# Patient Record
Sex: FEMALE | Race: WHITE | NOT HISPANIC OR LATINO | Employment: OTHER | URBAN - METROPOLITAN AREA
[De-identification: names, ages, dates, MRNs, and addresses within clinical notes are randomized per-mention and may not be internally consistent; named-entity substitution may affect disease eponyms.]

---

## 2017-01-03 ENCOUNTER — HOSPITAL ENCOUNTER (OUTPATIENT)
Dept: PHYSICAL THERAPY | Facility: HOSPITAL | Age: 62
Setting detail: RECURRING SERIES
Discharge: HOME | End: 2017-02-20
Attending: ORTHOPAEDIC SURGERY | Admitting: ORTHOPAEDIC SURGERY

## 2017-01-17 ENCOUNTER — HOSPITAL ENCOUNTER (OUTPATIENT)
Dept: OTHER | Facility: HOSPITAL | Age: 62
Discharge: HOME | End: 2017-01-17
Attending: UROLOGY | Admitting: UROLOGY

## 2017-01-17 LAB
ANION GAP SERPL CALC-SCNC: 11.2 MMOL/L (ref 10–20)
BASOPHILS # BLD AUTO: 0 10*3/UL (ref 0–0.2)
BASOPHILS NFR BLD AUTO: 1 % (ref 0–2)
BUN SERPL-MCNC: 22 MG/DL (ref 8–20)
BUN/CREAT SERPL: 27.5 (ref 5.4–26.2)
CALCIUM SERPL-MCNC: 9.9 MG/DL (ref 8.9–10.3)
CHLORIDE SERPL-SCNC: 101 MMOL/L (ref 101–111)
CONV CO2: 30 MMOL/L (ref 22–32)
CREAT UR-MCNC: 0.8 MG/DL (ref 0.4–1)
DIFFERENTIAL METHOD BLD: (no result)
EOSINOPHIL # BLD AUTO: 0.1 10*3/UL (ref 0–0.3)
EOSINOPHIL # BLD AUTO: 2 % (ref 0–3)
ERYTHROCYTE [DISTWIDTH] IN BLOOD BY AUTOMATED COUNT: 14.2 % (ref 11.5–14.5)
GLUCOSE SERPL-MCNC: 108 MG/DL (ref 65–99)
HCT VFR BLD AUTO: 38.8 % (ref 35–49)
HGB BLD-MCNC: 13 G/DL (ref 12–15)
LYMPHOCYTES # BLD AUTO: 1 10*3/UL (ref 0.8–4.8)
LYMPHOCYTES NFR BLD AUTO: 27 % (ref 18–42)
MCH RBC QN AUTO: 29.5 PG (ref 26–32)
MCHC RBC AUTO-ENTMCNC: 33.5 G/DL (ref 32–36)
MCV RBC AUTO: 88.1 FL (ref 80–94)
MONOCYTES # BLD AUTO: 0.3 10*3/UL (ref 0.1–1.3)
MONOCYTES NFR BLD AUTO: 7 % (ref 2–11)
NEUTROPHILS # BLD AUTO: 2.3 10*3/UL (ref 2.3–8.6)
NEUTROPHILS NFR BLD AUTO: 63 % (ref 50–75)
NRBC BLD AUTO-RTO: 0 /100{WBCS}
NRBC/RBC NFR BLD MANUAL: 0 10*3/UL
PLATELET # BLD AUTO: 168 10*3/UL (ref 150–450)
PMV BLD AUTO: 8.6 FL (ref 7.4–10.4)
POTASSIUM SERPL-SCNC: 4.2 MMOL/L (ref 3.6–5.1)
RBC # BLD AUTO: 4.41 10*6/UL (ref 4–5.4)
SODIUM SERPL-SCNC: 138 MMOL/L (ref 136–144)
WBC # BLD AUTO: 3.7 10*3/UL (ref 4.5–11.5)

## 2017-02-01 ENCOUNTER — HOSPITAL ENCOUNTER (OUTPATIENT)
Dept: GENERAL RADIOLOGY | Facility: HOSPITAL | Age: 62
Discharge: HOME | End: 2017-02-01
Attending: UROLOGY | Admitting: UROLOGY

## 2017-02-09 ENCOUNTER — HOSPITAL ENCOUNTER (OUTPATIENT)
Dept: SLEEP MEDICINE | Facility: HOSPITAL | Age: 62
Discharge: HOME | End: 2017-02-09
Attending: INTERNAL MEDICINE | Admitting: INTERNAL MEDICINE

## 2017-02-15 ENCOUNTER — HOSPITAL ENCOUNTER (OUTPATIENT)
Dept: PAIN MEDICINE | Facility: HOSPITAL | Age: 62
Discharge: HOME | End: 2017-02-15
Attending: ANESTHESIOLOGY | Admitting: ANESTHESIOLOGY

## 2017-02-18 ENCOUNTER — HOSPITAL ENCOUNTER (OUTPATIENT)
Dept: ULTRASOUND IMAGING | Facility: HOSPITAL | Age: 62
Discharge: HOME | End: 2017-02-18
Attending: INTERNAL MEDICINE | Admitting: INTERNAL MEDICINE

## 2017-02-18 LAB
ALBUMIN SERPL-MCNC: 3.6 G/DL (ref 3.5–4.8)
ALBUMIN/GLOB SERPL: 1 {RATIO} (ref 1–1.7)
ALP SERPL-CCNC: 64 IU/L (ref 32–91)
ALT SERPL-CCNC: 21 IU/L (ref 14–54)
ANION GAP SERPL CALC-SCNC: 12.5 MMOL/L (ref 10–20)
AST SERPL-CCNC: 23 IU/L (ref 15–41)
BASOPHILS # BLD AUTO: 0 10*3/UL (ref 0–0.2)
BASOPHILS NFR BLD AUTO: 1 % (ref 0–2)
BILIRUB SERPL-MCNC: 0.6 MG/DL (ref 0.3–1.2)
BUN SERPL-MCNC: 16 MG/DL (ref 8–20)
BUN/CREAT SERPL: 20 (ref 5.4–26.2)
CALCIUM SERPL-MCNC: 9.4 MG/DL (ref 8.9–10.3)
CHLORIDE SERPL-SCNC: 101 MMOL/L (ref 101–111)
CONV CO2: 29 MMOL/L (ref 22–32)
CONV TOTAL PROTEIN: 7.1 G/DL (ref 6.1–7.9)
CREAT UR-MCNC: 0.8 MG/DL (ref 0.4–1)
DIFFERENTIAL METHOD BLD: (no result)
EOSINOPHIL # BLD AUTO: 0.1 10*3/UL (ref 0–0.3)
EOSINOPHIL # BLD AUTO: 1 % (ref 0–3)
ERYTHROCYTE [DISTWIDTH] IN BLOOD BY AUTOMATED COUNT: 14.1 % (ref 11.5–14.5)
GLOBULIN UR ELPH-MCNC: 3.5 G/DL (ref 2.5–3.8)
GLUCOSE SERPL-MCNC: 100 MG/DL (ref 65–99)
HCT VFR BLD AUTO: 36.7 % (ref 35–49)
HGB BLD-MCNC: 12.3 G/DL (ref 12–15)
INR PPP: 0.8
LYMPHOCYTES # BLD AUTO: 1.3 10*3/UL (ref 0.8–4.8)
LYMPHOCYTES NFR BLD AUTO: 29 % (ref 18–42)
MCH RBC QN AUTO: 29.7 PG (ref 26–32)
MCHC RBC AUTO-ENTMCNC: 33.7 G/DL (ref 32–36)
MCV RBC AUTO: 88.3 FL (ref 80–94)
MONOCYTES # BLD AUTO: 0.4 10*3/UL (ref 0.1–1.3)
MONOCYTES NFR BLD AUTO: 9 % (ref 2–11)
NEUTROPHILS # BLD AUTO: 2.6 10*3/UL (ref 2.3–8.6)
NEUTROPHILS NFR BLD AUTO: 60 % (ref 50–75)
NRBC BLD AUTO-RTO: 0 /100{WBCS}
NRBC/RBC NFR BLD MANUAL: 0 10*3/UL
PLATELET # BLD AUTO: 146 10*3/UL (ref 150–450)
PMV BLD AUTO: 8.4 FL (ref 7.4–10.4)
POTASSIUM SERPL-SCNC: 3.5 MMOL/L (ref 3.6–5.1)
PROTHROMBIN TIME: 11.4 SEC (ref 9.6–11.7)
RBC # BLD AUTO: 4.15 10*6/UL (ref 4–5.4)
SODIUM SERPL-SCNC: 139 MMOL/L (ref 136–144)
WBC # BLD AUTO: 4.4 10*3/UL (ref 4.5–11.5)

## 2017-02-20 LAB — CONV AFP: 4 NG/ML (ref 0–9)

## 2017-04-26 ENCOUNTER — HOSPITAL ENCOUNTER (OUTPATIENT)
Dept: PAIN MEDICINE | Facility: HOSPITAL | Age: 62
Discharge: HOME | End: 2017-04-26
Attending: ANESTHESIOLOGY | Admitting: ANESTHESIOLOGY

## 2017-06-15 ENCOUNTER — HOSPITAL ENCOUNTER (OUTPATIENT)
Dept: FAMILY MEDICINE CLINIC | Facility: CLINIC | Age: 62
Setting detail: SPECIMEN
Discharge: HOME | End: 2017-06-15
Attending: PHYSICIAN ASSISTANT | Admitting: PHYSICIAN ASSISTANT

## 2017-06-15 LAB
ALBUMIN SERPL-MCNC: 4.1 G/DL (ref 3.5–4.8)
ALBUMIN/GLOB SERPL: 1.1 {RATIO} (ref 1–1.7)
ALP SERPL-CCNC: 69 IU/L (ref 32–91)
ALT SERPL-CCNC: 9 IU/L (ref 14–54)
ANION GAP SERPL CALC-SCNC: 17.4 MMOL/L (ref 10–20)
AST SERPL-CCNC: 27 IU/L (ref 15–41)
BASOPHILS # BLD AUTO: 0 10*3/UL (ref 0–0.2)
BASOPHILS NFR BLD AUTO: 1 % (ref 0–2)
BILIRUB SERPL-MCNC: 0.6 MG/DL (ref 0.3–1.2)
BUN SERPL-MCNC: 20 MG/DL (ref 8–20)
BUN/CREAT SERPL: 22.2 (ref 5.4–26.2)
CALCIUM SERPL-MCNC: 10.3 MG/DL (ref 8.9–10.3)
CHLORIDE SERPL-SCNC: 97 MMOL/L (ref 101–111)
CHOLEST SERPL-MCNC: 230 MG/DL
CHOLEST/HDLC SERPL: 3.1 {RATIO}
CONV CO2: 30 MMOL/L (ref 22–32)
CONV LDL CHOLESTEROL DIRECT: 134 MG/DL (ref 0–100)
CONV MICROALBUM.,U,RANDOM: NORMAL MG/L
CONV TOTAL PROTEIN: 7.8 G/DL (ref 6.1–7.9)
CREAT 24H UR-MCNC: 49 MG/DL
CREAT UR-MCNC: 0.9 MG/DL (ref 0.4–1)
DIFFERENTIAL METHOD BLD: (no result)
EOSINOPHIL # BLD AUTO: 0.1 10*3/UL (ref 0–0.3)
EOSINOPHIL # BLD AUTO: 2 % (ref 0–3)
ERYTHROCYTE [DISTWIDTH] IN BLOOD BY AUTOMATED COUNT: 13.9 % (ref 11.5–14.5)
GLOBULIN UR ELPH-MCNC: 3.7 G/DL (ref 2.5–3.8)
GLUCOSE SERPL-MCNC: 92 MG/DL (ref 65–99)
HCT VFR BLD AUTO: 39.3 % (ref 35–49)
HDLC SERPL-MCNC: 74 MG/DL
HGB BLD-MCNC: 13.2 G/DL (ref 12–15)
LDLC/HDLC SERPL: 1.8 {RATIO}
LIPID INTERPRETATION: ABNORMAL
LYMPHOCYTES # BLD AUTO: 1.6 10*3/UL (ref 0.8–4.8)
LYMPHOCYTES NFR BLD AUTO: 25 % (ref 18–42)
MCH RBC QN AUTO: 29.7 PG (ref 26–32)
MCHC RBC AUTO-ENTMCNC: 33.7 G/DL (ref 32–36)
MCV RBC AUTO: 88.3 FL (ref 80–94)
MICROALBUMIN/CREAT UR: NORMAL UG/MG
MONOCYTES # BLD AUTO: 0.6 10*3/UL (ref 0.1–1.3)
MONOCYTES NFR BLD AUTO: 9 % (ref 2–11)
NEUTROPHILS # BLD AUTO: 4.1 10*3/UL (ref 2.3–8.6)
NEUTROPHILS NFR BLD AUTO: 63 % (ref 50–75)
NRBC BLD AUTO-RTO: 0 /100{WBCS}
NRBC/RBC NFR BLD MANUAL: 0 10*3/UL
PLATELET # BLD AUTO: 183 10*3/UL (ref 150–450)
PMV BLD AUTO: 9.3 FL (ref 7.4–10.4)
POTASSIUM SERPL-SCNC: 4.4 MMOL/L (ref 3.6–5.1)
RBC # BLD AUTO: 4.45 10*6/UL (ref 4–5.4)
SODIUM SERPL-SCNC: 140 MMOL/L (ref 136–144)
TRIGL SERPL-MCNC: 152 MG/DL
TSH SERPL-ACNC: 1.21 UIU/ML (ref 0.34–5.6)
VLDLC SERPL CALC-MCNC: 21.8 MG/DL
WBC # BLD AUTO: 6.4 10*3/UL (ref 4.5–11.5)

## 2017-06-21 ENCOUNTER — HOSPITAL ENCOUNTER (OUTPATIENT)
Dept: PAIN MEDICINE | Facility: HOSPITAL | Age: 62
Discharge: HOME | End: 2017-06-21
Attending: ANESTHESIOLOGY | Admitting: ANESTHESIOLOGY

## 2017-06-24 ENCOUNTER — HOSPITAL ENCOUNTER (OUTPATIENT)
Dept: MRI IMAGING | Facility: HOSPITAL | Age: 62
Discharge: HOME | End: 2017-06-24
Attending: ANESTHESIOLOGY | Admitting: ANESTHESIOLOGY

## 2017-07-18 ENCOUNTER — HOSPITAL ENCOUNTER (OUTPATIENT)
Dept: CARDIOLOGY | Facility: HOSPITAL | Age: 62
Discharge: HOME | End: 2017-07-18
Attending: PHYSICIAN ASSISTANT | Admitting: PHYSICIAN ASSISTANT

## 2017-08-09 ENCOUNTER — HOSPITAL ENCOUNTER (OUTPATIENT)
Dept: PAIN MEDICINE | Facility: HOSPITAL | Age: 62
Discharge: HOME | End: 2017-08-09
Attending: ANESTHESIOLOGY | Admitting: ANESTHESIOLOGY

## 2017-08-30 ENCOUNTER — HOSPITAL ENCOUNTER (OUTPATIENT)
Dept: OTHER | Facility: HOSPITAL | Age: 62
Discharge: HOME | End: 2017-08-30
Attending: PHYSICIAN ASSISTANT | Admitting: PHYSICIAN ASSISTANT

## 2017-08-30 LAB
ALBUMIN SERPL-MCNC: 3.7 G/DL (ref 3.5–4.8)
ALBUMIN/GLOB SERPL: 1 {RATIO} (ref 1–1.7)
ALP SERPL-CCNC: 60 IU/L (ref 32–91)
ALT SERPL-CCNC: 11 IU/L (ref 14–54)
ANION GAP SERPL CALC-SCNC: 15 MMOL/L (ref 10–20)
AST SERPL-CCNC: 24 IU/L (ref 15–41)
BASOPHILS # BLD AUTO: 0 10*3/UL (ref 0–0.2)
BASOPHILS NFR BLD AUTO: 1 % (ref 0–2)
BILIRUB SERPL-MCNC: 0.6 MG/DL (ref 0.3–1.2)
BUN SERPL-MCNC: 17 MG/DL (ref 8–20)
BUN/CREAT SERPL: 21.3 (ref 5.4–26.2)
CALCIUM SERPL-MCNC: 9.6 MG/DL (ref 8.9–10.3)
CHLORIDE SERPL-SCNC: 98 MMOL/L (ref 101–111)
CONV AFP: 4 NG/ML (ref 0–9)
CONV CO2: 30 MMOL/L (ref 22–32)
CONV TOTAL PROTEIN: 7.4 G/DL (ref 6.1–7.9)
CREAT UR-MCNC: 0.8 MG/DL (ref 0.4–1)
DIFFERENTIAL METHOD BLD: (no result)
EOSINOPHIL # BLD AUTO: 0.1 10*3/UL (ref 0–0.3)
EOSINOPHIL # BLD AUTO: 2 % (ref 0–3)
ERYTHROCYTE [DISTWIDTH] IN BLOOD BY AUTOMATED COUNT: 14.2 % (ref 11.5–14.5)
GLOBULIN UR ELPH-MCNC: 3.7 G/DL (ref 2.5–3.8)
GLUCOSE SERPL-MCNC: 110 MG/DL (ref 65–99)
HCT VFR BLD AUTO: 36.5 % (ref 35–49)
HGB BLD-MCNC: 12.2 G/DL (ref 12–15)
INR PPP: 1 (ref 2–3)
LYMPHOCYTES # BLD AUTO: 1.4 10*3/UL (ref 0.8–4.8)
LYMPHOCYTES NFR BLD AUTO: 32 % (ref 18–42)
MCH RBC QN AUTO: 29.2 PG (ref 26–32)
MCHC RBC AUTO-ENTMCNC: 33.5 G/DL (ref 32–36)
MCV RBC AUTO: 87.4 FL (ref 80–94)
MONOCYTES # BLD AUTO: 0.4 10*3/UL (ref 0.1–1.3)
MONOCYTES NFR BLD AUTO: 9 % (ref 2–11)
NEUTROPHILS # BLD AUTO: 2.5 10*3/UL (ref 2.3–8.6)
NEUTROPHILS NFR BLD AUTO: 56 % (ref 50–75)
NRBC BLD AUTO-RTO: 0 /100{WBCS}
NRBC/RBC NFR BLD MANUAL: 0 10*3/UL
PLATELET # BLD AUTO: 154 10*3/UL (ref 150–450)
PMV BLD AUTO: 8 FL (ref 7.4–10.4)
POTASSIUM SERPL-SCNC: 4 MMOL/L (ref 3.6–5.1)
PROTHROMBIN TIME: 10.8 SEC (ref 19.4–28.5)
RBC # BLD AUTO: 4.18 10*6/UL (ref 4–5.4)
SODIUM SERPL-SCNC: 139 MMOL/L (ref 136–144)
WBC # BLD AUTO: 4.4 10*3/UL (ref 4.5–11.5)

## 2017-09-12 ENCOUNTER — HOSPITAL ENCOUNTER (OUTPATIENT)
Dept: ORTHOPEDIC SURGERY | Facility: CLINIC | Age: 62
Discharge: HOME | End: 2017-09-12
Attending: ORTHOPAEDIC SURGERY | Admitting: ORTHOPAEDIC SURGERY

## 2017-10-03 ENCOUNTER — HOSPITAL ENCOUNTER (OUTPATIENT)
Dept: PREADMISSION TESTING | Facility: HOSPITAL | Age: 62
Discharge: HOME | End: 2017-10-03
Attending: ORTHOPAEDIC SURGERY | Admitting: ORTHOPAEDIC SURGERY

## 2017-10-03 LAB
ABO + RH BLD: NORMAL
ALBUMIN SERPL-MCNC: 3.7 G/DL (ref 3.5–4.8)
ALBUMIN/GLOB SERPL: 1 {RATIO} (ref 1–1.7)
ALP SERPL-CCNC: 69 IU/L (ref 32–91)
ALT SERPL-CCNC: 9 IU/L (ref 14–54)
AMPHETAMINES UR QL SCN: NEGATIVE
AMPICILLIN SUSC ISLT: NORMAL
ANION GAP SERPL CALC-SCNC: 11.5 MMOL/L (ref 10–20)
ARMBAND: NORMAL
AST SERPL-CCNC: 29 IU/L (ref 15–41)
AZTREONAM SUSC ISLT: NORMAL
BACTERIA ISLT: NORMAL
BACTERIA SPEC AEROBE CULT: NORMAL
BACTERIA SPEC AEROBE CULT: NORMAL
BARBITURATES UR QL SCN: NEGATIVE
BASOPHILS # BLD AUTO: 0 10*3/UL (ref 0–0.2)
BASOPHILS NFR BLD AUTO: 1 % (ref 0–2)
BENZODIAZ UR QL SCN: NEGATIVE
BILIRUB SERPL-MCNC: 0.6 MG/DL (ref 0.3–1.2)
BILIRUB UR QL STRIP: NEGATIVE MG/DL
BLD COMPONENT TYPE: NORMAL
BLD GP AB SCN SERPL QL: NEGATIVE
BUN SERPL-MCNC: 19 MG/DL (ref 8–20)
BUN/CREAT SERPL: 21.1 (ref 5.4–26.2)
BZE UR QL SCN: NEGATIVE
CALCIUM SERPL-MCNC: 9.4 MG/DL (ref 8.9–10.3)
CASTS URNS QL MICRO: ABNORMAL /[LPF]
CEFAZOLIN SUSC ISLT: NORMAL
CEFEPIME SUSC ISLT: NORMAL
CEFTRIAXONE SUSC ISLT: NORMAL
CHLORIDE SERPL-SCNC: 97 MMOL/L (ref 101–111)
CIPROFLOXACIN SUSC ISLT: NORMAL
COLONY COUNT: NORMAL
COLOR UR: YELLOW
CONV BACTERIA IN URINE MICRO: ABNORMAL
CONV CLARITY OF URINE: ABNORMAL
CONV CO2: 30 MMOL/L (ref 22–32)
CONV HYALINE CASTS IN URINE MICRO: ABNORMAL /[LPF] (ref 0–5)
CONV PROTEIN IN URINE BY AUTOMATED TEST STRIP: NEGATIVE MG/DL
CONV SMALL ROUND CELLS: ABNORMAL /[HPF]
CONV TOTAL PROTEIN: 7.3 G/DL (ref 6.1–7.9)
CONV UROBILINOGEN IN URINE BY AUTOMATED TEST STRIP: 0.2 MG/DL
CREAT 24H UR-MCNC: NORMAL MG/DL
CREAT UR-MCNC: 0.9 MG/DL (ref 0.4–1)
CROSSMATCH EXPIRATION: NORMAL
CULTURE INDICATED?: ABNORMAL
DIFFERENTIAL METHOD BLD: (no result)
EOSINOPHIL # BLD AUTO: 0.1 10*3/UL (ref 0–0.3)
EOSINOPHIL # BLD AUTO: 2 % (ref 0–3)
ERTAPENEM SUSC ISLT: NORMAL
ERYTHROCYTE [DISTWIDTH] IN BLOOD BY AUTOMATED COUNT: 14.2 % (ref 11.5–14.5)
GLOBULIN UR ELPH-MCNC: 3.6 G/DL (ref 2.5–3.8)
GLUCOSE SERPL-MCNC: 121 MG/DL (ref 65–99)
GLUCOSE UR QL: NEGATIVE MG/DL
HCT VFR BLD AUTO: 37 % (ref 35–49)
HGB BLD-MCNC: 12.4 G/DL (ref 12–15)
HGB UR QL STRIP: NEGATIVE
KETONES UR QL STRIP: NEGATIVE MG/DL
LEUKOCYTE ESTERASE UR QL STRIP: ABNORMAL
LEVOFLOXACIN SUSC ISLT: NORMAL
LYMPHOCYTES # BLD AUTO: 1.4 10*3/UL (ref 0.8–4.8)
LYMPHOCYTES NFR BLD AUTO: 26 % (ref 18–42)
Lab: NORMAL
Lab: NORMAL
MCH RBC QN AUTO: 29.4 PG (ref 26–32)
MCHC RBC AUTO-ENTMCNC: 33.7 G/DL (ref 32–36)
MCV RBC AUTO: 87.2 FL (ref 80–94)
MEROPENEM SUSC ISLT: NORMAL
METHADONE UR QL SCN: NEGATIVE
MICRO REPORT STATUS: NORMAL
MICRO REPORT STATUS: NORMAL
MONOCYTES # BLD AUTO: 0.4 10*3/UL (ref 0.1–1.3)
MONOCYTES NFR BLD AUTO: 8 % (ref 2–11)
NEUTROPHILS # BLD AUTO: 3.4 10*3/UL (ref 2.3–8.6)
NEUTROPHILS NFR BLD AUTO: 63 % (ref 50–75)
NITRITE UR QL STRIP: POSITIVE
NITROFURANTOIN SUSC ISLT: NORMAL
NRBC BLD AUTO-RTO: 0 /100{WBCS}
NRBC/RBC NFR BLD MANUAL: 0 10*3/UL
OPIATES TESTED UR SCN: NEGATIVE
PCP UR QL: NEGATIVE
PH UR STRIP.AUTO: 7 [PH] (ref 4.5–8)
PIP+TAZO SUSC ISLT: NORMAL
PLATELET # BLD AUTO: 155 10*3/UL (ref 150–450)
PMV BLD AUTO: 8.4 FL (ref 7.4–10.4)
POTASSIUM SERPL-SCNC: 3.5 MMOL/L (ref 3.6–5.1)
RBC # BLD AUTO: 4.24 10*6/UL (ref 4–5.4)
RBC #/AREA URNS HPF: 1 /[HPF] (ref 0–3)
SODIUM SERPL-SCNC: 135 MMOL/L (ref 136–144)
SP GR UR: 1.02 (ref 1–1.03)
SPECIMEN SOURCE: ABNORMAL
SPECIMEN SOURCE: NORMAL
SPECIMEN SOURCE: NORMAL
SPERM URNS QL MICRO: ABNORMAL /[HPF]
SQUAMOUS SPT QL MICRO: 0 /[HPF] (ref 0–5)
SUSC METH SPEC: NORMAL
TETRACYCLINE SUSC ISLT: NORMAL
THC SERPLBLD CFM-MCNC: NEGATIVE NG/ML
TOBRAMYCIN SUSC ISLT: NORMAL
TRIMETHOPRIM/SULFA: NORMAL
UNIDENT CRYS URNS QL MICRO: ABNORMAL /[HPF]
WBC # BLD AUTO: 5.3 10*3/UL (ref 4.5–11.5)
WBC #/AREA URNS HPF: 25 /[HPF] (ref 0–5)
YEAST SPEC QL WET PREP: ABNORMAL /[HPF]

## 2017-10-10 ENCOUNTER — HOSPITAL ENCOUNTER (OUTPATIENT)
Dept: LAB | Facility: HOSPITAL | Age: 62
Discharge: HOME | End: 2017-10-10
Attending: PHYSICIAN ASSISTANT | Admitting: PHYSICIAN ASSISTANT

## 2017-10-10 LAB
BILIRUB UR QL STRIP: NEGATIVE MG/DL
CASTS URNS QL MICRO: NORMAL /[LPF]
COLOR UR: YELLOW
CONV BACTERIA IN URINE MICRO: NEGATIVE
CONV CLARITY OF URINE: CLEAR
CONV HYALINE CASTS IN URINE MICRO: 1 /[LPF] (ref 0–5)
CONV PROTEIN IN URINE BY AUTOMATED TEST STRIP: NEGATIVE MG/DL
CONV SMALL ROUND CELLS: NORMAL /[HPF]
CONV UROBILINOGEN IN URINE BY AUTOMATED TEST STRIP: 1 MG/DL
CULTURE INDICATED?: NORMAL
GLUCOSE UR QL: NEGATIVE MG/DL
HGB UR QL STRIP: NEGATIVE
KETONES UR QL STRIP: NEGATIVE MG/DL
LEUKOCYTE ESTERASE UR QL STRIP: NEGATIVE
NITRITE UR QL STRIP: NEGATIVE
PH UR STRIP.AUTO: 6.5 [PH] (ref 4.5–8)
RBC #/AREA URNS HPF: 1 /[HPF] (ref 0–3)
SP GR UR: 1.02 (ref 1–1.03)
SPERM URNS QL MICRO: NORMAL /[HPF]
SQUAMOUS SPT QL MICRO: 1 /[HPF] (ref 0–5)
UNIDENT CRYS URNS QL MICRO: NORMAL /[HPF]
WBC #/AREA URNS HPF: 2 /[HPF] (ref 0–5)
YEAST SPEC QL WET PREP: NORMAL /[HPF]

## 2017-10-11 ENCOUNTER — HOSPITAL ENCOUNTER (OUTPATIENT)
Dept: CARDIOLOGY | Facility: HOSPITAL | Age: 62
Discharge: HOME | End: 2017-10-11
Attending: INTERNAL MEDICINE | Admitting: INTERNAL MEDICINE

## 2017-12-12 ENCOUNTER — ON CAMPUS - OUTPATIENT (AMBULATORY)
Dept: URBAN - METROPOLITAN AREA HOSPITAL 85 | Facility: HOSPITAL | Age: 62
End: 2017-12-12

## 2017-12-12 ENCOUNTER — HOSPITAL ENCOUNTER (OUTPATIENT)
Dept: PREOP | Facility: HOSPITAL | Age: 62
Setting detail: HOSPITAL OUTPATIENT SURGERY
Discharge: HOME | End: 2017-12-12
Attending: INTERNAL MEDICINE | Admitting: INTERNAL MEDICINE

## 2017-12-12 DIAGNOSIS — I85.00 ESOPHAGEAL VARICES WITHOUT BLEEDING: ICD-10-CM

## 2017-12-12 DIAGNOSIS — K75.81 NONALCOHOLIC STEATOHEPATITIS (NASH): ICD-10-CM

## 2017-12-12 DIAGNOSIS — K44.9 DIAPHRAGMATIC HERNIA WITHOUT OBSTRUCTION OR GANGRENE: ICD-10-CM

## 2017-12-12 DIAGNOSIS — R13.10 DYSPHAGIA, UNSPECIFIED: ICD-10-CM

## 2017-12-12 PROCEDURE — 43450 DILATE ESOPHAGUS 1/MULT PASS: CPT | Performed by: INTERNAL MEDICINE

## 2017-12-12 PROCEDURE — 43235 EGD DIAGNOSTIC BRUSH WASH: CPT | Performed by: INTERNAL MEDICINE

## 2018-02-02 ENCOUNTER — HOSPITAL ENCOUNTER (OUTPATIENT)
Dept: GENERAL RADIOLOGY | Facility: HOSPITAL | Age: 63
Discharge: HOME | End: 2018-02-02
Attending: UROLOGY | Admitting: UROLOGY

## 2018-02-14 ENCOUNTER — HOSPITAL ENCOUNTER (OUTPATIENT)
Dept: GENERAL RADIOLOGY | Facility: HOSPITAL | Age: 63
Discharge: HOME | End: 2018-02-14
Attending: UROLOGY | Admitting: UROLOGY

## 2018-03-07 ENCOUNTER — HOSPITAL ENCOUNTER (OUTPATIENT)
Dept: ORTHOPEDIC SURGERY | Facility: CLINIC | Age: 63
Discharge: HOME | End: 2018-03-07
Attending: ORTHOPAEDIC SURGERY | Admitting: ORTHOPAEDIC SURGERY

## 2018-03-10 ENCOUNTER — HOSPITAL ENCOUNTER (OUTPATIENT)
Dept: MRI IMAGING | Facility: HOSPITAL | Age: 63
Discharge: HOME | End: 2018-03-10
Attending: ORTHOPAEDIC SURGERY | Admitting: ORTHOPAEDIC SURGERY

## 2018-04-03 ENCOUNTER — HOSPITAL ENCOUNTER (OUTPATIENT)
Dept: FAMILY MEDICINE CLINIC | Facility: CLINIC | Age: 63
Setting detail: SPECIMEN
Discharge: HOME | End: 2018-04-03
Attending: FAMILY MEDICINE | Admitting: FAMILY MEDICINE

## 2018-04-03 LAB
AMPICILLIN SUSC ISLT: NORMAL
AZTREONAM SUSC ISLT: NORMAL
BACTERIA ISLT: NORMAL
BACTERIA SPEC AEROBE CULT: NORMAL
BILIRUB UR QL STRIP: NEGATIVE MG/DL
CASTS URNS QL MICRO: ABNORMAL /[LPF]
CEFAZOLIN SUSC ISLT: NORMAL
CEFEPIME SUSC ISLT: NORMAL
CEFTRIAXONE SUSC ISLT: NORMAL
CIPROFLOXACIN SUSC ISLT: NORMAL
COLONY COUNT: NORMAL
COLOR UR: YELLOW
CONV BACTERIA IN URINE MICRO: ABNORMAL
CONV CLARITY OF URINE: CLEAR
CONV HYALINE CASTS IN URINE MICRO: 0 /[LPF] (ref 0–5)
CONV PROTEIN IN URINE BY AUTOMATED TEST STRIP: NEGATIVE MG/DL
CONV SMALL ROUND CELLS: ABNORMAL /[HPF]
CONV UROBILINOGEN IN URINE BY AUTOMATED TEST STRIP: 0.2 MG/DL
CULTURE INDICATED?: ABNORMAL
ERTAPENEM SUSC ISLT: NORMAL
GLUCOSE UR QL: NEGATIVE MG/DL
HGB UR QL STRIP: NEGATIVE
KETONES UR QL STRIP: NEGATIVE MG/DL
LEUKOCYTE ESTERASE UR QL STRIP: ABNORMAL
LEVOFLOXACIN SUSC ISLT: NORMAL
Lab: NORMAL
MEROPENEM SUSC ISLT: NORMAL
MICRO REPORT STATUS: NORMAL
NITRITE UR QL STRIP: NEGATIVE
NITROFURANTOIN SUSC ISLT: NORMAL
PH UR STRIP.AUTO: 7 [PH] (ref 4.5–8)
PIP+TAZO SUSC ISLT: NORMAL
RBC #/AREA URNS HPF: 1 /[HPF] (ref 0–3)
SP GR UR: 1.02 (ref 1–1.03)
SPECIMEN SOURCE: NORMAL
SPERM URNS QL MICRO: ABNORMAL /[HPF]
SQUAMOUS SPT QL MICRO: 2 /[HPF] (ref 0–5)
SUSC METH SPEC: NORMAL
TETRACYCLINE SUSC ISLT: NORMAL
TOBRAMYCIN SUSC ISLT: NORMAL
TRIMETHOPRIM/SULFA: NORMAL
UNIDENT CRYS URNS QL MICRO: ABNORMAL /[HPF]
WBC #/AREA URNS HPF: 15 /[HPF] (ref 0–5)
YEAST SPEC QL WET PREP: ABNORMAL /[HPF]

## 2018-04-10 ENCOUNTER — HOSPITAL ENCOUNTER (OUTPATIENT)
Dept: PREADMISSION TESTING | Facility: HOSPITAL | Age: 63
Discharge: HOME | End: 2018-04-10
Attending: ORTHOPAEDIC SURGERY | Admitting: ORTHOPAEDIC SURGERY

## 2018-04-10 LAB
ABO + RH BLD: NORMAL
ALBUMIN SERPL-MCNC: 3.5 G/DL (ref 3.5–4.8)
ALBUMIN/GLOB SERPL: 1.1 {RATIO} (ref 1–1.7)
ALP SERPL-CCNC: 72 IU/L (ref 32–91)
ALT SERPL-CCNC: 7 IU/L (ref 14–54)
ANION GAP SERPL CALC-SCNC: 14.8 MMOL/L (ref 10–20)
ARMBAND: NORMAL
AST SERPL-CCNC: 21 IU/L (ref 15–41)
BACTERIA SPEC AEROBE CULT: NORMAL
BASOPHILS # BLD AUTO: 0 10*3/UL (ref 0–0.2)
BASOPHILS NFR BLD AUTO: 0 % (ref 0–2)
BILIRUB SERPL-MCNC: 0.4 MG/DL (ref 0.3–1.2)
BILIRUB UR QL STRIP: NEGATIVE MG/DL
BLD COMPONENT TYPE: NORMAL
BLD GP AB SCN SERPL QL: NEGATIVE
BUN SERPL-MCNC: 17 MG/DL (ref 8–20)
BUN/CREAT SERPL: 18.9 (ref 5.4–26.2)
CALCIUM SERPL-MCNC: 9 MG/DL (ref 8.9–10.3)
CASTS URNS QL MICRO: ABNORMAL /[LPF]
CHLORIDE SERPL-SCNC: 93 MMOL/L (ref 101–111)
COLOR UR: YELLOW
CONV BACTERIA IN URINE MICRO: NEGATIVE
CONV CLARITY OF URINE: CLEAR
CONV CO2: 27 MMOL/L (ref 22–32)
CONV HYALINE CASTS IN URINE MICRO: 1 /[LPF] (ref 0–5)
CONV PROTEIN IN URINE BY AUTOMATED TEST STRIP: NEGATIVE MG/DL
CONV SMALL ROUND CELLS: ABNORMAL /[HPF]
CONV TOTAL PROTEIN: 6.7 G/DL (ref 6.1–7.9)
CONV UROBILINOGEN IN URINE BY AUTOMATED TEST STRIP: 0.2 MG/DL
CREAT UR-MCNC: 0.9 MG/DL (ref 0.4–1)
CROSSMATCH EXPIRATION: NORMAL
CULTURE INDICATED?: ABNORMAL
DIFFERENTIAL METHOD BLD: (no result)
EOSINOPHIL # BLD AUTO: 0.1 10*3/UL (ref 0–0.3)
EOSINOPHIL # BLD AUTO: 1 % (ref 0–3)
ERYTHROCYTE [DISTWIDTH] IN BLOOD BY AUTOMATED COUNT: 16.1 % (ref 11.5–14.5)
GLOBULIN UR ELPH-MCNC: 3.2 G/DL (ref 2.5–3.8)
GLUCOSE SERPL-MCNC: 108 MG/DL (ref 65–99)
GLUCOSE UR QL: NEGATIVE MG/DL
HCT VFR BLD AUTO: 34.7 % (ref 35–49)
HGB BLD-MCNC: 11.5 G/DL (ref 12–15)
HGB UR QL STRIP: NEGATIVE
KETONES UR QL STRIP: NEGATIVE MG/DL
LEUKOCYTE ESTERASE UR QL STRIP: ABNORMAL
LYMPHOCYTES # BLD AUTO: 1.1 10*3/UL (ref 0.8–4.8)
LYMPHOCYTES NFR BLD AUTO: 25 % (ref 18–42)
Lab: NORMAL
MCH RBC QN AUTO: 27.9 PG (ref 26–32)
MCHC RBC AUTO-ENTMCNC: 33.3 G/DL (ref 32–36)
MCV RBC AUTO: 83.8 FL (ref 80–94)
MICRO REPORT STATUS: NORMAL
MONOCYTES # BLD AUTO: 0.4 10*3/UL (ref 0.1–1.3)
MONOCYTES NFR BLD AUTO: 8 % (ref 2–11)
NEUTROPHILS # BLD AUTO: 3 10*3/UL (ref 2.3–8.6)
NEUTROPHILS NFR BLD AUTO: 66 % (ref 50–75)
NITRITE UR QL STRIP: NEGATIVE
NRBC BLD AUTO-RTO: 0 /100{WBCS}
NRBC/RBC NFR BLD MANUAL: 0 10*3/UL
PH UR STRIP.AUTO: 6 [PH] (ref 4.5–8)
PLATELET # BLD AUTO: 183 10*3/UL (ref 150–450)
PMV BLD AUTO: 7.6 FL (ref 7.4–10.4)
POTASSIUM SERPL-SCNC: 3.8 MMOL/L (ref 3.6–5.1)
RBC # BLD AUTO: 4.14 10*6/UL (ref 4–5.4)
RBC #/AREA URNS HPF: 1 /[HPF] (ref 0–3)
SODIUM SERPL-SCNC: 131 MMOL/L (ref 136–144)
SP GR UR: 1.03 (ref 1–1.03)
SPECIMEN SOURCE: ABNORMAL
SPECIMEN SOURCE: NORMAL
SPERM URNS QL MICRO: ABNORMAL /[HPF]
SQUAMOUS SPT QL MICRO: 1 /[HPF] (ref 0–5)
UNIDENT CRYS URNS QL MICRO: ABNORMAL /[HPF]
WBC # BLD AUTO: 4.6 10*3/UL (ref 4.5–11.5)
WBC #/AREA URNS HPF: 6 /[HPF] (ref 0–5)
YEAST SPEC QL WET PREP: ABNORMAL /[HPF]

## 2018-04-12 ENCOUNTER — HOSPITAL ENCOUNTER (OUTPATIENT)
Dept: FAMILY MEDICINE CLINIC | Facility: CLINIC | Age: 63
Setting detail: SPECIMEN
Discharge: HOME | End: 2018-04-12
Attending: PHYSICIAN ASSISTANT | Admitting: PHYSICIAN ASSISTANT

## 2018-04-12 LAB
CHOLEST SERPL-MCNC: 226 MG/DL
CHOLEST/HDLC SERPL: 3 {RATIO}
CONV LDL CHOLESTEROL DIRECT: 120 MG/DL (ref 0–100)
HDLC SERPL-MCNC: 76 MG/DL
LDLC/HDLC SERPL: 1.6 {RATIO}
LIPID INTERPRETATION: ABNORMAL
TRIGL SERPL-MCNC: 116 MG/DL
VLDLC SERPL CALC-MCNC: 30.4 MG/DL

## 2018-05-01 ENCOUNTER — HOSPITAL ENCOUNTER (OUTPATIENT)
Dept: ORTHOPEDIC SURGERY | Facility: CLINIC | Age: 63
Discharge: HOME | End: 2018-05-01
Attending: ORTHOPAEDIC SURGERY | Admitting: ORTHOPAEDIC SURGERY

## 2018-05-22 ENCOUNTER — HOSPITAL ENCOUNTER (OUTPATIENT)
Dept: ORTHOPEDIC SURGERY | Facility: CLINIC | Age: 63
Discharge: HOME | End: 2018-05-22
Attending: ORTHOPAEDIC SURGERY | Admitting: ORTHOPAEDIC SURGERY

## 2018-06-11 ENCOUNTER — HOSPITAL ENCOUNTER (OUTPATIENT)
Dept: RESPIRATORY THERAPY | Facility: HOSPITAL | Age: 63
Discharge: HOME | End: 2018-06-11
Attending: NURSE PRACTITIONER | Admitting: NURSE PRACTITIONER

## 2018-06-13 ENCOUNTER — HOSPITAL ENCOUNTER (OUTPATIENT)
Dept: CT IMAGING | Facility: HOSPITAL | Age: 63
Discharge: HOME | End: 2018-06-13
Attending: UROLOGY | Admitting: UROLOGY

## 2018-06-21 ENCOUNTER — HOSPITAL ENCOUNTER (OUTPATIENT)
Dept: RESPIRATORY THERAPY | Facility: HOSPITAL | Age: 63
Discharge: HOME | End: 2018-06-21
Attending: NURSE PRACTITIONER | Admitting: NURSE PRACTITIONER

## 2018-07-03 ENCOUNTER — HOSPITAL ENCOUNTER (OUTPATIENT)
Dept: ORTHOPEDIC SURGERY | Facility: CLINIC | Age: 63
Discharge: HOME | End: 2018-07-03
Attending: PHYSICIAN ASSISTANT | Admitting: PHYSICIAN ASSISTANT

## 2018-07-03 ENCOUNTER — HOSPITAL ENCOUNTER (OUTPATIENT)
Dept: CT IMAGING | Facility: HOSPITAL | Age: 63
Discharge: HOME | End: 2018-07-03
Attending: PHYSICIAN ASSISTANT | Admitting: PHYSICIAN ASSISTANT

## 2018-07-13 ENCOUNTER — HOSPITAL ENCOUNTER (OUTPATIENT)
Dept: MRI IMAGING | Facility: HOSPITAL | Age: 63
Discharge: HOME | End: 2018-07-13
Attending: PHYSICIAN ASSISTANT | Admitting: PHYSICIAN ASSISTANT

## 2018-07-19 ENCOUNTER — HOSPITAL ENCOUNTER (OUTPATIENT)
Dept: RESPIRATORY THERAPY | Facility: HOSPITAL | Age: 63
Discharge: HOME | End: 2018-07-19
Attending: INTERNAL MEDICINE | Admitting: INTERNAL MEDICINE

## 2018-07-23 ENCOUNTER — HOSPITAL ENCOUNTER (OUTPATIENT)
Dept: FAMILY MEDICINE CLINIC | Facility: CLINIC | Age: 63
Setting detail: SPECIMEN
Discharge: HOME | End: 2018-07-23
Attending: FAMILY MEDICINE | Admitting: FAMILY MEDICINE

## 2018-07-23 LAB
ALBUMIN SERPL-MCNC: 4 G/DL (ref 3.5–4.8)
ALBUMIN/GLOB SERPL: 1.1 {RATIO} (ref 1–1.7)
ALP SERPL-CCNC: 70 IU/L (ref 32–91)
ALT SERPL-CCNC: 10 IU/L (ref 14–54)
ANION GAP SERPL CALC-SCNC: 12.9 MMOL/L (ref 10–20)
AST SERPL-CCNC: 21 IU/L (ref 15–41)
BASOPHILS # BLD AUTO: 0 10*3/UL (ref 0–0.2)
BASOPHILS NFR BLD AUTO: 0 % (ref 0–2)
BILIRUB SERPL-MCNC: 0.5 MG/DL (ref 0.3–1.2)
BUN SERPL-MCNC: 21 MG/DL (ref 8–20)
BUN/CREAT SERPL: 26.3 (ref 5.4–26.2)
CALCIUM SERPL-MCNC: 9.9 MG/DL (ref 8.9–10.3)
CHLORIDE SERPL-SCNC: 97 MMOL/L (ref 101–111)
CONV CO2: 29 MMOL/L (ref 22–32)
CONV TOTAL PROTEIN: 7.5 G/DL (ref 6.1–7.9)
CREAT UR-MCNC: 0.8 MG/DL (ref 0.4–1)
DIFFERENTIAL METHOD BLD: (no result)
EOSINOPHIL # BLD AUTO: 0.1 10*3/UL (ref 0–0.3)
EOSINOPHIL # BLD AUTO: 1 % (ref 0–3)
ERYTHROCYTE [DISTWIDTH] IN BLOOD BY AUTOMATED COUNT: 17.1 % (ref 11.5–14.5)
GLOBULIN UR ELPH-MCNC: 3.5 G/DL (ref 2.5–3.8)
GLUCOSE SERPL-MCNC: 99 MG/DL (ref 65–99)
HCT VFR BLD AUTO: 37.3 % (ref 35–49)
HGB BLD-MCNC: 12.1 G/DL (ref 12–15)
LYMPHOCYTES # BLD AUTO: 1.4 10*3/UL (ref 0.8–4.8)
LYMPHOCYTES NFR BLD AUTO: 19 % (ref 18–42)
MCH RBC QN AUTO: 26.9 PG (ref 26–32)
MCHC RBC AUTO-ENTMCNC: 32.3 G/DL (ref 32–36)
MCV RBC AUTO: 83.1 FL (ref 80–94)
MONOCYTES # BLD AUTO: 0.6 10*3/UL (ref 0.1–1.3)
MONOCYTES NFR BLD AUTO: 8 % (ref 2–11)
NEUTROPHILS # BLD AUTO: 5.4 10*3/UL (ref 2.3–8.6)
NEUTROPHILS NFR BLD AUTO: 72 % (ref 50–75)
NRBC BLD AUTO-RTO: 0 /100{WBCS}
NRBC/RBC NFR BLD MANUAL: 0 10*3/UL
PLATELET # BLD AUTO: 213 10*3/UL (ref 150–450)
PMV BLD AUTO: 8.1 FL (ref 7.4–10.4)
POTASSIUM SERPL-SCNC: 3.9 MMOL/L (ref 3.6–5.1)
RBC # BLD AUTO: 4.49 10*6/UL (ref 4–5.4)
SODIUM SERPL-SCNC: 135 MMOL/L (ref 136–144)
WBC # BLD AUTO: 7.5 10*3/UL (ref 4.5–11.5)

## 2018-07-26 ENCOUNTER — HOSPITAL ENCOUNTER (OUTPATIENT)
Dept: LAB | Facility: HOSPITAL | Age: 63
Discharge: HOME | End: 2018-07-26
Attending: INTERNAL MEDICINE | Admitting: INTERNAL MEDICINE

## 2018-07-26 ENCOUNTER — HOSPITAL ENCOUNTER (OUTPATIENT)
Dept: SLEEP MEDICINE | Facility: HOSPITAL | Age: 63
Discharge: HOME | End: 2018-07-26
Attending: INTERNAL MEDICINE | Admitting: INTERNAL MEDICINE

## 2018-07-26 LAB
BASOPHILS # BLD AUTO: 0 10*3/UL (ref 0–0.2)
BASOPHILS NFR BLD AUTO: 0 % (ref 0–2)
DIFFERENTIAL METHOD BLD: (no result)
EOSINOPHIL # BLD AUTO: 0.1 10*3/UL (ref 0–0.3)
EOSINOPHIL # BLD AUTO: 1 % (ref 0–3)
ERYTHROCYTE [DISTWIDTH] IN BLOOD BY AUTOMATED COUNT: 16.5 % (ref 11.5–14.5)
HCT VFR BLD AUTO: 36 % (ref 35–49)
HGB BLD-MCNC: 11.6 G/DL (ref 12–15)
LYMPHOCYTES # BLD AUTO: 1.4 10*3/UL (ref 0.8–4.8)
LYMPHOCYTES NFR BLD AUTO: 21 % (ref 18–42)
MCH RBC QN AUTO: 26.7 PG (ref 26–32)
MCHC RBC AUTO-ENTMCNC: 32.3 G/DL (ref 32–36)
MCV RBC AUTO: 82.7 FL (ref 80–94)
MONOCYTES # BLD AUTO: 0.6 10*3/UL (ref 0.1–1.3)
MONOCYTES NFR BLD AUTO: 9 % (ref 2–11)
NEUTROPHILS # BLD AUTO: 4.4 10*3/UL (ref 2.3–8.6)
NEUTROPHILS NFR BLD AUTO: 69 % (ref 50–75)
NRBC BLD AUTO-RTO: 0 /100{WBCS}
NRBC/RBC NFR BLD MANUAL: 0 10*3/UL
PLATELET # BLD AUTO: 196 10*3/UL (ref 150–450)
PMV BLD AUTO: 7.8 FL (ref 7.4–10.4)
RBC # BLD AUTO: 4.35 10*6/UL (ref 4–5.4)
WBC # BLD AUTO: 6.5 10*3/UL (ref 4.5–11.5)

## 2018-07-30 ENCOUNTER — HOSPITAL ENCOUNTER (OUTPATIENT)
Dept: HOME HEALTH SERVICES | Facility: HOME HEALTHCARE | Age: 63
Setting detail: SPECIMEN
Discharge: HOME | End: 2018-07-30

## 2018-07-30 LAB
BILIRUB UR QL STRIP: NEGATIVE MG/DL
CASTS URNS QL MICRO: ABNORMAL /[LPF]
COLOR UR: YELLOW
CONV BACTERIA IN URINE MICRO: NEGATIVE
CONV CLARITY OF URINE: CLEAR
CONV HYALINE CASTS IN URINE MICRO: 4 /[LPF] (ref 0–5)
CONV PROTEIN IN URINE BY AUTOMATED TEST STRIP: NEGATIVE MG/DL
CONV SMALL ROUND CELLS: ABNORMAL /[HPF]
CONV UROBILINOGEN IN URINE BY AUTOMATED TEST STRIP: 0.2 MG/DL
CULTURE INDICATED?: ABNORMAL
GLUCOSE UR QL: NEGATIVE MG/DL
HGB UR QL STRIP: NEGATIVE
KETONES UR QL STRIP: NEGATIVE MG/DL
LEUKOCYTE ESTERASE UR QL STRIP: ABNORMAL
NITRITE UR QL STRIP: NEGATIVE
PH UR STRIP.AUTO: 6.5 [PH] (ref 4.5–8)
RBC #/AREA URNS HPF: 4 /[HPF] (ref 0–3)
SP GR UR: 1.02 (ref 1–1.03)
SPERM URNS QL MICRO: ABNORMAL /[HPF]
SQUAMOUS SPT QL MICRO: 4 /[HPF] (ref 0–5)
UNIDENT CRYS URNS QL MICRO: ABNORMAL /[HPF]
WBC #/AREA URNS HPF: 4 /[HPF] (ref 0–5)
YEAST SPEC QL WET PREP: ABNORMAL /[HPF]

## 2018-08-09 ENCOUNTER — HOSPITAL ENCOUNTER (OUTPATIENT)
Dept: SLEEP MEDICINE | Facility: HOSPITAL | Age: 63
Discharge: HOME | End: 2018-08-09
Attending: INTERNAL MEDICINE | Admitting: INTERNAL MEDICINE

## 2018-08-20 ENCOUNTER — HOSPITAL ENCOUNTER (OUTPATIENT)
Dept: RESPIRATORY THERAPY | Facility: HOSPITAL | Age: 63
Discharge: HOME | End: 2018-08-20
Attending: NURSE PRACTITIONER | Admitting: NURSE PRACTITIONER

## 2018-08-28 ENCOUNTER — HOSPITAL ENCOUNTER (OUTPATIENT)
Dept: CARDIOLOGY | Facility: HOSPITAL | Age: 63
Discharge: HOME | End: 2018-08-28
Attending: INTERNAL MEDICINE | Admitting: INTERNAL MEDICINE

## 2018-09-12 ENCOUNTER — HOSPITAL ENCOUNTER (OUTPATIENT)
Dept: ORTHOPEDIC SURGERY | Facility: CLINIC | Age: 63
Discharge: HOME | End: 2018-09-12
Attending: ORTHOPAEDIC SURGERY | Admitting: ORTHOPAEDIC SURGERY

## 2018-10-02 ENCOUNTER — HOSPITAL ENCOUNTER (OUTPATIENT)
Dept: MAMMOGRAPHY | Facility: HOSPITAL | Age: 63
Discharge: HOME | End: 2018-10-02
Attending: FAMILY MEDICINE | Admitting: FAMILY MEDICINE

## 2018-10-15 ENCOUNTER — HOSPITAL ENCOUNTER (OUTPATIENT)
Dept: RESPIRATORY THERAPY | Facility: HOSPITAL | Age: 63
Discharge: HOME | End: 2018-10-15
Attending: NURSE PRACTITIONER | Admitting: NURSE PRACTITIONER

## 2018-10-22 ENCOUNTER — HOSPITAL ENCOUNTER (OUTPATIENT)
Dept: FAMILY MEDICINE CLINIC | Facility: CLINIC | Age: 63
Setting detail: SPECIMEN
Discharge: HOME | End: 2018-10-22
Attending: FAMILY MEDICINE | Admitting: FAMILY MEDICINE

## 2018-10-22 LAB
ANION GAP SERPL CALC-SCNC: 15.7 MMOL/L (ref 10–20)
BUN SERPL-MCNC: 21 MG/DL (ref 8–20)
BUN/CREAT SERPL: 26.3 (ref 5.4–26.2)
CALCIUM SERPL-MCNC: 9.9 MG/DL (ref 8.9–10.3)
CHLORIDE SERPL-SCNC: 95 MMOL/L (ref 101–111)
CONV CO2: 28 MMOL/L (ref 22–32)
CREAT UR-MCNC: 0.8 MG/DL (ref 0.4–1)
GLUCOSE SERPL-MCNC: 151 MG/DL (ref 65–99)
POTASSIUM SERPL-SCNC: 3.7 MMOL/L (ref 3.6–5.1)
SODIUM SERPL-SCNC: 135 MMOL/L (ref 136–144)

## 2018-10-23 LAB — HBA1C MFR BLD: 6.4 % (ref 0–5.6)

## 2018-10-24 ENCOUNTER — HOSPITAL ENCOUNTER (OUTPATIENT)
Dept: GENERAL RADIOLOGY | Facility: HOSPITAL | Age: 63
Discharge: HOME | End: 2018-10-24
Attending: FAMILY MEDICINE | Admitting: FAMILY MEDICINE

## 2018-12-12 ENCOUNTER — OFFICE (AMBULATORY)
Dept: URBAN - METROPOLITAN AREA CLINIC 64 | Facility: CLINIC | Age: 63
End: 2018-12-12

## 2018-12-12 VITALS
WEIGHT: 290 LBS | DIASTOLIC BLOOD PRESSURE: 70 MMHG | HEIGHT: 67 IN | HEART RATE: 83 BPM | SYSTOLIC BLOOD PRESSURE: 122 MMHG

## 2018-12-12 DIAGNOSIS — K62.5 HEMORRHAGE OF ANUS AND RECTUM: ICD-10-CM

## 2018-12-12 DIAGNOSIS — K74.69 OTHER CIRRHOSIS OF LIVER: ICD-10-CM

## 2018-12-12 DIAGNOSIS — R19.4 CHANGE IN BOWEL HABIT: ICD-10-CM

## 2018-12-12 DIAGNOSIS — K21.9 GASTRO-ESOPHAGEAL REFLUX DISEASE WITHOUT ESOPHAGITIS: ICD-10-CM

## 2018-12-12 DIAGNOSIS — R10.10 UPPER ABDOMINAL PAIN, UNSPECIFIED: ICD-10-CM

## 2018-12-12 DIAGNOSIS — R13.10 DYSPHAGIA, UNSPECIFIED: ICD-10-CM

## 2018-12-12 PROCEDURE — 99214 OFFICE O/P EST MOD 30 MIN: CPT | Performed by: NURSE PRACTITIONER

## 2018-12-12 RX ORDER — OMEPRAZOLE 40 MG/1
CAPSULE, DELAYED RELEASE ORAL
Qty: 90 | Refills: 4 | Status: ACTIVE

## 2018-12-27 ENCOUNTER — HOSPITAL ENCOUNTER (OUTPATIENT)
Dept: OTHER | Facility: HOSPITAL | Age: 63
Discharge: HOME | End: 2018-12-27
Attending: NURSE PRACTITIONER | Admitting: NURSE PRACTITIONER

## 2018-12-27 LAB
ALBUMIN SERPL-MCNC: 3.8 G/DL (ref 3.5–4.8)
ALBUMIN/GLOB SERPL: 1.1 {RATIO} (ref 1–1.7)
ALP SERPL-CCNC: 69 IU/L (ref 32–91)
ALT SERPL-CCNC: 10 IU/L (ref 14–54)
ANION GAP SERPL CALC-SCNC: 11.9 MMOL/L (ref 10–20)
AST SERPL-CCNC: 31 IU/L (ref 15–41)
BASOPHILS # BLD AUTO: 0 10*3/UL (ref 0–0.2)
BASOPHILS NFR BLD AUTO: 1 % (ref 0–2)
BILIRUB SERPL-MCNC: 0.4 MG/DL (ref 0.3–1.2)
BUN SERPL-MCNC: 27 MG/DL (ref 8–20)
BUN/CREAT SERPL: 33.8 (ref 5.4–26.2)
CALCIUM SERPL-MCNC: 9.4 MG/DL (ref 8.9–10.3)
CHLORIDE SERPL-SCNC: 101 MMOL/L (ref 101–111)
CONV AFP: 4 NG/ML (ref 0–9)
CONV CO2: 27 MMOL/L (ref 22–32)
CONV TOTAL PROTEIN: 7.3 G/DL (ref 6.1–7.9)
CREAT UR-MCNC: 0.8 MG/DL (ref 0.4–1)
DIFFERENTIAL METHOD BLD: (no result)
EOSINOPHIL # BLD AUTO: 0.1 10*3/UL (ref 0–0.3)
EOSINOPHIL # BLD AUTO: 1 % (ref 0–3)
ERYTHROCYTE [DISTWIDTH] IN BLOOD BY AUTOMATED COUNT: 15.5 % (ref 11.5–14.5)
GLOBULIN UR ELPH-MCNC: 3.5 G/DL (ref 2.5–3.8)
GLUCOSE SERPL-MCNC: 142 MG/DL (ref 65–99)
HCT VFR BLD AUTO: 37.5 % (ref 35–49)
HGB BLD-MCNC: 12.6 G/DL (ref 12–15)
INR PPP: 1
LYMPHOCYTES # BLD AUTO: 1.2 10*3/UL (ref 0.8–4.8)
LYMPHOCYTES NFR BLD AUTO: 17 % (ref 18–42)
MCH RBC QN AUTO: 30.3 PG (ref 26–32)
MCHC RBC AUTO-ENTMCNC: 33.6 G/DL (ref 32–36)
MCV RBC AUTO: 90.1 FL (ref 80–94)
MONOCYTES # BLD AUTO: 0.6 10*3/UL (ref 0.1–1.3)
MONOCYTES NFR BLD AUTO: 9 % (ref 2–11)
NEUTROPHILS # BLD AUTO: 5 10*3/UL (ref 2.3–8.6)
NEUTROPHILS NFR BLD AUTO: 72 % (ref 50–75)
NRBC BLD AUTO-RTO: 0 /100{WBCS}
NRBC/RBC NFR BLD MANUAL: 0 10*3/UL
PLATELET # BLD AUTO: 161 10*3/UL (ref 150–450)
PMV BLD AUTO: 7.6 FL (ref 7.4–10.4)
POTASSIUM SERPL-SCNC: 3.9 MMOL/L (ref 3.6–5.1)
PROTHROMBIN TIME: 10.4 SEC (ref 9.6–11.7)
RBC # BLD AUTO: 4.16 10*6/UL (ref 4–5.4)
SODIUM SERPL-SCNC: 136 MMOL/L (ref 136–144)
WBC # BLD AUTO: 7 10*3/UL (ref 4.5–11.5)

## 2019-01-29 ENCOUNTER — ON CAMPUS - OUTPATIENT (AMBULATORY)
Dept: URBAN - METROPOLITAN AREA HOSPITAL 85 | Facility: HOSPITAL | Age: 64
End: 2019-01-29

## 2019-01-29 ENCOUNTER — HOSPITAL ENCOUNTER (OUTPATIENT)
Dept: GASTROENTEROLOGY | Facility: HOSPITAL | Age: 64
Setting detail: HOSPITAL OUTPATIENT SURGERY
Discharge: HOME | End: 2019-01-29
Attending: INTERNAL MEDICINE | Admitting: INTERNAL MEDICINE

## 2019-01-29 DIAGNOSIS — K76.6 PORTAL HYPERTENSION: ICD-10-CM

## 2019-01-29 DIAGNOSIS — K44.9 DIAPHRAGMATIC HERNIA WITHOUT OBSTRUCTION OR GANGRENE: ICD-10-CM

## 2019-01-29 DIAGNOSIS — K21.9 GASTRO-ESOPHAGEAL REFLUX DISEASE WITHOUT ESOPHAGITIS: ICD-10-CM

## 2019-01-29 DIAGNOSIS — R13.10 DYSPHAGIA, UNSPECIFIED: ICD-10-CM

## 2019-01-29 DIAGNOSIS — K75.81 NONALCOHOLIC STEATOHEPATITIS (NASH): ICD-10-CM

## 2019-01-29 DIAGNOSIS — I85.00 ESOPHAGEAL VARICES WITHOUT BLEEDING: ICD-10-CM

## 2019-01-29 DIAGNOSIS — R10.31 RIGHT LOWER QUADRANT PAIN: ICD-10-CM

## 2019-01-29 DIAGNOSIS — K57.30 DIVERTICULOSIS OF LARGE INTESTINE WITHOUT PERFORATION OR ABS: ICD-10-CM

## 2019-01-29 DIAGNOSIS — K62.1 RECTAL POLYP: ICD-10-CM

## 2019-01-29 LAB
GLUCOSE BLD-MCNC: 108 MG/DL (ref 70–105)
GLUCOSE BLD-MCNC: 133 MG/DL (ref 70–105)

## 2019-01-29 PROCEDURE — 43450 DILATE ESOPHAGUS 1/MULT PASS: CPT | Performed by: INTERNAL MEDICINE

## 2019-01-29 PROCEDURE — 43235 EGD DIAGNOSTIC BRUSH WASH: CPT | Performed by: INTERNAL MEDICINE

## 2019-01-29 PROCEDURE — 45380 COLONOSCOPY AND BIOPSY: CPT | Performed by: INTERNAL MEDICINE

## 2019-04-15 ENCOUNTER — HOSPITAL ENCOUNTER (OUTPATIENT)
Dept: RESPIRATORY THERAPY | Facility: HOSPITAL | Age: 64
Discharge: HOME | End: 2019-04-15
Attending: NURSE PRACTITIONER | Admitting: NURSE PRACTITIONER

## 2019-06-05 ENCOUNTER — HOSPITAL ENCOUNTER (OUTPATIENT)
Dept: FAMILY MEDICINE CLINIC | Facility: CLINIC | Age: 64
Setting detail: SPECIMEN
Discharge: HOME | End: 2019-06-05
Attending: FAMILY MEDICINE | Admitting: FAMILY MEDICINE

## 2019-06-05 ENCOUNTER — CONVERSION ENCOUNTER (OUTPATIENT)
Dept: FAMILY MEDICINE CLINIC | Facility: CLINIC | Age: 64
End: 2019-06-05

## 2019-06-05 VITALS
OXYGEN SATURATION: 96 % | DIASTOLIC BLOOD PRESSURE: 79 MMHG | SYSTOLIC BLOOD PRESSURE: 125 MMHG | RESPIRATION RATE: 14 BRPM | BODY MASS INDEX: 48.05 KG/M2 | HEART RATE: 80 BPM | HEIGHT: 65 IN | WEIGHT: 288.4 LBS

## 2019-06-05 LAB
ALBUMIN SERPL-MCNC: 4 G/DL (ref 3.5–4.8)
ALBUMIN/GLOB SERPL: 1.1 {RATIO} (ref 1–1.7)
ALP SERPL-CCNC: 67 IU/L (ref 32–91)
ALT SERPL-CCNC: 12 IU/L (ref 14–54)
ANION GAP SERPL CALC-SCNC: 16.3 MMOL/L (ref 10–20)
AST SERPL-CCNC: 27 IU/L (ref 15–41)
BASOPHILS # BLD AUTO: 0.1 10*3/UL (ref 0–0.2)
BASOPHILS NFR BLD AUTO: 1 % (ref 0–2)
BILIRUB SERPL-MCNC: 0.6 MG/DL (ref 0.3–1.2)
BUN SERPL-MCNC: 20 MG/DL (ref 8–20)
BUN/CREAT SERPL: 25 (ref 5.4–26.2)
CALCIUM SERPL-MCNC: 9.8 MG/DL (ref 8.9–10.3)
CHLORIDE SERPL-SCNC: 97 MMOL/L (ref 101–111)
CHOLEST SERPL-MCNC: 234 MG/DL
CHOLEST/HDLC SERPL: 3.3 {RATIO}
CONV CO2: 26 MMOL/L (ref 22–32)
CONV LDL CHOLESTEROL DIRECT: 157 MG/DL (ref 0–100)
CONV TOTAL PROTEIN: 7.5 G/DL (ref 6.1–7.9)
CREAT UR-MCNC: 0.8 MG/DL (ref 0.4–1)
DIFFERENTIAL METHOD BLD: (no result)
EOSINOPHIL # BLD AUTO: 0.1 10*3/UL (ref 0–0.3)
EOSINOPHIL # BLD AUTO: 1 % (ref 0–3)
ERYTHROCYTE [DISTWIDTH] IN BLOOD BY AUTOMATED COUNT: 14.9 % (ref 11.5–14.5)
GLOBULIN UR ELPH-MCNC: 3.5 G/DL (ref 2.5–3.8)
GLUCOSE SERPL-MCNC: 103 MG/DL (ref 65–99)
HCT VFR BLD AUTO: 38.3 % (ref 35–49)
HDLC SERPL-MCNC: 71 MG/DL
HGB BLD-MCNC: 12.9 G/DL (ref 12–15)
LDLC/HDLC SERPL: 2.2 {RATIO}
LIPID INTERPRETATION: ABNORMAL
LYMPHOCYTES # BLD AUTO: 1.4 10*3/UL (ref 0.8–4.8)
LYMPHOCYTES NFR BLD AUTO: 20 % (ref 18–42)
MCH RBC QN AUTO: 29.6 PG (ref 26–32)
MCHC RBC AUTO-ENTMCNC: 33.7 G/DL (ref 32–36)
MCV RBC AUTO: 87.7 FL (ref 80–94)
MONOCYTES # BLD AUTO: 0.5 10*3/UL (ref 0.1–1.3)
MONOCYTES NFR BLD AUTO: 6 % (ref 2–11)
NEUTROPHILS # BLD AUTO: 5.3 10*3/UL (ref 2.3–8.6)
NEUTROPHILS NFR BLD AUTO: 72 % (ref 50–75)
NRBC BLD AUTO-RTO: 0 /100{WBCS}
NRBC/RBC NFR BLD MANUAL: 0 10*3/UL
PLATELET # BLD AUTO: 185 10*3/UL (ref 150–450)
PMV BLD AUTO: 8.7 FL (ref 7.4–10.4)
POTASSIUM SERPL-SCNC: 4.3 MMOL/L (ref 3.6–5.1)
RBC # BLD AUTO: 4.37 10*6/UL (ref 4–5.4)
SODIUM SERPL-SCNC: 135 MMOL/L (ref 136–144)
TRIGL SERPL-MCNC: 148 MG/DL
VLDLC SERPL CALC-MCNC: 5.6 MG/DL
WBC # BLD AUTO: 7.3 10*3/UL (ref 4.5–11.5)

## 2019-06-06 LAB — HBA1C MFR BLD: 6.2 % (ref 0–5.6)

## 2019-06-06 NOTE — PROGRESS NOTES
[    Visit Type:  Follow-up Visit  Referring Provider:  Paul Larson MD  Primary Provider:  Paul Larson MD    CC:  3 month followup-COPD, GERD and Diabetes.    History of Present Illness:  Chief complaint:  Hypertension Parkinson's disease gastroesophageal reflux disease type 2 diabetes mellitus bipolar disease seizure disorder without abnormal EEG COPD    The patient is a 63-year-old white female comes in for follow-up and maintenance of her current problems which include    1. Hypertension- Stable -patient on lisinopril 10 mg once a day hydrochlorothiazide 12.5 mg daily potassium replacement therapy.  She denied headache lightheadedness dizziness or chest pain.    2. Type 2 diabetes mellitus - Stable-patient is on Piaglitazone 30 mg daily.  She denied polydipsia polyphagia or polyuria.    3. Bipolar disease - stable- the patient is on Latuda 20 mg daily clonazepam 0.25 mg twice a day.  She denied anxiousness and agitation depressed or suicidal.    4.  Seizure disorder without abnormal EEG- Stable-patient is on try a left elbow and had Keppra and gabapentin.   patient has frequent seizures.  Patient does not have abnormal EEG with her episodes of seizure activity.    5. Parkinson's disease /dystonia - Stable-patient is on Sinemet 25/100 3 times a day and Mirapex 0.5 mg 3 times a day.  Patient continues with tremor involving her head primarily.    6. COPD- Stable-patient is on inhaled long-acting beta adrenergic see her rescue inhaler inhaled  anticholinergics.  She denied recent cough shortness breath wheezing or sputum production.    7. gastroesophageal reflux disease- stable-he is on proton pump inhibitor.  She denied dysphagia or heartburn.    8. recurrent urinary tract infections - Stable -the patient is on Macrobid on a  regular basis.      Past Medical History:     Reviewed history from 09/12/2018 and no changes required:        Chest pain        Hypertension        Anxiety disorder - [facility]         Type II diabetes mellitus        Osteoarthritis        Chronic pain with stenosis        Bipolar Disease        Parkinsons disease        Cirrhosis        Non alcoholic steato-hepatitis        GERD        IBS        Colon polyps        Kidney Stones         eosinophilic colitis        Gastritis        Neck pain        Back pain        Dr. Romo        previous PT        previous Chiropactor        Previous JEMIMA        colitis        DDD        Seizures-onset 7/2016        Sleep apnea--Using Bipap machine  at night            Past Surgical History:     Reviewed history from 03/05/2019 and no changes required:        Stress test-2015        Arthroscopic surgery to bilateral knees        Knee replacement-2000        Cholesysectomy 1997        Hysterectomy-complete 09/2004        Bilateral wrist carpel tunnel surgery         Lithotripsy 10/2016         Left knee replacement Nov 2016        Right arm cellulits-spider bites        Lithotripsy 2/8/2018 4/20/18 ACDF C3-C4 & C6-C7        Heart Catherization 2/2019    Family History Summary:      Reviewed history Last on 03/06/2019 and no changes required:06/05/2019  Father - Has FH Heart Disease - Entered On: 10/26/2015  Father - Has FH Diabetes - Entered On: 10/26/2015  Sister - Has FH High Cholesterol - Entered On: 10/26/2015  Brother - Has FH High Cholesterol - Entered On: 10/26/2015  Brother - Has FH Hypertension - Entered On: 10/26/2015  Brother - Has FH Heart Disease - Entered On: 10/26/2015  Brother - Has FH Diabetes - Entered On: 10/26/2015  Mother - Has FH High Cholesterol - Entered On: 10/26/2015  Mother - Has FH Hypertension - Entered On: 10/26/2015    General Comments - FH:  FH- diabetes  FH- heart attack   FH- hypertension  FH- hyperlipidemia    Social History:     Reviewed history from 03/06/2019 and no changes required:        Patient is a former smoker.        Passive Smoke: N        Alcohol Use: N        Drug Use: N        HIV/High Risk: N         Regular Exercise: N                Risk Factors:     Smoked Tobacco Use:  Former smoker     Cigarettes:  Yes -- 1/2 pack(s) per day,      Year started:  1974 quit:          Years Since Last Quit:    Smokeless Tobacco Use:  Never  Passive smoke exposure:  no  Drug use:  no  HIV high-risk behavior:  no  Caffeine use:  2 drinks per day  Alcohol use:  no  Exercise:  no  Seatbelt use:  100 %  Sun Exposure:  rarely    Family History Risk Factors:     Family History of MI in females < 65 years old:  no     Family History of MI in males < 55 years old:  yes    Previous Tobacco Use: Signed On 2019  Smoked Tobacco Use:  Former smoker     Cigarettes:  Yes -- 1/2 pack(s) per day,      Year started:  1974 quit:          Years Since Last Quit:  29 years, 5 months, 4 days  Smokeless Tobacco Use:  Never     Counseled to quit/cut down:  yes  Passive smoke exposure:  no  Drug use:  no  HIV high-risk behavior:  no  Caffeine use:  2 drinks per day    Previous Alcohol Use: Signed On 2019  Alcohol use:  no  Exercise:  no  Seatbelt use:  100 %  Sun Exposure:  rarely    Family History Risk Factors:     Family History of MI in females < 65 years old:  no     Family History of MI in males < 55 years old:  yes    Colonoscopy History:     Date of Last Colonoscopy:  2019    Mammogram History:     Date of Last Mammogram:  10/02/2018        Vital Signs:    Patient Profile:    63 Years Old Female  Height:     65 inches (165.10 cm)  Weight:     288.4 pounds  BMI:        47.99     O2 Sat:     96 %  Temp:       98.1 degrees F oral  Pulse rate: 80 / minute  Pulse rhythm:   regular  Resp:       14 per minute  BP Sittin / 79  (left arm)    Cuff size:  regular      Problems: Active problems were reviewed with the patient during this visit.  Medications: Medications were reviewed with the patient during this visit.  Allergies: Allergies were reviewed with the patient during this  visit.        Vitals Entered By: Robby Amezcua CMA (June 5, 2019 11:25 AM)      Physical Exam    General:      No distress, obese  Head:      normocephalic and atraumatic.    Eyes:      PERRL/EOM intact, conjunctiva and sclera clear with out nystagmus.    Ears:      TM's intact and clear with normal canals with grossly normal hearing.    Nose:      no deformity, discharge, inflammation, or lesions.    Mouth:      no deformity or lesions with good dentition.    Neck:      no masses, thyromegaly, or abnormal cervical nodes.    Lungs:      clear bilaterally to auscultation.    Heart:      non-displaced PMI, chest non-tender; regular rate and rhythm, S1, S2 without murmurs, rubs, or gallops  Abdomen:       normal bowel sounds; no hepatosplenomegaly no ventral,umbilical hernias or masses noted.  obese  Msk:      no deformity or scoliosis noted of thoracic or lumbar spine.    Extremities:      no clubbing, cyanosis, edema, or deformity noted with normal full range of motion of joints of all four extremities   Skin:      intact without lesions or rashes.        Blood Pressure:  Today's BP: 125/79 mm Hg    Labwork:   Most Recent Lab Results:   LDL: 120 mg/dL 04/12/2018  HbA1c: : 6.4 % 10/23/2018        Impression & Recommendations:    Problem # 1:  Hypertension (ICD-401.9) ([id number])  Assessment: Unchanged    The following medications were removed from the medication list:     Lisinopril-hctz 10-12.5 Mg Tab (Lisinopril-hydrochlorothiazide) ..... Take 1 tablet by mouth every day    Her updated medication list for this problem includes:     Lisinopril-hctz 10-12.5 Mg Tab (Lisinopril-hydrochlorothiazide) ..... Take 1 tablet by mouth every day    Orders:  [facility] LIPID PANEL (LIPID)      Problem # 2:  Diabetes mellitus, type II, uncontrolled (ICD-250.02) (UHF46-R27.65)  Assessment: Unchanged    The following medications were removed from the medication list:     Lisinopril-hctz 10-12.5 Mg Tab (Lisinopril-hydrochlorothiazide)  ..... Take 1 tablet by mouth every day    Her updated medication list for this problem includes:     Adult Aspirin Ec Low Strength 81 Mg Oral Tablet Delayed Release (Aspirin) ..... Take 1 tablet by mouth daily     Lisinopril-hctz 10-12.5 Mg Tab (Lisinopril-hydrochlorothiazide) ..... Take 1 tablet by mouth every day     Pioglitazone Hcl 30 Mg Tablet (Pioglitazone hcl) ..... Take 1 tablet by mouth once daily    Orders:  [facility] CBC W/DIFF; PATH REVIEW IF INDICATED (CBC)  [facility] COMPREHENSIVE METABOLIC PANEL (CMP) (MPC)  [facility] MICROALBUMIN/CREATININE RATIO (MACRE)  [facility] HEMOGLOBIN A1c (A1DCA)      Problem # 3:  Bipolar disorder (ICD-296.80) (QVW45-F72.9)  Assessment: Unchanged    Problem # 4:  COPD (ICD-496) (NIW65-K95.9)  Assessment: Unchanged    Her updated medication list for this problem includes:     Spiriva Handihaler 18 Mcg Inhalation Capsule (Tiotropium bromide monohydrate) ..... 1 capsule inhaled daily     Proair Hfa 108 (90 Base) Mcg/act Inhalation Aerosol Solution (Albuterol sulfate) ..... 2 puffs every 4 hours as needed for wheezing     Symbicort 160-4.5 Mcg/act Inhalation Aerosol (Budesonide-formoterol fumarate) ..... 2 puffs twice a day     Ipratropium-albuterol 0.5-2.5 (3) Mg/3ml Inhalation Solution (Ipratropium-albuterol) ..... Inhale 1 vial daily      Problem # 5:  UTI's, recurrent (ICD-599.0) (JJD33-C32.0)  Assessment: Unchanged    Her updated medication list for this problem includes:     Macrobid 100 Mg Oral Capsule (Nitrofurantoin monohyd macro)      Problem # 6:  Seizure (ICD-780.39) (SMK53-Y70.9)  Assessment: Unchanged    Her updated medication list for this problem includes:     Gabapentin 300 Mg Oral Capsule (Gabapentin) ..... Take 1 tablets by mouth three times daily     Trileptal 600 Mg Oral Tablet (Oxcarbazepine) ..... Take one (1) tablet by mouth twice a day     Vimpat 150 Mg Oral Tablet (Lacosamide) ..... Take one (1) tablet by mouth twice a day     Keppra 750 Mg Oral Tablet (Levetiracetam) .....  Take one (1) tablet by mouth twice a day     Klonopin 0.5 Mg Oral Tablet (Clonazepam) ..... Take 1/2 tablet by mouth twice a day      Problem # 7:  Dystonia (ICD-781.0) (KLS95-K45.9)  Assessment: Unchanged    Problem # 8:  GERD (ICD-530.81) (XPK90-J96.9)  Assessment: Unchanged    Problem # 9:  Cirrhosis (ICD-571.5) (DSF11-Y48.60)  Assessment: Unchanged    Medications Added to Medication List This Visit:  1)  Ipratropium-albuterol 0.5-2.5 (3) Mg/3ml Inhalation Solution (Ipratropium-albuterol) .... Inhale 1 vial daily        Patient Instructions:  1)    Continue current medications and treatment.  2)  Have the follow-up laboratory testing results.  3)  Follow up with me in 3-6 months.                      Medication Administration    Orders Added:  1)  [facility] CBC W/DIFF; PATH REVIEW IF INDICATED [CBC]  2)  [facility] COMPREHENSIVE METABOLIC PANEL (CMP) [MPC]  3)  [facility] MICROALBUMIN/CREATININE RATIO [MACRE]  4)  [facility] HEMOGLOBIN A1c [A1DCA]  5)  Ofc Vst, Est Level IV [63732]  6)  [facility] LIPID PANEL [LIPID]  ]      Electronically signed by Paul Larson MD on 06/05/2019 at 12:03 PM  ________________________________________________________________________       Disclaimer: Converted Note message may not contain all data elements that existed in the legacy source system. Please see [employer] Legacy System for the original note details.

## 2019-06-18 ENCOUNTER — OFFICE VISIT (OUTPATIENT)
Dept: CARDIOLOGY | Facility: CLINIC | Age: 64
End: 2019-06-18

## 2019-06-18 VITALS
WEIGHT: 290 LBS | BODY MASS INDEX: 48.26 KG/M2 | OXYGEN SATURATION: 98 % | SYSTOLIC BLOOD PRESSURE: 151 MMHG | HEART RATE: 81 BPM | DIASTOLIC BLOOD PRESSURE: 85 MMHG

## 2019-06-18 DIAGNOSIS — E66.01 MORBIDLY OBESE (HCC): ICD-10-CM

## 2019-06-18 DIAGNOSIS — R53.1 LEFT-SIDED WEAKNESS: Primary | ICD-10-CM

## 2019-06-18 PROCEDURE — 99214 OFFICE O/P EST MOD 30 MIN: CPT | Performed by: INTERNAL MEDICINE

## 2019-06-18 RX ORDER — PANTOPRAZOLE SODIUM 40 MG/1
40 TABLET, DELAYED RELEASE ORAL DAILY
COMMUNITY
Start: 2018-11-30 | End: 2019-07-18

## 2019-06-18 RX ORDER — OMEPRAZOLE 40 MG/1
40 CAPSULE, DELAYED RELEASE ORAL DAILY
COMMUNITY

## 2019-06-18 RX ORDER — POTASSIUM CHLORIDE 20 MEQ/1
20 TABLET, EXTENDED RELEASE ORAL DAILY
COMMUNITY
End: 2019-08-29 | Stop reason: SDUPTHER

## 2019-06-18 NOTE — PROGRESS NOTES
Encounter Date:06/18/2019  Last seen in the office 3/5/2019      Patient ID: Dariana Rivera is a 63 y.o. female.    Chief Complaint:  Acute visit  Left-sided weakness fullness in the head  History of Present Illness  The patient was last seen on 3/5/2019.  On 6/12/2019 patient had an episode of head fullness left arm numbness and drooling.  Patient was seen by primary care and subsequently was seen by neurologist Dr. Hartley.  Patient was told to be seen by cardiologist also.  Patient denies having any chest discomfort or any cardiac symptoms at this time.    Since I have last seen, the patient has been without any chest discomfort ,shortness of breath, palpitations, dizziness or syncope.  Denies having any headache ,abdominal pain ,nausea, vomiting , diarrhea constipation, loss of weight or loss of appetite.  Denies having any excessive bruising ,hematuria or blood in the stool.  Review of all systems negative except as indicated.    Patient did not have cervical spine surgery.    Assessment:         [[[[[[[[[[[[[[[   impression  ======  -Recent left arm numbness had fullness and drooling from mouth.  Concerning for neurological issue.  Does not appear to be cardiac.  Could be due to cervical spine problems.    - preoperative cardiovascular evaluation prior to having repeat cervical spine surgery.  Surgery has been postponed and patient is taking normal board ox injections for cervical dystonia.  Patient did not have cervical spine surgery    -chest pain and shortness of breath  Normal left ventricular function and normal coronary arteries 02/15/2019    -history of pulmonary problems and is being evaluated by Pulmonary Medicine also.  It is my understanding her risk of surgery is increased from pulmonary standpoint.  Patient was intubated and on the respirator May of 2018. patient had seizure around the time of admission at that time.    -hypertension diabetes bipolar disorder anxiety disorder Parkinson's  disease cirrhosis nonalcoholic steatohepatitis GERD.  History of seizure disorder    -status post cholecystectomy hysterectomy lithotripsy new left knee replacement arthroscopic bilateral knee surgery cervical spine surgery    -exogenous obesity.    -former smoker    -allergic to penicillin Ambien methadone Cipro and clindamycin.  =============  Plan  ==========  Recent left arm numbness had fullness and drooling from mouth.  Concerning for neurological issue.  Does not appear to be cardiac.  Could be due to cervical spine problems.  Patient did not have cervical spine surgery  Patient is not having any angina pectoris or congestive heart failure.  Medications were reviewed and updated today  Attempts to reduce weight.  Patient does not need cervical spine surgery at this time.  The problems does not appear to be cardiac and suggested neurological follow-up.  Followup in the office in 6 months  Further plan will depend on patient's progress.  [[[[[[[[[[[[[[[[[[[         Plan:                      The following portions of the patient's history were reviewed and updated as appropriate: allergies, current medications, past family history, past medical history, past social history, past surgical history and problem list.    Review of Systems   Cardiovascular: Positive for leg swelling (Ankles. ). Negative for chest pain, palpitations and syncope.   Respiratory: Negative for shortness of breath.    Skin: Negative for rash.   Gastrointestinal: Negative for nausea and vomiting.   Neurological: Negative for dizziness, light-headedness and numbness.     /85    Heart Rate 81    SpO2 98 %    Weight 132 kg (290 lb)        There were no vitals taken for this visit.      Current Outpatient Medications:   •  pantoprazole (PROTONIX) 40 MG EC tablet, Take 40 mg by mouth Daily., Disp: , Rfl:   •  aspirin 81 MG oral suspension, Take 81 mg by mouth Daily., Disp: , Rfl:   •  lisinopril 10 MG tablet 10 mg, hydrochlorothiazide 12.5  MG capsule 12.5 mg, Take 10-12.5 mg by mouth Daily., Disp: , Rfl:   •  omeprazole (priLOSEC) 20 MG capsule, Take 20 mg by mouth Daily., Disp: , Rfl:   •  potassium chloride (K-DUR,KLOR-CON) 20 MEQ CR tablet, Take 20 mg by mouth Daily., Disp: , Rfl:     Allergies   Allergen Reactions   • Ambien  [Zolpidem Tartrate] Confusion   • Ciprofloxacin Hcl Rash   • Penicillins Rash   • Methadone Hallucinations   • Clindamycin Rash       Family History   Problem Relation Age of Onset   • Hypertension Mother    • Hyperlipidemia Mother    • Diabetes Father    • Heart disease Father    • Hyperlipidemia Sister    • Diabetes Brother    • Heart disease Brother    • Hypertension Brother    • Hyperlipidemia Brother        Past Surgical History:   Procedure Laterality Date   • CARDIAC CATHETERIZATION  02/2019   • CARPAL TUNNEL RELEASE Bilateral     Wrist    • CHOLECYSTECTOMY  1997   • HYSTERECTOMY  09/2004    complete   • KNEE ARTHROSCOPY Bilateral     Arthroscopic surgery to bilateral knees    • OTHER SURGICAL HISTORY  2015    Stress test    • OTHER SURGICAL HISTORY  10/2016    Lithotripsy   • OTHER SURGICAL HISTORY      Right arm cellulits- spider bites    • OTHER SURGICAL HISTORY  02/08/2018    Lithotripsy   • OTHER SURGICAL HISTORY  04/20/2018    ACDF C3-C4 & C6-C7   • REPLACEMENT TOTAL KNEE  2000   • REPLACEMENT TOTAL KNEE Left 11/2016       Past Medical History:   Diagnosis Date   • Anxiety disorder     [facility]    • Back pain    • Bipolar disorder (CMS/HCC)    • Chest pain    • Chronic pain     with stenosis   • Cirrhosis (CMS/HCC)    • Colitis    • Colon polyps    • DDD (degenerative disc disease), lumbar    • Eosinophilic colitis    • Gastritis    • GERD (gastroesophageal reflux disease)    • Hepatic steatosis     non alcoholic steo-hepatitis    • Hypertension    • IBS (irritable bowel syndrome)    • Kidney stones    • Neck pain    • Osteoarthritis    • Parkinson's disease (CMS/HCC)    • Seizure (CMS/HCC)     onset 7/2016   •  Sleep apnea     Using Bipap machine at night.    • Type II diabetes mellitus (CMS/HCC)        Family History   Problem Relation Age of Onset   • Hypertension Mother    • Hyperlipidemia Mother    • Diabetes Father    • Heart disease Father    • Hyperlipidemia Sister    • Diabetes Brother    • Heart disease Brother    • Hypertension Brother    • Hyperlipidemia Brother        Social History     Socioeconomic History   • Marital status:      Spouse name: Not on file   • Number of children: Not on file   • Years of education: Not on file   • Highest education level: Not on file   Tobacco Use   • Smoking status: Former Smoker   Substance and Sexual Activity   • Alcohol use: No     Frequency: Never   • Drug use: No                  Objective:         Physical Exam    General:      The patient is alert, oriented and in no distress.    Vital signs as noted above.  Exogenous obesity.    Head and neck revealed no carotid bruits or jugular venous distension.  No thyromegaly or lymphadenopathy is present.    Lungs clear.  No wheezing.  Breath sounds are normal bilaterally.    Heart normal first and second heart sounds.  No murmur.  No pericardial rub is present.  No gallop is present.    Abdomen soft and nontender.  No organomegaly is present.    Extremities revealed good peripheral pulses without any pedal edema.    Skin warm and dry.    Musculoskeletal system is grossly normal.    CNS grossly normal.

## 2019-06-21 ENCOUNTER — TELEPHONE (OUTPATIENT)
Dept: PULMONOLOGY | Facility: HOSPITAL | Age: 64
End: 2019-06-21

## 2019-06-21 NOTE — TELEPHONE ENCOUNTER
Please refill medications.  Patient is expecting to  at her pharmacy today at 12:30 pm.  Thank you.

## 2019-06-24 ENCOUNTER — APPOINTMENT (OUTPATIENT)
Dept: CT IMAGING | Facility: HOSPITAL | Age: 64
End: 2019-06-24

## 2019-06-24 ENCOUNTER — APPOINTMENT (OUTPATIENT)
Dept: GENERAL RADIOLOGY | Facility: HOSPITAL | Age: 64
End: 2019-06-24

## 2019-06-24 ENCOUNTER — HOSPITAL ENCOUNTER (EMERGENCY)
Facility: HOSPITAL | Age: 64
Discharge: HOME | End: 2019-06-24
Admitting: EMERGENCY MEDICINE

## 2019-06-24 VITALS
OXYGEN SATURATION: 99 % | DIASTOLIC BLOOD PRESSURE: 65 MMHG | BODY MASS INDEX: 51.38 KG/M2 | WEIGHT: 290 LBS | HEIGHT: 63 IN | SYSTOLIC BLOOD PRESSURE: 140 MMHG | RESPIRATION RATE: 17 BRPM | TEMPERATURE: 98.9 F | HEART RATE: 66 BPM
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
## 2019-06-24 DIAGNOSIS — S40.012A CONTUSION OF LEFT SHOULDER, INITIAL ENCOUNTER: ICD-10-CM

## 2019-06-24 DIAGNOSIS — S16.1XXA STRAIN OF NECK MUSCLE, INITIAL ENCOUNTER: ICD-10-CM

## 2019-06-24 DIAGNOSIS — R56.9 SEIZURE (HCC): ICD-10-CM

## 2019-06-24 DIAGNOSIS — S00.03XA CONTUSION OF SCALP, INITIAL ENCOUNTER: ICD-10-CM

## 2019-06-24 PROCEDURE — 96374 THER/PROPH/DIAG INJ IV PUSH: CPT

## 2019-06-24 PROCEDURE — 70450 CT HEAD/BRAIN W/O DYE: CPT

## 2019-06-24 PROCEDURE — 73030 X-RAY EXAM OF SHOULDER: CPT

## 2019-06-24 PROCEDURE — 25010000003 LEVETIRACETAM IN NACL 0.75% 1000 MG/100ML SOLUTION: Performed by: NURSE PRACTITIONER

## 2019-06-24 PROCEDURE — 25010000002 LORAZEPAM PER 2 MG: Performed by: NURSE PRACTITIONER

## 2019-06-24 PROCEDURE — 96375 TX/PRO/DX INJ NEW DRUG ADDON: CPT

## 2019-06-24 PROCEDURE — 99285 EMERGENCY DEPT VISIT HI MDM: CPT

## 2019-06-24 PROCEDURE — 72125 CT NECK SPINE W/O DYE: CPT

## 2019-06-24 RX ORDER — ALBUTEROL SULFATE 90 UG/1
2 AEROSOL, METERED RESPIRATORY (INHALATION) EVERY 6 HOURS PRN
COMMUNITY
End: 2020-08-19 | Stop reason: SDUPTHER

## 2019-06-24 RX ORDER — LEVETIRACETAM 750 MG/1
750 TABLET ORAL EVERY MORNING
COMMUNITY
End: 2019-11-06

## 2019-06-24 RX ORDER — LACOSAMIDE 100 MG/1
150 TABLET ORAL EVERY 12 HOURS SCHEDULED
COMMUNITY
End: 2019-08-05 | Stop reason: SDUPTHER

## 2019-06-24 RX ORDER — DIPHENHYDRAMINE HCL 50 MG
50 CAPSULE ORAL NIGHTLY PRN
COMMUNITY
End: 2019-08-18 | Stop reason: HOSPADM

## 2019-06-24 RX ORDER — LORAZEPAM 2 MG/ML
1 INJECTION INTRAMUSCULAR ONCE
Status: COMPLETED | OUTPATIENT
Start: 2019-06-24 | End: 2019-06-24

## 2019-06-24 RX ORDER — PIOGLITAZONEHYDROCHLORIDE 30 MG/1
30 TABLET ORAL DAILY
COMMUNITY
End: 2019-11-30 | Stop reason: SDUPTHER

## 2019-06-24 RX ORDER — LEVETIRACETAM 10 MG/ML
1000 INJECTION INTRAVASCULAR ONCE
Status: COMPLETED | OUTPATIENT
Start: 2019-06-24 | End: 2019-06-24

## 2019-06-24 RX ORDER — BUDESONIDE AND FORMOTEROL FUMARATE DIHYDRATE 160; 4.5 UG/1; UG/1
2 AEROSOL RESPIRATORY (INHALATION)
COMMUNITY
End: 2019-12-26 | Stop reason: SDUPTHER

## 2019-06-24 RX ORDER — LACTULOSE 10 G/15ML
15 SOLUTION ORAL NIGHTLY
COMMUNITY
End: 2021-02-17

## 2019-06-24 RX ORDER — HYDROCODONE BITARTRATE AND ACETAMINOPHEN 5; 325 MG/1; MG/1
1 TABLET ORAL ONCE AS NEEDED
Status: DISCONTINUED | OUTPATIENT
Start: 2019-06-24 | End: 2019-06-24 | Stop reason: HOSPADM

## 2019-06-24 RX ORDER — ONDANSETRON 4 MG/1
4 TABLET, ORALLY DISINTEGRATING ORAL ONCE
Status: COMPLETED | OUTPATIENT
Start: 2019-06-24 | End: 2019-06-24

## 2019-06-24 RX ORDER — LEVETIRACETAM 1000 MG/1
1000 TABLET ORAL
COMMUNITY
End: 2019-11-06

## 2019-06-24 RX ORDER — GABAPENTIN 300 MG/1
300 CAPSULE ORAL 3 TIMES DAILY
COMMUNITY

## 2019-06-24 RX ORDER — OXCARBAZEPINE 300 MG/1
600 TABLET, FILM COATED ORAL 2 TIMES DAILY
COMMUNITY
End: 2019-11-06

## 2019-06-24 RX ADMIN — LORAZEPAM 2 MG: 2 INJECTION INTRAMUSCULAR; INTRAVENOUS at 08:17

## 2019-06-24 RX ADMIN — HYDROCODONE BITARTRATE AND ACETAMINOPHEN 1 TABLET: 5; 325 TABLET ORAL at 07:36

## 2019-06-24 RX ADMIN — LEVETIRACETAM 1000 MG: 10 INJECTION INTRAVENOUS at 08:15

## 2019-06-24 RX ADMIN — ONDANSETRON 4 MG: 4 TABLET, ORALLY DISINTEGRATING ORAL at 07:35

## 2019-06-25 ENCOUNTER — OFFICE VISIT (OUTPATIENT)
Dept: FAMILY MEDICINE CLINIC | Facility: CLINIC | Age: 64
End: 2019-06-25

## 2019-06-25 VITALS
OXYGEN SATURATION: 94 % | RESPIRATION RATE: 24 BRPM | BODY MASS INDEX: 48.12 KG/M2 | DIASTOLIC BLOOD PRESSURE: 57 MMHG | HEART RATE: 107 BPM | SYSTOLIC BLOOD PRESSURE: 99 MMHG | WEIGHT: 288.8 LBS | HEIGHT: 65 IN | TEMPERATURE: 98.9 F

## 2019-06-25 DIAGNOSIS — S09.90XD INJURY OF HEAD, SUBSEQUENT ENCOUNTER: Primary | ICD-10-CM
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
## 2019-06-25 DIAGNOSIS — S49.92XD SHOULDER INJURY, LEFT, SUBSEQUENT ENCOUNTER: ICD-10-CM

## 2019-06-25 DIAGNOSIS — S14.109D INJURY OF CERVICAL SPINE, SUBSEQUENT ENCOUNTER (HCC): ICD-10-CM

## 2019-06-25 PROBLEM — G24.9 DYSTONIA: Status: ACTIVE | Noted: 2019-03-06

## 2019-06-25 PROBLEM — N39.0 RECURRENT URINARY TRACT INFECTION: Status: ACTIVE | Noted: 2019-03-06

## 2019-06-25 PROBLEM — M48.02 SPINAL STENOSIS OF CERVICAL REGION: Status: ACTIVE | Noted: 2017-09-12

## 2019-06-25 PROBLEM — J44.9 CHRONIC OBSTRUCTIVE PULMONARY DISEASE (HCC): Status: ACTIVE | Noted: 2017-10-06

## 2019-06-25 PROBLEM — G47.30 SLEEP APNEA: Status: ACTIVE | Noted: 2018-09-21

## 2019-06-25 PROBLEM — M40.209 KYPHOSIS: Status: ACTIVE | Noted: 2018-07-17

## 2019-06-25 PROCEDURE — 99214 OFFICE O/P EST MOD 30 MIN: CPT | Performed by: FAMILY MEDICINE

## 2019-06-25 RX ORDER — LURASIDONE HYDROCHLORIDE 20 MG/1
20 TABLET, FILM COATED ORAL EVERY EVENING
Refills: 2 | COMMUNITY
Start: 2019-05-25 | End: 2019-08-05 | Stop reason: SDUPTHER

## 2019-06-25 RX ORDER — PRAMIPEXOLE DIHYDROCHLORIDE 0.5 MG/1
0.5 TABLET ORAL 3 TIMES DAILY
Refills: 2 | COMMUNITY
Start: 2019-05-29 | End: 2019-12-28 | Stop reason: SDUPTHER

## 2019-06-25 RX ORDER — BACLOFEN 10 MG/1
10 TABLET ORAL 3 TIMES DAILY
Refills: 3 | COMMUNITY
Start: 2019-05-11 | End: 2022-11-29 | Stop reason: DRUGHIGH

## 2019-06-25 RX ORDER — NITROFURANTOIN 25; 75 MG/1; MG/1
100 CAPSULE ORAL
Refills: 11 | COMMUNITY
Start: 2019-08-11 | End: 2020-08-28

## 2019-06-25 RX ORDER — LISINOPRIL AND HYDROCHLOROTHIAZIDE 12.5; 1 MG/1; MG/1
TABLET ORAL EVERY 24 HOURS
COMMUNITY
Start: 2019-02-22 | End: 2019-06-25

## 2019-06-25 RX ORDER — CLONAZEPAM 0.25 MG/1
0.25 TABLET, ORALLY DISINTEGRATING ORAL 2 TIMES DAILY
COMMUNITY
End: 2019-08-06

## 2019-06-25 RX ORDER — HYDROCODONE BITARTRATE AND ACETAMINOPHEN 5; 325 MG/1; MG/1
5 TABLET ORAL EVERY 6 HOURS PRN
Refills: 0 | COMMUNITY
Start: 2019-04-17 | End: 2019-07-18

## 2019-06-25 RX ORDER — IPRATROPIUM BROMIDE AND ALBUTEROL SULFATE 2.5; .5 MG/3ML; MG/3ML
SOLUTION RESPIRATORY (INHALATION) EVERY 6 HOURS PRN
Refills: 0 | Status: ON HOLD | COMMUNITY
Start: 2019-04-23 | End: 2019-07-22 | Stop reason: SDUPTHER

## 2019-06-25 RX ORDER — ERGOCALCIFEROL 1.25 MG/1
50000 CAPSULE ORAL WEEKLY
COMMUNITY
End: 2019-07-18

## 2019-06-25 RX ORDER — LACOSAMIDE 150 MG/1
1 TABLET, FILM COATED ORAL 2 TIMES DAILY
Refills: 2 | COMMUNITY
Start: 2019-06-10 | End: 2019-06-25

## 2019-06-25 RX ORDER — FLUCONAZOLE 150 MG/1
150 TABLET ORAL DAILY
Refills: 2 | COMMUNITY
Start: 2019-04-25 | End: 2019-07-18

## 2019-06-25 NOTE — PROGRESS NOTES
Subjective   Dariana Rivera is a 63 y.o. female.     Chief Complaint   Patient presents with   • Fall     ER follow up       HPI  Chief complaint: Neck pain, left shoulder pain, closed head injury    Patient states that yesterday morning at 6:00 in the morning she bent over to her dog out.  She states that she lost her balance.  She states that she fell.  States that she struck her head and struck her left shoulder.  Did not lose consciousness.  However was not able to get up.  EMS was called.  Patient was taken to the emergency room where she was evaluated.  Evaluation included CT scan of the head CT scan of the cervical spine and x-ray of the left shoulder.  X-rays failed to reveal acute injury.    Patient had an echo exam by room physician.    Patient complains of some tightness in her neck today.  She denied headache.  She denied altered mental status.  She denied weakness or numbness on one side of the body of the other.  Does complain of some left shoulder pain.        The following portions of the patient's history were reviewed and updated as appropriate: allergies, current medications, past family history, past medical history, past social history, past surgical history and problem list.    Review of Systems   Constitutional: Negative for chills and fever.   HENT: Negative for congestion and sinus pressure.    Eyes: Negative for blurred vision and pain.   Respiratory: Negative for cough and shortness of breath.    Cardiovascular: Negative for chest pain and leg swelling.   Gastrointestinal: Negative for abdominal pain, nausea and indigestion.   Endocrine: Negative for cold intolerance, heat intolerance, polydipsia, polyphagia and polyuria.   Musculoskeletal:        Neck pain  Left Shoulder pain   Skin: Negative for dry skin, rash and bruise.   Neurological: Negative for dizziness, syncope and headache.   Psychiatric/Behavioral: Negative for dysphoric mood and stress.       Objective     BP 99/57 (BP  "Location: Left arm, Patient Position: Sitting, Cuff Size: Large Adult)   Pulse 107   Temp 98.9 °F (37.2 °C) (Oral)   Resp 24   Ht 165.1 cm (65\")   Wt 131 kg (288 lb 12.8 oz)   SpO2 94%   BMI 48.06 kg/m²     Physical Exam   Constitutional: She is oriented to person, place, and time. She appears well-developed and well-nourished.   Morbidly obese   HENT:   Head: Normocephalic and atraumatic.   Eyes: Conjunctivae and EOM are normal. Pupils are equal, round, and reactive to light.   Neck: Normal range of motion. Neck supple.   Cardiovascular: Normal rate, regular rhythm, normal heart sounds and intact distal pulses.   Pulmonary/Chest: Effort normal and breath sounds normal.   Abdominal: Soft. Bowel sounds are normal.   Musculoskeletal: Normal range of motion.   Neurological: She is alert and oriented to person, place, and time.   No focal findings   Skin: Skin is warm and dry.   Psychiatric: She has a normal mood and affect. Her behavior is normal.   Nursing note and vitals reviewed.        Assessment/Plan   Dariana was seen today for fall.    Diagnoses and all orders for this visit:    Injury of head, subsequent encounter    Injury of cervical spine, subsequent encounter (CMS/[facility])    Shoulder injury, left, subsequent encounter    Fall, subsequent encounter  Comments:  Patient was advised she would benefit by physical therapy for strengthening conditioning to prevent falls.  She has a home health provider.  She will try to arr      Patient Instructions   Continue your current medciations and treatment.    Arrange for physical therapy.    Follow up at your next appointment,.      Paul Larson Jr., MD    06/25/19  "

## 2019-06-25 NOTE — PATIENT INSTRUCTIONS
Continue your current medciations and treatment.    Arrange for physical therapy.    Follow up at your next appointment,.

## 2019-07-15 ENCOUNTER — TELEPHONE (OUTPATIENT)
Dept: FAMILY MEDICINE CLINIC | Facility: CLINIC | Age: 64
End: 2019-07-15

## 2019-07-18 ENCOUNTER — HOSPITAL ENCOUNTER (INPATIENT)
Facility: HOSPITAL | Age: 64
LOS: 4 days | Discharge: HOME HEALTH | End: 2019-07-24
Attending: EMERGENCY MEDICINE | Admitting: FAMILY MEDICINE

## 2019-07-18 ENCOUNTER — APPOINTMENT (OUTPATIENT)
Dept: CT IMAGING | Facility: HOSPITAL | Age: 64
End: 2019-07-18

## 2019-07-18 ENCOUNTER — APPOINTMENT (OUTPATIENT)
Dept: GENERAL RADIOLOGY | Facility: HOSPITAL | Age: 64
End: 2019-07-18

## 2019-07-18 DIAGNOSIS — E87.1 HYPONATREMIA: ICD-10-CM

## 2019-07-18 DIAGNOSIS — R06.02 SHORTNESS OF BREATH: Primary | ICD-10-CM

## 2019-07-18 DIAGNOSIS — R56.9 SEIZURE (HCC): ICD-10-CM

## 2019-07-18 DIAGNOSIS — J44.1 CHRONIC OBSTRUCTIVE PULMONARY DISEASE WITH ACUTE EXACERBATION (HCC): ICD-10-CM

## 2019-07-18 DIAGNOSIS — I10 ESSENTIAL HYPERTENSION: ICD-10-CM

## 2019-07-18 LAB
ALBUMIN SERPL-MCNC: 3.9 G/DL (ref 3.5–4.8)
ALBUMIN/GLOB SERPL: 1.1 G/DL (ref 1–1.7)
ALP SERPL-CCNC: 69 U/L (ref 32–91)
ALT SERPL W P-5'-P-CCNC: 16 U/L (ref 14–54)
ANION GAP SERPL CALCULATED.3IONS-SCNC: 18 MMOL/L (ref 5–15)
AST SERPL-CCNC: 28 U/L (ref 15–41)
BASOPHILS # BLD AUTO: 0 10*3/MM3 (ref 0–0.2)
BASOPHILS NFR BLD AUTO: 0.7 % (ref 0–1.5)
BILIRUB SERPL-MCNC: 0.6 MG/DL (ref 0.3–1.2)
BNP SERPL-MCNC: 39 PG/ML
BUN BLD-MCNC: 22 MG/DL (ref 8–20)
BUN/CREAT SERPL: 36.7 (ref 5.4–26.2)
CALCIUM SPEC-SCNC: 9.6 MG/DL (ref 8.9–10.3)
CHLORIDE SERPL-SCNC: 99 MMOL/L (ref 101–111)
CO2 SERPL-SCNC: 23 MMOL/L (ref 22–32)
CREAT BLD-MCNC: 0.6 MG/DL (ref 0.4–1)
D DIMER PPP FEU-MCNC: 1.14 MCGFEU/ML (ref 0.17–0.59)
D-LACTATE SERPL-SCNC: 2 MMOL/L (ref 0.5–2)
DEPRECATED RDW RBC AUTO: 47.3 FL (ref 37–54)
EOSINOPHIL # BLD AUTO: 0.1 10*3/MM3 (ref 0–0.4)
EOSINOPHIL NFR BLD AUTO: 1.1 % (ref 0.3–6.2)
ERYTHROCYTE [DISTWIDTH] IN BLOOD BY AUTOMATED COUNT: 15.4 % (ref 12.3–15.4)
GFR SERPL CREATININE-BSD FRML MDRD: 101 ML/MIN/1.73
GLOBULIN UR ELPH-MCNC: 3.4 GM/DL (ref 2.5–3.8)
GLUCOSE BLD-MCNC: 99 MG/DL (ref 65–99)
HCT VFR BLD AUTO: 38.8 % (ref 34–46.6)
HGB BLD-MCNC: 12.7 G/DL (ref 12–15.9)
HOLD SPECIMEN: NORMAL
HOLD SPECIMEN: NORMAL
LYMPHOCYTES # BLD AUTO: 1.5 10*3/MM3 (ref 0.7–3.1)
LYMPHOCYTES NFR BLD AUTO: 22.2 % (ref 19.6–45.3)
MCH RBC QN AUTO: 28.7 PG (ref 26.6–33)
MCHC RBC AUTO-ENTMCNC: 32.7 G/DL (ref 31.5–35.7)
MCV RBC AUTO: 87.7 FL (ref 79–97)
MONOCYTES # BLD AUTO: 0.5 10*3/MM3 (ref 0.1–0.9)
MONOCYTES NFR BLD AUTO: 7.8 % (ref 5–12)
NEUTROPHILS # BLD AUTO: 4.7 10*3/MM3 (ref 1.7–7)
NEUTROPHILS NFR BLD AUTO: 68.2 % (ref 42.7–76)
NRBC BLD AUTO-RTO: 0.1 /100 WBC (ref 0–0.2)
PLATELET # BLD AUTO: 155 10*3/MM3 (ref 140–450)
PMV BLD AUTO: 8.1 FL (ref 6–12)
POTASSIUM BLD-SCNC: 4 MMOL/L (ref 3.6–5.1)
PROT SERPL-MCNC: 7.3 G/DL (ref 6.1–7.9)
RBC # BLD AUTO: 4.42 10*6/MM3 (ref 3.77–5.28)
SODIUM BLD-SCNC: 136 MMOL/L (ref 136–144)
TROPONIN I SERPL-MCNC: <0.03 NG/ML (ref 0–0.03)
WBC NRBC COR # BLD: 6.9 10*3/MM3 (ref 3.4–10.8)
WHOLE BLOOD HOLD SPECIMEN: NORMAL
WHOLE BLOOD HOLD SPECIMEN: NORMAL

## 2019-07-18 PROCEDURE — 0 IOPAMIDOL PER 1 ML: Performed by: EMERGENCY MEDICINE

## 2019-07-18 PROCEDURE — 84484 ASSAY OF TROPONIN QUANT: CPT | Performed by: EMERGENCY MEDICINE

## 2019-07-18 PROCEDURE — 94799 UNLISTED PULMONARY SVC/PX: CPT

## 2019-07-18 PROCEDURE — 71275 CT ANGIOGRAPHY CHEST: CPT

## 2019-07-18 PROCEDURE — 93005 ELECTROCARDIOGRAM TRACING: CPT | Performed by: EMERGENCY MEDICINE

## 2019-07-18 PROCEDURE — 25010000003 LEVETIRACETAM IN NACL 0.75% 1000 MG/100ML SOLUTION: Performed by: EMERGENCY MEDICINE

## 2019-07-18 PROCEDURE — 94640 AIRWAY INHALATION TREATMENT: CPT

## 2019-07-18 PROCEDURE — 85025 COMPLETE CBC W/AUTO DIFF WBC: CPT | Performed by: EMERGENCY MEDICINE

## 2019-07-18 PROCEDURE — 83605 ASSAY OF LACTIC ACID: CPT

## 2019-07-18 PROCEDURE — 83880 ASSAY OF NATRIURETIC PEPTIDE: CPT | Performed by: EMERGENCY MEDICINE

## 2019-07-18 PROCEDURE — 99285 EMERGENCY DEPT VISIT HI MDM: CPT

## 2019-07-18 PROCEDURE — 80053 COMPREHEN METABOLIC PANEL: CPT | Performed by: EMERGENCY MEDICINE

## 2019-07-18 PROCEDURE — 87040 BLOOD CULTURE FOR BACTERIA: CPT | Performed by: EMERGENCY MEDICINE

## 2019-07-18 PROCEDURE — G0378 HOSPITAL OBSERVATION PER HR: HCPCS

## 2019-07-18 PROCEDURE — 99219 PR INITIAL OBSERVATION CARE/DAY 50 MINUTES: CPT | Performed by: NURSE PRACTITIONER

## 2019-07-18 PROCEDURE — 71045 X-RAY EXAM CHEST 1 VIEW: CPT

## 2019-07-18 PROCEDURE — 25010000002 LORAZEPAM PER 2 MG: Performed by: EMERGENCY MEDICINE

## 2019-07-18 PROCEDURE — 87076 CULTURE ANAEROBE IDENT EACH: CPT | Performed by: EMERGENCY MEDICINE

## 2019-07-18 PROCEDURE — 93005 ELECTROCARDIOGRAM TRACING: CPT

## 2019-07-18 PROCEDURE — 87150 DNA/RNA AMPLIFIED PROBE: CPT | Performed by: EMERGENCY MEDICINE

## 2019-07-18 PROCEDURE — 85379 FIBRIN DEGRADATION QUANT: CPT | Performed by: EMERGENCY MEDICINE

## 2019-07-18 RX ORDER — BUDESONIDE AND FORMOTEROL FUMARATE DIHYDRATE 160; 4.5 UG/1; UG/1
2 AEROSOL RESPIRATORY (INHALATION)
Status: DISCONTINUED | OUTPATIENT
Start: 2019-07-19 | End: 2019-07-24 | Stop reason: HOSPADM

## 2019-07-18 RX ORDER — ASPIRIN 81 MG/1
81 TABLET, CHEWABLE ORAL DAILY
Status: DISCONTINUED | OUTPATIENT
Start: 2019-07-19 | End: 2019-07-24 | Stop reason: HOSPADM

## 2019-07-18 RX ORDER — SODIUM CHLORIDE 0.9 % (FLUSH) 0.9 %
3 SYRINGE (ML) INJECTION EVERY 12 HOURS SCHEDULED
Status: DISCONTINUED | OUTPATIENT
Start: 2019-07-19 | End: 2019-07-24 | Stop reason: HOSPADM

## 2019-07-18 RX ORDER — ONDANSETRON 2 MG/ML
4 INJECTION INTRAMUSCULAR; INTRAVENOUS EVERY 6 HOURS PRN
Status: DISCONTINUED | OUTPATIENT
Start: 2019-07-18 | End: 2019-07-24 | Stop reason: HOSPADM

## 2019-07-18 RX ORDER — LEVETIRACETAM 10 MG/ML
1000 INJECTION INTRAVASCULAR ONCE
Status: COMPLETED | OUTPATIENT
Start: 2019-07-18 | End: 2019-07-18

## 2019-07-18 RX ORDER — NITROFURANTOIN 25; 75 MG/1; MG/1
100 CAPSULE ORAL NIGHTLY
Status: DISCONTINUED | OUTPATIENT
Start: 2019-07-19 | End: 2019-07-19

## 2019-07-18 RX ORDER — LORAZEPAM 2 MG/ML
2 INJECTION INTRAMUSCULAR ONCE
Status: COMPLETED | OUTPATIENT
Start: 2019-07-18 | End: 2019-07-18

## 2019-07-18 RX ORDER — PIOGLITAZONEHYDROCHLORIDE 30 MG/1
30 TABLET ORAL DAILY
Status: DISCONTINUED | OUTPATIENT
Start: 2019-07-19 | End: 2019-07-24 | Stop reason: HOSPADM

## 2019-07-18 RX ORDER — ASCORBIC ACID 500 MG
1000 TABLET ORAL DAILY
Status: DISCONTINUED | OUTPATIENT
Start: 2019-07-19 | End: 2019-07-24 | Stop reason: HOSPADM

## 2019-07-18 RX ORDER — MULTIPLE VITAMINS W/ MINERALS TAB 9MG-400MCG
1 TAB ORAL DAILY
COMMUNITY

## 2019-07-18 RX ORDER — PRAMIPEXOLE DIHYDROCHLORIDE 0.5 MG/1
0.5 TABLET ORAL 3 TIMES DAILY
Status: DISCONTINUED | OUTPATIENT
Start: 2019-07-19 | End: 2019-07-24 | Stop reason: HOSPADM

## 2019-07-18 RX ORDER — SODIUM CHLORIDE 9 MG/ML
100 INJECTION, SOLUTION INTRAVENOUS CONTINUOUS
Status: DISCONTINUED | OUTPATIENT
Start: 2019-07-19 | End: 2019-07-19

## 2019-07-18 RX ORDER — ASPIRIN 81 MG/1
81 TABLET, CHEWABLE ORAL DAILY
COMMUNITY
End: 2021-08-16 | Stop reason: SDUPTHER

## 2019-07-18 RX ORDER — DIPHENHYDRAMINE HCL 25 MG
50 TABLET ORAL NIGHTLY PRN
Status: DISCONTINUED | OUTPATIENT
Start: 2019-07-18 | End: 2019-07-24 | Stop reason: HOSPADM

## 2019-07-18 RX ORDER — LACTULOSE 10 G/15ML
30 SOLUTION ORAL NIGHTLY
Status: DISCONTINUED | OUTPATIENT
Start: 2019-07-19 | End: 2019-07-24 | Stop reason: HOSPADM

## 2019-07-18 RX ORDER — BACLOFEN 10 MG/1
10 TABLET ORAL 3 TIMES DAILY
Status: DISCONTINUED | OUTPATIENT
Start: 2019-07-19 | End: 2019-07-24 | Stop reason: HOSPADM

## 2019-07-18 RX ORDER — IPRATROPIUM BROMIDE AND ALBUTEROL SULFATE 2.5; .5 MG/3ML; MG/3ML
3 SOLUTION RESPIRATORY (INHALATION) ONCE
Status: COMPLETED | OUTPATIENT
Start: 2019-07-18 | End: 2019-07-18

## 2019-07-18 RX ORDER — SODIUM CHLORIDE 0.9 % (FLUSH) 0.9 %
3-10 SYRINGE (ML) INJECTION AS NEEDED
Status: DISCONTINUED | OUTPATIENT
Start: 2019-07-18 | End: 2019-07-24 | Stop reason: HOSPADM

## 2019-07-18 RX ORDER — LEVETIRACETAM 500 MG/1
1000 TABLET ORAL NIGHTLY
Status: DISCONTINUED | OUTPATIENT
Start: 2019-07-19 | End: 2019-07-20

## 2019-07-18 RX ORDER — GABAPENTIN 300 MG/1
300 CAPSULE ORAL 3 TIMES DAILY
Status: DISCONTINUED | OUTPATIENT
Start: 2019-07-19 | End: 2019-07-24 | Stop reason: HOSPADM

## 2019-07-18 RX ORDER — POTASSIUM CHLORIDE 750 MG/1
10 TABLET, FILM COATED, EXTENDED RELEASE ORAL DAILY
Status: DISCONTINUED | OUTPATIENT
Start: 2019-07-19 | End: 2019-07-24 | Stop reason: HOSPADM

## 2019-07-18 RX ORDER — LURASIDONE HYDROCHLORIDE 40 MG/1
20 TABLET, FILM COATED ORAL EVERY EVENING
Status: DISCONTINUED | OUTPATIENT
Start: 2019-07-19 | End: 2019-07-20

## 2019-07-18 RX ORDER — ASCORBIC ACID 500 MG
1000 TABLET ORAL DAILY
COMMUNITY

## 2019-07-18 RX ORDER — OXCARBAZEPINE 600 MG/1
600 TABLET, FILM COATED ORAL EVERY 12 HOURS SCHEDULED
Status: DISCONTINUED | OUTPATIENT
Start: 2019-07-19 | End: 2019-07-24 | Stop reason: HOSPADM

## 2019-07-18 RX ORDER — PANTOPRAZOLE SODIUM 40 MG/1
40 TABLET, DELAYED RELEASE ORAL DAILY
Status: DISCONTINUED | OUTPATIENT
Start: 2019-07-19 | End: 2019-07-24 | Stop reason: HOSPADM

## 2019-07-18 RX ORDER — SODIUM CHLORIDE 0.9 % (FLUSH) 0.9 %
10 SYRINGE (ML) INJECTION AS NEEDED
Status: DISCONTINUED | OUTPATIENT
Start: 2019-07-18 | End: 2019-07-24 | Stop reason: HOSPADM

## 2019-07-18 RX ORDER — CLONAZEPAM 0.5 MG/1
0.5 TABLET ORAL 2 TIMES DAILY PRN
Status: DISCONTINUED | OUTPATIENT
Start: 2019-07-18 | End: 2019-07-20

## 2019-07-18 RX ORDER — MULTIPLE VITAMINS W/ MINERALS TAB 2148-113
1 TAB ORAL DAILY
Status: DISCONTINUED | OUTPATIENT
Start: 2019-07-19 | End: 2019-07-24 | Stop reason: HOSPADM

## 2019-07-18 RX ADMIN — LEVETIRACETAM 1000 MG: 10 INJECTION INTRAVENOUS at 17:50

## 2019-07-18 RX ADMIN — IPRATROPIUM BROMIDE AND ALBUTEROL SULFATE 3 ML: .5; 3 SOLUTION RESPIRATORY (INHALATION) at 16:35

## 2019-07-18 RX ADMIN — IOPAMIDOL 94 ML: 755 INJECTION, SOLUTION INTRAVENOUS at 19:42

## 2019-07-18 RX ADMIN — LORAZEPAM 2 MG: 2 INJECTION INTRAMUSCULAR; INTRAVENOUS at 17:37

## 2019-07-19 LAB
ANION GAP SERPL CALCULATED.3IONS-SCNC: 15.7 MMOL/L (ref 5–15)
BASOPHILS # BLD AUTO: 0 10*3/MM3 (ref 0–0.2)
BASOPHILS NFR BLD AUTO: 0.8 % (ref 0–1.5)
BUN BLD-MCNC: 18 MG/DL (ref 8–20)
BUN/CREAT SERPL: 30 (ref 5.4–26.2)
CALCIUM SPEC-SCNC: 8.9 MG/DL (ref 8.9–10.3)
CHLORIDE SERPL-SCNC: 99 MMOL/L (ref 101–111)
CO2 SERPL-SCNC: 25 MMOL/L (ref 22–32)
CREAT BLD-MCNC: 0.6 MG/DL (ref 0.4–1)
DEPRECATED RDW RBC AUTO: 46.4 FL (ref 37–54)
EOSINOPHIL # BLD AUTO: 0.1 10*3/MM3 (ref 0–0.4)
EOSINOPHIL NFR BLD AUTO: 1.9 % (ref 0.3–6.2)
ERYTHROCYTE [DISTWIDTH] IN BLOOD BY AUTOMATED COUNT: 15 % (ref 12.3–15.4)
GFR SERPL CREATININE-BSD FRML MDRD: 101 ML/MIN/1.73
GLUCOSE BLD-MCNC: 141 MG/DL (ref 65–99)
GLUCOSE BLDC GLUCOMTR-MCNC: 123 MG/DL (ref 70–105)
GLUCOSE BLDC GLUCOMTR-MCNC: 148 MG/DL (ref 70–105)
GLUCOSE BLDC GLUCOMTR-MCNC: 154 MG/DL (ref 70–105)
GLUCOSE BLDC GLUCOMTR-MCNC: 162 MG/DL (ref 70–105)
GLUCOSE BLDC GLUCOMTR-MCNC: 182 MG/DL (ref 70–105)
HCT VFR BLD AUTO: 35.9 % (ref 34–46.6)
HGB BLD-MCNC: 12.1 G/DL (ref 12–15.9)
LYMPHOCYTES # BLD AUTO: 1.3 10*3/MM3 (ref 0.7–3.1)
LYMPHOCYTES NFR BLD AUTO: 25.5 % (ref 19.6–45.3)
MCH RBC QN AUTO: 29.6 PG (ref 26.6–33)
MCHC RBC AUTO-ENTMCNC: 33.6 G/DL (ref 31.5–35.7)
MCV RBC AUTO: 88 FL (ref 79–97)
MONOCYTES # BLD AUTO: 0.5 10*3/MM3 (ref 0.1–0.9)
MONOCYTES NFR BLD AUTO: 9 % (ref 5–12)
NEUTROPHILS # BLD AUTO: 3.3 10*3/MM3 (ref 1.7–7)
NEUTROPHILS NFR BLD AUTO: 62.8 % (ref 42.7–76)
NRBC BLD AUTO-RTO: 0.1 /100 WBC (ref 0–0.2)
PLATELET # BLD AUTO: 145 10*3/MM3 (ref 140–450)
PMV BLD AUTO: 8.7 FL (ref 6–12)
POTASSIUM BLD-SCNC: 3.7 MMOL/L (ref 3.6–5.1)
PROCALCITONIN SERPL-MCNC: <0.05 NG/ML (ref 0–0.5)
RBC # BLD AUTO: 4.08 10*6/MM3 (ref 3.77–5.28)
SODIUM BLD-SCNC: 136 MMOL/L (ref 136–144)
TSH SERPL DL<=0.05 MIU/L-ACNC: 1.3 MIU/ML (ref 0.34–5.6)
WBC NRBC COR # BLD: 5.2 10*3/MM3 (ref 3.4–10.8)

## 2019-07-19 PROCEDURE — 99232 SBSQ HOSP IP/OBS MODERATE 35: CPT | Performed by: HOSPITALIST

## 2019-07-19 PROCEDURE — 94799 UNLISTED PULMONARY SVC/PX: CPT

## 2019-07-19 PROCEDURE — 25010000002 ENOXAPARIN PER 10 MG: Performed by: NURSE PRACTITIONER

## 2019-07-19 PROCEDURE — 80048 BASIC METABOLIC PNL TOTAL CA: CPT | Performed by: NURSE PRACTITIONER

## 2019-07-19 PROCEDURE — 25010000002 FUROSEMIDE PER 20 MG: Performed by: HOSPITALIST

## 2019-07-19 PROCEDURE — 25010000002 METHYLPREDNISOLONE PER 40 MG: Performed by: HOSPITALIST

## 2019-07-19 PROCEDURE — 84145 PROCALCITONIN (PCT): CPT | Performed by: NURSE PRACTITIONER

## 2019-07-19 PROCEDURE — 85025 COMPLETE CBC W/AUTO DIFF WBC: CPT | Performed by: NURSE PRACTITIONER

## 2019-07-19 PROCEDURE — G0378 HOSPITAL OBSERVATION PER HR: HCPCS

## 2019-07-19 PROCEDURE — 99221 1ST HOSP IP/OBS SF/LOW 40: CPT | Performed by: PSYCHIATRY & NEUROLOGY

## 2019-07-19 PROCEDURE — 25010000002 LORAZEPAM PER 2 MG: Performed by: HOSPITALIST

## 2019-07-19 PROCEDURE — 84443 ASSAY THYROID STIM HORMONE: CPT | Performed by: NURSE PRACTITIONER

## 2019-07-19 PROCEDURE — 82962 GLUCOSE BLOOD TEST: CPT

## 2019-07-19 RX ORDER — DOXYCYCLINE 100 MG/1
100 TABLET ORAL 2 TIMES DAILY WITH MEALS
Status: DISPENSED | OUTPATIENT
Start: 2019-07-19 | End: 2019-07-24

## 2019-07-19 RX ORDER — FUROSEMIDE 10 MG/ML
40 INJECTION INTRAMUSCULAR; INTRAVENOUS EVERY 12 HOURS
Status: DISCONTINUED | OUTPATIENT
Start: 2019-07-19 | End: 2019-07-19

## 2019-07-19 RX ORDER — METHYLPREDNISOLONE SODIUM SUCCINATE 40 MG/ML
40 INJECTION, POWDER, LYOPHILIZED, FOR SOLUTION INTRAMUSCULAR; INTRAVENOUS EVERY 8 HOURS
Status: DISCONTINUED | OUTPATIENT
Start: 2019-07-19 | End: 2019-07-22

## 2019-07-19 RX ORDER — LORAZEPAM 2 MG/ML
2 INJECTION INTRAMUSCULAR EVERY 4 HOURS PRN
Status: DISCONTINUED | OUTPATIENT
Start: 2019-07-19 | End: 2019-07-24 | Stop reason: HOSPADM

## 2019-07-19 RX ADMIN — FUROSEMIDE 40 MG: 10 INJECTION, SOLUTION INTRAVENOUS at 13:42

## 2019-07-19 RX ADMIN — LEVETIRACETAM 750 MG: 250 TABLET, FILM COATED ORAL at 08:49

## 2019-07-19 RX ADMIN — BACLOFEN 10 MG: 10 TABLET ORAL at 09:01

## 2019-07-19 RX ADMIN — LACTULOSE 30 ML: 10 SOLUTION ORAL at 02:00

## 2019-07-19 RX ADMIN — LURASIDONE HYDROCHLORIDE 20 MG: 40 TABLET, FILM COATED ORAL at 18:27

## 2019-07-19 RX ADMIN — LACOSAMIDE 150 MG: 100 TABLET, FILM COATED ORAL at 01:59

## 2019-07-19 RX ADMIN — HYDROCHLOROTHIAZIDE: 12.5 TABLET ORAL at 10:56

## 2019-07-19 RX ADMIN — BUDESONIDE AND FORMOTEROL FUMARATE DIHYDRATE 2 PUFF: 160; 4.5 AEROSOL RESPIRATORY (INHALATION) at 06:57

## 2019-07-19 RX ADMIN — PIOGLITAZONE 30 MG: 30 TABLET ORAL at 08:48

## 2019-07-19 RX ADMIN — LACOSAMIDE 150 MG: 100 TABLET, FILM COATED ORAL at 09:01

## 2019-07-19 RX ADMIN — Medication 3 ML: at 08:50

## 2019-07-19 RX ADMIN — Medication 3 ML: at 02:03

## 2019-07-19 RX ADMIN — CARBIDOPA AND LEVODOPA 2 TABLET: 25; 100 TABLET ORAL at 20:43

## 2019-07-19 RX ADMIN — LEVETIRACETAM 1000 MG: 500 TABLET ORAL at 01:56

## 2019-07-19 RX ADMIN — CARBIDOPA AND LEVODOPA 2 TABLET: 25; 100 TABLET ORAL at 01:59

## 2019-07-19 RX ADMIN — CHOLECALCIFEROL CAP 125 MCG (5000 UNIT) 5000 UNITS: 125 CAP at 08:49

## 2019-07-19 RX ADMIN — DOXYCYCLINE 100 MG: 100 TABLET, FILM COATED ORAL at 20:43

## 2019-07-19 RX ADMIN — CARBIDOPA AND LEVODOPA 2 TABLET: 25; 100 TABLET ORAL at 18:20

## 2019-07-19 RX ADMIN — Medication 3 ML: at 21:14

## 2019-07-19 RX ADMIN — GABAPENTIN 300 MG: 300 CAPSULE ORAL at 18:20

## 2019-07-19 RX ADMIN — BACLOFEN 10 MG: 10 TABLET ORAL at 18:20

## 2019-07-19 RX ADMIN — IPRATROPIUM BROMIDE 0.5 MG: 0.5 SOLUTION RESPIRATORY (INHALATION) at 20:17

## 2019-07-19 RX ADMIN — ENOXAPARIN SODIUM 40 MG: 40 INJECTION SUBCUTANEOUS at 02:00

## 2019-07-19 RX ADMIN — ENOXAPARIN SODIUM 40 MG: 40 INJECTION SUBCUTANEOUS at 20:43

## 2019-07-19 RX ADMIN — GABAPENTIN 300 MG: 300 CAPSULE ORAL at 09:01

## 2019-07-19 RX ADMIN — METHYLPREDNISOLONE SODIUM SUCCINATE 40 MG: 40 INJECTION, POWDER, FOR SOLUTION INTRAMUSCULAR; INTRAVENOUS at 18:27

## 2019-07-19 RX ADMIN — LACTULOSE 30 ML: 10 SOLUTION ORAL at 20:44

## 2019-07-19 RX ADMIN — LURASIDONE HYDROCHLORIDE 20 MG: 40 TABLET, FILM COATED ORAL at 02:02

## 2019-07-19 RX ADMIN — MULTIPLE VITAMINS W/ MINERALS TAB 1 TABLET: TAB at 09:01

## 2019-07-19 RX ADMIN — OXCARBAZEPINE 600 MG: 600 TABLET, FILM COATED ORAL at 13:42

## 2019-07-19 RX ADMIN — NITROFURANTOIN (MONOHYDRATE/MACROCRYSTALS) 100 MG: 75; 25 CAPSULE ORAL at 02:00

## 2019-07-19 RX ADMIN — BUDESONIDE AND FORMOTEROL FUMARATE DIHYDRATE 2 PUFF: 160; 4.5 AEROSOL RESPIRATORY (INHALATION) at 20:17

## 2019-07-19 RX ADMIN — PRAMIPEXOLE DIHYDROCHLORIDE 0.5 MG: 0.5 TABLET ORAL at 18:27

## 2019-07-19 RX ADMIN — LORAZEPAM 2 MG: 2 INJECTION INTRAMUSCULAR; INTRAVENOUS at 14:29

## 2019-07-19 RX ADMIN — GABAPENTIN 300 MG: 300 CAPSULE ORAL at 01:59

## 2019-07-19 RX ADMIN — GABAPENTIN 300 MG: 300 CAPSULE ORAL at 20:43

## 2019-07-19 RX ADMIN — ASPIRIN 81 MG 81 MG: 81 TABLET ORAL at 08:49

## 2019-07-19 RX ADMIN — LEVETIRACETAM 1000 MG: 500 TABLET ORAL at 20:43

## 2019-07-19 RX ADMIN — POTASSIUM CHLORIDE 10 MEQ: 750 TABLET, EXTENDED RELEASE ORAL at 10:56

## 2019-07-19 RX ADMIN — LACOSAMIDE 150 MG: 100 TABLET, FILM COATED ORAL at 20:43

## 2019-07-19 RX ADMIN — IPRATROPIUM BROMIDE 0.5 MG: 0.5 SOLUTION RESPIRATORY (INHALATION) at 11:17

## 2019-07-19 RX ADMIN — OXYCODONE HYDROCHLORIDE AND ACETAMINOPHEN 1000 MG: 500 TABLET ORAL at 08:47

## 2019-07-19 RX ADMIN — OXCARBAZEPINE 600 MG: 600 TABLET, FILM COATED ORAL at 20:43

## 2019-07-19 RX ADMIN — PANTOPRAZOLE SODIUM 40 MG: 40 TABLET, DELAYED RELEASE ORAL at 09:15

## 2019-07-19 RX ADMIN — ENOXAPARIN SODIUM 40 MG: 40 INJECTION SUBCUTANEOUS at 09:01

## 2019-07-19 RX ADMIN — IPRATROPIUM BROMIDE 0.5 MG: 0.5 SOLUTION RESPIRATORY (INHALATION) at 15:14

## 2019-07-19 RX ADMIN — PRAMIPEXOLE DIHYDROCHLORIDE 0.5 MG: 0.5 TABLET ORAL at 20:43

## 2019-07-19 RX ADMIN — BACLOFEN 10 MG: 10 TABLET ORAL at 20:44

## 2019-07-19 RX ADMIN — BACLOFEN 10 MG: 10 TABLET ORAL at 01:59

## 2019-07-19 RX ADMIN — IPRATROPIUM BROMIDE 0.5 MG: 0.5 SOLUTION RESPIRATORY (INHALATION) at 06:57

## 2019-07-19 RX ADMIN — PRAMIPEXOLE DIHYDROCHLORIDE 0.5 MG: 0.5 TABLET ORAL at 01:57

## 2019-07-19 RX ADMIN — OXCARBAZEPINE 600 MG: 600 TABLET, FILM COATED ORAL at 01:56

## 2019-07-20 LAB
BACTERIA BLD CULT: ABNORMAL
GLUCOSE BLDC GLUCOMTR-MCNC: 177 MG/DL (ref 70–105)

## 2019-07-20 PROCEDURE — 25010000002 ENOXAPARIN PER 10 MG: Performed by: NURSE PRACTITIONER

## 2019-07-20 PROCEDURE — 99222 1ST HOSP IP/OBS MODERATE 55: CPT | Performed by: PHYSICIAN ASSISTANT

## 2019-07-20 PROCEDURE — 25010000002 LORAZEPAM PER 2 MG: Performed by: HOSPITALIST

## 2019-07-20 PROCEDURE — 99231 SBSQ HOSP IP/OBS SF/LOW 25: CPT | Performed by: PSYCHIATRY & NEUROLOGY

## 2019-07-20 PROCEDURE — 25010000002 METHYLPREDNISOLONE PER 40 MG: Performed by: HOSPITALIST

## 2019-07-20 PROCEDURE — 25010000002 FOSPHENYTOIN 500 MG PE/10ML SOLUTION 10 ML VIAL: Performed by: PSYCHIATRY & NEUROLOGY

## 2019-07-20 PROCEDURE — 94799 UNLISTED PULMONARY SVC/PX: CPT

## 2019-07-20 PROCEDURE — 25010000002 LEVETRIRACETAM PER 10 MG: Performed by: PSYCHIATRY & NEUROLOGY

## 2019-07-20 PROCEDURE — 82962 GLUCOSE BLOOD TEST: CPT

## 2019-07-20 PROCEDURE — 92610 EVALUATE SWALLOWING FUNCTION: CPT

## 2019-07-20 PROCEDURE — 25010000002 FOSPHENYTOIN 100 MG PE/2ML SOLUTION 2 ML VIAL: Performed by: PSYCHIATRY & NEUROLOGY

## 2019-07-20 PROCEDURE — 99232 SBSQ HOSP IP/OBS MODERATE 35: CPT | Performed by: INTERNAL MEDICINE

## 2019-07-20 PROCEDURE — 25010000003 LEVETIRACETAM IN NACL 0.75% 1000 MG/100ML SOLUTION: Performed by: PSYCHIATRY & NEUROLOGY

## 2019-07-20 RX ORDER — AMMONIA INHALANTS 0.04 G/.3ML
INHALANT RESPIRATORY (INHALATION)
Status: COMPLETED
Start: 2019-07-20 | End: 2019-07-20

## 2019-07-20 RX ORDER — LEVETIRACETAM 10 MG/ML
1000 INJECTION INTRAVASCULAR NIGHTLY
Status: DISCONTINUED | OUTPATIENT
Start: 2019-07-20 | End: 2019-07-23

## 2019-07-20 RX ORDER — CLONAZEPAM 1 MG/1
1 TABLET ORAL EVERY 12 HOURS SCHEDULED
Status: DISCONTINUED | OUTPATIENT
Start: 2019-07-20 | End: 2019-07-24 | Stop reason: HOSPADM

## 2019-07-20 RX ORDER — LURASIDONE HYDROCHLORIDE 40 MG/1
40 TABLET, FILM COATED ORAL EVERY EVENING
Status: DISCONTINUED | OUTPATIENT
Start: 2019-07-20 | End: 2019-07-24 | Stop reason: HOSPADM

## 2019-07-20 RX ADMIN — BACLOFEN 10 MG: 10 TABLET ORAL at 15:25

## 2019-07-20 RX ADMIN — SODIUM CHLORIDE 150 MG: 900 INJECTION, SOLUTION INTRAVENOUS at 22:52

## 2019-07-20 RX ADMIN — IPRATROPIUM BROMIDE 0.5 MG: 0.5 SOLUTION RESPIRATORY (INHALATION) at 07:51

## 2019-07-20 RX ADMIN — IPRATROPIUM BROMIDE 0.5 MG: 0.5 SOLUTION RESPIRATORY (INHALATION) at 15:49

## 2019-07-20 RX ADMIN — BUDESONIDE AND FORMOTEROL FUMARATE DIHYDRATE 2 PUFF: 160; 4.5 AEROSOL RESPIRATORY (INHALATION) at 20:03

## 2019-07-20 RX ADMIN — GABAPENTIN 300 MG: 300 CAPSULE ORAL at 15:25

## 2019-07-20 RX ADMIN — SODIUM CHLORIDE 750 MG: 900 INJECTION, SOLUTION INTRAVENOUS at 11:26

## 2019-07-20 RX ADMIN — Medication 3 ML: at 11:34

## 2019-07-20 RX ADMIN — METHYLPREDNISOLONE SODIUM SUCCINATE 40 MG: 40 INJECTION, POWDER, FOR SOLUTION INTRAMUSCULAR; INTRAVENOUS at 03:14

## 2019-07-20 RX ADMIN — IPRATROPIUM BROMIDE 0.5 MG: 0.5 SOLUTION RESPIRATORY (INHALATION) at 11:59

## 2019-07-20 RX ADMIN — DOXYCYCLINE 100 MG: 100 TABLET, FILM COATED ORAL at 18:06

## 2019-07-20 RX ADMIN — CLONAZEPAM 1 MG: 1 TABLET ORAL at 15:25

## 2019-07-20 RX ADMIN — BUDESONIDE AND FORMOTEROL FUMARATE DIHYDRATE 2 PUFF: 160; 4.5 AEROSOL RESPIRATORY (INHALATION) at 07:51

## 2019-07-20 RX ADMIN — ENOXAPARIN SODIUM 40 MG: 40 INJECTION SUBCUTANEOUS at 13:20

## 2019-07-20 RX ADMIN — GABAPENTIN 300 MG: 300 CAPSULE ORAL at 21:43

## 2019-07-20 RX ADMIN — LORAZEPAM 2 MG: 2 INJECTION INTRAMUSCULAR; INTRAVENOUS at 07:10

## 2019-07-20 RX ADMIN — ENOXAPARIN SODIUM 40 MG: 40 INJECTION SUBCUTANEOUS at 21:43

## 2019-07-20 RX ADMIN — LORAZEPAM 2 MG: 2 INJECTION INTRAMUSCULAR; INTRAVENOUS at 06:44

## 2019-07-20 RX ADMIN — Medication 3 ML: at 21:48

## 2019-07-20 RX ADMIN — AMMONIA INHALANTS: 0.04 INHALANT RESPIRATORY (INHALATION) at 06:55

## 2019-07-20 RX ADMIN — BACLOFEN 10 MG: 10 TABLET ORAL at 21:43

## 2019-07-20 RX ADMIN — SODIUM CHLORIDE 150 MG: 900 INJECTION, SOLUTION INTRAVENOUS at 11:52

## 2019-07-20 RX ADMIN — OXCARBAZEPINE 600 MG: 600 TABLET, FILM COATED ORAL at 21:43

## 2019-07-20 RX ADMIN — LURASIDONE HYDROCHLORIDE 40 MG: 40 TABLET, FILM COATED ORAL at 18:06

## 2019-07-20 RX ADMIN — LEVETIRACETAM 1000 MG: 10 INJECTION INTRAVENOUS at 22:05

## 2019-07-20 RX ADMIN — IPRATROPIUM BROMIDE 0.5 MG: 0.5 SOLUTION RESPIRATORY (INHALATION) at 20:03

## 2019-07-20 RX ADMIN — LACTULOSE 30 ML: 10 SOLUTION ORAL at 21:43

## 2019-07-20 RX ADMIN — CARBIDOPA AND LEVODOPA 2 TABLET: 25; 100 TABLET ORAL at 21:43

## 2019-07-20 RX ADMIN — CARBIDOPA AND LEVODOPA 2 TABLET: 25; 100 TABLET ORAL at 15:25

## 2019-07-20 RX ADMIN — FOSPHENYTOIN SODIUM 1600 MG PE: 50 INJECTION, SOLUTION INTRAMUSCULAR; INTRAVENOUS at 07:24

## 2019-07-20 RX ADMIN — CLONAZEPAM 1 MG: 1 TABLET ORAL at 21:44

## 2019-07-20 RX ADMIN — PRAMIPEXOLE DIHYDROCHLORIDE 0.5 MG: 0.5 TABLET ORAL at 15:25

## 2019-07-20 RX ADMIN — METHYLPREDNISOLONE SODIUM SUCCINATE 40 MG: 40 INJECTION, POWDER, FOR SOLUTION INTRAMUSCULAR; INTRAVENOUS at 13:20

## 2019-07-20 RX ADMIN — METHYLPREDNISOLONE SODIUM SUCCINATE 40 MG: 40 INJECTION, POWDER, FOR SOLUTION INTRAMUSCULAR; INTRAVENOUS at 22:01

## 2019-07-20 RX ADMIN — PRAMIPEXOLE DIHYDROCHLORIDE 0.5 MG: 0.5 TABLET ORAL at 21:43

## 2019-07-21 LAB — GLUCOSE BLDC GLUCOMTR-MCNC: 194 MG/DL (ref 70–105)

## 2019-07-21 PROCEDURE — 99232 SBSQ HOSP IP/OBS MODERATE 35: CPT | Performed by: INTERNAL MEDICINE

## 2019-07-21 PROCEDURE — 25010000002 ENOXAPARIN PER 10 MG: Performed by: NURSE PRACTITIONER

## 2019-07-21 PROCEDURE — 99232 SBSQ HOSP IP/OBS MODERATE 35: CPT | Performed by: PHYSICIAN ASSISTANT

## 2019-07-21 PROCEDURE — 82962 GLUCOSE BLOOD TEST: CPT

## 2019-07-21 PROCEDURE — 25010000002 METHYLPREDNISOLONE PER 40 MG: Performed by: HOSPITALIST

## 2019-07-21 PROCEDURE — 94799 UNLISTED PULMONARY SVC/PX: CPT

## 2019-07-21 PROCEDURE — 97162 PT EVAL MOD COMPLEX 30 MIN: CPT

## 2019-07-21 PROCEDURE — 25010000002 LORAZEPAM PER 2 MG: Performed by: HOSPITALIST

## 2019-07-21 PROCEDURE — 99231 SBSQ HOSP IP/OBS SF/LOW 25: CPT | Performed by: PSYCHIATRY & NEUROLOGY

## 2019-07-21 PROCEDURE — 25010000002 LEVETRIRACETAM PER 10 MG: Performed by: PSYCHIATRY & NEUROLOGY

## 2019-07-21 PROCEDURE — 25010000003 LEVETIRACETAM IN NACL 0.75% 1000 MG/100ML SOLUTION: Performed by: PSYCHIATRY & NEUROLOGY

## 2019-07-21 RX ADMIN — PRAMIPEXOLE DIHYDROCHLORIDE 0.5 MG: 0.5 TABLET ORAL at 08:55

## 2019-07-21 RX ADMIN — PIOGLITAZONE 30 MG: 30 TABLET ORAL at 08:53

## 2019-07-21 RX ADMIN — DOXYCYCLINE 100 MG: 100 TABLET, FILM COATED ORAL at 17:56

## 2019-07-21 RX ADMIN — ENOXAPARIN SODIUM 40 MG: 40 INJECTION SUBCUTANEOUS at 20:37

## 2019-07-21 RX ADMIN — OXCARBAZEPINE 600 MG: 600 TABLET, FILM COATED ORAL at 08:53

## 2019-07-21 RX ADMIN — OXYCODONE HYDROCHLORIDE AND ACETAMINOPHEN 1000 MG: 500 TABLET ORAL at 08:56

## 2019-07-21 RX ADMIN — CARBIDOPA AND LEVODOPA 2 TABLET: 25; 100 TABLET ORAL at 17:56

## 2019-07-21 RX ADMIN — IPRATROPIUM BROMIDE 0.5 MG: 0.5 SOLUTION RESPIRATORY (INHALATION) at 14:53

## 2019-07-21 RX ADMIN — LORAZEPAM 2 MG: 2 INJECTION INTRAMUSCULAR; INTRAVENOUS at 22:08

## 2019-07-21 RX ADMIN — SODIUM CHLORIDE 750 MG: 900 INJECTION, SOLUTION INTRAVENOUS at 08:59

## 2019-07-21 RX ADMIN — GABAPENTIN 300 MG: 300 CAPSULE ORAL at 20:38

## 2019-07-21 RX ADMIN — PANTOPRAZOLE SODIUM 40 MG: 40 TABLET, DELAYED RELEASE ORAL at 08:56

## 2019-07-21 RX ADMIN — CLONAZEPAM 1 MG: 1 TABLET ORAL at 20:38

## 2019-07-21 RX ADMIN — ENOXAPARIN SODIUM 40 MG: 40 INJECTION SUBCUTANEOUS at 08:59

## 2019-07-21 RX ADMIN — LURASIDONE HYDROCHLORIDE 40 MG: 40 TABLET, FILM COATED ORAL at 17:57

## 2019-07-21 RX ADMIN — LACTULOSE 30 ML: 10 SOLUTION ORAL at 20:38

## 2019-07-21 RX ADMIN — GABAPENTIN 300 MG: 300 CAPSULE ORAL at 08:56

## 2019-07-21 RX ADMIN — Medication 3 ML: at 20:39

## 2019-07-21 RX ADMIN — METHYLPREDNISOLONE SODIUM SUCCINATE 40 MG: 40 INJECTION, POWDER, FOR SOLUTION INTRAMUSCULAR; INTRAVENOUS at 11:45

## 2019-07-21 RX ADMIN — GABAPENTIN 300 MG: 300 CAPSULE ORAL at 17:56

## 2019-07-21 RX ADMIN — LEVETIRACETAM 1000 MG: 10 INJECTION INTRAVENOUS at 20:37

## 2019-07-21 RX ADMIN — OXCARBAZEPINE 600 MG: 600 TABLET, FILM COATED ORAL at 20:38

## 2019-07-21 RX ADMIN — BACLOFEN 10 MG: 10 TABLET ORAL at 20:38

## 2019-07-21 RX ADMIN — HYDROCHLOROTHIAZIDE: 12.5 TABLET ORAL at 08:54

## 2019-07-21 RX ADMIN — BUDESONIDE AND FORMOTEROL FUMARATE DIHYDRATE 2 PUFF: 160; 4.5 AEROSOL RESPIRATORY (INHALATION) at 07:15

## 2019-07-21 RX ADMIN — METHYLPREDNISOLONE SODIUM SUCCINATE 40 MG: 40 INJECTION, POWDER, FOR SOLUTION INTRAMUSCULAR; INTRAVENOUS at 20:37

## 2019-07-21 RX ADMIN — BACLOFEN 10 MG: 10 TABLET ORAL at 08:55

## 2019-07-21 RX ADMIN — CLONAZEPAM 1 MG: 1 TABLET ORAL at 08:56

## 2019-07-21 RX ADMIN — POTASSIUM CHLORIDE 10 MEQ: 750 TABLET, EXTENDED RELEASE ORAL at 08:53

## 2019-07-21 RX ADMIN — METHYLPREDNISOLONE SODIUM SUCCINATE 40 MG: 40 INJECTION, POWDER, FOR SOLUTION INTRAMUSCULAR; INTRAVENOUS at 03:22

## 2019-07-21 RX ADMIN — MULTIPLE VITAMINS W/ MINERALS TAB 1 TABLET: TAB at 08:53

## 2019-07-21 RX ADMIN — PRAMIPEXOLE DIHYDROCHLORIDE 0.5 MG: 0.5 TABLET ORAL at 17:56

## 2019-07-21 RX ADMIN — IPRATROPIUM BROMIDE 0.5 MG: 0.5 SOLUTION RESPIRATORY (INHALATION) at 12:05

## 2019-07-21 RX ADMIN — CARBIDOPA AND LEVODOPA 2 TABLET: 25; 100 TABLET ORAL at 08:55

## 2019-07-21 RX ADMIN — SODIUM CHLORIDE 150 MG: 900 INJECTION, SOLUTION INTRAVENOUS at 09:47

## 2019-07-21 RX ADMIN — Medication 3 ML: at 09:00

## 2019-07-21 RX ADMIN — IPRATROPIUM BROMIDE 0.5 MG: 0.5 SOLUTION RESPIRATORY (INHALATION) at 07:14

## 2019-07-21 RX ADMIN — PRAMIPEXOLE DIHYDROCHLORIDE 0.5 MG: 0.5 TABLET ORAL at 20:38

## 2019-07-21 RX ADMIN — SODIUM CHLORIDE 150 MG: 900 INJECTION, SOLUTION INTRAVENOUS at 22:10

## 2019-07-21 RX ADMIN — DOXYCYCLINE 100 MG: 100 TABLET, FILM COATED ORAL at 08:53

## 2019-07-21 RX ADMIN — BACLOFEN 10 MG: 10 TABLET ORAL at 17:56

## 2019-07-21 RX ADMIN — CARBIDOPA AND LEVODOPA 2 TABLET: 25; 100 TABLET ORAL at 20:38

## 2019-07-21 RX ADMIN — ASPIRIN 81 MG 81 MG: 81 TABLET ORAL at 08:56

## 2019-07-21 RX ADMIN — CHOLECALCIFEROL CAP 125 MCG (5000 UNIT) 5000 UNITS: 125 CAP at 08:55

## 2019-07-22 ENCOUNTER — TELEPHONE (OUTPATIENT)
Dept: PULMONOLOGY | Facility: HOSPITAL | Age: 64
End: 2019-07-22

## 2019-07-22 DIAGNOSIS — J44.9 CHRONIC OBSTRUCTIVE PULMONARY DISEASE, UNSPECIFIED COPD TYPE (HCC): Primary | ICD-10-CM

## 2019-07-22 PROBLEM — E87.1 HYPONATREMIA: Status: ACTIVE | Noted: 2019-07-22

## 2019-07-22 PROBLEM — J98.8 RESPIRATORY INFECTION: Status: ACTIVE | Noted: 2019-07-22

## 2019-07-22 LAB
ANION GAP SERPL CALCULATED.3IONS-SCNC: 17 MMOL/L (ref 5–15)
ANION GAP SERPL CALCULATED.3IONS-SCNC: 20.5 MMOL/L (ref 5–15)
BACTERIA SPEC AEROBE CULT: ABNORMAL
BASOPHILS # BLD AUTO: 0 10*3/MM3 (ref 0–0.2)
BASOPHILS NFR BLD AUTO: 0.3 % (ref 0–1.5)
BUN BLD-MCNC: 23 MG/DL (ref 8–20)
BUN BLD-MCNC: 25 MG/DL (ref 8–20)
BUN/CREAT SERPL: 25.6 (ref 5.4–26.2)
BUN/CREAT SERPL: 31.3 (ref 5.4–26.2)
CALCIUM SPEC-SCNC: 8.7 MG/DL (ref 8.9–10.3)
CALCIUM SPEC-SCNC: 9.5 MG/DL (ref 8.9–10.3)
CHLORIDE SERPL-SCNC: 88 MMOL/L (ref 101–111)
CHLORIDE SERPL-SCNC: 89 MMOL/L (ref 101–111)
CO2 SERPL-SCNC: 20 MMOL/L (ref 22–32)
CO2 SERPL-SCNC: 23 MMOL/L (ref 22–32)
CREAT BLD-MCNC: 0.8 MG/DL (ref 0.4–1)
CREAT BLD-MCNC: 0.9 MG/DL (ref 0.4–1)
DEPRECATED RDW RBC AUTO: 46.4 FL (ref 37–54)
EOSINOPHIL # BLD AUTO: 0 10*3/MM3 (ref 0–0.4)
EOSINOPHIL NFR BLD AUTO: 0.1 % (ref 0.3–6.2)
ERYTHROCYTE [DISTWIDTH] IN BLOOD BY AUTOMATED COUNT: 15.1 % (ref 12.3–15.4)
GFR SERPL CREATININE-BSD FRML MDRD: 63 ML/MIN/1.73
GFR SERPL CREATININE-BSD FRML MDRD: 73 ML/MIN/1.73
GLUCOSE BLD-MCNC: 195 MG/DL (ref 65–99)
GLUCOSE BLD-MCNC: 239 MG/DL (ref 65–99)
GLUCOSE BLDC GLUCOMTR-MCNC: 157 MG/DL (ref 70–105)
GLUCOSE BLDC GLUCOMTR-MCNC: 178 MG/DL (ref 70–105)
GLUCOSE BLDC GLUCOMTR-MCNC: 217 MG/DL (ref 70–105)
GLUCOSE BLDC GLUCOMTR-MCNC: 222 MG/DL (ref 70–105)
GRAM STN SPEC: ABNORMAL
HCT VFR BLD AUTO: 37.6 % (ref 34–46.6)
HGB BLD-MCNC: 12.7 G/DL (ref 12–15.9)
ISOLATED FROM: ABNORMAL
LYMPHOCYTES # BLD AUTO: 1 10*3/MM3 (ref 0.7–3.1)
LYMPHOCYTES NFR BLD AUTO: 13.4 % (ref 19.6–45.3)
MCH RBC QN AUTO: 29.5 PG (ref 26.6–33)
MCHC RBC AUTO-ENTMCNC: 33.9 G/DL (ref 31.5–35.7)
MCV RBC AUTO: 87.1 FL (ref 79–97)
MONOCYTES # BLD AUTO: 0.3 10*3/MM3 (ref 0.1–0.9)
MONOCYTES NFR BLD AUTO: 4.2 % (ref 5–12)
NEUTROPHILS # BLD AUTO: 5.9 10*3/MM3 (ref 1.7–7)
NEUTROPHILS NFR BLD AUTO: 82 % (ref 42.7–76)
NRBC BLD AUTO-RTO: 0.1 /100 WBC (ref 0–0.2)
PLATELET # BLD AUTO: 150 10*3/MM3 (ref 140–450)
PMV BLD AUTO: 8.6 FL (ref 6–12)
POTASSIUM BLD-SCNC: 4 MMOL/L (ref 3.6–5.1)
POTASSIUM BLD-SCNC: 4.5 MMOL/L (ref 3.6–5.1)
RBC # BLD AUTO: 4.31 10*6/MM3 (ref 3.77–5.28)
SODIUM BLD-SCNC: 124 MMOL/L (ref 136–144)
SODIUM BLD-SCNC: 125 MMOL/L (ref 136–144)
WBC NRBC COR # BLD: 7.2 10*3/MM3 (ref 3.4–10.8)

## 2019-07-22 PROCEDURE — 25010000003 LEVETIRACETAM IN NACL 0.75% 1000 MG/100ML SOLUTION: Performed by: PSYCHIATRY & NEUROLOGY

## 2019-07-22 PROCEDURE — 25010000002 LEVETRIRACETAM PER 10 MG: Performed by: PSYCHIATRY & NEUROLOGY

## 2019-07-22 PROCEDURE — 63710000001 INSULIN LISPRO (HUMAN) PER 5 UNITS: Performed by: FAMILY MEDICINE

## 2019-07-22 PROCEDURE — 85025 COMPLETE CBC W/AUTO DIFF WBC: CPT | Performed by: INTERNAL MEDICINE

## 2019-07-22 PROCEDURE — 80048 BASIC METABOLIC PNL TOTAL CA: CPT | Performed by: INTERNAL MEDICINE

## 2019-07-22 PROCEDURE — 25010000002 METHYLPREDNISOLONE PER 40 MG: Performed by: HOSPITALIST

## 2019-07-22 PROCEDURE — 92526 ORAL FUNCTION THERAPY: CPT

## 2019-07-22 PROCEDURE — 80048 BASIC METABOLIC PNL TOTAL CA: CPT | Performed by: FAMILY MEDICINE

## 2019-07-22 PROCEDURE — 82962 GLUCOSE BLOOD TEST: CPT

## 2019-07-22 PROCEDURE — 25010000002 ENOXAPARIN PER 10 MG: Performed by: NURSE PRACTITIONER

## 2019-07-22 PROCEDURE — 99231 SBSQ HOSP IP/OBS SF/LOW 25: CPT | Performed by: PHYSICIAN ASSISTANT

## 2019-07-22 PROCEDURE — 99232 SBSQ HOSP IP/OBS MODERATE 35: CPT | Performed by: FAMILY MEDICINE

## 2019-07-22 PROCEDURE — 94799 UNLISTED PULMONARY SVC/PX: CPT

## 2019-07-22 RX ORDER — IPRATROPIUM BROMIDE AND ALBUTEROL SULFATE 2.5; .5 MG/3ML; MG/3ML
3 SOLUTION RESPIRATORY (INHALATION) EVERY 6 HOURS PRN
Qty: 1080 ML | Refills: 0 | Status: SHIPPED | OUTPATIENT
Start: 2019-07-22 | End: 2019-08-21

## 2019-07-22 RX ORDER — IPRATROPIUM BROMIDE AND ALBUTEROL SULFATE 2.5; .5 MG/3ML; MG/3ML
3 SOLUTION RESPIRATORY (INHALATION) EVERY 6 HOURS PRN
Qty: 1080 ML | Refills: 0 | Status: SHIPPED | OUTPATIENT
Start: 2019-07-22 | End: 2019-07-22

## 2019-07-22 RX ORDER — DEXTROSE MONOHYDRATE 25 G/50ML
25 INJECTION, SOLUTION INTRAVENOUS
Status: DISCONTINUED | OUTPATIENT
Start: 2019-07-22 | End: 2019-07-24 | Stop reason: HOSPADM

## 2019-07-22 RX ORDER — NICOTINE POLACRILEX 4 MG
15 LOZENGE BUCCAL
Status: DISCONTINUED | OUTPATIENT
Start: 2019-07-22 | End: 2019-07-24 | Stop reason: HOSPADM

## 2019-07-22 RX ORDER — PREDNISONE 20 MG/1
20 TABLET ORAL 2 TIMES DAILY WITH MEALS
Status: DISCONTINUED | OUTPATIENT
Start: 2019-07-23 | End: 2019-07-24 | Stop reason: HOSPADM

## 2019-07-22 RX ADMIN — DOXYCYCLINE 100 MG: 100 TABLET, FILM COATED ORAL at 09:40

## 2019-07-22 RX ADMIN — IPRATROPIUM BROMIDE 0.5 MG: 0.5 SOLUTION RESPIRATORY (INHALATION) at 06:37

## 2019-07-22 RX ADMIN — LACTULOSE 30 ML: 10 SOLUTION ORAL at 20:29

## 2019-07-22 RX ADMIN — LEVETIRACETAM 1000 MG: 10 INJECTION INTRAVENOUS at 20:30

## 2019-07-22 RX ADMIN — CARBIDOPA AND LEVODOPA 2 TABLET: 25; 100 TABLET ORAL at 20:30

## 2019-07-22 RX ADMIN — PRAMIPEXOLE DIHYDROCHLORIDE 0.5 MG: 0.5 TABLET ORAL at 17:18

## 2019-07-22 RX ADMIN — ENOXAPARIN SODIUM 40 MG: 40 INJECTION SUBCUTANEOUS at 09:41

## 2019-07-22 RX ADMIN — CLONAZEPAM 1 MG: 1 TABLET ORAL at 09:41

## 2019-07-22 RX ADMIN — PRAMIPEXOLE DIHYDROCHLORIDE 0.5 MG: 0.5 TABLET ORAL at 09:39

## 2019-07-22 RX ADMIN — GABAPENTIN 300 MG: 300 CAPSULE ORAL at 09:41

## 2019-07-22 RX ADMIN — OXCARBAZEPINE 600 MG: 600 TABLET, FILM COATED ORAL at 20:30

## 2019-07-22 RX ADMIN — IPRATROPIUM BROMIDE 0.5 MG: 0.5 SOLUTION RESPIRATORY (INHALATION) at 18:48

## 2019-07-22 RX ADMIN — BACLOFEN 10 MG: 10 TABLET ORAL at 09:41

## 2019-07-22 RX ADMIN — INSULIN LISPRO 8 UNITS: 100 INJECTION, SOLUTION INTRAVENOUS; SUBCUTANEOUS at 17:18

## 2019-07-22 RX ADMIN — Medication 3 ML: at 09:42

## 2019-07-22 RX ADMIN — OXYCODONE HYDROCHLORIDE AND ACETAMINOPHEN 1000 MG: 500 TABLET ORAL at 09:39

## 2019-07-22 RX ADMIN — PRAMIPEXOLE DIHYDROCHLORIDE 0.5 MG: 0.5 TABLET ORAL at 20:30

## 2019-07-22 RX ADMIN — CHOLECALCIFEROL CAP 125 MCG (5000 UNIT) 5000 UNITS: 125 CAP at 09:40

## 2019-07-22 RX ADMIN — INSULIN LISPRO 8 UNITS: 100 INJECTION, SOLUTION INTRAVENOUS; SUBCUTANEOUS at 21:12

## 2019-07-22 RX ADMIN — LURASIDONE HYDROCHLORIDE 40 MG: 40 TABLET, FILM COATED ORAL at 17:17

## 2019-07-22 RX ADMIN — BACLOFEN 10 MG: 10 TABLET ORAL at 20:29

## 2019-07-22 RX ADMIN — BUDESONIDE AND FORMOTEROL FUMARATE DIHYDRATE 2 PUFF: 160; 4.5 AEROSOL RESPIRATORY (INHALATION) at 06:37

## 2019-07-22 RX ADMIN — HYDROCHLOROTHIAZIDE: 12.5 TABLET ORAL at 09:39

## 2019-07-22 RX ADMIN — SODIUM CHLORIDE 750 MG: 900 INJECTION, SOLUTION INTRAVENOUS at 09:42

## 2019-07-22 RX ADMIN — IPRATROPIUM BROMIDE 0.5 MG: 0.5 SOLUTION RESPIRATORY (INHALATION) at 14:30

## 2019-07-22 RX ADMIN — MULTIPLE VITAMINS W/ MINERALS TAB 1 TABLET: TAB at 09:40

## 2019-07-22 RX ADMIN — DOXYCYCLINE 100 MG: 100 TABLET, FILM COATED ORAL at 17:17

## 2019-07-22 RX ADMIN — CLONAZEPAM 1 MG: 1 TABLET ORAL at 20:29

## 2019-07-22 RX ADMIN — CARBIDOPA AND LEVODOPA 2 TABLET: 25; 100 TABLET ORAL at 17:17

## 2019-07-22 RX ADMIN — PIOGLITAZONE 30 MG: 30 TABLET ORAL at 09:40

## 2019-07-22 RX ADMIN — POTASSIUM CHLORIDE 10 MEQ: 750 TABLET, EXTENDED RELEASE ORAL at 09:40

## 2019-07-22 RX ADMIN — GABAPENTIN 300 MG: 300 CAPSULE ORAL at 17:17

## 2019-07-22 RX ADMIN — METHYLPREDNISOLONE SODIUM SUCCINATE 40 MG: 40 INJECTION, POWDER, FOR SOLUTION INTRAMUSCULAR; INTRAVENOUS at 02:09

## 2019-07-22 RX ADMIN — ASPIRIN 81 MG 81 MG: 81 TABLET ORAL at 09:39

## 2019-07-22 RX ADMIN — BUDESONIDE AND FORMOTEROL FUMARATE DIHYDRATE 2 PUFF: 160; 4.5 AEROSOL RESPIRATORY (INHALATION) at 18:48

## 2019-07-22 RX ADMIN — BACLOFEN 10 MG: 10 TABLET ORAL at 17:21

## 2019-07-22 RX ADMIN — SODIUM CHLORIDE 150 MG: 900 INJECTION, SOLUTION INTRAVENOUS at 12:50

## 2019-07-22 RX ADMIN — ENOXAPARIN SODIUM 40 MG: 40 INJECTION SUBCUTANEOUS at 20:29

## 2019-07-22 RX ADMIN — SODIUM CHLORIDE 150 MG: 900 INJECTION, SOLUTION INTRAVENOUS at 21:11

## 2019-07-22 RX ADMIN — PANTOPRAZOLE SODIUM 40 MG: 40 TABLET, DELAYED RELEASE ORAL at 09:41

## 2019-07-22 RX ADMIN — GABAPENTIN 300 MG: 300 CAPSULE ORAL at 20:29

## 2019-07-22 RX ADMIN — METHYLPREDNISOLONE SODIUM SUCCINATE 40 MG: 40 INJECTION, POWDER, FOR SOLUTION INTRAMUSCULAR; INTRAVENOUS at 12:50

## 2019-07-22 RX ADMIN — OXCARBAZEPINE 600 MG: 600 TABLET, FILM COATED ORAL at 09:39

## 2019-07-22 RX ADMIN — CARBIDOPA AND LEVODOPA 2 TABLET: 25; 100 TABLET ORAL at 09:39

## 2019-07-22 RX ADMIN — Medication 3 ML: at 20:29

## 2019-07-23 PROBLEM — F41.1 GENERALIZED ANXIETY DISORDER: Chronic | Status: ACTIVE | Noted: 2019-07-23

## 2019-07-23 LAB
BACTERIA SPEC AEROBE CULT: NORMAL
GLUCOSE BLDC GLUCOMTR-MCNC: 120 MG/DL (ref 70–105)
GLUCOSE BLDC GLUCOMTR-MCNC: 144 MG/DL (ref 70–105)
GLUCOSE BLDC GLUCOMTR-MCNC: 200 MG/DL (ref 70–105)
GLUCOSE BLDC GLUCOMTR-MCNC: 256 MG/DL (ref 70–105)
SODIUM BLD-SCNC: 125 MMOL/L (ref 136–144)
TSH SERPL DL<=0.05 MIU/L-ACNC: 1.77 MIU/ML (ref 0.34–5.6)
URATE SERPL-MCNC: 4.9 MG/DL (ref 2.6–8)

## 2019-07-23 PROCEDURE — 82962 GLUCOSE BLOOD TEST: CPT

## 2019-07-23 PROCEDURE — 63710000001 PREDNISONE PER 1 MG: Performed by: FAMILY MEDICINE

## 2019-07-23 PROCEDURE — 25010000002 ENOXAPARIN PER 10 MG: Performed by: NURSE PRACTITIONER

## 2019-07-23 PROCEDURE — 63710000001 INSULIN LISPRO (HUMAN) PER 5 UNITS: Performed by: FAMILY MEDICINE

## 2019-07-23 PROCEDURE — 84550 ASSAY OF BLOOD/URIC ACID: CPT | Performed by: INTERNAL MEDICINE

## 2019-07-23 PROCEDURE — 84443 ASSAY THYROID STIM HORMONE: CPT | Performed by: INTERNAL MEDICINE

## 2019-07-23 PROCEDURE — 99232 SBSQ HOSP IP/OBS MODERATE 35: CPT | Performed by: PSYCHIATRY & NEUROLOGY

## 2019-07-23 PROCEDURE — 94799 UNLISTED PULMONARY SVC/PX: CPT

## 2019-07-23 PROCEDURE — 93005 ELECTROCARDIOGRAM TRACING: CPT | Performed by: FAMILY MEDICINE

## 2019-07-23 PROCEDURE — 99232 SBSQ HOSP IP/OBS MODERATE 35: CPT | Performed by: FAMILY MEDICINE

## 2019-07-23 PROCEDURE — 84295 ASSAY OF SERUM SODIUM: CPT | Performed by: INTERNAL MEDICINE

## 2019-07-23 RX ORDER — LEVETIRACETAM 500 MG/1
1000 TABLET ORAL NIGHTLY
Status: DISCONTINUED | OUTPATIENT
Start: 2019-07-23 | End: 2019-07-24 | Stop reason: HOSPADM

## 2019-07-23 RX ORDER — SODIUM CHLORIDE 1000 MG
2 TABLET, SOLUBLE MISCELLANEOUS DAILY
Status: DISCONTINUED | OUTPATIENT
Start: 2019-07-23 | End: 2019-07-24 | Stop reason: HOSPADM

## 2019-07-23 RX ORDER — FUROSEMIDE 40 MG/1
40 TABLET ORAL DAILY
Status: DISCONTINUED | OUTPATIENT
Start: 2019-07-23 | End: 2019-07-24 | Stop reason: HOSPADM

## 2019-07-23 RX ADMIN — IPRATROPIUM BROMIDE 0.5 MG: 0.5 SOLUTION RESPIRATORY (INHALATION) at 06:33

## 2019-07-23 RX ADMIN — LACOSAMIDE 150 MG: 100 TABLET, FILM COATED ORAL at 08:58

## 2019-07-23 RX ADMIN — BACLOFEN 10 MG: 10 TABLET ORAL at 15:06

## 2019-07-23 RX ADMIN — LEVETIRACETAM 750 MG: 250 TABLET, FILM COATED ORAL at 08:58

## 2019-07-23 RX ADMIN — CLONAZEPAM 1 MG: 1 TABLET ORAL at 21:31

## 2019-07-23 RX ADMIN — DOXYCYCLINE 100 MG: 100 TABLET, FILM COATED ORAL at 18:36

## 2019-07-23 RX ADMIN — LACOSAMIDE 150 MG: 100 TABLET, FILM COATED ORAL at 21:30

## 2019-07-23 RX ADMIN — GABAPENTIN 300 MG: 300 CAPSULE ORAL at 21:30

## 2019-07-23 RX ADMIN — FUROSEMIDE 40 MG: 40 TABLET ORAL at 15:06

## 2019-07-23 RX ADMIN — POTASSIUM CHLORIDE 10 MEQ: 750 TABLET, EXTENDED RELEASE ORAL at 08:47

## 2019-07-23 RX ADMIN — PRAMIPEXOLE DIHYDROCHLORIDE 0.5 MG: 0.5 TABLET ORAL at 08:47

## 2019-07-23 RX ADMIN — BUDESONIDE AND FORMOTEROL FUMARATE DIHYDRATE 2 PUFF: 160; 4.5 AEROSOL RESPIRATORY (INHALATION) at 19:46

## 2019-07-23 RX ADMIN — Medication 3 ML: at 21:29

## 2019-07-23 RX ADMIN — LEVETIRACETAM 1000 MG: 500 TABLET ORAL at 21:30

## 2019-07-23 RX ADMIN — INSULIN LISPRO 8 UNITS: 100 INJECTION, SOLUTION INTRAVENOUS; SUBCUTANEOUS at 21:30

## 2019-07-23 RX ADMIN — BACLOFEN 10 MG: 10 TABLET ORAL at 21:31

## 2019-07-23 RX ADMIN — ENOXAPARIN SODIUM 40 MG: 40 INJECTION SUBCUTANEOUS at 21:31

## 2019-07-23 RX ADMIN — IPRATROPIUM BROMIDE 0.5 MG: 0.5 SOLUTION RESPIRATORY (INHALATION) at 19:46

## 2019-07-23 RX ADMIN — PIOGLITAZONE 30 MG: 30 TABLET ORAL at 08:50

## 2019-07-23 RX ADMIN — GABAPENTIN 300 MG: 300 CAPSULE ORAL at 08:50

## 2019-07-23 RX ADMIN — CARBIDOPA AND LEVODOPA 2 TABLET: 25; 100 TABLET ORAL at 08:50

## 2019-07-23 RX ADMIN — PREDNISONE 20 MG: 20 TABLET ORAL at 08:50

## 2019-07-23 RX ADMIN — MULTIPLE VITAMINS W/ MINERALS TAB 1 TABLET: TAB at 08:47

## 2019-07-23 RX ADMIN — ASPIRIN 81 MG 81 MG: 81 TABLET ORAL at 08:50

## 2019-07-23 RX ADMIN — IPRATROPIUM BROMIDE 0.5 MG: 0.5 SOLUTION RESPIRATORY (INHALATION) at 10:48

## 2019-07-23 RX ADMIN — INSULIN LISPRO 8 UNITS: 100 INJECTION, SOLUTION INTRAVENOUS; SUBCUTANEOUS at 18:36

## 2019-07-23 RX ADMIN — CLONAZEPAM 1 MG: 1 TABLET ORAL at 08:50

## 2019-07-23 RX ADMIN — LURASIDONE HYDROCHLORIDE 40 MG: 40 TABLET, FILM COATED ORAL at 18:36

## 2019-07-23 RX ADMIN — GABAPENTIN 300 MG: 300 CAPSULE ORAL at 15:06

## 2019-07-23 RX ADMIN — PRAMIPEXOLE DIHYDROCHLORIDE 0.5 MG: 0.5 TABLET ORAL at 15:06

## 2019-07-23 RX ADMIN — BUDESONIDE AND FORMOTEROL FUMARATE DIHYDRATE 2 PUFF: 160; 4.5 AEROSOL RESPIRATORY (INHALATION) at 06:33

## 2019-07-23 RX ADMIN — Medication 3 ML: at 09:35

## 2019-07-23 RX ADMIN — CHOLECALCIFEROL CAP 125 MCG (5000 UNIT) 5000 UNITS: 125 CAP at 08:47

## 2019-07-23 RX ADMIN — HYDROCHLOROTHIAZIDE: 12.5 TABLET ORAL at 08:47

## 2019-07-23 RX ADMIN — PRAMIPEXOLE DIHYDROCHLORIDE 0.5 MG: 0.5 TABLET ORAL at 21:30

## 2019-07-23 RX ADMIN — OXYCODONE HYDROCHLORIDE AND ACETAMINOPHEN 1000 MG: 500 TABLET ORAL at 08:50

## 2019-07-23 RX ADMIN — CARBIDOPA AND LEVODOPA 2 TABLET: 25; 100 TABLET ORAL at 15:06

## 2019-07-23 RX ADMIN — PANTOPRAZOLE SODIUM 40 MG: 40 TABLET, DELAYED RELEASE ORAL at 08:50

## 2019-07-23 RX ADMIN — DOXYCYCLINE 100 MG: 100 TABLET, FILM COATED ORAL at 08:50

## 2019-07-23 RX ADMIN — OXCARBAZEPINE 600 MG: 600 TABLET, FILM COATED ORAL at 21:30

## 2019-07-23 RX ADMIN — CARBIDOPA AND LEVODOPA 2 TABLET: 25; 100 TABLET ORAL at 21:31

## 2019-07-23 RX ADMIN — ENOXAPARIN SODIUM 40 MG: 40 INJECTION SUBCUTANEOUS at 08:59

## 2019-07-23 RX ADMIN — LACTULOSE 30 ML: 10 SOLUTION ORAL at 21:31

## 2019-07-23 RX ADMIN — OXCARBAZEPINE 600 MG: 600 TABLET, FILM COATED ORAL at 08:46

## 2019-07-23 RX ADMIN — IPRATROPIUM BROMIDE 0.5 MG: 0.5 SOLUTION RESPIRATORY (INHALATION) at 14:30

## 2019-07-23 RX ADMIN — BACLOFEN 10 MG: 10 TABLET ORAL at 08:46

## 2019-07-23 RX ADMIN — SODIUM CHLORIDE TAB 1 GM 2 G: 1 TAB at 15:09

## 2019-07-23 RX ADMIN — PREDNISONE 20 MG: 20 TABLET ORAL at 18:35

## 2019-07-24 VITALS
SYSTOLIC BLOOD PRESSURE: 106 MMHG | BODY MASS INDEX: 51.56 KG/M2 | WEIGHT: 291.01 LBS | HEIGHT: 63 IN | DIASTOLIC BLOOD PRESSURE: 68 MMHG | RESPIRATION RATE: 13 BRPM | OXYGEN SATURATION: 93 % | TEMPERATURE: 97.9 F | HEART RATE: 83 BPM

## 2019-07-24 LAB
ANION GAP SERPL CALCULATED.3IONS-SCNC: 18.4 MMOL/L (ref 5–15)
ANION GAP SERPL CALCULATED.3IONS-SCNC: 18.4 MMOL/L (ref 5–15)
BUN BLD-MCNC: 21 MG/DL (ref 8–20)
BUN BLD-MCNC: 22 MG/DL (ref 8–20)
BUN/CREAT SERPL: 26.3 (ref 5.4–26.2)
BUN/CREAT SERPL: 27.5 (ref 5.4–26.2)
CALCIUM SPEC-SCNC: 9.4 MG/DL (ref 8.9–10.3)
CALCIUM SPEC-SCNC: 9.5 MG/DL (ref 8.9–10.3)
CHLORIDE SERPL-SCNC: 88 MMOL/L (ref 101–111)
CHLORIDE SERPL-SCNC: 91 MMOL/L (ref 101–111)
CO2 SERPL-SCNC: 25 MMOL/L (ref 22–32)
CO2 SERPL-SCNC: 26 MMOL/L (ref 22–32)
CORTIS SERPL-MCNC: 3.84 MCG/DL
CREAT BLD-MCNC: 0.8 MG/DL (ref 0.4–1)
CREAT BLD-MCNC: 0.8 MG/DL (ref 0.4–1)
GFR SERPL CREATININE-BSD FRML MDRD: 73 ML/MIN/1.73
GFR SERPL CREATININE-BSD FRML MDRD: 73 ML/MIN/1.73
GLUCOSE BLD-MCNC: 183 MG/DL (ref 65–99)
GLUCOSE BLD-MCNC: 205 MG/DL (ref 65–99)
GLUCOSE BLDC GLUCOMTR-MCNC: 169 MG/DL (ref 70–105)
GLUCOSE BLDC GLUCOMTR-MCNC: 175 MG/DL (ref 70–105)
GLUCOSE BLDC GLUCOMTR-MCNC: 225 MG/DL (ref 70–105)
OSMOLALITY UR: 544 MOSM/KG (ref 300–800)
POTASSIUM BLD-SCNC: 4.4 MMOL/L (ref 3.6–5.1)
POTASSIUM BLD-SCNC: 4.4 MMOL/L (ref 3.6–5.1)
SODIUM BLD-SCNC: 125 MMOL/L (ref 136–144)
SODIUM BLD-SCNC: 128 MMOL/L (ref 136–144)
SODIUM BLD-SCNC: 130 MMOL/L (ref 136–144)
SODIUM UR-SCNC: 70 MMOL/L

## 2019-07-24 PROCEDURE — 94799 UNLISTED PULMONARY SVC/PX: CPT

## 2019-07-24 PROCEDURE — 82962 GLUCOSE BLOOD TEST: CPT

## 2019-07-24 PROCEDURE — 63710000001 INSULIN LISPRO (HUMAN) PER 5 UNITS: Performed by: FAMILY MEDICINE

## 2019-07-24 PROCEDURE — 80048 BASIC METABOLIC PNL TOTAL CA: CPT | Performed by: FAMILY MEDICINE

## 2019-07-24 PROCEDURE — 82533 TOTAL CORTISOL: CPT | Performed by: INTERNAL MEDICINE

## 2019-07-24 PROCEDURE — 84295 ASSAY OF SERUM SODIUM: CPT | Performed by: INTERNAL MEDICINE

## 2019-07-24 PROCEDURE — 25010000002 ENOXAPARIN PER 10 MG: Performed by: NURSE PRACTITIONER

## 2019-07-24 PROCEDURE — 99238 HOSP IP/OBS DSCHRG MGMT 30/<: CPT | Performed by: FAMILY MEDICINE

## 2019-07-24 PROCEDURE — 83935 ASSAY OF URINE OSMOLALITY: CPT | Performed by: INTERNAL MEDICINE

## 2019-07-24 PROCEDURE — 84300 ASSAY OF URINE SODIUM: CPT | Performed by: INTERNAL MEDICINE

## 2019-07-24 PROCEDURE — 80048 BASIC METABOLIC PNL TOTAL CA: CPT | Performed by: INTERNAL MEDICINE

## 2019-07-24 PROCEDURE — 99231 SBSQ HOSP IP/OBS SF/LOW 25: CPT | Performed by: PSYCHIATRY & NEUROLOGY

## 2019-07-24 PROCEDURE — 63710000001 PREDNISONE PER 1 MG: Performed by: FAMILY MEDICINE

## 2019-07-24 RX ORDER — DOXYCYCLINE 100 MG/1
100 TABLET ORAL 2 TIMES DAILY WITH MEALS
Qty: 14 TABLET | Refills: 0 | Status: SHIPPED | OUTPATIENT
Start: 2019-07-24 | End: 2019-07-25

## 2019-07-24 RX ORDER — SODIUM CHLORIDE 1000 MG
2 TABLET, SOLUBLE MISCELLANEOUS DAILY
Qty: 60 TABLET | Refills: 2 | Status: SHIPPED | OUTPATIENT
Start: 2019-07-25 | End: 2019-10-16 | Stop reason: SDUPTHER

## 2019-07-24 RX ORDER — PREDNISONE 20 MG/1
20 TABLET ORAL 2 TIMES DAILY WITH MEALS
Qty: 10 TABLET | Refills: 0 | Status: SHIPPED | OUTPATIENT
Start: 2019-07-25 | End: 2019-08-05

## 2019-07-24 RX ORDER — FUROSEMIDE 40 MG/1
40 TABLET ORAL DAILY
Qty: 30 TABLET | Refills: 2 | Status: ON HOLD | OUTPATIENT
Start: 2019-07-25 | End: 2019-08-16 | Stop reason: SDUPTHER

## 2019-07-24 RX ADMIN — PRAMIPEXOLE DIHYDROCHLORIDE 0.5 MG: 0.5 TABLET ORAL at 08:31

## 2019-07-24 RX ADMIN — Medication 3 ML: at 08:38

## 2019-07-24 RX ADMIN — INSULIN LISPRO 4 UNITS: 100 INJECTION, SOLUTION INTRAVENOUS; SUBCUTANEOUS at 08:33

## 2019-07-24 RX ADMIN — MULTIPLE VITAMINS W/ MINERALS TAB 1 TABLET: TAB at 08:33

## 2019-07-24 RX ADMIN — INSULIN LISPRO 8 UNITS: 100 INJECTION, SOLUTION INTRAVENOUS; SUBCUTANEOUS at 17:43

## 2019-07-24 RX ADMIN — SODIUM CHLORIDE TAB 1 GM 2 G: 1 TAB at 08:32

## 2019-07-24 RX ADMIN — PREDNISONE 20 MG: 20 TABLET ORAL at 08:33

## 2019-07-24 RX ADMIN — IPRATROPIUM BROMIDE 0.5 MG: 0.5 SOLUTION RESPIRATORY (INHALATION) at 07:06

## 2019-07-24 RX ADMIN — PRAMIPEXOLE DIHYDROCHLORIDE 0.5 MG: 0.5 TABLET ORAL at 17:42

## 2019-07-24 RX ADMIN — IPRATROPIUM BROMIDE 0.5 MG: 0.5 SOLUTION RESPIRATORY (INHALATION) at 10:49

## 2019-07-24 RX ADMIN — GABAPENTIN 300 MG: 300 CAPSULE ORAL at 17:42

## 2019-07-24 RX ADMIN — OXCARBAZEPINE 600 MG: 600 TABLET, FILM COATED ORAL at 08:31

## 2019-07-24 RX ADMIN — PIOGLITAZONE 30 MG: 30 TABLET ORAL at 08:32

## 2019-07-24 RX ADMIN — PANTOPRAZOLE SODIUM 40 MG: 40 TABLET, DELAYED RELEASE ORAL at 08:33

## 2019-07-24 RX ADMIN — GABAPENTIN 300 MG: 300 CAPSULE ORAL at 08:33

## 2019-07-24 RX ADMIN — FUROSEMIDE 40 MG: 40 TABLET ORAL at 08:31

## 2019-07-24 RX ADMIN — CHOLECALCIFEROL CAP 125 MCG (5000 UNIT) 5000 UNITS: 125 CAP at 08:32

## 2019-07-24 RX ADMIN — IPRATROPIUM BROMIDE 0.5 MG: 0.5 SOLUTION RESPIRATORY (INHALATION) at 14:46

## 2019-07-24 RX ADMIN — PREDNISONE 20 MG: 20 TABLET ORAL at 17:42

## 2019-07-24 RX ADMIN — INSULIN LISPRO 4 UNITS: 100 INJECTION, SOLUTION INTRAVENOUS; SUBCUTANEOUS at 12:27

## 2019-07-24 RX ADMIN — BACLOFEN 10 MG: 10 TABLET ORAL at 08:33

## 2019-07-24 RX ADMIN — OXYCODONE HYDROCHLORIDE AND ACETAMINOPHEN 1000 MG: 500 TABLET ORAL at 08:33

## 2019-07-24 RX ADMIN — LEVETIRACETAM 750 MG: 250 TABLET, FILM COATED ORAL at 08:32

## 2019-07-24 RX ADMIN — CARBIDOPA AND LEVODOPA 2 TABLET: 25; 100 TABLET ORAL at 08:32

## 2019-07-24 RX ADMIN — DOXYCYCLINE 100 MG: 100 TABLET, FILM COATED ORAL at 08:32

## 2019-07-24 RX ADMIN — ASPIRIN 81 MG 81 MG: 81 TABLET ORAL at 08:31

## 2019-07-24 RX ADMIN — LURASIDONE HYDROCHLORIDE 40 MG: 40 TABLET, FILM COATED ORAL at 17:42

## 2019-07-24 RX ADMIN — BUDESONIDE AND FORMOTEROL FUMARATE DIHYDRATE 2 PUFF: 160; 4.5 AEROSOL RESPIRATORY (INHALATION) at 07:06

## 2019-07-24 RX ADMIN — POTASSIUM CHLORIDE 10 MEQ: 750 TABLET, EXTENDED RELEASE ORAL at 08:31

## 2019-07-24 RX ADMIN — HYDROCHLOROTHIAZIDE: 12.5 TABLET ORAL at 08:31

## 2019-07-24 RX ADMIN — CLONAZEPAM 1 MG: 1 TABLET ORAL at 08:33

## 2019-07-24 RX ADMIN — CARBIDOPA AND LEVODOPA 2 TABLET: 25; 100 TABLET ORAL at 17:43

## 2019-07-24 RX ADMIN — BACLOFEN 10 MG: 10 TABLET ORAL at 17:43

## 2019-07-24 RX ADMIN — LACOSAMIDE 150 MG: 100 TABLET, FILM COATED ORAL at 08:33

## 2019-07-24 RX ADMIN — ENOXAPARIN SODIUM 40 MG: 40 INJECTION SUBCUTANEOUS at 08:34

## 2019-07-25 ENCOUNTER — READMISSION MANAGEMENT (OUTPATIENT)
Dept: CALL CENTER | Facility: HOSPITAL | Age: 64
End: 2019-07-25

## 2019-07-26 ENCOUNTER — READMISSION MANAGEMENT (OUTPATIENT)
Dept: CALL CENTER | Facility: HOSPITAL | Age: 64
End: 2019-07-26

## 2019-07-26 NOTE — OUTREACH NOTE
Medical Week 1 Survey      Responses   Facility patient discharged from?  [facility]   Does the patient have one of the following disease processes/diagnoses(primary or secondary)?  Other   Is there a successful TCM telephone encounter documented?  No   Week 1 attempt successful?  Yes   Call start time  1246   Call end time  1252   Discharge diagnosis  Generalized anxiety diorder   Meds reviewed with patient/caregiver?  Yes   Is the patient having any side effects they believe may be caused by any medication additions or changes?  No   Does the patient have all medications ordered at discharge?  Yes   Is the patient taking all medications as directed (includes completed medication regime)?  Yes   Does the patient have a primary care provider?   Yes   Does the patient have an appointment with their PCP within 7 days of discharge?  Greater than 7 days   What is preventing the patient from scheduling follow up appointments within 7 days of discharge?  Earlier appointment not available   Nursing Interventions  Verified appointment date/time/provider   Has the patient kept scheduled appointments due by today?  N/A   What is the Home health agency?   [facility]   Has home health visited the patient within 72 hours of discharge?  Yes   Did the patient receive a copy of their discharge instructions?  Yes   Nursing interventions  Reviewed instructions with patient   What is the patient's perception of their health status since discharge?  Improving   Is the patient/caregiver able to teach back signs and symptoms related to disease process for when to call PCP?  Yes   Is the patient/caregiver able to teach back signs and symptoms related to disease process for when to call 911?  Yes   Is the patient/caregiver able to teach back the hierarchy of who to call/visit for symptoms/problems? PCP, Specialist, Home health nurse, Urgent Care, ED, 911  Yes   Week 1 call completed?  Yes          Rochelle Wolff LPN

## 2019-07-29 ENCOUNTER — TELEPHONE (OUTPATIENT)
Dept: FAMILY MEDICINE CLINIC | Facility: CLINIC | Age: 64
End: 2019-07-29

## 2019-07-29 NOTE — TELEPHONE ENCOUNTER
Santiago Harrell, called to report pt had seizure while PT was in the home. Please call with any questions

## 2019-07-30 ENCOUNTER — TELEPHONE (OUTPATIENT)
Dept: FAMILY MEDICINE CLINIC | Facility: CLINIC | Age: 64
End: 2019-07-30

## 2019-07-30 NOTE — TELEPHONE ENCOUNTER
Sharri from [facility] would like you to call her regarding the patient.

## 2019-08-02 ENCOUNTER — EPISODE CHANGES (OUTPATIENT)
Dept: CASE MANAGEMENT | Facility: OTHER | Age: 64
End: 2019-08-02

## 2019-08-02 ENCOUNTER — READMISSION MANAGEMENT (OUTPATIENT)
Dept: CALL CENTER | Facility: HOSPITAL | Age: 64
End: 2019-08-02

## 2019-08-02 NOTE — OUTREACH NOTE
Medical Week 2 Survey      Responses   Facility patient discharged from?  [facility]   Does the patient have one of the following disease processes/diagnoses(primary or secondary)?  Other   Week 2 attempt successful?  Yes   Call start time  1218   Discharge diagnosis  Generalized anxiety rustyer   Call end time  1230   Meds reviewed with patient/caregiver?  Yes   Is the patient having any side effects they believe may be caused by any medication additions or changes?  No   Does the patient have all medications ordered at discharge?  Yes   Is the patient taking all medications as directed (includes completed medication regime)?  Yes   Medication comments  Patient has question on clonazepam on AVS was .25mg bid but consult note from Psych was changed to 1mg bid.  Pt will contact Psych office regarding getting refill due she will be out.   Does the patient have a primary care provider?   Yes   Does the patient have an appointment with their PCP within 7 days of discharge?  Greater than 7 days   Has the patient kept scheduled appointments due by today?  Yes   Comments  Patient went to  due to fall and rib pain.  States she did not fracture but it is painful.  Has f/u Monday with Dr. Larson and Tues with Dr. Armenta   What is the Home health agency?   [facility]   Has home health visited the patient within 72 hours of discharge?  Yes   Did the patient receive a copy of their discharge instructions?  Yes   Nursing interventions  Reviewed instructions with patient   What is the patient's perception of their health status since discharge?  New symptoms unrelated to diagnosis [Pt states she had a seizure and had fallen.  She was seen in by  states no breaks or fracture to her ribs but they are bruised and painful.]   Is the patient/caregiver able to teach back signs and symptoms related to disease process for when to call PCP?  Yes   Is the patient/caregiver able to teach back signs and symptoms related to disease process for  when to call 911?  Yes   Is the patient/caregiver able to teach back the hierarchy of who to call/visit for symptoms/problems? PCP, Specialist, Home health nurse, Urgent Care, ED, 911  Yes   Week 2 Call Completed?  Yes   Graduated  Yes   Graduated/Revoked comments  Patient is following up with PCP and Ortho next week and will contact Dr. Singh's office regarding prescription refill.  No needs at this time          An Angela LPN

## 2019-08-05 ENCOUNTER — OFFICE VISIT (OUTPATIENT)
Dept: FAMILY MEDICINE CLINIC | Facility: CLINIC | Age: 64
End: 2019-08-05

## 2019-08-05 VITALS
SYSTOLIC BLOOD PRESSURE: 103 MMHG | WEIGHT: 285.8 LBS | OXYGEN SATURATION: 95 % | TEMPERATURE: 98.3 F | HEART RATE: 82 BPM | RESPIRATION RATE: 20 BRPM | DIASTOLIC BLOOD PRESSURE: 67 MMHG | BODY MASS INDEX: 50.64 KG/M2 | HEIGHT: 63 IN

## 2019-08-05 DIAGNOSIS — E87.1 HYPONATREMIA: ICD-10-CM

## 2019-08-05 DIAGNOSIS — F41.1 GENERALIZED ANXIETY DISORDER: Chronic | ICD-10-CM

## 2019-08-05 DIAGNOSIS — J98.8 RESPIRATORY INFECTION: Primary | ICD-10-CM

## 2019-08-05 PROBLEM — G24.9 DYSTONIA: Status: RESOLVED | Noted: 2019-03-06 | Resolved: 2019-08-05

## 2019-08-05 PROBLEM — E66.01 MORBIDLY OBESE (HCC): Status: RESOLVED | Noted: 2019-06-18 | Resolved: 2019-08-05

## 2019-08-05 PROBLEM — R06.02 SHORTNESS OF BREATH: Status: RESOLVED | Noted: 2019-07-18 | Resolved: 2019-08-05

## 2019-08-05 PROCEDURE — 99214 OFFICE O/P EST MOD 30 MIN: CPT | Performed by: FAMILY MEDICINE

## 2019-08-05 RX ORDER — BREXPIPRAZOLE 1 MG/1
1 TABLET ORAL DAILY
Refills: 1 | COMMUNITY
Start: 2019-07-18 | End: 2019-08-05 | Stop reason: SDUPTHER

## 2019-08-05 RX ORDER — LACOSAMIDE 150 MG/1
1 TABLET, FILM COATED ORAL 2 TIMES DAILY
Refills: 2 | Status: ON HOLD | COMMUNITY
Start: 2019-07-08 | End: 2019-08-18 | Stop reason: SDUPTHER

## 2019-08-05 NOTE — PATIENT INSTRUCTIONS
Have the follow up labs done and call for results.    Follow up in the office in 3 months.    Have the follow up labs done and call for results.

## 2019-08-05 NOTE — PROGRESS NOTES
"Subjective   Dariana Rivera is a 63 y.o. female.     Chief Complaint   Patient presents with   • Follow-up     hospital follow up       HPI  Plan: Respiratory infection hyponatremia seizures generalized anxiety disorder    The patient is a 63-year-old white female comes in for follow-up and maintenance of her current problems to include    #1 Respiratory infection-stable-patient recently hospitalized with respiratory infection.  Patient was treated with antibiotics steroids and bronchitis.  Patient was discharged home on antibiotics including steroids.  Patient states that she is not having cough shortness of breath or wheezing    #2 hyponatremia-new-patient while hospitalized was found to have hyponatremia.  She was treated with fluid restriction and ADH inhibitors.  With this her sodium level improved.  Patient was discharged home on salt tablets and fluid restriction.  Patient does take multiple medications that can cause hyponatremia.    #3 seizures with normal EEG-unchanged-patient is currently on Vimpat Trileptal Keppra and Neurontin.  Patient continues to have frequent seizures at home    #4 anxiety disorder-stable-patient currently is on diazepam 1 mg twice a day Latuda 40 mg daily.  Patient does have anxiety depression is been made worse by recent health problems of late.    Other problems include hypertension sleep apnea COPD cirrhosis obesity type diabetes mellitus and chronic back pain      The following portions of the patient's history were reviewed and updated as appropriate: allergies, current medications, past family history, past medical history, past social history, past surgical history and problem list.    Review of Systems    Objective     /67 (BP Location: Left arm, Patient Position: Sitting, Cuff Size: Large Adult)   Pulse 82   Temp 98.3 °F (36.8 °C) (Oral)   Resp 20   Ht 160 cm (63\")   Wt 130 kg (285 lb 12.8 oz)   SpO2 95%   BMI 50.63 kg/m²     Physical Exam "   Constitutional: She is oriented to person, place, and time.   Morbidly obese   HENT:   Head: Normocephalic and atraumatic.   Eyes: Conjunctivae and EOM are normal. Pupils are equal, round, and reactive to light.   Neck: Normal range of motion. Neck supple.   Cardiovascular: Normal rate, regular rhythm, normal heart sounds and intact distal pulses.   Pulmonary/Chest: Effort normal and breath sounds normal.   Abdominal: Soft. Bowel sounds are normal.   Musculoskeletal: Normal range of motion.   Neurological: She is alert and oriented to person, place, and time.   Skin: Skin is warm and dry.   Psychiatric: She has a normal mood and affect. Her behavior is normal.   Nursing note and vitals reviewed.        Assessment/Plan   Dariana was seen today for follow-up.    Diagnoses and all orders for this visit:    Respiratory infection    Hyponatremia    Generalized anxiety disorder      Patient Instructions   Have the follow up labs done and call for results.    Follow up in the office in 3 months.    Have the follow up labs done and call for results.      Paul Larson Jr., MD    08/05/19

## 2019-08-06 ENCOUNTER — TELEPHONE (OUTPATIENT)
Dept: FAMILY MEDICINE CLINIC | Facility: CLINIC | Age: 64
End: 2019-08-06

## 2019-08-06 RX ORDER — CLONAZEPAM 0.25 MG/1
1 TABLET, ORALLY DISINTEGRATING ORAL 2 TIMES DAILY PRN
Qty: 60 TABLET | Refills: 0 | Status: SHIPPED | OUTPATIENT
Start: 2019-08-06 | End: 2019-08-06 | Stop reason: CLARIF

## 2019-08-06 RX ORDER — CLONAZEPAM 1 MG/1
1 TABLET ORAL 2 TIMES DAILY PRN
Qty: 69 TABLET | Refills: 2 | Status: ON HOLD | OUTPATIENT
Start: 2019-08-06 | End: 2019-08-18 | Stop reason: SDUPTHER

## 2019-08-06 NOTE — TELEPHONE ENCOUNTER
I sent the prescription...  I agreed to prescribe this until she can be seen by Psychiatry...  This was the dose Psychiatry recommended at IP consultation...

## 2019-08-07 ENCOUNTER — OFFICE VISIT (OUTPATIENT)
Dept: ORTHOPEDIC SURGERY | Facility: CLINIC | Age: 64
End: 2019-08-07

## 2019-08-07 VITALS
SYSTOLIC BLOOD PRESSURE: 138 MMHG | WEIGHT: 288.8 LBS | BODY MASS INDEX: 51.17 KG/M2 | HEIGHT: 63 IN | DIASTOLIC BLOOD PRESSURE: 72 MMHG | HEART RATE: 98 BPM

## 2019-08-07 DIAGNOSIS — M40.203 KYPHOSIS OF CERVICOTHORACIC REGION, UNSPECIFIED KYPHOSIS TYPE: Primary | ICD-10-CM

## 2019-08-07 PROCEDURE — 99213 OFFICE O/P EST LOW 20 MIN: CPT | Performed by: ORTHOPAEDIC SURGERY

## 2019-08-07 NOTE — PROGRESS NOTES
Visit Type: Follow Up  Referring Provider: No ref. provider found  Primary Care Provider: Paul Larson Jr., MD    Patient Name: Dariana Rivera  Patient Age: 63 y.o.  Chief Complaint: had concerns including Follow-up and Pain of the Cervical Spine (Increased pain, reports falling twice in the last month, one hitting head on kitchen cabinet and the other hitting head on entertainment center. See in [facility] ED and CT done at that time.).    Subjective   History of Present Illness: This patient is a 63 years old obese white female with multiple medical comorbidity patient is a status post anterior cervical discectomy fusion of C3-C4 and C6-C7 the patient developed a cervical thoracic kyphosis the patient has had multiple episode of seizure patient has been seen by Dr. Nya Botello and neurologist at [facility] and she was diagnosed this patient with dystonia patient has been receiving a Botox injection.  The patient also was diagnosed with hypertension, hyperlipidemia, diabetes, Parkinson disease and multiple episode of seizure patient had a CT scan in the past which is compatible with opening of the spinous process interspace at the level of C6-C7.      Review of Systems   Constitutional: Positive for activity change and fatigue.   Musculoskeletal: Positive for back pain, myalgias, neck pain and neck stiffness.   Neurological: Positive for dizziness, tremors, seizures, weakness and numbness.       The following portions of the patient's history were reviewed and updated as appropriate: allergies, current medications, past family history, past medical history, past social history, past surgical history and problem list.    Past Medical History:   Diagnosis Date   • Anxiety disorder     [facility]    • Back pain    • Bipolar disorder (CMS/HCC)    • Chest pain    • Chronic pain     with stenosis   • Cirrhosis (CMS/HCC)    • Colitis    • Colon polyps    • DDD (degenerative disc disease), lumbar    •  Eosinophilic colitis    • Gastritis    • GERD (gastroesophageal reflux disease)    • Hepatic steatosis     non alcoholic steo-hepatitis    • Hypertension    • IBS (irritable bowel syndrome)    • Kidney stones    • Neck pain    • Osteoarthritis    • Parkinson's disease (CMS/HCC)    • Seizure (CMS/HCC)     onset 7/2016   • Sleep apnea     Using Bipap machine at night.    • Type II diabetes mellitus (CMS/HCC)        Past Surgical History:   Procedure Laterality Date   • BACK SURGERY     • CARDIAC CATHETERIZATION  02/2019   • CARPAL TUNNEL RELEASE Bilateral     Wrist    • CHOLECYSTECTOMY  1997   • COLONOSCOPY     • ENDOSCOPY     • HYSTERECTOMY  09/2004    complete   • JOINT REPLACEMENT     • KNEE ARTHROSCOPY Bilateral     Arthroscopic surgery to bilateral knees    • OTHER SURGICAL HISTORY  2015    Stress test    • OTHER SURGICAL HISTORY  10/2016    Lithotripsy   • OTHER SURGICAL HISTORY      Right arm cellulits- spider bites    • OTHER SURGICAL HISTORY  02/08/2018    Lithotripsy   • OTHER SURGICAL HISTORY  04/20/2018    ACDF C3-C4 & C6-C7   • REPLACEMENT TOTAL KNEE  2000   • REPLACEMENT TOTAL KNEE Left 11/2016       Vitals:    08/07/19 1225   BP: 138/72   Pulse: 98       Patient Active Problem List   Diagnosis   • Bipolar 1 disorder, depressed, moderate (CMS/HCC)   • Body mass index (BMI) of 45.0-49.9 in adult (CMS/HCC)   • Cervical disc disorder with radiculopathy   • Chronic back pain   • Chronic kidney disease, unspecified   • Chronic obstructive pulmonary disease (CMS/HCC)   • Cirrhosis (CMS/HCC)   • Gastroesophageal reflux disease   • Hypertension   • Kyphosis   • Lumbar radiculopathy   • Sleep apnea   • Parkinson's disease (CMS/HCC)   • Recurrent urinary tract infection   • Seizure (CMS/HCC)   • Spinal stenosis of cervical region   • Type 2 diabetes mellitus with other diabetic neurological complication (CMS/HCC)   • Unsteady gait   • Hyponatremia   • Respiratory infection   • Generalized anxiety disorder        Family History Summary:  family history includes Diabetes in her brother and father; Heart disease in her brother and father; Hyperlipidemia in her brother, mother, and sister; Hypertension in her brother and mother.    Social History     Socioeconomic History   • Marital status:      Spouse name: Not on file   • Number of children: Not on file   • Years of education: Not on file   • Highest education level: Not on file   Tobacco Use   • Smoking status: Former Smoker   • Smokeless tobacco: Never Used   Substance and Sexual Activity   • Alcohol use: No     Frequency: Never   • Drug use: No   • Sexual activity: Defer         Current Outpatient Medications:   •  albuterol sulfate  (90 Base) MCG/ACT inhaler, Inhale 2 puffs Every 6 (Six) Hours As Needed for Wheezing., Disp: , Rfl:   •  aspirin 81 MG chewable tablet, Chew 81 mg Daily., Disp: , Rfl:   •  baclofen (LIORESAL) 10 MG tablet, Take 10 mg by mouth 3 (Three) Times a Day., Disp: , Rfl: 3  •  budesonide-formoterol (SYMBICORT) 160-4.5 MCG/ACT inhaler, Inhale 2 puffs 2 (Two) Times a Day., Disp: , Rfl:   •  carbidopa-levodopa (SINEMET)  MG per tablet, Take 2 tablets by mouth 3 (Three) Times a Day., Disp: , Rfl:   •  Cholecalciferol 5000 units tablet, Take 1 tablet by mouth Daily., Disp: , Rfl:   •  clonazePAM (KlonoPIN) 1 MG tablet, Take 1 tablet by mouth 2 (Two) Times a Day As Needed for Seizures., Disp: 69 tablet, Rfl: 2  •  diphenhydrAMINE (BENADRYL) 50 MG capsule, Take 50 mg by mouth At Night As Needed for Itching., Disp: , Rfl:   •  furosemide (LASIX) 40 MG tablet, Take 1 tablet by mouth Daily., Disp: 30 tablet, Rfl: 2  •  gabapentin (NEURONTIN) 300 MG capsule, Take 300 mg by mouth 3 (Three) Times a Day., Disp: , Rfl:   •  ipratropium-albuterol (DUO-NEB) 0.5-2.5 mg/3 ml nebulizer, Take 3 mL by nebulization Every 6 (Six) Hours As Needed for Shortness of Air or Wheezing for up to 30 days., Disp: 1080 mL, Rfl: 0  •  lactulose (CHRONULAC) 10  GM/15ML solution, Take 30 mL by mouth Every Night., Disp: , Rfl:   •  levETIRAcetam (KEPPRA) 1000 MG tablet, Take 1,000 mg by mouth every night at bedtime., Disp: , Rfl:   •  levETIRAcetam (KEPPRA) 750 MG tablet, Take 750 mg by mouth Every Morning., Disp: , Rfl:   •  lisinopril 10 MG tablet 10 mg, hydrochlorothiazide 12.5 MG capsule 12.5 mg, Take 10-12.5 mg by mouth Daily., Disp: , Rfl:   •  lurasidone (LATUDA) 40 MG tablet tablet, Take 40 mg by mouth Daily., Disp: , Rfl:   •  Multiple Vitamins-Minerals (MULTIVITAMIN WITH MINERALS) tablet tablet, Take 1 tablet by mouth Daily., Disp: , Rfl:   •  nitrofurantoin, macrocrystal-monohydrate, (MACROBID) 100 MG capsule, Take 100 mg by mouth every night at bedtime., Disp: , Rfl: 11  •  omeprazole (priLOSEC) 20 MG capsule, Take 40 mg by mouth Daily., Disp: , Rfl:   •  OXcarbazepine (TRILEPTAL) 300 MG tablet, Take 600 mg by mouth 2 (Two) Times a Day., Disp: , Rfl:   •  pioglitazone (ACTOS) 30 MG tablet, Take 30 mg by mouth Daily., Disp: , Rfl:   •  potassium chloride (K-DUR,KLOR-CON) 20 MEQ CR tablet, Take 20 mg by mouth Daily., Disp: , Rfl:   •  pramipexole (MIRAPEX) 0.5 MG tablet, Take 0.5 mg by mouth 3 (Three) Times a Day., Disp: , Rfl: 2  •  sodium chloride 1 g tablet, Take 2 tablets by mouth Daily., Disp: 60 tablet, Rfl: 2  •  tiotropium (SPIRIVA) 18 MCG per inhalation capsule, Place 1 capsule into inhaler and inhale Daily., Disp: , Rfl:   •  VIMPAT 150 MG tablet, Take 1 tablet by mouth 2 (Two) Times a Day., Disp: , Rfl: 2  •  vitamin C (ASCORBIC ACID) 500 MG tablet, Take 1,000 mg by mouth Daily., Disp: , Rfl:     Allergies   Allergen Reactions   • Ambien  [Zolpidem Tartrate] Confusion   • Ciprofloxacin Hcl Rash   • Penicillins Rash   • Methadone Hallucinations   • Clindamycin Rash           Objective   Physical Exam   Neck: Trachea normal. Normal carotid pulses present. Spinous process tenderness and muscular tenderness present. Neck rigidity present. Decreased range of  motion present.   Compression and distraction test are positive the patient has cervicothoracic kyphosis and obvious tremor and the restriction of range of motion of cervical spine.     Ortho Exam      Impression and Plan: This patient is here today for follow-up the patient is a status post C3-C4 and C6-C7 anterior cervical discectomy and fusion in the past the patient had a seizure last year came back to my office complaining of intractable neck pain patient is a CT scan of cervical spine which it was compatible with opening, widening of interspinous process distance of C6-C7 compatible with ligamentous injury patient started to have an episode of tremor patient has been seen by Dr. Nya Botello and neurologist at [facility] and that she was diagnosed this patient with dystonia and Parkinson's disease patient has been receiving Botox injection with minimal benefit patient is here for further evaluation and treatment.  Plan; lateral x-ray of cervical spine is demonstrating opening of interspinous ligament at the level of C6-C7 and cervicothoracic kyphosis and I am going to talk to Dr. Keith regarding her dystonia and Parkinson's disease and problem going to reconsider my plan of treatment for this patient.    Kyphosis of cervicothoracic region, unspecified kyphosis type [M40.203]     Orders Placed This Encounter   Procedures   • XR Spine Cervical 2 or 3 View     Scheduling Instructions:        16      0180      Cspine ap and lat      Neck pain, reports falling hitting head 6/24/19 and 7/28/19, post op 4/20/19 ACDF C3-C4 & C6-C7     Order Specific Question:   Reason for Exam:     Answer:   neck pain, falling               Cheryl Armenta MD  08/07/19  1:28 PM

## 2019-08-09 ENCOUNTER — PATIENT OUTREACH (OUTPATIENT)
Dept: CASE MANAGEMENT | Facility: OTHER | Age: 64
End: 2019-08-09

## 2019-08-09 ENCOUNTER — LAB (OUTPATIENT)
Dept: LAB | Facility: HOSPITAL | Age: 64
End: 2019-08-09

## 2019-08-09 DIAGNOSIS — E87.1 HYPONATREMIA: ICD-10-CM

## 2019-08-09 LAB
ANION GAP SERPL CALCULATED.3IONS-SCNC: 16.6 MMOL/L (ref 5–15)
BUN BLD-MCNC: 24 MG/DL (ref 8–20)
BUN/CREAT SERPL: 30 (ref 5.4–26.2)
CALCIUM SPEC-SCNC: 9.9 MG/DL (ref 8.9–10.3)
CHLORIDE SERPL-SCNC: 96 MMOL/L (ref 101–111)
CO2 SERPL-SCNC: 29 MMOL/L (ref 22–32)
CREAT BLD-MCNC: 0.8 MG/DL (ref 0.4–1)
GFR SERPL CREATININE-BSD FRML MDRD: 73 ML/MIN/1.73
GLUCOSE BLD-MCNC: 211 MG/DL (ref 65–99)
POTASSIUM BLD-SCNC: 3.6 MMOL/L (ref 3.6–5.1)
SODIUM BLD-SCNC: 138 MMOL/L (ref 136–144)

## 2019-08-09 PROCEDURE — 36415 COLL VENOUS BLD VENIPUNCTURE: CPT

## 2019-08-09 PROCEDURE — 80048 BASIC METABOLIC PNL TOTAL CA: CPT

## 2019-08-09 NOTE — OUTREACH NOTE
Care Plan Note      Responses   Lifestyle Goals  Routine follow-up with doctor(s), Medication management, Eat a healthy diet, Self monitor blood sugar   Self Management  Home Glucose Monitoring [dietary changes- diabetic diet and current fluid restriction]   Suggested Appointments  -- [pt has appointments scheduled with neurologist, nephrologist, cardiologist, and PCP]   AWV Materials  Send Materials   Care Gaps Addressed  Diabetic Eye Exam, Other (See Comment) [diabetic foot exam, pt states she sees podiatrist every 10 weeks for exam. pap discussion with pt]   Diabetic Eye Exam Status  Up to Date   Diabetic Eye Exam Completion at [facility] or Other  Other   Specific Disease Process Teaching  Diabetes, COPD, Hypertension   Other Patient Education/Resources   Advanced Care Planning, Nutrition/Diet, 24/7 [facility] Nurse Call Line   24/7 Nurse Call Line Education Method  Send Materials   ACP Education Method  Send Materials   Nutrition/Diet Education Method  Send Materials   Does patient have depression diagnosis?  Yes   Advanced Directives:  Send Materials   Ed Visits past 12 months:  2 or 3   Hospitalizations past 12 months  3 or more   Discharged From:  [facility]    Admit Date:  07/18/19   Discharge Date:  07/24/19   Discharge destination:  Home [Home with HH]   Agency:  [facility]   Medication Adherence  Medications understood   Goal Progress  Making Progress Toward Goal(s)   Readiness Scale  9   Confidence Scale  9   Health Literacy  Good        Pt discharged from [facility] on 7/24/19, pt diagnosed with respiratory infection, exacerbation of COPD, hyponatremia, and seizures. Pt since has seen PCP, MD Larson, on 8/5/19. Pt reports that she is active with HH, doing PT twice a week and has a HH nurse come once a week. Pt states she also has caregivers that come Monday through Friday, set up per [facility]. Per medical records pts seizure activity has increased recently. Pt is on Vimpat, Trileptal, Keppra, and Neurontin. Pt  "confirmed, states she had a seizure yesterday, Tuesday, and Saturday. Pt states HH nurse called and got her an appointment to see neurologist MD Hartley for this coming Monday 8/12/19. Pt states she last saw him while she inpatient. Pt reports she is using rescue inhaler and nebulizer as needed for shortness of breath and using Bipap at night. Pt has cough, is productive. Pt states she is on fluid restriction and does fairly well with diabetic diet, states she does has \"slip ups\" in regards to diabetic diet. Pt checks BS at home and states PT and HH nurse check her BP. Pt takes medications as directed, states she completed antibiotic and steroid that were prescribed at hospital discharge. Pt states blood glucose levels have been running between 140-190, pt is taking Actos as directed. Asked pt about labs ordered per PCP. Pt states she is currently at [facility] for lab draw. Pt states she uses MyChart and will look for lab results and call her PCP if she notices any abnormalities. Discussed health maintenence gaps with pt. Pt states she had eye exam done on Monday, sees podiatrist every 10 weeks for exam, and she was told she did not need pap 5 years after having her hysterectomy (done in 2004). Discussed pap with pt. Offered to send pt information about AWV, pt OK with this. Will have materials mailed. No other needs identified. RN-CC asked to follow up in 2 weeks after pts MD appointments to check in, pt in agreement. Outreach scheduled.      Judy Alvares RN    8/9/2019, 2:41 PM    "

## 2019-08-10 PROCEDURE — 93010 ELECTROCARDIOGRAM REPORT: CPT | Performed by: INTERNAL MEDICINE

## 2019-08-14 ENCOUNTER — APPOINTMENT (OUTPATIENT)
Dept: CT IMAGING | Facility: HOSPITAL | Age: 64
End: 2019-08-14

## 2019-08-14 ENCOUNTER — APPOINTMENT (OUTPATIENT)
Dept: GENERAL RADIOLOGY | Facility: HOSPITAL | Age: 64
End: 2019-08-14

## 2019-08-14 ENCOUNTER — HOSPITAL ENCOUNTER (OUTPATIENT)
Facility: HOSPITAL | Age: 64
Setting detail: OBSERVATION
Discharge: NURSING, REHAB OR LONG-TERM CARE | End: 2019-08-18
Attending: EMERGENCY MEDICINE | Admitting: FAMILY MEDICINE

## 2019-08-14 DIAGNOSIS — R41.82 ALTERED MENTAL STATUS, UNSPECIFIED ALTERED MENTAL STATUS TYPE: Primary | ICD-10-CM

## 2019-08-14 DIAGNOSIS — R56.9 SEIZURE (HCC): ICD-10-CM

## 2019-08-14 LAB
ALBUMIN SERPL-MCNC: 3.8 G/DL (ref 3.5–4.8)
ALBUMIN/GLOB SERPL: 1.1 G/DL (ref 1–1.7)
ALP SERPL-CCNC: 75 U/L (ref 32–91)
ALT SERPL W P-5'-P-CCNC: 13 U/L (ref 14–54)
AMMONIA BLD-SCNC: 28 UMOL/L (ref 9–35)
ANION GAP SERPL CALCULATED.3IONS-SCNC: 18.4 MMOL/L (ref 5–15)
APTT PPP: 21.5 SECONDS (ref 24–31)
ARTERIAL PATENCY WRIST A: POSITIVE
AST SERPL-CCNC: 27 U/L (ref 15–41)
ATMOSPHERIC PRESS: ABNORMAL MMHG
BASE EXCESS BLDA CALC-SCNC: 7.8 MMOL/L (ref 0–3)
BASOPHILS # BLD AUTO: 0.1 10*3/MM3 (ref 0–0.2)
BASOPHILS NFR BLD AUTO: 0.9 % (ref 0–1.5)
BDY SITE: ABNORMAL
BILIRUB SERPL-MCNC: 0.5 MG/DL (ref 0.3–1.2)
BILIRUB UR QL STRIP: NEGATIVE
BNP SERPL-MCNC: 45 PG/ML
BUN BLD-MCNC: 26 MG/DL (ref 8–20)
BUN/CREAT SERPL: 23.6 (ref 5.4–26.2)
CALCIUM SPEC-SCNC: 9.8 MG/DL (ref 8.9–10.3)
CHLORIDE SERPL-SCNC: 94 MMOL/L (ref 101–111)
CLARITY UR: CLEAR
CO2 BLDA-SCNC: 33.2 MMOL/L (ref 22–29)
CO2 SERPL-SCNC: 27 MMOL/L (ref 22–32)
COLOR UR: YELLOW
CREAT BLD-MCNC: 1.1 MG/DL (ref 0.4–1)
DEPRECATED RDW RBC AUTO: 47.7 FL (ref 37–54)
EOSINOPHIL # BLD AUTO: 0.1 10*3/MM3 (ref 0–0.4)
EOSINOPHIL NFR BLD AUTO: 1 % (ref 0.3–6.2)
ERYTHROCYTE [DISTWIDTH] IN BLOOD BY AUTOMATED COUNT: 15.4 % (ref 12.3–15.4)
ETHANOL UR QL: <0.01 %
GFR SERPL CREATININE-BSD FRML MDRD: 50 ML/MIN/1.73
GLOBULIN UR ELPH-MCNC: 3.6 GM/DL (ref 2.5–3.8)
GLUCOSE BLD-MCNC: 159 MG/DL (ref 65–99)
GLUCOSE BLDC GLUCOMTR-MCNC: 152 MG/DL (ref 70–105)
GLUCOSE UR STRIP-MCNC: NEGATIVE MG/DL
HCO3 BLDA-SCNC: 31.9 MMOL/L (ref 21–28)
HCT VFR BLD AUTO: 37.5 % (ref 34–46.6)
HEMODILUTION: NO
HGB BLD-MCNC: 12.7 G/DL (ref 12–15.9)
HGB UR QL STRIP.AUTO: NEGATIVE
HOROWITZ INDEX BLD+IHG-RTO: 21 %
INR PPP: 0.99 (ref 0.9–1.1)
KETONES UR QL STRIP: NEGATIVE
LEUKOCYTE ESTERASE UR QL STRIP.AUTO: NEGATIVE
LYMPHOCYTES # BLD AUTO: 1.1 10*3/MM3 (ref 0.7–3.1)
LYMPHOCYTES NFR BLD AUTO: 16.4 % (ref 19.6–45.3)
MAGNESIUM SERPL-MCNC: 1.7 MG/DL (ref 1.8–2.5)
MCH RBC QN AUTO: 29.9 PG (ref 26.6–33)
MCHC RBC AUTO-ENTMCNC: 33.9 G/DL (ref 31.5–35.7)
MCV RBC AUTO: 87.9 FL (ref 79–97)
MODALITY: ABNORMAL
MONOCYTES # BLD AUTO: 0.6 10*3/MM3 (ref 0.1–0.9)
MONOCYTES NFR BLD AUTO: 8.7 % (ref 5–12)
NEUTROPHILS # BLD AUTO: 4.9 10*3/MM3 (ref 1.7–7)
NEUTROPHILS NFR BLD AUTO: 73 % (ref 42.7–76)
NITRITE UR QL STRIP: NEGATIVE
NRBC BLD AUTO-RTO: 0.1 /100 WBC (ref 0–0.2)
PCO2 BLDA: 41.6 MM HG (ref 35–48)
PH BLDA: 7.49 PH UNITS (ref 7.35–7.45)
PH UR STRIP.AUTO: <=5 [PH] (ref 5–8)
PLATELET # BLD AUTO: 170 10*3/MM3 (ref 140–450)
PMV BLD AUTO: 8.2 FL (ref 6–12)
PO2 BLDA: 73.5 MM HG (ref 83–108)
POTASSIUM BLD-SCNC: 3.4 MMOL/L (ref 3.6–5.1)
PROT SERPL-MCNC: 7.4 G/DL (ref 6.1–7.9)
PROT UR QL STRIP: NEGATIVE
PROTHROMBIN TIME: 10.3 SECONDS (ref 9.6–11.7)
RBC # BLD AUTO: 4.26 10*6/MM3 (ref 3.77–5.28)
SAO2 % BLDCOA: 95.7 % (ref 94–98)
SODIUM BLD-SCNC: 136 MMOL/L (ref 136–144)
SP GR UR STRIP: 1.01 (ref 1–1.03)
TROPONIN I SERPL-MCNC: <0.03 NG/ML (ref 0–0.03)
UROBILINOGEN UR QL STRIP: NORMAL
WBC NRBC COR # BLD: 6.7 10*3/MM3 (ref 3.4–10.8)

## 2019-08-14 PROCEDURE — 96365 THER/PROPH/DIAG IV INF INIT: CPT

## 2019-08-14 PROCEDURE — 80307 DRUG TEST PRSMV CHEM ANLYZR: CPT | Performed by: EMERGENCY MEDICINE

## 2019-08-14 PROCEDURE — 83735 ASSAY OF MAGNESIUM: CPT | Performed by: EMERGENCY MEDICINE

## 2019-08-14 PROCEDURE — 80053 COMPREHEN METABOLIC PANEL: CPT | Performed by: EMERGENCY MEDICINE

## 2019-08-14 PROCEDURE — 85610 PROTHROMBIN TIME: CPT | Performed by: EMERGENCY MEDICINE

## 2019-08-14 PROCEDURE — 70450 CT HEAD/BRAIN W/O DYE: CPT

## 2019-08-14 PROCEDURE — 25010000002 LORAZEPAM PER 2 MG

## 2019-08-14 PROCEDURE — 84484 ASSAY OF TROPONIN QUANT: CPT | Performed by: EMERGENCY MEDICINE

## 2019-08-14 PROCEDURE — 96375 TX/PRO/DX INJ NEW DRUG ADDON: CPT

## 2019-08-14 PROCEDURE — G0378 HOSPITAL OBSERVATION PER HR: HCPCS

## 2019-08-14 PROCEDURE — 36600 WITHDRAWAL OF ARTERIAL BLOOD: CPT

## 2019-08-14 PROCEDURE — 25010000003 LEVETIRACETAM IN NACL 0.75% 1000 MG/100ML SOLUTION

## 2019-08-14 PROCEDURE — 82140 ASSAY OF AMMONIA: CPT | Performed by: EMERGENCY MEDICINE

## 2019-08-14 PROCEDURE — 82962 GLUCOSE BLOOD TEST: CPT

## 2019-08-14 PROCEDURE — 85025 COMPLETE CBC W/AUTO DIFF WBC: CPT | Performed by: EMERGENCY MEDICINE

## 2019-08-14 PROCEDURE — 51702 INSERT TEMP BLADDER CATH: CPT

## 2019-08-14 PROCEDURE — 71045 X-RAY EXAM CHEST 1 VIEW: CPT

## 2019-08-14 PROCEDURE — 99285 EMERGENCY DEPT VISIT HI MDM: CPT

## 2019-08-14 PROCEDURE — 25010000002 LORAZEPAM PER 2 MG: Performed by: EMERGENCY MEDICINE

## 2019-08-14 PROCEDURE — 85730 THROMBOPLASTIN TIME PARTIAL: CPT | Performed by: EMERGENCY MEDICINE

## 2019-08-14 PROCEDURE — 82803 BLOOD GASES ANY COMBINATION: CPT

## 2019-08-14 PROCEDURE — 96376 TX/PRO/DX INJ SAME DRUG ADON: CPT

## 2019-08-14 PROCEDURE — 93005 ELECTROCARDIOGRAM TRACING: CPT | Performed by: EMERGENCY MEDICINE

## 2019-08-14 PROCEDURE — 83880 ASSAY OF NATRIURETIC PEPTIDE: CPT | Performed by: EMERGENCY MEDICINE

## 2019-08-14 PROCEDURE — 81003 URINALYSIS AUTO W/O SCOPE: CPT | Performed by: EMERGENCY MEDICINE

## 2019-08-14 RX ORDER — LISINOPRIL AND HYDROCHLOROTHIAZIDE 12.5; 1 MG/1; MG/1
1 TABLET ORAL DAILY
COMMUNITY
End: 2019-11-06

## 2019-08-14 RX ORDER — LORAZEPAM 2 MG/ML
INJECTION INTRAMUSCULAR
Status: COMPLETED
Start: 2019-08-14 | End: 2019-08-14

## 2019-08-14 RX ORDER — LORAZEPAM 2 MG/ML
1 INJECTION INTRAMUSCULAR ONCE
Status: DISCONTINUED | OUTPATIENT
Start: 2019-08-14 | End: 2019-08-14

## 2019-08-14 RX ORDER — LORAZEPAM 2 MG/ML
1 INJECTION INTRAMUSCULAR ONCE
Status: COMPLETED | OUTPATIENT
Start: 2019-08-14 | End: 2019-08-14

## 2019-08-14 RX ORDER — SODIUM CHLORIDE 0.9 % (FLUSH) 0.9 %
10 SYRINGE (ML) INJECTION AS NEEDED
Status: DISCONTINUED | OUTPATIENT
Start: 2019-08-14 | End: 2019-08-18 | Stop reason: HOSPADM

## 2019-08-14 RX ORDER — LEVETIRACETAM 10 MG/ML
1000 INJECTION INTRAVASCULAR ONCE
Status: COMPLETED | OUTPATIENT
Start: 2019-08-14 | End: 2019-08-14

## 2019-08-14 RX ORDER — LEVETIRACETAM 10 MG/ML
INJECTION INTRAVASCULAR
Status: COMPLETED
Start: 2019-08-14 | End: 2019-08-14

## 2019-08-14 RX ORDER — LORAZEPAM 2 MG/ML
INJECTION INTRAMUSCULAR
Status: DISPENSED
Start: 2019-08-14 | End: 2019-08-15

## 2019-08-14 RX ADMIN — LEVETIRACETAM 1000 MG: 10 INJECTION INTRAVENOUS at 17:10

## 2019-08-14 RX ADMIN — LORAZEPAM 1 MG: 2 INJECTION INTRAMUSCULAR; INTRAVENOUS at 17:55

## 2019-08-14 RX ADMIN — LEVETIRACETAM 1000 MG: 10 INJECTION INTRAVASCULAR at 17:10

## 2019-08-14 RX ADMIN — LORAZEPAM 1 MG: 2 INJECTION INTRAMUSCULAR; INTRAVENOUS at 17:01

## 2019-08-14 NOTE — PROGRESS NOTES
Case Management/Social Work    Patient Name:  Dariana Rivera  YOB: 1955  MRN: [id number]  Admit Date:  8/14/2019        Patient is sedated at present. No family at bedside. Readmission assessment not done with patient at this time      Electronically signed by:  Jovana Elizabeth RN  08/14/19 7:42 PM

## 2019-08-14 NOTE — ED PROVIDER NOTES
"Subjective   63-year-old female presents with decreased mental status.  Patient apparently laid down to take a nap around 2:30 pm.  She was apparently normal at that time.  She woke up around 4 pm but was reportedly somewhat \"out of it\" and wasn't making any sense.  She does have a history of seizures but there was no witnessed seizure activity today.  Patient is unable to provide any history and no additional details can be obtained at this time.        History provided by:  EMS personnel      Review of Systems   Unable to perform ROS: Mental status change       Past Medical History:   Diagnosis Date   • Anxiety disorder     [facility]    • Back pain    • Bipolar disorder (CMS/HCC)    • Chest pain    • Chronic pain     with stenosis   • Cirrhosis (CMS/HCC)    • Colitis    • Colon polyps    • DDD (degenerative disc disease), lumbar    • Eosinophilic colitis    • Gastritis    • GERD (gastroesophageal reflux disease)    • Hepatic steatosis     non alcoholic steo-hepatitis    • Hypertension    • IBS (irritable bowel syndrome)    • Kidney stones    • Neck pain    • Osteoarthritis    • Parkinson's disease (CMS/HCC)    • Seizure (CMS/HCC)     onset 7/2016   • Sleep apnea     Using Bipap machine at night.    • Type II diabetes mellitus (CMS/HCC)        Allergies   Allergen Reactions   • Ambien  [Zolpidem Tartrate] Confusion   • Ciprofloxacin Hcl Rash   • Penicillins Rash   • Methadone Hallucinations   • Clindamycin Rash       Past Surgical History:   Procedure Laterality Date   • BACK SURGERY     • CARDIAC CATHETERIZATION  02/2019   • CARPAL TUNNEL RELEASE Bilateral     Wrist    • CHOLECYSTECTOMY  1997   • COLONOSCOPY     • ENDOSCOPY     • HYSTERECTOMY  09/2004    complete   • JOINT REPLACEMENT     • KNEE ARTHROSCOPY Bilateral     Arthroscopic surgery to bilateral knees    • OTHER SURGICAL HISTORY  2015    Stress test    • OTHER SURGICAL HISTORY  10/2016    Lithotripsy   • OTHER SURGICAL HISTORY      Right arm cellulits- " "spider bites    • OTHER SURGICAL HISTORY  02/08/2018    Lithotripsy   • OTHER SURGICAL HISTORY  04/20/2018    ACDF C3-C4 & C6-C7   • REPLACEMENT TOTAL KNEE  2000   • REPLACEMENT TOTAL KNEE Left 11/2016       Family History   Problem Relation Age of Onset   • Hypertension Mother    • Hyperlipidemia Mother    • Diabetes Father    • Heart disease Father    • Hyperlipidemia Sister    • Diabetes Brother    • Heart disease Brother    • Hypertension Brother    • Hyperlipidemia Brother        Social History     Socioeconomic History   • Marital status:      Spouse name: Not on file   • Number of children: Not on file   • Years of education: Not on file   • Highest education level: Not on file   Tobacco Use   • Smoking status: Former Smoker   • Smokeless tobacco: Never Used   Substance and Sexual Activity   • Alcohol use: No     Frequency: Never   • Drug use: No   • Sexual activity: Defer       /65   Pulse 81   Temp 97.8 °F (36.6 °C) (Rectal)   Resp 28   Ht 167.6 cm (66\")   Wt 121 kg (267 lb 10.2 oz)   SpO2 97%   BMI 43.20 kg/m²       Objective   Physical Exam   Constitutional: She appears well-developed and well-nourished. She appears lethargic.   HENT:   Head: Normocephalic and atraumatic.   Eyes: Pupils are equal, round, and reactive to light.   Neck: Normal range of motion. Neck supple.   Cardiovascular: Normal rate, regular rhythm and normal heart sounds.   Pulmonary/Chest: Effort normal and breath sounds normal. No respiratory distress.   Abdominal: Soft. Bowel sounds are normal.   Neurological: She appears lethargic. GCS eye subscore is 3. GCS verbal subscore is 1. GCS motor subscore is 4.   Skin: Skin is warm and dry.   Nursing note and vitals reviewed.      Procedures           ED Course      My interpretation of EKG shows sinus rhythm, rate of 90, no ST elevation.    Results for orders placed or performed during the hospital encounter of 08/14/19   Comprehensive Metabolic Panel   Result Value " Ref Range    Glucose 159 (H) 65 - 99 mg/dL    BUN 26 (H) 8 - 20 mg/dL    Creatinine 1.10 (H) 0.40 - 1.00 mg/dL    Sodium 136 136 - 144 mmol/L    Potassium 3.4 (L) 3.6 - 5.1 mmol/L    Chloride 94 (L) 101 - 111 mmol/L    CO2 27.0 22.0 - 32.0 mmol/L    Calcium 9.8 8.9 - 10.3 mg/dL    Total Protein 7.4 6.1 - 7.9 g/dL    Albumin 3.80 3.50 - 4.80 g/dL    ALT (SGPT) 13 (L) 14 - 54 U/L    AST (SGOT) 27 15 - 41 U/L    Alkaline Phosphatase 75 32 - 91 U/L    Total Bilirubin 0.5 0.3 - 1.2 mg/dL    eGFR Non African Amer 50 (L) >60 mL/min/1.73    Globulin 3.6 2.5 - 3.8 gm/dL    A/G Ratio 1.1 1.0 - 1.7 g/dL    BUN/Creatinine Ratio 23.6 5.4 - 26.2    Anion Gap 18.4 (H) 5.0 - 15.0 mmol/L   Protime-INR   Result Value Ref Range    Protime 10.3 9.6 - 11.7 Seconds    INR 0.99 0.90 - 1.10   aPTT   Result Value Ref Range    PTT 21.5 (L) 24.0 - 31.0 seconds   BNP   Result Value Ref Range    BNP 45.0 <=100.0 pg/mL   Urinalysis With Culture If Indicated - Urine, Catheter   Result Value Ref Range    Color, UA Yellow Yellow, Straw    Appearance, UA Clear Clear    pH, UA <=5.0 5.0 - 8.0    Specific Gravity, UA 1.014 1.005 - 1.030    Glucose, UA Negative Negative    Ketones, UA Negative Negative    Bilirubin, UA Negative Negative    Blood, UA Negative Negative    Protein, UA Negative Negative    Leuk Esterase, UA Negative Negative    Nitrite, UA Negative Negative    Urobilinogen, UA 0.2 E.U./dL 0.2 - 1.0 E.U./dL   Troponin   Result Value Ref Range    Troponin I <0.030 0.000 - 0.030 ng/mL   Ethanol   Result Value Ref Range    Ethanol % <0.010 <=0.079 %   Magnesium   Result Value Ref Range    Magnesium 1.7 (L) 1.8 - 2.5 mg/dL   Ammonia   Result Value Ref Range    Ammonia 28 9 - 35 umol/L   CBC Auto Differential   Result Value Ref Range    WBC 6.70 3.40 - 10.80 10*3/mm3    RBC 4.26 3.77 - 5.28 10*6/mm3    Hemoglobin 12.7 12.0 - 15.9 g/dL    Hematocrit 37.5 34.0 - 46.6 %    MCV 87.9 79.0 - 97.0 fL    MCH 29.9 26.6 - 33.0 pg    MCHC 33.9 31.5 - 35.7  g/dL    RDW 15.4 12.3 - 15.4 %    RDW-SD 47.7 37.0 - 54.0 fl    MPV 8.2 6.0 - 12.0 fL    Platelets 170 140 - 450 10*3/mm3    Neutrophil % 73.0 42.7 - 76.0 %    Lymphocyte % 16.4 (L) 19.6 - 45.3 %    Monocyte % 8.7 5.0 - 12.0 %    Eosinophil % 1.0 0.3 - 6.2 %    Basophil % 0.9 0.0 - 1.5 %    Neutrophils, Absolute 4.90 1.70 - 7.00 10*3/mm3    Lymphocytes, Absolute 1.10 0.70 - 3.10 10*3/mm3    Monocytes, Absolute 0.60 0.10 - 0.90 10*3/mm3    Eosinophils, Absolute 0.10 0.00 - 0.40 10*3/mm3    Basophils, Absolute 0.10 0.00 - 0.20 10*3/mm3    nRBC 0.1 0.0 - 0.2 /100 WBC   Blood Gas, Arterial   Result Value Ref Range    Site Right Radial     Yang's Test Positive     pH, Arterial 7.493 (H) 7.350 - 7.450 pH units    pCO2, Arterial 41.6 35.0 - 48.0 mm Hg    pO2, Arterial 73.5 (L) 83.0 - 108.0 mm Hg    HCO3, Arterial 31.9 (H) 21.0 - 28.0 mmol/L    Base Excess, Arterial 7.8 (H) 0.0 - 3.0 mmol/L    O2 Saturation, Arterial 95.7 94.0 - 98.0 %    CO2 Content 33.2 (H) 22 - 29 mmol/L    Barometric Pressure for Blood Gas  mmHg    Modality Room Air     FIO2 21 %    Hemodilution No    POC Glucose Once   Result Value Ref Range    Glucose 152 (H) 70 - 105 mg/dL     Ct Head Without Contrast    Result Date: 8/14/2019  1.No CT findings of acute intracranial abnormality. 2.Ethmoid sinus mucosal thickening may indicate sinusitis.  Electronically Signed By-DR. Zachary Montemayor MD On:8/14/2019 5:57 PM This report was finalized on [id number] by DR. Zachary Montemayor MD.    Xr Chest 1 View    Result Date: 8/14/2019  No acute process.  Electronically Signed By-Matthew Coronel On:8/14/2019 5:21 PM This report was finalized on [id number] by  Matthew Coronel, .            MDM  Patient had the above evaluation.  Results were discussed with the family.  Patient has remained somewhat poorly responsive throughout her ER stay however she has been hemodynamically stable.  Her work-up has been unremarkable.  There was some question of possible seizure activity  with some twitching of her face.  She was given a total of 2 mg of Ativan.  She was also given a dose of Keppra.  Patient has apparently had similar episodes in the past.  She has seen neurology and psychiatry.  Her EEG has been normal.  She does seem to respond to the conversation in the room however.  When I was discussing with the  that the patient would be admitted the patient immediately started having some convulsions and some sobbing sounds.  She is now resting comfortably.  Head CT is normal.  Labs are all unremarkable.  I discussed with Dr. Larson and the patient will be admitted for further evaluation and management.    Final diagnoses:   Altered mental status, unspecified altered mental status type   Seizure (CMS/HCC)            Abrahan Trent MD  08/14/19 1917

## 2019-08-15 PROBLEM — J98.8 RESPIRATORY INFECTION: Status: RESOLVED | Noted: 2019-07-22 | Resolved: 2019-08-15

## 2019-08-15 PROCEDURE — 25010000002 ENOXAPARIN PER 10 MG: Performed by: FAMILY MEDICINE

## 2019-08-15 PROCEDURE — 99219 PR INITIAL OBSERVATION CARE/DAY 50 MINUTES: CPT | Performed by: FAMILY MEDICINE

## 2019-08-15 PROCEDURE — 97162 PT EVAL MOD COMPLEX 30 MIN: CPT

## 2019-08-15 PROCEDURE — 99214 OFFICE O/P EST MOD 30 MIN: CPT | Performed by: PSYCHIATRY & NEUROLOGY

## 2019-08-15 PROCEDURE — 92610 EVALUATE SWALLOWING FUNCTION: CPT

## 2019-08-15 PROCEDURE — 96372 THER/PROPH/DIAG INJ SC/IM: CPT

## 2019-08-15 PROCEDURE — 94640 AIRWAY INHALATION TREATMENT: CPT

## 2019-08-15 PROCEDURE — G0378 HOSPITAL OBSERVATION PER HR: HCPCS

## 2019-08-15 PROCEDURE — 94799 UNLISTED PULMONARY SVC/PX: CPT

## 2019-08-15 PROCEDURE — 97116 GAIT TRAINING THERAPY: CPT

## 2019-08-15 RX ORDER — LURASIDONE HYDROCHLORIDE 40 MG/1
40 TABLET, FILM COATED ORAL DAILY
Status: DISCONTINUED | OUTPATIENT
Start: 2019-08-16 | End: 2019-08-18 | Stop reason: HOSPADM

## 2019-08-15 RX ORDER — POTASSIUM CHLORIDE 20 MEQ/1
20 TABLET, EXTENDED RELEASE ORAL DAILY
Status: DISCONTINUED | OUTPATIENT
Start: 2019-08-15 | End: 2019-08-18 | Stop reason: HOSPADM

## 2019-08-15 RX ORDER — GABAPENTIN 300 MG/1
300 CAPSULE ORAL 3 TIMES DAILY
Status: DISCONTINUED | OUTPATIENT
Start: 2019-08-15 | End: 2019-08-18 | Stop reason: HOSPADM

## 2019-08-15 RX ORDER — FAMOTIDINE 20 MG/1
40 TABLET, FILM COATED ORAL DAILY
Status: DISCONTINUED | OUTPATIENT
Start: 2019-08-15 | End: 2019-08-15

## 2019-08-15 RX ORDER — OXCARBAZEPINE 600 MG/1
600 TABLET, FILM COATED ORAL EVERY 12 HOURS SCHEDULED
Status: DISCONTINUED | OUTPATIENT
Start: 2019-08-15 | End: 2019-08-18 | Stop reason: HOSPADM

## 2019-08-15 RX ORDER — BUDESONIDE AND FORMOTEROL FUMARATE DIHYDRATE 160; 4.5 UG/1; UG/1
2 AEROSOL RESPIRATORY (INHALATION)
Status: DISCONTINUED | OUTPATIENT
Start: 2019-08-15 | End: 2019-08-18 | Stop reason: HOSPADM

## 2019-08-15 RX ORDER — SODIUM CHLORIDE 0.9 % (FLUSH) 0.9 %
3-10 SYRINGE (ML) INJECTION AS NEEDED
Status: DISCONTINUED | OUTPATIENT
Start: 2019-08-15 | End: 2019-08-18 | Stop reason: HOSPADM

## 2019-08-15 RX ORDER — CLONAZEPAM 1 MG/1
1 TABLET ORAL 2 TIMES DAILY PRN
Status: DISCONTINUED | OUTPATIENT
Start: 2019-08-15 | End: 2019-08-18 | Stop reason: HOSPADM

## 2019-08-15 RX ORDER — PRAMIPEXOLE DIHYDROCHLORIDE 0.5 MG/1
0.5 TABLET ORAL 3 TIMES DAILY
Status: DISCONTINUED | OUTPATIENT
Start: 2019-08-15 | End: 2019-08-18 | Stop reason: HOSPADM

## 2019-08-15 RX ORDER — ASPIRIN 81 MG/1
81 TABLET, CHEWABLE ORAL DAILY
Status: DISCONTINUED | OUTPATIENT
Start: 2019-08-15 | End: 2019-08-18 | Stop reason: HOSPADM

## 2019-08-15 RX ORDER — LACTULOSE 10 G/15ML
30 SOLUTION ORAL NIGHTLY
Status: DISCONTINUED | OUTPATIENT
Start: 2019-08-15 | End: 2019-08-18 | Stop reason: HOSPADM

## 2019-08-15 RX ORDER — LURASIDONE HYDROCHLORIDE 40 MG/1
20 TABLET, FILM COATED ORAL DAILY
Status: DISCONTINUED | OUTPATIENT
Start: 2019-08-15 | End: 2019-08-15

## 2019-08-15 RX ORDER — PANTOPRAZOLE SODIUM 40 MG/1
40 TABLET, DELAYED RELEASE ORAL EVERY MORNING
Status: DISCONTINUED | OUTPATIENT
Start: 2019-08-15 | End: 2019-08-18 | Stop reason: HOSPADM

## 2019-08-15 RX ORDER — PIOGLITAZONEHYDROCHLORIDE 30 MG/1
30 TABLET ORAL DAILY
Status: DISCONTINUED | OUTPATIENT
Start: 2019-08-15 | End: 2019-08-18 | Stop reason: HOSPADM

## 2019-08-15 RX ORDER — IPRATROPIUM BROMIDE AND ALBUTEROL SULFATE 2.5; .5 MG/3ML; MG/3ML
3 SOLUTION RESPIRATORY (INHALATION) 4 TIMES DAILY
Status: DISCONTINUED | OUTPATIENT
Start: 2019-08-15 | End: 2019-08-18 | Stop reason: HOSPADM

## 2019-08-15 RX ORDER — NITROGLYCERIN 0.4 MG/1
0.4 TABLET SUBLINGUAL
Status: DISCONTINUED | OUTPATIENT
Start: 2019-08-15 | End: 2019-08-18 | Stop reason: HOSPADM

## 2019-08-15 RX ORDER — ONDANSETRON 2 MG/ML
4 INJECTION INTRAMUSCULAR; INTRAVENOUS EVERY 6 HOURS PRN
Status: DISCONTINUED | OUTPATIENT
Start: 2019-08-15 | End: 2019-08-18 | Stop reason: HOSPADM

## 2019-08-15 RX ORDER — ARIPIPRAZOLE 2 MG/1
2 TABLET ORAL DAILY
Status: DISCONTINUED | OUTPATIENT
Start: 2019-08-15 | End: 2019-08-15

## 2019-08-15 RX ORDER — ALBUTEROL SULFATE 2.5 MG/3ML
2.5 SOLUTION RESPIRATORY (INHALATION)
Status: DISCONTINUED | OUTPATIENT
Start: 2019-08-15 | End: 2019-08-15

## 2019-08-15 RX ORDER — FUROSEMIDE 40 MG/1
40 TABLET ORAL DAILY
Status: DISCONTINUED | OUTPATIENT
Start: 2019-08-15 | End: 2019-08-18 | Stop reason: HOSPADM

## 2019-08-15 RX ORDER — LEVETIRACETAM 500 MG/1
1000 TABLET ORAL NIGHTLY
Status: DISCONTINUED | OUTPATIENT
Start: 2019-08-15 | End: 2019-08-18 | Stop reason: HOSPADM

## 2019-08-15 RX ORDER — SODIUM CHLORIDE 0.9 % (FLUSH) 0.9 %
3 SYRINGE (ML) INJECTION EVERY 12 HOURS SCHEDULED
Status: DISCONTINUED | OUTPATIENT
Start: 2019-08-15 | End: 2019-08-18 | Stop reason: HOSPADM

## 2019-08-15 RX ORDER — IPRATROPIUM BROMIDE AND ALBUTEROL SULFATE 2.5; .5 MG/3ML; MG/3ML
3 SOLUTION RESPIRATORY (INHALATION) EVERY 6 HOURS PRN
Status: DISCONTINUED | OUTPATIENT
Start: 2019-08-15 | End: 2019-08-18 | Stop reason: HOSPADM

## 2019-08-15 RX ORDER — SENNA PLUS 8.6 MG/1
1 TABLET ORAL NIGHTLY
Status: DISCONTINUED | OUTPATIENT
Start: 2019-08-15 | End: 2019-08-18 | Stop reason: HOSPADM

## 2019-08-15 RX ORDER — HEPARIN SODIUM 5000 [USP'U]/ML
5000 INJECTION, SOLUTION INTRAVENOUS; SUBCUTANEOUS EVERY 12 HOURS SCHEDULED
Status: DISCONTINUED | OUTPATIENT
Start: 2019-08-15 | End: 2019-08-15

## 2019-08-15 RX ORDER — SODIUM CHLORIDE 1000 MG
2 TABLET, SOLUBLE MISCELLANEOUS DAILY
Status: DISCONTINUED | OUTPATIENT
Start: 2019-08-15 | End: 2019-08-18 | Stop reason: HOSPADM

## 2019-08-15 RX ADMIN — Medication 3 ML: at 20:13

## 2019-08-15 RX ADMIN — OXCARBAZEPINE 600 MG: 600 TABLET, FILM COATED ORAL at 20:09

## 2019-08-15 RX ADMIN — LURASIDONE HYDROCHLORIDE 20 MG: 40 TABLET, FILM COATED ORAL at 16:24

## 2019-08-15 RX ADMIN — IPRATROPIUM BROMIDE AND ALBUTEROL SULFATE 3 ML: .5; 3 SOLUTION RESPIRATORY (INHALATION) at 18:29

## 2019-08-15 RX ADMIN — POTASSIUM CHLORIDE 20 MEQ: 20 TABLET, EXTENDED RELEASE ORAL at 13:36

## 2019-08-15 RX ADMIN — FUROSEMIDE 40 MG: 40 TABLET ORAL at 13:35

## 2019-08-15 RX ADMIN — LEVETIRACETAM 1000 MG: 500 TABLET ORAL at 20:09

## 2019-08-15 RX ADMIN — Medication 3 ML: at 13:46

## 2019-08-15 RX ADMIN — CARBIDOPA AND LEVODOPA 2 TABLET: 25; 100 TABLET ORAL at 16:24

## 2019-08-15 RX ADMIN — IPRATROPIUM BROMIDE AND ALBUTEROL SULFATE 3 ML: .5; 3 SOLUTION RESPIRATORY (INHALATION) at 11:57

## 2019-08-15 RX ADMIN — LACOSAMIDE 150 MG: 100 TABLET, FILM COATED ORAL at 20:08

## 2019-08-15 RX ADMIN — LACTULOSE 30 ML: 10 SOLUTION ORAL at 20:08

## 2019-08-15 RX ADMIN — OXCARBAZEPINE 600 MG: 600 TABLET, FILM COATED ORAL at 13:35

## 2019-08-15 RX ADMIN — IPRATROPIUM BROMIDE AND ALBUTEROL SULFATE 3 ML: .5; 3 SOLUTION RESPIRATORY (INHALATION) at 15:28

## 2019-08-15 RX ADMIN — CLONAZEPAM 1 MG: 1 TABLET ORAL at 22:20

## 2019-08-15 RX ADMIN — SENNOSIDES 1 TABLET: 8.6 TABLET, FILM COATED ORAL at 20:09

## 2019-08-15 RX ADMIN — PRAMIPEXOLE DIHYDROCHLORIDE 0.5 MG: 0.5 TABLET ORAL at 20:09

## 2019-08-15 RX ADMIN — LEVETIRACETAM 750 MG: 250 TABLET, FILM COATED ORAL at 16:24

## 2019-08-15 RX ADMIN — LACOSAMIDE 150 MG: 100 TABLET, FILM COATED ORAL at 13:38

## 2019-08-15 RX ADMIN — GABAPENTIN 300 MG: 300 CAPSULE ORAL at 16:26

## 2019-08-15 RX ADMIN — BUDESONIDE AND FORMOTEROL FUMARATE DIHYDRATE 2 PUFF: 160; 4.5 AEROSOL RESPIRATORY (INHALATION) at 18:29

## 2019-08-15 RX ADMIN — GABAPENTIN 300 MG: 300 CAPSULE ORAL at 20:09

## 2019-08-15 RX ADMIN — CARBIDOPA AND LEVODOPA 2 TABLET: 25; 100 TABLET ORAL at 20:09

## 2019-08-15 RX ADMIN — ASPIRIN 81 MG 81 MG: 81 TABLET ORAL at 13:37

## 2019-08-15 RX ADMIN — ENOXAPARIN SODIUM 40 MG: 40 INJECTION SUBCUTANEOUS at 16:25

## 2019-08-15 RX ADMIN — PRAMIPEXOLE DIHYDROCHLORIDE 0.5 MG: 0.5 TABLET ORAL at 16:28

## 2019-08-15 RX ADMIN — LISINOPRIL: 20 TABLET ORAL at 13:33

## 2019-08-15 RX ADMIN — PANTOPRAZOLE SODIUM 40 MG: 40 TABLET, DELAYED RELEASE ORAL at 13:37

## 2019-08-15 RX ADMIN — PIOGLITAZONE 30 MG: 30 TABLET ORAL at 13:37

## 2019-08-15 RX ADMIN — SODIUM CHLORIDE TAB 1 GM 2 G: 1 TAB at 13:36

## 2019-08-15 RX ADMIN — CLONAZEPAM 1 MG: 1 TABLET ORAL at 14:03

## 2019-08-15 RX ADMIN — BUDESONIDE AND FORMOTEROL FUMARATE DIHYDRATE 2 PUFF: 160; 4.5 AEROSOL RESPIRATORY (INHALATION) at 11:57

## 2019-08-15 NOTE — PROGRESS NOTES
Continued Stay Note  [facility]     Patient Name: Dariana Rivera  MRN: [id number]  Today's Date: 8/15/2019    Admit Date: 8/14/2019    Discharge Plan     Row Name 08/15/19 1609       Plan    Plan  [place] referral made,decision pending,needs PAS[facility] ,no precert needed    Patient/Family in Agreement with Plan  yes              Lakisha Ogden

## 2019-08-15 NOTE — PROGRESS NOTES
Nutrition Services    Patient Name:  Dariana Rivera  YOB: 1955  MRN: [id number]  Admit Date:  8/14/2019    MST =2 (unsure weight loss). No weight loss per EMR. Skin intact. No intakes recorded and no BM x1 day. Continue to monitor for nutrition needs. None at this time.     Electronically signed by:  Charly Vela RD  08/15/19 4:11 PM

## 2019-08-15 NOTE — THERAPY EVALUATION
Acute Care - Speech Language Pathology   Swallow Initial Evaluation  [facility]     Patient Name: Dariana Rivera  : 1955  MRN: [id number]  Today's Date: 8/15/2019               Admit Date: 2019    Visit Dx:     ICD-10-CM ICD-9-CM   1. Altered mental status, unspecified altered mental status type R41.82 780.97   2. Seizure (CMS/HCC) R56.9 780.39     Patient Active Problem List   Diagnosis   • Bipolar 1 disorder, depressed, moderate (CMS/HCC)   • Body mass index (BMI) of 45.0-49.9 in adult (CMS/HCC)   • Cervical disc disorder with radiculopathy   • Chronic back pain   • Chronic kidney disease, unspecified   • Chronic obstructive pulmonary disease (CMS/HCC)   • Cirrhosis (CMS/HCC)   • Gastroesophageal reflux disease   • Hypertension   • Kyphosis   • Lumbar radiculopathy   • Sleep apnea   • Parkinson's disease (CMS/HCC)   • Recurrent urinary tract infection   • Seizure (CMS/HCC)   • Spinal stenosis of cervical region   • Type 2 diabetes mellitus with other diabetic neurological complication (CMS/HCC)   • Unsteady gait   • Hyponatremia   • Respiratory infection   • Generalized anxiety disorder   • Altered mental status     Past Medical History:   Diagnosis Date   • Anxiety disorder     [facility]    • Back pain    • Bipolar disorder (CMS/HCC)    • Chest pain    • Chronic pain     with stenosis   • Cirrhosis (CMS/HCC)    • Colitis    • Colon polyps    • DDD (degenerative disc disease), lumbar    • Eosinophilic colitis    • Gastritis    • GERD (gastroesophageal reflux disease)    • Hepatic steatosis     non alcoholic steo-hepatitis    • Hypertension    • IBS (irritable bowel syndrome)    • Kidney stones    • Neck pain    • Osteoarthritis    • Parkinson's disease (CMS/HCC)    • Seizure (CMS/HCC)     onset 2016   • Sleep apnea     Using Bipap machine at night.    • Type II diabetes mellitus (CMS/HCC)      Past Surgical History:   Procedure Laterality Date   • BACK SURGERY     • CARDIAC CATHETERIZATION   02/2019   • CARPAL TUNNEL RELEASE Bilateral     Wrist    • CHOLECYSTECTOMY  1997   • COLONOSCOPY     • ENDOSCOPY     • HYSTERECTOMY  09/2004    complete   • JOINT REPLACEMENT     • KNEE ARTHROSCOPY Bilateral     Arthroscopic surgery to bilateral knees    • OTHER SURGICAL HISTORY  2015    Stress test    • OTHER SURGICAL HISTORY  10/2016    Lithotripsy   • OTHER SURGICAL HISTORY      Right arm cellulits- spider bites    • OTHER SURGICAL HISTORY  02/08/2018    Lithotripsy   • OTHER SURGICAL HISTORY  04/20/2018    ACDF C3-C4 & C6-C7   • REPLACEMENT TOTAL KNEE  2000   • REPLACEMENT TOTAL KNEE Left 11/2016        SWALLOW EVALUATION (last 72 hours)      SLP Adult Swallow Evaluation     Row Name 08/15/19 1300       Rehab Evaluation    Document Type  evaluation  -MM    Subjective Information  no complaints  -MM    Patient Observations  alert;cooperative;agree to therapy  -MM    Patient/Family Observations  Patient sitting in chair feeding herself a turkey sandwich, salad, chips and tea by straw.  -MM    Patient Effort  excellent  -MM       General Information    Patient Profile Reviewed  yes  -MM    Pertinent History Of Current Problem  Patient seen during last admission in July and it was recommended she be placed on a regular and thin liquid diet at that time.  -MM    Current Method of Nutrition  regular textures;thin liquids  -MM    Prior Level of Function-Communication  WFL  -MM    Prior Level of Function-Swallowing  no diet consistency restrictions  -MM       Oral Motor and Function    Dentition Assessment  upper dentures/partial in place;lower dentures/partial in place  -MM    Secretion Management  WNL/WFL  -MM    Mucosal Quality  moist, healthy  -MM    Volitional Swallow  WFL  -MM    Volitional Cough  WFL  -MM       Oral Musculature and Cranial Nerve Assessment    Oral Motor General Assessment  WFL  -MM       General Eating/Swallowing Observations    Respiratory Support Currently in Use  room air  -MM     Eating/Swallowing Skills  self-fed  -MM    Positioning During Eating  upright in chair  -MM    Utensils Used  straw  -MM    Consistencies Trialed  regular textures;thin liquids  -MM       Clinical Swallow Eval    Oral Prep Phase  WFL  -MM    Oral Transit  WFL  -MM    Oral Residue  WFL  -MM    Pharyngeal Phase  no overt signs/symptoms of pharyngeal impairment  -MM    Clinical Swallow Evaluation Summary  Patient seen during lunch.  She is independent in feeding herself small bites and sips with no oral residue and no overt s/s of aspiration  -MM       Clinical Impression    SLP Swallowing Diagnosis  functional oral phase;functional pharyngeal phase  -MM    Functional Impact  no impact on function  -MM    Swallow Criteria for Skilled Therapeutic Interventions Met  no problems identified which require skilled intervention  -MM       Recommendations    Therapy Frequency (Swallow)  evaluation only  -MM    SLP Diet Recommendation  regular textures;thin liquids  -MM    Recommended Precautions and Strategies  upright posture during/after eating;small bites of food and sips of liquid  -MM      User Key  (r) = Recorded By, (t) = Taken By, (c) = Cosigned By    Initials Name Effective Dates    MM Lorraine Rivas SLP 03/01/19 -           EDUCATION  The patient has been educated in the following areas:   Safe swallow strategies.    SLP Recommendation and Plan  SLP Swallowing Diagnosis: functional oral phase, functional pharyngeal phase  SLP Diet Recommendation: regular textures, thin liquids  Recommended Precautions and Strategies: upright posture during/after eating, small bites of food and sips of liquid              Swallow Criteria for Skilled Therapeutic Interventions Met: no problems identified which require skilled intervention        Therapy Frequency (Swallow): evaluation only          Plan of Care Reviewed With: patient  Plan of Care Review  Plan of Care Reviewed With: patient  Progress: improving  Outcome Summary:  Clinical swallow evaluation completed.  Patient is independent in feeding herself while sitting in the chair.  Patient observed to eat a turkey sandwich, chips, salad and tea by straw.  Functional mastication with no oral residue, timely swallow initiation and no overt s/s of aspiration noted during the evaluation.  It is recommended that patient continue a regular and thin liquid diet.  Skilled ST services at not indicated at this time.           Time Calculation:                HARPER Verdugo  8/15/2019

## 2019-08-15 NOTE — THERAPY EVALUATION
Patient Name: Dariana Rivera  : 1955    MRN: [id number]                              Today's Date: 8/15/2019       Admit Date: 2019    Visit Dx:     ICD-10-CM ICD-9-CM   1. Altered mental status, unspecified altered mental status type R41.82 780.97   2. Seizure (CMS/HCC) R56.9 780.39     Patient Active Problem List   Diagnosis   • Bipolar 1 disorder, depressed, moderate (CMS/HCC)   • Body mass index (BMI) of 45.0-49.9 in adult (CMS/HCC)   • Cervical disc disorder with radiculopathy   • Chronic back pain   • Chronic kidney disease, unspecified   • Chronic obstructive pulmonary disease (CMS/HCC)   • Cirrhosis (CMS/HCC)   • Gastroesophageal reflux disease   • Hypertension   • Kyphosis   • Lumbar radiculopathy   • Sleep apnea   • Parkinson's disease (CMS/HCC)   • Recurrent urinary tract infection   • Seizure (CMS/HCC)   • Spinal stenosis of cervical region   • Type 2 diabetes mellitus with other diabetic neurological complication (CMS/HCC)   • Unsteady gait   • Hyponatremia   • Generalized anxiety disorder   • Altered mental status     Past Medical History:   Diagnosis Date   • Anxiety disorder     [facility]    • Back pain    • Bipolar disorder (CMS/HCC)    • Chest pain    • Chronic pain     with stenosis   • Cirrhosis (CMS/HCC)    • Colitis    • Colon polyps    • DDD (degenerative disc disease), lumbar    • Eosinophilic colitis    • Gastritis    • GERD (gastroesophageal reflux disease)    • Hepatic steatosis     non alcoholic steo-hepatitis    • Hypertension    • IBS (irritable bowel syndrome)    • Kidney stones    • Neck pain    • Osteoarthritis    • Parkinson's disease (CMS/HCC)    • Seizure (CMS/HCC)     onset 2016   • Sleep apnea     Using Bipap machine at night.    • Type II diabetes mellitus (CMS/HCC)      Past Surgical History:   Procedure Laterality Date   • BACK SURGERY     • CARDIAC CATHETERIZATION  2019   • CARPAL TUNNEL RELEASE Bilateral     Wrist    • CHOLECYSTECTOMY     •  COLONOSCOPY     • ENDOSCOPY     • HYSTERECTOMY  09/2004    complete   • JOINT REPLACEMENT     • KNEE ARTHROSCOPY Bilateral     Arthroscopic surgery to bilateral knees    • OTHER SURGICAL HISTORY  2015    Stress test    • OTHER SURGICAL HISTORY  10/2016    Lithotripsy   • OTHER SURGICAL HISTORY      Right arm cellulits- spider bites    • OTHER SURGICAL HISTORY  02/08/2018    Lithotripsy   • OTHER SURGICAL HISTORY  04/20/2018    ACDF C3-C4 & C6-C7   • REPLACEMENT TOTAL KNEE  2000   • REPLACEMENT TOTAL KNEE Left 11/2016     General Information     Row Name 08/15/19 1537 08/15/19 1300       PT Evaluation Time/Intention    Subjective Information  --  no complaints  -MM    Document Type  evaluation  -CM  evaluation  -MM    Mode of Treatment  physical therapy  -  --    Patient Effort  --  excellent  -MM    Row Name 08/15/19 1537 08/15/19 1300       General Information    Patient Profile Reviewed?  yes  -CM  --    Patient Observations  --  alert;cooperative;agree to therapy  -MM    Patient/Family Observations  --  Patient sitting in chair feeding herself a turkey sandwich, salad, chips and tea by straw.  -MM    Prior Level of Function  independent:;all household mobility;gait uses cane; requires assist to ascend 3rd (large) step to enter home  -CM  --    Existing Precautions/Restrictions  fall  -CM  --    Barriers to Rehab  medically complex  -CM  --    Row Name 08/15/19 1537 08/15/19 1034       Relationship/Environment    Lives With  spouse  cares for pt at home  -CM  spouse  -CF    Family Caregiver if Needed  --  other (see comments) no children, neightbor [place]  -CF    Row Name 08/15/19 1537 08/15/19 1034       Resource/Environmental Concerns    Current Living Arrangements  home/apartment/condo  -CM  home/apartment/condo  -CF    Resource/Environmental Concerns  --  none  -CF    Transportation Concerns  --  car, none  -CF    Row Name 08/15/19 1537          Home Main Entrance    Number of Stairs, Main Entrance   three;other (see comments) large 3rd step which requires asst  -CM     Stair Railings, Main Entrance  railings safe and in good condition  -CM     Row Name 08/15/19 1537          Stairs Within Home, Primary    Number of Stairs, Within Home, Primary  none  -CM     Row Name 08/15/19 1537          Cognitive Assessment/Intervention- PT/OT    Orientation Status (Cognition)  oriented x 4  -CM     Row Name 08/15/19 1537          Safety Issues, Functional Mobility    Safety Issues Affecting Function (Mobility)  awareness of need for assistance;insight into deficits/self awareness  -CM     Impairments Affecting Function (Mobility)  balance;motor control;motor planning;muscle tone abnormal  -CM       User Key  (r) = Recorded By, (t) = Taken By, (c) = Cosigned By    Initials Name Provider Type    CM Josee Phelps, PT Physical Therapist    MM Lorraine Rivas, SLP Speech and Language Pathologist    CF Gale Lin         Mobility     Row Name 08/15/19 1543          Bed Mobility Assessment/Treatment    Comment (Bed Mobility)  not assessed, as pt in chair when PT arrived  -CM     Row Name 08/15/19 1543          Sit-Stand Transfer    Sit-Stand [place] (Transfers)  contact guard  -CM     Assistive Device (Sit-Stand Transfers)  walker, front-wheeled  -CM     Row Name 08/15/19 1543          Gait/Stairs Assessment/Training    [place] Level (Gait)  minimum assist (75% patient effort)  -CM     Assistive Device (Gait)  walker, front-wheeled  -CM     Distance in Feet (Gait)  30  -CM     Deviations/Abnormal Patterns (Gait)  festinating/shuffling;base of support, wide;other (see comments) severe tremors  -CM     Comment (Gait/Stairs)  eyes fluttering open/closed at times during rx; pt reports she likes to use cane, as rw tends to get away from her and cause her to lean forward; she feels puts too much pressure on her neck  -CM       User Key  (r) = Recorded By, (t) = Taken By, (c) = Cosigned By     Initials Name Provider Type    Josee Hudson, PT Physical Therapist        Obj/Interventions     [facility] Name 08/15/19 1547          General ROM    GENERAL ROM COMMENTS  wfl  -CM     Row Name 08/15/19 1547          MMT (Manual Muscle Testing)    General MMT Comments  R side 4-/5, L side 3+/5; severe tremoring of all 4 extremities at rest & w /movement  -CM     Row Name 08/15/19 1547          Static Sitting Balance    Level of [place] (Unsupported Sitting, Static Balance)  supervision  -CM     Sitting Position (Unsupported Sitting, Static Balance)  sitting in chair  -CM     Row Name 08/15/19 1547          Dynamic Sitting Balance    Level of [place], Reaches Outside Midline (Sitting, Dynamic Balance)  contact guard assist  -CM     Sitting Position, Reaches Outside Midline (Sitting, Dynamic Balance)  sitting in chair  -CM     Row Name 08/15/19 1547          Static Standing Balance    Level of [place] (Supported Standing, Static Balance)  minimal assist, 75% patient effort  -CM     Assistive Device Utilized (Supported Standing, Static Balance)  walker, rolling  -CM     Row Name 08/15/19 1547          Dynamic Standing Balance    Level of [place], Reaches Outside Midline (Standing, Dynamic Balance)  minimal assist, 75% patient effort  -CM     Assistive Device Utilized (Supported Standing, Dynamic Balance)  walker, rolling  -CM     Row Name 08/15/19 1547          Sensory Assessment/Intervention    Sensory General Assessment  -- hx of periheral neuropathy  -CM       User Key  (r) = Recorded By, (t) = Taken By, (c) = Cosigned By    Initials Name Provider Type    Josee Hudson, PT Physical Therapist        Goals/Plan     Row Name 08/15/19 6879          Bed Mobility Goal 1 (PT)    Activity/Assistive Device (Bed Mobility Goal 1, PT)  bed mobility activities, all  -CM     [place] Level/Cues Needed (Bed Mobility Goal 1, PT)  supervision required  -CM     Time Frame (Bed Mobility Goal 1, PT)  2  weeks  -CM     Row Name 08/15/19 1552          Transfer Goal 1 (PT)    Activity/Assistive Device (Transfer Goal 1, PT)  transfers, all;walker, rolling  -CM     Cost Level/Cues Needed (Transfer Goal 1, PT)  conditional independence  -CM     Time Frame (Transfer Goal 1, PT)  2 weeks  -CM     Row Name 08/15/19 1552          Gait Training Goal 1 (PT)    Activity/Assistive Device (Gait Training Goal 1, PT)  gait (walking locomotion);assistive device use;walker, rolling  -CM     Cost Level (Gait Training Goal 1, PT)  conditional independence  -CM     Distance (Gait Goal 1, PT)  75 ft  -CM       User Key  (r) = Recorded By, (t) = Taken By, (c) = Cosigned By    Initials Name Provider Type    Josee Hudson, PT Physical Therapist        Clinical Impression     Row Name 08/15/19 1548          Pain Assessment    Additional Documentation  Pain Scale: Numbers Pre/Post-Treatment (Group)  -CM     Row Name 08/15/19 1548          Pain Scale: Numbers Pre/Post-Treatment    Pain Scale: Numbers, Pretreatment  9/10  -CM     Pain Scale: Numbers, Post-Treatment  9/10  -CM     Pain Location  neck  -CM     Row Name 08/15/19 1548 08/15/19 1329       Plan of Care Review    Plan of Care Reviewed With  patient  -CM  patient  -MM    Row Name 08/15/19 1326          Plan of Care Review    Plan of Care Reviewed With  patient  (Pended)   -MM     Row Name 08/15/19 1548          Physical Therapy Clinical Impression    Patient/Family Goals Statement (PT Clinical Impression)  64 yo female w/ complicated medical hx. Admitted after being noted to have decreased level of consciousness & mumbled speech. Pt has hx of parkinsons, seizure d/o, as well as multiple other comorbidities. Pt has required frequent hospitalizations. High risk for readmission. Presents w/ global weakness & hx of multiple falls. Recommend short IP rehab stay at d/c.   -CM     Criteria for Skilled Interventions Met (PT Clinical Impression)  yes;treatment indicated   -CM     Rehab Potential (PT Clinical Summary)  good, to achieve stated therapy goals  -CM     Row Name 08/15/19 1548          Vital Signs    Recovery Time  vital signs stable w/ treatment  -CM     Row Name 08/15/19 1548          Positioning and Restraints    Pre-Treatment Position  sitting in chair/recliner  -CM     Post Treatment Position  chair  -CM     In Chair  notified nsg;sitting;call light within reach;encouraged to call for assist;exit alarm on;with family/caregiver  -CM       User Key  (r) = Recorded By, (t) = Taken By, (c) = Cosigned By    Initials Name Provider Type    Josee Hudson, PT Physical Therapist    Lorraine Drake, SLP Speech and Language Pathologist        Outcome Measures     Row Name 08/15/19 1555          Modified [place] Scale    Pre-Stroke Modified Iris Scale  4 - Moderately severe disability.  Unable to walk without assistance, and unable to attend to own bodily needs without assistance.  -CM     Modified [place] Scale  4 - Moderately severe disability.  Unable to walk without assistance, and unable to attend to own bodily needs without assistance.  -CM     Row Name 08/15/19 1555          Functional Assessment    Outcome Measure Options  Modified [place]  -CM       User Key  (r) = Recorded By, (t) = Taken By, (c) = Cosigned By    Initials Name Provider Type    Josee Hudson, PT Physical Therapist        Physical Therapy Education     Title: PT OT SLP Therapies (In Progress)     Topic: Physical Therapy (Done)     Point: Mobility training (Done)     Learning Progress Summary           Patient Acceptance, E,TB, VU by CM at 8/15/2019  3:54 PM   Family Acceptance, E,TB, VU by CM at 8/15/2019  3:54 PM   Significant Other Acceptance, E,TB, VU by CM at 8/15/2019  3:54 PM                   Point: Home exercise program (Done)     Learning Progress Summary           Patient Acceptance, E,TB, VU by CM at 8/15/2019  3:54 PM   Family Acceptance, E,TB, VU by CM at 8/15/2019  3:54 PM    Significant Other Acceptance, E,TB, VU by CM at 8/15/2019  3:54 PM                   Point: Body mechanics (Done)     Learning Progress Summary           Patient Acceptance, E,TB, VU by CM at 8/15/2019  3:54 PM   Family Acceptance, E,TB, VU by CM at 8/15/2019  3:54 PM   Significant Other Acceptance, E,TB, VU by CM at 8/15/2019  3:54 PM                   Point: Precautions (Done)     Learning Progress Summary           Patient Acceptance, E,TB, VU by CM at 8/15/2019  3:54 PM   Family Acceptance, E,TB, VU by CM at 8/15/2019  3:54 PM   Significant Other Acceptance, E,TB, VU by CM at 8/15/2019  3:54 PM                               User Key     Initials Effective Dates Name Provider Type Discipline     03/01/19 -  Josee Phelps, PT Physical Therapist PT              PT Recommendation and Plan  Planned Therapy Interventions (PT Eval): balance training, bed mobility training, gait training, home exercise program, motor coordination training, patient/family education, strengthening, transfer training  Outcome Summary/Treatment Plan (PT)  Anticipated Discharge Disposition (PT): inpatient rehabilitation facility  Plan of Care Reviewed With: patient  Outcome Summary: 62 yo female adm for decreased level of consciousness. Hx of multiple falls. Presents w/ global weakness & decreased safety of ambulation. Recommend short IP rehab stay at d/c.     Time Calculation:   PT Charges     Row Name 08/15/19 1556             Time Calculation    Start Time  1330  -CM      Stop Time  1358  -CM      Time Calculation (min)  28 min  -CM      PT Received On  08/15/19  -CM      PT - Next Appointment  08/18/19  -CM      PT Goal Re-Cert Due Date  08/29/19  -CM         Time Calculation- PT    Total Timed Code Minutes- PT  15 minute(s)  -CM        User Key  (r) = Recorded By, (t) = Taken By, (c) = Cosigned By    Initials Name Provider Type    Josee Hudson, PT Physical Therapist        Therapy Charges for Today     Code Description  Service Date Service Provider Modifiers Qty    [id number] HC PT EVAL MOD COMPLEXITY 4 8/15/2019 Josee Phelps, PT GP 1    [id number]  GAIT TRAINING EA 15 MIN 8/15/2019 Josee Phelps, PT GP 1          PT G-Codes  Outcome Measure Options: Modified Iris  Modified Iris Scale: 4 - Moderately severe disability.  Unable to walk without assistance, and unable to attend to own bodily needs without assistance.    Josee Phelps, PT  8/15/2019

## 2019-08-15 NOTE — DISCHARGE PLACEMENT REQUEST
"Dariana Rivera (63 y.o. Female)     Date of Birth Social Security Number Address Home Phone MRN    1955  [address]  [place] [address] [phone] [id number]    [place] Marital Status          [employer]        Admission Date Admission Type Admitting Provider Attending Provider Department, Room/Bed    8/14/19 Emergency Paul Larson Jr., MD Hocker, Clifton M Jr., MD [facility] NEURO HEART, 258/1    Discharge Date Discharge Disposition Discharge Destination                       Attending Provider:  Paul Larson Jr., MD    Allergies:  Ambien  [Zolpidem Tartrate], Ciprofloxacin Hcl, Penicillins, Methadone, Clindamycin    Isolation:  None   Infection:  None   Code Status:  CPR    Ht:  167.6 cm (66\")   Wt:  128 kg (282 lb 3 oz)    Admission Cmt:  None   Principal Problem:  None                Active Insurance as of 8/14/2019     Primary Coverage     Payor Plan Insurance Group Employer/Plan Group    MEDICARE MEDICARE A & B      Payor Plan Address Payor Plan Phone Number Payor Plan Fax Number Effective Dates    [address] [phone]  1/1/2011 - None Entered    [place] [place] [facility] [address]       Subscriber Name Subscriber Birth Date Member ID       DARIANA RIVERA 1955 [id number]           Secondary Coverage     Payor Plan Insurance Group Employer/Plan Group    [place]      Payor Plan Address Payor Plan Phone Number Payor Plan Fax Number Effective Dates    [address]   6/1/2019 - None Entered    [place] [address]       Subscriber Name Subscriber Birth Date Member ID       DARIANA RIVERA 1955 [id number]                 Emergency Contacts      (Rel.) Home Phone Work Phone Mobile Phone    RIVERAJIMBO (Spouse) [phone] -- --    KHARI GENAO (Sister) [phone] -- --              "

## 2019-08-15 NOTE — PROGRESS NOTES
Continued Stay Note   [facility]     Patient Name: Dariana Rivera  MRN: [id number]  Today's Date: 8/15/2019    Admit Date: 8/14/2019    Discharge Plan    Spoke with , who is advocating for patient to go to inpatient rehab. Upon discharge, especially due to frequent readmissions.  Discussed at bedside with patient and spouse, open to referrals to 1) [facility] 2) [facility].   PT/OT evaluation pending.  Updated .        Gale HANNONN,CCM

## 2019-08-15 NOTE — PLAN OF CARE
Problem: Patient Care Overview  Goal: Plan of Care Review   08/15/19 1552   OTHER   Outcome Summary 62 yo female adm for decreased level of consciousness. Hx of multiple falls. Presents w/ global weakness & decreased safety of ambulation. Recommend short IP rehab stay at d/c.

## 2019-08-15 NOTE — PLAN OF CARE
Problem: Patient Care Overview  Goal: Plan of Care Review  Outcome: Ongoing (interventions implemented as appropriate)   08/15/19 0259   Coping/Psychosocial   Plan of Care Reviewed With other (see comments)   OTHER   Outcome Summary pt. lethargic. Arouses to verbal stimuli, opens eyes but makes no eye contact. pt. responds to touch by pulling ext. away and then awakens and speech incoherant and mumbling. no s/s pain. v/s monitored. Seizure precautions maintained. NPO status r/t decreased responsiveness.        Problem: Fall Risk (Adult)  Goal: Identify Related Risk Factors and Signs and Symptoms  Outcome: Ongoing (interventions implemented as appropriate)    Goal: Absence of Fall  Outcome: Ongoing (interventions implemented as appropriate)      Problem: Seizure Disorder/Epilepsy (Adult)  Goal: Signs and Symptoms of Listed Potential Problems Will be Absent, Minimized or Managed (Seizure Disorder/Epilepsy)  Outcome: Ongoing (interventions implemented as appropriate)      Problem: Skin Injury Risk (Adult)  Goal: Identify Related Risk Factors and Signs and Symptoms  Outcome: Ongoing (interventions implemented as appropriate)    Goal: Skin Health and Integrity  Outcome: Ongoing (interventions implemented as appropriate)

## 2019-08-15 NOTE — H&P
Patient Care Team:  Paul Larson Jr., MD as PCP - General (Family Medicine)  Schirmer, OLIVER Avila as PCP - Judy Gaviria RN as Care Coordinator    Chief complaint  Altered mental status    Subjective      The patient is a 63-year-old white female was admitted the hospital altered mental status.  The patient has multiple medical problems.  The patient is currently cared for home by her .  She does have home health and home physical therapy.      History is obtained from the patient's  and the patient. They state that the patient was sitting in her chair in her recliner.  They significantly back to go to sleep.  Patient fell asleep.  The patient does have sleep apnea.  The patient did not order BiPAP on.  They state that when she awakened she was confused.  She was mumbling and not making sense.  Because of this she was brought to the emergency room.  In emergency room evaluation was performed.  Nothing specific was found to account for her symptoms.  She was admitted for further care.      Patient states that she awakened at about 2 o'clock this morning.  She states initially she was confused prescribed for help.  Since that time the patient is returned to her baseline status.      As stated above the patient has multiple medical problems.  She has frequent hospitalizations for ideal reasons.  It appears to me that her needs are greater than can be managed home.    Review of Systems   Constitutional: Positive for fatigue. Negative for chills and fever.   HENT: Negative for sinus pain, sore throat and trouble swallowing.    Eyes: Negative for pain and discharge.   Respiratory: Negative for cough and shortness of breath.    Cardiovascular: Negative for chest pain and leg swelling.   Gastrointestinal: Negative for abdominal pain, diarrhea, nausea and vomiting.   Endocrine: Negative for cold intolerance, heat intolerance, polydipsia, polyphagia and polyuria.    Genitourinary: Negative for dysuria and frequency.   Musculoskeletal: Negative for arthralgias and myalgias.   Skin: Negative for color change and rash.   Neurological: Negative for dizziness, syncope and weakness.   Psychiatric/Behavioral: Negative for agitation, confusion and decreased concentration.          History  Past Medical History:   Diagnosis Date   • Anxiety disorder     [facility]    • Back pain    • Bipolar disorder (CMS/HCC)    • Chest pain    • Chronic pain     with stenosis   • Cirrhosis (CMS/HCC)    • Colitis    • Colon polyps    • DDD (degenerative disc disease), lumbar    • Eosinophilic colitis    • Gastritis    • GERD (gastroesophageal reflux disease)    • Hepatic steatosis     non alcoholic steo-hepatitis    • Hypertension    • IBS (irritable bowel syndrome)    • Kidney stones    • Neck pain    • Osteoarthritis    • Parkinson's disease (CMS/HCC)    • Seizure (CMS/HCC)     onset 7/2016   • Sleep apnea     Using Bipap machine at night.    • Type II diabetes mellitus (CMS/HCC)      Past Surgical History:   Procedure Laterality Date   • BACK SURGERY     • CARDIAC CATHETERIZATION  02/2019   • CARPAL TUNNEL RELEASE Bilateral     Wrist    • CHOLECYSTECTOMY  1997   • COLONOSCOPY     • ENDOSCOPY     • HYSTERECTOMY  09/2004    complete   • JOINT REPLACEMENT     • KNEE ARTHROSCOPY Bilateral     Arthroscopic surgery to bilateral knees    • OTHER SURGICAL HISTORY  2015    Stress test    • OTHER SURGICAL HISTORY  10/2016    Lithotripsy   • OTHER SURGICAL HISTORY      Right arm cellulits- spider bites    • OTHER SURGICAL HISTORY  02/08/2018    Lithotripsy   • OTHER SURGICAL HISTORY  04/20/2018    ACDF C3-C4 & C6-C7   • REPLACEMENT TOTAL KNEE  2000   • REPLACEMENT TOTAL KNEE Left 11/2016     Family History   Problem Relation Age of Onset   • Hypertension Mother    • Hyperlipidemia Mother    • Diabetes Father    • Heart disease Father    • Hyperlipidemia Sister    • Diabetes Brother    • Heart disease Brother     • Hypertension Brother    • Hyperlipidemia Brother      Social History     Tobacco Use   • Smoking status: Former Smoker   • Smokeless tobacco: Never Used   Substance Use Topics   • Alcohol use: No     Frequency: Never   • Drug use: No     Medications Prior to Admission   Medication Sig Dispense Refill Last Dose   • albuterol sulfate  (90 Base) MCG/ACT inhaler Inhale 2 puffs Every 6 (Six) Hours As Needed for Wheezing.   Taking   • aspirin 81 MG chewable tablet Chew 81 mg Daily.   Taking   • baclofen (LIORESAL) 10 MG tablet Take 10 mg by mouth 3 (Three) Times a Day.  3 Taking   • Brexpiprazole (REXULTI) 1 MG tablet Take 1 mg by mouth Daily.      • budesonide-formoterol (SYMBICORT) 160-4.5 MCG/ACT inhaler Inhale 2 puffs 2 (Two) Times a Day.   Taking   • carbidopa-levodopa (SINEMET)  MG per tablet Take 2 tablets by mouth 3 (Three) Times a Day.   Taking   • Cholecalciferol 5000 units tablet Take 1 tablet by mouth Daily.   Taking   • clonazePAM (KlonoPIN) 1 MG tablet Take 1 tablet by mouth 2 (Two) Times a Day As Needed for Seizures. 69 tablet 2 Taking   • diphenhydrAMINE (BENADRYL) 50 MG capsule Take 50 mg by mouth At Night As Needed for Itching.   Taking   • furosemide (LASIX) 40 MG tablet Take 1 tablet by mouth Daily. 30 tablet 2 Taking   • gabapentin (NEURONTIN) 300 MG capsule Take 300 mg by mouth 3 (Three) Times a Day.   Taking   • ipratropium-albuterol (DUO-NEB) 0.5-2.5 mg/3 ml nebulizer Take 3 mL by nebulization Every 6 (Six) Hours As Needed for Shortness of Air or Wheezing for up to 30 days. 1080 mL 0 Taking   • lactulose (CHRONULAC) 10 GM/15ML solution Take 30 mL by mouth Every Night.   Taking   • levETIRAcetam (KEPPRA) 1000 MG tablet Take 1,000 mg by mouth every night at bedtime.   Taking   • levETIRAcetam (KEPPRA) 750 MG tablet Take 750 mg by mouth Every Morning.   Taking   • lisinopril-hydrochlorothiazide (PRINZIDE,ZESTORETIC) 10-12.5 MG per tablet Take 1 tablet by mouth Daily.      •  lurasidone (LATUDA) 40 MG tablet tablet Take 20 mg by mouth Daily.   Taking   • Multiple Vitamins-Minerals (MULTIVITAMIN WITH MINERALS) tablet tablet Take 1 tablet by mouth Daily.   Taking   • nitrofurantoin, macrocrystal-monohydrate, (MACROBID) 100 MG capsule Take 100 mg by mouth every night at bedtime.  11 Taking   • omeprazole (priLOSEC) 20 MG capsule Take 40 mg by mouth Daily.   Taking   • OXcarbazepine (TRILEPTAL) 300 MG tablet Take 600 mg by mouth 2 (Two) Times a Day.   Taking   • pioglitazone (ACTOS) 30 MG tablet Take 30 mg by mouth Daily.   Taking   • potassium chloride (K-DUR,KLOR-CON) 20 MEQ CR tablet Take 20 mg by mouth Daily.   Taking   • pramipexole (MIRAPEX) 0.5 MG tablet Take 0.5 mg by mouth 3 (Three) Times a Day.  2 Taking   • sodium chloride 1 g tablet Take 2 tablets by mouth Daily. 60 tablet 2 Taking   • tiotropium (SPIRIVA) 18 MCG per inhalation capsule Place 1 capsule into inhaler and inhale Daily.   Taking   • VIMPAT 150 MG tablet Take 1 tablet by mouth 2 (Two) Times a Day.  2 Taking   • vitamin C (ASCORBIC ACID) 500 MG tablet Take 1,000 mg by mouth Daily.   Taking     Allergies:  Ambien  [zolpidem tartrate]; Ciprofloxacin hcl; Penicillins; Methadone; and Clindamycin    Objective     Vital Signs  Temp:  [97.7 °F (36.5 °C)-98.6 °F (37 °C)] 98.2 °F (36.8 °C)  Heart Rate:  [] 95  Resp:  [13-28] 18  BP: (103-150)/(55-89) 113/57    Physical Exam   Constitutional: She is oriented to person, place, and time.   Morbidly obese   HENT:   Head: Normocephalic and atraumatic.   Eyes: EOM are normal. Pupils are equal, round, and reactive to light.   Neck: Neck supple.   Cardiovascular: Normal rate, regular rhythm, normal heart sounds and intact distal pulses.   Pulmonary/Chest: Effort normal and breath sounds normal.   Abdominal: Soft. Bowel sounds are normal.   Musculoskeletal: Normal range of motion.   Neurological: She is oriented to person, place, and time.   Skin: Skin is warm and dry.    Psychiatric: She has a normal mood and affect. Her behavior is normal. Judgment and thought content normal.   Nursing note and vitals reviewed.       Results Review:   Lab Results (last 24 hours)     Procedure Component Value Units Date/Time    Comprehensive Metabolic Panel [[id number]]  (Abnormal) Collected:  08/14/19 1706    Specimen:  Blood Updated:  08/14/19 1803     Glucose 159 mg/dL      BUN 26 mg/dL      Creatinine 1.10 mg/dL      Sodium 136 mmol/L      Potassium 3.4 mmol/L      Chloride 94 mmol/L      CO2 27.0 mmol/L      Calcium 9.8 mg/dL      Total Protein 7.4 g/dL      Albumin 3.80 g/dL      ALT (SGPT) 13 U/L      AST (SGOT) 27 U/L      Alkaline Phosphatase 75 U/L      Total Bilirubin 0.5 mg/dL      eGFR Non African Amer 50 mL/min/1.73      Globulin 3.6 gm/dL      A/G Ratio 1.1 g/dL      BUN/Creatinine Ratio 23.6     Anion Gap 18.4 mmol/L     Ethanol [[id number]]  (Normal) Collected:  08/14/19 1706    Specimen:  Blood Updated:  08/14/19 1803     Ethanol % <0.010 %     Narrative:       Plasma Ethanol Clinical Symptoms:    ETOH (%)               Clinical Symptom  .01-.05              No apparent influence  .03-.12              Euphoria, Diminished judgment and attention   .09-.25              Impaired comprehension, Muscle incoordination  .18-.30              Confusion, Staggered gait, Slurred speech  .25-.40              Markedly decreased response to stimuli, unable to stand or                        walk, vomitting, sleep or stupor  .35-.50              Comatose, Anesthesia, Subnormal body temperature      Magnesium [[id number]]  (Abnormal) Collected:  08/14/19 1706    Specimen:  Blood Updated:  08/14/19 1803     Magnesium 1.7 mg/dL     BNP [[id number]]  (Normal) Collected:  08/14/19 1706    Specimen:  Blood Updated:  08/14/19 1759     BNP 45.0 pg/mL      Comment: Results may be falsely decreased if patient taking Biotin.       Troponin [[id number]]  (Normal) Collected:  08/14/19 1706    Specimen:  Blood  Updated:  08/14/19 1754     Troponin I <0.030 ng/mL     Narrative:       Troponin I Reference Range:    0.00-0.03  Negative.  Repeat testing in 4-6 hours if clinically indicated.    0.04-0.29  Suspicious for myocardial injury. Serial measurements and clinical  correlation may be necessary to confirm or exclude diagnosis of acute  coronary syndrome.  Repeat testing in 4-6 hours if indicated.     >0.29 Consistent with myocardial injury.  Recommend clinical and laboratory correlation.     Results my be falsely decreased if patient taking Biotin.     Ammonia [[id number]]  (Normal) Collected:  08/14/19 1706    Specimen:  Blood Updated:  08/14/19 1753     Ammonia 28 umol/L     Protime-INR [[id number]]  (Normal) Collected:  08/14/19 1706    Specimen:  Blood Updated:  08/14/19 1737     Protime 10.3 Seconds      INR 0.99    aPTT [[id number]]  (Abnormal) Collected:  08/14/19 1706    Specimen:  Blood Updated:  08/14/19 1737     PTT 21.5 seconds     CBC & Differential [[id number]] Collected:  08/14/19 1706    Specimen:  Blood Updated:  08/14/19 1729    Narrative:       The following orders were created for panel order CBC & Differential.  Procedure                               Abnormality         Status                     ---------                               -----------         ------                     CBC Auto Differential[[id number]]        Abnormal            Final result                 Please view results for these tests on the individual orders.    CBC Auto Differential [[id number]]  (Abnormal) Collected:  08/14/19 1706    Specimen:  Blood Updated:  08/14/19 1729     WBC 6.70 10*3/mm3      RBC 4.26 10*6/mm3      Hemoglobin 12.7 g/dL      Hematocrit 37.5 %      MCV 87.9 fL      MCH 29.9 pg      MCHC 33.9 g/dL      RDW 15.4 %      RDW-SD 47.7 fl      MPV 8.2 fL      Platelets 170 10*3/mm3      Neutrophil % 73.0 %      Lymphocyte % 16.4 %      Monocyte % 8.7 %      Eosinophil % 1.0 %      Basophil % 0.9 %      Neutrophils,  Absolute 4.90 10*3/mm3      Lymphocytes, Absolute 1.10 10*3/mm3      Monocytes, Absolute 0.60 10*3/mm3      Eosinophils, Absolute 0.10 10*3/mm3      Basophils, Absolute 0.10 10*3/mm3      nRBC 0.1 /100 WBC     Urinalysis With Culture If Indicated - Urine, Catheter [[id number]]  (Normal) Collected:  08/14/19 1706    Specimen:  Urine, Catheter Updated:  08/14/19 1716     Color, UA Yellow     Appearance, UA Clear     pH, UA <=5.0     Specific Gravity, UA 1.014     Glucose, UA Negative     Ketones, UA Negative     Bilirubin, UA Negative     Blood, UA Negative     Protein, UA Negative     Leuk Esterase, UA Negative     Nitrite, UA Negative     Urobilinogen, UA 0.2 E.U./dL    Narrative:       Urine microscopic not indicated.    Blood Gas, Arterial [[id number]]  (Abnormal) Collected:  08/14/19 1648    Specimen:  Arterial Blood Updated:  08/14/19 1657     Site Right Radial     Yang's Test Positive     pH, Arterial 7.493 pH units      pCO2, Arterial 41.6 mm Hg      pO2, Arterial 73.5 mm Hg      HCO3, Arterial 31.9 mmol/L      Base Excess, Arterial 7.8 mmol/L      Comment: Serial Number: [id number]:  [id number]        O2 Saturation, Arterial 95.7 %      CO2 Content 33.2 mmol/L      Barometric Pressure for Blood Gas -- mmHg      Comment: N/A        Modality Room Air     FIO2 21 %      Hemodilution No    POC Glucose Once [[id number]]  (Abnormal) Collected:  08/14/19 1642    Specimen:  Blood Updated:  08/14/19 1649     Glucose 152 mg/dL      Comment: Serial Number: [id number]:  [id number]           Imaging Results (last 24 hours)     Procedure Component Value Units Date/Time    CT Head Without Contrast [[id number]] Collected:  08/14/19 1753     Updated:  08/14/19 1759    Narrative:          DATE OF EXAM:  8/14/2019 5:37 PM     PROCEDURE:   CT HEAD WO CONTRAST-     INDICATIONS:   altered mental status     COMPARISON:  06/24/2019     TECHNIQUE:   Routine transaxial cuts were obtained through the head without the  administration  of contrast. Automated exposure control and iterative  reconstruction methods were used.      FINDINGS:  No midline shift. Basal cisterns appear patent.  Ventricles and sulci  appear similar in appearance to the prior examination.     No findings of acute hemorrhage or extra-axial collection. No definite  mass or focal edema is identified. Atherosclerotic calcifications are  seen in the carotid arteries. Stable hypodensity in the inferior right  basal ganglion, which may be related to dilated perivascular space. No  CT findings of acute infarction.     There is mucosal thickening of the ethmoid sinuses.  Mastoid air cells  appear clear.  No acute calvarial abnormality is identified.       Impression:       1.No CT findings of acute intracranial abnormality.  2.Ethmoid sinus mucosal thickening may indicate sinusitis.     Electronically Signed By-DR. Zachary Montemayor MD On:8/14/2019 5:57 PM  This report was finalized on [id number] by DR. Zachary Montemayor MD.    XR Chest 1 View [[id number]] Collected:  08/14/19 1721     Updated:  08/14/19 1723    Narrative:          DATE OF EXAM:   8/14/2019 5:05 PM     PROCEDURE:   XR CHEST 1 VW-     INDICATIONS:   altered mental status     COMPARISON:  07/31/2019     TECHNIQUE:   [Portable chest radiograph]     FINDINGS:    The cardiomediastinal silhouette is within normal limits. The lungs are  clear. There is no pneumothorax, focal consolidation, or large pleural  effusion. Osseous structures grossly intact.        Impression:       No acute process.      Electronically Signed By-Matthew Coronel On:8/14/2019 5:21 PM  This report was finalized on [id number] by  Matthew Coronel, .         ECG/EMG Results (last 24 hours)     Procedure Component Value Units Date/Time    ECG 12 Lead [[id number]] Collected:  08/14/19 1646     Updated:  08/15/19 0857    Narrative:       HEART RATE= 90  bpm  RR Interval= 668  ms  FL Interval= 223  ms  P Horizontal Axis= 4  deg  P Front Axis= 44  deg  QRSD  Interval= 90  ms  QT Interval= 368  ms  QRS Axis= -5  deg  T Wave Axis= 26  deg  - ABNORMAL ECG -  Sinus rhythm  Prolonged CA interval  Inferior infarct, old  Electronically Signed By: Abrahan Trent ([facility]) 15-Aug-2019 08:57:41  Date and Time of Study: 2019-08-14 16:46:54           I reviewed the patient's new clinical results.    Active Problems:    Altered mental status  At this is multi factorial in etiology including patient not wearing BiPAP when she fell asleep multiple medications obesity.  The patient has been evaluated.  The patient is not returned to baseline.  Patient be continued on her home medications for now.  I discussed with the patient that 1 of the problems she has a is the number of medications that she takes.     patient be seen in consultation by case management.  I recommend transfer to skilled nursing facility for further care.  The patient refuses she could go back home with home health and home physical therapy.         Assessment:    Condition: In stable condition.       Plan:   Transfer to floor.  Regular diet.        I discussed the patients findings and my recommendations with patient and family.     Paul Larson Jr., MD  08/15/19  2:23 PM

## 2019-08-15 NOTE — CONSULTS
Chief Complaint:   Evaluate for mental status changes.      HPI:  The patient is a 63 year old lady with multiple medical problems including Parkinson's disease, Bipolar disorder, generalized seizure disorder, GERD, DDD, IBS, OSAS and pain in the  Neck region who was in usual health until day before yesterday when she did felt well.  She sat on her recliner and her speech was garbled.  The patient also had shortness of air at that time.  She did not have any loss or impairment of consciousness, but her speech remained garbled for about an hour.  The EMS were called and she was brought to ER of [facility].  She was admitted for further work up and management.  She denies any focal weakness, swallowing problems, however her anxiety has increased recently.  She also denies focal weakness, bowel and bladder problems.   ROS: Constitutional: no weakness SOA+, CP+, IBS+., Anxiety +, Neck pain +, myalgia +, no rash.           PMH:  Past Medical History:   Diagnosis Date   • Anxiety disorder     [facility]    • Back pain    • Bipolar disorder (CMS/HCC)    • Chest pain    • Chronic pain     with stenosis   • Cirrhosis (CMS/HCC)    • Colitis    • Colon polyps    • DDD (degenerative disc disease), lumbar    • Eosinophilic colitis    • Gastritis    • GERD (gastroesophageal reflux disease)    • Hepatic steatosis     non alcoholic steo-hepatitis    • Hypertension    • IBS (irritable bowel syndrome)    • Kidney stones    • Neck pain    • Osteoarthritis    • Parkinson's disease (CMS/HCC)    • Seizure (CMS/HCC)     onset 7/2016   • Sleep apnea     Using Bipap machine at night.    • Type II diabetes mellitus (CMS/HCC)      Social History     Socioeconomic History   • Marital status:      Spouse name: Not on file   • Number of children: Not on file   • Years of education: Not on file   • Highest education level: Not on file   Tobacco Use   • Smoking status: Former Smoker   • Smokeless tobacco: Never Used   Substance and Sexual Activity    • Alcohol use: No     Frequency: Never   • Drug use: No   • Sexual activity: Defer     Past Surgical History:   Procedure Laterality Date   • BACK SURGERY     • CARDIAC CATHETERIZATION  02/2019   • CARPAL TUNNEL RELEASE Bilateral     Wrist    • CHOLECYSTECTOMY  1997   • COLONOSCOPY     • ENDOSCOPY     • HYSTERECTOMY  09/2004    complete   • JOINT REPLACEMENT     • KNEE ARTHROSCOPY Bilateral     Arthroscopic surgery to bilateral knees    • OTHER SURGICAL HISTORY  2015    Stress test    • OTHER SURGICAL HISTORY  10/2016    Lithotripsy   • OTHER SURGICAL HISTORY      Right arm cellulits- spider bites    • OTHER SURGICAL HISTORY  02/08/2018    Lithotripsy   • OTHER SURGICAL HISTORY  04/20/2018    ACDF C3-C4 & C6-C7   • REPLACEMENT TOTAL KNEE  2000   • REPLACEMENT TOTAL KNEE Left 11/2016     Family History   Problem Relation Age of Onset   • Hypertension Mother    • Hyperlipidemia Mother    • Diabetes Father    • Heart disease Father    • Hyperlipidemia Sister    • Diabetes Brother    • Heart disease Brother    • Hypertension Brother    • Hyperlipidemia Brother        Labs:  Lab Results (last 24 hours)     Procedure Component Value Units Date/Time    Comprehensive Metabolic Panel [[id number]]  (Abnormal) Collected:  08/14/19 1706    Specimen:  Blood Updated:  08/14/19 1803     Glucose 159 mg/dL      BUN 26 mg/dL      Creatinine 1.10 mg/dL      Sodium 136 mmol/L      Potassium 3.4 mmol/L      Chloride 94 mmol/L      CO2 27.0 mmol/L      Calcium 9.8 mg/dL      Total Protein 7.4 g/dL      Albumin 3.80 g/dL      ALT (SGPT) 13 U/L      AST (SGOT) 27 U/L      Alkaline Phosphatase 75 U/L      Total Bilirubin 0.5 mg/dL      eGFR Non African Amer 50 mL/min/1.73      Globulin 3.6 gm/dL      A/G Ratio 1.1 g/dL      BUN/Creatinine Ratio 23.6     Anion Gap 18.4 mmol/L     Ethanol [[id number]]  (Normal) Collected:  08/14/19 1706    Specimen:  Blood Updated:  08/14/19 1803     Ethanol % <0.010 %     Narrative:       Plasma Ethanol  Clinical Symptoms:    ETOH (%)               Clinical Symptom  .01-.05              No apparent influence  .03-.12              Euphoria, Diminished judgment and attention   .09-.25              Impaired comprehension, Muscle incoordination  .18-.30              Confusion, Staggered gait, Slurred speech  .25-.40              Markedly decreased response to stimuli, unable to stand or                        walk, vomitting, sleep or stupor  .35-.50              Comatose, Anesthesia, Subnormal body temperature      Magnesium [[id number]]  (Abnormal) Collected:  08/14/19 1706    Specimen:  Blood Updated:  08/14/19 1803     Magnesium 1.7 mg/dL     BNP [[id number]]  (Normal) Collected:  08/14/19 1706    Specimen:  Blood Updated:  08/14/19 1759     BNP 45.0 pg/mL      Comment: Results may be falsely decreased if patient taking Biotin.       Troponin [[id number]]  (Normal) Collected:  08/14/19 1706    Specimen:  Blood Updated:  08/14/19 1754     Troponin I <0.030 ng/mL     Narrative:       Troponin I Reference Range:    0.00-0.03  Negative.  Repeat testing in 4-6 hours if clinically indicated.    0.04-0.29  Suspicious for myocardial injury. Serial measurements and clinical  correlation may be necessary to confirm or exclude diagnosis of acute  coronary syndrome.  Repeat testing in 4-6 hours if indicated.     >0.29 Consistent with myocardial injury.  Recommend clinical and laboratory correlation.     Results my be falsely decreased if patient taking Biotin.     Ammonia [[id number]]  (Normal) Collected:  08/14/19 1706    Specimen:  Blood Updated:  08/14/19 1753     Ammonia 28 umol/L     Protime-INR [[id number]]  (Normal) Collected:  08/14/19 1706    Specimen:  Blood Updated:  08/14/19 1737     Protime 10.3 Seconds      INR 0.99    aPTT [[id number]]  (Abnormal) Collected:  08/14/19 1706    Specimen:  Blood Updated:  08/14/19 1737     PTT 21.5 seconds     CBC & Differential [[id number]] Collected:  08/14/19 1706    Specimen:  Blood  Updated:  08/14/19 1729    Narrative:       The following orders were created for panel order CBC & Differential.  Procedure                               Abnormality         Status                     ---------                               -----------         ------                     CBC Auto Differential[[id number]]        Abnormal            Final result                 Please view results for these tests on the individual orders.    CBC Auto Differential [[id number]]  (Abnormal) Collected:  08/14/19 1706    Specimen:  Blood Updated:  08/14/19 1729     WBC 6.70 10*3/mm3      RBC 4.26 10*6/mm3      Hemoglobin 12.7 g/dL      Hematocrit 37.5 %      MCV 87.9 fL      MCH 29.9 pg      MCHC 33.9 g/dL      RDW 15.4 %      RDW-SD 47.7 fl      MPV 8.2 fL      Platelets 170 10*3/mm3      Neutrophil % 73.0 %      Lymphocyte % 16.4 %      Monocyte % 8.7 %      Eosinophil % 1.0 %      Basophil % 0.9 %      Neutrophils, Absolute 4.90 10*3/mm3      Lymphocytes, Absolute 1.10 10*3/mm3      Monocytes, Absolute 0.60 10*3/mm3      Eosinophils, Absolute 0.10 10*3/mm3      Basophils, Absolute 0.10 10*3/mm3      nRBC 0.1 /100 WBC     Urinalysis With Culture If Indicated - Urine, Catheter [[id number]]  (Normal) Collected:  08/14/19 1706    Specimen:  Urine, Catheter Updated:  08/14/19 1716     Color, UA Yellow     Appearance, UA Clear     pH, UA <=5.0     Specific Gravity, UA 1.014     Glucose, UA Negative     Ketones, UA Negative     Bilirubin, UA Negative     Blood, UA Negative     Protein, UA Negative     Leuk Esterase, UA Negative     Nitrite, UA Negative     Urobilinogen, UA 0.2 E.U./dL    Narrative:       Urine microscopic not indicated.    Blood Gas, Arterial [[id number]]  (Abnormal) Collected:  08/14/19 1648    Specimen:  Arterial Blood Updated:  08/14/19 1657     Site Right Radial     Yang's Test Positive     pH, Arterial 7.493 pH units      pCO2, Arterial 41.6 mm Hg      pO2, Arterial 73.5 mm Hg      HCO3, Arterial 31.9 mmol/L       Base Excess, Arterial 7.8 mmol/L      Comment: Serial Number: [id number]:  [id number]        O2 Saturation, Arterial 95.7 %      CO2 Content 33.2 mmol/L      Barometric Pressure for Blood Gas -- mmHg      Comment: N/A        Modality Room Air     FIO2 21 %      Hemodilution No    POC Glucose Once [[id number]]  (Abnormal) Collected:  08/14/19 1642    Specimen:  Blood Updated:  08/14/19 1649     Glucose 152 mg/dL      Comment: Serial Number: [id number]:  [id number]               Medications:  Schedule Meds    aspirin 81 mg Oral Daily   budesonide-formoterol 2 puff Inhalation BID - RT   [START ON 8/16/2019] calcium-vitamin D 1 tablet Oral Daily   carbidopa-levodopa 2 tablet Oral TID   enoxaparin 40 mg Subcutaneous Q24H   furosemide 40 mg Oral Daily   gabapentin 300 mg Oral TID   ipratropium-albuterol 3 mL Nebulization 4x Daily   lacosamide 150 mg Oral BID   lactulose 30 mL Oral Nightly   levETIRAcetam 1,000 mg Oral Nightly   [START ON 8/16/2019] levETIRAcetam 750 mg Oral QAM   levETIRAcetam 750 mg Oral Once   lisinopril-hydroCHLOROthiazide (ZESTORETIC) 10-12.5 mg combo dose  Oral Q24H   lurasidone 20 mg Oral Daily   OXcarbazepine 600 mg Oral Q12H   pantoprazole 40 mg Oral QAM   pioglitazone 30 mg Oral Daily   potassium chloride 20 mEq Oral Daily   pramipexole 0.5 mg Oral TID   senna 1 tablet Oral Nightly   sodium chloride 3 mL Intravenous Q12H   sodium chloride 2 g Oral Daily         MED'S PRN  clonazePAM  •  ipratropium-albuterol  •  magnesium hydroxide  •  nitroglycerin  •  ondansetron  •  [COMPLETED] Insert peripheral IV **AND** sodium chloride  •  sodium chloride    Vitals:  Vitals:    08/15/19 1531   BP: 110/74   Pulse: 89   Resp: 18   Temp:    SpO2:    1      Physical Exam: The patient is obese lady sitting in chair.  Head NC, Neck supple.  Lungs decreased breath sounds in bases. CV  S1-S2.  Abdomen soft.  Ext no edema.    Neurologic Exam:  The patient is awake, alert, oriented x 3.  Speech is good.   Slight tremor around mouth noted.  CN EOMI, no facial droop. Motor 5-/5.  Sensory light touch intact. Reflexes absent, plantar mute.  Cerebellum coordinated movements noted.  Gait is deferred secondary to patient's condition.      Impression:  The patient is a 63 year old lady with multiple medical problems including Parkinson's disease,  Generalized seizure disorder, bipolar and anxiety disorder who had an episode of dysarthria.  It has resolved without any residuals.  It may be related to toxic metabolic encephalopathy along with some component of anxiety.  Unlikely to be stroke or transient ischemic attack.      Recomendations:  Agree with work up and management.  Will follow.

## 2019-08-15 NOTE — PLAN OF CARE
Problem: Patient Care Overview  Goal: Plan of Care Review  Outcome: Ongoing (interventions implemented as appropriate)   08/15/19 8884   Coping/Psychosocial   Plan of Care Reviewed With patient   OTHER   Outcome Summary Clinical swallow evaluation completed. Patient is independent in feeding herself while sitting in the chair. Patient observed to eat a turkey sandwich, chips, salad and tea by straw. Functional mastication with no oral residue, timely swallow initiation and no overt s/s of aspiration noted during the evaluation. It is recommended that patient continue a regular and thin liquid diet. Skilled ST services at not indicated at this time.   Plan of Care Review   Progress improving

## 2019-08-15 NOTE — PROGRESS NOTES
Discharge Planning Assessment   [facility]     Patient Name: Dariana Rivera  MRN: [id number]  Today's Date: 8/15/2019    Admit Date: 8/14/2019    Discharge Needs Assessment     Row Name 08/15/19 1034       Living Environment    Lives With  spouse    Current Living Arrangements  home/apartment/condo    Primary Care Provided by  spouse/significant other;self    Provides Primary Care For  no one    Family Caregiver if Needed  other (see comments) no children, neightbor Delonte    Able to Return to Prior Arrangements  yes       Resource/Environmental Concerns    Resource/Environmental Concerns  none    Transportation Concerns  car, none       Transition Planning    Patient/Family Anticipates Transition to  home with family;home with help/services    Patient/Family Anticipated Services at Transition  home health care    Transportation Anticipated  -- spouse       Discharge Needs Assessment    Readmission Within the Last 30 Days  -- recent admission 8/19-24    Concerns to be Addressed  denies needs/concerns at this time    Equipment Currently Used at Home  cane, quad;cane, straight;walker, standard;commode    Equipment Needed After Discharge  none    Discharge Facility/Level of Care Needs  home with home health    Offered/Gave Vendor List  no        Discharge Plan     Row Name 08/15/19 1036       Plan    Plan  Return home with spouse, current [facility]    Patient/Family in Agreement with Plan  yes     Demographic Summary     Row Name 08/15/19 1026       General Information    Admission Type  observation    Required Notices Provided  Observation Status Notice    Referral Source  admission list    Preferred Language  English        Functional Status     Row Name 08/15/19 1026       Functional Status    Usual Activity Tolerance  good       Functional Status, IADL    Medications  independent [facility] assists M-F with housekeeping, transportation and meals     Spoke with patient this AM at bedside,  slow to respond. Lives in own home with spouse.  Current with [facility]  and [facility].  Declined rehab. In past.    frankie BEARDEN. Dean,     Gale Lin BSN,[facility]  [phone]

## 2019-08-16 PROCEDURE — 96372 THER/PROPH/DIAG INJ SC/IM: CPT

## 2019-08-16 PROCEDURE — 25010000002 ENOXAPARIN PER 10 MG: Performed by: FAMILY MEDICINE

## 2019-08-16 PROCEDURE — 97535 SELF CARE MNGMENT TRAINING: CPT

## 2019-08-16 PROCEDURE — G0378 HOSPITAL OBSERVATION PER HR: HCPCS

## 2019-08-16 PROCEDURE — 94660 CPAP INITIATION&MGMT: CPT

## 2019-08-16 PROCEDURE — 94799 UNLISTED PULMONARY SVC/PX: CPT

## 2019-08-16 PROCEDURE — 97166 OT EVAL MOD COMPLEX 45 MIN: CPT

## 2019-08-16 PROCEDURE — 99225 PR SBSQ OBSERVATION CARE/DAY 25 MINUTES: CPT | Performed by: FAMILY MEDICINE

## 2019-08-16 PROCEDURE — 99213 OFFICE O/P EST LOW 20 MIN: CPT | Performed by: PSYCHIATRY & NEUROLOGY

## 2019-08-16 RX ORDER — ACETAMINOPHEN 325 MG/1
650 TABLET ORAL EVERY 4 HOURS PRN
Status: DISCONTINUED | OUTPATIENT
Start: 2019-08-16 | End: 2019-08-18 | Stop reason: HOSPADM

## 2019-08-16 RX ORDER — BACLOFEN 10 MG/1
10 TABLET ORAL 3 TIMES DAILY
Status: DISCONTINUED | OUTPATIENT
Start: 2019-08-16 | End: 2019-08-18 | Stop reason: HOSPADM

## 2019-08-16 RX ORDER — FUROSEMIDE 40 MG/1
TABLET ORAL
Qty: 30 TABLET | Refills: 2 | Status: SHIPPED | OUTPATIENT
Start: 2019-08-16 | End: 2019-11-14 | Stop reason: SDUPTHER

## 2019-08-16 RX ADMIN — IPRATROPIUM BROMIDE AND ALBUTEROL SULFATE 3 ML: .5; 3 SOLUTION RESPIRATORY (INHALATION) at 14:35

## 2019-08-16 RX ADMIN — PANTOPRAZOLE SODIUM 40 MG: 40 TABLET, DELAYED RELEASE ORAL at 12:01

## 2019-08-16 RX ADMIN — POTASSIUM CHLORIDE 20 MEQ: 20 TABLET, EXTENDED RELEASE ORAL at 09:47

## 2019-08-16 RX ADMIN — IPRATROPIUM BROMIDE AND ALBUTEROL SULFATE 3 ML: .5; 3 SOLUTION RESPIRATORY (INHALATION) at 06:47

## 2019-08-16 RX ADMIN — CARBIDOPA AND LEVODOPA 2 TABLET: 25; 100 TABLET ORAL at 09:54

## 2019-08-16 RX ADMIN — BACLOFEN 10 MG: 10 TABLET ORAL at 16:32

## 2019-08-16 RX ADMIN — GABAPENTIN 300 MG: 300 CAPSULE ORAL at 09:47

## 2019-08-16 RX ADMIN — ENOXAPARIN SODIUM 40 MG: 40 INJECTION SUBCUTANEOUS at 20:59

## 2019-08-16 RX ADMIN — OXCARBAZEPINE 600 MG: 600 TABLET, FILM COATED ORAL at 09:56

## 2019-08-16 RX ADMIN — PRAMIPEXOLE DIHYDROCHLORIDE 0.5 MG: 0.5 TABLET ORAL at 21:00

## 2019-08-16 RX ADMIN — PRAMIPEXOLE DIHYDROCHLORIDE 0.5 MG: 0.5 TABLET ORAL at 09:56

## 2019-08-16 RX ADMIN — CLONAZEPAM 1 MG: 1 TABLET ORAL at 15:25

## 2019-08-16 RX ADMIN — ASPIRIN 81 MG 81 MG: 81 TABLET ORAL at 09:51

## 2019-08-16 RX ADMIN — Medication 3 ML: at 10:12

## 2019-08-16 RX ADMIN — CARBIDOPA AND LEVODOPA 2 TABLET: 25; 100 TABLET ORAL at 15:23

## 2019-08-16 RX ADMIN — PRAMIPEXOLE DIHYDROCHLORIDE 0.5 MG: 0.5 TABLET ORAL at 15:23

## 2019-08-16 RX ADMIN — Medication 3 ML: at 21:00

## 2019-08-16 RX ADMIN — LACTULOSE 30 ML: 10 SOLUTION ORAL at 20:34

## 2019-08-16 RX ADMIN — GABAPENTIN 300 MG: 300 CAPSULE ORAL at 15:22

## 2019-08-16 RX ADMIN — BUDESONIDE AND FORMOTEROL FUMARATE DIHYDRATE 2 PUFF: 160; 4.5 AEROSOL RESPIRATORY (INHALATION) at 06:47

## 2019-08-16 RX ADMIN — LEVETIRACETAM 1000 MG: 500 TABLET ORAL at 20:34

## 2019-08-16 RX ADMIN — LURASIDONE HYDROCHLORIDE 40 MG: 40 TABLET, FILM COATED ORAL at 09:57

## 2019-08-16 RX ADMIN — OYSTER SHELL CALCIUM WITH VITAMIN D 1 TABLET: 500; 200 TABLET, FILM COATED ORAL at 09:51

## 2019-08-16 RX ADMIN — SENNOSIDES 1 TABLET: 8.6 TABLET, FILM COATED ORAL at 20:33

## 2019-08-16 RX ADMIN — LEVETIRACETAM 750 MG: 250 TABLET, FILM COATED ORAL at 09:51

## 2019-08-16 RX ADMIN — LACOSAMIDE 150 MG: 100 TABLET, FILM COATED ORAL at 20:34

## 2019-08-16 RX ADMIN — BACLOFEN 10 MG: 10 TABLET ORAL at 20:59

## 2019-08-16 RX ADMIN — LISINOPRIL: 20 TABLET ORAL at 09:49

## 2019-08-16 RX ADMIN — IPRATROPIUM BROMIDE AND ALBUTEROL SULFATE 3 ML: .5; 3 SOLUTION RESPIRATORY (INHALATION) at 10:43

## 2019-08-16 RX ADMIN — OXCARBAZEPINE 600 MG: 600 TABLET, FILM COATED ORAL at 20:33

## 2019-08-16 RX ADMIN — IPRATROPIUM BROMIDE AND ALBUTEROL SULFATE 3 ML: .5; 3 SOLUTION RESPIRATORY (INHALATION) at 19:49

## 2019-08-16 RX ADMIN — ACETAMINOPHEN 650 MG: 325 TABLET ORAL at 15:47

## 2019-08-16 RX ADMIN — BUDESONIDE AND FORMOTEROL FUMARATE DIHYDRATE 2 PUFF: 160; 4.5 AEROSOL RESPIRATORY (INHALATION) at 19:50

## 2019-08-16 RX ADMIN — CARBIDOPA AND LEVODOPA 2 TABLET: 25; 100 TABLET ORAL at 20:59

## 2019-08-16 RX ADMIN — PIOGLITAZONE 30 MG: 30 TABLET ORAL at 09:57

## 2019-08-16 RX ADMIN — GABAPENTIN 300 MG: 300 CAPSULE ORAL at 20:33

## 2019-08-16 RX ADMIN — LACOSAMIDE 150 MG: 100 TABLET, FILM COATED ORAL at 09:47

## 2019-08-16 NOTE — PLAN OF CARE
Problem: Patient Care Overview  Goal: Plan of Care Review  Outcome: Ongoing (interventions implemented as appropriate)   08/16/19 1121   Coping/Psychosocial   Plan of Care Reviewed With patient   OTHER   Outcome Summary pt. Alert, verbal and orientated x3 this shift. Amb. to BR with assistance. fall precautions in place and pt. compliant with precautions.       Problem: Fall Risk (Adult)  Goal: Identify Related Risk Factors and Signs and Symptoms  Outcome: Ongoing (interventions implemented as appropriate)    Goal: Absence of Fall  Outcome: Ongoing (interventions implemented as appropriate)      Problem: Seizure Disorder/Epilepsy (Adult)  Goal: Signs and Symptoms of Listed Potential Problems Will be Absent, Minimized or Managed (Seizure Disorder/Epilepsy)  Outcome: Ongoing (interventions implemented as appropriate)      Problem: Skin Injury Risk (Adult)  Goal: Identify Related Risk Factors and Signs and Symptoms  Outcome: Ongoing (interventions implemented as appropriate)    Goal: Skin Health and Integrity  Outcome: Ongoing (interventions implemented as appropriate)

## 2019-08-16 NOTE — PROGRESS NOTES
Discharge Planning Assessment   [facility]     Patient Name: Dariana Rivera  MRN: [id number]  Today's Date: 8/16/2019    Admit Date: 8/14/2019    .       Discharge Plan     Row Name 08/16/19 1230       Plan    Plan  [facility] accepted,[facility] approved    Patient/Family in Agreement with Plan  yes    Final Discharge Disposition Code  03 - skilled nursing facility (SNF)        Destination      Service Provider Request Status Selected Services Address Phone Number Fax Number    [facility] Accepted N/A [address] [place] [address] [phone]             Functional Status    No documentation.           Lakisha Ogden

## 2019-08-16 NOTE — THERAPY EVALUATION
Acute Care - Occupational Therapy Initial Evaluation   [facility]     Patient Name: Dariana Rivera  : 1955  MRN: [id number]  Today's Date: 2019             Admit Date: 2019       ICD-10-CM ICD-9-CM   1. Altered mental status, unspecified altered mental status type R41.82 780.97   2. Seizure (CMS/HCC) R56.9 780.39     Patient Active Problem List   Diagnosis   • Bipolar 1 disorder, depressed, moderate (CMS/HCC)   • Body mass index (BMI) of 45.0-49.9 in adult (CMS/HCC)   • Cervical disc disorder with radiculopathy   • Chronic back pain   • Chronic kidney disease, unspecified   • Chronic obstructive pulmonary disease (CMS/HCC)   • Cirrhosis (CMS/HCC)   • Gastroesophageal reflux disease   • Hypertension   • Kyphosis   • Lumbar radiculopathy   • Sleep apnea   • Parkinson's disease (CMS/HCC)   • Recurrent urinary tract infection   • Seizure (CMS/HCC)   • Spinal stenosis of cervical region   • Type 2 diabetes mellitus with other diabetic neurological complication (CMS/HCC)   • Unsteady gait   • Hyponatremia   • Generalized anxiety disorder   • Altered mental status     Past Medical History:   Diagnosis Date   • Anxiety disorder     [facility]    • Back pain    • Bipolar disorder (CMS/HCC)    • Chest pain    • Chronic pain     with stenosis   • Cirrhosis (CMS/HCC)    • Colitis    • Colon polyps    • DDD (degenerative disc disease), lumbar    • Eosinophilic colitis    • Gastritis    • GERD (gastroesophageal reflux disease)    • Hepatic steatosis     non alcoholic steo-hepatitis    • Hypertension    • IBS (irritable bowel syndrome)    • Kidney stones    • Neck pain    • Osteoarthritis    • Parkinson's disease (CMS/HCC)    • Seizure (CMS/HCC)     onset 2016   • Sleep apnea     Using Bipap machine at night.    • Type II diabetes mellitus (CMS/HCC)      Past Surgical History:   Procedure Laterality Date   • BACK SURGERY     • CARDIAC CATHETERIZATION  2019   • CARPAL TUNNEL RELEASE Bilateral     Wrist    •  CHOLECYSTECTOMY  1997   • COLONOSCOPY     • ENDOSCOPY     • HYSTERECTOMY  09/2004    complete   • JOINT REPLACEMENT     • KNEE ARTHROSCOPY Bilateral     Arthroscopic surgery to bilateral knees    • OTHER SURGICAL HISTORY  2015    Stress test    • OTHER SURGICAL HISTORY  10/2016    Lithotripsy   • OTHER SURGICAL HISTORY      Right arm cellulits- spider bites    • OTHER SURGICAL HISTORY  02/08/2018    Lithotripsy   • OTHER SURGICAL HISTORY  04/20/2018    ACDF C3-C4 & C6-C7   • REPLACEMENT TOTAL KNEE  2000   • REPLACEMENT TOTAL KNEE Left 11/2016          OT ASSESSMENT FLOWSHEET (last 12 hours)      Occupational Therapy Evaluation     Row Name 08/16/19 1100                   OT Evaluation Time/Intention    Subjective Information  complains of;fatigue;pain CLBP; feeling better but weak  -        Document Type  evaluation  -        Mode of Treatment  occupational therapy  -        Patient Effort  excellent  -           General Information    Patient Observations  alert;cooperative;agree to therapy  -        Patient/Family Observations  supine, HM, O2  -        Prior Level of Function  min assist:;ADL's has CG in the home 5 days/wk to assist w/ I/ADL  -        Equipment Currently Used at Home  shower chair;cane, straight;oxygen  -        Pertinent History of Current Functional Problem  Pt is 64 y/o chronically ill F who admits with decreased LOC & her stroke workup has been negative. Pt is diagnosed with metabolic encephalopathy. All of her symptoms are resolved this a.m. & Pt is agreeable to get up to the bathroom.  -           Relationship/Environment    Primary Source of Support/Comfort  spouse reports spouse has [employer]'s w/ ST mem loss 2* chron. ETOH  -        Lives With  spouse  -           Resource/Environmental Concerns    Current Living Arrangements  home/apartment/condo  -           Home Main Entrance    Number of Stairs, Main Entrance  three  -        Stair Railings, Main Entrance  --  one rail  -        Stairs Comment, Main Entrance  Difficulty sttepping up third step.  -           Cognitive Assessment/Intervention- PT/OT    Orientation Status (Cognition)  oriented x 4  -           Safety Issues, Functional Mobility    Impairments Affecting Function (Mobility)  balance;endurance/activity tolerance Pt states she might need a walker instead of cane.  -           Bed Mobility Assessment/Treatment    Bed Mobility Assessment/Treatment  bed mobility (all) activities  -        [place] Level (Bed Mobility)  set up;conditional independence  -           Functional Mobility    Functional Mobility- Ind. Level  set up required  -        Functional Mobility- Device  rolling walker  -        Functional Mobility-Distance (Feet)  40  -           Transfer Assessment/Treatment    Transfer Assessment/Treatment  toilet transfer  -           Toilet Transfer    Type (Toilet Transfer)  lateral  -        [place] Level (Toilet Transfer)  conditional independence  -           ADL Assessment/Intervention    BADL Assessment/Intervention  bathing;lower body dressing;grooming;toileting;upper body dressing  -           Bathing Assessment/Intervention    Bathing [place] Level  lower body;minimum assist (75% patient effort)  -        Bathing Position  sink side  -           Upper Body Dressing Assessment/Training    Upper Body Dressing [place] Level  doff;don;front opening garment;minimum assist (75% patient effort)  -        Upper Body Dressing Position  unsupported standing  -           Lower Body Dressing Assessment/Training    Lower Body Dressing [place] Level  don;socks;dependent (less than 25% patient effort)  -        Lower Body Dressing Position  supported sitting  -           Grooming Assessment/Training    [place] Level (Grooming)  hair care, combing/brushing;oral care regimen;wash face, hands;set up  -        Grooming Position  sink side  -            Toileting Assessment/Training    [place] Level (Toileting)  conditional independence  -        Toileting Position  unsupported sitting  -           General ROM    GENERAL ROM COMMENTS  WFL except trunk flexion limited 25% by obesity.  -           MMT (Manual Muscle Testing)    General MMT Comments  difficult to assess due to Parkinson's tremors. Pt has functional strength for ADL BUE.  -           Static Sitting Balance    Level of [place] (Unsupported Sitting, Static Balance)  independent  -           Dynamic Sitting Balance    Level of [place], Reaches Outside Midline (Sitting, Dynamic Balance)  conditional independence  -           Static Standing Balance    Level of [place] (Supported Standing, Static Balance)  supervision cues for safety when Pt tremors increased but Pt did not sit  -           Dynamic Standing Balance    Level of [place], Reaches Outside Midline (Standing, Dynamic Balance)  contact guard assist  -           Positioning and Restraints    Pre-Treatment Position  in bed  -        Post Treatment Position  bathroom  -        Bathroom  call light within reach;with other staff  -           Pain Assessment    Additional Documentation  Pain Scale: Word Pre/Post-Treatment (Group)  -           Pain Scale: Word Pre/Post-Treatment    Pain: Word Scale, Pretreatment  2 - mild pain  -        Pain: Word Scale, Post-Treatment  2 - mild pain  -        Pre/Post Treatment Pain Comment  reports chronic neck & back pain. No worse than normal.  -           Coping    Observed Emotional State  accepting;tearful/crying crying over spouse's inability to remember new events.  -           Plan of Care Review    Plan of Care Reviewed With  patient  -           Clinical Impression (OT)    Criteria for Skilled Therapeutic Interventions Met (OT Eval)  treatment indicated  -        Therapy Frequency (OT Eval)  3 times/wk  -        Anticipated Discharge  Disposition (OT)  home with home health;assisted living facility (shelter)  -           Planned OT Interventions    Planned Therapy Interventions (OT Eval)  activity tolerance training;occupation/activity based interventions;IADL retraining  -           OT Goals    Bathing Goal Selection (OT)  bathing, OT goal 1  -        Dressing Goal Selection (OT)  dressing, OT goal 1  -        Additional Documentation  Safety Awareness Goal Selection (OT) (Row)  -           Bathing Goal 1 (OT)    Activity/Assistive Device (Bathing Goal 1, OT)  bathing skills, all  -MH        [place] Level/Cues Needed (Bathing Goal 1, OT)  verbal cues required;supervision required;set-up required  -        Time Frame (Bathing Goal 1, OT)  2 weeks  -           Dressing Goal 1 (OT)    Activity/Assistive Device (Dressing Goal 1, OT)  dressing skills, all  -MH        [place]/Cues Needed (Dressing Goal 1, OT)  conditional independence;verbal cues required;set-up required  -        Time Frame (Dressing Goal 1, OT)  2 weeks  -           Patient Education Goal (OT)    Activity (Patient Education Goal, OT)  upright endurance 10 min  -        [place]/Cues/Accuracy (Memory Goal 2, OT)  demonstrates adequately  -MH        Time Frame (Patient Education Goal, OT)  2 weeks  -           Living Environment    Home Accessibility  stairs to enter home  -          User Key  (r) = Recorded By, (t) = Taken By, (c) = Cosigned By    Initials Name Effective Dates     Tita Elizondo, OT 03/01/19 -          Occupational Therapy Education     Title: PT OT SLP Therapies (Done)     Topic: Occupational Therapy (Done)     Point: ADL training (Done)     Description: Instruct learner(s) on proper safety adaptation and remediation techniques during self care or transfers.   Instruct in proper use of assistive devices.    Learning Progress Summary           Patient Acceptance, E,TB, VU by MM at 8/16/2019 12:44 AM                   Point: Home  exercise program (Done)     Description: Instruct learner(s) on appropriate technique for monitoring, assisting and/or progressing therapeutic exercises/activities.    Learning Progress Summary           Patient Acceptance, E,TB, VU by LÁZARO at 8/16/2019 12:44 AM                   Point: Precautions (Done)     Description: Instruct learner(s) on prescribed precautions during self-care and functional transfers.    Learning Progress Summary           Patient Acceptance, E,TB, VU by LÁZARO at 8/16/2019 12:44 AM                   Point: Body mechanics (Done)     Description: Instruct learner(s) on proper positioning and spine alignment during self-care, functional mobility activities and/or exercises.    Learning Progress Summary           Patient Acceptance, E,TB, VU by LÁZARO at 8/16/2019 12:44 AM                               User Key     Initials Effective Dates Name Provider Type Discipline     03/01/19 -  Lesly Burroughs RN Registered Nurse Nurse                  OT Recommendation and Plan  Outcome Summary/Treatment Plan (OT)  Anticipated Discharge Disposition (OT): home with home health, assisted living facility ([facility])  Planned Therapy Interventions (OT Eval): activity tolerance training, occupation/activity based interventions, IADL retraining  Therapy Frequency (OT Eval): 3 times/wk  Plan of Care Review  Plan of Care Reviewed With: patient  Plan of Care Reviewed With: patient  Outcome Summary: Pt demonstrates return of baseline function and is alert & up completing her ADL in the bathroom without encouragement. She requires some assist at baseline & has CG out ot her home for this 5 days a week. Pt verbalizes stress due to her spouse's increasing needs & her own lack of total independence. OT recommends [facility] OT/PT and transition to assisted living facility for Pt & spouse.    Outcome Measures     Row Name 08/16/19 1100             How much help from another is currently needed...    Putting on and taking off regular lower  body clothing?  2  -MH      Bathing (including washing, rinsing, and drying)  3  -MH      Toileting (which includes using toilet bed pan or urinal)  4  -MH      Putting on and taking off regular upper body clothing  3  -      Taking care of personal grooming (such as brushing teeth)  3  -MH      Eating meals  4  -MH      AM-PAC 6 Clicks Score (OT)  19  -         Functional Assessment    Outcome Measure Options  AM-PAC 6 Clicks Daily Activity (OT)  -        User Key  (r) = Recorded By, (t) = Taken By, (c) = Cosigned By    Initials Name Provider Type     Tita Elizondo OT Occupational Therapist          Time Calculation:   Time Calculation- OT     Row Name 08/16/19 1157             Time Calculation-     OT Start Time  0845  -      OT Stop Time  0920  -      OT Time Calculation (min)  35 min  -      Total Timed Code Minutes- OT  20 minute(s)  -      OT Received On  08/16/19  -      OT - Next Appointment  08/19/19  -      OT Goal Re-Cert Due Date  08/30/19  -        User Key  (r) = Recorded By, (t) = Taken By, (c) = Cosigned By    Initials Name Provider Type     Tita Elizondo OT Occupational Therapist        Therapy Charges for Today     Code Description Service Date Service Provider Modifiers Qty    [id number]  OT EVAL MOD COMPLEXITY 3 8/16/2019 Tita Elizondo OT GO 1    [id number]  OT SELF CARE/MGMT/TRAIN EA 15 MIN 8/16/2019 Tita Elizondo OT GO 1               Tita Elizondo OT  8/16/2019

## 2019-08-16 NOTE — PROGRESS NOTES
Cc:  Confusion.       S:  The patient is sitting in chair.     O:  Vitals stable.  O/E the patient is sitting in chair in no apparent distress.  Speech is good.  CN EOMI, no facial droop. Motor moves all extremities without difficulty.      A:  63 year old lady with multiple medical problems with toxic metabolic encephalopathy.  Improving.      P:  Will continue same care.  Will follow.

## 2019-08-16 NOTE — NURSING NOTE
Pt  having muscle pain in neck due to stress. This nurse called Dr. Larson about ordering medication that she takes scheduled for muscle pain. (See MAR)

## 2019-08-16 NOTE — PROGRESS NOTES
LOS: 0 days   Patient Care Team:  Paul Larson Jr., MD as PCP - [facility] (Family Medicine)  Schirmer, OLIVER Avila as PCP - Judy Gaviria RN as Care Coordinator    Chief Complaint: Altered mental status    Subjective     Interval History:     Patient states she has had no further episodes of altered mental status or trouble speaking.    Review of Systems   Constitutional: Negative for chills, fatigue and fever.   Respiratory: Negative for cough and shortness of breath.    Cardiovascular: Negative for chest pain and leg swelling.   Gastrointestinal: Negative for abdominal pain, diarrhea and nausea.   Skin: Negative for pallor and rash.         Objective     Vital Signs  Temp:  [98 °F (36.7 °C)-98.6 °F (37 °C)] 98.5 °F (36.9 °C)  Heart Rate:  [63-95] 70  Resp:  [12-18] 12  BP: ()/(49-59) 102/59    Physical Exam   Constitutional:   Obese   Cardiovascular: Normal rate, regular rhythm, normal heart sounds and intact distal pulses.   Pulmonary/Chest: Effort normal and breath sounds normal.   Abdominal: Soft. Bowel sounds are normal.   Skin: Skin is warm and dry.   Nursing note and vitals reviewed.        Results Review:    Lab Results (last 24 hours)     ** No results found for the last 24 hours. **         Imaging Results (last 24 hours)     ** No results found for the last 24 hours. **         ECG/EMG Results (last 24 hours)     Procedure Component Value Units Date/Time    ECG 12 Lead [[id number]] Collected:  08/14/19 1646     Updated:  08/15/19 0857    Narrative:       HEART RATE= 90  bpm  RR Interval= 668  ms  ME Interval= 223  ms  P Horizontal Axis= 4  deg  P Front Axis= 44  deg  QRSD Interval= 90  ms  QT Interval= 368  ms  QRS Axis= -5  deg  T Wave Axis= 26  deg  - ABNORMAL ECG -  Sinus rhythm  Prolonged ME interval  Inferior infarct, old  Electronically Signed By: Abrahan Trent (Sean) 15-Aug-2019 08:57:41  Date and Time of Study: 2019-08-14 16:46:54           I reviewed the  patient's new clinical results.    Medication Review: I have reviewed the medications    Current Facility-Administered Medications:   •  aspirin chewable tablet 81 mg, 81 mg, Oral, Daily, Paul Larson Jr., MD, 81 mg at 08/15/19 1337  •  budesonide-formoterol (SYMBICORT) 160-4.5 MCG/ACT inhaler 2 puff, 2 puff, Inhalation, BID - RT, Paul Larson Jr., MD, 2 puff at 08/16/19 0647  •  calcium-vitamin D 500-200 MG-UNIT per tablet 1 tablet, 1 tablet, Oral, Daily, Paul Larson Jr., MD  •  carbidopa-levodopa (SINEMET)  MG per tablet 2 tablet, 2 tablet, Oral, TID, Paul Larson Jr., MD, 2 tablet at 08/15/19 2009  •  clonazePAM (KlonoPIN) tablet 1 mg, 1 mg, Oral, BID PRN, Paul Larson Jr., MD, 1 mg at 08/15/19 2220  •  enoxaparin (LOVENOX) syringe 40 mg, 40 mg, Subcutaneous, Q24H, Paul Larson Jr., MD, 40 mg at 08/15/19 1625  •  furosemide (LASIX) tablet 40 mg, 40 mg, Oral, Daily, Paul Larson Jr., MD, 40 mg at 08/15/19 1335  •  gabapentin (NEURONTIN) capsule 300 mg, 300 mg, Oral, TID, Paul Larson Jr., MD, 300 mg at 08/15/19 2009  •  ipratropium-albuterol (DUO-NEB) nebulizer solution 3 mL, 3 mL, Nebulization, Q6H PRN, Paul Larson Jr., MD  •  ipratropium-albuterol (DUO-NEB) nebulizer solution 3 mL, 3 mL, Nebulization, 4x Daily, Paul Larson Jr., MD, 3 mL at 08/16/19 0647  •  lacosamide (VIMPAT) tablet 150 mg, 150 mg, Oral, BID, Paul Larson Jr., MD, 150 mg at 08/15/19 2008  •  lactulose (CHRONULAC) 10 GM/15ML solution 30 mL, 30 mL, Oral, Nightly, Paul Larson Jr., MD, 30 mL at 08/15/19 2008  •  levETIRAcetam (KEPPRA) tablet 1,000 mg, 1,000 mg, Oral, Nightly, Paul Larson Jr., MD, 1,000 mg at 08/15/19 2009  •  levETIRAcetam (KEPPRA) tablet 750 mg, 750 mg, Oral, QAM, Palu Larson Jr., MD  •  lisinopril (PRINIVIL,ZESTRIL) 10 mg, hydrochlorothiazide (HYDRODIURIL) 12.5 mg for ZESTORETIC 10-12.5, , Oral, Q24H, Paul Larson Jr., MD  •  lurasidone  (LATUDA) tablet 40 mg, 40 mg, Oral, Daily, Paul Larson Jr., MD  •  magnesium hydroxide (MILK OF MAGNESIA) suspension 2400 mg/10mL 10 mL, 10 mL, Oral, Daily PRN, Paul Larson Jr., MD  •  nitroglycerin (NITROSTAT) SL tablet 0.4 mg, 0.4 mg, Sublingual, Q5 Min PRN, Paul Larson Jr., MD  •  ondansetron (ZOFRAN) injection 4 mg, 4 mg, Intravenous, Q6H PRN, Paul Larson Jr., MD  •  OXcarbazepine (TRILEPTAL) tablet 600 mg, 600 mg, Oral, Q12H, Paul Larson Jr., MD, 600 mg at 08/15/19 2009  •  pantoprazole (PROTONIX) EC tablet 40 mg, 40 mg, Oral, QAM, Paul Larson Jr., MD, 40 mg at 08/15/19 1337  •  pioglitazone (ACTOS) tablet 30 mg, 30 mg, Oral, Daily, Paul Larson Jr., MD, 30 mg at 08/15/19 1337  •  potassium chloride (K-DUR,KLOR-CON) CR tablet 20 mEq, 20 mEq, Oral, Daily, Paul Larson Jr., MD, 20 mEq at 08/15/19 1336  •  pramipexole (MIRAPEX) tablet 0.5 mg, 0.5 mg, Oral, TID, Paul Larson Jr., MD, 0.5 mg at 08/15/19 2009  •  senna (SENOKOT) tablet 1 tablet, 1 tablet, Oral, Nightly, Paul Larson Jr., MD, 1 tablet at 08/15/19 2009  •  [COMPLETED] Insert peripheral IV, , , Once **AND** sodium chloride 0.9 % flush 10 mL, 10 mL, Intravenous, PRN, Abrahan Trent MD  •  sodium chloride 0.9 % flush 3 mL, 3 mL, Intravenous, Q12H, Paul Larson Jr., MD, 3 mL at 08/15/19 2013  •  sodium chloride 0.9 % flush 3-10 mL, 3-10 mL, Intravenous, PRN, Paul Larson Jr., MD  •  sodium chloride tablet 2 g, 2 g, Oral, Daily, Paul Larson Jr., MD, 2 g at 08/15/19 1905       Active Problems:    Altered mental ysickz-cuwnufhb-cgmnaijdizgyih in etiology including fact that the patient is falling asleep without wearing her BiPAP medications obesity     I recommended the patient that she go to a skilled nursing facility on discharge for further recovery.  I also discussed with the patient that she takes a lot of occasions which can contribute to the problems that she is having.  I  suggested she work to get off of some of these medications.    Assessment & Plan     Plan for disposition:Where: To be determined    Paul Larson Jr., MD  08/16/19  7:11 AM

## 2019-08-16 NOTE — PLAN OF CARE
Problem: Patient Care Overview  Goal: Plan of Care Review  Outcome: Ongoing (interventions implemented as appropriate)   08/16/19 1152   Coping/Psychosocial   Plan of Care Reviewed With patient   OTHER   Outcome Summary Pt demonstrates return of baseline function and is alert & up completing her ADL in the bathroom without encouragement. She requires some assist at baseline & has CG out ot her home for this 5 days a week. Pt verbalizes stress due to her spouse's increasing needs & her own lack of total independence. OT recommends [facility] OT/PT and transition to assisted living facility for Pt & spouse.   Plan of Care Review   Progress improving

## 2019-08-16 NOTE — PLAN OF CARE
Problem: Patient Care Overview  Goal: Plan of Care Review  Outcome: Ongoing (interventions implemented as appropriate)    Goal: Individualization and Mutuality  Outcome: Ongoing (interventions implemented as appropriate)    Goal: Discharge Needs Assessment  Outcome: Ongoing (interventions implemented as appropriate)    Goal: Interprofessional Rounds/Family Conf  Outcome: Ongoing (interventions implemented as appropriate)      Problem: Fall Risk (Adult)  Goal: Identify Related Risk Factors and Signs and Symptoms  Outcome: Ongoing (interventions implemented as appropriate)    Goal: Absence of Fall  Outcome: Ongoing (interventions implemented as appropriate)      Problem: Seizure Disorder/Epilepsy (Adult)  Goal: Signs and Symptoms of Listed Potential Problems Will be Absent, Minimized or Managed (Seizure Disorder/Epilepsy)  Outcome: Ongoing (interventions implemented as appropriate)      Problem: Skin Injury Risk (Adult)  Goal: Identify Related Risk Factors and Signs and Symptoms  Outcome: Ongoing (interventions implemented as appropriate)    Goal: Skin Health and Integrity  Outcome: Ongoing (interventions implemented as appropriate)

## 2019-08-17 ENCOUNTER — APPOINTMENT (OUTPATIENT)
Dept: GENERAL RADIOLOGY | Facility: HOSPITAL | Age: 64
End: 2019-08-17

## 2019-08-17 PROBLEM — E87.1 HYPONATREMIA: Status: RESOLVED | Noted: 2019-07-22 | Resolved: 2019-08-17

## 2019-08-17 LAB
ANION GAP SERPL CALCULATED.3IONS-SCNC: 17.7 MMOL/L (ref 5–15)
BNP SERPL-MCNC: 46 PG/ML
BUN BLD-MCNC: 20 MG/DL (ref 8–20)
BUN/CREAT SERPL: 22.2 (ref 5.4–26.2)
CALCIUM SPEC-SCNC: 9.3 MG/DL (ref 8.9–10.3)
CHLORIDE SERPL-SCNC: 95 MMOL/L (ref 101–111)
CO2 SERPL-SCNC: 23 MMOL/L (ref 22–32)
CREAT BLD-MCNC: 0.9 MG/DL (ref 0.4–1)
DEPRECATED RDW RBC AUTO: 49 FL (ref 37–54)
ERYTHROCYTE [DISTWIDTH] IN BLOOD BY AUTOMATED COUNT: 15.8 % (ref 12.3–15.4)
GFR SERPL CREATININE-BSD FRML MDRD: 63 ML/MIN/1.73
GLUCOSE BLD-MCNC: 142 MG/DL (ref 65–99)
HCT VFR BLD AUTO: 36.1 % (ref 34–46.6)
HGB BLD-MCNC: 12.4 G/DL (ref 12–15.9)
MCH RBC QN AUTO: 30.5 PG (ref 26.6–33)
MCHC RBC AUTO-ENTMCNC: 34.3 G/DL (ref 31.5–35.7)
MCV RBC AUTO: 89.1 FL (ref 79–97)
PLATELET # BLD AUTO: 157 10*3/MM3 (ref 140–450)
PMV BLD AUTO: 8.2 FL (ref 6–12)
POTASSIUM BLD-SCNC: 3.7 MMOL/L (ref 3.6–5.1)
RBC # BLD AUTO: 4.05 10*6/MM3 (ref 3.77–5.28)
SODIUM BLD-SCNC: 132 MMOL/L (ref 136–144)
WBC NRBC COR # BLD: 6.1 10*3/MM3 (ref 3.4–10.8)

## 2019-08-17 PROCEDURE — 94660 CPAP INITIATION&MGMT: CPT

## 2019-08-17 PROCEDURE — 25010000002 ENOXAPARIN PER 10 MG: Performed by: FAMILY MEDICINE

## 2019-08-17 PROCEDURE — 96376 TX/PRO/DX INJ SAME DRUG ADON: CPT

## 2019-08-17 PROCEDURE — 83880 ASSAY OF NATRIURETIC PEPTIDE: CPT | Performed by: FAMILY MEDICINE

## 2019-08-17 PROCEDURE — 94799 UNLISTED PULMONARY SVC/PX: CPT

## 2019-08-17 PROCEDURE — 80048 BASIC METABOLIC PNL TOTAL CA: CPT | Performed by: FAMILY MEDICINE

## 2019-08-17 PROCEDURE — 25010000002 LORAZEPAM PER 2 MG

## 2019-08-17 PROCEDURE — 71045 X-RAY EXAM CHEST 1 VIEW: CPT

## 2019-08-17 PROCEDURE — G0378 HOSPITAL OBSERVATION PER HR: HCPCS

## 2019-08-17 PROCEDURE — 99217 PR OBSERVATION CARE DISCHARGE MANAGEMENT: CPT | Performed by: FAMILY MEDICINE

## 2019-08-17 PROCEDURE — 85027 COMPLETE CBC AUTOMATED: CPT | Performed by: FAMILY MEDICINE

## 2019-08-17 PROCEDURE — 96372 THER/PROPH/DIAG INJ SC/IM: CPT

## 2019-08-17 RX ORDER — LORAZEPAM 2 MG/ML
INJECTION INTRAMUSCULAR
Status: COMPLETED
Start: 2019-08-17 | End: 2019-08-17

## 2019-08-17 RX ORDER — LORAZEPAM 2 MG/ML
1 INJECTION INTRAMUSCULAR ONCE
Status: COMPLETED | OUTPATIENT
Start: 2019-08-17 | End: 2019-08-17

## 2019-08-17 RX ADMIN — PIOGLITAZONE 30 MG: 30 TABLET ORAL at 09:13

## 2019-08-17 RX ADMIN — LACOSAMIDE 150 MG: 100 TABLET, FILM COATED ORAL at 09:12

## 2019-08-17 RX ADMIN — Medication 3 ML: at 09:14

## 2019-08-17 RX ADMIN — LURASIDONE HYDROCHLORIDE 40 MG: 40 TABLET, FILM COATED ORAL at 09:09

## 2019-08-17 RX ADMIN — PRAMIPEXOLE DIHYDROCHLORIDE 0.5 MG: 0.5 TABLET ORAL at 20:20

## 2019-08-17 RX ADMIN — FUROSEMIDE 40 MG: 40 TABLET ORAL at 09:12

## 2019-08-17 RX ADMIN — POTASSIUM CHLORIDE 20 MEQ: 20 TABLET, EXTENDED RELEASE ORAL at 09:12

## 2019-08-17 RX ADMIN — CARBIDOPA AND LEVODOPA 2 TABLET: 25; 100 TABLET ORAL at 20:20

## 2019-08-17 RX ADMIN — BUDESONIDE AND FORMOTEROL FUMARATE DIHYDRATE 2 PUFF: 160; 4.5 AEROSOL RESPIRATORY (INHALATION) at 19:59

## 2019-08-17 RX ADMIN — IPRATROPIUM BROMIDE AND ALBUTEROL SULFATE 3 ML: .5; 3 SOLUTION RESPIRATORY (INHALATION) at 11:09

## 2019-08-17 RX ADMIN — LISINOPRIL: 20 TABLET ORAL at 09:10

## 2019-08-17 RX ADMIN — PRAMIPEXOLE DIHYDROCHLORIDE 0.5 MG: 0.5 TABLET ORAL at 16:23

## 2019-08-17 RX ADMIN — ASPIRIN 81 MG 81 MG: 81 TABLET ORAL at 09:12

## 2019-08-17 RX ADMIN — CARBIDOPA AND LEVODOPA 2 TABLET: 25; 100 TABLET ORAL at 09:13

## 2019-08-17 RX ADMIN — PANTOPRAZOLE SODIUM 40 MG: 40 TABLET, DELAYED RELEASE ORAL at 09:12

## 2019-08-17 RX ADMIN — CLONAZEPAM 1 MG: 1 TABLET ORAL at 09:13

## 2019-08-17 RX ADMIN — IPRATROPIUM BROMIDE AND ALBUTEROL SULFATE 3 ML: .5; 3 SOLUTION RESPIRATORY (INHALATION) at 07:08

## 2019-08-17 RX ADMIN — LACOSAMIDE 150 MG: 100 TABLET, FILM COATED ORAL at 20:20

## 2019-08-17 RX ADMIN — Medication 3 ML: at 20:23

## 2019-08-17 RX ADMIN — OXCARBAZEPINE 600 MG: 600 TABLET, FILM COATED ORAL at 20:20

## 2019-08-17 RX ADMIN — LORAZEPAM 1 MG: 2 INJECTION INTRAMUSCULAR; INTRAVENOUS at 20:40

## 2019-08-17 RX ADMIN — IPRATROPIUM BROMIDE AND ALBUTEROL SULFATE 3 ML: .5; 3 SOLUTION RESPIRATORY (INHALATION) at 15:41

## 2019-08-17 RX ADMIN — BUDESONIDE AND FORMOTEROL FUMARATE DIHYDRATE 2 PUFF: 160; 4.5 AEROSOL RESPIRATORY (INHALATION) at 07:08

## 2019-08-17 RX ADMIN — OXCARBAZEPINE 600 MG: 600 TABLET, FILM COATED ORAL at 09:11

## 2019-08-17 RX ADMIN — PRAMIPEXOLE DIHYDROCHLORIDE 0.5 MG: 0.5 TABLET ORAL at 09:10

## 2019-08-17 RX ADMIN — CARBIDOPA AND LEVODOPA 2 TABLET: 25; 100 TABLET ORAL at 16:23

## 2019-08-17 RX ADMIN — BACLOFEN 10 MG: 10 TABLET ORAL at 20:20

## 2019-08-17 RX ADMIN — BACLOFEN 10 MG: 10 TABLET ORAL at 09:12

## 2019-08-17 RX ADMIN — LEVETIRACETAM 750 MG: 250 TABLET, FILM COATED ORAL at 09:11

## 2019-08-17 RX ADMIN — GABAPENTIN 300 MG: 300 CAPSULE ORAL at 09:11

## 2019-08-17 RX ADMIN — SENNOSIDES 1 TABLET: 8.6 TABLET, FILM COATED ORAL at 20:20

## 2019-08-17 RX ADMIN — LEVETIRACETAM 1000 MG: 500 TABLET ORAL at 20:20

## 2019-08-17 RX ADMIN — CLONAZEPAM 1 MG: 1 TABLET ORAL at 20:23

## 2019-08-17 RX ADMIN — ENOXAPARIN SODIUM 40 MG: 40 INJECTION SUBCUTANEOUS at 09:12

## 2019-08-17 RX ADMIN — GABAPENTIN 300 MG: 300 CAPSULE ORAL at 16:23

## 2019-08-17 RX ADMIN — LORAZEPAM 1 MG: 2 INJECTION INTRAMUSCULAR at 20:40

## 2019-08-17 RX ADMIN — LACTULOSE 30 ML: 10 SOLUTION ORAL at 20:20

## 2019-08-17 RX ADMIN — IPRATROPIUM BROMIDE AND ALBUTEROL SULFATE 3 ML: .5; 3 SOLUTION RESPIRATORY (INHALATION) at 19:59

## 2019-08-17 RX ADMIN — GABAPENTIN 300 MG: 300 CAPSULE ORAL at 20:20

## 2019-08-17 RX ADMIN — BACLOFEN 10 MG: 10 TABLET ORAL at 16:23

## 2019-08-17 RX ADMIN — ENOXAPARIN SODIUM 40 MG: 40 INJECTION SUBCUTANEOUS at 20:20

## 2019-08-17 RX ADMIN — OYSTER SHELL CALCIUM WITH VITAMIN D 1 TABLET: 500; 200 TABLET, FILM COATED ORAL at 09:12

## 2019-08-17 NOTE — PLAN OF CARE
Problem: Patient Care Overview  Goal: Plan of Care Review  Outcome: Ongoing (interventions implemented as appropriate)   08/17/19 0005   Coping/Psychosocial   Plan of Care Reviewed With patient   OTHER   Outcome Summary Pt has remained stable, no new issues.        Problem: Fall Risk (Adult)  Goal: Identify Related Risk Factors and Signs and Symptoms  Outcome: Outcome(s) achieved Date Met: 08/17/19    Goal: Absence of Fall  Outcome: Ongoing (interventions implemented as appropriate)      Problem: Seizure Disorder/Epilepsy (Adult)  Goal: Signs and Symptoms of Listed Potential Problems Will be Absent, Minimized or Managed (Seizure Disorder/Epilepsy)  Outcome: Outcome(s) achieved Date Met: 08/17/19      Problem: Skin Injury Risk (Adult)  Goal: Identify Related Risk Factors and Signs and Symptoms  Outcome: Outcome(s) achieved Date Met: 08/17/19    Goal: Skin Health and Integrity  Outcome: Ongoing (interventions implemented as appropriate)

## 2019-08-17 NOTE — PROGRESS NOTES
"Continued Stay Note  [facility]     Patient Name: Dariana Rivera  MRN: [id number]  Today's Date: 8/17/2019    Admit Date: 8/14/2019    Discharge Plan     Row Name 08/17/19 1216       Plan    Plan  DC PLAN: [facility] P: [phone]- accepted. PASRR approved. No precert required.     Plan Comments  1213pm: Per Ruchi  on call: \" The LOC has been eacalated to be approved as a 30 Exempted Hospital Discharge per the notes documented on the LOC once selected in PASRR, so pt does not need a level II.\"  1216pm: Notified [facility] charge nurse pt is ok to DC to [facility] today.  Will continue to follow and update ase needed.       Row Name 08/17/19 1205       Plan    Plan  DC PLAN: [facility] P: [phone]- accepted. [facility] Level II is pending. No precert required.     Plan Comments  UR person called to inquire on status of discharge.  It has been noted that pt is accepted for rehab at [facility] but the status of precert is in question.  I looked on [facility] website and it appears a level II is pending.  12pm: I contacted  on call and asked her to verify status of the [facility].  At this time I believe the level II is pending.  Pt cannot be discharged until the level II is completed.  Will continue to follow and update as needed.           Discharge Codes    No documentation.             Pamela Daniels RN    "

## 2019-08-17 NOTE — PROGRESS NOTES
Continued Stay Note   [facility]     Patient Name: Dariana Rivera  MRN: [id number]  Today's Date: 8/17/2019    Admit Date: 8/14/2019    Discharge Plan     Row Name 08/17/19 1205       Plan    Plan  DC PLAN: [facility] P: [phone] [facility] No [facility] required.     Plan Comments  UR person called to inquire on status of discharge.  It has been noted that pt is accepted for rehab at [facility] but the status of precert is in question.  I looked on PASRR website and it appears a level II is pending.  12pm: I contacted  on call and asked her to verify status of the PAS.  At this time I believe the level II is pending.  Pt cannot be discharged until the level II is completed.  Will continue to follow and update as needed.           Discharge Codes    No documentation.             Pamela Daniels RN

## 2019-08-17 NOTE — DISCHARGE PLACEMENT REQUEST
"Dariana Rivera (63 y.o. Female)     Date of Birth Social Security Number Address Home Phone MRN    1955  [id number]  [place] [address] [phone] [id number]    Zoroastrian Marital Status          [employer]        Admission Date Admission Type Admitting Provider Attending Provider Department, Room/Bed    8/14/19 Emergency Paul Larson Jr., MD Hocker, Clifton M Jr., MD [facility] NEURO HEART, 258/1    Discharge Date Discharge Disposition Discharge Destination                       Attending Provider:  Paul Larson Jr., MD    Allergies:  Ambien  [Zolpidem Tartrate], Ciprofloxacin Hcl, Penicillins, Methadone, Clindamycin    Isolation:  None   Infection:  None   Code Status:  CPR    Ht:  167.6 cm (66\")   Wt:  131 kg (289 lb 3.2 oz)    Admission Cmt:  None   Principal Problem:  None                Active Insurance as of 8/14/2019     Primary Coverage     Payor Plan Insurance Group Employer/Plan Group    MEDICARE MEDICARE A & B      Payor Plan Address Payor Plan Phone Number Payor Plan Fax Number Effective Dates    [address] [phone]  1/1/2011 - None Entered    [facility] [id number]       Subscriber Name Subscriber Birth Date Member ID       DARIANA RIVERA 1955 [id number]           Secondary Coverage     Payor Plan Insurance Group Employer/Plan Group    [place]      Payor Plan Address Payor Plan Phone Number Payor Plan Fax Number Effective Dates    [address]   6/1/2019 - None Entered    [place] [address]       Subscriber Name Subscriber Birth Date Member ID       DARIANA RIVERA 1955 [id number]                 Emergency Contacts      (Rel.) Home Phone Work Phone Mobile Phone    JIMBO RIVERA (Spouse) [phone] -- --    KHARI GENAO (Sister) [phone] -- --        Continued Stay Note  [facility]     Patient Name: Dariana Rivera  MRN: [id number]  Today's Date: 8/17/2019    Admit Date: " 8/14/2019    Discharge Plan     Row Name 08/17/19 1205       Plan    Plan  DC PLAN: [facility] P: [phone] No [facility].     Plan Comments  UR person called to inquire on status of discharge.  It has been noted that pt is accepted for rehab at [facility] but the status of precert is in question.  I looked on [facility] website and it appears a level II is pending.  12pm: I contacted  on call and asked her to verify status of the PASRR.  At this time I believe the level II is pending.  Pt cannot be discharged until the level II is completed.  Will continue to follow and update as needed.           Discharge Codes    No documentation.             Pamela Daniels RN         Physician Progress Notes (last 24 hours) (Notes from 8/16/2019 12:08 PM through 8/17/2019 12:08 PM)      Allyson Steele MD at 8/16/2019  1:15 PM                Cc:  Confusion.       S:  The patient is sitting in chair.     O:  Vitals stable.  O/E the patient is sitting in chair in no apparent distress.  Speech is good.  CN EOMI, no facial droop. Motor moves all extremities without difficulty.      A:  63 year old lady with multiple medical problems with toxic metabolic encephalopathy.  Improving.      P:  Will continue same care.  Will follow.     Electronically signed by Allyson Steele MD at 8/16/2019  1:19 PM

## 2019-08-17 NOTE — PLAN OF CARE
Problem: Patient Care Overview  Goal: Plan of Care Review  Outcome: Ongoing (interventions implemented as appropriate)   08/17/19 3832   Coping/Psychosocial   Plan of Care Reviewed With patient   OTHER   Outcome Summary patient did not discharge today due to feeling congested and shortness of air. chest xray negative with labs pending. Patient is stable and will likely discharge tomorrow       Problem: Fall Risk (Adult)  Goal: Absence of Fall  Outcome: Ongoing (interventions implemented as appropriate)      Problem: Skin Injury Risk (Adult)  Goal: Skin Health and Integrity  Outcome: Ongoing (interventions implemented as appropriate)

## 2019-08-17 NOTE — PROGRESS NOTES
Cc:   Mental status changes.        S:    The patient is sitting in chair.  Feeling better.      O:   Vitals stable.  O/E the patient is sitting in chair in no apparent distress.  Speech is good.  CN EOMI, no facial droop. Mild tremor around mouth noted.  Motor no changes.      A:  63 year old lady with multiple medical problems with toxic metabolic encephalopathy.  Improving.      P:  Same care.  Agree with plan.  Will follow.

## 2019-08-17 NOTE — DISCHARGE SUMMARY
Date of Discharge:  8/17/2019    Discharge Diagnosis: Altered mental status  <principal problem not specified>      Presenting Problem/History of Present Illness  Active Hospital Problems    Diagnosis  POA   • Altered mental status [R41.82]  Yes      Resolved Hospital Problems   No resolved problems to display.       Hospital Course  Patient is a 63 y.o. female presented with altered mental status.      The patient is a 63-year-old white female was admitted the hospital altered mental status.  The patient has multiple medical problems.  The patient is currently cared for home by her .  She does have home health and home physical therapy.       History is obtained from the patient's  and the patient. They state that the patient was sitting in her chair in her recliner.  They significantly back to go to sleep.  Patient fell asleep.  The patient does have sleep apnea.  The patient did not order BiPAP on.  They state that when she awakened she was confused.  She was mumbling and not making sense.  Because of this she was brought to the emergency room.  In emergency room evaluation was performed.  Nothing specific was found to account for her symptoms.  She was admitted for further care.       Patient states that she awakened at about 2 o'clock this morning.  She states initially she was confused prescribed for help.  Since that time the patient is returned to her baseline status.       As stated above the patient has multiple medical problems.  She has frequent hospitalizations for ideal reasons.  It appears to me that her needs are greater than can be managed home.    The patient was admitted to the neuro heart unit at the hospital.  Patient was seen in consultation by neurology.  The patient's mental status returned to baseline spontaneously.    He was continued on her home medications.  Patient is remained stable during the entire hospital stay.    [place] by this examiner that the patient would benefit  by a stay in extended care facility for further rehab.  Arrangements been made for this to occur patient be transferred today.        Procedures Performed         Consults:   Consults     Date and Time Order Name Status Description    8/15/2019 1139 Inpatient Neurology Consult General Completed     8/14/2019 1804 Family Medicine Consult Completed     7/23/2019 0733 Inpatient Nephrology Consult Completed     7/19/2019 1743 Inpatient Psychiatrist Consult Completed     7/19/2019 1446 Inpatient Neurology Consult General Completed     7/18/2019 2014 Hospitalist (on-call MD unless specified) Completed     7/18/2019 2013 Family Medicine Consult Completed           Pertinent Test Results:   Lab Results (last 48 hours)     ** No results found for the last 48 hours. **         Imaging Results (last 24 hours)     ** No results found for the last 24 hours. **         ECG/EMG Results (last 24 hours)     ** No results found for the last 24 hours. **           Condition on Discharge: Stable    Vital Signs  Temp:  [97.9 °F (36.6 °C)-98.7 °F (37.1 °C)] 98.2 °F (36.8 °C)  Heart Rate:  [76-88] 82  Resp:  [10-23] 18  BP: ()/(52-67) 133/67     Physical Exam   Constitutional:   Morbidly obese   Cardiovascular: Normal rate, regular rhythm, normal heart sounds and intact distal pulses.   Pulmonary/Chest: Effort normal and breath sounds normal.   Abdominal: Soft. Bowel sounds are normal.   Musculoskeletal: Normal range of motion.   Skin: Skin is warm and dry.   Nursing note and vitals reviewed.       Discharge Disposition stable      Discharge Medications     Discharge Medications      ASK your doctor about these medications      Instructions Start Date   albuterol sulfate  (90 Base) MCG/ACT inhaler  Commonly known as:  PROVENTIL HFA;VENTOLIN HFA;PROAIR HFA   2 puffs, Inhalation, Every 6 Hours PRN      aspirin 81 MG chewable tablet   81 mg, Oral, Daily      baclofen 10 MG tablet  Commonly known as:  LIORESAL   10 mg, Oral, 3  Times Daily      budesonide-formoterol 160-4.5 MCG/ACT inhaler  Commonly known as:  SYMBICORT   2 puffs, Inhalation, 2 Times Daily - RT      carbidopa-levodopa  MG per tablet  Commonly known as:  SINEMET   2 tablets, Oral, 3 Times Daily      Cholecalciferol 5000 units tablet   1 tablet, Oral, Daily      clonazePAM 1 MG tablet  Commonly known as:  KlonoPIN   1 mg, Oral, 2 Times Daily PRN      diphenhydrAMINE 50 MG capsule  Commonly known as:  BENADRYL   50 mg, Oral, Nightly PRN      furosemide 40 MG tablet  Commonly known as:  LASIX   TAKE 1 TABLET BY MOUTH EVERY DAY      gabapentin 300 MG capsule  Commonly known as:  NEURONTIN   300 mg, Oral, 3 Times Daily      ipratropium-albuterol 0.5-2.5 mg/3 ml nebulizer  Commonly known as:  DUO-NEB   3 mL, Nebulization, Every 6 Hours PRN      lactulose 10 GM/15ML solution  Commonly known as:  CHRONULAC   30 mL, Oral, Nightly      levETIRAcetam 750 MG tablet  Commonly known as:  KEPPRA   750 mg, Oral, Every Morning      levETIRAcetam 1000 MG tablet  Commonly known as:  KEPPRA   1,000 mg, Oral, Every Night at Bedtime      lisinopril-hydrochlorothiazide 10-12.5 MG per tablet  Commonly known as:  PRINZIDE,ZESTORETIC   1 tablet, Oral, Daily      lurasidone 40 MG tablet tablet  Commonly known as:  LATUDA   20 mg, Oral, Daily      multivitamin with minerals tablet tablet   1 tablet, Oral, Daily      nitrofurantoin (macrocrystal-monohydrate) 100 MG capsule  Commonly known as:  MACROBID   100 mg, Oral, Every Night at Bedtime      omeprazole 20 MG capsule  Commonly known as:  priLOSEC   40 mg, Oral, Daily      OXcarbazepine 300 MG tablet  Commonly known as:  TRILEPTAL   600 mg, Oral, 2 Times Daily      pioglitazone 30 MG tablet  Commonly known as:  ACTOS   30 mg, Oral, Daily      potassium chloride 20 MEQ CR tablet  Commonly known as:  K-DUR,KLOR-CON   20 mg, Oral, Daily      pramipexole 0.5 MG tablet  Commonly known as:  MIRAPEX   0.5 mg, Oral, 3 Times Daily      sodium chloride 1  g tablet   2 g, Oral, Daily      tiotropium 18 MCG per inhalation capsule  Commonly known as:  SPIRIVA   1 capsule, Inhalation, Daily - RT      VIMPAT 150 MG tablet  Generic drug:  Lacosamide   1 tablet, Oral, 2 Times Daily      vitamin C 500 MG tablet  Commonly known as:  ASCORBIC ACID   1,000 mg, Oral, Daily             Discharge Diet: Consistent carbohydrate diet    Activity at Discharge: As tolerated    Follow-up Appointments  Future Appointments   Date Time Provider Department Center   10/18/2019 11:00 AM [facility] None   11/6/2019  1:00 PM Paul Larson Jr., MD [facility] None   12/2/2019 12:50 PM Nabor Amador MD [facility] NA         Test Results Pending at Discharge none       Paul Larson Jr., MD  08/17/19  12:25 PM

## 2019-08-18 VITALS
RESPIRATION RATE: 16 BRPM | HEART RATE: 79 BPM | SYSTOLIC BLOOD PRESSURE: 122 MMHG | TEMPERATURE: 98.5 F | WEIGHT: 292.55 LBS | OXYGEN SATURATION: 96 % | BODY MASS INDEX: 47.02 KG/M2 | HEIGHT: 66 IN | DIASTOLIC BLOOD PRESSURE: 59 MMHG

## 2019-08-18 PROCEDURE — 96372 THER/PROPH/DIAG INJ SC/IM: CPT

## 2019-08-18 PROCEDURE — 94799 UNLISTED PULMONARY SVC/PX: CPT

## 2019-08-18 PROCEDURE — G0378 HOSPITAL OBSERVATION PER HR: HCPCS

## 2019-08-18 PROCEDURE — 25010000002 ENOXAPARIN PER 10 MG: Performed by: FAMILY MEDICINE

## 2019-08-18 PROCEDURE — 94660 CPAP INITIATION&MGMT: CPT

## 2019-08-18 RX ORDER — LACOSAMIDE 150 MG/1
1 TABLET, FILM COATED ORAL 2 TIMES DAILY
Qty: 60 TABLET | Refills: 0 | Status: SHIPPED | OUTPATIENT
Start: 2019-08-18 | End: 2019-11-06

## 2019-08-18 RX ORDER — CLONAZEPAM 1 MG/1
1 TABLET ORAL 2 TIMES DAILY PRN
Qty: 69 TABLET | Refills: 0 | Status: SHIPPED | OUTPATIENT
Start: 2019-08-18 | End: 2019-10-30 | Stop reason: SDUPTHER

## 2019-08-18 RX ADMIN — ASPIRIN 81 MG 81 MG: 81 TABLET ORAL at 09:12

## 2019-08-18 RX ADMIN — LISINOPRIL: 20 TABLET ORAL at 09:11

## 2019-08-18 RX ADMIN — PANTOPRAZOLE SODIUM 40 MG: 40 TABLET, DELAYED RELEASE ORAL at 09:12

## 2019-08-18 RX ADMIN — IPRATROPIUM BROMIDE AND ALBUTEROL SULFATE 3 ML: .5; 3 SOLUTION RESPIRATORY (INHALATION) at 07:15

## 2019-08-18 RX ADMIN — SODIUM CHLORIDE TAB 1 GM 2 G: 1 TAB at 09:12

## 2019-08-18 RX ADMIN — BUDESONIDE AND FORMOTEROL FUMARATE DIHYDRATE 2 PUFF: 160; 4.5 AEROSOL RESPIRATORY (INHALATION) at 07:16

## 2019-08-18 RX ADMIN — PIOGLITAZONE 30 MG: 30 TABLET ORAL at 09:12

## 2019-08-18 RX ADMIN — FUROSEMIDE 40 MG: 40 TABLET ORAL at 09:12

## 2019-08-18 RX ADMIN — OXCARBAZEPINE 600 MG: 600 TABLET, FILM COATED ORAL at 09:11

## 2019-08-18 RX ADMIN — BACLOFEN 10 MG: 10 TABLET ORAL at 09:13

## 2019-08-18 RX ADMIN — LACOSAMIDE 150 MG: 100 TABLET, FILM COATED ORAL at 09:12

## 2019-08-18 RX ADMIN — OYSTER SHELL CALCIUM WITH VITAMIN D 1 TABLET: 500; 200 TABLET, FILM COATED ORAL at 09:12

## 2019-08-18 RX ADMIN — ENOXAPARIN SODIUM 40 MG: 40 INJECTION SUBCUTANEOUS at 09:11

## 2019-08-18 RX ADMIN — LEVETIRACETAM 750 MG: 250 TABLET, FILM COATED ORAL at 09:12

## 2019-08-18 RX ADMIN — CLONAZEPAM 1 MG: 1 TABLET ORAL at 10:41

## 2019-08-18 RX ADMIN — CARBIDOPA AND LEVODOPA 2 TABLET: 25; 100 TABLET ORAL at 09:13

## 2019-08-18 RX ADMIN — PRAMIPEXOLE DIHYDROCHLORIDE 0.5 MG: 0.5 TABLET ORAL at 09:12

## 2019-08-18 RX ADMIN — Medication 3 ML: at 09:11

## 2019-08-18 RX ADMIN — IPRATROPIUM BROMIDE AND ALBUTEROL SULFATE 3 ML: .5; 3 SOLUTION RESPIRATORY (INHALATION) at 11:21

## 2019-08-18 RX ADMIN — POTASSIUM CHLORIDE 20 MEQ: 20 TABLET, EXTENDED RELEASE ORAL at 09:11

## 2019-08-18 RX ADMIN — GABAPENTIN 300 MG: 300 CAPSULE ORAL at 09:12

## 2019-08-18 RX ADMIN — LURASIDONE HYDROCHLORIDE 40 MG: 40 TABLET, FILM COATED ORAL at 09:11

## 2019-08-18 NOTE — NURSING NOTE
Pt had a period of intense tremors lasting from 2034 to 2052. Pt started to tremor and asked for Klonopin. RN gave pt meds as she started to tremor more intensely and stated she thought she was about to have a seizure. Pt then was not able to follow commands and she would not follow finger with her eyes. Pt was still able to answer yes or no questions by shaking her head. Pt was given IV ativan during this episode.  After this episode ended pt stated she had a panic attack. Pt stated she has panic attacks if she wears bipap, so she is refusing to wear it. Pt is now alert and oriented and responsive.

## 2019-08-19 ENCOUNTER — EPISODE CHANGES (OUTPATIENT)
Dept: CASE MANAGEMENT | Facility: OTHER | Age: 64
End: 2019-08-19

## 2019-08-19 NOTE — PROGRESS NOTES
"Case Management Discharge Note    Final Note: [facility]     Destination - Selection Complete      Service Provider Request Status Selected Services Address Phone Number Fax Number    [facility] Selected Skilled Nursing [address] [place] [phone]       Pamela Daniels RN 8/17/2019 1219    UR person called to inquire on status of discharge.  It has been noted that pt is accepted for rehab at [facility] but the status of precert is in question.  I looked on [facility] website and it appears a level II is pending.  12pm: I contacted  on call and asked her to verify status of the [facility].  At this time I believe the level II is pending.  Pt cannot be discharged until the level II is completed.  Will continue to follow and update as needed.       1213pm: Per Ruchi  on call: \" The LOC has been eacalated to be approved as a 30 Exempted Hospital Discharge per the notes documented on the LOC once selected in [facility], so pt does not need a level II.\"  1216pm: Notified ROULA charge nurse pt is ok to DC to [facility] today.  Will continue to follow and update ase needed.                  Final Discharge Disposition Code: 03 - skilled nursing facility (SNF)  "

## 2019-08-23 ENCOUNTER — EPISODE CHANGES (OUTPATIENT)
Dept: CASE MANAGEMENT | Facility: OTHER | Age: 64
End: 2019-08-23

## 2019-08-30 ENCOUNTER — EPISODE CHANGES (OUTPATIENT)
Dept: CASE MANAGEMENT | Facility: OTHER | Age: 64
End: 2019-08-30

## 2019-08-31 RX ORDER — POTASSIUM CHLORIDE 1500 MG/1
TABLET, EXTENDED RELEASE ORAL
Qty: 90 TABLET | Refills: 0 | Status: SHIPPED | OUTPATIENT
Start: 2019-08-31

## 2019-09-04 ENCOUNTER — OFFICE VISIT (OUTPATIENT)
Dept: FAMILY MEDICINE CLINIC | Facility: CLINIC | Age: 64
End: 2019-09-04

## 2019-09-04 ENCOUNTER — PATIENT OUTREACH (OUTPATIENT)
Dept: CASE MANAGEMENT | Facility: OTHER | Age: 64
End: 2019-09-04

## 2019-09-04 VITALS
HEART RATE: 84 BPM | TEMPERATURE: 98.6 F | OXYGEN SATURATION: 92 % | RESPIRATION RATE: 16 BRPM | HEIGHT: 66 IN | BODY MASS INDEX: 45.77 KG/M2 | SYSTOLIC BLOOD PRESSURE: 112 MMHG | WEIGHT: 284.8 LBS | DIASTOLIC BLOOD PRESSURE: 57 MMHG

## 2019-09-04 DIAGNOSIS — G20 PARKINSON'S DISEASE (HCC): Primary | ICD-10-CM

## 2019-09-04 PROBLEM — F41.1 GENERALIZED ANXIETY DISORDER: Chronic | Status: RESOLVED | Noted: 2019-07-23 | Resolved: 2019-09-04

## 2019-09-04 PROBLEM — M40.209 KYPHOSIS: Status: RESOLVED | Noted: 2018-07-17 | Resolved: 2019-09-04

## 2019-09-04 PROBLEM — R41.82 ALTERED MENTAL STATUS: Status: RESOLVED | Noted: 2019-08-14 | Resolved: 2019-09-04

## 2019-09-04 PROCEDURE — 99213 OFFICE O/P EST LOW 20 MIN: CPT | Performed by: FAMILY MEDICINE

## 2019-09-04 NOTE — OUTREACH NOTE
SNF Follow-up Note      Responses   Name of the Skilled Nursing Facility?  [facility]   Skilled Nursing Discharge Date?  08/30/19   Where was the patient discharged to?  Home   Home Health Agency at discharge?  Yes   Name of Home Health Agency?  [facility]   DME Needed at Discharge?  Yes   What Medical Equipment is needed?  cane   Are there any medication concerns at Discharge  Yes   What are the Concerns?  see note   Does the patient have a follow-up appointment?  Yes   Comments Regarding F/U Appt. ?  scheduled today per RN-CC        Pt discharged from [facility] to home on 8/30/19. RN-CC outreach call made to pt. Pt states that since Saturday she has had difficulty walking and her hands are not wanting to grab or hold onto things. Pt states she is using a cane with ambulation but is having a hard time holding onto it. Pt states her  and caregiver have been helping her walk. Pt states she thought the effects could've been due to a medication change so she called [facility] and had a nurse review meds with her. Pt states the only med change was she was not discharged on a cholesterol medication, possibly Lipitor, that she was taking while in the SNF. Pt reports that she does have [facility] coming to her home. Pt states a nurse saw her yesterday and PT is coming today to do an evaluation. Pt reports she still has a caregiver that comes Monday-Friday through [employer] (set up per [facility]). Pt has caregiver with her currently. Pt reports she has not had any seizure activity since she was in the SNF. Pt has appointment scheduled to see PCP next Thursday, 9/12/19. Pt was tearful on the phone stating she felt OK on Friday when she was discharged from the SNF and she doesn't know why she has started having difficulty with ambulation, and states she is having trouble with her memory also. RN-CC offered to call pts PCP to see about getting sooner appointment, pt agreed.     Judy  GLORIA Alvares    9/4/2019, 11:25 AM

## 2019-09-04 NOTE — OUTREACH NOTE
Care Coordination Note    Called pts PCP office, spoke with staff, explained pts problems. Staff offered to schedule pt appointment to see PCP today at 2:45 pm. RN-CC called pt on 3 way. Pt took appointment and will see PCP today. RN-CC reminded pt to take SNF discharge papers, including med list, to this appointment. Advised pt to call RN-CC with any needs. Next outreach scheduled for 1-2 weeks.     Judy Alvares RN    9/4/2019, 11:35 AM

## 2019-09-04 NOTE — PROGRESS NOTES
"Subjective   Dariana Rivera is a 63 y.o. female.     Chief Complaint   Patient presents with   • Hand Problem     CAN'T HOLD THINGS       HPI  Chief complaint: Tremor    Patient is a 63-year-old white female comes in for follow-up and maintenance of her current problems which include     #1 Parkinson's disease-deteriorated-she was recently hospitalized because of altered mental status.  Patient developed altered mental status as result of not using her BiPAP.  Patient was hospitalized for repeated time and then discharged to rehab.  Patient states that she was in rehab until this past Saturday.  She was discharged from rehab Friday.  Patient states that she was doing well in Friday when she was discharged.  She states that she when she awakened on Saturday she had tremor in the upper extremities to the point that she did not want anything.  Patient states no changes in medications were made.  Patient does take Sinemet and ropinirole for Parkinson's.      Patient takes multiple other medications.  Other problems include hypertension COPD sleep apnea morbid obesity cirrhosis Gastrosoft reflux disease type 2 diabetes mellitus chronic back pain seizures with normal EEG recurrent urinary tract infection bipolar disease.  Patient also has polypharmacy      The following portions of the patient's history were reviewed and updated as appropriate: allergies, current medications, past family history, past medical history, past social history, past surgical history and problem list.    Review of Systems    Objective     /57 (BP Location: Right arm, Patient Position: Sitting, Cuff Size: Large Adult)   Pulse 84   Temp 98.6 °F (37 °C) (Oral)   Resp 16   Ht 167.6 cm (66\")   Wt 129 kg (284 lb 12.8 oz)   SpO2 92%   BMI 45.97 kg/m²     Physical Exam   Constitutional: She is oriented to person, place, and time. She appears well-developed and well-nourished.   HENT:   Head: Normocephalic and atraumatic.   Eyes: " Conjunctivae and EOM are normal. Pupils are equal, round, and reactive to light.   Neck: Normal range of motion. Neck supple.   Cardiovascular: Normal rate, regular rhythm, normal heart sounds and intact distal pulses.   Pulmonary/Chest: Effort normal and breath sounds normal.   Abdominal: Soft. Bowel sounds are normal.   Musculoskeletal: Normal range of motion.   Neurological: She is alert and oriented to person, place, and time. She displays normal reflexes. No cranial nerve deficit or sensory deficit. She exhibits normal muscle tone. Coordination normal.   She had tremor involving the upper 50s that would  come and go.  It was not classic for Parkinson's type tremor.   Skin: Skin is warm and dry.   Psychiatric: She has a normal mood and affect. Her behavior is normal.   Nursing note and vitals reviewed.    Patient was advised that the tremor may or may not be related to Parkinson's disease.  Patient is advised him hesitant to add more medication given patient's current medication list and in fact my preference would be that she take less medication.    Assessment/Plan   Dariana was seen today for hand problem.    Diagnoses and all orders for this visit:    Parkinson's disease (CMS/[facility])      Patient Instructions   Continue your current medications and treatment.    Follow up with your neurologist regardng the tremor.    Further francia ill depend on further evaluation.          Paul Larson Jr., MD    09/04/19

## 2019-09-04 NOTE — PATIENT INSTRUCTIONS
Continue your current medications and treatment.    Follow up with your neurologist regardng the tremor.    Further care will depend on further evaluation.

## 2019-09-10 ENCOUNTER — LAB REQUISITION (OUTPATIENT)
Dept: LAB | Facility: HOSPITAL | Age: 64
End: 2019-09-10

## 2019-09-10 DIAGNOSIS — J44.0 CHRONIC OBSTRUCTIVE PULMONARY DISEASE WITH ACUTE LOWER RESPIRATORY INFECTION (HCC): ICD-10-CM

## 2019-09-10 DIAGNOSIS — J44.1 CHRONIC OBSTRUCTIVE PULMONARY DISEASE WITH ACUTE EXACERBATION (HCC): ICD-10-CM

## 2019-09-10 DIAGNOSIS — E87.1 HYPO-OSMOLALITY AND HYPONATREMIA: ICD-10-CM

## 2019-09-10 DIAGNOSIS — J98.8 OTHER SPECIFIED RESPIRATORY DISORDERS: ICD-10-CM

## 2019-09-10 LAB
ANION GAP SERPL CALCULATED.3IONS-SCNC: 19.4 MMOL/L (ref 5–15)
BUN BLD-MCNC: 31 MG/DL (ref 8–20)
BUN/CREAT SERPL: 28.2 (ref 5.4–26.2)
CALCIUM SPEC-SCNC: 9.7 MG/DL (ref 8.9–10.3)
CHLORIDE SERPL-SCNC: 91 MMOL/L (ref 101–111)
CO2 SERPL-SCNC: 30 MMOL/L (ref 22–32)
CREAT BLD-MCNC: 1.1 MG/DL (ref 0.4–1)
GFR SERPL CREATININE-BSD FRML MDRD: 50 ML/MIN/1.73
GLUCOSE BLD-MCNC: 133 MG/DL (ref 65–99)
MAGNESIUM SERPL-MCNC: 1.7 MG/DL (ref 1.8–2.5)
POTASSIUM BLD-SCNC: 3.4 MMOL/L (ref 3.6–5.1)
SODIUM BLD-SCNC: 137 MMOL/L (ref 136–144)
URATE SERPL-MCNC: 9 MG/DL (ref 2.6–8)

## 2019-09-10 PROCEDURE — 80048 BASIC METABOLIC PNL TOTAL CA: CPT | Performed by: INTERNAL MEDICINE

## 2019-09-10 PROCEDURE — 83735 ASSAY OF MAGNESIUM: CPT | Performed by: INTERNAL MEDICINE

## 2019-09-10 PROCEDURE — 84550 ASSAY OF BLOOD/URIC ACID: CPT | Performed by: INTERNAL MEDICINE

## 2019-09-17 ENCOUNTER — EPISODE CHANGES (OUTPATIENT)
Dept: CASE MANAGEMENT | Facility: OTHER | Age: 64
End: 2019-09-17

## 2019-09-25 ENCOUNTER — TELEPHONE (OUTPATIENT)
Dept: FAMILY MEDICINE CLINIC | Facility: CLINIC | Age: 64
End: 2019-09-25

## 2019-09-25 RX ORDER — SULFAMETHOXAZOLE AND TRIMETHOPRIM 800; 160 MG/1; MG/1
1 TABLET ORAL 2 TIMES DAILY
Qty: 14 TABLET | Refills: 0 | Status: SHIPPED | OUTPATIENT
Start: 2019-09-25 | End: 2019-11-06

## 2019-09-25 NOTE — TELEPHONE ENCOUNTER
I spoke with the nurse.  The patient complains of cough productive of sputum.  She has increased SOB with 7 pound weight gain in 1 day.  She also complains of Dysuria.  I recommended Bactrim.  And increasing her lasix to 120 mgm a day until her weight gets back to baseline.

## 2019-09-26 ENCOUNTER — PATIENT OUTREACH (OUTPATIENT)
Dept: CASE MANAGEMENT | Facility: OTHER | Age: 64
End: 2019-09-26

## 2019-09-26 ENCOUNTER — EPISODE CHANGES (OUTPATIENT)
Dept: CASE MANAGEMENT | Facility: OTHER | Age: 64
End: 2019-09-26

## 2019-09-26 NOTE — OUTREACH NOTE
"Care Plan Note      Responses   Annual Wellness Visit:   Care Coordinator Will Schedule   Care Gaps Addressed  Colon Cancer Screening, Mammogram, Flu Shot, Pneumonia Vaccine   Colon Cancer Screening Type  Colonoscopy   Colonoscopy Status  Up to Date (< 10 yrs)   Flu Shot Status  Up to Date   Mammogram Status  Up to Date   Pneumonia Vaccine Status  Up to Date        Follow up RN-CC outreach call made to pt. Pt states she is \"doing pretty good\". Pt states she is currently being treated for URI and UTI. Telephone encounter noted from 9/25/19. Pt reports she is getting antibiotic prescription today and is taking Lasix now at 120 mg daily. RN-CC emphasized importance of completing entire antibiotic prescription. Disease education provided to pt, along with UTI education. Pt states she has been having seizures more frequently. Pt has been advised to follow up with her neurologist. Pt states she has not seen her neurologist but she has decided to see another neurologist at [facility], MD Hernandez, and she has an appointment with him next week on 10/1/9. Pt states she wants the MD's opinion on her seizures and tremors. Pt confirmed she is still active with [facility]. Pt states she saw nurse yesterday and had PT today. Pt reports she also still has caregiver through the week, through [employer]. Reviewed health maintenance gaps with pt. Pt is up to date on colonoscopy, mammogram, flu and pneumonia vaccine. Discussed AWV with pt. RN-CC offered to call PCP office and inquire about getting her follow up appointment in November changed to AWV, pt agreed. RN-CC to call PCP office and call pt back.     Judy Alvares RN  Community Care Coordinator    9/26/2019, 11:14 AM    "

## 2019-09-26 NOTE — OUTREACH NOTE
Care Coordination Note    Called pts PCP office, asked if pts appointment on 11/6/19 at 1:00 pm could be changed to an AWV, staff states they will change appointment to AWV.     Judy Alvares RN  Community Care Coordinator    9/26/2019, 11:14 AM

## 2019-09-26 NOTE — OUTREACH NOTE
Patient Outreach Note    Called pt, let her know that her appointment with PCP on 11/6/19 has been changed to AWV. RN-CC asked about pt having a advance directive. Pt states her PCP has been asking her to do one. RN-CC offered to mail AD materials to pt, pt agreed. RN-CC encouraged pt to make one before seeing PCP in November. Pt has no questions or concerns. Pt given [facility] 24 hour nurse line number. Advised pt to call RN-CC with any needs. No future outreach scheduled at this time.     Judy Alvares RN  Community Care Coordinator    9/26/2019, 11:21 AM

## 2019-10-02 ENCOUNTER — TELEPHONE (OUTPATIENT)
Dept: FAMILY MEDICINE CLINIC | Facility: CLINIC | Age: 64
End: 2019-10-02

## 2019-10-02 NOTE — TELEPHONE ENCOUNTER
I spoke with the nurse.  The patient has had symptoms intermittently for 3-4 days.  She is to be seen in the office tomorrow.

## 2019-10-09 ENCOUNTER — TELEPHONE (OUTPATIENT)
Dept: FAMILY MEDICINE CLINIC | Facility: CLINIC | Age: 64
End: 2019-10-09

## 2019-10-09 RX ORDER — LURASIDONE HYDROCHLORIDE 40 MG/1
20 TABLET, FILM COATED ORAL DAILY
Qty: 30 TABLET | Refills: 0 | Status: SHIPPED | OUTPATIENT
Start: 2019-10-09 | End: 2019-10-30 | Stop reason: SDUPTHER

## 2019-10-17 RX ORDER — SODIUM CHLORIDE 1000 MG
TABLET, SOLUBLE MISCELLANEOUS
Qty: 60 TABLET | Refills: 2 | Status: SHIPPED | OUTPATIENT
Start: 2019-10-17 | End: 2019-11-06

## 2019-10-18 ENCOUNTER — OFFICE VISIT (OUTPATIENT)
Dept: PULMONOLOGY | Facility: HOSPITAL | Age: 64
End: 2019-10-18

## 2019-10-18 VITALS
HEIGHT: 67 IN | WEIGHT: 284 LBS | SYSTOLIC BLOOD PRESSURE: 134 MMHG | BODY MASS INDEX: 44.57 KG/M2 | OXYGEN SATURATION: 95 % | DIASTOLIC BLOOD PRESSURE: 79 MMHG | HEART RATE: 89 BPM

## 2019-10-18 DIAGNOSIS — Z99.81 DEPENDENCE ON NOCTURNAL OXYGEN THERAPY: ICD-10-CM

## 2019-10-18 DIAGNOSIS — R06.09 DYSPNEA ON EXERTION: ICD-10-CM

## 2019-10-18 DIAGNOSIS — J44.9 CHRONIC OBSTRUCTIVE PULMONARY DISEASE, UNSPECIFIED COPD TYPE (HCC): ICD-10-CM

## 2019-10-18 DIAGNOSIS — G47.33 OSA (OBSTRUCTIVE SLEEP APNEA): Primary | ICD-10-CM

## 2019-10-18 PROCEDURE — G0463 HOSPITAL OUTPT CLINIC VISIT: HCPCS

## 2019-10-18 RX ORDER — CLONAZEPAM 0.5 MG/1
0.5 TABLET ORAL 2 TIMES DAILY
Refills: 0 | COMMUNITY
Start: 2019-09-18 | End: 2019-10-18 | Stop reason: DRUGHIGH

## 2019-10-18 RX ORDER — INFLUENZA A VIRUS A/MICHIGAN/45/2015 X-275 (H1N1) ANTIGEN (FORMALDEHYDE INACTIVATED), INFLUENZA A VIRUS A/SINGAPORE/INFIMH-16-0019/2016 IVR-186 (H3N2) ANTIGEN (FORMALDEHYDE INACTIVATED), INFLUENZA B VIRUS B/PHUKET/3073/2013 ANTIGEN (FORMALDEHYDE INACTIVATED), AND INFLUENZA B VIRUS B/MARYLAND/15/2016 BX-69A ANTIGEN (FORMALDEHYDE INACTIVATED) 15; 15; 15; 15 UG/.5ML; UG/.5ML; UG/.5ML; UG/.5ML
INJECTION, SUSPENSION INTRAMUSCULAR
Refills: 0 | COMMUNITY
Start: 2019-09-11 | End: 2020-02-10

## 2019-10-18 NOTE — PROGRESS NOTES
10/18/2019     Darianakatya Rivera  1955      Chief Complaint   Patient presents with   • COPD   • Sleep Apnea         Subjective   64 year old female with a [facility] sig for LISANDRO, COPD, DM, Seizures, Parkinson's Disease, and GERD presented to clinic today for follow up to her COPD and LISANDRO.  She was last seen by me on 4/15/19.  Since then she had two hospitalizations one in July where she was treated for a respiratory infection and in August for altered mental status likely secondary to elevated CO2 levels.  She said she fell asleep while watching tv and was without her PAP.  When she woke up she was confused and her spouse called EMS.  She was treated about a month ago for an upper respiratory infection by her primary physician.  Today she reports feeling improved.  After the second hospitalization she stayed two weeks in rehab.  She is home now.  She has physical therapy once per week and she also has home health nursing care five days per week.  She continues to use Spiriva and Symbicort and feels better with use.  She remains with shortness of air with activity that is better at rest.  She is using 1 liter of oxygen with sleep with her PAP.  Data sheet reviewed and showed 172 days with device and 12 days without.  93.5% compliance.  Average usage time is 5 hours and 2 minutes.  Greater than 4 hours use is 81%.  Average AHI is 0.2.  No issues with her machine or mask.  She feels her symptoms of LISANDRO are controlled with use of PAP.  She is going to be admitted to [facility] today according to her for studies related to her seizures.  She has her PAP with her to take.  No fevers and no worsening cough.    Review of System  General: Denies fevers or chills.  Denies any weakness + fatigue.  Denies any weight loss or weight gain.  HEENT: Denies sore throat, rhinorrhea, ear pain.  Denies any change in vision.   LUNGS: + shortness of breath No wheezing. + cough.  Denies any hemoptysis.  CARDIAC: Denies any chest pain,  palpitations. Denies edema  ABD: Denies any abdominal pain.  Denies any N/V/D  PSYCH: Negative for suicidal ideas. + anxiety + depression  All other systems reviewed and negative unless stated in HPI       Objective    Vitals:    10/18/19 1119   BP: 134/79   Pulse: 89   SpO2: 95%       General: NAD, alert and oriented x 4  HEENT: NCAT. PERRL, oral mucosa moist   Cardiac: S1, S2, RRR, no murmur, trace LE edema  Pulmonary: good air movement bilaterally, no wheezing   Abdomen: soft, non-tender, non-distended  : Voids independently, no CVA tenderness   Musculoskeletal: Moves all extremities, equal strength bilaterally  Neuro: alert and oriented x 3, CN 2 - 12 grossly intact  Skin: warm, dry, and intact          Current Outpatient Medications:   •  albuterol sulfate  (90 Base) MCG/ACT inhaler, Inhale 2 puffs Every 6 (Six) Hours As Needed for Wheezing., Disp: , Rfl:   •  aspirin 81 MG chewable tablet, Chew 81 mg Daily., Disp: , Rfl:   •  baclofen (LIORESAL) 10 MG tablet, Take 10 mg by mouth 3 (Three) Times a Day., Disp: , Rfl: 3  •  budesonide-formoterol (SYMBICORT) 160-4.5 MCG/ACT inhaler, Inhale 2 puffs 2 (Two) Times a Day., Disp: , Rfl:   •  carbidopa-levodopa (SINEMET)  MG per tablet, Take 2 tablets by mouth 3 (Three) Times a Day., Disp: , Rfl:   •  Cholecalciferol 5000 units tablet, Take 1 tablet by mouth Daily., Disp: , Rfl:   •  clonazePAM (KlonoPIN) 1 MG tablet, Take 1 tablet by mouth 2 (Two) Times a Day As Needed for Seizures., Disp: 69 tablet, Rfl: 0  •  FLUZONE QUADRIVALENT 0.5 ML suspension prefilled syringe injection, TO BE ADMINISTERED BY PHARMACIST FOR IMMUNIZATION, Disp: , Rfl: 0  •  furosemide (LASIX) 40 MG tablet, TAKE 1 TABLET BY MOUTH EVERY DAY (Patient taking differently: TAKE 1 TABLET BY MOUTH TWICE DAILY), Disp: 30 tablet, Rfl: 2  •  gabapentin (NEURONTIN) 300 MG capsule, Take 300 mg by mouth 3 (Three) Times a Day., Disp: , Rfl:   •  KLOR-CON 20 MEQ CR tablet, TAKE 1 TABLET BY MOUTH  DAILY, Disp: 90 tablet, Rfl: 0  •  lactulose (CHRONULAC) 10 GM/15ML solution, Take 30 mL by mouth Every Night., Disp: , Rfl:   •  levETIRAcetam (KEPPRA) 1000 MG tablet, Take 1,000 mg by mouth every night at bedtime., Disp: , Rfl:   •  levETIRAcetam (KEPPRA) 750 MG tablet, Take 750 mg by mouth Every Morning., Disp: , Rfl:   •  lurasidone (LATUDA) 40 MG tablet tablet, Take 0.5 tablets by mouth Daily., Disp: 30 tablet, Rfl: 0  •  Multiple Vitamins-Minerals (MULTIVITAMIN WITH MINERALS) tablet tablet, Take 1 tablet by mouth Daily., Disp: , Rfl:   •  nitrofurantoin, macrocrystal-monohydrate, (MACROBID) 100 MG capsule, Take 100 mg by mouth every night at bedtime., Disp: , Rfl: 11  •  omeprazole (priLOSEC) 20 MG capsule, Take 40 mg by mouth Daily., Disp: , Rfl:   •  OXcarbazepine (TRILEPTAL) 300 MG tablet, Take 600 mg by mouth 2 (Two) Times a Day., Disp: , Rfl:   •  pioglitazone (ACTOS) 30 MG tablet, Take 30 mg by mouth Daily., Disp: , Rfl:   •  pramipexole (MIRAPEX) 0.5 MG tablet, Take 0.5 mg by mouth 3 (Three) Times a Day., Disp: , Rfl: 2  •  tiotropium (SPIRIVA) 18 MCG per inhalation capsule, Place 1 capsule into inhaler and inhale Daily., Disp: , Rfl:   •  VIMPAT 150 MG tablet, Take 1 tablet by mouth 2 (Two) Times a Day., Disp: 60 tablet, Rfl: 0  •  vitamin C (ASCORBIC ACID) 500 MG tablet, Take 1,000 mg by mouth Daily., Disp: , Rfl:   •  lisinopril-hydrochlorothiazide (PRINZIDE,ZESTORETIC) 10-12.5 MG per tablet, Take 1 tablet by mouth Daily., Disp: , Rfl:   •  sodium chloride 1 g tablet, TAKE 2 TABLETS BY MOUTH EVERY DAY, Disp: 60 tablet, Rfl: 2  •  sulfamethoxazole-trimethoprim (BACTRIM DS) 800-160 MG per tablet, Take 1 tablet by mouth 2 (Two) Times a Day., Disp: 14 tablet, Rfl: 0    Social History     Socioeconomic History   • Marital status:      Spouse name: Not on file   • Number of children: Not on file   • Years of education: Not on file   • Highest education level: Not on file   Tobacco Use   • Smoking  status: Former Smoker     Types: Cigarettes   • Smokeless tobacco: Never Used   Substance and Sexual Activity   • Alcohol use: No     Frequency: Never   • Drug use: No   • Sexual activity: Defer       Past Medical History:   Diagnosis Date   • Anxiety disorder     [facility]    • Back pain    • Bipolar disorder (CMS/HCC)    • Chest pain    • Chronic pain     with stenosis   • Cirrhosis (CMS/HCC)    • Colitis    • Colon polyps    • DDD (degenerative disc disease), lumbar    • Eosinophilic colitis    • Gastritis    • GERD (gastroesophageal reflux disease)    • Hepatic steatosis     non alcoholic steo-hepatitis    • Hypertension    • IBS (irritable bowel syndrome)    • Kidney stones    • Neck pain    • Osteoarthritis    • Parkinson's disease (CMS/HCC)    • Seizure (CMS/HCC)     onset 7/2016   • Sleep apnea     Using Bipap machine at night.    • Sleep apnea, obstructive    • Type II diabetes mellitus (CMS/HCC)                Diagnoses and all orders for this visit:    1. LISANDRO (obstructive sleep apnea) (Primary)    2. Chronic obstructive pulmonary disease, unspecified COPD type (CMS/HCC)    3. Dependence on nocturnal oxygen therapy    4. Dyspnea on exertion  -     Walking Oximetry; Future      PLAN:  -cont with Symbicort and Spiriva  -albuterol prn  -6 minute walk test ordered   -Data sheet reviewed and showed 172 days with device and 12 days without.  93.5% compliance.  Average usage time is 5 hours and 2 minutes.  Greater than 4 hours use is 81%.  Average AHI is 0.2.  Patient is compliant with PAP and benefiting from use      This document has been electronically signed by: OLIVER Shearer APRN

## 2019-10-29 ENCOUNTER — TELEPHONE (OUTPATIENT)
Dept: FAMILY MEDICINE CLINIC | Facility: CLINIC | Age: 64
End: 2019-10-29

## 2019-10-30 ENCOUNTER — OFFICE VISIT (OUTPATIENT)
Dept: PSYCHIATRY | Facility: CLINIC | Age: 64
End: 2019-10-30

## 2019-10-30 ENCOUNTER — TELEPHONE (OUTPATIENT)
Dept: FAMILY MEDICINE CLINIC | Facility: CLINIC | Age: 64
End: 2019-10-30

## 2019-10-30 DIAGNOSIS — F31.32 BIPOLAR 1 DISORDER, DEPRESSED, MODERATE (HCC): Primary | Chronic | ICD-10-CM

## 2019-10-30 DIAGNOSIS — F41.1 GENERALIZED ANXIETY DISORDER: Chronic | ICD-10-CM

## 2019-10-30 DIAGNOSIS — F43.12 CHRONIC POSTTRAUMATIC STRESS DISORDER: ICD-10-CM

## 2019-10-30 PROCEDURE — 99214 OFFICE O/P EST MOD 30 MIN: CPT | Performed by: PSYCHIATRY & NEUROLOGY

## 2019-10-30 RX ORDER — CLONAZEPAM 1 MG/1
1 TABLET ORAL 2 TIMES DAILY PRN
Qty: 60 TABLET | Refills: 2 | Status: SHIPPED | OUTPATIENT
Start: 2019-10-30 | End: 2019-10-30 | Stop reason: SDUPTHER

## 2019-10-30 RX ORDER — CLONAZEPAM 1 MG/1
1 TABLET ORAL 2 TIMES DAILY PRN
Qty: 60 TABLET | Refills: 2 | Status: SHIPPED | OUTPATIENT
Start: 2019-10-30 | End: 2020-01-28

## 2019-10-30 RX ORDER — LURASIDONE HYDROCHLORIDE 40 MG/1
40 TABLET, FILM COATED ORAL DAILY
Qty: 30 TABLET | Refills: 2 | Status: SHIPPED | OUTPATIENT
Start: 2019-10-30 | End: 2020-01-25

## 2019-10-30 NOTE — PATIENT INSTRUCTIONS
Bipolar 1 Disorder  Bipolar 1 disorder is a mental health disorder in which a person has episodes of emotional highs (mary), and may also have episodes of emotional lows (depression) in addition to highs. Bipolar 1 disorder is different from other bipolar disorders because it involves extreme manic episodes. These episodes last at least one week or involve symptoms that are so severe that hospitalization is needed to keep the person safe.  What increases the risk?  The cause of this condition is not known. However, certain factors make you more likely to have bipolar disorder, such as:  · Having a family member with the disorder.  · An imbalance of certain chemicals in the brain (neurotransmitters).  · Stress, such as illness, financial problems, or a death.  · Certain conditions that affect the brain or spinal cord (neurologic conditions).  · Brain injury (trauma).  · Having another mental health disorder, such as:  ? Obsessive compulsive disorder.  ? Schizophrenia.  What are the signs or symptoms?  Symptoms of mary include:  · Very high self-esteem or self-confidence.  · Decreased need for sleep.  · Unusual talkativeness or feeling a need to keep talking. Speech may be very fast. It may seem like you cannot stop talking.  · Racing thoughts or constant talking, with quick shifts between topics that may or may not be related (flight of ideas).  · Decreased ability to focus or concentrate.  · Increased purposeful activity, such as work, studies, or social activity.  · Increased nonproductive activity. This could be pacing, squirming and fidgeting, or finger and toe tapping.  · Impulsive behavior and poor judgment. This may result in high-risk activities, such as having unprotected sex or spending a lot of money.  Symptoms of depression include:  · Feeling sad, hopeless, or helpless.  · Frequent or uncontrollable crying.  · Lack of feeling or caring about anything.  · Sleeping too much.  · Moving more slowly than  usual.  · Not being able to enjoy things you used to enjoy.  · Wanting to be alone all the time.  · Feeling guilty or worthless.  · Lack of energy or motivation.  · Trouble concentrating or remembering.  · Trouble making decisions.  · Increased appetite.  · Thoughts of death, or the desire to harm yourself.  Sometimes, you may have a mixed mood. This means having symptoms of depression and mary. Stress can make symptoms worse.  How is this diagnosed?  To diagnose bipolar disorder, your health care provider may ask about your:  · Emotional episodes.  · Medical history.  · Alcohol and drug use. This includes prescription medicines. Certain medical conditions and substances can cause symptoms that seem like bipolar disorder (secondary bipolar disorder).  How is this treated?  Bipolar disorder is a long-term (chronic) illness. It is best controlled with ongoing (continuous) treatment rather than treatment only when symptoms occur. Treatment may include:  · Medicine. Medicine can be prescribed by a provider who specializes in treating mental disorders (psychiatrist).  ? Medicines called mood stabilizers are usually prescribed.  ? If symptoms occur even while taking a mood stabilizer, other medicines may be added.  · Psychotherapy. Some forms of talk therapy, such as cognitive-behavioral therapy (CBT), can provide support, education, and guidance.  · Coping methods, such as journaling or relaxation exercises. These may include:  ? Yoga.  ? Meditation.  ? Deep breathing.  · Lifestyle changes, such as:  ? Limiting alcohol and drug use.  ? Exercising regularly.  ? Getting plenty of sleep.  ? Making healthy eating choices.    A combination of medicine, talk therapy, and coping methods is best. A procedure in which electricity is applied to the brain through the scalp (electroconvulsive therapy) may be used in cases of severe mary when medicine and psychotherapy work too slowly or do not work.  Follow these instructions at  home:  Activity    · Return to your normal activities as told by your health care provider.  · Find activities that you enjoy, and make time to do them.  · Exercise regularly as told by your health care provider.  Lifestyle  · Limit alcohol intake to no more than 1 drink a day for nonpregnant women and 2 drinks a day for men. One drink equals 12 oz of beer, 5 oz of wine, or 1½ oz of hard liquor.  · Follow a set schedule for eating and sleeping.  · Eat a balanced diet that includes fresh fruits and vegetables, whole grains, low-fat dairy, and lean meat.  · Get 7-8 hours of sleep each night.  General instructions  · Take over-the-counter and prescription medicines only as told by your health care provider.  · Think about joining a support group. Your health care provider may be able to recommend a support group.  · Talk with your family and loved ones about your treatment goals and how they can help.  · Keep all follow-up visits as told by your health care provider. This is important.  Where to find more information  For more information about bipolar disorder, visit the following websites:  · National [place] on Mental Illness: www.tania.org  · [place] National [place] of [place] Health: www.nimh.nih.gov  Contact a health care provider if:  · Your symptoms get worse.  · You have side effects from your medicine, and they get worse.  · You have trouble sleeping.  · You have trouble doing daily activities.  · You feel unsafe in your surroundings.  · You are dealing with substance abuse.  Get help right away if:  · You have new symptoms.  · You have thoughts about harming yourself.  · You self-harm.  This information is not intended to replace advice given to you by your health care provider. Make sure you discuss any questions you have with your health care provider.  Document Released: 03/26/2002 Document Revised: 08/13/2017 Document Reviewed: 08/17/2017  [employer] Interactive Patient Education © 2019 [employer]
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
## 2019-11-06 ENCOUNTER — OFFICE VISIT (OUTPATIENT)
Dept: FAMILY MEDICINE CLINIC | Facility: CLINIC | Age: 64
End: 2019-11-06

## 2019-11-06 VITALS
TEMPERATURE: 98.5 F | OXYGEN SATURATION: 96 % | DIASTOLIC BLOOD PRESSURE: 87 MMHG | SYSTOLIC BLOOD PRESSURE: 157 MMHG | HEIGHT: 67 IN | HEART RATE: 77 BPM | RESPIRATION RATE: 16 BRPM | WEIGHT: 287 LBS | BODY MASS INDEX: 45.04 KG/M2

## 2019-11-06 DIAGNOSIS — Z00.00 ENCOUNTER FOR MEDICARE ANNUAL WELLNESS EXAM: Primary | ICD-10-CM

## 2019-11-06 PROCEDURE — G0439 PPPS, SUBSEQ VISIT: HCPCS | Performed by: FAMILY MEDICINE

## 2019-11-06 NOTE — PATIENT INSTRUCTIONS
Continue your current medications and treatment.    Create a living will.    Have the immunizations done.    Ask your neurologist about coming off the Mirapex.    Follow up in 3-6 months.

## 2019-11-12 ENCOUNTER — LAB REQUISITION (OUTPATIENT)
Dept: LAB | Facility: HOSPITAL | Age: 64
End: 2019-11-12

## 2019-11-12 DIAGNOSIS — E87.5 HYPERKALEMIA: ICD-10-CM

## 2019-11-12 LAB
ALBUMIN SERPL-MCNC: 4 G/DL (ref 3.5–5.2)
ALBUMIN/GLOB SERPL: 1.1 G/DL
ALP SERPL-CCNC: 66 U/L (ref 39–117)
ALT SERPL W P-5'-P-CCNC: 17 U/L (ref 1–33)
ANION GAP SERPL CALCULATED.3IONS-SCNC: 13.4 MMOL/L (ref 5–15)
AST SERPL-CCNC: 31 U/L (ref 1–32)
BACTERIA UR QL AUTO: ABNORMAL /HPF
BASOPHILS # BLD AUTO: 0.03 10*3/MM3 (ref 0–0.2)
BASOPHILS NFR BLD AUTO: 0.5 % (ref 0–1.5)
BILIRUB SERPL-MCNC: 0.4 MG/DL (ref 0.2–1.2)
BILIRUB UR QL STRIP: NEGATIVE
BUN BLD-MCNC: 23 MG/DL (ref 8–23)
BUN/CREAT SERPL: 24 (ref 7–25)
CALCIUM SPEC-SCNC: 9.8 MG/DL (ref 8.6–10.5)
CHLORIDE SERPL-SCNC: 97 MMOL/L (ref 98–107)
CK SERPL-CCNC: 150 U/L (ref 20–180)
CLARITY UR: CLEAR
CO2 SERPL-SCNC: 30.6 MMOL/L (ref 22–29)
COLOR UR: YELLOW
CREAT BLD-MCNC: 0.96 MG/DL (ref 0.57–1)
CREAT UR-MCNC: 67.4 MG/DL
DEPRECATED RDW RBC AUTO: 44.3 FL (ref 37–54)
EOSINOPHIL # BLD AUTO: 0.08 10*3/MM3 (ref 0–0.4)
EOSINOPHIL NFR BLD AUTO: 1.2 % (ref 0.3–6.2)
ERYTHROCYTE [DISTWIDTH] IN BLOOD BY AUTOMATED COUNT: 13.5 % (ref 12.3–15.4)
GFR SERPL CREATININE-BSD FRML MDRD: 59 ML/MIN/1.73
GLOBULIN UR ELPH-MCNC: 3.6 GM/DL
GLUCOSE BLD-MCNC: 188 MG/DL (ref 65–99)
GLUCOSE UR STRIP-MCNC: ABNORMAL MG/DL
HCT VFR BLD AUTO: 36.8 % (ref 34–46.6)
HGB BLD-MCNC: 12.3 G/DL (ref 12–15.9)
HGB UR QL STRIP.AUTO: NEGATIVE
HYALINE CASTS UR QL AUTO: ABNORMAL /LPF
IMM GRANULOCYTES # BLD AUTO: 0.01 10*3/MM3 (ref 0–0.05)
IMM GRANULOCYTES NFR BLD AUTO: 0.2 % (ref 0–0.5)
KETONES UR QL STRIP: NEGATIVE
LEUKOCYTE ESTERASE UR QL STRIP.AUTO: ABNORMAL
LYMPHOCYTES # BLD AUTO: 1.26 10*3/MM3 (ref 0.7–3.1)
LYMPHOCYTES NFR BLD AUTO: 19.3 % (ref 19.6–45.3)
MAGNESIUM SERPL-MCNC: 1.8 MG/DL (ref 1.6–2.4)
MCH RBC QN AUTO: 29.9 PG (ref 26.6–33)
MCHC RBC AUTO-ENTMCNC: 33.4 G/DL (ref 31.5–35.7)
MCV RBC AUTO: 89.3 FL (ref 79–97)
MONOCYTES # BLD AUTO: 0.52 10*3/MM3 (ref 0.1–0.9)
MONOCYTES NFR BLD AUTO: 8 % (ref 5–12)
NEUTROPHILS # BLD AUTO: 4.62 10*3/MM3 (ref 1.7–7)
NEUTROPHILS NFR BLD AUTO: 70.8 % (ref 42.7–76)
NITRITE UR QL STRIP: NEGATIVE
NRBC BLD AUTO-RTO: 0 /100 WBC (ref 0–0.2)
PH UR STRIP.AUTO: 5.5 [PH] (ref 5–8)
PLATELET # BLD AUTO: 146 10*3/MM3 (ref 140–450)
PMV BLD AUTO: 10.6 FL (ref 6–12)
POTASSIUM BLD-SCNC: 3.5 MMOL/L (ref 3.5–5.2)
PROT SERPL-MCNC: 7.6 G/DL (ref 6–8.5)
PROT UR QL STRIP: NEGATIVE
PROT UR-MCNC: 8 MG/DL
RBC # BLD AUTO: 4.12 10*6/MM3 (ref 3.77–5.28)
RBC # UR: ABNORMAL /HPF
REF LAB TEST METHOD: ABNORMAL
SODIUM BLD-SCNC: 141 MMOL/L (ref 136–145)
SP GR UR STRIP: 1.02 (ref 1–1.03)
SQUAMOUS #/AREA URNS HPF: ABNORMAL /HPF
URATE SERPL-MCNC: 7.6 MG/DL (ref 2.4–5.7)
UROBILINOGEN UR QL STRIP: ABNORMAL
WBC NRBC COR # BLD: 6.52 10*3/MM3 (ref 3.4–10.8)
WBC UR QL AUTO: ABNORMAL /HPF

## 2019-11-12 PROCEDURE — 80053 COMPREHEN METABOLIC PANEL: CPT | Performed by: INTERNAL MEDICINE

## 2019-11-12 PROCEDURE — 83735 ASSAY OF MAGNESIUM: CPT | Performed by: INTERNAL MEDICINE

## 2019-11-12 PROCEDURE — 87077 CULTURE AEROBIC IDENTIFY: CPT | Performed by: INTERNAL MEDICINE

## 2019-11-12 PROCEDURE — 87186 SC STD MICRODIL/AGAR DIL: CPT | Performed by: INTERNAL MEDICINE

## 2019-11-12 PROCEDURE — 87086 URINE CULTURE/COLONY COUNT: CPT | Performed by: INTERNAL MEDICINE

## 2019-11-12 PROCEDURE — 82550 ASSAY OF CK (CPK): CPT | Performed by: INTERNAL MEDICINE

## 2019-11-12 PROCEDURE — 81001 URINALYSIS AUTO W/SCOPE: CPT | Performed by: INTERNAL MEDICINE

## 2019-11-12 PROCEDURE — 84156 ASSAY OF PROTEIN URINE: CPT | Performed by: INTERNAL MEDICINE

## 2019-11-12 PROCEDURE — 82570 ASSAY OF URINE CREATININE: CPT | Performed by: INTERNAL MEDICINE

## 2019-11-12 PROCEDURE — 84550 ASSAY OF BLOOD/URIC ACID: CPT | Performed by: INTERNAL MEDICINE

## 2019-11-12 PROCEDURE — 85025 COMPLETE CBC W/AUTO DIFF WBC: CPT | Performed by: INTERNAL MEDICINE

## 2019-11-14 LAB — BACTERIA SPEC AEROBE CULT: ABNORMAL

## 2019-11-14 RX ORDER — FUROSEMIDE 40 MG/1
TABLET ORAL
Qty: 90 TABLET | Refills: 0 | Status: SHIPPED | OUTPATIENT
Start: 2019-11-14 | End: 2020-01-04

## 2019-11-18 ENCOUNTER — TELEPHONE (OUTPATIENT)
Dept: FAMILY MEDICINE CLINIC | Facility: CLINIC | Age: 64
End: 2019-11-18

## 2019-11-18 DIAGNOSIS — Z00.00 HEALTH CARE MAINTENANCE: Primary | ICD-10-CM

## 2019-12-02 ENCOUNTER — OFFICE VISIT (OUTPATIENT)
Dept: CARDIOLOGY | Facility: CLINIC | Age: 64
End: 2019-12-02

## 2019-12-02 VITALS
WEIGHT: 284 LBS | BODY MASS INDEX: 44.57 KG/M2 | HEIGHT: 67 IN | SYSTOLIC BLOOD PRESSURE: 138 MMHG | DIASTOLIC BLOOD PRESSURE: 83 MMHG | HEART RATE: 64 BPM | OXYGEN SATURATION: 98 %

## 2019-12-02 DIAGNOSIS — G47.30 SLEEP APNEA, UNSPECIFIED TYPE: ICD-10-CM

## 2019-12-02 DIAGNOSIS — J41.0 SIMPLE CHRONIC BRONCHITIS (HCC): ICD-10-CM

## 2019-12-02 DIAGNOSIS — E78.2 MIXED HYPERLIPIDEMIA: ICD-10-CM

## 2019-12-02 DIAGNOSIS — I10 ESSENTIAL HYPERTENSION: Primary | ICD-10-CM

## 2019-12-02 DIAGNOSIS — E11.49 TYPE 2 DIABETES MELLITUS WITH OTHER DIABETIC NEUROLOGICAL COMPLICATION (HCC): ICD-10-CM

## 2019-12-02 PROBLEM — E78.5 HYPERLIPIDEMIA: Status: ACTIVE | Noted: 2019-12-02

## 2019-12-02 PROCEDURE — 99213 OFFICE O/P EST LOW 20 MIN: CPT | Performed by: INTERNAL MEDICINE

## 2019-12-02 RX ORDER — DIPHENHYDRAMINE HCL 25 MG
50 CAPSULE ORAL NIGHTLY PRN
COMMUNITY
End: 2020-08-19 | Stop reason: SINTOL

## 2019-12-02 RX ORDER — IPRATROPIUM BROMIDE AND ALBUTEROL SULFATE 2.5; .5 MG/3ML; MG/3ML
3 SOLUTION RESPIRATORY (INHALATION) EVERY 4 HOURS PRN
COMMUNITY

## 2019-12-02 RX ORDER — PIOGLITAZONEHYDROCHLORIDE 30 MG/1
TABLET ORAL
Qty: 90 TABLET | Refills: 1 | Status: SHIPPED | OUTPATIENT
Start: 2019-12-02 | End: 2020-05-21

## 2019-12-02 NOTE — PROGRESS NOTES
Encounter Date:12/02/2019  Last seen 6/18/2019      Patient ID: Dariana Rivera is a 64 y.o. female.    Chief Complaint:  Follow-up  History of chest discomfort  History of shortness of breath  Hypertension  Diabetes    History of Present Illness    Since I have last seen, the patient has been without any chest discomfort ,shortness of breath, palpitations, dizziness or syncope.  Denies having any headache ,abdominal pain ,nausea, vomiting , diarrhea constipation, loss of weight or loss of appetite.  Denies having any excessive bruising ,hematuria or blood in the stool.    Review of all systems negative except as indicated  Assessment and Plan       [[[[[[[[[[[[[[[   impression  ======  -Recent left arm numbness had fullness and drooling from mouth.-Improved  Concerning for neurological issue.  Does not appear to be cardiac.  Could be due to cervical spine problems.     - preoperative cardiovascular evaluation prior to having repeat cervical spine surgery.  Surgery has been postponed and patient is taking Botox injections for cervical dystonia.  Patient did not have cervical spine surgery     -chest pain and shortness of breath  Normal left ventricular function and normal coronary arteries 02/15/2019     -history of pulmonary problems and is being evaluated by Pulmonary Medicine also.  It is my understanding her risk of surgery is increased from pulmonary standpoint.  Patient was intubated and on the respirator May of 2018. patient had seizure around the time of admission at that time.     -hypertension diabetes bipolar disorder anxiety disorder Parkinson's disease cirrhosis nonalcoholic steatohepatitis GERD.  History of seizure disorder     -status post cholecystectomy hysterectomy lithotripsy new left knee replacement arthroscopic bilateral knee surgery cervical spine surgery     -exogenous obesity.     -former smoker     -allergic to penicillin Ambien methadone Cipro and  clindamycin.  =============  Plan  ==========  Recent left arm numbness had fullness and drooling from mouth.  Concerning for neurological issue.  Does not appear to be cardiac.  Could be due to cervical spine problems.  Patient did not have cervical spine surgery  Patient is not having any angina pectoris or congestive heart failure.  Medications were reviewed and updated today  Attempts to reduce weight.  Patient does not need cervical spine surgery at this time per spine surgeon.  The problems does not appear to be cardiac and suggested neurological follow-up.  Patient is taking Lasix on a as needed basis for lower extremity swelling  Followup in the office in 1 year  Further plan will depend on patient's progress.  [[[[[[[[[[[[[[[[[[[            Diagnosis Plan   1. Essential hypertension     2. Mixed hyperlipidemia     3. Simple chronic bronchitis (CMS/HCC)     4. Sleep apnea, unspecified type     5. Type 2 diabetes mellitus with other diabetic neurological complication (CMS/[facility])     LAB RESULTS (LAST 7 DAYS)    CBC        BMP        CMP         BNP        TROPONIN        CoAg        Creatinine Clearance  Estimated Creatinine Clearance: 83.9 mL/min (by C-G formula based on SCr of 0.96 mg/dL).    ABG        Radiology  No radiology results for the last day                The following portions of the patient's history were reviewed and updated as appropriate: allergies, current medications, past family history, past medical history, past social history, past surgical history and problem list.    Review of Systems   Constitution: Negative for malaise/fatigue.   Cardiovascular: Negative for chest pain, leg swelling, palpitations and syncope.   Respiratory: Negative for shortness of breath.    Skin: Negative for rash.   Gastrointestinal: Negative for nausea and vomiting.   Neurological: Negative for dizziness, light-headedness and numbness.         Current Outpatient Medications:   •  albuterol sulfate  (90  Base) MCG/ACT inhaler, Inhale 2 puffs Every 6 (Six) Hours As Needed for Wheezing., Disp: , Rfl:   •  aspirin 81 MG chewable tablet, Chew 81 mg Daily., Disp: , Rfl:   •  baclofen (LIORESAL) 10 MG tablet, Take 10 mg by mouth 3 (Three) Times a Day., Disp: , Rfl: 3  •  budesonide-formoterol (SYMBICORT) 160-4.5 MCG/ACT inhaler, Inhale 2 puffs 2 (Two) Times a Day., Disp: , Rfl:   •  carbidopa-levodopa (SINEMET)  MG per tablet, Take 2 tablets by mouth 3 (Three) Times a Day., Disp: , Rfl:   •  Cholecalciferol 5000 units tablet, Take 1 tablet by mouth Daily., Disp: , Rfl:   •  clonazePAM (KlonoPIN) 1 MG tablet, Take 1 tablet by mouth 2 (Two) Times a Day As Needed for Anxiety., Disp: 60 tablet, Rfl: 2  •  diphenhydrAMINE (BENADRYL) 25 mg capsule, Take 25 mg by mouth At Night As Needed for Itching (2 tabs)., Disp: , Rfl:   •  FLUZONE QUADRIVALENT 0.5 ML suspension prefilled syringe injection, TO BE ADMINISTERED BY PHARMACIST FOR IMMUNIZATION, Disp: , Rfl: 0  •  furosemide (LASIX) 40 MG tablet, TAKE 1 TABLET BY MOUTH EVERY DAY (Patient taking differently: TAKE 1 TABLET BY MOUTH BID), Disp: 90 tablet, Rfl: 0  •  gabapentin (NEURONTIN) 300 MG capsule, Take 300 mg by mouth 3 (Three) Times a Day., Disp: , Rfl:   •  ipratropium-albuterol (DUO-NEB) 0.5-2.5 mg/3 ml nebulizer, Take 3 mL by nebulization Every 4 (Four) Hours As Needed for Wheezing., Disp: , Rfl:   •  KLOR-CON 20 MEQ CR tablet, TAKE 1 TABLET BY MOUTH DAILY (Patient taking differently: TAKE 1 TABLET BY MOUTH BID), Disp: 90 tablet, Rfl: 0  •  lactulose (CHRONULAC) 10 GM/15ML solution, Take 30 mL by mouth Every Night., Disp: , Rfl:   •  lurasidone (LATUDA) 40 MG tablet tablet, Take 1 tablet by mouth Daily., Disp: 30 tablet, Rfl: 2  •  Multiple Vitamins-Minerals (MULTIVITAMIN WITH MINERALS) tablet tablet, Take 1 tablet by mouth Daily., Disp: , Rfl:   •  nitrofurantoin, macrocrystal-monohydrate, (MACROBID) 100 MG capsule, Take 100 mg by mouth every night at bedtime.,  Disp: , Rfl: 11  •  O2 (OXYGEN), Inhale 1 L/min 1 (One) Time. @@ night, Disp: , Rfl:   •  omeprazole (priLOSEC) 40 MG capsule, Take 40 mg by mouth Daily., Disp: , Rfl:   •  pioglitazone (ACTOS) 30 MG tablet, TAKE 1 TABLET BY MOUTH ONCE DAILY, Disp: 90 tablet, Rfl: 1  •  pramipexole (MIRAPEX) 0.5 MG tablet, Take 0.5 mg by mouth 3 (Three) Times a Day., Disp: , Rfl: 2  •  tiotropium (SPIRIVA) 18 MCG per inhalation capsule, Place 1 capsule into inhaler and inhale Daily., Disp: , Rfl:   •  vitamin C (ASCORBIC ACID) 500 MG tablet, Take 1,000 mg by mouth Daily., Disp: , Rfl:     Allergies   Allergen Reactions   • Ambien  [Zolpidem Tartrate] Confusion   • Ciprofloxacin Hcl Rash   • Penicillins Rash   • Methadone Hallucinations   • Clindamycin Rash       Family History   Problem Relation Age of Onset   • Hypertension Mother    • Hyperlipidemia Mother    • Diabetes Father    • Heart disease Father    • Hyperlipidemia Sister    • Diabetes Brother    • Heart disease Brother    • Hypertension Brother    • Hyperlipidemia Brother        Past Surgical History:   Procedure Laterality Date   • BACK SURGERY     • CARDIAC CATHETERIZATION  02/2019   • CARPAL TUNNEL RELEASE Bilateral     Wrist    • CHOLECYSTECTOMY  1997   • COLONOSCOPY     • ENDOSCOPY     • HYSTERECTOMY  09/2004    complete   • JOINT REPLACEMENT     • KNEE ARTHROSCOPY Bilateral     Arthroscopic surgery to bilateral knees    • OTHER SURGICAL HISTORY  2015    Stress test    • OTHER SURGICAL HISTORY  10/2016    Lithotripsy   • OTHER SURGICAL HISTORY      Right arm cellulits- spider bites    • OTHER SURGICAL HISTORY  02/08/2018    Lithotripsy   • OTHER SURGICAL HISTORY  04/20/2018    ACDF C3-C4 & C6-C7   • REPLACEMENT TOTAL KNEE  2000   • REPLACEMENT TOTAL KNEE Left 11/2016       Past Medical History:   Diagnosis Date   • Anxiety disorder     [facility]    • Back pain    • Bipolar disorder (CMS/HCC)    • Chest pain    • Chronic pain     with stenosis   • Cirrhosis (CMS/HCC)   "  • Colitis    • Colon polyps    • DDD (degenerative disc disease), lumbar    • Eosinophilic colitis    • Gastritis    • GERD (gastroesophageal reflux disease)    • Hepatic steatosis     non alcoholic steo-hepatitis    • Hypertension    • IBS (irritable bowel syndrome)    • Kidney stones    • Neck pain    • Osteoarthritis    • Parkinson's disease (CMS/HCC)    • Seizure (CMS/HCC)     onset 2016   • Sleep apnea     Using Bipap machine at night.    • Sleep apnea, obstructive    • Type II diabetes mellitus (CMS/HCC)        Family History   Problem Relation Age of Onset   • Hypertension Mother    • Hyperlipidemia Mother    • Diabetes Father    • Heart disease Father    • Hyperlipidemia Sister    • Diabetes Brother    • Heart disease Brother    • Hypertension Brother    • Hyperlipidemia Brother        Social History     Socioeconomic History   • Marital status:      Spouse name: Not on file   • Number of children: Not on file   • Years of education: Not on file   • Highest education level: Not on file   Tobacco Use   • Smoking status: Former Smoker     Types: Cigarettes     Last attempt to quit: 1990     Years since quittin.9   • Smokeless tobacco: Never Used   Substance and Sexual Activity   • Alcohol use: No     Frequency: Never   • Drug use: No   • Sexual activity: Defer         Procedures      Objective:       Physical Exam    /83 (BP Location: Right arm, Patient Position: Sitting, Cuff Size: Large Adult)   Pulse 64   Ht 170.2 cm (67\")   Wt 129 kg (284 lb)   SpO2 98%   BMI 44.48 kg/m²   The patient is alert, oriented and in no distress.    Vital signs as noted above.  Exogenous obesity.    Head and neck revealed no carotid bruits or jugular venous distension.  No thyromegaly or lymphadenopathy is present.    Lungs clear.  No wheezing.  Breath sounds are normal bilaterally.    Heart normal first and second heart sounds.  No murmur..  No pericardial rub is present.  No gallop is " present.    Abdomen soft and nontender.  No organomegaly is present.    Extremities revealed good peripheral pulses without any pedal edema.    Skin warm and dry.    Musculoskeletal system is grossly normal.    CNS grossly normal.

## 2019-12-06 ENCOUNTER — TRANSCRIBE ORDERS (OUTPATIENT)
Dept: ADMINISTRATIVE | Facility: HOSPITAL | Age: 64
End: 2019-12-06

## 2019-12-06 ENCOUNTER — HOSPITAL ENCOUNTER (OUTPATIENT)
Dept: MAMMOGRAPHY | Facility: HOSPITAL | Age: 64
Discharge: HOME | End: 2019-12-06
Admitting: FAMILY MEDICINE

## 2019-12-06 ENCOUNTER — HOSPITAL ENCOUNTER (OUTPATIENT)
Dept: GENERAL RADIOLOGY | Facility: HOSPITAL | Age: 64
Discharge: HOME | End: 2019-12-06

## 2019-12-06 DIAGNOSIS — N20.0 RENAL CALCULI: ICD-10-CM

## 2019-12-06 DIAGNOSIS — Z00.00 HEALTH CARE MAINTENANCE: ICD-10-CM

## 2019-12-06 DIAGNOSIS — N20.0 RENAL CALCULI: Primary | ICD-10-CM

## 2019-12-06 PROCEDURE — 77067 SCR MAMMO BI INCL CAD: CPT

## 2019-12-06 PROCEDURE — 74018 RADEX ABDOMEN 1 VIEW: CPT

## 2019-12-06 PROCEDURE — 77063 BREAST TOMOSYNTHESIS BI: CPT

## 2019-12-17 ENCOUNTER — TELEPHONE (OUTPATIENT)
Dept: FAMILY MEDICINE CLINIC | Facility: CLINIC | Age: 64
End: 2019-12-17

## 2019-12-18 ENCOUNTER — TELEPHONE (OUTPATIENT)
Dept: CARDIOLOGY | Facility: CLINIC | Age: 64
End: 2019-12-18

## 2019-12-26 RX ORDER — BUDESONIDE AND FORMOTEROL FUMARATE DIHYDRATE 160; 4.5 UG/1; UG/1
2 AEROSOL RESPIRATORY (INHALATION)
Qty: 30.6 G | Refills: 3 | Status: SHIPPED | OUTPATIENT
Start: 2019-12-26 | End: 2019-12-26

## 2019-12-26 RX ORDER — BUDESONIDE AND FORMOTEROL FUMARATE DIHYDRATE 160; 4.5 UG/1; UG/1
AEROSOL RESPIRATORY (INHALATION)
Qty: 30.6 INHALER | Refills: 1 | Status: SHIPPED | OUTPATIENT
Start: 2019-12-26 | End: 2020-12-18

## 2019-12-28 RX ORDER — PRAMIPEXOLE DIHYDROCHLORIDE 0.5 MG/1
0.5 TABLET ORAL 3 TIMES DAILY
Qty: 90 TABLET | Refills: 2 | Status: SHIPPED | OUTPATIENT
Start: 2019-12-28 | End: 2020-02-05 | Stop reason: SDUPTHER

## 2019-12-30 ENCOUNTER — DOCUMENTATION (OUTPATIENT)
Dept: PULMONOLOGY | Facility: HOSPITAL | Age: 64
End: 2019-12-30

## 2019-12-30 NOTE — PROGRESS NOTES
[facility]   19    [facility] office received a request for pulmonary clearance on Dariana Rivera  1955 for a rotator cuff repair surgery to take place on 20 with Dr. Vaughn.  This patient is seen by NICHOLAS for COPD, LISANDRO, Dyspnea, and use of nocturnal oxygen.  She is compliant with oxygen use, PAP use, and bronchodilators.  Per Ariscat scoring she would be at intermediate risk for post operative complications such as respiratory failure, pneumonia, and atelectasis.  I would recommend minimal amount of surgery and anesthesia time as possible to complete the surgical repair.  Patient would likely require use of a BIPAP post-operatively during recovery given her LISANDRO.

## 2020-01-03 ENCOUNTER — TRANSCRIBE ORDERS (OUTPATIENT)
Dept: ADMINISTRATIVE | Facility: HOSPITAL | Age: 65
End: 2020-01-03

## 2020-01-03 ENCOUNTER — APPOINTMENT (OUTPATIENT)
Dept: PREADMISSION TESTING | Facility: HOSPITAL | Age: 65
End: 2020-01-03

## 2020-01-03 ENCOUNTER — HOSPITAL ENCOUNTER (OUTPATIENT)
Dept: GENERAL RADIOLOGY | Facility: HOSPITAL | Age: 65
Discharge: HOME | End: 2020-01-03

## 2020-01-03 ENCOUNTER — HOSPITAL ENCOUNTER (OUTPATIENT)
Dept: GENERAL RADIOLOGY | Facility: HOSPITAL | Age: 65
Discharge: HOME | End: 2020-01-03
Admitting: ORTHOPAEDIC SURGERY

## 2020-01-03 VITALS
WEIGHT: 279 LBS | DIASTOLIC BLOOD PRESSURE: 79 MMHG | OXYGEN SATURATION: 94 % | HEART RATE: 85 BPM | SYSTOLIC BLOOD PRESSURE: 135 MMHG | HEIGHT: 67 IN | BODY MASS INDEX: 43.79 KG/M2

## 2020-01-03 DIAGNOSIS — N20.1 CALCULUS, URETER: Primary | ICD-10-CM

## 2020-01-03 LAB
ANION GAP SERPL CALCULATED.3IONS-SCNC: 13 MMOL/L (ref 5–15)
BILIRUB UR QL STRIP: NEGATIVE
BUN BLD-MCNC: 17 MG/DL (ref 8–23)
BUN/CREAT SERPL: 17.3 (ref 7–25)
CALCIUM SPEC-SCNC: 10.2 MG/DL (ref 8.6–10.5)
CHLORIDE SERPL-SCNC: 96 MMOL/L (ref 98–107)
CLARITY UR: CLEAR
CO2 SERPL-SCNC: 29 MMOL/L (ref 22–29)
COLOR UR: YELLOW
CREAT BLD-MCNC: 0.98 MG/DL (ref 0.57–1)
DEPRECATED RDW RBC AUTO: 43.8 FL (ref 37–54)
ERYTHROCYTE [DISTWIDTH] IN BLOOD BY AUTOMATED COUNT: 14 % (ref 12.3–15.4)
GFR SERPL CREATININE-BSD FRML MDRD: 57 ML/MIN/1.73
GLUCOSE BLD-MCNC: 176 MG/DL (ref 65–99)
GLUCOSE UR STRIP-MCNC: NEGATIVE MG/DL
HCT VFR BLD AUTO: 38.5 % (ref 34–46.6)
HGB BLD-MCNC: 12.9 G/DL (ref 12–15.9)
HGB UR QL STRIP.AUTO: NEGATIVE
KETONES UR QL STRIP: NEGATIVE
LEUKOCYTE ESTERASE UR QL STRIP.AUTO: NEGATIVE
MCH RBC QN AUTO: 29.9 PG (ref 26.6–33)
MCHC RBC AUTO-ENTMCNC: 33.6 G/DL (ref 31.5–35.7)
MCV RBC AUTO: 89 FL (ref 79–97)
NITRITE UR QL STRIP: NEGATIVE
PH UR STRIP.AUTO: <=5 [PH] (ref 5–8)
PLATELET # BLD AUTO: 184 10*3/MM3 (ref 140–450)
PMV BLD AUTO: 8.3 FL (ref 6–12)
POTASSIUM BLD-SCNC: 3.9 MMOL/L (ref 3.5–5.2)
PROT UR QL STRIP: NEGATIVE
RBC # BLD AUTO: 4.33 10*6/MM3 (ref 3.77–5.28)
SODIUM BLD-SCNC: 138 MMOL/L (ref 136–145)
SP GR UR STRIP: 1.01 (ref 1–1.03)
UROBILINOGEN UR QL STRIP: NORMAL
WBC NRBC COR # BLD: 7.5 10*3/MM3 (ref 3.4–10.8)

## 2020-01-03 PROCEDURE — 36415 COLL VENOUS BLD VENIPUNCTURE: CPT

## 2020-01-03 PROCEDURE — 93005 ELECTROCARDIOGRAM TRACING: CPT

## 2020-01-03 PROCEDURE — 81003 URINALYSIS AUTO W/O SCOPE: CPT | Performed by: ORTHOPAEDIC SURGERY

## 2020-01-03 PROCEDURE — 80048 BASIC METABOLIC PNL TOTAL CA: CPT | Performed by: ORTHOPAEDIC SURGERY

## 2020-01-03 PROCEDURE — 71046 X-RAY EXAM CHEST 2 VIEWS: CPT

## 2020-01-03 PROCEDURE — 87081 CULTURE SCREEN ONLY: CPT | Performed by: ORTHOPAEDIC SURGERY

## 2020-01-03 PROCEDURE — 74018 RADEX ABDOMEN 1 VIEW: CPT

## 2020-01-03 PROCEDURE — 93010 ELECTROCARDIOGRAM REPORT: CPT | Performed by: INTERNAL MEDICINE

## 2020-01-03 PROCEDURE — 85027 COMPLETE CBC AUTOMATED: CPT | Performed by: ORTHOPAEDIC SURGERY

## 2020-01-03 RX ORDER — HYDROCODONE BITARTRATE AND ACETAMINOPHEN 5; 325 MG/1; MG/1
2 TABLET ORAL EVERY 6 HOURS PRN
COMMUNITY
End: 2020-05-11

## 2020-01-04 LAB — MRSA SPEC QL CULT: NORMAL

## 2020-01-04 RX ORDER — FUROSEMIDE 40 MG/1
TABLET ORAL
Qty: 90 TABLET | Refills: 0 | Status: SHIPPED | OUTPATIENT
Start: 2020-01-04

## 2020-01-07 ENCOUNTER — ANESTHESIA EVENT (OUTPATIENT)
Dept: PERIOP | Facility: HOSPITAL | Age: 65
End: 2020-01-07

## 2020-01-08 ENCOUNTER — HOSPITAL ENCOUNTER (OUTPATIENT)
Facility: HOSPITAL | Age: 65
Setting detail: HOSPITAL OUTPATIENT SURGERY
Discharge: HOME | End: 2020-01-08
Attending: ORTHOPAEDIC SURGERY | Admitting: ORTHOPAEDIC SURGERY

## 2020-01-08 ENCOUNTER — ANESTHESIA (OUTPATIENT)
Dept: PERIOP | Facility: HOSPITAL | Age: 65
End: 2020-01-08

## 2020-01-08 VITALS
WEIGHT: 277.56 LBS | OXYGEN SATURATION: 92 % | BODY MASS INDEX: 43.56 KG/M2 | HEIGHT: 67 IN | HEART RATE: 78 BPM | SYSTOLIC BLOOD PRESSURE: 114 MMHG | TEMPERATURE: 97 F | DIASTOLIC BLOOD PRESSURE: 57 MMHG | RESPIRATION RATE: 10 BRPM

## 2020-01-08 LAB
GLUCOSE BLDC GLUCOMTR-MCNC: 174 MG/DL (ref 70–105)
GLUCOSE BLDC GLUCOMTR-MCNC: 181 MG/DL (ref 70–105)

## 2020-01-08 PROCEDURE — 25010000002 FENTANYL CITRATE (PF) 100 MCG/2ML SOLUTION: Performed by: ANESTHESIOLOGY

## 2020-01-08 PROCEDURE — C1713 ANCHOR/SCREW BN/BN,TIS/BN: HCPCS | Performed by: ORTHOPAEDIC SURGERY

## 2020-01-08 PROCEDURE — 25010000002 MIDAZOLAM PER 1 MG: Performed by: ANESTHESIOLOGY

## 2020-01-08 PROCEDURE — 82962 GLUCOSE BLOOD TEST: CPT

## 2020-01-08 PROCEDURE — 25010000002 ROPIVACAINE PER 1 MG: Performed by: ANESTHESIOLOGY

## 2020-01-08 PROCEDURE — 25010000002 ONDANSETRON PER 1 MG: Performed by: ANESTHESIOLOGIST ASSISTANT

## 2020-01-08 PROCEDURE — 25010000002 DEXAMETHASONE PER 1 MG: Performed by: ANESTHESIOLOGY

## 2020-01-08 PROCEDURE — L3670 SO ACRO/CLAV CAN WEB PRE OTS: HCPCS | Performed by: ORTHOPAEDIC SURGERY

## 2020-01-08 PROCEDURE — 25010000002 PROPOFOL 200 MG/20ML EMULSION: Performed by: ANESTHESIOLOGIST ASSISTANT

## 2020-01-08 DEVICE — SUT/ANCH BIOSWIVELOCK/C VNT 5.5X19.1MM: Type: IMPLANTABLE DEVICE | Site: SHOULDER | Status: FUNCTIONAL

## 2020-01-08 RX ORDER — MEPERIDINE HYDROCHLORIDE 25 MG/ML
12.5 INJECTION INTRAMUSCULAR; INTRAVENOUS; SUBCUTANEOUS
Status: DISCONTINUED | OUTPATIENT
Start: 2020-01-08 | End: 2020-01-08 | Stop reason: HOSPADM

## 2020-01-08 RX ORDER — CEFAZOLIN SODIUM IN 0.9 % NACL 3 G/100 ML
3 INTRAVENOUS SOLUTION, PIGGYBACK (ML) INTRAVENOUS ONCE
Status: COMPLETED | OUTPATIENT
Start: 2020-01-08 | End: 2020-01-08

## 2020-01-08 RX ORDER — SODIUM CHLORIDE 0.9 % (FLUSH) 0.9 %
10 SYRINGE (ML) INJECTION EVERY 12 HOURS SCHEDULED
Status: DISCONTINUED | OUTPATIENT
Start: 2020-01-08 | End: 2020-01-08 | Stop reason: HOSPADM

## 2020-01-08 RX ORDER — PROMETHAZINE HYDROCHLORIDE 25 MG/1
25 TABLET ORAL ONCE AS NEEDED
Status: DISCONTINUED | OUTPATIENT
Start: 2020-01-08 | End: 2020-01-08 | Stop reason: HOSPADM

## 2020-01-08 RX ORDER — SODIUM CHLORIDE 0.9 % (FLUSH) 0.9 %
10 SYRINGE (ML) INJECTION AS NEEDED
Status: DISCONTINUED | OUTPATIENT
Start: 2020-01-08 | End: 2020-01-08 | Stop reason: HOSPADM

## 2020-01-08 RX ORDER — ROPIVACAINE HYDROCHLORIDE 5 MG/ML
INJECTION, SOLUTION EPIDURAL; INFILTRATION; PERINEURAL
Status: COMPLETED | OUTPATIENT
Start: 2020-01-08 | End: 2020-01-08

## 2020-01-08 RX ORDER — PROMETHAZINE HYDROCHLORIDE 25 MG/1
25 SUPPOSITORY RECTAL ONCE AS NEEDED
Status: DISCONTINUED | OUTPATIENT
Start: 2020-01-08 | End: 2020-01-08 | Stop reason: HOSPADM

## 2020-01-08 RX ORDER — LIDOCAINE HYDROCHLORIDE 10 MG/ML
INJECTION, SOLUTION EPIDURAL; INFILTRATION; INTRACAUDAL; PERINEURAL AS NEEDED
Status: DISCONTINUED | OUTPATIENT
Start: 2020-01-08 | End: 2020-01-08 | Stop reason: SURG

## 2020-01-08 RX ORDER — PROPOFOL 10 MG/ML
INJECTION, EMULSION INTRAVENOUS AS NEEDED
Status: DISCONTINUED | OUTPATIENT
Start: 2020-01-08 | End: 2020-01-08 | Stop reason: SURG

## 2020-01-08 RX ORDER — FENTANYL CITRATE 50 UG/ML
25 INJECTION, SOLUTION INTRAMUSCULAR; INTRAVENOUS
Status: DISCONTINUED | OUTPATIENT
Start: 2020-01-08 | End: 2020-01-08 | Stop reason: HOSPADM

## 2020-01-08 RX ORDER — OXYCODONE HYDROCHLORIDE 5 MG/1
7.5 TABLET ORAL ONCE AS NEEDED
Status: DISCONTINUED | OUTPATIENT
Start: 2020-01-08 | End: 2020-01-08 | Stop reason: HOSPADM

## 2020-01-08 RX ORDER — LABETALOL HYDROCHLORIDE 5 MG/ML
5 INJECTION, SOLUTION INTRAVENOUS
Status: DISCONTINUED | OUTPATIENT
Start: 2020-01-08 | End: 2020-01-08 | Stop reason: HOSPADM

## 2020-01-08 RX ORDER — FLUMAZENIL 0.1 MG/ML
0.2 INJECTION INTRAVENOUS AS NEEDED
Status: DISCONTINUED | OUTPATIENT
Start: 2020-01-08 | End: 2020-01-08 | Stop reason: HOSPADM

## 2020-01-08 RX ORDER — SODIUM CHLORIDE 9 MG/ML
9 INJECTION, SOLUTION INTRAVENOUS CONTINUOUS PRN
Status: DISCONTINUED | OUTPATIENT
Start: 2020-01-08 | End: 2020-01-08 | Stop reason: HOSPADM

## 2020-01-08 RX ORDER — IPRATROPIUM BROMIDE AND ALBUTEROL SULFATE 2.5; .5 MG/3ML; MG/3ML
3 SOLUTION RESPIRATORY (INHALATION) ONCE AS NEEDED
Status: DISCONTINUED | OUTPATIENT
Start: 2020-01-08 | End: 2020-01-08 | Stop reason: HOSPADM

## 2020-01-08 RX ORDER — MIDAZOLAM HYDROCHLORIDE 1 MG/ML
INJECTION INTRAMUSCULAR; INTRAVENOUS
Status: COMPLETED | OUTPATIENT
Start: 2020-01-08 | End: 2020-01-08

## 2020-01-08 RX ORDER — ROCURONIUM BROMIDE 10 MG/ML
INJECTION, SOLUTION INTRAVENOUS AS NEEDED
Status: DISCONTINUED | OUTPATIENT
Start: 2020-01-08 | End: 2020-01-08 | Stop reason: SURG

## 2020-01-08 RX ORDER — NICOTINE POLACRILEX 4 MG
15 LOZENGE BUCCAL
Status: DISCONTINUED | OUTPATIENT
Start: 2020-01-08 | End: 2020-01-08 | Stop reason: HOSPADM

## 2020-01-08 RX ORDER — FENTANYL CITRATE 50 UG/ML
INJECTION, SOLUTION INTRAMUSCULAR; INTRAVENOUS
Status: COMPLETED | OUTPATIENT
Start: 2020-01-08 | End: 2020-01-08

## 2020-01-08 RX ORDER — ONDANSETRON 2 MG/ML
INJECTION INTRAMUSCULAR; INTRAVENOUS AS NEEDED
Status: DISCONTINUED | OUTPATIENT
Start: 2020-01-08 | End: 2020-01-08 | Stop reason: SURG

## 2020-01-08 RX ORDER — HYDRALAZINE HYDROCHLORIDE 20 MG/ML
5 INJECTION INTRAMUSCULAR; INTRAVENOUS
Status: DISCONTINUED | OUTPATIENT
Start: 2020-01-08 | End: 2020-01-08 | Stop reason: HOSPADM

## 2020-01-08 RX ORDER — DEXAMETHASONE SODIUM PHOSPHATE 4 MG/ML
INJECTION, SOLUTION INTRA-ARTICULAR; INTRALESIONAL; INTRAMUSCULAR; INTRAVENOUS; SOFT TISSUE
Status: COMPLETED | OUTPATIENT
Start: 2020-01-08 | End: 2020-01-08

## 2020-01-08 RX ORDER — DEXTROSE MONOHYDRATE 25 G/50ML
25 INJECTION, SOLUTION INTRAVENOUS
Status: DISCONTINUED | OUTPATIENT
Start: 2020-01-08 | End: 2020-01-08 | Stop reason: HOSPADM

## 2020-01-08 RX ORDER — OXYCODONE AND ACETAMINOPHEN 10; 325 MG/1; MG/1
1 TABLET ORAL EVERY 4 HOURS PRN
Qty: 42 TABLET | Refills: 0 | Status: SHIPPED | OUTPATIENT
Start: 2020-01-08 | End: 2020-02-10

## 2020-01-08 RX ORDER — FENTANYL CITRATE 50 UG/ML
50 INJECTION, SOLUTION INTRAMUSCULAR; INTRAVENOUS
Status: DISCONTINUED | OUTPATIENT
Start: 2020-01-08 | End: 2020-01-08 | Stop reason: HOSPADM

## 2020-01-08 RX ORDER — PHENYLEPHRINE HCL IN 0.9% NACL 0.5 MG/5ML
SYRINGE (ML) INTRAVENOUS AS NEEDED
Status: DISCONTINUED | OUTPATIENT
Start: 2020-01-08 | End: 2020-01-08 | Stop reason: SURG

## 2020-01-08 RX ORDER — NALOXONE HCL 0.4 MG/ML
0.4 VIAL (ML) INJECTION AS NEEDED
Status: DISCONTINUED | OUTPATIENT
Start: 2020-01-08 | End: 2020-01-08 | Stop reason: HOSPADM

## 2020-01-08 RX ORDER — EPHEDRINE SULFATE 50 MG/ML
INJECTION INTRAVENOUS AS NEEDED
Status: DISCONTINUED | OUTPATIENT
Start: 2020-01-08 | End: 2020-01-08 | Stop reason: SURG

## 2020-01-08 RX ORDER — PROMETHAZINE HYDROCHLORIDE 25 MG/ML
6.25 INJECTION, SOLUTION INTRAMUSCULAR; INTRAVENOUS ONCE AS NEEDED
Status: DISCONTINUED | OUTPATIENT
Start: 2020-01-08 | End: 2020-01-08 | Stop reason: HOSPADM

## 2020-01-08 RX ORDER — ONDANSETRON 2 MG/ML
4 INJECTION INTRAMUSCULAR; INTRAVENOUS ONCE AS NEEDED
Status: DISCONTINUED | OUTPATIENT
Start: 2020-01-08 | End: 2020-01-08 | Stop reason: HOSPADM

## 2020-01-08 RX ADMIN — LIDOCAINE HYDROCHLORIDE 50 MG: 10 INJECTION, SOLUTION EPIDURAL; INFILTRATION; INTRACAUDAL; PERINEURAL at 07:19

## 2020-01-08 RX ADMIN — ROPIVACAINE HYDROCHLORIDE 25 ML: 5 INJECTION, SOLUTION EPIDURAL; INFILTRATION; PERINEURAL at 06:55

## 2020-01-08 RX ADMIN — ONDANSETRON 4 MG: 2 INJECTION INTRAMUSCULAR; INTRAVENOUS at 08:06

## 2020-01-08 RX ADMIN — MIDAZOLAM 2 MG: 1 INJECTION INTRAMUSCULAR; INTRAVENOUS at 06:55

## 2020-01-08 RX ADMIN — SUGAMMADEX 200 MG: 100 INJECTION, SOLUTION INTRAVENOUS at 08:06

## 2020-01-08 RX ADMIN — DEXAMETHASONE SODIUM PHOSPHATE 4 MG: 4 INJECTION, SOLUTION INTRAMUSCULAR; INTRAVENOUS at 06:55

## 2020-01-08 RX ADMIN — CEFAZOLIN 3 G: 1 INJECTION, POWDER, FOR SOLUTION INTRAMUSCULAR; INTRAVENOUS; PARENTERAL at 07:33

## 2020-01-08 RX ADMIN — EPHEDRINE SULFATE 10 MG: 50 INJECTION, SOLUTION INTRAVENOUS at 07:59

## 2020-01-08 RX ADMIN — PHENYLEPHRINE HYDROCHLORIDE 100 MCG: 10 INJECTION INTRAVENOUS at 07:29

## 2020-01-08 RX ADMIN — FENTANYL CITRATE 100 MCG: 50 INJECTION, SOLUTION INTRAMUSCULAR; INTRAVENOUS at 06:55

## 2020-01-08 RX ADMIN — SODIUM CHLORIDE: 0.9 INJECTION, SOLUTION INTRAVENOUS at 07:04

## 2020-01-08 RX ADMIN — ROCURONIUM BROMIDE 50 MG: 10 INJECTION, SOLUTION INTRAVENOUS at 07:20

## 2020-01-08 RX ADMIN — PROPOFOL 200 MG: 10 INJECTION, EMULSION INTRAVENOUS at 07:19

## 2020-01-08 RX ADMIN — PHENYLEPHRINE HYDROCHLORIDE 100 MCG: 10 INJECTION INTRAVENOUS at 07:50

## 2020-01-08 NOTE — ANESTHESIA POSTPROCEDURE EVALUATION
Patient: Dariana Rivera    Procedure Summary     Date:  01/08/20 Room / Location:  [facility] OR [address] [place] [address] MAIN OR    Anesthesia Start:  0712 Anesthesia Stop:  0815    Procedure:  RT ROTATOR CUFF REPAIR OPEN (Right Shoulder) Diagnosis:       Right rotator cuff tear      (RT ROTATOR CUFF TEAR)    Surgeon:  Curly Vaughn MD Provider:  Jayro Velazquez MD    Anesthesia Type:  general with block ASA Status:  4          Anesthesia Type: general with block    Vitals  Vitals Value Taken Time   /50 1/8/2020  9:06 AM   Temp 97.7 °F (36.5 °C) 1/8/2020  8:13 AM   Pulse 71 1/8/2020  9:08 AM   Resp 17 1/8/2020  8:58 AM   SpO2 92 % 1/8/2020  9:08 AM   Vitals shown include unvalidated device data.        Post Anesthesia Care and Evaluation    Patient location during evaluation: PACU  Patient participation: complete - patient participated  Level of consciousness: awake  Pain scale: See nurse's notes for pain score.  Pain management: adequate  Airway patency: patent  Anesthetic complications: No anesthetic complications  PONV Status: none  Cardiovascular status: acceptable  Respiratory status: acceptable  Hydration status: acceptable    Comments: Patient seen and examined postoperatively; vital signs stable; SpO2 greater than or equal to 90%; cardiopulmonary status stable; nausea/vomiting adequately controlled; pain adequately controlled; no apparent anesthesia complications; patient discharged from anesthesia care when discharge criteria were met

## 2020-01-08 NOTE — ANESTHESIA PROCEDURE NOTES
Peripheral Block      Patient location during procedure: pre-op  Start time: 1/8/2020 6:55 AM  Stop time: 1/8/2020 7:03 AM  Reason for block: at surgeon's request and post-op pain management  Performed by  Anesthesiologist: Jayro Velazquez MD  Preanesthetic Checklist  Completed: patient identified, site marked, surgical consent, pre-op evaluation, timeout performed, IV checked, risks and benefits discussed and monitors and equipment checked  Prep:  Pt Position: supine  Sterile barriers:cap, gloves and mask  Prep: ChloraPrep  Patient monitoring: blood pressure monitoring, continuous pulse oximetry and EKG  Procedure  Sedation:yes    Guidance:ultrasound guided  ULTRASOUND INTERPRETATION.  Using ultrasound guidance a 20 G gauge needle was placed in close proximity to the brachial plexus nerve, at which point, under ultrasound guidance anesthetic was injected in the area of the nerve and spread of the anesthesia was seen on ultrasound in close proximity thereto.  There were no abnormalities seen on ultrasound; a digital image was taken; and the patient tolerated the procedure with no complications. Images:still images obtained, printed/placed on chart    Laterality:right  Block Type:supraclavicular  Injection Technique:single-shot  Needle Type:echogenic  Needle Gauge:20 G  Resistance on Injection: none  Sedation medications used: midazolam (VERSED) injection, 2 mg  fentaNYL citrate (PF) (SUBLIMAZE) injection, 100 mcg  Medications Used: dexamethasone (DECADRON) injection, 4 mg  ropivacaine (NAROPIN) 0.5 % injection, 25 mL  Med admintered at 1/8/2020 6:55 AM      Post Assessment  Injection Assessment: negative aspiration for heme, no paresthesia on injection and incremental injection  Patient Tolerance:comfortable throughout block  Complications:no

## 2020-01-08 NOTE — OP NOTE
Preoperative diagnosis right rotator cuff tear  Postoperative diagnosis same with BMI of 44  Surgeon Dr. Curly Vaughn  Assistant Sahra  Anesthesia General plus scalene by Dr. Gates and Larissa  Complications drains transfusions none estimated blood loss less than 50 cc    Operation performed was right rotator cuff repair with subacromial decompression and bursectomy    Occasions this patient is a 64-year-old female with severe pain in her right shoulder diagnosis and treatment plan as well as risks potential complications were discussed with her that include but not exclusive to continued pain infection nerve damage or damage need for reoperation re-tear of the rotator cuff.  She understood these and still desired to have me do the procedure    Procedure the patient was taken the operating room where she was given a scalene block general anesthesia placed in the oglesby chair sitting position her right shoulder was prepped and draped in usual sterile fashion.  She was given 3 g of perioperative Kefzol developed no rash and had no problems with the capsule.  Anterior superior incision was made skin was dissected and soft tissue and a vascular structure retractor harm's way hemo-coagulation was obtained throughout the procedure with Bovie cauterization.  The deltoid was split the subacromial decompression and bursectomy was performed and the rotator cuff tear was identified.  Was approximately 1 x 1 cm.  It was anatomically we repaired with 1 bio absorbable suture anchor and one fiber tape from [employer].  The arm was then placed through full passive range of motion she had no impingement.  Wound was washed out with copious amounts of sterile saline the subcu was closed with interrupted sutures of 3-0 Vicryl skin was closed with a running subcuticular 3-0 Prolene covered with Steri-Strips fluffs the patient was placed in a sling  The needle and sponge count was correct at the end of the procedure the patient tolerated  procedure well was taken to the recovery room in satisfactory condition.

## 2020-01-08 NOTE — ANESTHESIA PROCEDURE NOTES
Airway  Urgency: elective    Date/Time: 1/8/2020 7:24 AM  Airway not difficult    General Information and Staff    Patient location during procedure: OR  Anesthesiologist: Jayro Velazquez MD  CRNA: Larissa Orourke AA    Indications and Patient Condition  Indications for airway management: airway protection    Preoxygenated: yes  Mask difficulty assessment: 2 - vent by mask + OA or adjuvant +/- NMBA    Final Airway Details  Final airway type: endotracheal airway      Successful airway: ETT  Cuffed: yes   Successful intubation technique: direct laryngoscopy  Endotracheal tube insertion site: oral  Blade: Fina  Blade size: 3  ETT size (mm): 7.0  Cormack-Lehane Classification: grade I - full view of glottis  Placement verified by: chest auscultation and capnometry   Measured from: teeth  ETT/EBT  to teeth (cm): 22  Number of attempts at approach: 1  Assessment: lips, teeth, and gum same as pre-op and atraumatic intubation

## 2020-01-08 NOTE — ANESTHESIA PREPROCEDURE EVALUATION
Anesthesia Evaluation     Patient summary reviewed and Nursing notes reviewed   NPO Solid Status: > 8 hours  NPO Liquid Status: > 8 hours           Airway   Mallampati: II  TM distance: <3 FB  Neck ROM: limited  No difficulty expected, Possible difficult intubation and Large neck circumference  Dental - normal exam   (+) edentulous    Pulmonary - normal exam   (+) COPD mild, sleep apnea on CPAP,   Cardiovascular - normal exam    (+) hypertension, hyperlipidemia,       Neuro/Psych  (+) psychiatric history Bipolar,     GI/Hepatic/Renal/Endo    (+) morbid obesity,  liver disease fatty liver disease, renal disease CRI, diabetes mellitus type 2,     Musculoskeletal (-) negative ROS    Abdominal  - normal exam    Bowel sounds: normal.   Substance History - negative use     OB/GYN negative ob/gyn ROS         Other                      Anesthesia Plan    ASA 4     general with block     intravenous induction     Anesthetic plan, all risks, benefits, and alternatives have been provided, discussed and informed consent has been obtained with: patient.    Plan discussed with CAA.

## 2020-01-08 NOTE — H&P
Patient Care Team:  Paul Larson Jr., MD as PCP - General (Family Medicine)  Paul Larson Jr., MD as PCP - Claims Attributed  Nabor Amador MD as Consulting Physician (Cardiology)    Chief complaint pain in right shoulder  This patient is a 64-year-old right-handed female who has an MRI that reveals a torn rotator cuff in her right shoulder.  No specific injuries.  She is tried nonoperative therapy and it has failed and she is here in the hospital to have her right rotator cuff repaired.  Subjective     History of Present Illness    Review of Systems     Past Medical History:   Diagnosis Date   • Anxiety disorder     [facility]    • Back pain    • Bipolar disorder (CMS/HCC)    • Chest pain    • Chronic pain     with stenosis   • Cirrhosis (CMS/HCC)    • Colitis    • Colon polyps    • COPD (chronic obstructive pulmonary disease) (CMS/HCC)    • DDD (degenerative disc disease), lumbar    • Eosinophilic colitis    • Gastritis    • GERD (gastroesophageal reflux disease)    • Hepatic steatosis     non alcoholic steo-hepatitis    • Hypertension    • IBS (irritable bowel syndrome)    • Kidney stones    • Neck pain    • Osteoarthritis    • Parkinson's disease (CMS/HCC)    • Seizure (CMS/HCC)     onset 7/2016   • Sleep apnea     Using Bipap machine at night.    • Sleep apnea, obstructive    • Type II diabetes mellitus (CMS/HCC)        Objective      Vital Signs  Temp:  [97.6 °F (36.4 °C)] 97.6 °F (36.4 °C)  Heart Rate:  [76] 76  Resp:  [20] 20  BP: (114)/(55) 114/55    Physical Exam  Head normocephalic and atraumatic  Eyes pupils equal round reactive to light and accommodation  Neck supple without adenopathy  Cardiovascular regular rate and rhythm normal S1 and S2  Lungs clear to auscultation percussion  Abdomen soft nontender nondistended bowel sounds within normal limits no mass or fluid wave  Remedies no cyanosis clubbing or edema deep tendon reflexes within normal notes no joint deformity or  effusion    Results Review:   I reviewed the patient's new clinical results.      Assessment/Plan       * No active hospital problems. *      Assessment & Plan  Impression right rotator cuff tear  Recommendations right rotator cuff repair as an outpatient.  I discussed the patients findings and my recommendations with patient    Curly Vaughn MD  01/08/20  6:43 AM    Time: More than 50% of time spent in counseling and coordination of care:  Total face-to-face/floor time 15 min.  Time spent in counseling 15 min. Counseling included the following topics: I have discussed risks and potential complications with the patient she understands these and still desires to have me do the procedure.

## 2020-01-25 DIAGNOSIS — F31.32 BIPOLAR 1 DISORDER, DEPRESSED, MODERATE (HCC): Chronic | ICD-10-CM

## 2020-01-25 RX ORDER — LURASIDONE HYDROCHLORIDE 40 MG/1
TABLET, FILM COATED ORAL
Qty: 30 TABLET | Refills: 2 | Status: SHIPPED | OUTPATIENT
Start: 2020-01-25 | End: 2020-02-05 | Stop reason: SDUPTHER

## 2020-01-27 DIAGNOSIS — F41.1 GENERALIZED ANXIETY DISORDER: Chronic | ICD-10-CM

## 2020-01-28 RX ORDER — CLONAZEPAM 1 MG/1
1 TABLET ORAL 2 TIMES DAILY PRN
Qty: 60 TABLET | Refills: 0 | Status: SHIPPED | OUTPATIENT
Start: 2020-01-28 | End: 2020-02-24

## 2020-02-04 ENCOUNTER — HOSPITAL ENCOUNTER (OUTPATIENT)
Facility: HOSPITAL | Age: 65
Setting detail: HOSPITAL OUTPATIENT SURGERY
End: 2020-02-04
Attending: INTERNAL MEDICINE | Admitting: INTERNAL MEDICINE

## 2020-02-05 ENCOUNTER — OFFICE VISIT (OUTPATIENT)
Dept: PSYCHIATRY | Facility: CLINIC | Age: 65
End: 2020-02-05

## 2020-02-05 DIAGNOSIS — F41.1 GENERALIZED ANXIETY DISORDER: Chronic | ICD-10-CM

## 2020-02-05 DIAGNOSIS — F31.32 BIPOLAR 1 DISORDER, DEPRESSED, MODERATE (HCC): Primary | Chronic | ICD-10-CM

## 2020-02-05 DIAGNOSIS — F43.12 CHRONIC POSTTRAUMATIC STRESS DISORDER: ICD-10-CM

## 2020-02-05 PROCEDURE — 99213 OFFICE O/P EST LOW 20 MIN: CPT | Performed by: PSYCHIATRY & NEUROLOGY

## 2020-02-05 RX ORDER — LURASIDONE HYDROCHLORIDE 40 MG/1
40 TABLET, FILM COATED ORAL DAILY
Qty: 30 TABLET | Refills: 2 | Status: SHIPPED | OUTPATIENT
Start: 2020-02-05 | End: 2020-07-13

## 2020-02-05 RX ORDER — PRAMIPEXOLE DIHYDROCHLORIDE 0.5 MG/1
0.5 TABLET ORAL 3 TIMES DAILY
Qty: 90 TABLET | Refills: 2 | Status: SHIPPED | OUTPATIENT
Start: 2020-02-05 | End: 2020-07-16
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
## 2020-02-05 NOTE — PATIENT INSTRUCTIONS
Generalized Anxiety Disorder, Adult  Generalized anxiety disorder (FAVIOLA) is a mental health disorder. People with this condition constantly worry about everyday events. Unlike normal anxiety, worry related to FAVIOLA is not triggered by a specific event. These worries also do not fade or get better with time. FAVIOLA interferes with life functions, including relationships, work, and school.  FAVIOLA can vary from mild to severe. People with severe FAVIOLA can have intense waves of anxiety with physical symptoms (panic attacks).  What are the causes?  The exact cause of FAVIOLA is not known.  What increases the risk?  This condition is more likely to develop in:  · Women.  · People who have a family history of anxiety disorders.  · People who are very shy.  · People who experience very stressful life events, such as the death of a loved one.  · People who have a very stressful family environment.  What are the signs or symptoms?  People with FAVIOLA often worry excessively about many things in their lives, such as their health and family. They may also be overly concerned about:  · Doing well at work.  · Being on time.  · Natural disasters.  · Friendships.  Physical symptoms of FAVIOLA include:  · Fatigue.  · Muscle tension or having muscle twitches.  · Trembling or feeling shaky.  · Being easily startled.  · Feeling like your heart is pounding or racing.  · Feeling out of breath or like you cannot take a deep breath.  · Having trouble falling asleep or staying asleep.  · Sweating.  · Nausea, diarrhea, or irritable bowel syndrome (IBS).  · Headaches.  · Trouble concentrating or remembering facts.  · Restlessness.  · Irritability.  How is this diagnosed?  Your health care provider can diagnose FAVIOLA based on your symptoms and medical history. You will also have a physical exam. The health care provider will ask specific questions about your symptoms, including how severe they are, when they started, and if they come and go. Your health care  provider may ask you about your use of alcohol or drugs, including prescription medicines. Your health care provider may refer you to a mental health specialist for further evaluation.  Your health care provider will do a thorough examination and may perform additional tests to rule out other possible causes of your symptoms.  To be diagnosed with FAVIOLA, a person must have anxiety that:  · Is out of his or her control.  · Affects several different aspects of his or her life, such as work and relationships.  · Causes distress that makes him or her unable to take part in normal activities.  · Includes at least three physical symptoms of FAVIOLA, such as restlessness, fatigue, trouble concentrating, irritability, muscle tension, or sleep problems.  Before your health care provider can confirm a diagnosis of FAVIOLA, these symptoms must be present more days than they are not, and they must last for six months or longer.  How is this treated?  The following therapies are usually used to treat FAVIOLA:  · Medicine. Antidepressant medicine is usually prescribed for long-term daily control. Antianxiety medicines may be added in severe cases, especially when panic attacks occur.  · Talk therapy (psychotherapy). Certain types of talk therapy can be helpful in treating FAVIOLA by providing support, education, and guidance. Options include:  ? Cognitive behavioral therapy (CBT). People learn coping skills and techniques to ease their anxiety. They learn to identify unrealistic or negative thoughts and behaviors and to replace them with positive ones.  ? Acceptance and commitment therapy (ACT). This treatment teaches people how to be mindful as a way to cope with unwanted thoughts and feelings.  ? Biofeedback. This process trains you to manage your body's response (physiological response) through breathing techniques and relaxation methods. You will work with a therapist while machines are used to monitor your physical symptoms.  · Stress  management techniques. These include yoga, meditation, and exercise.  A mental health specialist can help determine which treatment is best for you. Some people see improvement with one type of therapy. However, other people require a combination of therapies.  Follow these instructions at home:  · Take over-the-counter and prescription medicines only as told by your health care provider.  · Try to maintain a normal routine.  · Try to anticipate stressful situations and allow extra time to manage them.  · Practice any stress management or self-calming techniques as taught by your health care provider.  · Do not punish yourself for setbacks or for not making progress.  · Try to recognize your accomplishments, even if they are small.  · Keep all follow-up visits as told by your health care provider. This is important.  Contact a health care provider if:  · Your symptoms do not get better.  · Your symptoms get worse.  · You have signs of depression, such as:  ? A persistently sad, cranky, or irritable mood.  ? Loss of enjoyment in activities that used to bring you thiago.  ? Change in weight or eating.  ? Changes in sleeping habits.  ? Avoiding friends or family members.  ? Loss of energy for normal tasks.  ? Feelings of guilt or worthlessness.  Get help right away if:  · You have serious thoughts about hurting yourself or others.  If you ever feel like you may hurt yourself or others, or have thoughts about taking your own life, get help right away. You can go to your nearest emergency department or call:  · Your local emergency services (911 in the [place]  · A suicide crisis helpline, such as the National Suicide Prevention Lifeline at 1-307.163.4665. This is open 24 hours a day.  Summary  · Generalized anxiety disorder (FAVIOLA) is a mental health disorder that involves worry that is not triggered by a specific event.  · People with FAVIOLA often worry excessively about many things in their lives, such as their health and  family.  · FAVIOLA may cause physical symptoms such as restlessness, trouble concentrating, sleep problems, frequent sweating, nausea, diarrhea, headaches, and trembling or muscle twitching.  · A mental health specialist can help determine which treatment is best for you. Some people see improvement with one type of therapy. However, other people require a combination of therapies.  This information is not intended to replace advice given to you by your health care provider. Make sure you discuss any questions you have with your health care provider.  Document Released: 04/14/2014 Document Revised: 11/07/2017 Document Reviewed: 11/07/2017  [employer] Patient Education © 2019 [employer]

## 2020-02-10 ENCOUNTER — OFFICE VISIT (OUTPATIENT)
Dept: FAMILY MEDICINE CLINIC | Facility: CLINIC | Age: 65
End: 2020-02-10

## 2020-02-10 VITALS
RESPIRATION RATE: 18 BRPM | OXYGEN SATURATION: 96 % | TEMPERATURE: 98.4 F | SYSTOLIC BLOOD PRESSURE: 127 MMHG | WEIGHT: 275.8 LBS | DIASTOLIC BLOOD PRESSURE: 68 MMHG | HEIGHT: 67 IN | BODY MASS INDEX: 43.29 KG/M2 | HEART RATE: 82 BPM

## 2020-02-10 DIAGNOSIS — J41.0 SIMPLE CHRONIC BRONCHITIS (HCC): ICD-10-CM

## 2020-02-10 DIAGNOSIS — M25.552 PAIN OF LEFT HIP JOINT: ICD-10-CM

## 2020-02-10 DIAGNOSIS — I10 ESSENTIAL HYPERTENSION: ICD-10-CM

## 2020-02-10 DIAGNOSIS — E11.49 TYPE 2 DIABETES MELLITUS WITH OTHER DIABETIC NEUROLOGICAL COMPLICATION (HCC): Primary | ICD-10-CM

## 2020-02-10 DIAGNOSIS — N39.0 RECURRENT URINARY TRACT INFECTION: ICD-10-CM

## 2020-02-10 DIAGNOSIS — F41.1 GENERALIZED ANXIETY DISORDER: Chronic | ICD-10-CM

## 2020-02-10 DIAGNOSIS — E78.2 MIXED HYPERLIPIDEMIA: ICD-10-CM

## 2020-02-10 DIAGNOSIS — K21.9 GASTROESOPHAGEAL REFLUX DISEASE WITHOUT ESOPHAGITIS: ICD-10-CM

## 2020-02-10 PROBLEM — F43.12 CHRONIC POSTTRAUMATIC STRESS DISORDER: Status: RESOLVED | Noted: 2019-10-30 | Resolved: 2020-02-10

## 2020-02-10 PROCEDURE — 99214 OFFICE O/P EST MOD 30 MIN: CPT | Performed by: FAMILY MEDICINE

## 2020-02-10 NOTE — PROGRESS NOTES
Subjective   Dariana Rivera is a 64 y.o. female.     Chief Complaint   Patient presents with   • COPD     3 MTH F/U   • Hypertension   • Diabetes   • Leg Pain     left leg from hip to knee x 2 weeks       HPI  Chief complaint: Hypertension hyperlipidemia COPD type 2 diabetes mellitus Parkinson's seizure disorder hip pain    The patient is a 64-year-old white female comes in for follow-up and maintenance of her current problems which include    1.  Hypertension-stable-patient is current on Lasix 40 mg daily potassium 20 mEq daily.  She quit taking lisinopril.  She denied headache lightheadedness dizziness or chest pain.    2.  COPD-stable-patient is on Symbicort 2 puffs twice a day and a rescue inhaler and Spiriva 1 puff daily.  She denied cough shortness of breath wheezing or sputum production.    3.  Hyperlipidemia-stable-patient is currently on a diet.    4.  Type 2 diabetes mellitus-stable-patient is on Actos 30 mg once a day which she denied polydipsia polyphagia or polyuria.  She denied low blood sugars.  She has dropped some weight.  States that her blood sugars are improved.    5.  Recurrent urinary tract infections-stable-patient is on Macrobid 100 mg at night.  She denies dysuria urinary frequency intermittency or hesitancy.    6.  Gastroesophageal reflux disease-stable-patient is on Prilosec 40 mg daily.  She denies dysphagia or heartburn.    7.  Seizures-stable-patient is on gabapentin 200 mg 3 times a day.  She denied recent seizures.    8.  Parkinson's disease-stable-patient on Sinemet 25 102 tablets 3 times a day and Mirapex.  She states that her tremor and balance is improved.    9.  Anxiety and depression-stable-patient is currently on Latuda milligrams daily and clonazepam.  She states that her anxiousness and anxiety is improved.    Recently had rotator cuff surgery.    Patient states she has pain in the left hip when she walks.  She complains of pain from the knee up into the hip.  She states  "that she only has pain in the hip when she bears weight on it.        The following portions of the patient's history were reviewed and updated as appropriate: allergies, current medications, past family history, past medical history, past social history, past surgical history and problem list.    Review of Systems    Objective     /68 (BP Location: Left arm, Patient Position: Sitting, Cuff Size: Large Adult)   Pulse 82   Temp 98.4 °F (36.9 °C) (Oral)   Resp 18   Ht 170.2 cm (67\")   Wt 125 kg (275 lb 12.8 oz)   SpO2 96%   BMI 43.20 kg/m²     Physical Exam   Constitutional: She is oriented to person, place, and time.   obese   HENT:   Head: Normocephalic and atraumatic.   Eyes: Pupils are equal, round, and reactive to light. Conjunctivae and EOM are normal.   Neck: Normal range of motion. Neck supple.   Cardiovascular: Normal rate, regular rhythm, normal heart sounds and intact distal pulses.   Pulmonary/Chest: Effort normal and breath sounds normal.   Abdominal: Soft. Bowel sounds are normal.   Musculoskeletal: Normal range of motion.   No Pain with internal or external rotation of the left hip.  Full Range of motion of the left hip.   Neurological: She is alert and oriented to person, place, and time.   Skin: Skin is warm and dry.   Psychiatric: She has a normal mood and affect. Her behavior is normal.   Nursing note and vitals reviewed.        Assessment/Plan   Dariana was seen today for copd, hypertension, diabetes and leg pain.    Diagnoses and all orders for this visit:    Type 2 diabetes mellitus with other diabetic neurological complication (CMS/HCC)  -     Comprehensive Metabolic Panel; Future  -     CBC & Differential; Future  -     Hemoglobin A1c; Future    Pain of left hip joint  -     XR Hip With or Without Pelvis 2 - 3 View Left; Future    Essential hypertension    Mixed hyperlipidemia    Simple chronic bronchitis (CMS/HCC)    Gastroesophageal reflux disease without " esophagitis    Recurrent urinary tract infection    Generalized anxiety disorder      Patient Instructions   Continue your current medications and treatment.    Have the follow up xrays and labs done and call for results.    Follow up in the office in 3 months.      Paul Larson Jr., MD    02/10/20

## 2020-02-10 NOTE — PATIENT INSTRUCTIONS
Continue your current medications and treatment.    Have the follow up xrays and labs done and call for results.    Follow up in the office in 3 months.   lithotomy supine

## 2020-02-14 ENCOUNTER — LAB (OUTPATIENT)
Dept: LAB | Facility: HOSPITAL | Age: 65
End: 2020-02-14

## 2020-02-14 ENCOUNTER — HOSPITAL ENCOUNTER (OUTPATIENT)
Dept: GENERAL RADIOLOGY | Facility: HOSPITAL | Age: 65
Discharge: HOME | End: 2020-02-14
Admitting: FAMILY MEDICINE

## 2020-02-14 DIAGNOSIS — M25.552 PAIN OF LEFT HIP JOINT: ICD-10-CM

## 2020-02-14 DIAGNOSIS — E11.49 TYPE 2 DIABETES MELLITUS WITH OTHER DIABETIC NEUROLOGICAL COMPLICATION (HCC): ICD-10-CM

## 2020-02-14 LAB
ALBUMIN SERPL-MCNC: 4.3 G/DL (ref 3.5–5.2)
ALBUMIN/GLOB SERPL: 1.3 G/DL
ALP SERPL-CCNC: 70 U/L (ref 39–117)
ALT SERPL W P-5'-P-CCNC: 30 U/L (ref 1–33)
ANION GAP SERPL CALCULATED.3IONS-SCNC: 11.7 MMOL/L (ref 5–15)
AST SERPL-CCNC: 40 U/L (ref 1–32)
BASOPHILS # BLD AUTO: 0.02 10*3/MM3 (ref 0–0.2)
BASOPHILS NFR BLD AUTO: 0.3 % (ref 0–1.5)
BILIRUB SERPL-MCNC: 0.6 MG/DL (ref 0.2–1.2)
BUN BLD-MCNC: 23 MG/DL (ref 8–23)
BUN/CREAT SERPL: 24 (ref 7–25)
CALCIUM SPEC-SCNC: 9.8 MG/DL (ref 8.6–10.5)
CHLORIDE SERPL-SCNC: 98 MMOL/L (ref 98–107)
CO2 SERPL-SCNC: 29.3 MMOL/L (ref 22–29)
CREAT BLD-MCNC: 0.96 MG/DL (ref 0.57–1)
DEPRECATED RDW RBC AUTO: 42.1 FL (ref 37–54)
EOSINOPHIL # BLD AUTO: 0.05 10*3/MM3 (ref 0–0.4)
EOSINOPHIL NFR BLD AUTO: 0.8 % (ref 0.3–6.2)
ERYTHROCYTE [DISTWIDTH] IN BLOOD BY AUTOMATED COUNT: 13.5 % (ref 12.3–15.4)
GFR SERPL CREATININE-BSD FRML MDRD: 59 ML/MIN/1.73
GLOBULIN UR ELPH-MCNC: 3.3 GM/DL
GLUCOSE BLD-MCNC: 143 MG/DL (ref 65–99)
HBA1C MFR BLD: 7 % (ref 3.5–5.6)
HCT VFR BLD AUTO: 38.6 % (ref 34–46.6)
HGB BLD-MCNC: 12.9 G/DL (ref 12–15.9)
IMM GRANULOCYTES # BLD AUTO: 0.01 10*3/MM3 (ref 0–0.05)
IMM GRANULOCYTES NFR BLD AUTO: 0.2 % (ref 0–0.5)
LYMPHOCYTES # BLD AUTO: 1.42 10*3/MM3 (ref 0.7–3.1)
LYMPHOCYTES NFR BLD AUTO: 22.3 % (ref 19.6–45.3)
MCH RBC QN AUTO: 29 PG (ref 26.6–33)
MCHC RBC AUTO-ENTMCNC: 33.4 G/DL (ref 31.5–35.7)
MCV RBC AUTO: 86.7 FL (ref 79–97)
MONOCYTES # BLD AUTO: 0.53 10*3/MM3 (ref 0.1–0.9)
MONOCYTES NFR BLD AUTO: 8.3 % (ref 5–12)
NEUTROPHILS # BLD AUTO: 4.34 10*3/MM3 (ref 1.7–7)
NEUTROPHILS NFR BLD AUTO: 68.1 % (ref 42.7–76)
NRBC BLD AUTO-RTO: 0 /100 WBC (ref 0–0.2)
PLATELET # BLD AUTO: 160 10*3/MM3 (ref 140–450)
PMV BLD AUTO: 10.9 FL (ref 6–12)
POTASSIUM BLD-SCNC: 4 MMOL/L (ref 3.5–5.2)
PROT SERPL-MCNC: 7.6 G/DL (ref 6–8.5)
RBC # BLD AUTO: 4.45 10*6/MM3 (ref 3.77–5.28)
SODIUM BLD-SCNC: 139 MMOL/L (ref 136–145)
WBC NRBC COR # BLD: 6.37 10*3/MM3 (ref 3.4–10.8)

## 2020-02-14 PROCEDURE — 85025 COMPLETE CBC W/AUTO DIFF WBC: CPT

## 2020-02-14 PROCEDURE — 73502 X-RAY EXAM HIP UNI 2-3 VIEWS: CPT

## 2020-02-14 PROCEDURE — 80053 COMPREHEN METABOLIC PANEL: CPT

## 2020-02-14 PROCEDURE — 36415 COLL VENOUS BLD VENIPUNCTURE: CPT

## 2020-02-14 PROCEDURE — 83036 HEMOGLOBIN GLYCOSYLATED A1C: CPT

## 2020-02-18 ENCOUNTER — TELEPHONE (OUTPATIENT)
Dept: FAMILY MEDICINE CLINIC | Facility: CLINIC | Age: 65
End: 2020-02-18

## 2020-02-18 NOTE — TELEPHONE ENCOUNTER
I spoke with the patient.  Her labs are okay.  Her xrays reveal arthritis.  She is to continue as is.  She is to see orthopedics as a next step.

## 2020-02-24 DIAGNOSIS — F41.1 GENERALIZED ANXIETY DISORDER: Chronic | ICD-10-CM

## 2020-02-24 RX ORDER — CLONAZEPAM 1 MG/1
1 TABLET ORAL 2 TIMES DAILY PRN
Qty: 60 TABLET | Refills: 0 | Status: SHIPPED | OUTPATIENT
Start: 2020-02-24 | End: 2020-03-24

## 2020-03-24 DIAGNOSIS — F41.1 GENERALIZED ANXIETY DISORDER: Chronic | ICD-10-CM

## 2020-03-24 RX ORDER — CLONAZEPAM 1 MG/1
1 TABLET ORAL 2 TIMES DAILY PRN
Qty: 60 TABLET | Refills: 2 | Status: SHIPPED | OUTPATIENT
Start: 2020-03-24 | End: 2020-06-16

## 2020-03-25 ENCOUNTER — TRANSCRIBE ORDERS (OUTPATIENT)
Dept: ADMINISTRATIVE | Facility: HOSPITAL | Age: 65
End: 2020-03-25

## 2020-03-25 ENCOUNTER — LAB (OUTPATIENT)
Dept: LAB | Facility: HOSPITAL | Age: 65
End: 2020-03-25

## 2020-03-25 DIAGNOSIS — N39.0 URINARY TRACT INFECTION WITHOUT HEMATURIA, SITE UNSPECIFIED: ICD-10-CM

## 2020-03-25 DIAGNOSIS — E87.1 ACUTE HYPONATREMIA: ICD-10-CM

## 2020-03-25 DIAGNOSIS — E87.1 ACUTE HYPONATREMIA: Primary | ICD-10-CM

## 2020-03-25 LAB
ALBUMIN SERPL-MCNC: 4.2 G/DL (ref 3.5–5.2)
ALBUMIN/GLOB SERPL: 1.3 G/DL
ALP SERPL-CCNC: 66 U/L (ref 39–117)
ALT SERPL W P-5'-P-CCNC: 21 U/L (ref 1–33)
ANION GAP SERPL CALCULATED.3IONS-SCNC: 14.2 MMOL/L (ref 5–15)
AST SERPL-CCNC: 39 U/L (ref 1–32)
BASOPHILS # BLD MANUAL: 0.11 10*3/MM3 (ref 0–0.2)
BASOPHILS NFR BLD AUTO: 2 % (ref 0–1.5)
BILIRUB SERPL-MCNC: 0.6 MG/DL (ref 0.2–1.2)
BILIRUB UR QL STRIP: NEGATIVE
BUN BLD-MCNC: 25 MG/DL (ref 8–23)
BUN/CREAT SERPL: 26.3 (ref 7–25)
CALCIUM SPEC-SCNC: 10.2 MG/DL (ref 8.6–10.5)
CHLORIDE SERPL-SCNC: 94 MMOL/L (ref 98–107)
CLARITY UR: CLEAR
CO2 SERPL-SCNC: 29.8 MMOL/L (ref 22–29)
COLOR UR: YELLOW
CREAT BLD-MCNC: 0.95 MG/DL (ref 0.57–1)
DEPRECATED RDW RBC AUTO: 43.4 FL (ref 37–54)
EOSINOPHIL # BLD MANUAL: 0.11 10*3/MM3 (ref 0–0.4)
EOSINOPHIL NFR BLD MANUAL: 2 % (ref 0.3–6.2)
ERYTHROCYTE [DISTWIDTH] IN BLOOD BY AUTOMATED COUNT: 14 % (ref 12.3–15.4)
GFR SERPL CREATININE-BSD FRML MDRD: 59 ML/MIN/1.73
GLOBULIN UR ELPH-MCNC: 3.3 GM/DL
GLUCOSE BLD-MCNC: 198 MG/DL (ref 65–99)
GLUCOSE UR STRIP-MCNC: NEGATIVE MG/DL
HCT VFR BLD AUTO: 37.2 % (ref 34–46.6)
HGB BLD-MCNC: 12.6 G/DL (ref 12–15.9)
HGB UR QL STRIP.AUTO: NEGATIVE
KETONES UR QL STRIP: NEGATIVE
LEUKOCYTE ESTERASE UR QL STRIP.AUTO: NEGATIVE
LYMPHOCYTES # BLD MANUAL: 1.27 10*3/MM3 (ref 0.7–3.1)
LYMPHOCYTES NFR BLD MANUAL: 24 % (ref 19.6–45.3)
LYMPHOCYTES NFR BLD MANUAL: 3 % (ref 5–12)
MAGNESIUM SERPL-MCNC: 1.9 MG/DL (ref 1.6–2.4)
MCH RBC QN AUTO: 29.3 PG (ref 26.6–33)
MCHC RBC AUTO-ENTMCNC: 33.9 G/DL (ref 31.5–35.7)
MCV RBC AUTO: 86.5 FL (ref 79–97)
MONOCYTES # BLD AUTO: 0.16 10*3/MM3 (ref 0.1–0.9)
NEUTROPHILS # BLD AUTO: 3.66 10*3/MM3 (ref 1.7–7)
NEUTROPHILS NFR BLD MANUAL: 69 % (ref 42.7–76)
NITRITE UR QL STRIP: NEGATIVE
PH UR STRIP.AUTO: 5.5 [PH] (ref 5–8)
PLAT MORPH BLD: NORMAL
PLATELET # BLD AUTO: 145 10*3/MM3 (ref 140–450)
PMV BLD AUTO: 10.7 FL (ref 6–12)
POIKILOCYTOSIS BLD QL SMEAR: ABNORMAL
POTASSIUM BLD-SCNC: 3.5 MMOL/L (ref 3.5–5.2)
PROT SERPL-MCNC: 7.5 G/DL (ref 6–8.5)
PROT UR QL STRIP: NEGATIVE
RBC # BLD AUTO: 4.3 10*6/MM3 (ref 3.77–5.28)
SODIUM BLD-SCNC: 138 MMOL/L (ref 136–145)
SP GR UR STRIP: 1.02 (ref 1–1.03)
UROBILINOGEN UR QL STRIP: NORMAL
WBC MORPH BLD: NORMAL
WBC NRBC COR # BLD: 5.3 10*3/MM3 (ref 3.4–10.8)

## 2020-03-25 PROCEDURE — 81003 URINALYSIS AUTO W/O SCOPE: CPT

## 2020-03-25 PROCEDURE — 83735 ASSAY OF MAGNESIUM: CPT

## 2020-03-25 PROCEDURE — 80053 COMPREHEN METABOLIC PANEL: CPT

## 2020-03-25 PROCEDURE — 85025 COMPLETE CBC W/AUTO DIFF WBC: CPT

## 2020-03-25 PROCEDURE — 85007 BL SMEAR W/DIFF WBC COUNT: CPT

## 2020-03-25 PROCEDURE — 36415 COLL VENOUS BLD VENIPUNCTURE: CPT

## 2020-04-10 ENCOUNTER — OFFICE VISIT (OUTPATIENT)
Dept: FAMILY MEDICINE CLINIC | Facility: CLINIC | Age: 65
End: 2020-04-10

## 2020-04-10 ENCOUNTER — HOSPITAL ENCOUNTER (EMERGENCY)
Facility: HOSPITAL | Age: 65
Discharge: HOME | End: 2020-04-10
Attending: EMERGENCY MEDICINE

## 2020-04-10 ENCOUNTER — APPOINTMENT (OUTPATIENT)
Dept: GENERAL RADIOLOGY | Facility: HOSPITAL | Age: 65
End: 2020-04-10

## 2020-04-10 VITALS
TEMPERATURE: 99 F | SYSTOLIC BLOOD PRESSURE: 145 MMHG | RESPIRATION RATE: 17 BRPM | HEART RATE: 81 BPM | DIASTOLIC BLOOD PRESSURE: 87 MMHG | BODY MASS INDEX: 43.79 KG/M2 | WEIGHT: 279 LBS | OXYGEN SATURATION: 98 % | HEIGHT: 67 IN

## 2020-04-10 DIAGNOSIS — Z20.822 SUSPECTED COVID-19 VIRUS INFECTION: ICD-10-CM

## 2020-04-10 DIAGNOSIS — J06.9 ACUTE UPPER RESPIRATORY INFECTION: Primary | ICD-10-CM

## 2020-04-10 DIAGNOSIS — J41.0 SIMPLE CHRONIC BRONCHITIS (HCC): ICD-10-CM

## 2020-04-10 DIAGNOSIS — R06.00 DYSPNEA, UNSPECIFIED TYPE: Primary | ICD-10-CM

## 2020-04-10 LAB
ANION GAP SERPL CALCULATED.3IONS-SCNC: 17 MMOL/L (ref 5–15)
BASOPHILS # BLD AUTO: 0.1 10*3/MM3 (ref 0–0.2)
BASOPHILS NFR BLD AUTO: 0.8 % (ref 0–1.5)
BUN BLD-MCNC: 24 MG/DL (ref 8–23)
BUN/CREAT SERPL: 27.6 (ref 7–25)
CALCIUM SPEC-SCNC: 9.9 MG/DL (ref 8.6–10.5)
CHLORIDE SERPL-SCNC: 100 MMOL/L (ref 98–107)
CO2 SERPL-SCNC: 26 MMOL/L (ref 22–29)
CREAT BLD-MCNC: 0.87 MG/DL (ref 0.57–1)
DEPRECATED RDW RBC AUTO: 48.1 FL (ref 37–54)
EOSINOPHIL # BLD AUTO: 0.1 10*3/MM3 (ref 0–0.4)
EOSINOPHIL NFR BLD AUTO: 1.4 % (ref 0.3–6.2)
ERYTHROCYTE [DISTWIDTH] IN BLOOD BY AUTOMATED COUNT: 15.7 % (ref 12.3–15.4)
GFR SERPL CREATININE-BSD FRML MDRD: 66 ML/MIN/1.73
GLUCOSE BLD-MCNC: 120 MG/DL (ref 65–99)
HCT VFR BLD AUTO: 37.1 % (ref 34–46.6)
HGB BLD-MCNC: 13 G/DL (ref 12–15.9)
HOLD SPECIMEN: NORMAL
LYMPHOCYTES # BLD AUTO: 1.4 10*3/MM3 (ref 0.7–3.1)
LYMPHOCYTES NFR BLD AUTO: 20.7 % (ref 19.6–45.3)
MCH RBC QN AUTO: 30.6 PG (ref 26.6–33)
MCHC RBC AUTO-ENTMCNC: 35.1 G/DL (ref 31.5–35.7)
MCV RBC AUTO: 87.1 FL (ref 79–97)
MONOCYTES # BLD AUTO: 0.6 10*3/MM3 (ref 0.1–0.9)
MONOCYTES NFR BLD AUTO: 8.9 % (ref 5–12)
NEUTROPHILS # BLD AUTO: 4.5 10*3/MM3 (ref 1.7–7)
NEUTROPHILS NFR BLD AUTO: 68.2 % (ref 42.7–76)
NRBC BLD AUTO-RTO: 0 /100 WBC (ref 0–0.2)
PLATELET # BLD AUTO: 165 10*3/MM3 (ref 140–450)
PMV BLD AUTO: 8.4 FL (ref 6–12)
POTASSIUM BLD-SCNC: 4 MMOL/L (ref 3.5–5.2)
RBC # BLD AUTO: 4.25 10*6/MM3 (ref 3.77–5.28)
SODIUM BLD-SCNC: 143 MMOL/L (ref 136–145)
WBC NRBC COR # BLD: 6.7 10*3/MM3 (ref 3.4–10.8)

## 2020-04-10 PROCEDURE — 85025 COMPLETE CBC W/AUTO DIFF WBC: CPT | Performed by: EMERGENCY MEDICINE

## 2020-04-10 PROCEDURE — 99024 POSTOP FOLLOW-UP VISIT: CPT | Performed by: FAMILY MEDICINE

## 2020-04-10 PROCEDURE — 99283 EMERGENCY DEPT VISIT LOW MDM: CPT

## 2020-04-10 PROCEDURE — 80048 BASIC METABOLIC PNL TOTAL CA: CPT | Performed by: EMERGENCY MEDICINE

## 2020-04-10 PROCEDURE — 87635 SARS-COV-2 COVID-19 AMP PRB: CPT | Performed by: EMERGENCY MEDICINE

## 2020-04-10 PROCEDURE — U0003 INFECTIOUS AGENT DETECTION BY NUCLEIC ACID (DNA OR RNA); SEVERE ACUTE RESPIRATORY SYNDROME CORONAVIRUS 2 (SARS-COV-2) (CORONAVIRUS DISEASE [COVID-19]), AMPLIFIED PROBE TECHNIQUE, MAKING USE OF HIGH THROUGHPUT TECHNOLOGIES AS DESCRIBED BY CMS-2020-01-R: HCPCS | Performed by: EMERGENCY MEDICINE

## 2020-04-10 PROCEDURE — 71045 X-RAY EXAM CHEST 1 VIEW: CPT

## 2020-04-10 RX ORDER — SODIUM CHLORIDE 0.9 % (FLUSH) 0.9 %
10 SYRINGE (ML) INJECTION AS NEEDED
Status: DISCONTINUED | OUTPATIENT
Start: 2020-04-10 | End: 2020-04-10 | Stop reason: HOSPADM

## 2020-04-10 NOTE — PROGRESS NOTES
Subjective   Dariana Rivera is a 64 y.o. female.     Chief Complaint   Patient presents with   • Sinus Problem     X 3 days, loss of taste and smell   • Headache       HPI  Chief complaint: Cough and chest congestion    The patient is a 64-year-old white female who was evaluated by telephone visit today.    The patient states that for the past 3 to 4 days she has had sinus congestion drainage cough.  She states that she has lost her taste and smell.  She states that she feels terrible.  She states that the cough is productive of purulent sputum.  She has had increased shortness of breath.    Patient has a history of COPD sleep apnea hypertension hyperlipidemia cirrhosis type 2 diabetes mellitus obesity and seizures.        The following portions of the patient's history were reviewed and updated as appropriate: allergies, current medications, past family history, past medical history, past social history, past surgical history and problem list.    Review of Systems   Constitutional: Positive for fatigue. Negative for chills and fever.   HENT: Positive for congestion, sinus pressure and sore throat. Negative for swollen glands.    Eyes: Negative for blurred vision and pain.   Respiratory: Positive for cough, shortness of breath and wheezing.    Cardiovascular: Negative for chest pain and leg swelling.   Gastrointestinal: Negative for abdominal pain, nausea and indigestion.   Endocrine: Negative for cold intolerance, heat intolerance, polydipsia, polyphagia and polyuria.   Skin: Negative for dry skin, rash and bruise.   Neurological: Negative for dizziness, syncope and headache.   Psychiatric/Behavioral: Negative for dysphoric mood and stress.       Objective     There were no vitals taken for this visit.    Physical Exam      Assessment/Plan   Dariana was seen today for sinus problem and headache.    Diagnoses and all orders for this visit:    Acute upper respiratory infection-the patient was advised that her  symptoms are suggestive of COVID infection she was advised that she is at high risk for complications from this. She was advised to go to the emergency room.  I contacted the ER and told him that she would be coming down.    Simple chronic bronchitis (CMS/HCC)      Patient Instructions   Go to the emergency room for further evaluation and treatment.      Paul Larson Jr., MD    04/10/20

## 2020-04-10 NOTE — DISCHARGE INSTRUCTIONS
Follow-up with your MD for recheck as needed.  Return for increasing shortness of breath or vomiting.  Self quarantine until your coronavirus test results are obtained.

## 2020-04-10 NOTE — ED PROVIDER NOTES
Subjective   Patient is a 64-year-old female complaining of cough congestion and shortness of breath.  This developed over the past several days.  She denies fever chest pain vomiting or other complaint.          Review of Systems  Negative for headache earache sore throat fever chest pain abdominal pain vomiting diarrhea dysuria acnes weight loss or other associated complaints.  Past Medical History:   Diagnosis Date   • Anxiety disorder     [facility]    • Back pain    • Bipolar disorder (CMS/HCC)    • Chest pain    • Chronic pain     with stenosis   • Cirrhosis (CMS/HCC)    • Colitis    • Colon polyps    • COPD (chronic obstructive pulmonary disease) (CMS/HCC)    • DDD (degenerative disc disease), lumbar    • Eosinophilic colitis    • Gastritis    • GERD (gastroesophageal reflux disease)    • Hepatic steatosis     non alcoholic steo-hepatitis    • Hypertension    • IBS (irritable bowel syndrome)    • Kidney stones    • Neck pain    • Osteoarthritis    • Parkinson's disease (CMS/HCC)    • Seizure (CMS/HCC)     onset 7/2016   • Sleep apnea     Using Bipap machine at night.    • Sleep apnea, obstructive    • Type II diabetes mellitus (CMS/HCC)        Allergies   Allergen Reactions   • Ambien  [Zolpidem Tartrate] Confusion   • Ciprofloxacin Hcl Rash   • Penicillins Rash   • Methadone Hallucinations   • Clindamycin Rash       Past Surgical History:   Procedure Laterality Date   • BACK SURGERY     • CARDIAC CATHETERIZATION  02/2019   • CARPAL TUNNEL RELEASE Bilateral     Wrist    • CHOLECYSTECTOMY  1997   • COLONOSCOPY     • ENDOSCOPY     • HYSTERECTOMY  09/2004    complete   • JOINT REPLACEMENT     • KNEE ARTHROSCOPY Bilateral     Arthroscopic surgery to bilateral knees    • OTHER SURGICAL HISTORY  2015    Stress test    • OTHER SURGICAL HISTORY  10/2016    Lithotripsy   • OTHER SURGICAL HISTORY      Right arm cellulits- spider bites    • OTHER SURGICAL HISTORY  02/08/2018    Lithotripsy   • OTHER SURGICAL HISTORY   2018    ACDF C3-C4 & C6-C7   • REPLACEMENT TOTAL KNEE     • REPLACEMENT TOTAL KNEE Left 2016   • SHOULDER ROTATOR CUFF REPAIR Right 2020    Procedure: RT ROTATOR CUFF REPAIR OPEN;  Surgeon: Curly Vaughn MD;  Location: [facility] MAIN OR;  Service: Orthopedics       Family History   Problem Relation Age of Onset   • Hypertension Mother    • Hyperlipidemia Mother    • Diabetes Father    • Heart disease Father    • Hyperlipidemia Sister    • Diabetes Brother    • Heart disease Brother    • Hypertension Brother    • Hyperlipidemia Brother        Social History     Socioeconomic History   • Marital status:      Spouse name: Not on file   • Number of children: Not on file   • Years of education: Not on file   • Highest education level: Not on file   Tobacco Use   • Smoking status: Former Smoker     Types: Cigarettes     Last attempt to quit: 1990     Years since quittin.2   • Smokeless tobacco: Never Used   Substance and Sexual Activity   • Alcohol use: No     Frequency: Never   • Drug use: No   • Sexual activity: Defer           Objective   Physical Exam  I examined the patient using the appropriate personal protective equipment.    HEENT exam shows TMs to be clear.  Oropharynx, spray sclerae nonicteric.  Neck has no adenopathy JVD or bruits.  Lungs are clear throughout.  Heart has regular rate rhythm without murmur rub or gallop.  Chest is nontender.  Abdomen is soft nontender.  Extremity exam is no cyanosis or edema.  Procedures           ED Course      Results for orders placed or performed during the hospital encounter of 04/10/20   Basic Metabolic Panel   Result Value Ref Range    Glucose 120 (H) 65 - 99 mg/dL    BUN 24 (H) 8 - 23 mg/dL    Creatinine 0.87 0.57 - 1.00 mg/dL    Sodium 143 136 - 145 mmol/L    Potassium 4.0 3.5 - 5.2 mmol/L    Chloride 100 98 - 107 mmol/L    CO2 26.0 22.0 - 29.0 mmol/L    Calcium 9.9 8.6 - 10.5 mg/dL    eGFR Non African Amer 66 >60 mL/min/1.73     BUN/Creatinine Ratio 27.6 (H) 7.0 - 25.0    Anion Gap 17.0 (H) 5.0 - 15.0 mmol/L   CBC Auto Differential   Result Value Ref Range    WBC 6.70 3.40 - 10.80 10*3/mm3    RBC 4.25 3.77 - 5.28 10*6/mm3    Hemoglobin 13.0 12.0 - 15.9 g/dL    Hematocrit 37.1 34.0 - 46.6 %    MCV 87.1 79.0 - 97.0 fL    MCH 30.6 26.6 - 33.0 pg    MCHC 35.1 31.5 - 35.7 g/dL    RDW 15.7 (H) 12.3 - 15.4 %    RDW-SD 48.1 37.0 - 54.0 fl    MPV 8.4 6.0 - 12.0 fL    Platelets 165 140 - 450 10*3/mm3    Neutrophil % 68.2 42.7 - 76.0 %    Lymphocyte % 20.7 19.6 - 45.3 %    Monocyte % 8.9 5.0 - 12.0 %    Eosinophil % 1.4 0.3 - 6.2 %    Basophil % 0.8 0.0 - 1.5 %    Neutrophils, Absolute 4.50 1.70 - 7.00 10*3/mm3    Lymphocytes, Absolute 1.40 0.70 - 3.10 10*3/mm3    Monocytes, Absolute 0.60 0.10 - 0.90 10*3/mm3    Eosinophils, Absolute 0.10 0.00 - 0.40 10*3/mm3    Basophils, Absolute 0.10 0.00 - 0.20 10*3/mm3    nRBC 0.0 0.0 - 0.2 /100 WBC   Gold Top - SST   Result Value Ref Range    Extra Tube Hold for add-ons.      Xr Chest 1 View    Result Date: 4/10/2020  Mild cardiomegaly without acute pulmonary process. No acute change from prior exam dated 01/03/2020.  Electronically Signed By-Mychal Calderon On:4/10/2020 5:35 PM This report was finalized on [id number] by  Mychal Calderon, .                                         MDM  Number of Diagnoses or Management Options  Diagnosis management comments: Patient has a benign physical exam.  She has normal vital signs and is not hypoxic.  She is been afebrile.  There is no evidence of pneumonia on her chest x-ray.  Metabolic panel is normal.  Patient had a covid 19 test performed.  Patient will be discharged and will self quarantine.  She will use Tylenol and ibuprofen for fever or achiness.  She will follow with MD for recheck as needed.    Risk of Complications, Morbidity, and/or Mortality  Presenting problems: high  Diagnostic procedures: high  Management options: high    Patient Progress  Patient  progress: stable      Final diagnoses:   Dyspnea, unspecified type   Suspected Covid-19 Virus Infection            Alexander Aly MD  04/10/20 1921

## 2020-04-10 NOTE — PROGRESS NOTES
This visit has been rescheduled as a phone visit to comply with patient safety concerns in accordance with CDC recommendations. Total time of discussion was 11-20 minutes.

## 2020-04-13 ENCOUNTER — PATIENT OUTREACH (OUTPATIENT)
Dept: CASE MANAGEMENT | Facility: OTHER | Age: 65
End: 2020-04-13

## 2020-04-13 LAB — SARS-COV-2 RNA RESP QL NAA+PROBE: NOT DETECTED

## 2020-04-13 NOTE — OUTREACH NOTE
Care Coordination Assessment    Documented/Reviewed By:  Judy Page RN Date/time:  4/13/2020 11:23 AM   Assessment completed with:  patient  Enrolled in care management program:  No  Living arrangement:  spouse  Support system:  spouse, home care staff  Type of residence:  private residence  Home care services:  Yes  Equipment used at home:  cane, oxygen/respiratory treatment  Communication device:  Yes  Bed or wheelchair confined:  No  Inadequate nutrition:  No  Medication adherence problem:  No  Experiencing side effects from current medications:  No  History of fall(s) in last 6 months:  No  Difficulty keeping appointments:  No  Family aware of the patient's advance care planning wishes:  Yes

## 2020-04-13 NOTE — OUTREACH NOTE
Care Plan Note      Responses   Care Gaps Addressed  Colon Cancer Screening, Diabetic A1C, Flu Shot, Pneumonia Vaccine, Mammogram, Diabetic Eye Exam, Other (See Comment) [Tdap]   Colon Cancer Screening Type  Colonoscopy   Colonoscopy Status  Up to Date (< 10 yrs)   HbA1c Status  Up to Date-within defined limits   HbA1c Completion at [facility] or Other  [facility]   Diabetic Eye Exam Status  Patient will Complete   Flu Shot Status  Up to Date   Mammogram Status  Up to Date   Mammogram Completion at [facility] or Other  [facility]   Pneumonia Vaccine Status  Up to Date   Care Gap Comments  pt to complete Tdap   Specific Disease Process Teaching  COPD   Other Patient Education/Resources   24/7 [facility] Nurse Call Line   24/7 Nurse Call Line Education Method  Verbal   Does patient have depression diagnosis?  Yes   Advanced Directives:  Patient Has [pt states she has AD, needs notarized]   Medication Adherence  Medications understood        The main concerns and/or symptoms the patient would like to address are: Pt discharged from [facility] ED on 4/10/20, seen for cough, congestion, shortness of breath. RN-ACM outreach call made to pt. Pt complains of cough, productive at times with green sputum, congestion, and shortness of breath at times. Pt states she has COPD. Pt denies chest pain or fever. Pt lives with her spouse, is independent with toileting and meals, states sometimes her caregiver helps her with bathing, depending on how she's feeling. Pt has support from her spouse, sister, and caregiver. Pt states she has a caregiver 5 days a week through [facility]. Pt reports caregiver helps with cleaning, laundry, and provides her transportation. Pt uses a cane to assist with ambulation, denies falls within the past 6 months. Pt reports to be compliant with medications. Pt reports using her inhaler and nebulizer help some when experiencing shortness of breath. Pt reports to be compliant with blood pressure and blood sugar  monitoring. Pt reports she is practicing self quarantine at home, as advised, states she told her caregiver not to come to her home until she gets her covid testing results back. Pt states she is still waiting for results, she thought they would be back by now.    Education/instruction provided by Care Coordinator: reviewed with pt: ED AVS; education provided; disease education provided regarding COPD; when to seek medical attention; medical appointments; RN-ACM contact information; [facility] 24 hour nurse line number; health maintenance gaps; and advance directive. Pt is up to date on AWV, colonoscopy, mammogram, flu and pneumonia vaccines. Pt reports she will complete diabetic eye exam and Tdap. Pt reports she has advance directive at home, states she needs to get it notarized. Pt is active with [employer]. SDOH updated. Instructed pt to follow up with PCP this week for covid testing results. No other questions or concerns per pt at this time. Patient exhibits strong sense of health self-management and adequate support system. Advised pt to call RN-ACM or [facility] 24 hour nurse line with any needs. Follow up outreach as needed.     Follow Up Outreach Due: as needed    Judy Page RN  Ambulatory     4/13/2020, 11:32

## 2020-04-14 RX ORDER — ROPINIROLE 0.25 MG/1
0.25 TABLET, FILM COATED ORAL 2 TIMES DAILY
COMMUNITY
End: 2020-08-05 | Stop reason: SDUPTHER

## 2020-04-15 ENCOUNTER — ON CAMPUS - OUTPATIENT (AMBULATORY)
Dept: URBAN - METROPOLITAN AREA HOSPITAL 85 | Facility: HOSPITAL | Age: 65
End: 2020-04-15

## 2020-04-15 ENCOUNTER — ANESTHESIA (OUTPATIENT)
Dept: GASTROENTEROLOGY | Facility: HOSPITAL | Age: 65
End: 2020-04-15

## 2020-04-15 ENCOUNTER — ANESTHESIA EVENT (OUTPATIENT)
Dept: GASTROENTEROLOGY | Facility: HOSPITAL | Age: 65
End: 2020-04-15

## 2020-04-15 ENCOUNTER — HOSPITAL ENCOUNTER (OUTPATIENT)
Facility: HOSPITAL | Age: 65
Setting detail: HOSPITAL OUTPATIENT SURGERY
Discharge: HOME | End: 2020-04-15
Attending: INTERNAL MEDICINE | Admitting: INTERNAL MEDICINE

## 2020-04-15 VITALS
WEIGHT: 284.83 LBS | TEMPERATURE: 98.1 F | OXYGEN SATURATION: 100 % | HEIGHT: 67 IN | BODY MASS INDEX: 44.71 KG/M2 | SYSTOLIC BLOOD PRESSURE: 140 MMHG | DIASTOLIC BLOOD PRESSURE: 72 MMHG | HEART RATE: 68 BPM | RESPIRATION RATE: 15 BRPM

## 2020-04-15 DIAGNOSIS — R13.10 DYSPHAGIA, UNSPECIFIED: ICD-10-CM

## 2020-04-15 DIAGNOSIS — K44.9 DIAPHRAGMATIC HERNIA WITHOUT OBSTRUCTION OR GANGRENE: ICD-10-CM

## 2020-04-15 DIAGNOSIS — I85.00 ESOPHAGEAL VARICES WITHOUT BLEEDING: ICD-10-CM

## 2020-04-15 LAB — GLUCOSE BLDC GLUCOMTR-MCNC: 132 MG/DL (ref 70–105)

## 2020-04-15 PROCEDURE — 25010000002 PROPOFOL 10 MG/ML EMULSION: Performed by: ANESTHESIOLOGY

## 2020-04-15 PROCEDURE — 82962 GLUCOSE BLOOD TEST: CPT

## 2020-04-15 PROCEDURE — 43450 DILATE ESOPHAGUS 1/MULT PASS: CPT | Performed by: INTERNAL MEDICINE

## 2020-04-15 PROCEDURE — 43235 EGD DIAGNOSTIC BRUSH WASH: CPT | Performed by: INTERNAL MEDICINE

## 2020-04-15 RX ORDER — SODIUM CHLORIDE 0.9 % (FLUSH) 0.9 %
10 SYRINGE (ML) INJECTION AS NEEDED
Status: DISCONTINUED | OUTPATIENT
Start: 2020-04-15 | End: 2020-04-15 | Stop reason: HOSPADM

## 2020-04-15 RX ORDER — PROPOFOL 10 MG/ML
VIAL (ML) INTRAVENOUS AS NEEDED
Status: DISCONTINUED | OUTPATIENT
Start: 2020-04-15 | End: 2020-04-15 | Stop reason: SURG

## 2020-04-15 RX ORDER — SODIUM CHLORIDE 9 MG/ML
1000 INJECTION, SOLUTION INTRAVENOUS CONTINUOUS
Status: DISCONTINUED | OUTPATIENT
Start: 2020-04-15 | End: 2020-04-15 | Stop reason: HOSPADM

## 2020-04-15 RX ORDER — LIDOCAINE HYDROCHLORIDE 10 MG/ML
0.5 INJECTION, SOLUTION INFILTRATION; PERINEURAL ONCE AS NEEDED
Status: DISCONTINUED | OUTPATIENT
Start: 2020-04-15 | End: 2020-04-15 | Stop reason: HOSPADM

## 2020-04-15 RX ADMIN — SODIUM CHLORIDE 1000 ML: 900 INJECTION, SOLUTION INTRAVENOUS at 09:15

## 2020-04-15 RX ADMIN — PROPOFOL 50 MG: 10 INJECTION, EMULSION INTRAVENOUS at 11:13

## 2020-04-15 RX ADMIN — PROPOFOL 50 MG: 10 INJECTION, EMULSION INTRAVENOUS at 11:11

## 2020-04-15 NOTE — OP NOTE
ESOPHAGOGASTRODUODENOSCOPY Procedure Report    Patient Name:  Dariana Rivera  YOB: 1955    Date of Surgery:  4/15/2020     Pre-Op Diagnosis:  Esophageal varices (CMS/HCC) [I85.00]  Dysphagia (hiatal hernia dilated to 60 Fr last year)    Post-Op Diagnosis Codes:     * Esophageal varices (CMS/HCC) [I85.00]  Dysphagia (hiatal hernia dilated to 60 Fr last year)  Grade 1 esoph. Varices X 2 columns  Hiatal hernia X 5 cm was dilated to 60 Fr without trauma  Stomach was normal  Duodenum was normal    Procedure/CPT® Codes:      Procedure(s):  ESOPHAGOGASTRODUODENOSCOPY with bougie dilation 60 Fr    Staff:  Surgeon(s):  Julieta Allison MD         Anesthesia: Monitored Anesthesia Care    Implants:    Nothing was implanted during the procedure    Specimen:        See Below    Complications:  None    Description of Procedure:  Informed consent was obtained for the procedure, including sedation.  Risks of perforation, hemorrhage, adverse drug reaction and aspiration were discussed.  The patient was brought into the endoscopy suite. Continuous cardiopulmonary monitoring was performed. The patient was placed in the left lateral decubitus position.  The bite block was inserted into the patient's mouth. After adequate sedation was attained, the Olympus gastroscope was inserted into the patient's mouth and advanced to the second portion of the duodenum without difficulty.  Circumferential examination was performed. A retroflex exam was performed in the patient's stomach.  On completion of the exam, the bowel was decompressed, the scope was removed from the patient, the patient tolerated the procedure well, there were no immediate post-operative complications.     Grade 1 esoph. Varices X 2 columns  Hiatal hernia X 5 cm was dilated to 60 Fr without trauma  Stomach was normal  Duodenum was normal      Impression:  Grade 1 esoph. Varices X 2 columns  Hiatal hernia X 5 cm was dilated to 60 Fr without trauma  Stomach  was normal  Duodenum was normal    Recommendations:  EGD 1 year  PPI and B-blocker long term  OV 1 month via Tele-Health    Viktoria MD     Date: 4/15/2020  Time: 11:04

## 2020-04-15 NOTE — H&P
" LOS: 0 days   Patient Care Team:  Paul Larson Jr., MD as PCP - General (Family Medicine)  Paul Larson Jr., MD as PCP - Claims Attributed  Nabor Amador MD as Consulting Physician (Cardiology)      Subjective     Interval History:     Subjective: Outpt EGD scope for esophageal varices check, high risk for bleeding due to cirrhosis  No GI complaints  No chest pains or SOA  No fever or chills  No pain complaints.  No contraindications for MAC anesthesia    ROS:   No chest pain, shortness of breath, or cough. Agreeable to scope and anesthesia  No change since scanned H&P       Medication Review:     Current Facility-Administered Medications:   •  lidocaine (XYLOCAINE) 1 % injection 0.5 mL, 0.5 mL, Intradermal, Once PRN, Julieta Allison MD  •  sodium chloride 0.9 % flush 10 mL, 10 mL, Intravenous, PRN, Julieta Allison MD  •  sodium chloride 0.9 % infusion 1,000 mL, 1,000 mL, Intravenous, Continuous, Julieta Allison MD, Last Rate: 25 mL/hr at 04/15/20 0915, 1,000 mL at 04/15/20 0915      Objective     Vital Signs  Vitals:    04/14/20 1654 04/15/20 0906   BP:  147/76   BP Location:  Left arm   Patient Position:  Lying   Pulse:  72   Resp:  12   Temp:  98.1 °F (36.7 °C)   TempSrc:  Oral   SpO2:  96%   Weight: 127 kg (279 lb) 129 kg (284 lb 13.4 oz)   Height: 170.2 cm (67\") 170.2 cm (67\")       Physical Exam:    General Appearance:    Awake and alert, in no acute distress   Head:    Normocephalic, without obvious abnormality   Eyes:          Conjunctivae normal, anicteric sclera   Throat:   No oral lesions, no thrush, oral mucosa moist   Neck:   No adenopathy, supple, no JVD   Lungs:     respirations regular, even and unlabored   Abdomen:     Soft, non-tender, no rebound or guarding, non-distended, no hepatosplenomegaly   Rectal:     Deferred   Extremities:   No edema, no cyanosis   Skin:   No bruising or rash, no jaundice        Results Review:    Lab Results (last 24 hours)     Procedure Component " Value Units Date/Time    POC Glucose Once [[id number]]  (Abnormal) Collected:  04/15/20 0850    Specimen:  Blood Updated:  04/15/20 0851     Glucose 132 mg/dL      Comment: Serial Number: [id number]:  [id number]             Imaging Results (Last 24 Hours)     ** No results found for the last 24 hours. **            Assessment/Plan   Proceed with scope and MAC anesthesia    No change from office records    Viktoria MD  04/15/20  09:22

## 2020-04-15 NOTE — DISCHARGE INSTRUCTIONS
A responsible adult should stay with you and you should rest quietly for the rest of the day.    Do not drink alcohol, drive, operate any heavy machinery or power tools or make any legal/important decisions for the next 24 hours.     Progress your diet as tolerated.  If you begin to experience severe pain, increased shortness of breath, racing heartbeat or a fever above 101 F, seek immediate medical attention.     Follow up with MD as instructed. Call office for results in 3 to 5 days if needed.    EGD 1 year  Office visit in one month via telehealth

## 2020-04-15 NOTE — ANESTHESIA POSTPROCEDURE EVALUATION
Patient: Dariana Rivera    Procedure Summary     Date:  04/15/20 Room / Location:  [facility] ENDOSCOPY 1 / [facility] ENDOSCOPY    Anesthesia Start:  1107 Anesthesia Stop:  1119    Procedure:  ESOPHAGOGASTRODUODENOSCOPY with dilitation (60FR.) (N/A ) Diagnosis:       Esophageal varices (CMS/HCC)      (Esophageal varices (CMS/HCC) [I85.00])    Surgeon:  Juileta Allison MD Provider:  Jeff Hunt MD    Anesthesia Type:  MAC ASA Status:  4          Anesthesia Type: MAC    Vitals  Vitals Value Taken Time   /72 4/15/2020 11:40 AM   Temp     Pulse 68 4/15/2020 11:40 AM   Resp 15 4/15/2020 11:40 AM   SpO2 100 % 4/15/2020 11:40 AM           Post Anesthesia Care and Evaluation    Patient location during evaluation: PACU  Patient participation: complete - patient participated  Level of consciousness: awake  Pain scale: See nurse's notes for pain score.  Pain management: adequate  Airway patency: patent  Anesthetic complications: No anesthetic complications  PONV Status: none  Cardiovascular status: acceptable  Respiratory status: acceptable  Hydration status: acceptable    Comments: Patient seen and examined postoperatively; vital signs stable; SpO2 greater than or equal to 90%; cardiopulmonary status stable; nausea/vomiting adequately controlled; pain adequately controlled; no apparent anesthesia complications; patient discharged from anesthesia care when discharge criteria were met

## 2020-04-15 NOTE — ANESTHESIA PREPROCEDURE EVALUATION
Anesthesia Evaluation     NPO Solid Status: > 8 hours  NPO Liquid Status: > 8 hours           Airway   Mallampati: II  TM distance: >3 FB  No difficulty expected  Dental      Pulmonary    (+) COPD, recent URI, sleep apnea on CPAP,   Cardiovascular     (+) hypertension, hyperlipidemia,       Neuro/Psych  (+) seizures, psychiatric history,     GI/Hepatic/Renal/Endo    (+) morbid obesity, GERD,  liver disease, renal disease, diabetes mellitus,     Musculoskeletal     (+) back pain, neck pain,   Abdominal    Substance History      OB/GYN          Other   arthritis,                    Anesthesia Plan    ASA 4     MAC     intravenous induction     Anesthetic plan, all risks, benefits, and alternatives have been provided, discussed and informed consent has been obtained with: patient.

## 2020-05-11 ENCOUNTER — OFFICE VISIT (OUTPATIENT)
Dept: FAMILY MEDICINE CLINIC | Facility: CLINIC | Age: 65
End: 2020-05-11

## 2020-05-11 ENCOUNTER — LAB (OUTPATIENT)
Dept: LAB | Facility: HOSPITAL | Age: 65
End: 2020-05-11

## 2020-05-11 VITALS
OXYGEN SATURATION: 99 % | BODY MASS INDEX: 44.7 KG/M2 | RESPIRATION RATE: 18 BRPM | TEMPERATURE: 97.4 F | HEART RATE: 65 BPM | HEIGHT: 67 IN | DIASTOLIC BLOOD PRESSURE: 76 MMHG | SYSTOLIC BLOOD PRESSURE: 135 MMHG | WEIGHT: 284.8 LBS

## 2020-05-11 DIAGNOSIS — I10 ESSENTIAL HYPERTENSION: Primary | ICD-10-CM

## 2020-05-11 DIAGNOSIS — E11.49 TYPE 2 DIABETES MELLITUS WITH OTHER DIABETIC NEUROLOGICAL COMPLICATION (HCC): ICD-10-CM

## 2020-05-11 DIAGNOSIS — F31.32 BIPOLAR 1 DISORDER, DEPRESSED, MODERATE (HCC): Chronic | ICD-10-CM

## 2020-05-11 DIAGNOSIS — G20 PARKINSON'S DISEASE (HCC): ICD-10-CM

## 2020-05-11 DIAGNOSIS — I10 ESSENTIAL HYPERTENSION: ICD-10-CM

## 2020-05-11 DIAGNOSIS — K74.60 CIRRHOSIS OF LIVER WITHOUT ASCITES, UNSPECIFIED HEPATIC CIRRHOSIS TYPE (HCC): ICD-10-CM

## 2020-05-11 DIAGNOSIS — R56.9 SEIZURE (HCC): ICD-10-CM

## 2020-05-11 DIAGNOSIS — J41.0 SIMPLE CHRONIC BRONCHITIS (HCC): ICD-10-CM

## 2020-05-11 DIAGNOSIS — E78.2 MIXED HYPERLIPIDEMIA: ICD-10-CM

## 2020-05-11 PROBLEM — F41.1 GENERALIZED ANXIETY DISORDER: Chronic | Status: RESOLVED | Noted: 2019-07-23 | Resolved: 2020-05-11

## 2020-05-11 PROBLEM — J06.9 ACUTE UPPER RESPIRATORY INFECTION: Status: RESOLVED | Noted: 2020-04-10 | Resolved: 2020-05-11

## 2020-05-11 PROBLEM — I85.00 VARICES OF ESOPHAGUS DETERMINED BY ENDOSCOPY: Status: ACTIVE | Noted: 2020-04-15

## 2020-05-11 PROBLEM — M25.552 PAIN OF LEFT HIP JOINT: Status: RESOLVED | Noted: 2020-02-10 | Resolved: 2020-05-11

## 2020-05-11 LAB
ALBUMIN SERPL-MCNC: 4 G/DL (ref 3.5–5.2)
ALBUMIN/GLOB SERPL: 1.1 G/DL
ALP SERPL-CCNC: 72 U/L (ref 39–117)
ALT SERPL W P-5'-P-CCNC: 26 U/L (ref 1–33)
ANION GAP SERPL CALCULATED.3IONS-SCNC: 9.1 MMOL/L (ref 5–15)
AST SERPL-CCNC: 40 U/L (ref 1–32)
BASOPHILS # BLD AUTO: 0.02 10*3/MM3 (ref 0–0.2)
BASOPHILS NFR BLD AUTO: 0.3 % (ref 0–1.5)
BILIRUB SERPL-MCNC: 0.6 MG/DL (ref 0.2–1.2)
BUN BLD-MCNC: 22 MG/DL (ref 8–23)
BUN/CREAT SERPL: 24.2 (ref 7–25)
CALCIUM SPEC-SCNC: 10.2 MG/DL (ref 8.6–10.5)
CHLORIDE SERPL-SCNC: 97 MMOL/L (ref 98–107)
CHOLEST SERPL-MCNC: 206 MG/DL (ref 0–200)
CO2 SERPL-SCNC: 29.9 MMOL/L (ref 22–29)
CREAT BLD-MCNC: 0.91 MG/DL (ref 0.57–1)
DEPRECATED RDW RBC AUTO: 44.7 FL (ref 37–54)
EOSINOPHIL # BLD AUTO: 0.08 10*3/MM3 (ref 0–0.4)
EOSINOPHIL NFR BLD AUTO: 1.2 % (ref 0.3–6.2)
ERYTHROCYTE [DISTWIDTH] IN BLOOD BY AUTOMATED COUNT: 13.7 % (ref 12.3–15.4)
GFR SERPL CREATININE-BSD FRML MDRD: 62 ML/MIN/1.73
GLOBULIN UR ELPH-MCNC: 3.6 GM/DL
GLUCOSE BLD-MCNC: 217 MG/DL (ref 65–99)
HBA1C MFR BLD: 7.3 % (ref 3.5–5.6)
HCT VFR BLD AUTO: 37.9 % (ref 34–46.6)
HDLC SERPL-MCNC: 60 MG/DL (ref 40–60)
HGB BLD-MCNC: 12.7 G/DL (ref 12–15.9)
IMM GRANULOCYTES # BLD AUTO: 0.02 10*3/MM3 (ref 0–0.05)
IMM GRANULOCYTES NFR BLD AUTO: 0.3 % (ref 0–0.5)
LDLC SERPL CALC-MCNC: 118 MG/DL (ref 0–100)
LDLC/HDLC SERPL: 1.96 {RATIO}
LYMPHOCYTES # BLD AUTO: 1.53 10*3/MM3 (ref 0.7–3.1)
LYMPHOCYTES NFR BLD AUTO: 23.4 % (ref 19.6–45.3)
MCH RBC QN AUTO: 29.9 PG (ref 26.6–33)
MCHC RBC AUTO-ENTMCNC: 33.5 G/DL (ref 31.5–35.7)
MCV RBC AUTO: 89.2 FL (ref 79–97)
MONOCYTES # BLD AUTO: 0.56 10*3/MM3 (ref 0.1–0.9)
MONOCYTES NFR BLD AUTO: 8.5 % (ref 5–12)
NEUTROPHILS # BLD AUTO: 4.34 10*3/MM3 (ref 1.7–7)
NEUTROPHILS NFR BLD AUTO: 66.3 % (ref 42.7–76)
NRBC BLD AUTO-RTO: 0 /100 WBC (ref 0–0.2)
PLATELET # BLD AUTO: 144 10*3/MM3 (ref 140–450)
PMV BLD AUTO: 11 FL (ref 6–12)
POTASSIUM BLD-SCNC: 4 MMOL/L (ref 3.5–5.2)
PROT SERPL-MCNC: 7.6 G/DL (ref 6–8.5)
RBC # BLD AUTO: 4.25 10*6/MM3 (ref 3.77–5.28)
SODIUM BLD-SCNC: 136 MMOL/L (ref 136–145)
TRIGL SERPL-MCNC: 141 MG/DL (ref 0–150)
VLDLC SERPL-MCNC: 28.2 MG/DL (ref 5–40)
WBC NRBC COR # BLD: 6.55 10*3/MM3 (ref 3.4–10.8)

## 2020-05-11 PROCEDURE — 80053 COMPREHEN METABOLIC PANEL: CPT

## 2020-05-11 PROCEDURE — 83036 HEMOGLOBIN GLYCOSYLATED A1C: CPT

## 2020-05-11 PROCEDURE — 80061 LIPID PANEL: CPT

## 2020-05-11 PROCEDURE — 85025 COMPLETE CBC W/AUTO DIFF WBC: CPT

## 2020-05-11 PROCEDURE — 99214 OFFICE O/P EST MOD 30 MIN: CPT | Performed by: FAMILY MEDICINE

## 2020-05-11 PROCEDURE — 36415 COLL VENOUS BLD VENIPUNCTURE: CPT

## 2020-05-11 RX ORDER — NADOLOL 20 MG/1
20 TABLET ORAL EVERY MORNING
COMMUNITY
Start: 2020-04-15

## 2020-05-11 NOTE — PROGRESS NOTES
Subjective   Dariana Rivera is a 64 y.o. female.     Chief Complaint   Patient presents with   • Hypertension     3 MTH F/U   • Hyperlipidemia   • Diabetes       HPI  Chief complaint: Hypertension hyperlipidemia COPD cirrhosis type 2 diabetes mellitus Parkinson disease bipolar disease    Patient is a 64-year-old white female comes in for follow-up and maintenance of her current problems which include      1. Hypertension-stable-patient is on Lasix 40 mg daily and nadolol 20 mg twice a day potassium replacement therapy.  She denied headache lightheadedness dizziness or chest pain.    2.  Hyperlipidemia-stable-patient is currently on a diet.    3.  COPD-stable-patient on Symbicort 160/4.52 puffs twice a day Spiriva 1 puff daily rescue inhaler.  She denied cough shortness of breath wheezing or sputum production.    4.  Anxiety and depression-stable-patient on clonazepam 1 mg twice a day as needed Latuda 40 mg daily.  She denied anxiousness agitation depressed or suicidal thoughts.    5.  Cirrhosis-stable-patient is currently on nadolol Lasix and potassium and lactulose.  The patient had a recent upper GI endoscopy.  Patient was found to have varices.  The patient may need to have banding of the varices.  Patient also is taking vitamin C.    6.  Seizure disorder-stable-patient is on clonazepam as needed and Neurontin.  She denied recent seizures.    7.  Recurrent urinary tract infections-stable-patient's on Macrodantin as needed.    8.  Parkinson's disease-stable-patient currently takes Sinemet 25/102 tablets 3 times a day and Mirapex 0.5 mg 3 times a day.  States her tremors are under good control.  She denied falls or posturing.    9.  Type 2 diabetes mellitus-stable-the patient is currently on Actos 30 mg daily.  Patient denied polydipsia polyphagia or polyuria.  She denied low blood sugars.        The following portions of the patient's history were reviewed and updated as appropriate: allergies, current  "medications, past family history, past medical history, past social history, past surgical history and problem list.    Review of Systems    Objective     /76 (BP Location: Left arm, Patient Position: Sitting, Cuff Size: Large Adult)   Pulse 65   Temp 97.4 °F (36.3 °C) (Temporal)   Resp 18   Ht 170.2 cm (67\")   Wt 129 kg (284 lb 12.8 oz)   SpO2 99%   BMI 44.61 kg/m²     Physical Exam   Constitutional: She is oriented to person, place, and time.   obese   HENT:   Head: Normocephalic and atraumatic.   Eyes: Pupils are equal, round, and reactive to light. Conjunctivae and EOM are normal.   Neck: Normal range of motion. Neck supple.   Cardiovascular: Normal rate, regular rhythm, normal heart sounds and intact distal pulses.   Pulmonary/Chest: Effort normal and breath sounds normal.   Abdominal: Soft. Bowel sounds are normal.   Musculoskeletal: Normal range of motion.   Neurological: She is alert and oriented to person, place, and time.   Skin: Skin is warm and dry.   Psychiatric: She has a normal mood and affect. Her behavior is normal.   Nursing note and vitals reviewed.        Assessment/Plan   Dariana was seen today for hypertension, hyperlipidemia and diabetes.    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    Have the follow up labs done and call for results.    Follow up in the office in 3 months.      Paul Larson Jr., " MD    05/11/20

## 2020-05-15 ENCOUNTER — TELEPHONE (OUTPATIENT)
Dept: FAMILY MEDICINE CLINIC | Facility: CLINIC | Age: 65
End: 2020-05-15

## 2020-05-21 RX ORDER — PIOGLITAZONEHYDROCHLORIDE 30 MG/1
TABLET ORAL
Qty: 90 TABLET | Refills: 1 | Status: SHIPPED | OUTPATIENT
Start: 2020-05-21 | End: 2020-11-12

## 2020-06-16 DIAGNOSIS — F41.1 GENERALIZED ANXIETY DISORDER: Chronic | ICD-10-CM

## 2020-06-16 RX ORDER — CLONAZEPAM 1 MG/1
1 TABLET ORAL 2 TIMES DAILY PRN
Qty: 60 TABLET | Refills: 1 | Status: SHIPPED | OUTPATIENT
Start: 2020-06-16 | End: 2020-08-13

## 2020-06-30 ENCOUNTER — HOSPITAL ENCOUNTER (OUTPATIENT)
Dept: GENERAL RADIOLOGY | Facility: HOSPITAL | Age: 65
Discharge: HOME | End: 2020-06-30
Admitting: UROLOGY

## 2020-06-30 ENCOUNTER — TRANSCRIBE ORDERS (OUTPATIENT)
Dept: ADMINISTRATIVE | Facility: HOSPITAL | Age: 65
End: 2020-06-30

## 2020-06-30 DIAGNOSIS — K80.50 STONES COMMON DUCT: Primary | ICD-10-CM

## 2020-06-30 DIAGNOSIS — K80.50 STONES COMMON DUCT: ICD-10-CM

## 2020-06-30 PROCEDURE — 74018 RADEX ABDOMEN 1 VIEW: CPT

## 2020-07-08 ENCOUNTER — TRANSCRIBE ORDERS (OUTPATIENT)
Dept: ADMINISTRATIVE | Facility: HOSPITAL | Age: 65
End: 2020-07-08

## 2020-07-08 ENCOUNTER — LAB (OUTPATIENT)
Dept: LAB | Facility: HOSPITAL | Age: 65
End: 2020-07-08

## 2020-07-08 DIAGNOSIS — E55.9 VITAMIN D DEFICIENCY, UNSPECIFIED: ICD-10-CM

## 2020-07-08 DIAGNOSIS — E87.1 HYPOSMOLALITY SYNDROME: ICD-10-CM

## 2020-07-08 DIAGNOSIS — I10 ESSENTIAL HYPERTENSION: Primary | ICD-10-CM

## 2020-07-08 DIAGNOSIS — I10 ESSENTIAL HYPERTENSION: ICD-10-CM

## 2020-07-08 LAB
25(OH)D3 SERPL-MCNC: 59.8 NG/ML (ref 30–100)
ALBUMIN SERPL-MCNC: 4.1 G/DL (ref 3.5–5.2)
ALBUMIN/GLOB SERPL: 1.2 G/DL
ALP SERPL-CCNC: 66 U/L (ref 39–117)
ALT SERPL W P-5'-P-CCNC: 24 U/L (ref 1–33)
ANION GAP SERPL CALCULATED.3IONS-SCNC: 10.6 MMOL/L (ref 5–15)
AST SERPL-CCNC: 40 U/L (ref 1–32)
BASOPHILS # BLD AUTO: 0.03 10*3/MM3 (ref 0–0.2)
BASOPHILS NFR BLD AUTO: 0.5 % (ref 0–1.5)
BILIRUB SERPL-MCNC: 0.7 MG/DL (ref 0–1.2)
BUN SERPL-MCNC: 23 MG/DL (ref 8–23)
BUN/CREAT SERPL: 23.2 (ref 7–25)
CALCIUM SPEC-SCNC: 10.6 MG/DL (ref 8.6–10.5)
CHLORIDE SERPL-SCNC: 98 MMOL/L (ref 98–107)
CO2 SERPL-SCNC: 31.4 MMOL/L (ref 22–29)
CREAT SERPL-MCNC: 0.99 MG/DL (ref 0.57–1)
DEPRECATED RDW RBC AUTO: 44.2 FL (ref 37–54)
EOSINOPHIL # BLD AUTO: 0.09 10*3/MM3 (ref 0–0.4)
EOSINOPHIL NFR BLD AUTO: 1.4 % (ref 0.3–6.2)
ERYTHROCYTE [DISTWIDTH] IN BLOOD BY AUTOMATED COUNT: 13.3 % (ref 12.3–15.4)
GFR SERPL CREATININE-BSD FRML MDRD: 57 ML/MIN/1.73
GLOBULIN UR ELPH-MCNC: 3.5 GM/DL
GLUCOSE SERPL-MCNC: 228 MG/DL (ref 65–99)
HCT VFR BLD AUTO: 39.8 % (ref 34–46.6)
HGB BLD-MCNC: 13.1 G/DL (ref 12–15.9)
IMM GRANULOCYTES # BLD AUTO: 0.01 10*3/MM3 (ref 0–0.05)
IMM GRANULOCYTES NFR BLD AUTO: 0.2 % (ref 0–0.5)
LYMPHOCYTES # BLD AUTO: 1.51 10*3/MM3 (ref 0.7–3.1)
LYMPHOCYTES NFR BLD AUTO: 22.9 % (ref 19.6–45.3)
MAGNESIUM SERPL-MCNC: 1.9 MG/DL (ref 1.6–2.4)
MCH RBC QN AUTO: 29.7 PG (ref 26.6–33)
MCHC RBC AUTO-ENTMCNC: 32.9 G/DL (ref 31.5–35.7)
MCV RBC AUTO: 90.2 FL (ref 79–97)
MONOCYTES # BLD AUTO: 0.55 10*3/MM3 (ref 0.1–0.9)
MONOCYTES NFR BLD AUTO: 8.4 % (ref 5–12)
NEUTROPHILS NFR BLD AUTO: 4.39 10*3/MM3 (ref 1.7–7)
NEUTROPHILS NFR BLD AUTO: 66.6 % (ref 42.7–76)
NRBC BLD AUTO-RTO: 0 /100 WBC (ref 0–0.2)
PLATELET # BLD AUTO: 153 10*3/MM3 (ref 140–450)
PMV BLD AUTO: 11.1 FL (ref 6–12)
POTASSIUM SERPL-SCNC: 4 MMOL/L (ref 3.5–5.2)
PROT SERPL-MCNC: 7.6 G/DL (ref 6–8.5)
RBC # BLD AUTO: 4.41 10*6/MM3 (ref 3.77–5.28)
SODIUM SERPL-SCNC: 140 MMOL/L (ref 136–145)
WBC # BLD AUTO: 6.58 10*3/MM3 (ref 3.4–10.8)

## 2020-07-08 PROCEDURE — 36415 COLL VENOUS BLD VENIPUNCTURE: CPT

## 2020-07-08 PROCEDURE — 85025 COMPLETE CBC W/AUTO DIFF WBC: CPT

## 2020-07-08 PROCEDURE — 83735 ASSAY OF MAGNESIUM: CPT

## 2020-07-08 PROCEDURE — 82306 VITAMIN D 25 HYDROXY: CPT

## 2020-07-08 PROCEDURE — 80053 COMPREHEN METABOLIC PANEL: CPT

## 2020-07-11 DIAGNOSIS — F31.32 BIPOLAR 1 DISORDER, DEPRESSED, MODERATE (HCC): Chronic | ICD-10-CM

## 2020-07-13 RX ORDER — LURASIDONE HYDROCHLORIDE 40 MG/1
40 TABLET, FILM COATED ORAL NIGHTLY
Qty: 30 TABLET | Refills: 1 | Status: SHIPPED | OUTPATIENT
Start: 2020-07-13 | End: 2020-08-05 | Stop reason: SDUPTHER

## 2020-07-16 RX ORDER — PRAMIPEXOLE DIHYDROCHLORIDE 0.5 MG/1
TABLET ORAL
Qty: 90 TABLET | Refills: 2 | Status: SHIPPED | OUTPATIENT
Start: 2020-07-16 | End: 2020-10-12

## 2020-08-05 ENCOUNTER — OFFICE VISIT (OUTPATIENT)
Dept: PSYCHIATRY | Facility: CLINIC | Age: 65
End: 2020-08-05

## 2020-08-05 DIAGNOSIS — F31.32 BIPOLAR 1 DISORDER, DEPRESSED, MODERATE (HCC): Primary | Chronic | ICD-10-CM

## 2020-08-05 DIAGNOSIS — F41.1 GENERALIZED ANXIETY DISORDER: Chronic | ICD-10-CM

## 2020-08-05 PROCEDURE — 99442 PR PHYS/QHP TELEPHONE EVALUATION 11-20 MIN: CPT | Performed by: PSYCHIATRY & NEUROLOGY

## 2020-08-05 RX ORDER — LURASIDONE HYDROCHLORIDE 40 MG/1
40 TABLET, FILM COATED ORAL NIGHTLY
Qty: 30 TABLET | Refills: 2 | Status: SHIPPED | OUTPATIENT
Start: 2020-08-05 | End: 2020-11-03 | Stop reason: SDUPTHER

## 2020-08-05 RX ORDER — ROPINIROLE 0.25 MG/1
0.25 TABLET, FILM COATED ORAL 2 TIMES DAILY
Qty: 60 TABLET | Refills: 2 | Status: SHIPPED | OUTPATIENT
Start: 2020-08-05 | End: 2020-11-03 | Stop reason: SDUPTHER

## 2020-08-05 RX ORDER — ESCITALOPRAM OXALATE 5 MG/1
5 TABLET ORAL DAILY
Qty: 30 TABLET | Refills: 2 | Status: SHIPPED | OUTPATIENT
Start: 2020-08-05 | End: 2020-08-27
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
## 2020-08-05 NOTE — PATIENT INSTRUCTIONS
Bipolar 1 Disorder  Bipolar 1 disorder is a mental health disorder in which a person has episodes of emotional highs (mary), and may also have episodes of emotional lows (depression) in addition to highs. Bipolar 1 disorder is different from other bipolar disorders because it involves extreme manic episodes. These episodes last at least one week or involve symptoms that are so severe that hospitalization is needed to keep the person safe.  What increases the risk?  The cause of this condition is not known. However, certain factors make you more likely to have bipolar disorder, such as:  · Having a family member with the disorder.  · An imbalance of certain chemicals in the brain (neurotransmitters).  · Stress, such as illness, financial problems, or a death.  · Certain conditions that affect the brain or spinal cord (neurologic conditions).  · Brain injury (trauma).  · Having another mental health disorder, such as:  ? Obsessive compulsive disorder.  ? Schizophrenia.  What are the signs or symptoms?  Symptoms of mary include:  · Very high self-esteem or self-confidence.  · Decreased need for sleep.  · Unusual talkativeness or feeling a need to keep talking. Speech may be very fast. It may seem like you cannot stop talking.  · Racing thoughts or constant talking, with quick shifts between topics that may or may not be related (flight of ideas).  · Decreased ability to focus or concentrate.  · Increased purposeful activity, such as work, studies, or social activity.  · Increased nonproductive activity. This could be pacing, squirming and fidgeting, or finger and toe tapping.  · Impulsive behavior and poor judgment. This may result in high-risk activities, such as having unprotected sex or spending a lot of money.  Symptoms of depression include:  · Feeling sad, hopeless, or helpless.  · Frequent or uncontrollable crying.  · Lack of feeling or caring about anything.  · Sleeping too much.  · Moving more slowly than  usual.  · Not being able to enjoy things you used to enjoy.  · Wanting to be alone all the time.  · Feeling guilty or worthless.  · Lack of energy or motivation.  · Trouble concentrating or remembering.  · Trouble making decisions.  · Increased appetite.  · Thoughts of death, or the desire to harm yourself.  Sometimes, you may have a mixed mood. This means having symptoms of depression and mary. Stress can make symptoms worse.  How is this diagnosed?  To diagnose bipolar disorder, your health care provider may ask about your:  · Emotional episodes.  · Medical history.  · Alcohol and drug use. This includes prescription medicines. Certain medical conditions and substances can cause symptoms that seem like bipolar disorder (secondary bipolar disorder).  How is this treated?  Bipolar disorder is a long-term (chronic) illness. It is best controlled with ongoing (continuous) treatment rather than treatment only when symptoms occur. Treatment may include:  · Medicine. Medicine can be prescribed by a provider who specializes in treating mental disorders (psychiatrist).  ? Medicines called mood stabilizers are usually prescribed.  ? If symptoms occur even while taking a mood stabilizer, other medicines may be added.  · Psychotherapy. Some forms of talk therapy, such as cognitive-behavioral therapy (CBT), can provide support, education, and guidance.  · Coping methods, such as journaling or relaxation exercises. These may include:  ? Yoga.  ? Meditation.  ? Deep breathing.  · Lifestyle changes, such as:  ? Limiting alcohol and drug use.  ? Exercising regularly.  ? Getting plenty of sleep.  ? Making healthy eating choices.    A combination of medicine, talk therapy, and coping methods is best. A procedure in which electricity is applied to the brain through the scalp (electroconvulsive therapy) may be used in cases of severe mary when medicine and psychotherapy work too slowly or do not work.  Follow these instructions at  home:  Activity    · Return to your normal activities as told by your health care provider.  · Find activities that you enjoy, and make time to do them.  · Exercise regularly as told by your health care provider.  Lifestyle  · Limit alcohol intake to no more than 1 drink a day for nonpregnant women and 2 drinks a day for men. One drink equals 12 oz of beer, 5 oz of wine, or 1½ oz of hard liquor.  · Follow a set schedule for eating and sleeping.  · Eat a balanced diet that includes fresh fruits and vegetables, whole grains, low-fat dairy, and lean meat.  · Get 7-8 hours of sleep each night.  General instructions  · Take over-the-counter and prescription medicines only as told by your health care provider.  · Think about joining a support group. Your health care provider may be able to recommend a support group.  · Talk with your family and loved ones about your treatment goals and how they can help.  · Keep all follow-up visits as told by your health care provider. This is important.  Where to find more information  For more information about bipolar disorder, visit the following websites:  · National [place] on Mental Illness: www.tania.org  · [place] National [place] of Mental Health: www.nimh.nih.gov  Contact a health care provider if:  · Your symptoms get worse.  · You have side effects from your medicine, and they get worse.  · You have trouble sleeping.  · You have trouble doing daily activities.  · You feel unsafe in your surroundings.  · You are dealing with substance abuse.  Get help right away if:  · You have new symptoms.  · You have thoughts about harming yourself.  · You self-harm.  This information is not intended to replace advice given to you by your health care provider. Make sure you discuss any questions you have with your health care provider.  Document Released: 03/26/2002 Document Revised: 11/30/2018 Document Reviewed: 08/17/2017  Elsevier Patient Education © 2020 [employer]

## 2020-08-11 DIAGNOSIS — F41.1 GENERALIZED ANXIETY DISORDER: Chronic | ICD-10-CM

## 2020-08-12 ENCOUNTER — OFFICE (AMBULATORY)
Dept: URBAN - METROPOLITAN AREA CLINIC 64 | Facility: CLINIC | Age: 65
End: 2020-08-12

## 2020-08-12 ENCOUNTER — TRANSCRIBE ORDERS (OUTPATIENT)
Dept: ADMINISTRATIVE | Facility: HOSPITAL | Age: 65
End: 2020-08-12

## 2020-08-12 VITALS
HEIGHT: 67 IN | HEART RATE: 69 BPM | WEIGHT: 291 LBS | DIASTOLIC BLOOD PRESSURE: 68 MMHG | SYSTOLIC BLOOD PRESSURE: 133 MMHG

## 2020-08-12 DIAGNOSIS — I85.00 ESOPHAGEAL VARICES WITHOUT BLEEDING: ICD-10-CM

## 2020-08-12 DIAGNOSIS — K74.69 OTHER CIRRHOSIS OF LIVER: ICD-10-CM

## 2020-08-12 DIAGNOSIS — K21.9 GASTRO-ESOPHAGEAL REFLUX DISEASE WITHOUT ESOPHAGITIS: ICD-10-CM

## 2020-08-12 DIAGNOSIS — Z86.010 PERSONAL HISTORY OF COLONIC POLYPS: ICD-10-CM

## 2020-08-12 DIAGNOSIS — K74.60 HEPATIC CIRRHOSIS, UNSPECIFIED HEPATIC CIRRHOSIS TYPE, UNSPECIFIED WHETHER ASCITES PRESENT (HCC): Primary | ICD-10-CM

## 2020-08-12 DIAGNOSIS — R10.84 GENERALIZED ABDOMINAL PAIN: ICD-10-CM

## 2020-08-12 DIAGNOSIS — R13.10 DYSPHAGIA, UNSPECIFIED: ICD-10-CM

## 2020-08-12 PROCEDURE — 99213 OFFICE O/P EST LOW 20 MIN: CPT | Performed by: INTERNAL MEDICINE

## 2020-08-12 RX ORDER — LACTULOSE 10 G/15ML
SOLUTION ORAL; RECTAL
Qty: 900 | Refills: 11 | Status: COMPLETED
End: 2021-08-30

## 2020-08-12 RX ORDER — OMEGA-3S/DHA/EPA/FISH OIL 980-1400MG
CAPSULE,DELAYED RELEASE (ENTERIC COATED) ORAL
Qty: 90 | Refills: 5 | Status: ACTIVE
Start: 2020-08-12

## 2020-08-13 RX ORDER — CLONAZEPAM 1 MG/1
1 TABLET ORAL 2 TIMES DAILY PRN
Qty: 60 TABLET | Refills: 2 | Status: SHIPPED | OUTPATIENT
Start: 2020-08-13 | End: 2020-11-03 | Stop reason: SDUPTHER

## 2020-08-19 ENCOUNTER — OFFICE VISIT (OUTPATIENT)
Dept: PULMONOLOGY | Facility: HOSPITAL | Age: 65
End: 2020-08-19

## 2020-08-19 VITALS
SYSTOLIC BLOOD PRESSURE: 126 MMHG | OXYGEN SATURATION: 95 % | DIASTOLIC BLOOD PRESSURE: 76 MMHG | HEIGHT: 67 IN | BODY MASS INDEX: 44.42 KG/M2 | RESPIRATION RATE: 12 BRPM | WEIGHT: 283 LBS | TEMPERATURE: 98.4 F | HEART RATE: 60 BPM

## 2020-08-19 DIAGNOSIS — F19.982 HYPERSOMNIA DUE TO DRUG (HCC): ICD-10-CM

## 2020-08-19 DIAGNOSIS — G47.33 OBSTRUCTIVE SLEEP APNEA SYNDROME: ICD-10-CM

## 2020-08-19 DIAGNOSIS — K74.60 CIRRHOSIS OF LIVER WITHOUT ASCITES, UNSPECIFIED HEPATIC CIRRHOSIS TYPE (HCC): ICD-10-CM

## 2020-08-19 DIAGNOSIS — J41.0 SIMPLE CHRONIC BRONCHITIS (HCC): Primary | ICD-10-CM

## 2020-08-19 PROCEDURE — G0463 HOSPITAL OUTPT CLINIC VISIT: HCPCS

## 2020-08-19 RX ORDER — ALBUTEROL SULFATE 90 UG/1
2 AEROSOL, METERED RESPIRATORY (INHALATION) EVERY 6 HOURS PRN
Qty: 1 G | Refills: 5 | Status: SHIPPED | OUTPATIENT
Start: 2020-08-19 | End: 2022-08-29 | Stop reason: SDUPTHER

## 2020-08-19 RX ORDER — PHENOL 1.4 %
10 AEROSOL, SPRAY (ML) MUCOUS MEMBRANE NIGHTLY PRN
COMMUNITY
End: 2022-04-13 | Stop reason: SDUPTHER

## 2020-08-19 NOTE — PROGRESS NOTES
8/19/2020     Dariana Rivera  1955      Chief Complaint   Patient presents with   • Sleep Apnea     MSC    • COPD     on O2 at HS ([facility])         Subjective   Ms Rivera is a 64 year old female with a PMH sig for LISANDRO, COPD, DM, Seizures, Parkinson's Disease, and GERD presented to clinic today for follow up to her COPD and LISANDRO.      She was last seen in October 2019 by [facility] nurse practitioner at that time no management changes were made.  Patient is here for the follow-up today.  She had 1 ER visit back in April 2020 for possibility of COVID as she lost her sense of smell and taste.  Cover test was negative and was sent home.  She was not given any antibiotics or steroids.    Patient symptoms of cough and shortness of breath are stable.  She is not on any supplemental oxygen during daytime.  She has occasional wheezing and coughing.  She is compliant with her Symbicort and Spiriva and asking for refills on pro-air.  Her symptoms get worse with humid weather.  Cough remains nonproductive.    She also brought the smart card with her.  Smartcard download is reviewed.  She has excellent compliance.  Patient [place] sleepiness score is 12.  She is on multiple medications that can contribute to sleepiness including baclofen, clonazepam, gabapentin, Latuda, Requip at nighttime.  Patient was also using Benadryl at nighttime which was stopped and currently on melatonin at nighttime.    Patient weight remains stable.  She was diagnosed with  She was last seen by me on 4/15/19.  Since then she had two hospitalizations one in July where she was treated for a respiratory infection and in August for altered mental status likely secondary to elevated CO2 levels.  She said she fell asleep while watching tv and was without her PAP.  When she woke up she was confused and her spouse called EMS.  She was treated about a month ago for an upper respiratory infection by her primary physician.  Today she reports feeling  improved.  After the second hospitalization she stayed two weeks in rehab.  She is home now.  She has physical therapy once per week and she also has home health nursing care five days per week.  She continues to use Spiriva and Symbicort and feels better with use.  She remains with shortness of air with activity that is better at rest.  She is using 1 liter of oxygen with sleep with her PAP.  Data sheet reviewed and showed 172 days with device and 12 days without.  93.5% compliance.  Average usage time is 5 hours and 2 minutes.  Greater than 4 hours use is 81%.  Average AHI is 0.2.  No issues with her machine or mask.  She feels her symptoms of LISANDRO are controlled with use of PAP.  She is going to be admitted to [facility] today according to her for studies related to her seizures.  She has her PAP with her to take.  No fevers and no worsening cough.      Review of System  General: Denies fevers or chills.  Denies any weakness or fatigue.  Denies any weight loss or weight gain.  HEENT: Denies sore throat, rhinorrhea, ear pain.  Denies any change in vision.   LUNGS: Denies any shortness of breath or wheezing.  Denies any cough.  Denies any hemoptysis.  CARDIAC: Denies any chest pain, palpitations. Denies edema  ABD: Denies any abdominal pain.  Denies any N/V/D  PSYCH: Negative for suicidal ideas. Denies anxiety or depression  All other systems reviewed and negative unless stated in HPI       Objective    Vitals:    08/19/20 1115   BP: 126/76   Pulse: 60   Resp: 12   Temp: 98.4 °F (36.9 °C)   SpO2: 95%       General: NAD, alert and oriented x 4  Cardiac: S1, S2, RRR, no murmur, no edema  Pulmonary: CTA bilaterally, no wheezing   Abdomen: soft, non-tender, non-distended  : Voids independently, no CVA tenderness   Musculoskeletal: Moves all extremities, equal strength bilaterally  Neuro: alert and oriented x 3, CN 2 - 12 grossly intact  Skin: warm, dry, and intact          Current Outpatient Medications:   •  albuterol  sulfate  (90 Base) MCG/ACT inhaler, Inhale 2 puffs Every 6 (Six) Hours As Needed for Wheezing. Use if needed dos bring, Disp: , Rfl:   •  aspirin 81 MG chewable tablet, Chew 81 mg Daily. Do not take dos, Disp: , Rfl:   •  baclofen (LIORESAL) 10 MG tablet, Take 10 mg by mouth 3 (Three) Times a Day. Do not take dos, Disp: , Rfl: 3  •  carbidopa-levodopa (SINEMET)  MG per tablet, Take 2 tablets by mouth 3 (Three) Times a Day. Take preop, Disp: , Rfl:   •  clonazePAM (KlonoPIN) 1 MG tablet, TAKE 1 TABLET BY MOUTH 2 (TWO) TIMES A DAY AS NEEDED FOR ANXIETY, Disp: 60 tablet, Rfl: 2  •  escitalopram (LEXAPRO) 5 MG tablet, Take 1 tablet by mouth Daily., Disp: 30 tablet, Rfl: 2  •  furosemide (LASIX) 40 MG tablet, TAKE 1 TABLET BY MOUTH EVERY DAY (Patient taking differently: Take 40 mg by mouth 2 (Two) Times a Day. Do not take preop), Disp: 90 tablet, Rfl: 0  •  gabapentin (NEURONTIN) 300 MG capsule, Take 300 mg by mouth 3 (Three) Times a Day. Take preop, Disp: , Rfl:   •  ipratropium-albuterol (DUO-NEB) 0.5-2.5 mg/3 ml nebulizer, Take 3 mL by nebulization Every 4 (Four) Hours As Needed for Wheezing. Use preop if needed, Disp: , Rfl:   •  KLOR-CON 20 MEQ CR tablet, TAKE 1 TABLET BY MOUTH DAILY (Patient taking differently: Take 20 mEq by mouth 2 (Two) Times a Day. Take preop), Disp: 90 tablet, Rfl: 0  •  lactulose (CHRONULAC) 10 GM/15ML solution, Take 30 mL by mouth Every Night., Disp: , Rfl:   •  lurasidone (Latuda) 40 MG tablet tablet, Take 1 tablet by mouth Every Night., Disp: 30 tablet, Rfl: 2  •  Melatonin 10 MG tablet, Take 10 mg by mouth At Night As Needed., Disp: , Rfl:   •  Multiple Vitamins-Minerals (MULTIVITAMIN WITH MINERALS) tablet tablet, Take 1 tablet by mouth Daily. Do not take dos, Disp: , Rfl:   •  nadolol (CORGARD) 20 MG tablet, Take 20 mg by mouth., Disp: , Rfl:   •  nitrofurantoin, macrocrystal-monohydrate, (MACROBID) 100 MG capsule, Take 100 mg by mouth every night at bedtime., Disp: , Rfl:  11  •  O2 (OXYGEN), Inhale 1 L/min 1 (One) Time. @@ night, Disp: , Rfl:   •  omeprazole (priLOSEC) 40 MG capsule, Take 40 mg by mouth Daily. Take preop, Disp: , Rfl:   •  pioglitazone (ACTOS) 30 MG tablet, TAKE 1 TABLET BY MOUTH EVERY DAY, Disp: 90 tablet, Rfl: 1  •  pramipexole (MIRAPEX) 0.5 MG tablet, TAKE 1 TABLET BY MOUTH THREE TIMES A DAY, Disp: 90 tablet, Rfl: 2  •  rOPINIRole (REQUIP) 0.25 MG tablet, Take 1 tablet by mouth 2 (Two) Times a Day. Take preop, Disp: 60 tablet, Rfl: 2  •  SYMBICORT 160-4.5 MCG/ACT inhaler, INHALEE 2 PUFFS BY MOUTH TWICE A DAY (Patient taking differently: Inhale 2 puffs 2 (Two) Times a Day. Use preop), Disp: 30.6 inhaler, Rfl: 1  •  tiotropium (SPIRIVA) 18 MCG per inhalation capsule, Place 1 capsule into inhaler and inhale Daily. Use preop, Disp: 90 capsule, Rfl: 1  •  vitamin C (ASCORBIC ACID) 500 MG tablet, Take 1,000 mg by mouth Daily. dont take preop, Disp: , Rfl:     Social History     Socioeconomic History   • Marital status:      Spouse name: Not on file   • Number of children: Not on file   • Years of education: Not on file   • Highest education level: Not on file   Social Needs   • Financial resource strain: Not on file   • Food insecurity:     Worry: Never true     Inability: Never true   • Transportation needs:     Medical: No     Non-medical: No   Tobacco Use   • Smoking status: Former Smoker     Packs/day: 0.50     Years: 10.00     Pack years: 5.00     Types: Cigarettes     Start date: 1974     Last attempt to quit:      Years since quittin.1   • Smokeless tobacco: Never Used   • Tobacco comment: Stopped off and on.  Stopped once for more than 2 yrs   Substance and Sexual Activity   • Alcohol use: No     Frequency: Never   • Drug use: No   • Sexual activity: Never       Past Medical History:   Diagnosis Date   • Allergic Not sure    Penicillin, cipro, clindamycin, methadone, ambien   • Anxiety disorder     [facility]    • Back pain    • Bipolar  disorder (CMS/HCC)    • Cataract     Left & right removed in 3/2016   • Cervical dystonia    • Cholelithiasis     Gallbladder removed in 1995 or 97   • Chronic pain     with stenosis   • Cirrhosis (CMS/HCC)    • Colitis    • Colon polyps    • COPD (chronic obstructive pulmonary disease) (CMS/HCC)    • Cramping of feet    • Cramping of hands    • DDD (degenerative disc disease), cervical    • DDD (degenerative disc disease), lumbar    • DDD (degenerative disc disease), lumbar    • DDD (degenerative disc disease), thoracic    • Depression    • Diverticulosis     Not sure when diagnosed   • Eosinophilic colitis    • Gastritis    • GERD (gastroesophageal reflux disease)    • Headache Not sure    Migraines stopped after complete hyst in 2004   • Hepatic steatosis     non alcoholic steo-hepatitis    • Hypertension    • IBS (irritable bowel syndrome)    • Infectious viral hepatitis     [facility]   • Insomnia    • Kidney stones    • Low back pain     Not sure when diagnosed   • Lumbar stenosis    • Neck pain    • Neuromuscular disorder (CMS/HCC) Not sure    Parkinsons and Cervical Dystonia   • Neuropathy    • Obesity     Obese since about 1966   • Osteoarthritis    • Parkinson's disease (CMS/HCC)    • Peripheral edema    • Pneumonia May 2018    About a month after neck fusion surgery   • PTSD (post-traumatic stress disorder)    • Recurrent UTI    • Renal insufficiency     Not sure when diagnosed   • Seizure (CMS/HCC)     onset 7/2016   • Sleep apnea     Using Bipap machine at night. advised to bring dos   • Sleep apnea, obstructive    • Type II diabetes mellitus (CMS/HCC)    • Urinary incontinence                Diagnoses and all orders for this visit:    1. COPD (CMS/HCC) (Primary)  -No new exacerbations.  Will continue current inhalers Symbicort and Spiriva.  Pro-air as needed.    -No hospitalizations.  2. Obstructive sleep apnea syndrome  -Reviewed smartcard download.  Days with usage is 180 and days without usage is 0.   Average use is 7 hours 10 minutes.  Her usage above 4 hours is 100%.  She is on auto BiPAP 20/10 with pressure support of 4.  Average IPAP is 15.8 with average EPAP of 11.8.  Her residual AHI 0.6  -[place] sleepiness score is 12 however she is on multiple medications that it can induce sedation.  Patient is on Klonopin, baclofen, gabapentin, Latuda, Requip.  Patient is instructed that she can use caffeinated beverages if needed however we will not start her on any stimulants.  Also recommend to wean any of those medications  -She does not drive   -Patient is instructed to use the BiPAP during daytime with naps and at nighttime   3. Body mass index (BMI) of 45.0-49.9 in adult (CMS/HCC)    4. Cirrhosis of liver without ascites, unspecified hepatic cirrhosis type (CMS/HCC)    5. 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

## 2020-08-21 ENCOUNTER — HOSPITAL ENCOUNTER (OUTPATIENT)
Dept: ULTRASOUND IMAGING | Facility: HOSPITAL | Age: 65
Discharge: HOME | End: 2020-08-21
Admitting: INTERNAL MEDICINE

## 2020-08-21 ENCOUNTER — LAB (OUTPATIENT)
Dept: LAB | Facility: HOSPITAL | Age: 65
End: 2020-08-21

## 2020-08-21 ENCOUNTER — TRANSCRIBE ORDERS (OUTPATIENT)
Dept: ADMINISTRATIVE | Facility: HOSPITAL | Age: 65
End: 2020-08-21

## 2020-08-21 DIAGNOSIS — K74.60 HEPATIC CIRRHOSIS, UNSPECIFIED HEPATIC CIRRHOSIS TYPE, UNSPECIFIED WHETHER ASCITES PRESENT (HCC): ICD-10-CM

## 2020-08-21 DIAGNOSIS — K74.69 CIRRHOSIS, CRYPTOGENIC (HCC): ICD-10-CM

## 2020-08-21 DIAGNOSIS — I70.209 DIABETES TYPE II WITH ATHEROSCLEROSIS OF ARTERIES OF EXTREMITIES (HCC): Primary | ICD-10-CM

## 2020-08-21 DIAGNOSIS — E11.51 DIABETES TYPE II WITH ATHEROSCLEROSIS OF ARTERIES OF EXTREMITIES (HCC): Primary | ICD-10-CM

## 2020-08-21 DIAGNOSIS — I70.209 DIABETES TYPE II WITH ATHEROSCLEROSIS OF ARTERIES OF EXTREMITIES (HCC): ICD-10-CM

## 2020-08-21 DIAGNOSIS — K21.9 GERD WITH APNEA: ICD-10-CM

## 2020-08-21 DIAGNOSIS — R06.81 GERD WITH APNEA: ICD-10-CM

## 2020-08-21 DIAGNOSIS — E11.51 DIABETES TYPE II WITH ATHEROSCLEROSIS OF ARTERIES OF EXTREMITIES (HCC): ICD-10-CM

## 2020-08-21 DIAGNOSIS — R10.9 ABDOMINAL PAIN, UNSPECIFIED ABDOMINAL LOCATION: ICD-10-CM

## 2020-08-21 DIAGNOSIS — R13.10 DYSPHAGIA, UNSPECIFIED TYPE: ICD-10-CM

## 2020-08-21 LAB
ALBUMIN SERPL-MCNC: 4.2 G/DL (ref 3.5–5.2)
ALBUMIN/GLOB SERPL: 1.4 G/DL
ALP SERPL-CCNC: 62 U/L (ref 39–117)
ALPHA-FETOPROTEIN: 4.05 NG/ML (ref 0–8.3)
ALT SERPL W P-5'-P-CCNC: 26 U/L (ref 1–33)
ANION GAP SERPL CALCULATED.3IONS-SCNC: 14.9 MMOL/L (ref 5–15)
AST SERPL-CCNC: 42 U/L (ref 1–32)
BASOPHILS # BLD AUTO: 0.02 10*3/MM3 (ref 0–0.2)
BASOPHILS NFR BLD AUTO: 0.3 % (ref 0–1.5)
BILIRUB SERPL-MCNC: 0.7 MG/DL (ref 0–1.2)
BUN SERPL-MCNC: 19 MG/DL (ref 8–23)
BUN/CREAT SERPL: 18.3 (ref 7–25)
CALCIUM SPEC-SCNC: 9.8 MG/DL (ref 8.6–10.5)
CHLORIDE SERPL-SCNC: 95 MMOL/L (ref 98–107)
CO2 SERPL-SCNC: 29.1 MMOL/L (ref 22–29)
CREAT SERPL-MCNC: 1.04 MG/DL (ref 0.57–1)
DEPRECATED RDW RBC AUTO: 44.6 FL (ref 37–54)
EOSINOPHIL # BLD AUTO: 0.07 10*3/MM3 (ref 0–0.4)
EOSINOPHIL NFR BLD AUTO: 1.1 % (ref 0.3–6.2)
ERYTHROCYTE [DISTWIDTH] IN BLOOD BY AUTOMATED COUNT: 13.7 % (ref 12.3–15.4)
GFR SERPL CREATININE-BSD FRML MDRD: 54 ML/MIN/1.73
GLOBULIN UR ELPH-MCNC: 3 GM/DL
GLUCOSE SERPL-MCNC: 131 MG/DL (ref 65–99)
HCT VFR BLD AUTO: 40.1 % (ref 34–46.6)
HGB BLD-MCNC: 13.4 G/DL (ref 12–15.9)
IMM GRANULOCYTES # BLD AUTO: 0.01 10*3/MM3 (ref 0–0.05)
IMM GRANULOCYTES NFR BLD AUTO: 0.2 % (ref 0–0.5)
INR PPP: 1.04 (ref 0.93–1.1)
LYMPHOCYTES # BLD AUTO: 1.48 10*3/MM3 (ref 0.7–3.1)
LYMPHOCYTES NFR BLD AUTO: 23.8 % (ref 19.6–45.3)
MCH RBC QN AUTO: 29.7 PG (ref 26.6–33)
MCHC RBC AUTO-ENTMCNC: 33.4 G/DL (ref 31.5–35.7)
MCV RBC AUTO: 88.9 FL (ref 79–97)
MONOCYTES # BLD AUTO: 0.57 10*3/MM3 (ref 0.1–0.9)
MONOCYTES NFR BLD AUTO: 9.2 % (ref 5–12)
NEUTROPHILS NFR BLD AUTO: 4.06 10*3/MM3 (ref 1.7–7)
NEUTROPHILS NFR BLD AUTO: 65.4 % (ref 42.7–76)
NRBC BLD AUTO-RTO: 0 /100 WBC (ref 0–0.2)
PLATELET # BLD AUTO: 138 10*3/MM3 (ref 140–450)
PMV BLD AUTO: 11 FL (ref 6–12)
POTASSIUM SERPL-SCNC: 3.9 MMOL/L (ref 3.5–5.2)
PROT SERPL-MCNC: 7.2 G/DL (ref 6–8.5)
PROTHROMBIN TIME: 11.2 SECONDS (ref 9.6–11.7)
RBC # BLD AUTO: 4.51 10*6/MM3 (ref 3.77–5.28)
SODIUM SERPL-SCNC: 139 MMOL/L (ref 136–145)
WBC # BLD AUTO: 6.21 10*3/MM3 (ref 3.4–10.8)

## 2020-08-21 PROCEDURE — 85025 COMPLETE CBC W/AUTO DIFF WBC: CPT

## 2020-08-21 PROCEDURE — 76705 ECHO EXAM OF ABDOMEN: CPT

## 2020-08-21 PROCEDURE — 82105 ALPHA-FETOPROTEIN SERUM: CPT

## 2020-08-21 PROCEDURE — 36415 COLL VENOUS BLD VENIPUNCTURE: CPT

## 2020-08-21 PROCEDURE — 80053 COMPREHEN METABOLIC PANEL: CPT

## 2020-08-21 PROCEDURE — 85610 PROTHROMBIN TIME: CPT

## 2020-08-26 ENCOUNTER — OFFICE VISIT (OUTPATIENT)
Dept: FAMILY MEDICINE CLINIC | Facility: CLINIC | Age: 65
End: 2020-08-26

## 2020-08-26 ENCOUNTER — LAB (OUTPATIENT)
Dept: LAB | Facility: HOSPITAL | Age: 65
End: 2020-08-26

## 2020-08-26 VITALS
RESPIRATION RATE: 24 BRPM | TEMPERATURE: 98.2 F | OXYGEN SATURATION: 96 % | BODY MASS INDEX: 44.86 KG/M2 | WEIGHT: 285.8 LBS | SYSTOLIC BLOOD PRESSURE: 101 MMHG | HEART RATE: 56 BPM | DIASTOLIC BLOOD PRESSURE: 65 MMHG | HEIGHT: 67 IN

## 2020-08-26 DIAGNOSIS — M54.6 CHRONIC THORACIC BACK PAIN, UNSPECIFIED BACK PAIN LATERALITY: ICD-10-CM

## 2020-08-26 DIAGNOSIS — F31.32 BIPOLAR 1 DISORDER, DEPRESSED, MODERATE (HCC): Chronic | ICD-10-CM

## 2020-08-26 DIAGNOSIS — K74.60 CIRRHOSIS OF LIVER WITHOUT ASCITES, UNSPECIFIED HEPATIC CIRRHOSIS TYPE (HCC): ICD-10-CM

## 2020-08-26 DIAGNOSIS — K21.9 GASTROESOPHAGEAL REFLUX DISEASE WITHOUT ESOPHAGITIS: ICD-10-CM

## 2020-08-26 DIAGNOSIS — E11.49 TYPE 2 DIABETES MELLITUS WITH OTHER DIABETIC NEUROLOGICAL COMPLICATION (HCC): ICD-10-CM

## 2020-08-26 DIAGNOSIS — R56.9 SEIZURE (HCC): ICD-10-CM

## 2020-08-26 DIAGNOSIS — G20 PARKINSON'S DISEASE (HCC): ICD-10-CM

## 2020-08-26 DIAGNOSIS — I10 ESSENTIAL HYPERTENSION: Primary | ICD-10-CM

## 2020-08-26 DIAGNOSIS — I85.00 VARICES OF ESOPHAGUS DETERMINED BY ENDOSCOPY (HCC): ICD-10-CM

## 2020-08-26 DIAGNOSIS — J44.9 CHRONIC OBSTRUCTIVE PULMONARY DISEASE, UNSPECIFIED COPD TYPE (HCC): ICD-10-CM

## 2020-08-26 DIAGNOSIS — G89.29 CHRONIC THORACIC BACK PAIN, UNSPECIFIED BACK PAIN LATERALITY: ICD-10-CM

## 2020-08-26 DIAGNOSIS — E78.2 MIXED HYPERLIPIDEMIA: ICD-10-CM

## 2020-08-26 PROBLEM — S14.109A INJURY OF CERVICAL SPINE: Status: ACTIVE | Noted: 2020-08-26

## 2020-08-26 LAB
BACTERIA UR QL AUTO: ABNORMAL /HPF
BILIRUB UR QL STRIP: NEGATIVE
CLARITY UR: ABNORMAL
COLOR UR: YELLOW
GLUCOSE UR STRIP-MCNC: NEGATIVE MG/DL
HBA1C MFR BLD: 7.5 % (ref 3.5–5.6)
HGB UR QL STRIP.AUTO: NEGATIVE
HYALINE CASTS UR QL AUTO: ABNORMAL /LPF
KETONES UR QL STRIP: NEGATIVE
LEUKOCYTE ESTERASE UR QL STRIP.AUTO: ABNORMAL
NITRITE UR QL STRIP: NEGATIVE
PH UR STRIP.AUTO: 5.5 [PH] (ref 5–8)
PROT UR QL STRIP: NEGATIVE
RBC # UR: ABNORMAL /HPF
REF LAB TEST METHOD: ABNORMAL
SP GR UR STRIP: 1.02 (ref 1–1.03)
SQUAMOUS #/AREA URNS HPF: ABNORMAL /HPF
UROBILINOGEN UR QL STRIP: ABNORMAL
WBC UR QL AUTO: ABNORMAL /HPF

## 2020-08-26 PROCEDURE — 87088 URINE BACTERIA CULTURE: CPT

## 2020-08-26 PROCEDURE — 83036 HEMOGLOBIN GLYCOSYLATED A1C: CPT

## 2020-08-26 PROCEDURE — 81001 URINALYSIS AUTO W/SCOPE: CPT

## 2020-08-26 PROCEDURE — 87086 URINE CULTURE/COLONY COUNT: CPT

## 2020-08-26 PROCEDURE — 36415 COLL VENOUS BLD VENIPUNCTURE: CPT

## 2020-08-26 PROCEDURE — 99214 OFFICE O/P EST MOD 30 MIN: CPT | Performed by: FAMILY MEDICINE

## 2020-08-26 PROCEDURE — 87186 SC STD MICRODIL/AGAR DIL: CPT

## 2020-08-26 NOTE — PROGRESS NOTES
Subjective   Dariana Rivera is a 64 y.o. female.     Chief Complaint   Patient presents with   • Hypertension     3month followup   • COPD   • Diabetes       HPI chief complaint: See depression hypertension diabetes mellitus chronic pain syndrome    The patient is a 64-year-old white female comes in for follow-up and maintenance of her current problems which include    1.  Depression-deteriorated-patient states that she has had increased thoracic and neck pain of late.  She states is moderately severe.  She states is impaired of her mobility.  She also states that she has had increased anxiousness regarding the pandemic.  She states that she is lost 2 close family members recently.  She states as result of this her depression is worse.  She did contact her psychiatrist.  She was placed on Lexapro 5 mg once a day.  She also takes Latuda 40 mg daily and clonazepam 1 mg twice a day as needed.  She states it is not helping.  I recommended she contact her psychiatrist.    2.  Hypertension-stable-patient on Corgard 20 mg twice a day Lasix 40 mg twice a day and potassium.  She denied headache lightheadedness dizziness or chest pain.    3.  Type 2 diabetes mellitus-stable-patient on Actos 30 mg daily.  She denies polydipsia polyphagia or polyuria.  She denies low blood sugars.    4.  Chronic back and neck pain-deteriorated-the patient has had worsening upper back and neck pain.  She has been to a spine surgeon.  A bone densitometry and cervical myelogram is been scheduled.  The patient is waiting to have these tests done.  She denied fever or bowel or bladder problems.  She is on gabapentin 300 mg 3 times a day and baclofen.    5.  Cirrhosis-stable-patient is currently on lactulose 20 mg a day nadolol 20 mg daily potassium and Lasix.  Patient does have esophagitis.  She is not had recent GI bleeds.  She sees gastroenterology on a regular basis.    6.  COPD-stable-patient on oxygen Symbicort 160/4.52 puffs twice a day  "Spiriva once a day and rescue inhaler.  She denied increased cough shortness of breath or wheezing.    7.  Parkinson's disease-stable-patient is on Sinemet and Requip.  Patient has chronic tremor of her head not consistent with Parkinson's.  She does not have tremor consistent with Parkinson's involving the extremity.  #8 seizures-stable-patient is currently on clonazepam and gabapentin.  She has not had seizures of late.          The following portions of the patient's history were reviewed and updated as appropriate: allergies, current medications, past family history, past medical history, past social history, past surgical history and problem list.    Review of Systems    Objective     /65 (BP Location: Left arm, Patient Position: Sitting, Cuff Size: Large Adult)   Pulse 56   Temp 98.2 °F (36.8 °C) (Infrared)   Resp 24   Ht 170.2 cm (67\")   Wt 130 kg (285 lb 12.8 oz)   SpO2 96%   BMI 44.76 kg/m²     Physical Exam   Constitutional: She is oriented to person, place, and time. She appears well-developed and well-nourished.   HENT:   Head: Normocephalic and atraumatic.   Eyes: Pupils are equal, round, and reactive to light. Conjunctivae and EOM are normal.   Neck: Normal range of motion. Neck supple.   Cardiovascular: Normal rate, regular rhythm, normal heart sounds and intact distal pulses.   Pulmonary/Chest: Effort normal and breath sounds normal.   Abdominal: Soft. Bowel sounds are normal.   Musculoskeletal: Normal range of motion.   Neurological: She is alert and oriented to person, place, and time.   Skin: Skin is warm and dry.   Psychiatric: She has a normal mood and affect. Her behavior is normal.   Nursing note and vitals reviewed.        Assessment/Plan   Dariana was seen today for hypertension, copd and diabetes.    Diagnoses and all orders for this visit:    Essential hypertension    Injury of cervical spine, subsequent encounter (CMS/[facility])    Varices of esophagus determined by endoscopy " (CMS/HCC)    Mixed hyperlipidemia    Simple chronic bronchitis (CMS/HCC)    Cirrhosis of liver without ascites, unspecified hepatic cirrhosis type (CMS/HCC)    Gastroesophageal reflux disease without esophagitis    Type 2 diabetes mellitus with other diabetic neurological complication (CMS/HCC)  -     Hemoglobin A1c; Future    Chronic thoracic back pain, unspecified back pain laterality    Seizure (CMS/HCC)    Bipolar 1 disorder, depressed, moderate (CMS/HCC)-offered urgent hospitalization to help with depression and her increased pain.  The patient states she is not suicidal or homicidal.  She did not want to go to the hospital.      Patient Instructions   Continue your current medications and treatment.    Have the follow up labs done and call for results.    Check with your psychiatrist regarding possible increasing the Lexapro.    Follow up in the office in 3 month.      Paul Larson Jr., MD    08/26/20

## 2020-08-26 NOTE — PATIENT INSTRUCTIONS
Continue your current medications and treatment.    Have the follow up labs done and call for results.    Check with your psychiatrist regarding possible increasing the Lexapro.    Follow up in the office in 3 month.

## 2020-08-27 DIAGNOSIS — F41.1 GENERALIZED ANXIETY DISORDER: Chronic | ICD-10-CM

## 2020-08-27 RX ORDER — ESCITALOPRAM OXALATE 5 MG/1
TABLET ORAL
Qty: 30 TABLET | Refills: 2 | Status: SHIPPED | OUTPATIENT
Start: 2020-08-27 | End: 2020-08-28 | Stop reason: SDUPTHER

## 2020-08-28 DIAGNOSIS — S14.109D INJURY OF CERVICAL SPINE, SUBSEQUENT ENCOUNTER (HCC): ICD-10-CM

## 2020-08-28 DIAGNOSIS — F41.1 GENERALIZED ANXIETY DISORDER: Chronic | ICD-10-CM

## 2020-08-28 LAB — BACTERIA SPEC AEROBE CULT: ABNORMAL

## 2020-08-28 RX ORDER — ESCITALOPRAM OXALATE 10 MG/1
10 TABLET ORAL DAILY
Qty: 30 TABLET | Refills: 1 | Status: SHIPPED | OUTPATIENT
Start: 2020-08-28 | End: 2020-09-22

## 2020-08-28 RX ORDER — SULFAMETHOXAZOLE AND TRIMETHOPRIM 800; 160 MG/1; MG/1
1 TABLET ORAL 2 TIMES DAILY
Qty: 20 TABLET | Refills: 0 | Status: SHIPPED | OUTPATIENT
Start: 2020-08-28 | End: 2021-02-17

## 2020-09-10 ENCOUNTER — TRANSCRIBE ORDERS (OUTPATIENT)
Dept: ADMINISTRATIVE | Facility: HOSPITAL | Age: 65
End: 2020-09-10

## 2020-09-10 DIAGNOSIS — M54.2 NECK PAIN: Primary | ICD-10-CM

## 2020-09-16 ENCOUNTER — HOSPITAL ENCOUNTER (OUTPATIENT)
Dept: BONE DENSITY | Facility: HOSPITAL | Age: 65
Discharge: HOME | End: 2020-09-16
Admitting: NEUROLOGICAL SURGERY

## 2020-09-16 DIAGNOSIS — M54.2 NECK PAIN: ICD-10-CM

## 2020-09-16 PROCEDURE — 77080 DXA BONE DENSITY AXIAL: CPT

## 2020-09-18 ENCOUNTER — HOSPITAL ENCOUNTER (OUTPATIENT)
Dept: GENERAL RADIOLOGY | Facility: HOSPITAL | Age: 65
Discharge: HOME | End: 2020-09-18
Admitting: PHYSICIAN ASSISTANT

## 2020-09-18 ENCOUNTER — TRANSCRIBE ORDERS (OUTPATIENT)
Dept: ADMINISTRATIVE | Facility: HOSPITAL | Age: 65
End: 2020-09-18

## 2020-09-18 DIAGNOSIS — M54.2 NECK PAIN: Primary | ICD-10-CM

## 2020-09-18 DIAGNOSIS — M54.2 NECK PAIN: ICD-10-CM

## 2020-09-18 PROCEDURE — 72040 X-RAY EXAM NECK SPINE 2-3 VW: CPT

## 2020-09-21 DIAGNOSIS — F41.1 GENERALIZED ANXIETY DISORDER: Chronic | ICD-10-CM

## 2020-09-22 RX ORDER — ESCITALOPRAM OXALATE 10 MG/1
TABLET ORAL
Qty: 30 TABLET | Refills: 1 | Status: SHIPPED | OUTPATIENT
Start: 2020-09-22 | End: 2020-10-20

## 2020-09-24 RX ORDER — NITROFURANTOIN 25; 75 MG/1; MG/1
100 CAPSULE ORAL
COMMUNITY
End: 2021-08-16

## 2020-09-26 ENCOUNTER — APPOINTMENT (OUTPATIENT)
Dept: LAB | Facility: HOSPITAL | Age: 65
End: 2020-09-26

## 2020-09-26 ENCOUNTER — LAB (OUTPATIENT)
Dept: LAB | Facility: HOSPITAL | Age: 65
End: 2020-09-26

## 2020-09-26 PROCEDURE — C9803 HOPD COVID-19 SPEC COLLECT: HCPCS

## 2020-09-26 PROCEDURE — U0004 COV-19 TEST NON-CDC HGH THRU: HCPCS

## 2020-09-27 LAB
SARS-COV-2 RNA NOSE QL NAA+PROBE: NOT DETECTED
SARS-COV-2 RNA RESP QL NAA+PROBE: NORMAL

## 2020-09-28 ENCOUNTER — ANESTHESIA EVENT (OUTPATIENT)
Dept: GASTROENTEROLOGY | Facility: HOSPITAL | Age: 65
End: 2020-09-28

## 2020-09-29 ENCOUNTER — ANESTHESIA (OUTPATIENT)
Dept: GASTROENTEROLOGY | Facility: HOSPITAL | Age: 65
End: 2020-09-29

## 2020-09-29 ENCOUNTER — ON CAMPUS - OUTPATIENT (AMBULATORY)
Dept: URBAN - METROPOLITAN AREA HOSPITAL 85 | Facility: HOSPITAL | Age: 65
End: 2020-09-29
Payer: MEDICARE

## 2020-09-29 ENCOUNTER — HOSPITAL ENCOUNTER (OUTPATIENT)
Facility: HOSPITAL | Age: 65
Setting detail: HOSPITAL OUTPATIENT SURGERY
Discharge: HOME | End: 2020-09-29
Attending: INTERNAL MEDICINE | Admitting: INTERNAL MEDICINE

## 2020-09-29 VITALS
HEIGHT: 64 IN | DIASTOLIC BLOOD PRESSURE: 87 MMHG | RESPIRATION RATE: 17 BRPM | HEART RATE: 65 BPM | SYSTOLIC BLOOD PRESSURE: 137 MMHG | TEMPERATURE: 98.4 F | WEIGHT: 288.8 LBS | OXYGEN SATURATION: 94 % | BODY MASS INDEX: 49.31 KG/M2

## 2020-09-29 DIAGNOSIS — I85.00 ESOPHAGEAL VARICES WITHOUT BLEEDING: ICD-10-CM

## 2020-09-29 DIAGNOSIS — K21.9 GASTRO-ESOPHAGEAL REFLUX DISEASE WITHOUT ESOPHAGITIS: ICD-10-CM

## 2020-09-29 DIAGNOSIS — R13.10 DYSPHAGIA, UNSPECIFIED: ICD-10-CM

## 2020-09-29 DIAGNOSIS — K44.9 DIAPHRAGMATIC HERNIA WITHOUT OBSTRUCTION OR GANGRENE: ICD-10-CM

## 2020-09-29 LAB
GLUCOSE BLDC GLUCOMTR-MCNC: 100 MG/DL (ref 70–105)
GLUCOSE BLDC GLUCOMTR-MCNC: 88 MG/DL (ref 70–105)

## 2020-09-29 PROCEDURE — 82962 GLUCOSE BLOOD TEST: CPT

## 2020-09-29 PROCEDURE — 43450 DILATE ESOPHAGUS 1/MULT PASS: CPT | Performed by: INTERNAL MEDICINE

## 2020-09-29 PROCEDURE — 25010000002 PROPOFOL 10 MG/ML EMULSION: Performed by: ANESTHESIOLOGY

## 2020-09-29 PROCEDURE — 43235 EGD DIAGNOSTIC BRUSH WASH: CPT | Performed by: INTERNAL MEDICINE

## 2020-09-29 RX ORDER — PROPOFOL 10 MG/ML
VIAL (ML) INTRAVENOUS AS NEEDED
Status: DISCONTINUED | OUTPATIENT
Start: 2020-09-29 | End: 2020-09-29 | Stop reason: SURG

## 2020-09-29 RX ORDER — SODIUM CHLORIDE 0.9 % (FLUSH) 0.9 %
10 SYRINGE (ML) INJECTION AS NEEDED
Status: DISCONTINUED | OUTPATIENT
Start: 2020-09-29 | End: 2020-09-29 | Stop reason: HOSPADM

## 2020-09-29 RX ORDER — SODIUM CHLORIDE 0.9 % (FLUSH) 0.9 %
10 SYRINGE (ML) INJECTION EVERY 12 HOURS SCHEDULED
Status: DISCONTINUED | OUTPATIENT
Start: 2020-09-29 | End: 2020-09-29 | Stop reason: HOSPADM

## 2020-09-29 RX ORDER — SODIUM CHLORIDE 9 MG/ML
9 INJECTION, SOLUTION INTRAVENOUS CONTINUOUS PRN
Status: DISCONTINUED | OUTPATIENT
Start: 2020-09-29 | End: 2020-09-29 | Stop reason: HOSPADM

## 2020-09-29 RX ADMIN — SODIUM CHLORIDE 9 ML/HR: 900 INJECTION, SOLUTION INTRAVENOUS at 10:44

## 2020-09-29 RX ADMIN — PROPOFOL 150 MG: 10 INJECTION, EMULSION INTRAVENOUS at 12:01

## 2020-09-29 RX ADMIN — GLYCOPYRROLATE 0.2 MG: 0.2 INJECTION, SOLUTION INTRAMUSCULAR; INTRAVITREAL at 11:56

## 2020-09-29 NOTE — ANESTHESIA PREPROCEDURE EVALUATION
Anesthesia Evaluation     Patient summary reviewed and Nursing notes reviewed   NPO Solid Status: > 6 hours  NPO Liquid Status: > 2 hours           Airway   Mallampati: III  TM distance: <3 FB  Neck ROM: full  Possible difficult intubation, Small opening and Large neck circumference  Dental    (+) poor dentition    Comment: Missing all but 3 lower teeth    Pulmonary    (+) a smoker Current, COPD, home oxygen, sleep apnea on CPAP, decreased breath sounds,   Cardiovascular - normal exam    ECG reviewed    (+) hypertension, angina, hyperlipidemia,     ROS comment: 2/19 nml Cath, EF 65%    1/20 ECG: SR, low voltage, 79      Neuro/Psych  (+) seizures, headaches, numbness, psychiatric history Anxiety, Depression, Bipolar and PTSD, dementia,     GI/Hepatic/Renal/Endo    (+) morbid obesity, GERD,  hepatitis, liver disease fatty liver disease, renal disease CRI, diabetes mellitus type 2,     Musculoskeletal     (+) back pain, chronic pain, neck pain,   Abdominal   (+) obese,    Substance History      OB/GYN          Other   arthritis,                    Anesthesia Plan    ASA 4     MAC     intravenous induction     Anesthetic plan, all risks, benefits, and alternatives have been provided, discussed and informed consent has been obtained with: patient.

## 2020-09-29 NOTE — ANESTHESIA POSTPROCEDURE EVALUATION
Patient: Dariana Rivera    Procedure Summary     Date: 09/29/20 Room / Location: [facility] / [facility]    Anesthesia Start: 1159 Anesthesia Stop: 1207    Procedure: ESOPHAGOGASTRODUODENOSCOPY with esophageal dilitation (54 bougie) (N/A ) Diagnosis:       Florid cirrhosis (CMS/HCC)      Difficulty in swallowing      Esophageal reflux      Personal history of colonic polyps      ALEXANDER (nonalcoholic steatohepatitis)      ([place]id cirrhosis (CMS/HCC) [K74.69])      (Difficulty in swallowing [R13.10])      (Esophageal reflux [K21.9])      (Personal history of colonic polyps [Z86.010])      (ALEXANDER (nonalcoholic steatohepatitis) [K75.81])    Surgeon: Julieta Allison MD Provider: Judy Sorto DO    Anesthesia Type: MAC ASA Status: 4          Anesthesia Type: MAC    Vitals  Vitals Value Taken Time   /87 09/29/20 1235   Temp     Pulse 65 09/29/20 1235   Resp 17 09/29/20 1235   SpO2 94 % 09/29/20 1235           Post Anesthesia Care and Evaluation    Patient location during evaluation: PACU  Patient participation: complete - patient participated  Level of consciousness: awake  Pain score: 0  Pain management: adequate  Airway patency: patent  Anesthetic complications: No anesthetic complications  PONV Status: none  Cardiovascular status: acceptable  Respiratory status: acceptable  Hydration status: acceptable

## 2020-10-12 RX ORDER — PRAMIPEXOLE DIHYDROCHLORIDE 0.5 MG/1
0.5 TABLET ORAL 2 TIMES DAILY
Qty: 180 TABLET | Refills: 1 | Status: SHIPPED | OUTPATIENT
Start: 2020-10-12 | End: 2020-11-03 | Stop reason: SDUPTHER

## 2020-10-14 ENCOUNTER — LAB (OUTPATIENT)
Dept: LAB | Facility: HOSPITAL | Age: 65
End: 2020-10-14

## 2020-10-14 ENCOUNTER — TRANSCRIBE ORDERS (OUTPATIENT)
Dept: ADMINISTRATIVE | Facility: HOSPITAL | Age: 65
End: 2020-10-14

## 2020-10-14 DIAGNOSIS — E87.1 HYPOSMOLALITY SYNDROME: ICD-10-CM

## 2020-10-14 DIAGNOSIS — E55.9 VITAMIN D DEFICIENCY: ICD-10-CM

## 2020-10-14 DIAGNOSIS — E87.1 HYPOSMOLALITY SYNDROME: Primary | ICD-10-CM

## 2020-10-14 LAB
25(OH)D3 SERPL-MCNC: 51.9 NG/ML (ref 30–100)
ALBUMIN SERPL-MCNC: 4.3 G/DL (ref 3.5–5.2)
ALBUMIN/GLOB SERPL: 1.3 G/DL
ALP SERPL-CCNC: 72 U/L (ref 39–117)
ALT SERPL W P-5'-P-CCNC: 28 U/L (ref 1–33)
ANION GAP SERPL CALCULATED.3IONS-SCNC: 10.5 MMOL/L (ref 5–15)
AST SERPL-CCNC: 51 U/L (ref 1–32)
BASOPHILS # BLD AUTO: 0.03 10*3/MM3 (ref 0–0.2)
BASOPHILS NFR BLD AUTO: 0.5 % (ref 0–1.5)
BILIRUB SERPL-MCNC: 0.5 MG/DL (ref 0–1.2)
BILIRUB UR QL STRIP: NEGATIVE
BUN SERPL-MCNC: 21 MG/DL (ref 8–23)
BUN/CREAT SERPL: 21.9 (ref 7–25)
CALCIUM SPEC-SCNC: 10.3 MG/DL (ref 8.6–10.5)
CHLORIDE SERPL-SCNC: 100 MMOL/L (ref 98–107)
CK SERPL-CCNC: 55 U/L (ref 20–180)
CLARITY UR: CLEAR
CO2 SERPL-SCNC: 31.5 MMOL/L (ref 22–29)
COLOR UR: YELLOW
CREAT SERPL-MCNC: 0.96 MG/DL (ref 0.57–1)
DEPRECATED RDW RBC AUTO: 45.1 FL (ref 37–54)
EOSINOPHIL # BLD AUTO: 0.07 10*3/MM3 (ref 0–0.4)
EOSINOPHIL NFR BLD AUTO: 1.2 % (ref 0.3–6.2)
ERYTHROCYTE [DISTWIDTH] IN BLOOD BY AUTOMATED COUNT: 13.6 % (ref 12.3–15.4)
GFR SERPL CREATININE-BSD FRML MDRD: 59 ML/MIN/1.73
GLOBULIN UR ELPH-MCNC: 3.4 GM/DL
GLUCOSE SERPL-MCNC: 168 MG/DL (ref 65–99)
GLUCOSE UR STRIP-MCNC: NEGATIVE MG/DL
HCT VFR BLD AUTO: 39.8 % (ref 34–46.6)
HGB BLD-MCNC: 13.4 G/DL (ref 12–15.9)
HGB UR QL STRIP.AUTO: NEGATIVE
IMM GRANULOCYTES # BLD AUTO: 0.01 10*3/MM3 (ref 0–0.05)
IMM GRANULOCYTES NFR BLD AUTO: 0.2 % (ref 0–0.5)
KETONES UR QL STRIP: NEGATIVE
LEUKOCYTE ESTERASE UR QL STRIP.AUTO: NEGATIVE
LYMPHOCYTES # BLD AUTO: 1.3 10*3/MM3 (ref 0.7–3.1)
LYMPHOCYTES NFR BLD AUTO: 22 % (ref 19.6–45.3)
MAGNESIUM SERPL-MCNC: 2 MG/DL (ref 1.6–2.4)
MCH RBC QN AUTO: 30.7 PG (ref 26.6–33)
MCHC RBC AUTO-ENTMCNC: 33.7 G/DL (ref 31.5–35.7)
MCV RBC AUTO: 91.1 FL (ref 79–97)
MONOCYTES # BLD AUTO: 0.53 10*3/MM3 (ref 0.1–0.9)
MONOCYTES NFR BLD AUTO: 9 % (ref 5–12)
NEUTROPHILS NFR BLD AUTO: 3.96 10*3/MM3 (ref 1.7–7)
NEUTROPHILS NFR BLD AUTO: 67.1 % (ref 42.7–76)
NITRITE UR QL STRIP: NEGATIVE
NRBC BLD AUTO-RTO: 0 /100 WBC (ref 0–0.2)
PH UR STRIP.AUTO: 5.5 [PH] (ref 5–8)
PHOSPHATE SERPL-MCNC: 3.1 MG/DL (ref 2.5–4.5)
PLATELET # BLD AUTO: 142 10*3/MM3 (ref 140–450)
PMV BLD AUTO: 11 FL (ref 6–12)
POTASSIUM SERPL-SCNC: 4.6 MMOL/L (ref 3.5–5.2)
PROT SERPL-MCNC: 7.7 G/DL (ref 6–8.5)
PROT UR QL STRIP: NEGATIVE
PTH-INTACT SERPL-MCNC: 38.3 PG/ML (ref 15–65)
RBC # BLD AUTO: 4.37 10*6/MM3 (ref 3.77–5.28)
SODIUM SERPL-SCNC: 142 MMOL/L (ref 136–145)
SP GR UR STRIP: 1.01 (ref 1–1.03)
URATE SERPL-MCNC: 7.8 MG/DL (ref 2.4–5.7)
UROBILINOGEN UR QL STRIP: NORMAL
WBC # BLD AUTO: 5.9 10*3/MM3 (ref 3.4–10.8)

## 2020-10-14 PROCEDURE — 83735 ASSAY OF MAGNESIUM: CPT

## 2020-10-14 PROCEDURE — 36415 COLL VENOUS BLD VENIPUNCTURE: CPT

## 2020-10-14 PROCEDURE — 85025 COMPLETE CBC W/AUTO DIFF WBC: CPT

## 2020-10-14 PROCEDURE — 82306 VITAMIN D 25 HYDROXY: CPT

## 2020-10-14 PROCEDURE — 83970 ASSAY OF PARATHORMONE: CPT

## 2020-10-14 PROCEDURE — 80053 COMPREHEN METABOLIC PANEL: CPT

## 2020-10-14 PROCEDURE — 84100 ASSAY OF PHOSPHORUS: CPT

## 2020-10-14 PROCEDURE — 82550 ASSAY OF CK (CPK): CPT

## 2020-10-14 PROCEDURE — 81003 URINALYSIS AUTO W/O SCOPE: CPT

## 2020-10-14 PROCEDURE — 84550 ASSAY OF BLOOD/URIC ACID: CPT

## 2020-10-18 DIAGNOSIS — F41.1 GENERALIZED ANXIETY DISORDER: Chronic | ICD-10-CM

## 2020-10-20 RX ORDER — ESCITALOPRAM OXALATE 10 MG/1
TABLET ORAL
Qty: 30 TABLET | Refills: 1 | Status: SHIPPED | OUTPATIENT
Start: 2020-10-20 | End: 2020-11-03 | Stop reason: SDUPTHER

## 2020-11-03 ENCOUNTER — OFFICE VISIT (OUTPATIENT)
Dept: PSYCHIATRY | Facility: CLINIC | Age: 65
End: 2020-11-03

## 2020-11-03 DIAGNOSIS — F41.1 GENERALIZED ANXIETY DISORDER: Chronic | ICD-10-CM

## 2020-11-03 DIAGNOSIS — F31.32 BIPOLAR 1 DISORDER, DEPRESSED, MODERATE (HCC): Primary | Chronic | ICD-10-CM

## 2020-11-03 PROCEDURE — 99442 PR PHYS/QHP TELEPHONE EVALUATION 11-20 MIN: CPT | Performed by: PSYCHIATRY & NEUROLOGY

## 2020-11-03 RX ORDER — ROPINIROLE 0.25 MG/1
0.25 TABLET, FILM COATED ORAL 2 TIMES DAILY
Qty: 60 TABLET | Refills: 3 | Status: SHIPPED | OUTPATIENT
Start: 2020-11-03 | End: 2021-04-02

## 2020-11-03 RX ORDER — CLONAZEPAM 1 MG/1
1 TABLET ORAL 2 TIMES DAILY PRN
Qty: 60 TABLET | Refills: 3 | Status: SHIPPED | OUTPATIENT
Start: 2020-11-03 | End: 2021-02-25

## 2020-11-03 RX ORDER — ESCITALOPRAM OXALATE 10 MG/1
10 TABLET ORAL DAILY
Qty: 30 TABLET | Refills: 3 | Status: SHIPPED | OUTPATIENT
Start: 2020-11-03 | End: 2021-02-16

## 2020-11-03 RX ORDER — PRAMIPEXOLE DIHYDROCHLORIDE 0.5 MG/1
0.5 TABLET ORAL 2 TIMES DAILY
Qty: 180 TABLET | Refills: 1 | Status: SHIPPED | OUTPATIENT
Start: 2020-11-03 | End: 2021-02-25 | Stop reason: SDUPTHER

## 2020-11-03 RX ORDER — LURASIDONE HYDROCHLORIDE 40 MG/1
40 TABLET, FILM COATED ORAL NIGHTLY
Qty: 30 TABLET | Refills: 3 | Status: SHIPPED | OUTPATIENT
Start: 2020-11-03 | End: 2021-02-25 | Stop reason: SDUPTHER

## 2020-11-03 NOTE — PROGRESS NOTES
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 rescheduled as a phone visit to comply with patient safety concerns in accordance with CDC recommendations. Total time of discussion was 15 minutes.    This document has been electronically signed by Archana Singh MD  November 3, 2020 12:45 EST

## 2020-11-03 NOTE — PATIENT INSTRUCTIONS
Bipolar 1 Disorder  Bipolar 1 disorder is a mental health disorder in which a person has episodes of emotional highs, or mary, and may also have episodes of lows, or depression. Bipolar 1 disorder is different from other bipolar disorders in that it involves extreme episodes of mary (manic episodes). These episodes last at least one week or involve symptoms that are so severe that hospitalization is needed to keep the person safe.  What are the causes?  The cause of this condition is not known.  What increases the risk?  The following factors may make you more likely to develop this condition:  · Having a family member with the disorder.  · Having an imbalance of certain chemicals in the brain (neurotransmitters).  · Experiencing stress, such as illness, financial problems, or a death.  · Having certain conditions that affect the brain or spinal cord (neurologic conditions).  · Having had a brain injury (trauma).  What are the signs or symptoms?  Symptoms of mary include:  · Very high self-esteem or self-confidence.  · Decreased need for sleep.  · Unusual talkativeness. Speech may be very fast.  · Racing thoughts with quick shifts between topics that may or may not be related (flight of ideas).  · Ability to concentrate either greatly improved or decreased.  · Increased purposeful activity, such as work, studies, or social activity.  · Increased agitation. This could be pacing, squirming, fidgeting, or finger and toe tapping.  · Impulsive behavior and poor judgment. This may result in high-risk activities that are sexual, financial, or physical.  Symptoms of depression include:  · Extreme degrees of sadness, uncontrollable crying, hopelessness, worthlessness, or numbness.  · Sleep problems, such as insomnia, waking early, or sleeping too much.  · No longer enjoying things you used to enjoy.  · Isolation. You may often spend time alone.  · Lack of energy or motivation, and moving more slowly than  normal.  · Trouble making decisions.  · Increased appetite or loss of appetite.  · Thoughts of death, or wanting to harm yourself.  Sometimes, you may have a mixed mood. This means having symptoms of mary and depression at the same time. Stress can often trigger these symptoms.  How is this diagnosed?  This condition may be diagnosed based on:  · Emotional episodes.  · Medical history.  · Use of alcohol, drugs, and prescription medicines. Certain medical conditions and substances can cause symptoms that seem like bipolar disorder. This is called secondary bipolar disorder.  Your health care provider may ask you to take a short test. This helps to understand your symptoms. You may also be asked to see a mental health specialist to follow up on this diagnosis and start treatment.  How is this treated?         This condition is a long-term (chronic) illness. It is often managed with ongoing treatment rather than treatment only when symptoms occur. A combination of treatments is the main approach. Treatment may include:  · Medicine. Medicine can be prescribed by a health care provider who specializes in treating mental health disorders (psychiatrist). Medicines called mood stabilizers are usually prescribed. If symptoms occur during treatment with a mood stabilizer, other medicines may be added.  · Psychotherapy. Some forms of talk therapy, such as cognitive behavioral therapy (CBT) and family therapy, can help with learning to manage bipolar disorder.  · Psychoeducation. This helps you and others understand how this disorder is managed. Include friends and family in educational sessions so they learn how best to support you.  · Methods of managing your condition, such as journaling or relaxation exercises. Relaxation exercises include:  ? Yoga.  ? Meditation.  ? Deep breathing.  · Lifestyle changes, such as:  ? Limiting alcohol and drug use.  ? Exercising regularly.  ? Structuring when you go to bed and get  up.  ? Eating a healthy diet.  · Electroconvulsive therapy (ECT). This is a procedure in which electricity is applied to the brain through the scalp. It may be used in cases of severe bipolar disorder when medicine and psychotherapy work too slowly or do not work.  Follow these instructions at home:  Activity  · Return to your normal activities as told by your health care provider.  · Find activities that you enjoy, and make time to do them.  · Exercise regularly as told by your health care provider.  Lifestyle    · Follow a set schedule for eating and sleeping.  · Eat a healthy diet that includes fresh fruits and vegetables, whole grains, low-fat dairy, and lean meat.  · Get at least 7-8 hours of sleep each night.  · Avoid using products that contain nicotine or tobacco. If you want help quitting, ask your health care provider.  · Do not use drugs.  Alcohol use  · Do not drink alcohol if:  ? Your health care provider tells you not to drink.  ? You are pregnant, may be pregnant, or are planning to become pregnant.  · If you drink alcohol:  ? Limit how much you use to:  § 0-1 drink a day for women.  § 0-2 drinks a day for men.  ? Be aware of how much alcohol is in your drink. In the [place] one drink equals one 12 oz bottle of beer (355 mL), one 5 oz glass of wine (148 mL), or one 1½ oz glass of hard liquor (44 mL).  General instructions  · Take over-the-counter and prescription medicines only as told by your health care provider. You may think about stopping your medicine, but it is very important to take all your medicine as prescribed.  · Consider joining a support group. Your health care provider may be able to recommend one.  · Talk with your family and friends about your treatment goals and how they can help.  · Keep all follow-up visits as told by your health care provider. This is important.  Where to find more information  · National [place] on Mental Illness: www.tania.org  · National [place] of Mental  Health: www.[facility].gov  Contact a health care provider if:  · Your symptoms get worse, or your loved ones tell you that your symptoms are getting worse.  · You have uncomfortable side effects from your medicine.  · You have trouble sleeping.  · You have trouble doing daily activities.  · You feel unsafe in your surroundings.  · You are self-medicating with alcohol or drugs.  Get help right away if:  · You have new symptoms.  · You have thoughts about harming yourself or others.  · You are considering suicide.  If you ever feel like you may hurt yourself or others, or have thoughts about taking your own life, get help right away. You can go to your nearest emergency department or call:  · Your local emergency services (911 in the [place]  · A suicide crisis helpline, such as the National Suicide Prevention Lifeline at 1-462.818.2535. This is open 24 hours a day.  Summary  · Bipolar 1 disorder is a lifelong mental health disorder in which a person has episodes of mary and depression.  · This disorder is mainly treated with a combination of medicines, talk and behavioral therapies, and, often, electroconvulsive therapy (ECT).  · Include friends and family in educational sessions so they know how best to support you.  · Get help right away if you are considering suicide.  This information is not intended to replace advice given to you by your health care provider. Make sure you discuss any questions you have with your health care provider.  Document Released: 03/26/2002 Document Revised: 06/02/2020 Document Reviewed: 06/02/2020  Elsevier Patient Education © 2020 [employer]

## 2020-11-12 RX ORDER — PIOGLITAZONEHYDROCHLORIDE 30 MG/1
TABLET ORAL
Qty: 90 TABLET | Refills: 1 | Status: SHIPPED | OUTPATIENT
Start: 2020-11-12 | End: 2021-05-06

## 2020-12-02 ENCOUNTER — OFFICE VISIT (OUTPATIENT)
Dept: CARDIOLOGY | Facility: CLINIC | Age: 65
End: 2020-12-02

## 2020-12-02 VITALS
DIASTOLIC BLOOD PRESSURE: 79 MMHG | BODY MASS INDEX: 44.49 KG/M2 | SYSTOLIC BLOOD PRESSURE: 121 MMHG | TEMPERATURE: 97.3 F | HEART RATE: 54 BPM | HEIGHT: 67 IN | WEIGHT: 283.5 LBS | OXYGEN SATURATION: 98 %

## 2020-12-02 DIAGNOSIS — E11.49 TYPE 2 DIABETES MELLITUS WITH OTHER DIABETIC NEUROLOGICAL COMPLICATION (HCC): ICD-10-CM

## 2020-12-02 DIAGNOSIS — E78.2 MIXED HYPERLIPIDEMIA: ICD-10-CM

## 2020-12-02 DIAGNOSIS — I10 ESSENTIAL HYPERTENSION: Primary | ICD-10-CM

## 2020-12-02 DIAGNOSIS — J41.0 SIMPLE CHRONIC BRONCHITIS (HCC): ICD-10-CM

## 2020-12-02 DIAGNOSIS — G47.30 SLEEP APNEA, UNSPECIFIED TYPE: ICD-10-CM

## 2020-12-02 PROCEDURE — 93000 ELECTROCARDIOGRAM COMPLETE: CPT | Performed by: INTERNAL MEDICINE

## 2020-12-02 PROCEDURE — 99213 OFFICE O/P EST LOW 20 MIN: CPT | Performed by: INTERNAL MEDICINE

## 2020-12-02 NOTE — PROGRESS NOTES
Encounter Date:12/02/2020  Last seen 12/2/2019      Patient ID: Dariana Rivera is a 65 y.o. female.    Chief Complaint:    Follow-up  History of chest discomfort  History of shortness of breath  Hypertension  Diabetes     History of Present Illness  Patient is having occasional left precordial sharp pain.  Nonexertional nonradiating.  Patient did not have cervical spine surgery.    Since I have last seen, the patient has been without any shortness of breath, palpitations, dizziness or syncope.  Denies having any headache ,abdominal pain ,nausea, vomiting , diarrhea constipation, loss of weight or loss of appetite.  Denies having any excessive bruising ,hematuria or blood in the stool.    Review of all systems negative except as indicated.    Reviewed ROS.    Assessment and Plan         [[[[[[[[[[[[[[[   impression  ====  -Chest pain-atypical    Normal left ventricular function and normal coronary arteries 02/15/2019     -history of pulmonary problems and is being evaluated by Pulmonary Medicine also.  It is my understanding her risk of surgery is increased from pulmonary standpoint.  Patient was intubated and on the respirator May of 2018. patient had seizure around the time of admission at that time.     -hypertension diabetes bipolar disorder anxiety disorder Parkinson's disease cirrhosis nonalcoholic steatohepatitis GERD.  History of seizure disorder    -History of left arm numbness had fullness and drooling from mouth.-Improved  Cervical spine surgery was not performed and patient is taking Botox injections for cervical dystonia.     -status post cholecystectomy hysterectomy lithotripsy new left knee replacement arthroscopic bilateral knee surgery cervical spine surgery     -exogenous obesity.     -former smoker     -allergic to penicillin Ambien methadone Cipro and clindamycin.  =============  Plan  ==========  Patient is not having any angina pectoris or congestive heart failure.  EKG showed sinus  bradycardia.  Chest discomfort-atypical.  History of normal coronary arteries February 2019  Medications were reviewed and updated today  History of left arm numbness.  Patient did not have cervical spine surgery.  Followup in the office in 1 year  Further plan will depend on patient's progress.  [[[[[[[[[[[[[[[[[[[             Diagnosis Plan   1. Essential hypertension  ECG 12 Lead   2. Mixed hyperlipidemia  ECG 12 Lead   3. Simple chronic bronchitis (CMS/HCC)     4. Type 2 diabetes mellitus with other diabetic neurological complication (CMS/HCC)     5. Sleep apnea, unspecified type     LAB RESULTS (LAST 7 DAYS)    CBC        BMP        CMP         BNP        TROPONIN        CoAg        Creatinine Clearance  CrCl cannot be calculated (Patient's most recent lab result is older than the maximum 30 days allowed.).    ABG        Radiology  No radiology results for the last day                The following portions of the patient's history were reviewed and updated as appropriate: allergies, current medications, past family history, past medical history, past social history, past surgical history and problem list.    Review of Systems   Constitution: Negative for malaise/fatigue.   Cardiovascular: Positive for chest pain. Negative for leg swelling, palpitations and syncope.   Respiratory: Positive for shortness of breath.    Skin: Negative for rash.   Musculoskeletal: Positive for joint pain (arms).   Gastrointestinal: Negative for nausea and vomiting.   Neurological: Positive for dizziness and light-headedness. Negative for numbness.         Current Outpatient Medications:   •  aspirin 81 MG chewable tablet, Chew 81 mg Daily. Do not take dos, Disp: , Rfl:   •  baclofen (LIORESAL) 10 MG tablet, Take 10 mg by mouth 3 (Three) Times a Day. Do not take dos, Disp: , Rfl: 3  •  carbidopa-levodopa (SINEMET)  MG per tablet, Take 2 tablets by mouth 3 (Three) Times a Day. Take preop, Disp: , Rfl:   •  clonazePAM (KlonoPIN)  1 MG tablet, Take 1 tablet by mouth 2 (Two) Times a Day As Needed for Anxiety., Disp: 60 tablet, Rfl: 3  •  escitalopram (LEXAPRO) 10 MG tablet, Take 1 tablet by mouth Daily., Disp: 30 tablet, Rfl: 3  •  furosemide (LASIX) 40 MG tablet, TAKE 1 TABLET BY MOUTH EVERY DAY (Patient taking differently: Take 40 mg by mouth 2 (Two) Times a Day. Do not take preop), Disp: 90 tablet, Rfl: 0  •  gabapentin (NEURONTIN) 300 MG capsule, Take 300 mg by mouth 3 (Three) Times a Day., Disp: , Rfl:   •  ipratropium-albuterol (DUO-NEB) 0.5-2.5 mg/3 ml nebulizer, Take 3 mL by nebulization Every 4 (Four) Hours As Needed for Wheezing. Use preop if needed, Disp: , Rfl:   •  KLOR-CON 20 MEQ CR tablet, TAKE 1 TABLET BY MOUTH DAILY (Patient taking differently: Take 20 mEq by mouth 2 (Two) Times a Day. Take preop), Disp: 90 tablet, Rfl: 0  •  lactulose (CHRONULAC) 10 GM/15ML solution, Take 15 mL by mouth Every Night., Disp: , Rfl:   •  lurasidone (Latuda) 40 MG tablet tablet, Take 1 tablet by mouth Every Night., Disp: 30 tablet, Rfl: 3  •  Melatonin 10 MG tablet, Take 10 mg by mouth At Night As Needed., Disp: , Rfl:   •  Multiple Vitamins-Minerals (MULTIVITAMIN WITH MINERALS) tablet tablet, Take 1 tablet by mouth Daily. Do not take dos, Disp: , Rfl:   •  nadolol (CORGARD) 20 MG tablet, Take 20 mg by mouth Every Morning. Take dos, Disp: , Rfl:   •  nitrofurantoin, macrocrystal-monohydrate, (MACROBID) 100 MG capsule, Take 100 mg by mouth every night at bedtime., Disp: , Rfl:   •  O2 (OXYGEN), Inhale 1 L/min 1 (One) Time. @@ night, Disp: , Rfl:   •  omeprazole (priLOSEC) 40 MG capsule, Take 40 mg by mouth Daily. Take preop, Disp: , Rfl:   •  pioglitazone (ACTOS) 30 MG tablet, TAKE 1 TABLET BY MOUTH EVERY DAY, Disp: 90 tablet, Rfl: 1  •  pramipexole (MIRAPEX) 0.5 MG tablet, Take 1 tablet by mouth 2 (two) times a day., Disp: 180 tablet, Rfl: 1  •  rOPINIRole (REQUIP) 0.25 MG tablet, Take 1 tablet by mouth 2 (Two) Times a Day. Take preop, Disp: 60  tablet, Rfl: 3  •  sulfamethoxazole-trimethoprim (Bactrim DS) 800-160 MG per tablet, Take 1 tablet by mouth 2 (Two) Times a Day., Disp: 20 tablet, Rfl: 0  •  SYMBICORT 160-4.5 MCG/ACT inhaler, INHALEE 2 PUFFS BY MOUTH TWICE A DAY (Patient taking differently: Inhale 2 puffs 2 (Two) Times a Day. Use preop), Disp: 30.6 inhaler, Rfl: 1  •  tiotropium (SPIRIVA) 18 MCG per inhalation capsule, Place 1 capsule into inhaler and inhale Daily. Use preop, Disp: 90 capsule, Rfl: 1  •  vitamin C (ASCORBIC ACID) 500 MG tablet, Take 1,000 mg by mouth Daily. dont take preop, Disp: , Rfl:   •  albuterol sulfate  (90 Base) MCG/ACT inhaler, Inhale 2 puffs Every 6 (Six) Hours As Needed for Wheezing. Use if needed dos bring, Disp: 1 g, Rfl: 5    Allergies   Allergen Reactions   • Ambien  [Zolpidem Tartrate] Confusion   • Ciprofloxacin Hcl Rash   • Penicillins Rash   • Methadone Hallucinations   • Clindamycin Rash       Family History   Problem Relation Age of Onset   • Hypertension Mother    • Hyperlipidemia Mother    • Arthritis Mother    • Cancer Mother         Skin cancers   • Depression Mother    • Mental illness Mother         Depression and Alzheimers   • Diabetes Father             • Heart disease Father         Had angina. Was diabetic.  of heart attack at age 57.   • Early death Father          at 57 from heart attack   • Hyperlipidemia Sister    • Arthritis Sister    • Cancer Sister         Skin cancers   • Depression Sister    • Diabetes Sister         Diagnosed    • Heart disease Sister         2 mini strokes, atrial fribulation, pacemaker insertion   • Hypertension Sister    • Mental illness Sister         Depression   • Stroke Sister         2 mini strokes   • Diabetes Brother         Not sure when diagnosed   • Heart disease Brother         Had open heart surgery about 4 yrs ago.   • Hypertension Brother    • Hyperlipidemia Brother    • Arthritis Brother    • Cancer Brother         Skin  cancers       Past Surgical History:   Procedure Laterality Date   • BACK SURGERY     • CARDIAC CATHETERIZATION  02/2019   • CARPAL TUNNEL RELEASE Bilateral     Wrist    • CHOLECYSTECTOMY  1997   • COLONOSCOPY     • ENDOSCOPY     • ENDOSCOPY N/A 4/15/2020    Procedure: ESOPHAGOGASTRODUODENOSCOPY with dilitation (60FR.);  Surgeon: Julieta Allison MD;  Location: [facility] ENDOSCOPY;  Service: Gastroenterology;  Laterality: N/A;  varices,hiatal hernia,gastritis   • ENDOSCOPY N/A 9/29/2020    Procedure: ESOPHAGOGASTRODUODENOSCOPY with esophageal dilitation (54 bougie);  Surgeon: Julieta Allison MD;  Location: [facility] ENDOSCOPY;  Service: Gastroenterology;  Laterality: N/A;   post op: hiatal hernia, esophageal stricture   • EYE SURGERY  3-14 & 3-    Cataract surgery R 3-14, L 3-   • HYSTERECTOMY  09/2004    complete   • INTERSTIM PLACEMENT      bladder   • JOINT REPLACEMENT Bilateral     knees   • KNEE ARTHROSCOPY Bilateral     Arthroscopic surgery to bilateral knees    • OTHER SURGICAL HISTORY  2015    Stress test    • OTHER SURGICAL HISTORY  10/2016    Lithotripsy total 4-5   • OTHER SURGICAL HISTORY      Right arm cellulits- spider bites    • OTHER SURGICAL HISTORY  02/08/2018    Lithotripsy   • OTHER SURGICAL HISTORY  04/20/2018    ACDF C3-C4 & C6-C7   • REPLACEMENT TOTAL KNEE  2000   • REPLACEMENT TOTAL KNEE Left 11/2016   • SHOULDER ROTATOR CUFF REPAIR Right 1/8/2020    Procedure: RT ROTATOR CUFF REPAIR OPEN;  Surgeon: Curly Vaughn MD;  Location: [facility] MAIN OR;  Service: Orthopedics   • SPINE SURGERY  4/2018    Fusion surgery C3-C4 and C6-C7       Past Medical History:   Diagnosis Date   • Allergic Not sure    Penicillin, cipro, clindamycin, methadone, ambien   • Angina at rest (CMS/HCC)     DR. Amador   • Anxiety disorder     [facility]    • Back pain    • Bipolar disorder (CMS/HCC)    • Cervical dystonia    • Chronic pain     with stenosis   • Cirrhosis (CMS/HCC)    • Colitis    • Colon polyps     • COPD (chronic obstructive pulmonary disease) (CMS/HCC)    • Cramping of feet    • Cramping of hands    • DDD (degenerative disc disease), cervical    • DDD (degenerative disc disease), lumbar    • DDD (degenerative disc disease), lumbar    • DDD (degenerative disc disease), thoracic    • Depression    • Diverticulosis     Not sure when diagnosed   • Dysphagia 2020   • Eosinophilic colitis    • Gastritis    • GERD (gastroesophageal reflux disease)    • Headache Not sure    Migraines stopped after complete hyst in    • Hepatic steatosis     non alcoholic steo-hepatitis    • Hypertension    • IBS (irritable bowel syndrome)    • Infectious viral hepatitis     [facility]   • Insomnia    • Kidney stones    • Low back pain     Not sure when diagnosed   • Lumbar stenosis    • Neck pain    • Neuromuscular disorder (CMS/HCC) Not sure    Parkinsons and Cervical Dystonia   • Neuropathy    • Obesity     Obese since about    • Osteoarthritis    • Parkinson's disease (CMS/HCC)    • Peripheral edema    • PTSD (post-traumatic stress disorder)    • Recurrent UTI    • Renal insufficiency     Not sure when diagnosed   • Seizure (CMS/HCC)     onset 2016.  Last seizure 10/2019   • Sleep apnea     Using Bipap machine at night. advised to bring dos   • Sleep apnea, obstructive    • Type II diabetes mellitus (CMS/HCC)    • Urinary incontinence        Family History   Problem Relation Age of Onset   • Hypertension Mother    • Hyperlipidemia Mother    • Arthritis Mother    • Cancer Mother         Skin cancers   • Depression Mother    • Mental illness Mother         Depression and Alzheimers   • Diabetes Father             • Heart disease Father         Had angina. Was diabetic.  of heart attack at age 57.   • Early death Father          at 57 from heart attack   • Hyperlipidemia Sister    • Arthritis Sister    • Cancer Sister         Skin cancers   • Depression Sister    • Diabetes Sister         Diagnosed   "  • Heart disease Sister         2 mini strokes, atrial fribulation, pacemaker insertion   • Hypertension Sister    • Mental illness Sister         Depression   • Stroke Sister         2 mini strokes   • Diabetes Brother         Not sure when diagnosed   • Heart disease Brother         Had open heart surgery about 4 yrs ago.   • Hypertension Brother    • Hyperlipidemia Brother    • Arthritis Brother    • Cancer Brother         Skin cancers       Social History     Socioeconomic History   • Marital status:      Spouse name: Not on file   • Number of children: Not on file   • Years of education: Not on file   • Highest education level: Not on file   Social Needs   • Financial resource strain: Not on file   • Food insecurity     Worry: Never true     Inability: Never true   • Transportation needs     Medical: No     Non-medical: No   Tobacco Use   • Smoking status: Former Smoker     Packs/day: 0.50     Years: 10.00     Pack years: 5.00     Types: Cigarettes     Start date: 1974     Quit date:      Years since quittin.4   • Smokeless tobacco: Never Used   • Tobacco comment: Stopped off and on.  Stopped once for more than 2 yrs   Substance and Sexual Activity   • Alcohol use: No     Frequency: Never   • Drug use: No   • Sexual activity: Never           ECG 12 Lead    Date/Time: 2020 1:07 PM  Performed by: Nabor Amador MD  Authorized by: Nabor Amador MD   Comparison: compared with previous ECG   Similar to previous ECG  Comparison to previous ECG: Sinus bradycardia 55/min nonspecific ST-T wave changes normal axis normal intervals no ectopy no significant change from 1/3/2020                Objective:       Physical Exam    /79   Pulse 54   Temp 97.3 °F (36.3 °C)   Ht 170.2 cm (67\")   Wt 129 kg (283 lb 8 oz)   SpO2 98%   BMI 44.40 kg/m²   The patient is alert, oriented and in no distress.    Vital signs as noted above.    Head and neck revealed no carotid bruits or jugular " venous distension.  No thyromegaly or lymphadenopathy is present.    Lungs clear.  No wheezing.  Breath sounds are normal bilaterally.    Heart normal first and second heart sounds.  No murmur..  No pericardial rub is present.  No gallop is present.    Abdomen soft and nontender.  No organomegaly is present.    Extremities revealed good peripheral pulses without any pedal edema.    Skin warm and dry.    Musculoskeletal system is grossly normal.    CNS grossly normal.    Reviewed and unchanged from last visit.

## 2020-12-03 ENCOUNTER — TELEPHONE (OUTPATIENT)
Dept: FAMILY MEDICINE CLINIC | Facility: CLINIC | Age: 65
End: 2020-12-03

## 2020-12-03 DIAGNOSIS — Z12.31 BREAST CANCER SCREENING BY MAMMOGRAM: Primary | ICD-10-CM

## 2020-12-14 ENCOUNTER — HOSPITAL ENCOUNTER (OUTPATIENT)
Dept: MAMMOGRAPHY | Facility: HOSPITAL | Age: 65
Discharge: HOME | End: 2020-12-14
Admitting: NURSE PRACTITIONER

## 2020-12-14 PROCEDURE — 77067 SCR MAMMO BI INCL CAD: CPT

## 2020-12-14 PROCEDURE — 77063 BREAST TOMOSYNTHESIS BI: CPT

## 2020-12-18 RX ORDER — BUDESONIDE AND FORMOTEROL FUMARATE DIHYDRATE 160; 4.5 UG/1; UG/1
AEROSOL RESPIRATORY (INHALATION)
Qty: 30.6 INHALER | Refills: 3 | Status: SHIPPED | OUTPATIENT
Start: 2020-12-18 | End: 2021-08-16 | Stop reason: SDUPTHER

## 2021-01-20 ENCOUNTER — TRANSCRIBE ORDERS (OUTPATIENT)
Dept: ADMINISTRATIVE | Facility: HOSPITAL | Age: 66
End: 2021-01-20

## 2021-01-20 ENCOUNTER — HOSPITAL ENCOUNTER (OUTPATIENT)
Dept: GENERAL RADIOLOGY | Facility: HOSPITAL | Age: 66
Discharge: HOME | End: 2021-01-20
Admitting: UROLOGY

## 2021-01-20 DIAGNOSIS — N20.0 RENAL STONES: ICD-10-CM

## 2021-01-20 DIAGNOSIS — N20.0 RENAL STONES: Primary | ICD-10-CM

## 2021-01-20 PROCEDURE — 74018 RADEX ABDOMEN 1 VIEW: CPT

## 2021-02-02 ENCOUNTER — TRANSCRIBE ORDERS (OUTPATIENT)
Dept: ADMINISTRATIVE | Facility: HOSPITAL | Age: 66
End: 2021-02-02

## 2021-02-02 ENCOUNTER — HOSPITAL ENCOUNTER (OUTPATIENT)
Dept: CARDIOLOGY | Facility: HOSPITAL | Age: 66
Discharge: HOME | End: 2021-02-02

## 2021-02-02 ENCOUNTER — LAB (OUTPATIENT)
Dept: LAB | Facility: HOSPITAL | Age: 66
End: 2021-02-02

## 2021-02-02 DIAGNOSIS — N20.0 KIDNEY STONES: ICD-10-CM

## 2021-02-02 DIAGNOSIS — N20.0 KIDNEY STONES: Primary | ICD-10-CM

## 2021-02-02 LAB
ANION GAP SERPL CALCULATED.3IONS-SCNC: 8.9 MMOL/L (ref 5–15)
BASOPHILS # BLD AUTO: 0.03 10*3/MM3 (ref 0–0.2)
BASOPHILS NFR BLD AUTO: 0.4 % (ref 0–1.5)
BUN SERPL-MCNC: 23 MG/DL (ref 8–23)
BUN/CREAT SERPL: 23.2 (ref 7–25)
CALCIUM SPEC-SCNC: 10.3 MG/DL (ref 8.6–10.5)
CHLORIDE SERPL-SCNC: 100 MMOL/L (ref 98–107)
CO2 SERPL-SCNC: 33.1 MMOL/L (ref 22–29)
CREAT SERPL-MCNC: 0.99 MG/DL (ref 0.57–1)
DEPRECATED RDW RBC AUTO: 43.6 FL (ref 37–54)
EOSINOPHIL # BLD AUTO: 0.1 10*3/MM3 (ref 0–0.4)
EOSINOPHIL NFR BLD AUTO: 1.4 % (ref 0.3–6.2)
ERYTHROCYTE [DISTWIDTH] IN BLOOD BY AUTOMATED COUNT: 12.7 % (ref 12.3–15.4)
GFR SERPL CREATININE-BSD FRML MDRD: 56 ML/MIN/1.73
GLUCOSE SERPL-MCNC: 107 MG/DL (ref 65–99)
HCT VFR BLD AUTO: 40.2 % (ref 34–46.6)
HGB BLD-MCNC: 13.4 G/DL (ref 12–15.9)
IMM GRANULOCYTES # BLD AUTO: 0.01 10*3/MM3 (ref 0–0.05)
IMM GRANULOCYTES NFR BLD AUTO: 0.1 % (ref 0–0.5)
LYMPHOCYTES # BLD AUTO: 1.67 10*3/MM3 (ref 0.7–3.1)
LYMPHOCYTES NFR BLD AUTO: 23.7 % (ref 19.6–45.3)
MCH RBC QN AUTO: 31.3 PG (ref 26.6–33)
MCHC RBC AUTO-ENTMCNC: 33.3 G/DL (ref 31.5–35.7)
MCV RBC AUTO: 93.9 FL (ref 79–97)
MONOCYTES # BLD AUTO: 0.77 10*3/MM3 (ref 0.1–0.9)
MONOCYTES NFR BLD AUTO: 10.9 % (ref 5–12)
NEUTROPHILS NFR BLD AUTO: 4.46 10*3/MM3 (ref 1.7–7)
NEUTROPHILS NFR BLD AUTO: 63.5 % (ref 42.7–76)
NRBC BLD AUTO-RTO: 0 /100 WBC (ref 0–0.2)
PLATELET # BLD AUTO: 149 10*3/MM3 (ref 140–450)
PMV BLD AUTO: 10.9 FL (ref 6–12)
POTASSIUM SERPL-SCNC: 5.1 MMOL/L (ref 3.5–5.2)
RBC # BLD AUTO: 4.28 10*6/MM3 (ref 3.77–5.28)
SODIUM SERPL-SCNC: 142 MMOL/L (ref 136–145)
WBC # BLD AUTO: 7.04 10*3/MM3 (ref 3.4–10.8)

## 2021-02-02 PROCEDURE — 93010 ELECTROCARDIOGRAM REPORT: CPT | Performed by: INTERNAL MEDICINE

## 2021-02-02 PROCEDURE — 93005 ELECTROCARDIOGRAM TRACING: CPT | Performed by: UROLOGY

## 2021-02-02 PROCEDURE — 80048 BASIC METABOLIC PNL TOTAL CA: CPT

## 2021-02-02 PROCEDURE — 85025 COMPLETE CBC W/AUTO DIFF WBC: CPT

## 2021-02-02 PROCEDURE — 36415 COLL VENOUS BLD VENIPUNCTURE: CPT

## 2021-02-16 DIAGNOSIS — F41.1 GENERALIZED ANXIETY DISORDER: Chronic | ICD-10-CM

## 2021-02-16 RX ORDER — ESCITALOPRAM OXALATE 10 MG/1
TABLET ORAL
Qty: 90 TABLET | Refills: 1 | Status: SHIPPED | OUTPATIENT
Start: 2021-02-16 | End: 2021-02-25 | Stop reason: SDUPTHER

## 2021-02-17 ENCOUNTER — OFFICE VISIT (OUTPATIENT)
Dept: PULMONOLOGY | Facility: HOSPITAL | Age: 66
End: 2021-02-17

## 2021-02-17 VITALS
TEMPERATURE: 96.1 F | HEIGHT: 67 IN | HEART RATE: 56 BPM | OXYGEN SATURATION: 97 % | DIASTOLIC BLOOD PRESSURE: 74 MMHG | RESPIRATION RATE: 15 BRPM | WEIGHT: 286.4 LBS | BODY MASS INDEX: 44.95 KG/M2 | SYSTOLIC BLOOD PRESSURE: 113 MMHG

## 2021-02-17 DIAGNOSIS — I10 ESSENTIAL HYPERTENSION: ICD-10-CM

## 2021-02-17 DIAGNOSIS — G47.33 OBSTRUCTIVE SLEEP APNEA SYNDROME: ICD-10-CM

## 2021-02-17 DIAGNOSIS — J44.9 CHRONIC OBSTRUCTIVE PULMONARY DISEASE, UNSPECIFIED COPD TYPE (HCC): Primary | ICD-10-CM

## 2021-02-17 PROCEDURE — G0463 HOSPITAL OUTPT CLINIC VISIT: HCPCS

## 2021-02-17 RX ORDER — HYDROCODONE BITARTRATE AND ACETAMINOPHEN 5; 300 MG/1; MG/1
1 TABLET ORAL EVERY 12 HOURS PRN
COMMUNITY
Start: 2021-01-07 | End: 2021-08-16 | Stop reason: SDUPTHER

## 2021-02-17 RX ORDER — LACTULOSE 10 G/15ML
SOLUTION ORAL; RECTAL
COMMUNITY

## 2021-02-17 NOTE — PROGRESS NOTES
PULMONARY  CONSULT NOTE      PATIENT IDENTIFICATION:  Name: Dariana Rivera  Age: 65 y.o.  Sex: female  :  1955  MRN: [id number]    DATE OF CONSULTATION:  2021                     CHIEF COMPLAINT: Chronic obstructive airway disease    History of Present Illness:   Dariana Rivera is a 65 y.o. female Pt on CPAP feeling better more energy especially the night he use it more than 4 hours, no sleepiness no fatigue no tiredness, no mask irritation no dryness, compliance report reviewed with pt AHI< 5 with good usage.   Patient with COPD currently on Spiriva Symbicort and uses albuterol as needed last time she used 3 days ago no significant shortness of breath some dry coughing on and off no hemoptysis,  Patient still getting swelling in her legs on and off      Review of Systems:   Constitutional: negative   Eyes: negative   ENT/oropharynx: negative   Cardiovascular: negative   Respiratory: negative   Gastrointestinal: negative   Genitourinary: negative   Neurological: negative   Musculoskeletal: negative   Integument/breast: negative   Endocrine: negative   Allergic/Immunologic: negative     Past Medical History:  Past Medical History:   Diagnosis Date   • Allergic Not sure    Penicillin, cipro, clindamycin, methadone, ambien   • Angina at rest (CMS/[facility])     DR. Amador   • Anxiety disorder     [facility]    • Back pain    • Bipolar disorder (CMS/[facility])    • Cervical dystonia    • Chronic pain     with stenosis   • Cirrhosis (CMS/[facility])    • Colitis    • Colon polyps    • COPD (chronic obstructive pulmonary disease) (CMS/[facility])    • Cramping of feet    • Cramping of hands    • DDD (degenerative disc disease), cervical    • DDD (degenerative disc disease), lumbar    • DDD (degenerative disc disease), lumbar    • DDD (degenerative disc disease), thoracic    • Depression    • Diverticulosis     Not sure when diagnosed   • Dysphagia 2020   • Eosinophilic colitis    • Gastritis    • GERD (gastroesophageal  reflux disease)    • Headache Not sure    Migraines stopped after complete hyst in 2004   • Hepatic steatosis     non alcoholic steo-hepatitis    • Hx of lithotripsy 02/10/2021   • Hypertension    • IBS (irritable bowel syndrome)    • Infectious viral hepatitis     [facility]   • Insomnia    • Kidney stones    • Low back pain     Not sure when diagnosed   • Lumbar stenosis    • Neck pain    • Neuromuscular disorder (CMS/HCC) Not sure    Parkinsons and Cervical Dystonia   • Neuropathy    • Obesity     Obese since about 1966   • Osteoarthritis    • Parkinson's disease (CMS/HCC)    • Peripheral edema    • PTSD (post-traumatic stress disorder)    • Recurrent UTI    • Renal insufficiency     Not sure when diagnosed   • Seizure (CMS/HCC)     onset 7/2016.  Last seizure 10/2019   • Sleep apnea     Using Bipap machine at night. advised to bring dos   • Sleep apnea, obstructive    • Type II diabetes mellitus (CMS/HCC)    • Urinary incontinence        Past Surgical History:  Past Surgical History:   Procedure Laterality Date   • BACK SURGERY     • CARDIAC CATHETERIZATION  02/2019   • CARPAL TUNNEL RELEASE Bilateral     Wrist    • CHOLECYSTECTOMY  1997   • COLONOSCOPY     • ENDOSCOPY     • ENDOSCOPY N/A 4/15/2020    Procedure: ESOPHAGOGASTRODUODENOSCOPY with dilitation (60FR.);  Surgeon: Julieta Allison MD;  Location: [facility] ENDOSCOPY;  Service: Gastroenterology;  Laterality: N/A;  varices,hiatal hernia,gastritis   • ENDOSCOPY N/A 9/29/2020    Procedure: ESOPHAGOGASTRODUODENOSCOPY with esophageal dilitation (54 bougie);  Surgeon: Julieta Allison MD;  Location: [facility] ENDOSCOPY;  Service: Gastroenterology;  Laterality: N/A;   post op: hiatal hernia, esophageal stricture   • EYE SURGERY  3-14 & 3-    Cataract surgery R 3-14, L 3-   • HYSTERECTOMY  09/2004    complete   • INTERSTIM PLACEMENT      bladder   • JOINT REPLACEMENT Bilateral     knees   • KNEE ARTHROSCOPY Bilateral     Arthroscopic surgery to bilateral  knees    • OTHER SURGICAL HISTORY      Stress test    • OTHER SURGICAL HISTORY  10/2016    Lithotripsy total 4-5   • OTHER SURGICAL HISTORY      Right arm cellulits- spider bites    • OTHER SURGICAL HISTORY  2018    Lithotripsy   • OTHER SURGICAL HISTORY  2018    ACDF C3-C4 & C6-C7   • REPLACEMENT TOTAL KNEE     • REPLACEMENT TOTAL KNEE Left 2016   • SHOULDER ROTATOR CUFF REPAIR Right 2020    Procedure: RT ROTATOR CUFF REPAIR OPEN;  Surgeon: Curly Vaughn MD;  Location: [facility] OR;  Service: Orthopedics   • SPINE SURGERY  2018    Fusion surgery C3-C4 and C6-C7        Family History:  Family History   Problem Relation Age of Onset   • Hypertension Mother    • Hyperlipidemia Mother    • Arthritis Mother    • Cancer Mother         Skin cancers   • Depression Mother    • Mental illness Mother         Depression and Alzheimers   • Diabetes Father             • Heart disease Father         Had angina. Was diabetic.  of heart attack at age 57.   • Early death Father          at 57 from heart attack   • Hyperlipidemia Sister    • Arthritis Sister    • Cancer Sister         Skin cancers   • Depression Sister    • Diabetes Sister         Diagnosed    • Heart disease Sister         2 mini strokes, atrial fribulation, pacemaker insertion   • Hypertension Sister    • Mental illness Sister         Depression   • Stroke Sister         2 mini strokes   • Diabetes Brother         Not sure when diagnosed   • Heart disease Brother         Had open heart surgery about 4 yrs ago.   • Hypertension Brother    • Hyperlipidemia Brother    • Arthritis Brother    • Cancer Brother         Skin cancers   • Breast cancer Maternal Grandmother    • Breast cancer Maternal Aunt         Social History:   Social History     Tobacco Use   • Smoking status: Former Smoker     Packs/day: 0.50     Years: 10.00     Pack years: 5.00     Types: Cigarettes     Start date: 1974     Quit date:       Years since quittin.6   • Smokeless tobacco: Never Used   • Tobacco comment: Stopped off and on.  Stopped once for more than 2 yrs   Substance Use Topics   • Alcohol use: No     Frequency: Never        Allergies:  Allergies   Allergen Reactions   • Ambien  [Zolpidem Tartrate] Confusion   • Ciprofloxacin Hcl Rash   • Penicillins Rash   • Methadone Hallucinations   • Clindamycin Rash       Home Meds:  (Not in a hospital admission)      Objective:    Vitals Ranges:   Temp:  [96.1 °F (35.6 °C)] 96.1 °F (35.6 °C)  Heart Rate:  [56] 56  Resp:  [15] 15  BP: (113)/(74) 113/74  Body mass index is 44.86 kg/m².     Exam:  General Appearance:  WDWN    HEENT:   without obvious abnormality,  Conjunctiva/corneas clear,  Normal external ear canals, no drainage    Clear orsalmucosa,  Mallampati score 3    Neck:  Supple, symmetrical, trachea midline. No JVD.  Lungs:   Bilateral basal rhonchi bilaterally, respirations unlabored symmetrical wall movement.    Chest wall:  No tenderness or deformity.    Heart:  Regular rate and rhythm, S1 and S2 normal.  Extremities: Trace edema no clubbing or Cyanosis        Data Review:  All labs (24hrs): No results found for this or any previous visit (from the past 24 hour(s)).     Imaging:  XR Abdomen KUB  Narrative: DATE OF EXAM:  2021 12:02 PM     PROCEDURE:  XR ABDOMEN KUB-     INDICATIONS:  RENAL STONES; N20.0-Calculus of kidney     COMPARISON:  2020     TECHNIQUE:   Single radiographic view of the abdomen was obtained.        FINDINGS:  There is a 6 mm calcification projecting over the left iliac crest. Is  unclear whether this is due to a ureteral calculus or phlebolith. This  was not present on the prior study. No calcifications are noted over the  kidneys on the current study. Evaluation is somewhat limited due to  overlying bowel gas and stool. Right-sided implanted electronic device  is again noted. There are surgical clips in right upper quadrant. There  is a grossly  nonobstructive bowel gas pattern.      Impression: 6 mm calcification projecting over the left iliac crest. This may be due  to ureteral calculus. This could be further evaluated by CT.     Electronically Signed By-Alexander Chung MD On:1/20/2021 12:14 PM  This report was finalized on [id number] by  Alexander Chung MD.       ASSESSMENT:  Diagnoses and all orders for this visit:    Chronic obstructive pulmonary disease, unspecified COPD type (CMS/HCC)    Obstructive sleep apnea syndrome    Essential hypertension    Other orders  -     lactulose (Generlac) 10 GM/15ML solution solution (encephalopathy); Use as directed  -     HYDROcodone-Acetaminophen (XODOL) 5-300 MG per tablet; Take 1 tablet by mouth Every 12 (Twelve) Hours As Needed.        PLAN:   Bronchodilator inhaled corticosteroid    Education how to use inhalers    Encouraged to use incentive spirometer    Continue to exercise slowly as tolerated    Monitor for any change in the color of the sputum    Avoid any exposure to fumes, gas or any irritant        This patient with obstructive sleep apnea, compliance is improved. Encourage to use it more frequent, I re-emphasized on pt the long and short term benefit of treating LISANDRO.     Treating LISANDRO will improve BP control       Sasha Khan MD. D, ABSM.  2/17/2021  13:49 EST

## 2021-02-24 ENCOUNTER — OFFICE VISIT (OUTPATIENT)
Dept: FAMILY MEDICINE CLINIC | Facility: CLINIC | Age: 66
End: 2021-02-24

## 2021-02-24 ENCOUNTER — LAB (OUTPATIENT)
Dept: LAB | Facility: HOSPITAL | Age: 66
End: 2021-02-24

## 2021-02-24 VITALS
HEIGHT: 64 IN | SYSTOLIC BLOOD PRESSURE: 110 MMHG | BODY MASS INDEX: 48.59 KG/M2 | HEART RATE: 60 BPM | TEMPERATURE: 97.1 F | WEIGHT: 284.6 LBS | DIASTOLIC BLOOD PRESSURE: 73 MMHG | OXYGEN SATURATION: 94 % | RESPIRATION RATE: 20 BRPM

## 2021-02-24 DIAGNOSIS — E11.49 TYPE 2 DIABETES MELLITUS WITH OTHER DIABETIC NEUROLOGICAL COMPLICATION (HCC): Chronic | ICD-10-CM

## 2021-02-24 DIAGNOSIS — G20 PARKINSON'S DISEASE (HCC): Chronic | ICD-10-CM

## 2021-02-24 DIAGNOSIS — G89.29 CHRONIC THORACIC BACK PAIN, UNSPECIFIED BACK PAIN LATERALITY: Chronic | ICD-10-CM

## 2021-02-24 DIAGNOSIS — K74.60 CIRRHOSIS OF LIVER WITHOUT ASCITES, UNSPECIFIED HEPATIC CIRRHOSIS TYPE (HCC): Chronic | ICD-10-CM

## 2021-02-24 DIAGNOSIS — I10 ESSENTIAL HYPERTENSION: Primary | Chronic | ICD-10-CM

## 2021-02-24 DIAGNOSIS — F31.32 BIPOLAR 1 DISORDER, DEPRESSED, MODERATE (HCC): Chronic | ICD-10-CM

## 2021-02-24 DIAGNOSIS — K21.9 GASTROESOPHAGEAL REFLUX DISEASE WITHOUT ESOPHAGITIS: Chronic | ICD-10-CM

## 2021-02-24 DIAGNOSIS — E78.2 MIXED HYPERLIPIDEMIA: Chronic | ICD-10-CM

## 2021-02-24 DIAGNOSIS — M54.6 CHRONIC THORACIC BACK PAIN, UNSPECIFIED BACK PAIN LATERALITY: Chronic | ICD-10-CM

## 2021-02-24 DIAGNOSIS — R56.9 SEIZURE (HCC): Chronic | ICD-10-CM

## 2021-02-24 DIAGNOSIS — J44.9 CHRONIC OBSTRUCTIVE PULMONARY DISEASE, UNSPECIFIED COPD TYPE (HCC): Chronic | ICD-10-CM

## 2021-02-24 DIAGNOSIS — G47.33 OBSTRUCTIVE SLEEP APNEA SYNDROME: Chronic | ICD-10-CM

## 2021-02-24 DIAGNOSIS — Z00.00 ENCOUNTER FOR ANNUAL WELLNESS EXAM IN MEDICARE PATIENT: ICD-10-CM

## 2021-02-24 PROBLEM — F19.982 HYPERSOMNIA DUE TO DRUG: Chronic | Status: ACTIVE | Noted: 2020-08-19

## 2021-02-24 PROBLEM — G47.30 SLEEP APNEA: Chronic | Status: ACTIVE | Noted: 2018-09-21

## 2021-02-24 PROBLEM — I85.00 VARICES OF ESOPHAGUS DETERMINED BY ENDOSCOPY (HCC): Chronic | Status: ACTIVE | Noted: 2020-04-15

## 2021-02-24 PROBLEM — F41.1 GENERALIZED ANXIETY DISORDER: Chronic | Status: RESOLVED | Noted: 2019-07-23 | Resolved: 2021-02-24

## 2021-02-24 PROBLEM — N39.0 RECURRENT URINARY TRACT INFECTION: Chronic | Status: ACTIVE | Noted: 2019-03-06

## 2021-02-24 PROBLEM — E78.5 HYPERLIPIDEMIA: Chronic | Status: ACTIVE | Noted: 2019-12-02

## 2021-02-24 LAB
CHOLEST SERPL-MCNC: 207 MG/DL (ref 0–200)
CREAT UR-MCNC: 44.2 MG/DL
HBA1C MFR BLD: 6.7 % (ref 3.5–5.6)
HDLC SERPL-MCNC: 57 MG/DL (ref 40–60)
LDLC SERPL CALC-MCNC: 121 MG/DL (ref 0–100)
LDLC/HDLC SERPL: 2.05 {RATIO}
PROT UR-MCNC: 7 MG/DL
PROT/CREAT UR: 158.4 MG/G CREA (ref 0–200)
TRIGL SERPL-MCNC: 165 MG/DL (ref 0–150)
VLDLC SERPL-MCNC: 29 MG/DL (ref 5–40)

## 2021-02-24 PROCEDURE — 84156 ASSAY OF PROTEIN URINE: CPT

## 2021-02-24 PROCEDURE — 36415 COLL VENOUS BLD VENIPUNCTURE: CPT

## 2021-02-24 PROCEDURE — 83036 HEMOGLOBIN GLYCOSYLATED A1C: CPT

## 2021-02-24 PROCEDURE — G0439 PPPS, SUBSEQ VISIT: HCPCS | Performed by: FAMILY MEDICINE

## 2021-02-24 PROCEDURE — 99214 OFFICE O/P EST MOD 30 MIN: CPT | Performed by: FAMILY MEDICINE

## 2021-02-24 PROCEDURE — 82570 ASSAY OF URINE CREATININE: CPT

## 2021-02-24 PROCEDURE — 80061 LIPID PANEL: CPT

## 2021-02-24 NOTE — PROGRESS NOTES
Subjective   Dariana Rivera is a 65 y.o. female.     Chief Complaint   Patient presents with   • Medicare Wellness-subsequent   • Hypertension     3 month follow up    • Diabetes   • Chronic Kidney Disease   • Tremors       HPI  Chief complaint: Hypertension hyperlipidemia type 2 diabetes mellitus Parkinson's disease    Patient is a 65-year-old white female comes in for follow-up and maintenance of her current problems which include    1.  Hypertension-stable-patient on Lasix 40 mg daily potassium 20 mg daily Corgard 20 mg every day.  She denied headache lightheadedness dizziness or chest pain.    2. type 2 diabetes mellitus-stable-the patient on pioglitazone 30 mg daily.  She denied polydipsia polyphagia or polyuria.  Denied low blood sugars.  She is due for follow-up A1c.    3.  Bipolar disease-stable-patient has history of moderately severe recurrent bipolar disease.  The patient is currently on Lexapro 10 mg daily clonazepam 1 mg twice a day as needed Latuda 40 mg daily.  She recently lost her pet dog.  She had to have them put to sleep.  She is having increased depression as result of this.  Patient's not suicidal homicidal.    4.  Cirrhosis-stable-patient on lactulose Corgard Lasix and potassium.  She denied jaundice izzy colored stools dark urine or ascites.    5.  Parkinson's disease-stable-patient is currently taking Sinemet 25/102 tablets 3 times a day Mirapex 0.5 mg 2 times a day and Requip 0.25 mg twice a day.  She has chronic tremor consistent with Parkinson's involving her upper extremities and her head.    6.  COPD-stable-patient is currently taking Symbicort 160/4.52 puffs twice a day Spiriva 1 puff daily a rescue inhaler.  She denied recent cough shortness of breath or wheezing.    7.  Sleep apnea-stable-patient's current CPAP and oxygen at night.  She states this helps with her daytime hypersomnolence.    8.  Chronic pain syndrome-stable-patient is currently going to pain management.  She gets  "epidural blocks periodically she is on hydrocodone 5/300 twice a day baclofen 10 3 times a day and gabapentin 300 mg a day.  In spite of this she continues to have pain in her neck and upper back.    9.  Seizures/pseudoseizures-improved-patient is on Xanax 1 mg 2 times a day as needed and gabapentin 300 mg 3 times a day.  She denied recent seizures.        The following portions of the patient's history were reviewed and updated as appropriate: allergies, current medications, past family history, past medical history, past social history, past surgical history and problem list.    Review of Systems    Objective     /73 (BP Location: Right arm, Patient Position: Sitting, Cuff Size: Large Adult)   Pulse 60   Temp 97.1 °F (36.2 °C) (Infrared)   Resp 20   Ht 162.6 cm (64\")   Wt 129 kg (284 lb 9.6 oz)   SpO2 94%   BMI 48.85 kg/m²     Physical Exam  Vitals signs and nursing note reviewed.   Constitutional:       Appearance: She is well-developed. She is obese.   HENT:      Head: Normocephalic and atraumatic.      Nose: Nose normal.      Mouth/Throat:      Mouth: Mucous membranes are moist.      Pharynx: Oropharynx is clear.   Eyes:      Extraocular Movements: Extraocular movements intact.      Conjunctiva/sclera: Conjunctivae normal.      Pupils: Pupils are equal, round, and reactive to light.   Neck:      Musculoskeletal: Neck supple.   Cardiovascular:      Rate and Rhythm: Normal rate and regular rhythm.      Pulses: Normal pulses.      Heart sounds: Normal heart sounds.   Pulmonary:      Effort: Pulmonary effort is normal.      Breath sounds: Normal breath sounds.   Abdominal:      General: Abdomen is flat. Bowel sounds are normal.      Palpations: Abdomen is soft.   Musculoskeletal: Normal range of motion.   Skin:     General: Skin is warm and dry.   Neurological:      Mental Status: She is alert and oriented to person, place, and time.      Comments: Tremor consistent with Parkinson's involving upper " extremities and head   Psychiatric:         Behavior: Behavior normal.         Thought Content: Thought content normal.         Judgment: Judgment normal.           Assessment/Plan   Diagnoses and all orders for this visit:    1. Essential hypertension (Primary)    2. Mixed hyperlipidemia  -     Lipid Panel; Future    3. Type 2 diabetes mellitus with other diabetic neurological complication (CMS/HCC)  -     Hemoglobin A1c; Future  -     Protein / Creatinine Ratio, Urine - Urine, Clean Catch; Future    4. Cirrhosis of liver without ascites, unspecified hepatic cirrhosis type (CMS/HCC)    5. Gastroesophageal reflux disease without esophagitis    6. Bipolar 1 disorder, depressed, moderate (CMS/HCC)    7. Chronic thoracic back pain, unspecified back pain laterality    8. Seizure (CMS/HCC)    9. Parkinson's disease (CMS/HCC)    10. Obstructive sleep apnea syndrome    11. Chronic obstructive pulmonary disease, unspecified COPD type (CMS/HCC)    12. Encounter for annual wellness exam in Medicare patient      Patient Instructions   Continue your current medications and treatment.    Have the follow up labs done and call for results.    Follow up in the office in 3 months.      Paul Larson Jr., MD    02/24/21

## 2021-02-24 NOTE — PROGRESS NOTES
The ABCs of the Annual Wellness Visit  Subsequent Medicare Wellness Visit    Chief Complaint   Patient presents with   • Medicare Wellness-subsequent   • Hypertension     3 month follow up    • Diabetes   • Chronic Kidney Disease   • Tremors       Subjective   History of Present Illness:  Dariana Rivera is a 65 y.o. female who presents for a Subsequent Medicare Wellness Visit.    HEALTH RISK ASSESSMENT    Recent Hospitalizations:  Recently treated at the following:  [facility]     Current Medical Providers:  Patient Care Team:  Paul Larson Jr., MD as PCP - General (Family Medicine)  Nabor Amador MD as Consulting Physician (Cardiology)    Smoking Status:  Social History     Tobacco Use   Smoking Status Former Smoker   • Packs/day: 0.50   • Years: 10.00   • Pack years: 5.00   • Types: Cigarettes   • Start date: 1974   • Quit date:    • Years since quittin.6   Smokeless Tobacco Never Used   Tobacco Comment    Stopped off and on.  Stopped once for more than 2 yrs       Alcohol Consumption:  Social History     Substance and Sexual Activity   Alcohol Use No   • Frequency: Never       Depression Screen:   PHQ-2/PHQ-9 Depression Screening 2021   Little interest or pleasure in doing things 1   Feeling down, depressed, or hopeless 1   Trouble falling or staying asleep, or sleeping too much 1   Feeling tired or having little energy 1   Poor appetite or overeating 0   Feeling bad about yourself - or that you are a failure or have let yourself or your family down 0   Trouble concentrating on things, such as reading the newspaper or watching television 2   Moving or speaking so slowly that other people could have noticed. Or the opposite - being so fidgety or restless that you have been moving around a lot more than usual 1   Thoughts that you would be better off dead, or of hurting yourself in some way 0   Total Score 7   If you checked off any problems, how difficult have these  problems made it for you to do your work, take care of things at home, or get along with other people? Not difficult at all       Fall Risk Screen:  JUAN Fall Risk Assessment has not been completed.    Health Habits and Functional and Cognitive Screening:  Functional & Cognitive Status 2/24/2021   Do you have difficulty preparing food and eating? No   Do you have difficulty bathing yourself, getting dressed or grooming yourself? Yes   Do you have difficulty using the toilet? No   Do you have difficulty moving around from place to place? Yes   Do you have trouble with steps or getting out of a bed or a chair? Yes   Current Diet Well Balanced Diet   Dental Exam Not up to date   Eye Exam Not up to date   Exercise (times per week) 0 times per week   Current Exercise Activities Include -   Do you need help using the phone?  No   Are you deaf or do you have serious difficulty hearing?  No   Do you need help with transportation? Yes   Do you need help shopping? No   Do you need help preparing meals?  No   Do you need help with housework?  Yes   Do you need help with laundry? Yes   Do you need help taking your medications? No   Do you need help managing money? No   Do you ever drive or ride in a car without wearing a seat belt? No   Have you felt unusual stress, anger or loneliness in the last month? -   Who do you live with? -   If you need help, do you have trouble finding someone available to you? -   Have you been bothered in the last four weeks by sexual problems? -   Do you have difficulty concentrating, remembering or making decisions? -         Does the patient have evidence of cognitive impairment? No    Asprin use counseling:Taking ASA appropriately as indicated    Age-appropriate Screening Schedule:  Refer to the list below for future screening recommendations based on patient's age, sex and/or medical conditions. Orders for these recommended tests are listed in the plan section. The patient has been provided  with a written plan.    Health Maintenance   Topic Date Due   • URINE MICROALBUMIN  03/14/2021 (Originally 11/12/2020)   • DIABETIC EYE EXAM  04/14/2021 (Originally 5/20/2019)   • TDAP/TD VACCINES (1 - Tdap) 05/11/2021 (Originally 9/20/1974)   • HEMOGLOBIN A1C  02/26/2021   • LIPID PANEL  05/11/2021   • DIABETIC FOOT EXAM  02/17/2022   • MAMMOGRAM  12/14/2022   • COLONOSCOPY  01/29/2029   • INFLUENZA VACCINE  Completed   • ZOSTER VACCINE  Completed   • PAP SMEAR  Discontinued          The following portions of the patient's history were reviewed and updated as appropriate: allergies, current medications, past family history, past medical history, past social history, past surgical history and problem list.    Outpatient Medications Prior to Visit   Medication Sig Dispense Refill   • albuterol sulfate  (90 Base) MCG/ACT inhaler Inhale 2 puffs Every 6 (Six) Hours As Needed for Wheezing. Use if needed dos bring 1 g 5   • aspirin 81 MG chewable tablet Chew 81 mg Daily. Do not take dos     • baclofen (LIORESAL) 10 MG tablet Take 10 mg by mouth 3 (Three) Times a Day. Do not take dos  3   • carbidopa-levodopa (SINEMET)  MG per tablet Take 2 tablets by mouth 3 (Three) Times a Day. Take preop     • clonazePAM (KlonoPIN) 1 MG tablet Take 1 tablet by mouth 2 (Two) Times a Day As Needed for Anxiety. 60 tablet 3   • escitalopram (LEXAPRO) 10 MG tablet TAKE 1 TABLET BY MOUTH EVERY DAY 90 tablet 1   • furosemide (LASIX) 40 MG tablet TAKE 1 TABLET BY MOUTH EVERY DAY (Patient taking differently: Take 40 mg by mouth 2 (Two) Times a Day. Do not take preop) 90 tablet 0   • gabapentin (NEURONTIN) 300 MG capsule Take 300 mg by mouth 3 (Three) Times a Day.     • HYDROcodone-Acetaminophen (XODOL) 5-300 MG per tablet Take 1 tablet by mouth Every 12 (Twelve) Hours As Needed.     • ipratropium-albuterol (DUO-NEB) 0.5-2.5 mg/3 ml nebulizer Take 3 mL by nebulization Every 4 (Four) Hours As Needed for Wheezing. Use preop if needed      • KLOR-CON 20 MEQ CR tablet TAKE 1 TABLET BY MOUTH DAILY (Patient taking differently: Take 20 mEq by mouth 2 (Two) Times a Day. Take preop) 90 tablet 0   • lactulose (Generlac) 10 GM/15ML solution solution (encephalopathy) Use as directed     • lurasidone (Latuda) 40 MG tablet tablet Take 1 tablet by mouth Every Night. 30 tablet 3   • Melatonin 10 MG tablet Take 10 mg by mouth At Night As Needed.     • Multiple Vitamins-Minerals (MULTIVITAMIN WITH MINERALS) tablet tablet Take 1 tablet by mouth Daily. Do not take dos     • nadolol (CORGARD) 20 MG tablet Take 20 mg by mouth Every Morning. Take dos     • nitrofurantoin, macrocrystal-monohydrate, (MACROBID) 100 MG capsule Take 100 mg by mouth every night at bedtime.     • O2 (OXYGEN) Inhale 1 L/min 1 (One) Time. @@ night     • omeprazole (priLOSEC) 40 MG capsule Take 40 mg by mouth Daily. Take preop     • pioglitazone (ACTOS) 30 MG tablet TAKE 1 TABLET BY MOUTH EVERY DAY 90 tablet 1   • pramipexole (MIRAPEX) 0.5 MG tablet Take 1 tablet by mouth 2 (two) times a day. 180 tablet 1   • rOPINIRole (REQUIP) 0.25 MG tablet Take 1 tablet by mouth 2 (Two) Times a Day. Take preop 60 tablet 3   • Symbicort 160-4.5 MCG/ACT inhaler TAKE 2 PUFFS BY MOUTH TWICE A DAY 30.6 inhaler 3   • tiotropium (SPIRIVA) 18 MCG per inhalation capsule Place 1 capsule into inhaler and inhale Daily. Use preop 90 capsule 1   • vitamin C (ASCORBIC ACID) 500 MG tablet Take 1,000 mg by mouth Daily. dont take preop       No facility-administered medications prior to visit.        Patient Active Problem List   Diagnosis   • Bipolar 1 disorder, depressed, moderate (CMS/HCC)   • Body mass index (BMI) of 45.0-49.9 in adult (CMS/HCC)   • Chronic back pain   • Chronic kidney disease, unspecified   • Chronic obstructive pulmonary disease (CMS/HCC)   • Cirrhosis (CMS/HCC)   • Gastroesophageal reflux disease   • Hypertension   • Sleep apnea   • Parkinson's disease (CMS/HCC)   • Recurrent urinary tract infection  "  • Seizure (CMS/[facility])   • Spinal stenosis of cervical region   • Type 2 diabetes mellitus with other diabetic neurological complication (CMS/[facility])   • Hyperlipidemia   • Varices of esophagus determined by endoscopy (CMS/[facility])   • 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 Patient has an advance directive in EMR which is still valid.     Review of Systems    Compared to one year ago, the patient feels her physical health is the same.  Compared to one year ago, the patient feels her mental health is the same.    Reviewed chart for potential of high risk medication in the elderly: yes  Reviewed chart for potential of harmful drug interactions in the elderly:yes    Objective         Vitals:    02/24/21 1123   BP: 110/73   BP Location: Right arm   Patient Position: Sitting   Cuff Size: Large Adult   Pulse: 60   Resp: 20   Temp: 97.1 °F (36.2 °C)   TempSrc: Infrared   SpO2: 94%   Weight: 129 kg (284 lb 9.6 oz)   Height: 162.6 cm (64\")   PainSc:   7   PainLoc: Generalized       Body mass index is 48.85 kg/m².  Discussed the patient's BMI with her. The BMI is above average; BMI management plan is completed.    Physical Exam  Vitals signs and nursing note reviewed.   Constitutional:       Appearance: She is well-developed. She is obese.   HENT:      Head: Normocephalic and atraumatic.      Nose: Nose normal.      Mouth/Throat:      Mouth: Mucous membranes are moist.      Pharynx: Oropharynx is clear.   Eyes:      Extraocular Movements: Extraocular movements intact.      Conjunctiva/sclera: Conjunctivae normal.      Pupils: Pupils are equal, round, and reactive to light.   Neck:      Musculoskeletal: Neck supple.   Cardiovascular:      Rate and Rhythm: Normal rate and regular rhythm.      Heart sounds: Normal heart sounds.   Pulmonary:      Effort: Pulmonary effort is normal.      Breath sounds: Normal breath " sounds.   Abdominal:      General: Bowel sounds are normal.      Palpations: Abdomen is soft.   Musculoskeletal: Normal range of motion.   Skin:     General: Skin is warm and dry.   Neurological:      Mental Status: She is alert and oriented to person, place, and time.   Psychiatric:         Behavior: Behavior normal.         Thought Content: Thought content normal.         Judgment: Judgment normal.               Assessment/Plan   Medicare Risks and Personalized Health Plan  CMS Preventative Services Quick Reference  Advance Directive Discussion  Immunizations Discussed/Encouraged (specific immunizations; Td, Influenza, Pneumococcal 23 and Shingrix )    The above risks/problems have been discussed with the patient.  Pertinent information has been shared with the patient in the After Visit Summary.  Follow up plans and orders are seen below in the Assessment/Plan Section.    Diagnoses and all orders for this visit:    1. Encounter for annual wellness exam in Medicare patient (Primary)    2. Essential hypertension    3. Mixed hyperlipidemia  -     Lipid Panel; Future    4. Type 2 diabetes mellitus with other diabetic neurological complication (CMS/HCC)  -     Hemoglobin A1c; Future  -     Protein / Creatinine Ratio, Urine - Urine, Clean Catch; Future    5. Cirrhosis of liver without ascites, unspecified hepatic cirrhosis type (CMS/HCC)    6. Gastroesophageal reflux disease without esophagitis    7. Bipolar 1 disorder, depressed, moderate (CMS/HCC)    8. Chronic thoracic back pain, unspecified back pain laterality    9. Seizure (CMS/HCC)    10. Parkinson's disease (CMS/HCC)    11. Obstructive sleep apnea syndrome    12. Chronic obstructive pulmonary disease, unspecified COPD type (CMS/HCC)      Follow Up:  Return in about 3 months (around 5/24/2021) for Recheck.     An After Visit Summary and PPPS were given to the patient.

## 2021-02-25 ENCOUNTER — OFFICE VISIT (OUTPATIENT)
Dept: PSYCHIATRY | Facility: CLINIC | Age: 66
End: 2021-02-25

## 2021-02-25 DIAGNOSIS — F31.32 BIPOLAR 1 DISORDER, DEPRESSED, MODERATE (HCC): Primary | Chronic | ICD-10-CM

## 2021-02-25 DIAGNOSIS — F41.1 GENERALIZED ANXIETY DISORDER: Chronic | ICD-10-CM

## 2021-02-25 PROCEDURE — 99442 PR PHYS/QHP TELEPHONE EVALUATION 11-20 MIN: CPT | Performed by: PSYCHIATRY & NEUROLOGY

## 2021-02-25 RX ORDER — PRAMIPEXOLE DIHYDROCHLORIDE 0.5 MG/1
0.5 TABLET ORAL 2 TIMES DAILY
Qty: 180 TABLET | Refills: 1 | Status: SHIPPED | OUTPATIENT
Start: 2021-02-25 | End: 2021-06-17 | Stop reason: SDUPTHER

## 2021-02-25 RX ORDER — ESCITALOPRAM OXALATE 10 MG/1
10 TABLET ORAL DAILY
Qty: 90 TABLET | Refills: 1 | Status: SHIPPED | OUTPATIENT
Start: 2021-02-25 | End: 2021-06-17 | Stop reason: SDUPTHER

## 2021-02-25 RX ORDER — LURASIDONE HYDROCHLORIDE 40 MG/1
40 TABLET, FILM COATED ORAL NIGHTLY
Qty: 30 TABLET | Refills: 3 | Status: SHIPPED | OUTPATIENT
Start: 2021-02-25 | End: 2021-06-17 | Stop reason: SDUPTHER

## 2021-02-25 RX ORDER — CLONAZEPAM 0.5 MG/1
0.5 TABLET ORAL 3 TIMES DAILY PRN
Qty: 90 TABLET | Refills: 3 | Status: SHIPPED | OUTPATIENT
Start: 2021-02-25 | End: 2021-06-17 | Stop reason: SDUPTHER
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
## 2021-02-25 NOTE — PATIENT INSTRUCTIONS
Bipolar 2 Disorder  Bipolar 2 disorder is a mental health disorder in which a person has episodes of emotional highs and episodes of emotional lows, or depression. In bipolar 2 disorder, the episodes of emotional highs are less extreme and do not last as long as in bipolar 1 disorder. These highs are called hypomania.  People with bipolar 2 disorder have had at least one episode of hypomania (hypomanic episode) in their lives, which is usually followed by a depressive episode. Some people may have cycles of hypomanic and depressive episodes. Some people with bipolar 2 disorder may lead a very normal life between episodes.  What are the causes?  The cause of this condition is not known.  What increases the risk?  The following factors may make you more likely to develop this condition:  · Having a family member with the disorder.  · Having an imbalance of certain chemicals in the brain (neurotransmitters).  · Experiencing stress, such as illness, divorce, financial problems, or a death.  · Having certain conditions that affect the brain or spinal cord (neurologic conditions).  · Having had a brain injury (trauma).  What are the signs or symptoms?  Symptoms of hypomania include:  · Very high self-esteem or self-confidence.  · Decreased need for sleep.  · Unusual talkativeness. Speech may be very fast.  · Racing thoughts, with quick shifts between topics that may or may not be related (flight of ideas).  · Change in ability to concentrate. Some people may have better focus, and others may not be able to focus at all.  · Increased agitation. This could be pacing, squirming, fidgeting, or finger and toe tapping.  · Impulsive behavior and poor judgment. This may result in high-risk activities, such as:  ? Being sexual with people you normally wouldn't be sexual with.  ? Spending money you have borrowed on things you don't need.  Symptoms of depression include:  · Extreme degrees of sadness, uncontrollable crying,  hopelessness, worthlessness, or numbness.  · Sleep problems, such as insomnia, waking early, or sleeping too much.  · No longer enjoying things you used to enjoy.  · Isolation. You may often spend time alone.  · Lack of energy or moving more slowly than normal.  · Trouble making decisions.  · Changes in appetite, such as eating too much or not eating.  · Thoughts of death, or wanting to harm yourself.  Sometimes, you may have a mix of symptoms of hypomania and depression at the same time. Stress can often trigger these symptoms.  How is this diagnosed?  This condition may be diagnosed based on:  · Emotional episodes.  · Medical history.  · Use of alcohol, drugs, and prescription medicines. Certain medical conditions and substances can cause symptoms that seem like bipolar disorder. This is called secondary bipolar disorder.  Your health care provider may ask you to take a short test. This helps to understand your symptoms. You may also be asked to see a mental health specialist for further evaluation or to start treatment.  How is this treated?         This condition is a long-term (chronic) illness. It is often managed with ongoing treatment rather than treatment only when symptoms occur. A combination of treatments is the main approach. Treatment may include:  · Psychotherapy. Some forms of talk therapy, such as cognitive behavioral therapy (CBT) and family therapy, can help with learning to manage bipolar disorder.  · Psychoeducation. This helps you and others understand how this disorder is managed. Include friends and family in educational sessions so they learn how best to support you.  · Methods of managing your condition, such as journaling or relaxation exercises. Relaxation exercises include:  ? Yoga.  ? Meditation.  ? Deep breathing.  · Lifestyle changes, such as:  ? Limiting alcohol and drug use.  ? Exercising regularly.  ? Structuring when you go to bed and when you get up.  ? Eating a healthy  diet.  · Medicine. Medicine can be prescribed by a health care provider who specializes in treating mental health disorders (psychiatrist). Medicines called mood stabilizers are usually prescribed. If symptoms occur during treatment with a mood stabilizer, other medicines may be added.  Follow these instructions at home:  Activity  · Return to your normal activities as told by your health care provider.  · Find activities that you enjoy, and make time to do them.  · Exercise regularly as told by your health care provider.  Lifestyle    · Follow a set daily schedule.  · Eat a healthy diet that includes fresh fruits and vegetables, whole grains, low-fat dairy, and lean meat.  · Get at least 7-8 hours of sleep each night.  · Avoid using products that contain nicotine or tobacco. If you want help quitting, ask your health care provider.  · Do not use drugs.  Alcohol use  · Do not drink alcohol if:  ? Your health care provider tells you not to drink.  ? You are pregnant, may be pregnant, or are planning to become pregnant.  · If you drink alcohol:  ? Limit how much you use to:  § 0-1 drink a day for women.  § 0-2 drinks a day for men.  ? Be aware of how much alcohol is in your drink. In the [place] one drink equals one 12 oz bottle of beer (355 mL), one 5 oz glass of wine (148 mL), or one 1½ oz glass of hard liquor (44 mL).  General instructions  · Take over-the-counter and prescription medicines only as told by your health care provider. You may think about stopping your medicine, but it is very important to take your medicine as prescribed.  · Consider joining a support group. Your health care provider may be able to recommend one.  · Talk with your family and friends about your treatment goals and how they can help.  · Keep all follow-up visits as told by your health care provider. This is important.  Where to find more information  · National [place] on Mental Illness: www.tania.org  · National [place] of Mental  Health: www.[facility] [[facility]].gov  Contact a health care provider if:  · Your symptoms get worse, or your loved ones tell you that your symptoms are getting worse.  · You have uncomfortable side effects from your medicine.  · You have trouble sleeping.  · You have trouble doing daily activities.  · You feel unsafe in your surroundings.  · You are self-medicating with alcohol or drugs.  Get help right away if:  · You have new symptoms.  · You have thoughts about harming yourself or others.  · You are considering suicide.  If you ever feel like you may hurt yourself or others, or have thoughts about taking your own life, get help right away. You can go to your nearest emergency department or call:  · Your local emergency services (911 in the [place]  · A suicide crisis helpline, such as the National Suicide Prevention Lifeline at 1-717.571.2438. This is open 24 hours a day.  Summary  · Bipolar 2 disorder is a lifelong mental health disorder in which a person has episodes of hypomania and depression.  · This disorder is mainly treated with a combination of talk therapy, education, strategies for managing the condition, and medicines.  · Talk with your family and friends about your treatment goals and how they can help.  · Get help right away if you are considering suicide.  This information is not intended to replace advice given to you by your health care provider. Make sure you discuss any questions you have with your health care provider.  Document Revised: 06/02/2020 Document Reviewed: 06/02/2020  Elsevier Patient Education © 2020 [employer]

## 2021-03-05 ENCOUNTER — HOSPITAL ENCOUNTER (OUTPATIENT)
Dept: GENERAL RADIOLOGY | Facility: HOSPITAL | Age: 66
Discharge: HOME | End: 2021-03-05
Admitting: UROLOGY

## 2021-03-05 ENCOUNTER — TRANSCRIBE ORDERS (OUTPATIENT)
Dept: ADMINISTRATIVE | Facility: HOSPITAL | Age: 66
End: 2021-03-05

## 2021-03-05 DIAGNOSIS — N20.0 CALCULUS, RENAL: Primary | ICD-10-CM

## 2021-03-05 DIAGNOSIS — N20.0 CALCULUS, RENAL: ICD-10-CM

## 2021-03-05 PROCEDURE — 74018 RADEX ABDOMEN 1 VIEW: CPT

## 2021-03-10 LAB — QT INTERVAL: 435 MS

## 2021-03-18 RX ORDER — TIOTROPIUM BROMIDE 18 UG/1
CAPSULE ORAL; RESPIRATORY (INHALATION)
Qty: 90 CAPSULE | Refills: 1 | Status: SHIPPED | OUTPATIENT
Start: 2021-03-18 | End: 2021-09-09

## 2021-04-02 RX ORDER — ROPINIROLE 0.25 MG/1
TABLET, FILM COATED ORAL
Qty: 180 TABLET | Refills: 1 | Status: SHIPPED | OUTPATIENT
Start: 2021-04-02 | End: 2021-09-22

## 2021-04-14 ENCOUNTER — TRANSCRIBE ORDERS (OUTPATIENT)
Dept: ADMINISTRATIVE | Facility: HOSPITAL | Age: 66
End: 2021-04-14

## 2021-04-14 ENCOUNTER — LAB (OUTPATIENT)
Dept: LAB | Facility: HOSPITAL | Age: 66
End: 2021-04-14

## 2021-04-14 DIAGNOSIS — E87.1 HYPOSMOLALITY SYNDROME: Primary | ICD-10-CM

## 2021-04-14 DIAGNOSIS — E87.1 HYPOSMOLALITY SYNDROME: ICD-10-CM

## 2021-04-14 LAB
25(OH)D3 SERPL-MCNC: 41.2 NG/ML
ALBUMIN SERPL-MCNC: 4.1 G/DL (ref 3.5–5.2)
ALBUMIN/GLOB SERPL: 1.3 G/DL
ALP SERPL-CCNC: 68 U/L (ref 39–117)
ALT SERPL W P-5'-P-CCNC: 28 U/L (ref 1–33)
ANION GAP SERPL CALCULATED.3IONS-SCNC: 8.2 MMOL/L (ref 5–15)
AST SERPL-CCNC: 46 U/L (ref 1–32)
BASOPHILS # BLD AUTO: 0.03 10*3/MM3 (ref 0–0.2)
BASOPHILS NFR BLD AUTO: 0.4 % (ref 0–1.5)
BILIRUB SERPL-MCNC: 0.6 MG/DL (ref 0–1.2)
BUN SERPL-MCNC: 24 MG/DL (ref 8–23)
BUN/CREAT SERPL: 31.2 (ref 7–25)
CALCIUM SPEC-SCNC: 10 MG/DL (ref 8.6–10.5)
CHLORIDE SERPL-SCNC: 98 MMOL/L (ref 98–107)
CO2 SERPL-SCNC: 32.8 MMOL/L (ref 22–29)
CREAT SERPL-MCNC: 0.77 MG/DL (ref 0.57–1)
DEPRECATED RDW RBC AUTO: 41.3 FL (ref 37–54)
EOSINOPHIL # BLD AUTO: 0.1 10*3/MM3 (ref 0–0.4)
EOSINOPHIL NFR BLD AUTO: 1.4 % (ref 0.3–6.2)
ERYTHROCYTE [DISTWIDTH] IN BLOOD BY AUTOMATED COUNT: 12.4 % (ref 12.3–15.4)
GFR SERPL CREATININE-BSD FRML MDRD: 75 ML/MIN/1.73
GLOBULIN UR ELPH-MCNC: 3.2 GM/DL
GLUCOSE SERPL-MCNC: 100 MG/DL (ref 65–99)
HCT VFR BLD AUTO: 39.7 % (ref 34–46.6)
HGB BLD-MCNC: 13.8 G/DL (ref 12–15.9)
IMM GRANULOCYTES # BLD AUTO: 0.02 10*3/MM3 (ref 0–0.05)
IMM GRANULOCYTES NFR BLD AUTO: 0.3 % (ref 0–0.5)
LYMPHOCYTES # BLD AUTO: 1.58 10*3/MM3 (ref 0.7–3.1)
LYMPHOCYTES NFR BLD AUTO: 22.3 % (ref 19.6–45.3)
MCH RBC QN AUTO: 31.5 PG (ref 26.6–33)
MCHC RBC AUTO-ENTMCNC: 34.8 G/DL (ref 31.5–35.7)
MCV RBC AUTO: 90.6 FL (ref 79–97)
MONOCYTES # BLD AUTO: 0.79 10*3/MM3 (ref 0.1–0.9)
MONOCYTES NFR BLD AUTO: 11.1 % (ref 5–12)
NEUTROPHILS NFR BLD AUTO: 4.58 10*3/MM3 (ref 1.7–7)
NEUTROPHILS NFR BLD AUTO: 64.5 % (ref 42.7–76)
NRBC BLD AUTO-RTO: 0 /100 WBC (ref 0–0.2)
PLATELET # BLD AUTO: 149 10*3/MM3 (ref 140–450)
PMV BLD AUTO: 11.2 FL (ref 6–12)
POTASSIUM SERPL-SCNC: 4.2 MMOL/L (ref 3.5–5.2)
PROT SERPL-MCNC: 7.3 G/DL (ref 6–8.5)
RBC # BLD AUTO: 4.38 10*6/MM3 (ref 3.77–5.28)
SODIUM SERPL-SCNC: 139 MMOL/L (ref 136–145)
URATE SERPL-MCNC: 7.6 MG/DL (ref 2.4–5.7)
WBC # BLD AUTO: 7.1 10*3/MM3 (ref 3.4–10.8)

## 2021-04-14 PROCEDURE — 36415 COLL VENOUS BLD VENIPUNCTURE: CPT

## 2021-04-14 PROCEDURE — 85025 COMPLETE CBC W/AUTO DIFF WBC: CPT

## 2021-04-14 PROCEDURE — 80053 COMPREHEN METABOLIC PANEL: CPT

## 2021-04-14 PROCEDURE — 84550 ASSAY OF BLOOD/URIC ACID: CPT

## 2021-04-14 PROCEDURE — 82306 VITAMIN D 25 HYDROXY: CPT

## 2021-05-06 RX ORDER — PIOGLITAZONEHYDROCHLORIDE 30 MG/1
TABLET ORAL
Qty: 90 TABLET | Refills: 0 | Status: SHIPPED | OUTPATIENT
Start: 2021-05-06 | End: 2021-08-01

## 2021-05-19 ENCOUNTER — LAB (OUTPATIENT)
Dept: LAB | Facility: HOSPITAL | Age: 66
End: 2021-05-19

## 2021-05-19 ENCOUNTER — OFFICE VISIT (OUTPATIENT)
Dept: FAMILY MEDICINE CLINIC | Facility: CLINIC | Age: 66
End: 2021-05-19

## 2021-05-19 VITALS
HEIGHT: 64 IN | OXYGEN SATURATION: 92 % | DIASTOLIC BLOOD PRESSURE: 69 MMHG | WEIGHT: 288.3 LBS | BODY MASS INDEX: 49.22 KG/M2 | TEMPERATURE: 96.8 F | HEART RATE: 64 BPM | RESPIRATION RATE: 18 BRPM | SYSTOLIC BLOOD PRESSURE: 118 MMHG

## 2021-05-19 DIAGNOSIS — I10 ESSENTIAL HYPERTENSION: Primary | Chronic | ICD-10-CM

## 2021-05-19 DIAGNOSIS — E78.2 MIXED HYPERLIPIDEMIA: Chronic | ICD-10-CM

## 2021-05-19 DIAGNOSIS — M54.6 CHRONIC THORACIC BACK PAIN, UNSPECIFIED BACK PAIN LATERALITY: Chronic | ICD-10-CM

## 2021-05-19 DIAGNOSIS — E11.49 TYPE 2 DIABETES MELLITUS WITH OTHER DIABETIC NEUROLOGICAL COMPLICATION (HCC): Chronic | ICD-10-CM

## 2021-05-19 DIAGNOSIS — R56.9 SEIZURE (HCC): Chronic | ICD-10-CM

## 2021-05-19 DIAGNOSIS — N39.0 RECURRENT URINARY TRACT INFECTION: Chronic | ICD-10-CM

## 2021-05-19 DIAGNOSIS — K74.60 CIRRHOSIS OF LIVER WITHOUT ASCITES, UNSPECIFIED HEPATIC CIRRHOSIS TYPE (HCC): Chronic | ICD-10-CM

## 2021-05-19 DIAGNOSIS — J44.9 CHRONIC OBSTRUCTIVE PULMONARY DISEASE, UNSPECIFIED COPD TYPE (HCC): Chronic | ICD-10-CM

## 2021-05-19 DIAGNOSIS — G47.33 OBSTRUCTIVE SLEEP APNEA SYNDROME: Chronic | ICD-10-CM

## 2021-05-19 DIAGNOSIS — G20 PARKINSON'S DISEASE (HCC): Chronic | ICD-10-CM

## 2021-05-19 DIAGNOSIS — F31.32 BIPOLAR 1 DISORDER, DEPRESSED, MODERATE (HCC): Chronic | ICD-10-CM

## 2021-05-19 DIAGNOSIS — G89.29 CHRONIC THORACIC BACK PAIN, UNSPECIFIED BACK PAIN LATERALITY: Chronic | ICD-10-CM

## 2021-05-19 DIAGNOSIS — K21.9 GASTROESOPHAGEAL REFLUX DISEASE WITHOUT ESOPHAGITIS: Chronic | ICD-10-CM

## 2021-05-19 LAB
ANION GAP SERPL CALCULATED.3IONS-SCNC: 12.3 MMOL/L (ref 5–15)
BUN SERPL-MCNC: 23 MG/DL (ref 8–23)
BUN/CREAT SERPL: 21.7 (ref 7–25)
CALCIUM SPEC-SCNC: 10.4 MG/DL (ref 8.6–10.5)
CHLORIDE SERPL-SCNC: 99 MMOL/L (ref 98–107)
CO2 SERPL-SCNC: 29.7 MMOL/L (ref 22–29)
CREAT SERPL-MCNC: 1.06 MG/DL (ref 0.57–1)
GFR SERPL CREATININE-BSD FRML MDRD: 52 ML/MIN/1.73
GLUCOSE SERPL-MCNC: 96 MG/DL (ref 65–99)
HBA1C MFR BLD: 7 % (ref 3.5–5.6)
POTASSIUM SERPL-SCNC: 4.4 MMOL/L (ref 3.5–5.2)
SODIUM SERPL-SCNC: 141 MMOL/L (ref 136–145)

## 2021-05-19 PROCEDURE — 36415 COLL VENOUS BLD VENIPUNCTURE: CPT

## 2021-05-19 PROCEDURE — 80048 BASIC METABOLIC PNL TOTAL CA: CPT

## 2021-05-19 PROCEDURE — 99214 OFFICE O/P EST MOD 30 MIN: CPT | Performed by: FAMILY MEDICINE

## 2021-05-19 PROCEDURE — 83036 HEMOGLOBIN GLYCOSYLATED A1C: CPT

## 2021-05-19 NOTE — PATIENT INSTRUCTIONS
Continue your current medications and treatment.    Have the follow up labs done and call for results.    Follow up in the offcie in 6 months.

## 2021-05-19 NOTE — PROGRESS NOTES
Subjective   Dariana Rivera is a 65 y.o. female.     Chief Complaint   Patient presents with   • Diabetes     3 month followup   • Hyperlipidemia   • Hypertension       HPI chief complaint: Hypertension hyperlipidemia diabetes mellitus cirrhosis    The patient is a 65-year-old white female who comes in for follow-up and maintenance of her current problems which include    1.  Hypertension-stable-patient is currently on Lasix 40 mg daily potassium 20 mg daily and Corgard 20 mg daily.  She denied headache dizziness or chest pain.    2.  Hyperlipidemia-stable-patient is currently on a diet.    3.  Type 2 diabetes mellitus-stable-patient on Actos 30 mg a day.  She denied polydipsia polyphagia or polyuria.    4.  Depression-stable-patient has history of recurrent moderately severe bipolar depression.  Patient is currently on Lexapro 10 mg daily clonazepam 0.5 mg 3 times a day Latuda 40 mg at night.  She currently is being followed by psychiatry.  She states her depression and bipolar disease is stable at this time.    5.  Parkinson's disease-stable-patient is currently on Sinemet 25/102 tablets 3 times a day Requip 0.75 mgm twice a day.  She continues to have some problems with posturing and tremor.    6.  Cirrhosis-stable-patient is currently taking lactulose 10 g twice a day.  She also is on Corgard 20 mg twice a day.  She does have esophageal varices.  She denies jaundice izzy colored stools or ascites.    7.  Recurrent urinary tract infections-stable-patient on Macrobid 100 mg daily.  This helps to control her UTIs.    8.  COPD-stable-patient on Symbicort 160.52 puffs twice a day rescue inhaler and Spiriva will once a day.  She denied cough shortness of breath wheezing or sputum production.    9.  Seizures-stable-patient is currently on gabapentin 300 mg 3 times a day.  She denied recent seizures.    10.  Chronic back pain-stable-patient is currently on baclofen 10 3 times a day and hydrocodone 5/301 tablet twice  "a day.  She states her back pain is controlled.    11.  Sleep [id number] is on CPAP and Oxygen.      The following portions of the patient's history were reviewed and updated as appropriate: allergies, current medications, past family history, past medical history, past social history, past surgical history and problem list.    Review of Systems    Objective     /69 (BP Location: Right arm, Patient Position: Sitting, Cuff Size: Large Adult)   Pulse 64   Temp 96.8 °F (36 °C) (Infrared)   Resp 18   Ht 162.6 cm (64\")   Wt 131 kg (288 lb 4.8 oz)   SpO2 92%   BMI 49.49 kg/m²     Physical Exam  Vitals and nursing note reviewed.   Constitutional:       Appearance: She is well-developed. She is obese.   HENT:      Head: Normocephalic and atraumatic.   Eyes:      Pupils: Pupils are equal, round, and reactive to light.   Cardiovascular:      Rate and Rhythm: Normal rate and regular rhythm.      Heart sounds: Normal heart sounds.   Pulmonary:      Effort: Pulmonary effort is normal.      Breath sounds: Normal breath sounds.   Abdominal:      General: Bowel sounds are normal.      Palpations: Abdomen is soft.   Musculoskeletal:         General: Normal range of motion.      Cervical back: Neck supple.   Skin:     General: Skin is warm and dry.   Neurological:      Mental Status: She is alert and oriented to person, place, and time.   Psychiatric:         Behavior: Behavior normal.         Thought Content: Thought content normal.         Judgment: Judgment normal.         Vitamin D 25 Hydroxy (04/14/2021 12:07)  Uric Acid (04/14/2021 12:07)  Comprehensive Metabolic Panel (04/14/2021 12:07)  CBC & Differential (04/14/2021 12:07)  Lipid Panel (02/24/2021 12:14)  Protein / Creatinine Ratio, Urine - Urine, Clean Catch (02/24/2021 12:14)  Hemoglobin A1c (02/24/2021 12:14)  Basic Metabolic Panel (02/02/2021 12:04)  CBC & Differential (02/02/2021 12:04)    Assessment/Plan   Diagnoses and all orders for this " visit:    1. Essential hypertension (Primary)    2. Mixed hyperlipidemia    3. 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 Cirrhosis of liver without ascites, unspecified hepatic cirrhosis type (CMS/HCC)    5. Gastroesophageal reflux disease without esophagitis    6. Recurrent urinary tract infection    7. Bipolar 1 disorder, depressed, moderate (CMS/HCC)    8. Chronic thoracic back pain, unspecified back pain laterality    9. Parkinson's disease (CMS/HCC)    10. Seizure (CMS/HCC)    11. Chronic obstructive pulmonary disease, unspecified COPD type (CMS/HCC)    12. Obstructive sleep apnea syndrome      Patient Instructions   Continue your current medications and treatment.    Have the follow up labs done and call for results.    Follow up in the offcie in 6 months.      Paul Larson Jr., MD    05/19/21

## 2021-06-09 ENCOUNTER — HOSPITAL ENCOUNTER (OUTPATIENT)
Dept: GENERAL RADIOLOGY | Facility: HOSPITAL | Age: 66
Discharge: HOME | End: 2021-06-09
Admitting: UROLOGY

## 2021-06-09 ENCOUNTER — TRANSCRIBE ORDERS (OUTPATIENT)
Dept: ADMINISTRATIVE | Facility: HOSPITAL | Age: 66
End: 2021-06-09

## 2021-06-09 DIAGNOSIS — N20.0 RENAL CALCULUS: Primary | ICD-10-CM

## 2021-06-09 DIAGNOSIS — N20.0 RENAL CALCULUS: ICD-10-CM

## 2021-06-09 PROCEDURE — 74018 RADEX ABDOMEN 1 VIEW: CPT

## 2021-06-13 NOTE — OUTREACH NOTE
Prep Survey      Responses   Facility patient discharged from?  [facility]   Is patient eligible?  Yes   Discharge diagnosis  Generalized anxiety diorder   Does the patient have one of the following disease processes/diagnoses(primary or secondary)?  Other   Does the patient have Home health ordered?  Yes   What is the Home health agency?   [facility]   Is there a DME ordered?  No   Prep survey completed?  Yes          Teresa Valentin RN        
Constitutional: no fever, chills, no recent weight loss, change in appetite or malaise  Eyes: no redness/discharge/pain/vision changes  ENT: no rhinorrhea/ear pain/sore throat  Cardiac: No chest pain, SOB or edema.  Respiratory: No cough or respiratory distress  GI: No nausea, vomiting, diarrhea or abdominal pain.  : No dysuria, frequency, urgency or hematuria  MS: no pain to back or extremities, no loss of ROM, no weakness  Neuro: No headache or weakness. No LOC.  Skin: No skin rash.  Endocrine: No history of thyroid disease or diabetes.  Except as documented in the HPI, all other systems are negative.

## 2021-06-17 ENCOUNTER — OFFICE VISIT (OUTPATIENT)
Dept: PSYCHIATRY | Facility: CLINIC | Age: 66
End: 2021-06-17

## 2021-06-17 DIAGNOSIS — F31.32 BIPOLAR 1 DISORDER, DEPRESSED, MODERATE (HCC): Primary | Chronic | ICD-10-CM

## 2021-06-17 DIAGNOSIS — F41.1 GENERALIZED ANXIETY DISORDER: Chronic | ICD-10-CM

## 2021-06-17 PROCEDURE — 99214 OFFICE O/P EST MOD 30 MIN: CPT | Performed by: PSYCHIATRY & NEUROLOGY

## 2021-06-17 RX ORDER — CLONAZEPAM 0.5 MG/1
0.5 TABLET ORAL 3 TIMES DAILY PRN
Qty: 90 TABLET | Refills: 3 | Status: SHIPPED | OUTPATIENT
Start: 2021-06-17 | End: 2021-10-26 | Stop reason: SDUPTHER

## 2021-06-17 RX ORDER — ESCITALOPRAM OXALATE 10 MG/1
10 TABLET ORAL DAILY
Qty: 90 TABLET | Refills: 1 | Status: SHIPPED | OUTPATIENT
Start: 2021-06-17 | End: 2021-10-26 | Stop reason: SDUPTHER

## 2021-06-17 RX ORDER — LURASIDONE HYDROCHLORIDE 40 MG/1
40 TABLET, FILM COATED ORAL NIGHTLY
Qty: 30 TABLET | Refills: 3 | Status: SHIPPED | OUTPATIENT
Start: 2021-06-17 | End: 2021-10-26 | Stop reason: SDUPTHER

## 2021-06-17 RX ORDER — PRAMIPEXOLE DIHYDROCHLORIDE 0.5 MG/1
0.5 TABLET ORAL 2 TIMES DAILY
Qty: 180 TABLET | Refills: 1 | Status: SHIPPED | OUTPATIENT
Start: 2021-06-17 | End: 2022-01-04

## 2021-06-17 NOTE — PATIENT INSTRUCTIONS
Bipolar 2 Disorder  Bipolar 2 disorder is a mental health disorder in which a person has episodes of emotional highs and episodes of emotional lows, or depression. In bipolar 2 disorder, the episodes of emotional highs are less extreme and do not last as long as in bipolar 1 disorder. These highs are called hypomania.  People with bipolar 2 disorder have had at least one episode of hypomania (hypomanic episode) in their lives, which is usually followed by a depressive episode. Some people may have cycles of hypomanic and depressive episodes. Some people with bipolar 2 disorder may lead a very normal life between episodes.  What are the causes?  The cause of this condition is not known.  What increases the risk?  The following factors may make you more likely to develop this condition:  · Having a family member with the disorder.  · Having an imbalance of certain chemicals in the brain (neurotransmitters).  · Experiencing stress, such as illness, divorce, financial problems, or a death.  · Having certain conditions that affect the brain or spinal cord (neurologic conditions).  · Having had a brain injury (trauma).  What are the signs or symptoms?  Symptoms of hypomania include:  · Very high self-esteem or self-confidence.  · Decreased need for sleep.  · Unusual talkativeness. Speech may be very fast.  · Racing thoughts, with quick shifts between topics that may or may not be related (flight of ideas).  · Change in ability to concentrate. Some people may have better focus, and others may not be able to focus at all.  · Increased agitation. This could be pacing, squirming, fidgeting, or finger and toe tapping.  · Impulsive behavior and poor judgment. This may result in high-risk activities, such as:  ? Being sexual with people you normally wouldn't be sexual with.  ? Spending money you have borrowed on things you don't need.  Symptoms of depression include:  · Extreme degrees of sadness, uncontrollable crying,  hopelessness, worthlessness, or numbness.  · Sleep problems, such as insomnia, waking early, or sleeping too much.  · No longer enjoying things you used to enjoy.  · Isolation. You may often spend time alone.  · Lack of energy or moving more slowly than normal.  · Trouble making decisions.  · Changes in appetite, such as eating too much or not eating.  · Thoughts of death, or wanting to harm yourself.  Sometimes, you may have a mix of symptoms of hypomania and depression at the same time. Stress can often trigger these symptoms.  How is this diagnosed?  This condition may be diagnosed based on:  · Emotional episodes.  · Medical history.  · Use of alcohol, drugs, and prescription medicines. Certain medical conditions and substances can cause symptoms that seem like bipolar disorder. This is called secondary bipolar disorder.  Your health care provider may ask you to take a short test. This helps to understand your symptoms. You may also be asked to see a mental health specialist for further evaluation or to start treatment.  How is this treated?         This condition is a long-term (chronic) illness. It is often managed with ongoing treatment rather than treatment only when symptoms occur. A combination of treatments is the main approach. Treatment may include:  · Psychotherapy. Some forms of talk therapy, such as cognitive behavioral therapy (CBT) and family therapy, can help with learning to manage bipolar disorder.  · Psychoeducation. This helps you and others understand how this disorder is managed. Include friends and family in educational sessions so they learn how best to support you.  · Methods of managing your condition, such as journaling or relaxation exercises. Relaxation exercises include:  ? Yoga.  ? Meditation.  ? Deep breathing.  · Lifestyle changes, such as:  ? Limiting alcohol and drug use.  ? Exercising regularly.  ? Structuring when you go to bed and when you get up.  ? Eating a healthy  diet.  · Medicine. Medicine can be prescribed by a health care provider who specializes in treating mental health disorders (psychiatrist). Medicines called mood stabilizers are usually prescribed. If symptoms occur during treatment with a mood stabilizer, other medicines may be added.  Follow these instructions at home:  Activity  · Return to your normal activities as told by your health care provider.  · Find activities that you enjoy, and make time to do them.  · Exercise regularly as told by your health care provider.  Lifestyle    · Follow a set daily schedule.  · Eat a healthy diet that includes fresh fruits and vegetables, whole grains, low-fat dairy, and lean meat.  · Get at least 7-8 hours of sleep each night.  · Avoid using products that contain nicotine or tobacco. If you want help quitting, ask your health care provider.  · Do not use drugs.  Alcohol use  · Do not drink alcohol if:  ? Your health care provider tells you not to drink.  ? You are pregnant, may be pregnant, or are planning to become pregnant.  · If you drink alcohol:  ? Limit how much you use to:  § 0-1 drink a day for women.  § 0-2 drinks a day for men.  ? Be aware of how much alcohol is in your drink. In the [place] one drink equals one 12 oz bottle of beer (355 mL), one 5 oz glass of wine (148 mL), or one 1½ oz glass of hard liquor (44 mL).  General instructions  · Take over-the-counter and prescription medicines only as told by your health care provider. You may think about stopping your medicine, but it is very important to take your medicine as prescribed.  · Consider joining a support group. Your health care provider may be able to recommend one.  · Talk with your family and friends about your treatment goals and how they can help.  · Keep all follow-up visits as told by your health care provider. This is important.  Where to find more information  · National [place] on Mental Illness: www.tania.org  · National [place] of Mental  Health: www.[facility].gov  Contact a health care provider if:  · Your symptoms get worse, or your loved ones tell you that your symptoms are getting worse.  · You have uncomfortable side effects from your medicine.  · You have trouble sleeping.  · You have trouble doing daily activities.  · You feel unsafe in your surroundings.  · You are self-medicating with alcohol or drugs.  Get help right away if:  · You have new symptoms.  · You have thoughts about harming yourself or others.  · You are considering suicide.  If you ever feel like you may hurt yourself or others, or have thoughts about taking your own life, get help right away. You can go to your nearest emergency department or call:  · Your local emergency services (911 in the [place]  · A suicide crisis helpline, such as the National Suicide Prevention Lifeline at 1-894.818.3665. This is open 24 hours a day.  Summary  · Bipolar 2 disorder is a lifelong mental health disorder in which a person has episodes of hypomania and depression.  · This disorder is mainly treated with a combination of talk therapy, education, strategies for managing the condition, and medicines.  · Talk with your family and friends about your treatment goals and how they can help.  · Get help right away if you are considering suicide.  This information is not intended to replace advice given to you by your health care provider. Make sure you discuss any questions you have with your health care provider.  Document Revised: 06/02/2020 Document Reviewed: 06/02/2020  Elsevier Patient Education © 2021 [employer].
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
## 2021-08-01 RX ORDER — PIOGLITAZONEHYDROCHLORIDE 30 MG/1
TABLET ORAL
Qty: 90 TABLET | Refills: 0 | Status: SHIPPED | OUTPATIENT
Start: 2021-08-01 | End: 2022-01-19

## 2021-08-16 ENCOUNTER — OFFICE VISIT (OUTPATIENT)
Dept: PULMONOLOGY | Facility: HOSPITAL | Age: 66
End: 2021-08-16

## 2021-08-16 VITALS
TEMPERATURE: 98.7 F | DIASTOLIC BLOOD PRESSURE: 77 MMHG | BODY MASS INDEX: 49.85 KG/M2 | RESPIRATION RATE: 13 BRPM | HEIGHT: 64 IN | OXYGEN SATURATION: 94 % | WEIGHT: 292 LBS | SYSTOLIC BLOOD PRESSURE: 118 MMHG | HEART RATE: 63 BPM

## 2021-08-16 DIAGNOSIS — I27.81 COR PULMONALE (HCC): ICD-10-CM

## 2021-08-16 DIAGNOSIS — G47.33 OBSTRUCTIVE SLEEP APNEA SYNDROME: Chronic | ICD-10-CM

## 2021-08-16 DIAGNOSIS — I10 ESSENTIAL HYPERTENSION: Chronic | ICD-10-CM

## 2021-08-16 DIAGNOSIS — J44.9 CHRONIC OBSTRUCTIVE PULMONARY DISEASE, UNSPECIFIED COPD TYPE (HCC): Primary | Chronic | ICD-10-CM

## 2021-08-16 PROCEDURE — G0463 HOSPITAL OUTPT CLINIC VISIT: HCPCS

## 2021-08-16 RX ORDER — HYDROCODONE BITARTRATE AND ACETAMINOPHEN 5; 325 MG/1; MG/1
1 TABLET ORAL EVERY 12 HOURS PRN
COMMUNITY
Start: 2021-07-18

## 2021-08-16 RX ORDER — CARBIDOPA AND LEVODOPA 25; 100 MG/1; MG/1
2 TABLET, EXTENDED RELEASE ORAL 3 TIMES DAILY
COMMUNITY
Start: 2021-07-12

## 2021-08-16 RX ORDER — SAW/PYGEUM/BETA/HERB/D3/B6/ZN 30 MG-25MG
1 CAPSULE ORAL
COMMUNITY
Start: 2021-08-09 | End: 2022-02-28

## 2021-08-16 RX ORDER — ASPIRIN 81 MG/1
TABLET ORAL
COMMUNITY

## 2021-08-16 NOTE — PROGRESS NOTES
PULMONARY  CONSULT NOTE      PATIENT IDENTIFICATION:  Name: Dariana Rivera  Age: 65 y.o.  Sex: female  :  1955  MRN: [id number]    DATE OF CONSULTATION:  2021                     CHIEF COMPLAINT: Sleep apnea    History of Present Illness:   Dariana Rivera is a 65 y.o. female Pt on CPAP feeling better more energy especially the night he use it more than 4 hours, no sleepiness no fatigue no tiredness, no mask irritation no dryness, compliance report reviewed with pt AHI< 5 with good usage.   She has underlying COPD, using her bronchodilator and steroid less dyspnea exertion still having some cough but no sputum's no hemoptysis      Review of Systems:   Constitutional: negative   Eyes: negative   ENT/oropharynx: negative   Cardiovascular: negative   Respiratory: negative   Gastrointestinal: negative   Genitourinary: negative   Neurological: negative   Musculoskeletal: negative   Integument/breast: negative   Endocrine: negative   Allergic/Immunologic: negative     Past Medical History:  Past Medical History:   Diagnosis Date   • Allergic Not sure    Penicillin, cipro, clindamycin, methadone, ambien   • Angina at rest (CMS/[facility])     DR. Amador   • Anxiety disorder     [facility]    • Back pain    • Bipolar disorder (CMS/[facility])    • Cervical dystonia    • Chronic pain     with stenosis   • Cirrhosis (CMS/HCC)    • Colitis    • Colon polyps    • COPD (chronic obstructive pulmonary disease) (CMS/HCC)    • Cramping of feet    • Cramping of hands    • DDD (degenerative disc disease), cervical    • DDD (degenerative disc disease), lumbar    • DDD (degenerative disc disease), lumbar    • DDD (degenerative disc disease), thoracic    • Depression    • Diverticulosis     Not sure when diagnosed   • Dysphagia 2020   • Eosinophilic colitis    • Gastritis    • GERD (gastroesophageal reflux disease)    • Headache Not sure    Migraines stopped after complete hyst in    • Hepatic steatosis     non alcoholic  steo-hepatitis    • Hx of lithotripsy 02/10/2021   • Hypertension    • IBS (irritable bowel syndrome)    • Infectious viral hepatitis     [facility]   • Insomnia    • Kidney stones    • Low back pain     Not sure when diagnosed   • Lumbar stenosis    • Neck pain    • Neuromuscular disorder (CMS/HCC) Not sure    Parkinsons and Cervical Dystonia   • Neuropathy    • Obesity     Obese since about 1966   • Osteoarthritis    • Parkinson's disease (CMS/HCC)    • Peripheral edema    • PTSD (post-traumatic stress disorder)    • Recurrent UTI    • Renal insufficiency     Not sure when diagnosed   • Seizure (CMS/HCC)     onset 7/2016.  Last seizure 10/2019   • Sleep apnea     Using Bipap machine at night. advised to bring dos   • Sleep apnea, obstructive    • Type II diabetes mellitus (CMS/HCC)    • Urinary incontinence        Past Surgical History:  Past Surgical History:   Procedure Laterality Date   • BACK SURGERY     • CARDIAC CATHETERIZATION  02/2019   • CARPAL TUNNEL RELEASE Bilateral     Wrist    • CHOLECYSTECTOMY  1997   • COLONOSCOPY     • ENDOSCOPY     • ENDOSCOPY N/A 4/15/2020    Procedure: ESOPHAGOGASTRODUODENOSCOPY with dilitation (60FR.);  Surgeon: Julieta Allison MD;  Location: [facility] ENDOSCOPY;  Service: Gastroenterology;  Laterality: N/A;  varices,hiatal hernia,gastritis   • ENDOSCOPY N/A 9/29/2020    Procedure: ESOPHAGOGASTRODUODENOSCOPY with esophageal dilitation (54 bougie);  Surgeon: Julieta Allison MD;  Location: [facility] ENDOSCOPY;  Service: Gastroenterology;  Laterality: N/A;   post op: hiatal hernia, esophageal stricture   • EYE SURGERY  3-14 & 3-    Cataract surgery R 3-14, L 3-   • HYSTERECTOMY  09/2004    complete   • INTERSTIM PLACEMENT      bladder   • JOINT REPLACEMENT Bilateral     knees   • KNEE ARTHROSCOPY Bilateral     Arthroscopic surgery to bilateral knees    • OTHER SURGICAL HISTORY  2015    Stress test    • OTHER SURGICAL HISTORY  10/2016    Lithotripsy total 4-5   • OTHER  SURGICAL HISTORY      Right arm cellulits- spider bites    • OTHER SURGICAL HISTORY  2018    Lithotripsy   • OTHER SURGICAL HISTORY  2018    ACDF C3-C4 & C6-C7   • REPLACEMENT TOTAL KNEE     • REPLACEMENT TOTAL KNEE Left 2016   • SHOULDER ROTATOR CUFF REPAIR Right 2020    Procedure: RT ROTATOR CUFF REPAIR OPEN;  Surgeon: Curly Vaughn MD;  Location: [facility] MAIN OR;  Service: Orthopedics   • SPINE SURGERY  2018    Fusion surgery C3-C4 and C6-C7        Family History:  Family History   Problem Relation Age of Onset   • Hypertension Mother    • Hyperlipidemia Mother    • Arthritis Mother    • Cancer Mother         Skin cancers   • Depression Mother    • Mental illness Mother         Depression and Alzheimers   • Diabetes Father             • Heart disease Father         Had angina. Was diabetic.  of heart attack at age 57.   • Early death Father          at 57 from heart attack   • Hyperlipidemia Sister    • Arthritis Sister    • Cancer Sister         Skin cancers   • Depression Sister    • Diabetes Sister         Diagnosed    • Heart disease Sister         2 mini strokes, atrial fribulation, pacemaker insertion   • Hypertension Sister    • Mental illness Sister         Depression   • Stroke Sister         2 mini strokes   • Diabetes Brother         Not sure when diagnosed   • Heart disease Brother         Had open heart surgery about 4 yrs ago.   • Hypertension Brother    • Hyperlipidemia Brother    • Arthritis Brother    • Cancer Brother         Skin cancers   • Breast cancer Maternal Grandmother    • Breast cancer Maternal Aunt         Social History:   Social History     Tobacco Use   • Smoking status: Former Smoker     Packs/day: 0.50     Years: 10.00     Pack years: 5.00     Types: Cigarettes     Start date: 1974     Quit date:      Years since quittin.1   • Smokeless tobacco: Never Used   • Tobacco comment: Stopped off and on.  Stopped once for  more than 2 yrs   Substance Use Topics   • Alcohol use: No        Allergies:  Allergies   Allergen Reactions   • Ambien  [Zolpidem Tartrate] Confusion   • Ciprofloxacin Hcl Rash   • Penicillins Rash   • Zolpidem Unknown - High Severity     Other reaction(s): Confusion   • Methadone Hallucinations   • Clindamycin Rash       Home Meds:  (Not in a hospital admission)      Objective:    Vitals Ranges:   Temp:  [98.7 °F (37.1 °C)] 98.7 °F (37.1 °C)  Heart Rate:  [63] 63  Resp:  [13] 13  BP: (118)/(77) 118/77  Body mass index is 50.12 kg/m².     Exam:  General Appearance:  WDWN    HEENT:   without obvious abnormality,  Conjunctiva/corneas clear,  Normal external ear canals, no drainage    Clear orsalmucosa,  Mallampati score 3    Neck:  Supple, symmetrical, trachea midline. No JVD.  Lungs:   Bilateral basal rhonchi bilaterally, respirations unlabored symmetrical wall movement.    Chest wall:  No tenderness or deformity.    Heart:  Regular rate and rhythm, S1 and S2 normal.  Extremities: Trace edema no clubbing or Cyanosis        Data Review:  All labs (24hrs): No results found for this or any previous visit (from the past 24 hour(s)).     Imaging:  XR Abdomen KUB  Narrative: DATE OF EXAM:  6/9/2021 11:29 AM     PROCEDURE:  XR ABDOMEN KUB-     INDICATIONS:  RENAL CALCULUS; N20.0-Calculus of kidney     COMPARISON:  KUB 3/5/2021.      TECHNIQUE:   Single radiographic view of the abdomen was obtained.        FINDINGS:  Stable 6 mm stone at the left lower renal pole. No ureteral stone. No  right renal stone. Sacral stimulator device is seen to the right of  midline. Degenerative disc and endplate changes are present in the lower  lumbar spine. Mild hip degenerative changes. Nonobstructive bowel gas  pattern.      Impression: Stable appearance of 6 mm left lower renal pole radiopaque stone.     Electronically Signed By-Maria L Foster MD On:6/9/2021 11:56 AM  This report was finalized on [id number] by  Maria L Foster MD.        ASSESSMENT:  Diagnoses and all orders for this visit:    Chronic obstructive pulmonary disease, unspecified COPD type (CMS/HCC)    Obstructive sleep apnea syndrome    Essential hypertension    Cor pulmonale (CMS/HCC)    Other orders  -     carbidopa-levodopa ER (SINEMET CR)  MG per tablet; Take 2 tablets by mouth 3 (Three) Times a Day.  -     HYDROcodone-acetaminophen (NORCO) 5-325 MG per tablet; Take 1 tablet by mouth Every 12 (Twelve) Hours As Needed.  -     aspirin (aspirin) 81 MG EC tablet; aspirin 81 mg tablet,delayed release  -     Melatonin ER 10 MG tablet controlled-release; Take 1 tablet by mouth every night at bedtime.        PLAN:  Bronchodilator inhaled corticosteroid    Education how to use inhalers    Encouraged to use incentive spirometer    Continue to exercise slowly as tolerated    Monitor for any change in the color of the sputum    Avoid any exposure to fumes, gas or any irritant        This patient with obstructive sleep apnea, compliance is improved. Encourage to use it more frequent, I re-emphasized on pt the long and short term benefit of treating LISANDRO.     Treating LISANDRO will improve BP control    Follow-up 6 months    Sasha Khan MD. D, ABSM.  8/16/2021  15:36 EDT

## 2021-08-30 ENCOUNTER — OFFICE (AMBULATORY)
Dept: URBAN - METROPOLITAN AREA CLINIC 64 | Facility: CLINIC | Age: 66
End: 2021-08-30

## 2021-08-30 VITALS
HEIGHT: 67 IN | WEIGHT: 292 LBS | DIASTOLIC BLOOD PRESSURE: 75 MMHG | HEART RATE: 61 BPM | SYSTOLIC BLOOD PRESSURE: 123 MMHG

## 2021-08-30 DIAGNOSIS — K74.60 UNSPECIFIED CIRRHOSIS OF LIVER: ICD-10-CM

## 2021-08-30 DIAGNOSIS — I85.00 ESOPHAGEAL VARICES WITHOUT BLEEDING: ICD-10-CM

## 2021-08-30 DIAGNOSIS — K75.81 NONALCOHOLIC STEATOHEPATITIS (NASH): ICD-10-CM

## 2021-08-30 PROCEDURE — 99213 OFFICE O/P EST LOW 20 MIN: CPT | Performed by: INTERNAL MEDICINE

## 2021-08-30 RX ORDER — OMEGA-3S/DHA/EPA/FISH OIL 980-1400MG
CAPSULE,DELAYED RELEASE (ENTERIC COATED) ORAL
Qty: 90 | Refills: 5 | Status: ACTIVE
Start: 2020-08-12

## 2021-08-30 RX ORDER — OMEPRAZOLE 40 MG/1
CAPSULE, DELAYED RELEASE ORAL
Qty: 90 | Refills: 4 | Status: ACTIVE

## 2021-08-30 RX ORDER — NADOLOL 20 MG/1
TABLET ORAL
Qty: 90 | Refills: 1 | Status: ACTIVE

## 2021-09-09 RX ORDER — TIOTROPIUM BROMIDE 18 UG/1
CAPSULE ORAL; RESPIRATORY (INHALATION)
Qty: 90 CAPSULE | Refills: 1 | Status: SHIPPED | OUTPATIENT
Start: 2021-09-09 | End: 2022-03-07 | Stop reason: SDUPTHER

## 2021-09-22 RX ORDER — ROPINIROLE 0.25 MG/1
TABLET, FILM COATED ORAL
Qty: 180 TABLET | Refills: 1 | Status: SHIPPED | OUTPATIENT
Start: 2021-09-22 | End: 2022-03-04 | Stop reason: SDUPTHER

## 2021-09-28 ENCOUNTER — LAB (OUTPATIENT)
Dept: LAB | Facility: HOSPITAL | Age: 66
End: 2021-09-28

## 2021-09-28 LAB — SARS-COV-2 ORF1AB RESP QL NAA+PROBE: NOT DETECTED

## 2021-09-28 PROCEDURE — C9803 HOPD COVID-19 SPEC COLLECT: HCPCS

## 2021-09-28 PROCEDURE — U0004 COV-19 TEST NON-CDC HGH THRU: HCPCS

## 2021-09-28 PROCEDURE — U0005 INFEC AGEN DETEC AMPLI PROBE: HCPCS

## 2021-09-30 ENCOUNTER — ANESTHESIA EVENT (OUTPATIENT)
Dept: GASTROENTEROLOGY | Facility: HOSPITAL | Age: 66
End: 2021-09-30

## 2021-09-30 ENCOUNTER — ANESTHESIA (OUTPATIENT)
Dept: GASTROENTEROLOGY | Facility: HOSPITAL | Age: 66
End: 2021-09-30

## 2021-09-30 ENCOUNTER — HOSPITAL ENCOUNTER (OUTPATIENT)
Facility: HOSPITAL | Age: 66
Setting detail: HOSPITAL OUTPATIENT SURGERY
Discharge: HOME | End: 2021-09-30
Attending: INTERNAL MEDICINE | Admitting: INTERNAL MEDICINE

## 2021-09-30 ENCOUNTER — ON CAMPUS - OUTPATIENT (AMBULATORY)
Dept: URBAN - METROPOLITAN AREA HOSPITAL 85 | Facility: HOSPITAL | Age: 66
End: 2021-09-30

## 2021-09-30 VITALS
SYSTOLIC BLOOD PRESSURE: 115 MMHG | HEIGHT: 63 IN | RESPIRATION RATE: 18 BRPM | BODY MASS INDEX: 51.91 KG/M2 | OXYGEN SATURATION: 99 % | WEIGHT: 293 LBS | TEMPERATURE: 98.4 F | HEART RATE: 58 BPM | DIASTOLIC BLOOD PRESSURE: 57 MMHG

## 2021-09-30 DIAGNOSIS — R13.10 DYSPHAGIA, UNSPECIFIED: ICD-10-CM

## 2021-09-30 DIAGNOSIS — K74.60 UNSPECIFIED CIRRHOSIS OF LIVER: ICD-10-CM

## 2021-09-30 DIAGNOSIS — I85.00 ESOPHAGEAL VARICES WITHOUT BLEEDING: ICD-10-CM

## 2021-09-30 DIAGNOSIS — K44.9 DIAPHRAGMATIC HERNIA WITHOUT OBSTRUCTION OR GANGRENE: ICD-10-CM

## 2021-09-30 LAB — GLUCOSE BLDC GLUCOMTR-MCNC: 127 MG/DL (ref 70–105)

## 2021-09-30 PROCEDURE — 43450 DILATE ESOPHAGUS 1/MULT PASS: CPT | Performed by: INTERNAL MEDICINE

## 2021-09-30 PROCEDURE — 25010000002 PROPOFOL 10 MG/ML EMULSION: Performed by: ANESTHESIOLOGY

## 2021-09-30 PROCEDURE — 43235 EGD DIAGNOSTIC BRUSH WASH: CPT | Performed by: INTERNAL MEDICINE

## 2021-09-30 PROCEDURE — 82962 GLUCOSE BLOOD TEST: CPT

## 2021-09-30 RX ORDER — PROPOFOL 10 MG/ML
VIAL (ML) INTRAVENOUS AS NEEDED
Status: DISCONTINUED | OUTPATIENT
Start: 2021-09-30 | End: 2021-09-30 | Stop reason: SURG

## 2021-09-30 RX ORDER — SODIUM CHLORIDE 9 MG/ML
INJECTION, SOLUTION INTRAVENOUS CONTINUOUS PRN
Status: DISCONTINUED | OUTPATIENT
Start: 2021-09-30 | End: 2021-09-30 | Stop reason: SURG

## 2021-09-30 RX ORDER — LIDOCAINE HYDROCHLORIDE 10 MG/ML
INJECTION, SOLUTION EPIDURAL; INFILTRATION; INTRACAUDAL; PERINEURAL AS NEEDED
Status: DISCONTINUED | OUTPATIENT
Start: 2021-09-30 | End: 2021-09-30 | Stop reason: SURG

## 2021-09-30 RX ADMIN — PROPOFOL 200 MG: 10 INJECTION, EMULSION INTRAVENOUS at 11:51

## 2021-09-30 RX ADMIN — SODIUM CHLORIDE: 0.9 INJECTION, SOLUTION INTRAVENOUS at 11:49

## 2021-09-30 RX ADMIN — LIDOCAINE HYDROCHLORIDE 50 MG: 10 INJECTION, SOLUTION EPIDURAL; INFILTRATION; INTRACAUDAL; PERINEURAL at 11:51

## 2021-09-30 NOTE — ANESTHESIA POSTPROCEDURE EVALUATION
Patient: Dariana Rivera    Procedure Summary     Date: 09/30/21 Room / Location: [facility] ENDOSCOPY 4 / [facility] ENDOSCOPY    Anesthesia Start: 1149 Anesthesia Stop: 1159    Procedure: ESOPHAGOGASTRODUODENOSCOPY WITH DILATATION (BOUGIE #60) (N/A ) Diagnosis:       Cirrhosis (HCC)      Dysphagia      (Cirrhosis (CMS/HCC) [K74.60])      (Dysphagia [R13.10])    Surgeons: Julieta Allison MD Provider: Kenny Pepe MD    Anesthesia Type: MAC ASA Status: 4          Anesthesia Type: MAC    Vitals  Vitals Value Taken Time   /68 09/30/21 1230   Temp     Pulse 60 09/30/21 1231   Resp 18 09/30/21 1226   SpO2 93 % 09/30/21 1231   Vitals shown include unvalidated device data.        Post Anesthesia Care and Evaluation    Patient location during evaluation: bedside  Patient participation: complete - patient participated  Level of consciousness: sleepy but conscious and responsive to verbal stimuli  Pain score: 0  Pain management: adequate  Airway patency: patent  Anesthetic complications: No anesthetic complications  PONV Status: none  Cardiovascular status: acceptable  Respiratory status: acceptable  Hydration status: acceptable

## 2021-09-30 NOTE — ANESTHESIA PREPROCEDURE EVALUATION
Anesthesia Evaluation     Patient summary reviewed and Nursing notes reviewed   NPO Solid Status: > 8 hours  NPO Liquid Status: > 8 hours           Airway   Mallampati: III  TM distance: <3 FB  Neck ROM: full  Possible difficult intubation, Small opening and Large neck circumference  Dental    (+) poor dentition    Comment: Missing all but 3 lower teeth    Pulmonary    (+) a smoker Current, COPD, home oxygen, sleep apnea on CPAP, decreased breath sounds,   Cardiovascular - normal exam    ECG reviewed    (+) hypertension, angina, hyperlipidemia,     ROS comment: 2/19 nml Cath, EF 65%    1/20 ECG: SR, low voltage, 79      Neuro/Psych  (+) seizures, headaches, numbness, psychiatric history Anxiety, Depression, Bipolar and PTSD, dementia,       ROS Comment: Parkinson's  GI/Hepatic/Renal/Endo    (+) morbid obesity, GERD,  hepatitis, liver disease fatty liver disease, renal disease CRI, diabetes mellitus type 2,     ROS Comment: cirrhosis    Musculoskeletal     (+) back pain, chronic pain, neck pain,   Abdominal   (+) obese,    Substance History      OB/GYN          Other   arthritis,                        Anesthesia Plan    ASA 4     MAC     intravenous induction     Anesthetic plan, all risks, benefits, and alternatives have been provided, discussed and informed consent has been obtained with: patient.

## 2021-10-13 ENCOUNTER — TRANSCRIBE ORDERS (OUTPATIENT)
Dept: ADMINISTRATIVE | Facility: HOSPITAL | Age: 66
End: 2021-10-13

## 2021-10-13 ENCOUNTER — LAB (OUTPATIENT)
Dept: LAB | Facility: HOSPITAL | Age: 66
End: 2021-10-13

## 2021-10-13 DIAGNOSIS — E87.1 HYPO-OSMOLALITY AND HYPONATREMIA: Primary | ICD-10-CM

## 2021-10-13 LAB
BASOPHILS # BLD AUTO: 0.03 10*3/MM3 (ref 0–0.2)
BASOPHILS NFR BLD AUTO: 0.5 % (ref 0–1.5)
DEPRECATED RDW RBC AUTO: 44.7 FL (ref 37–54)
EOSINOPHIL # BLD AUTO: 0.06 10*3/MM3 (ref 0–0.4)
EOSINOPHIL NFR BLD AUTO: 0.9 % (ref 0.3–6.2)
ERYTHROCYTE [DISTWIDTH] IN BLOOD BY AUTOMATED COUNT: 13.2 % (ref 12.3–15.4)
HCT VFR BLD AUTO: 41 % (ref 34–46.6)
HGB BLD-MCNC: 13.2 G/DL (ref 12–15.9)
IMM GRANULOCYTES # BLD AUTO: 0.01 10*3/MM3 (ref 0–0.05)
IMM GRANULOCYTES NFR BLD AUTO: 0.2 % (ref 0–0.5)
LYMPHOCYTES # BLD AUTO: 1.74 10*3/MM3 (ref 0.7–3.1)
LYMPHOCYTES NFR BLD AUTO: 26.9 % (ref 19.6–45.3)
MCH RBC QN AUTO: 29.7 PG (ref 26.6–33)
MCHC RBC AUTO-ENTMCNC: 32.2 G/DL (ref 31.5–35.7)
MCV RBC AUTO: 92.3 FL (ref 79–97)
MONOCYTES # BLD AUTO: 0.66 10*3/MM3 (ref 0.1–0.9)
MONOCYTES NFR BLD AUTO: 10.2 % (ref 5–12)
NEUTROPHILS NFR BLD AUTO: 3.97 10*3/MM3 (ref 1.7–7)
NEUTROPHILS NFR BLD AUTO: 61.3 % (ref 42.7–76)
NRBC BLD AUTO-RTO: 0 /100 WBC (ref 0–0.2)
PLATELET # BLD AUTO: 146 10*3/MM3 (ref 140–450)
PMV BLD AUTO: 11.7 FL (ref 6–12)
RBC # BLD AUTO: 4.44 10*6/MM3 (ref 3.77–5.28)
WBC # BLD AUTO: 6.47 10*3/MM3 (ref 3.4–10.8)

## 2021-10-13 PROCEDURE — 36415 COLL VENOUS BLD VENIPUNCTURE: CPT

## 2021-10-13 PROCEDURE — 85025 COMPLETE CBC W/AUTO DIFF WBC: CPT

## 2021-10-14 ENCOUNTER — LAB (OUTPATIENT)
Dept: LAB | Facility: HOSPITAL | Age: 66
End: 2021-10-14

## 2021-10-14 LAB
ANION GAP SERPL CALCULATED.3IONS-SCNC: 11.2 MMOL/L (ref 5–15)
BUN SERPL-MCNC: 24 MG/DL (ref 8–23)
BUN/CREAT SERPL: 22 (ref 7–25)
CALCIUM SPEC-SCNC: 9.8 MG/DL (ref 8.6–10.5)
CHLORIDE SERPL-SCNC: 100 MMOL/L (ref 98–107)
CO2 SERPL-SCNC: 28.8 MMOL/L (ref 22–29)
CREAT SERPL-MCNC: 1.09 MG/DL (ref 0.57–1)
GFR SERPL CREATININE-BSD FRML MDRD: 50 ML/MIN/1.73
GLUCOSE SERPL-MCNC: 98 MG/DL (ref 65–99)
POTASSIUM SERPL-SCNC: 4 MMOL/L (ref 3.5–5.2)
SODIUM SERPL-SCNC: 140 MMOL/L (ref 136–145)

## 2021-10-14 PROCEDURE — 80048 BASIC METABOLIC PNL TOTAL CA: CPT

## 2021-10-26 ENCOUNTER — OFFICE VISIT (OUTPATIENT)
Dept: PSYCHIATRY | Facility: CLINIC | Age: 66
End: 2021-10-26

## 2021-10-26 DIAGNOSIS — F41.1 GENERALIZED ANXIETY DISORDER: Chronic | ICD-10-CM

## 2021-10-26 DIAGNOSIS — F31.32 BIPOLAR 1 DISORDER, DEPRESSED, MODERATE (HCC): Primary | Chronic | ICD-10-CM

## 2021-10-26 PROCEDURE — 99214 OFFICE O/P EST MOD 30 MIN: CPT | Performed by: PSYCHIATRY & NEUROLOGY

## 2021-10-26 RX ORDER — ESCITALOPRAM OXALATE 10 MG/1
15 TABLET ORAL DAILY
Qty: 135 TABLET | Refills: 1 | Status: SHIPPED | OUTPATIENT
Start: 2021-10-26 | End: 2022-04-27 | Stop reason: SDUPTHER

## 2021-10-26 RX ORDER — CLONAZEPAM 0.5 MG/1
0.5 TABLET ORAL 3 TIMES DAILY PRN
Qty: 90 TABLET | Refills: 3 | Status: SHIPPED | OUTPATIENT
Start: 2021-10-26 | End: 2022-02-25

## 2021-10-26 RX ORDER — LURASIDONE HYDROCHLORIDE 40 MG/1
40 TABLET, FILM COATED ORAL NIGHTLY
Qty: 30 TABLET | Refills: 3 | Status: SHIPPED | OUTPATIENT
Start: 2021-10-26 | End: 2022-03-04 | Stop reason: SDUPTHER

## 2021-11-24 ENCOUNTER — OFFICE VISIT (OUTPATIENT)
Dept: FAMILY MEDICINE CLINIC | Facility: CLINIC | Age: 66
End: 2021-11-24

## 2021-11-24 VITALS
OXYGEN SATURATION: 93 % | TEMPERATURE: 97.7 F | WEIGHT: 286 LBS | RESPIRATION RATE: 18 BRPM | HEART RATE: 78 BPM | BODY MASS INDEX: 50.68 KG/M2 | HEIGHT: 63 IN | SYSTOLIC BLOOD PRESSURE: 116 MMHG | DIASTOLIC BLOOD PRESSURE: 77 MMHG

## 2021-11-24 DIAGNOSIS — K74.60 CIRRHOSIS OF LIVER WITHOUT ASCITES, UNSPECIFIED HEPATIC CIRRHOSIS TYPE (HCC): Chronic | ICD-10-CM

## 2021-11-24 DIAGNOSIS — F31.32 BIPOLAR 1 DISORDER, DEPRESSED, MODERATE (HCC): Chronic | ICD-10-CM

## 2021-11-24 DIAGNOSIS — G20 PARKINSON'S DISEASE (HCC): Chronic | ICD-10-CM

## 2021-11-24 DIAGNOSIS — F41.1 GENERALIZED ANXIETY DISORDER: Chronic | ICD-10-CM

## 2021-11-24 DIAGNOSIS — E78.2 MIXED HYPERLIPIDEMIA: Chronic | ICD-10-CM

## 2021-11-24 DIAGNOSIS — I10 PRIMARY HYPERTENSION: Primary | Chronic | ICD-10-CM

## 2021-11-24 DIAGNOSIS — R56.9 SEIZURE (HCC): Chronic | ICD-10-CM

## 2021-11-24 DIAGNOSIS — E11.49 TYPE 2 DIABETES MELLITUS WITH OTHER DIABETIC NEUROLOGICAL COMPLICATION (HCC): Chronic | ICD-10-CM

## 2021-11-24 DIAGNOSIS — K21.9 GASTROESOPHAGEAL REFLUX DISEASE WITHOUT ESOPHAGITIS: Chronic | ICD-10-CM

## 2021-11-24 PROBLEM — F19.982 HYPERSOMNIA DUE TO DRUG: Chronic | Status: RESOLVED | Noted: 2020-08-19 | Resolved: 2021-11-24

## 2021-11-24 PROCEDURE — 99214 OFFICE O/P EST MOD 30 MIN: CPT | Performed by: FAMILY MEDICINE

## 2021-11-24 NOTE — PROGRESS NOTES
Subjective   Dariana Rivera is a 66 y.o. female.     Chief Complaint   Patient presents with   • Diabetes     6 month followup   • Hypertension   • Hyperlipidemia   • Back Pain     lower back       HPI chief complaint: Hypertension hyperlipidemia cor pulmonale type 2 diabetes mellitus cirrhosis gastroesophageal reflux disease bipolar disease anxiety Parkinson's disease    Patient is a 66-year-old white female comes in for follow-up and maintenance of her current problems which include    1.  Hypertension-stable-patient is on Corgard 20 mg daily Lasix 40 mg twice a day and potassium.  She denied headache lightheadedness dizziness or chest pain.    2.  Hyperlipidemia-stable the patient is on a diet.    3.  Type 2 diabetes mellitus-stable-the patient is currently on Actos 30 mg daily.  She is asymptomatic.    4.  Cirrhosis-stable patient on Corgard 20 mg once a day and Lasix.  She denied jaundice izzy colored stools dark urine or ascites.    5.  Parkinson's disease-stable-the patient is on Mirapex 25 mg twice a day and long-acting Sinemet 25/102 tablets 3 times a day.  States it helps with her tremor.    6.  Gastroesophageal reflux disease-stable-the patient is currently on Prilosec 40 mg daily.  She denies dysphagia or heartburn.    7.  Seizures-stable-the patient is currently taking clonazepam 0.5 mg 3 times a day gabapentin 300 mg 3 times a day.  She denied recent seizures.    8.  Bipolar disease-stable-patient has a history of moderately severe recurrent bipolar disorder.  Patient is currently on Lexapro 10 mg daily clonazepam 0.5 mg 3 times a day gabapentin 300 mg 3 times a day.  She states she is stable.    9.  COPD-stable on the patient on Spiriva and a short acting inhaler.          The following portions of the patient's history were reviewed and updated as appropriate: allergies, current medications, past family history, past medical history, past social history, past surgical history and problem  "list.    Review of Systems    Objective     /77 (BP Location: Left arm, Patient Position: Sitting, Cuff Size: Large Adult)   Pulse 78   Temp 97.7 °F (36.5 °C) (Infrared)   Resp 18   Ht 160 cm (63\")   Wt 130 kg (286 lb)   SpO2 93%   BMI 50.66 kg/m²     Physical Exam  Vitals and nursing note reviewed.   Constitutional:       Appearance: She is well-developed. She is obese.   HENT:      Head: Normocephalic and atraumatic.      Nose: Nose normal.      Mouth/Throat:      Mouth: Mucous membranes are moist.      Pharynx: Oropharynx is clear.   Eyes:      Extraocular Movements: Extraocular movements intact.      Conjunctiva/sclera: Conjunctivae normal.      Pupils: Pupils are equal, round, and reactive to light.   Cardiovascular:      Rate and Rhythm: Normal rate and regular rhythm.      Heart sounds: Normal heart sounds.   Pulmonary:      Effort: Pulmonary effort is normal.      Breath sounds: Normal breath sounds.   Abdominal:      General: Bowel sounds are normal.      Palpations: Abdomen is soft.   Musculoskeletal:         General: Normal range of motion.      Cervical back: Neck supple.   Skin:     General: Skin is warm and dry.   Neurological:      Mental Status: She is alert and oriented to person, place, and time.   Psychiatric:         Behavior: Behavior normal.         Thought Content: Thought content normal.         Judgment: Judgment normal.           Assessment/Plan   Diagnoses and all orders for this visit:    1. Primary hypertension (Primary)    2. Mixed hyperlipidemia    3. Type 2 diabetes mellitus with other diabetic neurological complication (HCC)    4. Cirrhosis of liver without ascites, unspecified hepatic cirrhosis type (HCC)    5. Gastroesophageal reflux disease without esophagitis    6. Bipolar 1 disorder, depressed, moderate (HCC)    7. Generalized anxiety disorder    8. Parkinson's disease (HCC)    9. Seizure (HCC)      Patient Instructions   Continue your current medications and " treatment.    Follow up in the offe in 3 months.    Laboratory testing at that time.      Paul Larson Jr., MD    11/24/21

## 2021-12-02 ENCOUNTER — OFFICE VISIT (OUTPATIENT)
Dept: CARDIOLOGY | Facility: CLINIC | Age: 66
End: 2021-12-02

## 2021-12-02 VITALS
HEIGHT: 63 IN | OXYGEN SATURATION: 94 % | DIASTOLIC BLOOD PRESSURE: 74 MMHG | WEIGHT: 286 LBS | HEART RATE: 61 BPM | SYSTOLIC BLOOD PRESSURE: 110 MMHG | BODY MASS INDEX: 50.68 KG/M2

## 2021-12-02 DIAGNOSIS — E11.49 TYPE 2 DIABETES MELLITUS WITH OTHER DIABETIC NEUROLOGICAL COMPLICATION (HCC): ICD-10-CM

## 2021-12-02 DIAGNOSIS — G47.30 SLEEP APNEA, UNSPECIFIED TYPE: ICD-10-CM

## 2021-12-02 DIAGNOSIS — J41.0 SIMPLE CHRONIC BRONCHITIS (HCC): ICD-10-CM

## 2021-12-02 DIAGNOSIS — I10 ESSENTIAL HYPERTENSION: Primary | ICD-10-CM

## 2021-12-02 DIAGNOSIS — E78.2 MIXED HYPERLIPIDEMIA: ICD-10-CM

## 2021-12-02 PROCEDURE — 99214 OFFICE O/P EST MOD 30 MIN: CPT | Performed by: INTERNAL MEDICINE

## 2021-12-02 PROCEDURE — 93000 ELECTROCARDIOGRAM COMPLETE: CPT | Performed by: INTERNAL MEDICINE

## 2021-12-02 NOTE — PROGRESS NOTES
Encounter Date:12/02/2021  Last seen 12/2/2020      Patient ID: Dariana Rivera is a 66 y.o. female.    Chief Complaint:  History of chest discomfort  History of shortness of breath  Hypertension  Diabetes     History of Present Illness  Since I have last seen, the patient has been without any chest discomfort ,shortness of breath, palpitations, dizziness or syncope.  Denies having any headache ,abdominal pain ,nausea, vomiting , diarrhea constipation, loss of weight or loss of appetite.  Denies having any excessive bruising ,hematuria or blood in the stool.    Review of all systems negative except as indicated.    Reviewed ROS.    Assessment and Plan         [[[[[[[[[[[[[[[   impression  ====  -Chest pain-atypical     Normal left ventricular function and normal coronary arteries 02/15/2019     -history of pulmonary problems and is being evaluated by Pulmonary Medicine also.  It is my understanding her risk of surgery is increased from pulmonary standpoint.  Patient was intubated and on the respirator May of 2018. patient had seizure around the time of admission at that time.     -hypertension diabetes bipolar disorder anxiety disorder Parkinson's disease cirrhosis nonalcoholic steatohepatitis GERD.  History of seizure disorder     -History of left arm numbness had fullness and drooling from mouth.-Improved  Cervical spine surgery was not performed and patient is taking Botox injections for cervical dystonia.     -status post cholecystectomy hysterectomy lithotripsy new left knee replacement arthroscopic bilateral knee surgery cervical spine surgery     -exogenous obesity.     -former smoker     -allergic to penicillin Ambien methadone Cipro and clindamycin.  =============  Plan  ==========  Chest pain-atypical-improved  Patient is not having any angina pectoris or congestive heart failure.  EKG showed sinus bradycardia 12/2/2021  History of normal coronary arteries February  2019    Hypertension-well-controlled  110/74    Diabetes-patient is on Actos.    Medications were reviewed and updated today  Followup in the office in 1 year  Further plan will depend on patient's progress.  [[[[[[[[[[[[[[[[[[[                       Diagnosis Plan   1. Essential hypertension  ECG 12 Lead   2. Mixed hyperlipidemia  ECG 12 Lead   3. Simple chronic bronchitis (HCC)  ECG 12 Lead   4. Type 2 diabetes mellitus with other diabetic neurological complication (HCC)  ECG 12 Lead   5. Sleep apnea, unspecified type  ECG 12 Lead   LAB RESULTS (LAST 7 DAYS)    CBC        BMP        CMP         BNP        TROPONIN        CoAg        Creatinine Clearance  CrCl cannot be calculated (Patient's most recent lab result is older than the maximum 30 days allowed.).    ABG        Radiology  No radiology results for the last day                The following portions of the patient's history were reviewed and updated as appropriate: allergies, current medications, past family history, past medical history, past social history, past surgical history and problem list.    Review of Systems   Constitutional: Negative for malaise/fatigue.   Cardiovascular: Positive for chest pain (occasional). Negative for leg swelling, palpitations and syncope.   Respiratory: Positive for shortness of breath (with exertion).    Skin: Negative for rash.   Gastrointestinal: Negative for nausea and vomiting.   Neurological: Negative for dizziness, light-headedness and numbness.   All other systems reviewed and are negative.        Current Outpatient Medications:   •  albuterol sulfate  (90 Base) MCG/ACT inhaler, Inhale 2 puffs Every 6 (Six) Hours As Needed for Wheezing. Use if needed dos bring, Disp: 1 g, Rfl: 5  •  aspirin (aspirin) 81 MG EC tablet, aspirin 81 mg tablet,delayed release, Disp: , Rfl:   •  baclofen (LIORESAL) 10 MG tablet, Take 10 mg by mouth 3 (Three) Times a Day. Do not take dos, Disp: , Rfl: 3  •  carbidopa-levodopa ER  (SINEMET CR)  MG per tablet, Take 2 tablets by mouth 3 (Three) Times a Day., Disp: , Rfl:   •  clonazePAM (KlonoPIN) 0.5 MG tablet, Take 1 tablet by mouth 3 (Three) Times a Day As Needed for Anxiety., Disp: 90 tablet, Rfl: 3  •  escitalopram (LEXAPRO) 10 MG tablet, Take 1.5 tablets by mouth Daily., Disp: 135 tablet, Rfl: 1  •  furosemide (LASIX) 40 MG tablet, TAKE 1 TABLET BY MOUTH EVERY DAY (Patient taking differently: Take 40 mg by mouth 2 (Two) Times a Day. Do not take preop), Disp: 90 tablet, Rfl: 0  •  gabapentin (NEURONTIN) 300 MG capsule, Take 300 mg by mouth 3 (Three) Times a Day., Disp: , Rfl:   •  HYDROcodone-acetaminophen (NORCO) 5-325 MG per tablet, Take 1 tablet by mouth Every 12 (Twelve) Hours As Needed., Disp: , Rfl:   •  ipratropium-albuterol (DUO-NEB) 0.5-2.5 mg/3 ml nebulizer, Take 3 mL by nebulization Every 4 (Four) Hours As Needed for Wheezing. Use preop if needed, Disp: , Rfl:   •  KLOR-CON 20 MEQ CR tablet, TAKE 1 TABLET BY MOUTH DAILY (Patient taking differently: Take 20 mEq by mouth 2 (Two) Times a Day. Take preop), Disp: 90 tablet, Rfl: 0  •  lactulose (Generlac) 10 GM/15ML solution solution (encephalopathy), Use as directed, Disp: , Rfl:   •  lurasidone (Latuda) 40 MG tablet tablet, Take 1 tablet by mouth Every Night., Disp: 30 tablet, Rfl: 3  •  Melatonin 10 MG tablet, Take 10 mg by mouth At Night As Needed., Disp: , Rfl:   •  Melatonin ER 10 MG tablet controlled-release, Take 1 tablet by mouth every night at bedtime., Disp: , Rfl:   •  Multiple Vitamins-Minerals (MULTIVITAMIN WITH MINERALS) tablet tablet, Take 1 tablet by mouth Daily. Do not take dos, Disp: , Rfl:   •  nadolol (CORGARD) 20 MG tablet, Take 20 mg by mouth Every Morning. Take dos, Disp: , Rfl:   •  O2 (OXYGEN), Inhale 1 L/min 1 (One) Time. @@ night, Disp: , Rfl:   •  omeprazole (priLOSEC) 40 MG capsule, Take 40 mg by mouth Daily. Take preop, Disp: , Rfl:   •  pioglitazone (ACTOS) 30 MG tablet, TAKE 1 TABLET BY MOUTH  EVERY DAY, Disp: 90 tablet, Rfl: 0  •  pramipexole (MIRAPEX) 0.5 MG tablet, Take 1 tablet by mouth 2 (two) times a day., Disp: 180 tablet, Rfl: 1  •  rOPINIRole (REQUIP) 0.25 MG tablet, TAKE 1 TABLET BY MOUTH 2 (TWO) TIMES A DAY., Disp: 180 tablet, Rfl: 1  •  Spiriva HandiHaler 18 MCG per inhalation capsule, PLACE 1 CAPSULE INTO INHALER AND INHALE DAILY, Disp: 90 capsule, Rfl: 1  •  vitamin C (ASCORBIC ACID) 500 MG tablet, Take 1,000 mg by mouth Daily. dont take preop, Disp: , Rfl:     Allergies   Allergen Reactions   • Ambien  [Zolpidem Tartrate] Confusion   • Ciprofloxacin Hcl Rash   • Penicillins Rash   • Zolpidem Unknown - High Severity     Other reaction(s): Confusion   • Methadone Hallucinations   • Clindamycin Rash       Family History   Problem Relation Age of Onset   • Hypertension Mother    • Hyperlipidemia Mother    • Arthritis Mother    • Cancer Mother         Skin cancers   • Depression Mother    • Mental illness Mother         Depression and Alzheimers   • Diabetes Father             • Heart disease Father         Had angina. Was diabetic.  of heart attack at age 57.   • Early death Father          at 57 from heart attack   • Hyperlipidemia Sister    • Arthritis Sister    • Cancer Sister         Skin cancers   • Depression Sister    • Diabetes Sister         Diagnosed    • Heart disease Sister         2 mini strokes, atrial fribulation, pacemaker insertion   • Hypertension Sister    • Mental illness Sister         Depression   • Stroke Sister         2 mini strokes   • Diabetes Brother         Not sure when diagnosed   • Heart disease Brother         Had open heart surgery about 4 yrs ago.   • Hypertension Brother    • Hyperlipidemia Brother    • Arthritis Brother    • Cancer Brother         Skin cancers   • Breast cancer Maternal Grandmother    • Breast cancer Maternal Aunt        Past Surgical History:   Procedure Laterality Date   • BACK SURGERY     • CARDIAC CATHETERIZATION   02/2019   • CARPAL TUNNEL RELEASE Bilateral     Wrist    • CHOLECYSTECTOMY  1997   • COLONOSCOPY     • CYSTOSCOPY BLADDER STONE LITHOTRIPSY     • ENDOSCOPY     • ENDOSCOPY N/A 4/15/2020    Procedure: ESOPHAGOGASTRODUODENOSCOPY with dilitation (60FR.);  Surgeon: Julieta Allison MD;  Location: [facility] ENDOSCOPY;  Service: Gastroenterology;  Laterality: N/A;  varices,hiatal hernia,gastritis   • ENDOSCOPY N/A 9/29/2020    Procedure: ESOPHAGOGASTRODUODENOSCOPY with esophageal dilitation (54 bougie);  Surgeon: Julieta Allison MD;  Location: [facility] ENDOSCOPY;  Service: Gastroenterology;  Laterality: N/A;   post op: hiatal hernia, esophageal stricture   • ENDOSCOPY N/A 9/30/2021    Procedure: ESOPHAGOGASTRODUODENOSCOPY WITH DILATATION (BOUGIE #60);  Surgeon: Julieta Allison MD;  Location: [facility] ENDOSCOPY;  Service: Gastroenterology;  Laterality: N/A;  ESOPHAGEAL VARICES AND HIATAL HERNIA   • EYE SURGERY  3-14 & 3-    Cataract surgery R 3-14, L 3-   • HYSTERECTOMY  09/2004    complete   • INTERSTIM PLACEMENT      bladder   • JOINT REPLACEMENT Bilateral     knees   • KNEE ARTHROSCOPY Bilateral     Arthroscopic surgery to bilateral knees    • OTHER SURGICAL HISTORY  2015    Stress test    • OTHER SURGICAL HISTORY  10/2016    Lithotripsy total 4-5   • OTHER SURGICAL HISTORY      Right arm cellulits- spider bites    • OTHER SURGICAL HISTORY  02/08/2018    Lithotripsy   • OTHER SURGICAL HISTORY  04/20/2018    ACDF C3-C4 & C6-C7   • REPLACEMENT TOTAL KNEE  2000   • REPLACEMENT TOTAL KNEE Left 11/2016   • SHOULDER ROTATOR CUFF REPAIR Right 1/8/2020    Procedure: RT ROTATOR CUFF REPAIR OPEN;  Surgeon: Curly Vaughn MD;  Location: [facility] MAIN OR;  Service: Orthopedics   • SPINE SURGERY  4/2018    Fusion surgery C3-C4 and C6-C7       Past Medical History:   Diagnosis Date   • Allergic Not sure    Penicillin, cipro, clindamycin, methadone, ambien   • Angina at rest ([facility])     DR. Amador   • Anxiety disorder      [facility]    • Back pain    • Bipolar disorder (HCC)    • Cervical dystonia    • Chronic pain     with stenosis   • Cirrhosis (HCC)    • Colitis    • Colon polyps    • COPD (chronic obstructive pulmonary disease) (HCC)    • Cramping of feet    • Cramping of hands    • DDD (degenerative disc disease), cervical    • DDD (degenerative disc disease), lumbar    • DDD (degenerative disc disease), lumbar    • DDD (degenerative disc disease), thoracic    • Depression    • Diverticulosis     Not sure when diagnosed   • Dysphagia 2020   • Eosinophilic colitis    • Gastritis    • GERD (gastroesophageal reflux disease)    • Headache Not sure    Migraines stopped after complete hyst in    • Hepatic steatosis     non alcoholic steo-hepatitis    • Hx of lithotripsy 02/10/2021   • Hypertension    • IBS (irritable bowel syndrome)    • Infectious viral hepatitis     [facility]   • Insomnia    • Kidney stones    • Low back pain     Not sure when diagnosed   • Lumbar stenosis    • Neck pain    • Neuromuscular disorder (HCC) Not sure    Parkinsons and Cervical Dystonia   • Neuropathy    • Obesity     Obese since about    • Osteoarthritis    • Parkinson's disease (HCC)    • Peripheral edema    • PTSD (post-traumatic stress disorder)    • Recurrent UTI    • Renal insufficiency     Not sure when diagnosed   • Seizure (HCC)     onset 2016.  Last seizure 10/2019   • Sleep apnea     Using Bipap machine at night. advised to bring dos   • Sleep apnea, obstructive    • Type II diabetes mellitus (HCC)    • Urinary incontinence        Family History   Problem Relation Age of Onset   • Hypertension Mother    • Hyperlipidemia Mother    • Arthritis Mother    • Cancer Mother         Skin cancers   • Depression Mother    • Mental illness Mother         Depression and Alzheimers   • Diabetes Father             • Heart disease Father         Had angina. Was diabetic.  of heart attack at age 57.   • Early death Father           "at 57 from heart attack   • Hyperlipidemia Sister    • Arthritis Sister    • Cancer Sister         Skin cancers   • Depression Sister    • Diabetes Sister         Diagnosed 2019   • Heart disease Sister         2 mini strokes, atrial fribulation, pacemaker insertion   • Hypertension Sister    • Mental illness Sister         Depression   • Stroke Sister         2 mini strokes   • Diabetes Brother         Not sure when diagnosed   • Heart disease Brother         Had open heart surgery about 4 yrs ago.   • Hypertension Brother    • Hyperlipidemia Brother    • Arthritis Brother    • Cancer Brother         Skin cancers   • Breast cancer Maternal Grandmother    • Breast cancer Maternal Aunt        Social History     Socioeconomic History   • Marital status:    Tobacco Use   • Smoking status: Former Smoker     Packs/day: 0.50     Years: 10.00     Pack years: 5.00     Types: Cigarettes     Start date: 1974     Quit date:      Years since quittin.4   • Smokeless tobacco: Never Used   • Tobacco comment: Stopped off and on.  Stopped once for more than 2 yrs   Vaping Use   • Vaping Use: Never used   Substance and Sexual Activity   • Alcohol use: No   • Drug use: No   • Sexual activity: Never           ECG 12 Lead    Date/Time: 2021 1:24 PM  Performed by: Nabor Amador MD  Authorized by: Nabor Amador MD   Comparison: compared with previous ECG   Similar to previous ECG  Comparison to previous ECG: Normal sinus rhythm nonspecific ST-T wave changes baseline artifact 62/min normal axis normal intervals no ectopy no change from previous tracing.                Objective:       Physical Exam    /74 (BP Location: Left arm, Patient Position: Sitting, Cuff Size: Large Adult)   Pulse 61   Ht 160 cm (63\")   Wt 130 kg (286 lb)   SpO2 94%   BMI 50.66 kg/m²   The patient is alert, oriented and in no distress.    Vital signs as noted above.    Head and neck revealed no carotid bruits or jugular " venous distension.  No thyromegaly or lymphadenopathy is present.    Lungs clear.  No wheezing.  Breath sounds are normal bilaterally.    Heart normal first and second heart sounds.  No murmur..  No pericardial rub is present.  No gallop is present.    Abdomen soft and nontender.  No organomegaly is present.    Extremities revealed good peripheral pulses without any pedal edema.    Skin warm and dry.    Musculoskeletal system is grossly normal.    CNS grossly normal.    Reviewed and unchanged from last visit.

## 2021-12-14 ENCOUNTER — TELEPHONE (OUTPATIENT)
Dept: FAMILY MEDICINE CLINIC | Facility: CLINIC | Age: 66
End: 2021-12-14

## 2021-12-14 DIAGNOSIS — Z13.9 ENCOUNTER FOR HEALTH-RELATED SCREENING: Primary | ICD-10-CM

## 2021-12-14 NOTE — TELEPHONE ENCOUNTER
Caller: Dariana Rivera    Relationship: Self    Best call back number: [phone]    What orders are you requesting (i.e. lab or imaging): MAMMOGRAM     In what timeframe would the patient need to come in: ASAP     Where will you receive your lab/imaging services: [facility]

## 2021-12-21 ENCOUNTER — HOSPITAL ENCOUNTER (OUTPATIENT)
Dept: MAMMOGRAPHY | Facility: HOSPITAL | Age: 66
Discharge: HOME | End: 2021-12-21
Admitting: FAMILY MEDICINE

## 2021-12-21 DIAGNOSIS — Z13.9 ENCOUNTER FOR HEALTH-RELATED SCREENING: ICD-10-CM

## 2021-12-21 PROCEDURE — 77067 SCR MAMMO BI INCL CAD: CPT

## 2021-12-21 PROCEDURE — 77063 BREAST TOMOSYNTHESIS BI: CPT

## 2022-01-04 RX ORDER — PRAMIPEXOLE DIHYDROCHLORIDE 0.5 MG/1
TABLET ORAL
Qty: 180 TABLET | Refills: 1 | Status: SHIPPED | OUTPATIENT
Start: 2022-01-04 | End: 2022-04-27 | Stop reason: SDUPTHER

## 2022-01-04 NOTE — PROGRESS NOTES
LOS: 0 days   Patient Care Team:  Paul Larson Jr., MD as PCP - General (Family Medicine)  Schirmer, OLIVER Avila as PCP - Judy Gaviria RN as Care Coordinator    Chief Complaint:  Altered mental status    Subjective     Interval History:     The patient states she feels better today.  Patient states that after discharge yesterday she felt some chest congestion and requested not to be discharged.  Patient states she feels better today and would like to go to rehab today.    Review of Systems   Constitutional: Negative for chills, fatigue and fever.   Respiratory: Negative for cough, chest tightness and shortness of breath.    Cardiovascular: Negative for chest pain and leg swelling.   Gastrointestinal: Negative for abdominal pain, diarrhea and nausea.   Skin: Negative for pallor and rash.         Objective     Vital Signs  Temp:  [98.5 °F (36.9 °C)-99.3 °F (37.4 °C)] 98.5 °F (36.9 °C)  Heart Rate:  [64-87] 79  Resp:  [16-20] 16  BP: ()/(57-61) 122/59    Physical Exam   Constitutional:   Morbidly obese   Cardiovascular: Normal rate, regular rhythm, normal heart sounds and intact distal pulses.   Pulmonary/Chest: Effort normal and breath sounds normal.   Abdominal: Soft. Bowel sounds are normal.   Skin: Skin is warm and dry.         Results Review:    Lab Results (last 24 hours)     Procedure Component Value Units Date/Time    BNP [[id number]]  (Normal) Collected:  08/17/19 1608    Specimen:  Blood Updated:  08/17/19 1646     BNP 46.0 pg/mL      Comment: Results may be falsely decreased if patient taking Biotin.       Basic Metabolic Panel [[id number]]  (Abnormal) Collected:  08/17/19 1608    Specimen:  Blood Updated:  08/17/19 1644     Glucose 142 mg/dL      BUN 20 mg/dL      Creatinine 0.90 mg/dL      Sodium 132 mmol/L      Potassium 3.7 mmol/L      Chloride 95 mmol/L      CO2 23.0 mmol/L      Calcium 9.3 mg/dL      eGFR Non African Amer 63 mL/min/1.73      BUN/Creatinine Ratio  22.2     Anion Gap 17.7 mmol/L     CBC (No Diff) [[id number]]  (Abnormal) Collected:  08/17/19 1607    Specimen:  Blood Updated:  08/17/19 1631     WBC 6.10 10*3/mm3      RBC 4.05 10*6/mm3      Hemoglobin 12.4 g/dL      Hematocrit 36.1 %      MCV 89.1 fL      MCH 30.5 pg      MCHC 34.3 g/dL      RDW 15.8 %      RDW-SD 49.0 fl      MPV 8.2 fL      Platelets 157 10*3/mm3          Imaging Results (last 24 hours)     Procedure Component Value Units Date/Time    XR Chest 1 View [[id number]] Collected:  08/17/19 1546     Updated:  08/17/19 1548    Narrative:       Examination: XR CHEST 1 VW-     Date of Exam: 8/17/2019 3:42 PM     Indication: congestion; R41.82-Altered mental status, unspecified;  R56.9-Unspecified convulsions.     Comparison: 08/14/2019.     Technique: Single radiographic view of the chest was obtained.     Findings:  There is no pneumothorax, pleural effusion or focal airspace  consolidation.  Cardiomediastinal silhouette is unremarkable.  Pulmonary  vasculature appears within normal limits.  Regional bones appear grossly  intact.          Impression:       No acute cardiopulmonary abnormality.     Electronically Signed By-Rad Underwood On:8/17/2019 3:46 PM  This report was finalized on [id number] by  Rad Underwood, .         ECG/EMG Results (last 24 hours)     ** No results found for the last 24 hours. **           I reviewed the patient's new clinical results.    Medication Review: I have reviewed the medications    Current Facility-Administered Medications:   •  acetaminophen (TYLENOL) tablet 650 mg, 650 mg, Oral, Q4H PRN, Paul Larson Jr., MD, 650 mg at 08/16/19 1547  •  aspirin chewable tablet 81 mg, 81 mg, Oral, Daily, Paul Larson Jr., MD, 81 mg at 08/18/19 0912  •  baclofen (LIORESAL) tablet 10 mg, 10 mg, Oral, TID, Paul Larson Jr., MD, 10 mg at 08/18/19 0913  •  budesonide-formoterol (SYMBICORT) 160-4.5 MCG/ACT inhaler 2 puff, 2 puff, Inhalation, BID - RT, Paul Larson  MD [place], 2 puff at 08/18/19 0716  •  calcium-vitamin D 500-200 MG-UNIT per tablet 1 tablet, 1 tablet, Oral, Daily, Paul Larson Jr., MD, 1 tablet at 08/18/19 0912  •  carbidopa-levodopa (SINEMET)  MG per tablet 2 tablet, 2 tablet, Oral, TID, Paul Larson Jr., MD, 2 tablet at 08/18/19 0913  •  clonazePAM (KlonoPIN) tablet 1 mg, 1 mg, Oral, BID PRN, Paul Larson Jr., MD, 1 mg at 08/18/19 1041  •  enoxaparin (LOVENOX) syringe 40 mg, 40 mg, Subcutaneous, Q12H, Paul Larson Jr., MD, 40 mg at 08/18/19 0911  •  furosemide (LASIX) tablet 40 mg, 40 mg, Oral, Daily, Paul Larson Jr., MD, 40 mg at 08/18/19 0912  •  gabapentin (NEURONTIN) capsule 300 mg, 300 mg, Oral, TID, Paul Larson Jr., MD, 300 mg at 08/18/19 0912  •  ipratropium-albuterol (DUO-NEB) nebulizer solution 3 mL, 3 mL, Nebulization, Q6H PRN, Paul Larson Jr., MD  •  ipratropium-albuterol (DUO-NEB) nebulizer solution 3 mL, 3 mL, Nebulization, 4x Daily, Paul Larson Jr., MD, 3 mL at 08/18/19 1121  •  lacosamide (VIMPAT) tablet 150 mg, 150 mg, Oral, BID, Paul Larson Jr., MD, 150 mg at 08/18/19 0912  •  lactulose (CHRONULAC) 10 GM/15ML solution 30 mL, 30 mL, Oral, Nightly, Paul Larson Jr., MD, 30 mL at 08/17/19 2020  •  levETIRAcetam (KEPPRA) tablet 1,000 mg, 1,000 mg, Oral, Nightly, Paul Larson Jr., MD, 1,000 mg at 08/17/19 2020  •  levETIRAcetam (KEPPRA) tablet 750 mg, 750 mg, Oral, QAM, Paul Larson Jr., MD, 750 mg at 08/18/19 0912  •  lisinopril (PRINIVIL,ZESTRIL) 10 mg, hydrochlorothiazide (HYDRODIURIL) 12.5 mg for ZESTORETIC 10-12.5, , Oral, Q24H, Paul Larson Jr., MD  •  lurasidone (LATUDA) tablet 40 mg, 40 mg, Oral, Daily, Paul Larson Jr., MD, 40 mg at 08/18/19 0911  •  magnesium hydroxide (MILK OF MAGNESIA) suspension 2400 mg/10mL 10 mL, 10 mL, Oral, Daily PRN, Paul Larson Jr., MD  •  nitroglycerin (NITROSTAT) SL tablet 0.4 mg, 0.4 mg, Sublingual, Q5 Min PRN, Marsha  Paul BLANC Jr., MD  •  ondansetron (ZOFRAN) injection 4 mg, 4 mg, Intravenous, Q6H PRN, Paul Larson Jr., MD  •  OXcarbazepine (TRILEPTAL) tablet 600 mg, 600 mg, Oral, Q12H, Paul Larson Jr., MD, 600 mg at 08/18/19 0911  •  pantoprazole (PROTONIX) EC tablet 40 mg, 40 mg, Oral, QAM, Paul Larson Jr., MD, 40 mg at 08/18/19 0912  •  pioglitazone (ACTOS) tablet 30 mg, 30 mg, Oral, Daily, Paul Larson Jr., MD, 30 mg at 08/18/19 0912  •  potassium chloride (K-DUR,KLOR-CON) CR tablet 20 mEq, 20 mEq, Oral, Daily, Paul Larson Jr., MD, 20 mEq at 08/18/19 0911  •  pramipexole (MIRAPEX) tablet 0.5 mg, 0.5 mg, Oral, TID, Paul Larson Jr., MD, 0.5 mg at 08/18/19 0912  •  senna (SENOKOT) tablet 1 tablet, 1 tablet, Oral, Nightly, Paul Larson Jr., MD, 1 tablet at 08/17/19 2020  •  [COMPLETED] Insert peripheral IV, , , Once **AND** sodium chloride 0.9 % flush 10 mL, 10 mL, Intravenous, PRN, Abrahan Trent MD  •  sodium chloride 0.9 % flush 3 mL, 3 mL, Intravenous, Q12H, Paul Larson Jr., MD, 3 mL at 08/18/19 0911  •  sodium chloride 0.9 % flush 3-10 mL, 3-10 mL, Intravenous, PRN, Paul Larson Jr., MD  •  sodium chloride tablet 2 g, 2 g, Oral, Daily, Paul Larson Jr., MD, 2 g at 08/18/19 0912       Active Problems:    Altered mental status       Patient opted not to go to rehab yesterday because of chest congestion.  EValuation was performed.  X-ray was normal BNP was 45 CBC was normal.  Discharge to skilled nursing facility today.    Assessment & Plan     Plan for disposition:Where: SNF    Paul Larson Jr., MD  08/18/19  11:42 AM     not motivated to quit

## 2022-01-19 RX ORDER — PIOGLITAZONEHYDROCHLORIDE 30 MG/1
TABLET ORAL
Qty: 90 TABLET | Refills: 0 | Status: SHIPPED | OUTPATIENT
Start: 2022-01-19 | End: 2022-04-27 | Stop reason: SDUPTHER

## 2022-02-21 ENCOUNTER — OFFICE (AMBULATORY)
Dept: URBAN - METROPOLITAN AREA CLINIC 64 | Facility: CLINIC | Age: 67
End: 2022-02-21

## 2022-02-21 ENCOUNTER — TRANSCRIBE ORDERS (OUTPATIENT)
Dept: ADMINISTRATIVE | Facility: HOSPITAL | Age: 67
End: 2022-02-21

## 2022-02-21 VITALS
DIASTOLIC BLOOD PRESSURE: 64 MMHG | HEART RATE: 63 BPM | WEIGHT: 281 LBS | HEIGHT: 67 IN | SYSTOLIC BLOOD PRESSURE: 120 MMHG

## 2022-02-21 DIAGNOSIS — K74.69 OTHER CIRRHOSIS OF LIVER: ICD-10-CM

## 2022-02-21 DIAGNOSIS — R79.89 ABNORMAL LIVER FUNCTION TESTS: ICD-10-CM

## 2022-02-21 PROCEDURE — 99213 OFFICE O/P EST LOW 20 MIN: CPT | Performed by: INTERNAL MEDICINE

## 2022-02-21 RX ORDER — OMEGA-3S/DHA/EPA/FISH OIL 980-1400MG
CAPSULE,DELAYED RELEASE (ENTERIC COATED) ORAL
Qty: 90 | Refills: 5 | Status: ACTIVE
Start: 2020-08-12

## 2022-02-21 RX ORDER — NADOLOL 20 MG/1
TABLET ORAL
Qty: 90 | Refills: 1 | Status: ACTIVE

## 2022-02-21 RX ORDER — OMEPRAZOLE 40 MG/1
CAPSULE, DELAYED RELEASE ORAL
Qty: 90 | Refills: 4 | Status: ACTIVE

## 2022-02-25 DIAGNOSIS — F41.1 GENERALIZED ANXIETY DISORDER: Chronic | ICD-10-CM

## 2022-02-25 RX ORDER — CLONAZEPAM 0.5 MG/1
TABLET ORAL
Qty: 90 TABLET | Refills: 0 | Status: SHIPPED | OUTPATIENT
Start: 2022-02-25 | End: 2022-03-21 | Stop reason: SDUPTHER

## 2022-02-28 ENCOUNTER — OFFICE VISIT (OUTPATIENT)
Dept: FAMILY MEDICINE CLINIC | Facility: CLINIC | Age: 67
End: 2022-02-28

## 2022-02-28 VITALS
HEART RATE: 56 BPM | TEMPERATURE: 96.9 F | OXYGEN SATURATION: 97 % | BODY MASS INDEX: 42.17 KG/M2 | RESPIRATION RATE: 19 BRPM | DIASTOLIC BLOOD PRESSURE: 65 MMHG | SYSTOLIC BLOOD PRESSURE: 125 MMHG | HEIGHT: 63 IN | WEIGHT: 238 LBS

## 2022-02-28 DIAGNOSIS — I27.81 COR PULMONALE: ICD-10-CM

## 2022-02-28 DIAGNOSIS — I10 PRIMARY HYPERTENSION: Primary | Chronic | ICD-10-CM

## 2022-02-28 DIAGNOSIS — Z00.00 ENCOUNTER FOR ANNUAL WELLNESS EXAM IN MEDICARE PATIENT: ICD-10-CM

## 2022-02-28 DIAGNOSIS — G20 PARKINSON'S DISEASE: Chronic | ICD-10-CM

## 2022-02-28 DIAGNOSIS — G89.29 CHRONIC THORACIC BACK PAIN, UNSPECIFIED BACK PAIN LATERALITY: Chronic | ICD-10-CM

## 2022-02-28 DIAGNOSIS — K74.60 CIRRHOSIS OF LIVER WITHOUT ASCITES, UNSPECIFIED HEPATIC CIRRHOSIS TYPE: Chronic | ICD-10-CM

## 2022-02-28 DIAGNOSIS — J44.9 CHRONIC OBSTRUCTIVE PULMONARY DISEASE, UNSPECIFIED COPD TYPE: Chronic | ICD-10-CM

## 2022-02-28 DIAGNOSIS — M54.6 CHRONIC THORACIC BACK PAIN, UNSPECIFIED BACK PAIN LATERALITY: Chronic | ICD-10-CM

## 2022-02-28 DIAGNOSIS — K21.9 GASTROESOPHAGEAL REFLUX DISEASE WITHOUT ESOPHAGITIS: Chronic | ICD-10-CM

## 2022-02-28 DIAGNOSIS — E78.2 MIXED HYPERLIPIDEMIA: Chronic | ICD-10-CM

## 2022-02-28 DIAGNOSIS — F31.32 BIPOLAR 1 DISORDER, DEPRESSED, MODERATE: Chronic | ICD-10-CM

## 2022-02-28 DIAGNOSIS — G47.33 OBSTRUCTIVE SLEEP APNEA SYNDROME: Chronic | ICD-10-CM

## 2022-02-28 DIAGNOSIS — R56.9 SEIZURE: Chronic | ICD-10-CM

## 2022-02-28 DIAGNOSIS — E11.49 TYPE 2 DIABETES MELLITUS WITH OTHER DIABETIC NEUROLOGICAL COMPLICATION: Chronic | ICD-10-CM

## 2022-02-28 PROCEDURE — 1160F RVW MEDS BY RX/DR IN RCRD: CPT | Performed by: FAMILY MEDICINE

## 2022-02-28 PROCEDURE — 1170F FXNL STATUS ASSESSED: CPT | Performed by: FAMILY MEDICINE

## 2022-02-28 PROCEDURE — 99214 OFFICE O/P EST MOD 30 MIN: CPT | Performed by: FAMILY MEDICINE

## 2022-02-28 PROCEDURE — G0439 PPPS, SUBSEQ VISIT: HCPCS | Performed by: FAMILY MEDICINE

## 2022-02-28 PROCEDURE — 1125F AMNT PAIN NOTED PAIN PRSNT: CPT | Performed by: FAMILY MEDICINE

## 2022-02-28 NOTE — PATIENT INSTRUCTIONS
Continue your current medications and treatment.    Follow up in the office in 6 months.    Get an eye exam.    Have the follow up labs done and call for results.

## 2022-02-28 NOTE — PROGRESS NOTES
Subjective   Dariana Rivera is a 66 y.o. female.     Chief Complaint   Patient presents with   • Medicare Wellness-subsequent   • Diabetes     3 month followup   • Hypertension   • Hyperlipidemia       HPI  Chief complaint: Hypertension hyperlipidemia type 2 diabetes mellitus obesity gastroesophageal reflux disease    Patient is a 66-year-old white female comes in for follow-up of maintenance of her current problems which include    1.  Hypertension-stable-patient on Lasix 40 mg twice a day potassium 20 mg twice a day.  She is asymptomatic.  Blood pressures controlled.    2.  Hyperlipidemia-stable patient is currently on a diet.    3.  Type 2 diabetes mellitus-stable-patient on Actos 30 mg daily.  She denied polydipsia polyphagia or polyuria.  She is due for an A1c.    4.  Bipolar disease-stable-patient is currently taking melatonin 10 mg at night clonazepam 0.5 mg 3 times a day as needed Lexapro 15 mg daily Latuda 40 mg daily.  She denied recent anxiousness or agitation.  States her mood has been stable.    5.  Cirrhosis-stable-patient on Lasix 40 mg twice a day and potassium 20 mg twice a day and Corgard 20 mg daily.  She denied jaundice izzy colored stools dark urine or ascites.  She denied episodes of GI bleeding.    6.  Parkinson's disease-stable-patient is currently on Sinemet 25/102 tablets 3 times a day Mirapex 25 mg twice a day and Requip 0.25 mg twice a day.  States it helps with her tremor.    7.  Chronic pain syndrome-stable-patient is currently on Norco 5/3/2025 twice a day baclofen 10 3 times a day and gabapentin 300 mg 3 times a day.  States her pain is adequately controlled.    8.  COPD-stable-patient is on oxygen at nighttime Spiriva 18 mcg inhaled once a day rescue inhaler as needed.  She denied recent cough shortness of breath or wheezing.      9. seizures-stable patient on gabapentin 300 3 times a day.  She is not had recent seizures.    10.  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  She was encouraged to increase her activity level to 240 minutes a week.        The following portions of the patient's history were reviewed and updated as appropriate: allergies, current medications, past family history, past medical history, past social history, past surgical history and problem list.    Review of Systems    Objective     /65 (BP Location: Left arm, Patient Position: Sitting, Cuff Size: Large Adult)   Pulse 56   Temp 96.9 °F (36.1 °C) (Infrared)   Resp 19   Ht 160 cm (63\")   Wt 108 kg (238 lb)   SpO2 97%   BMI 42.16 kg/m²     Physical Exam  Vitals and nursing note reviewed.   Constitutional:       Appearance: She is well-developed. She is obese.   HENT:      Head: Normocephalic and atraumatic.   Eyes:      Pupils: Pupils are equal, round, and reactive to light.   Cardiovascular:      Rate and Rhythm: Normal rate and regular rhythm.      Pulses: Normal pulses.      Heart sounds: Normal heart sounds. No murmur heard.  No friction rub. No gallop.    Pulmonary:      Effort: Pulmonary effort is normal.      Breath sounds: Normal breath sounds.   Abdominal:      General: Bowel sounds are normal.      Palpations: Abdomen is soft.   Musculoskeletal:         General: Normal range of motion.      Cervical back: Neck supple.   Skin:     General: Skin is warm and dry.   Neurological:      Mental Status: She is alert and oriented to person, place, and time.   Psychiatric:         Behavior: Behavior normal.         Thought Content: Thought content normal.         Judgment: Judgment normal.           Assessment/Plan   Diagnoses and all orders for this visit:    1. Primary hypertension (Primary)  -     Basic Metabolic Panel; Future    2. Encounter for annual wellness exam in Medicare patient    3. Mixed hyperlipidemia  -     ALT; Future  -     Lipid Panel; Future    4. Cor pulmonale (HCC)    5. Type 2 diabetes mellitus with other diabetic neurological complication " (HCC)  -     Hemoglobin A1c; Future  -     Protein / Creatinine Ratio, Urine - Urine, Clean Catch; Future    6. Body mass index (BMI) of 45.0-49.9 in adult (HCC)    7. Gastroesophageal reflux disease without esophagitis    8. Cirrhosis of liver without ascites, unspecified hepatic cirrhosis type (HCC)    9. Bipolar 1 disorder, depressed, moderate (HCC)    10. Chronic thoracic back pain, unspecified back pain laterality    11. Parkinson's disease (HCC)    12. Seizure (HCC)    13. Chronic obstructive pulmonary disease, unspecified COPD type (HCC)    14. Obstructive sleep apnea syndrome      Patient Instructions   Continue your current medications and treatment.    Follow up in the office in 6 months.    Get an eye exam.    Have the follow up labs done and call for results.      Paul Larson Jr., MD    02/28/22

## 2022-02-28 NOTE — PROGRESS NOTES
The ABCs of the Annual Wellness Visit  Subsequent Medicare Wellness Visit    Chief Complaint   Patient presents with   • Medicare Wellness-subsequent   • Diabetes     3 month followup   • Hypertension   • Hyperlipidemia      Subjective    History of Present Illness:  Dariana Rivera is a 66 y.o. female who presents for a Subsequent Medicare Wellness Visit.    The following portions of the patient's history were reviewed and   updated as appropriate: allergies, current medications, past family history, past medical history, past social history, past surgical history and problem list.    Compared to one year ago, the patient feels her physical   health is the same.    Compared to one year ago, the patient feels her mental   health is the same.    Recent Hospitalizations:  She was not admitted to the hospital during the last year.       Current Medical Providers:  Patient Care Team:  Paul Larson Jr., MD as PCP - General (Family Medicine)  Nabor Amador MD as Consulting Physician (Cardiology)    Outpatient Medications Prior to Visit   Medication Sig Dispense Refill   • albuterol sulfate  (90 Base) MCG/ACT inhaler Inhale 2 puffs Every 6 (Six) Hours As Needed for Wheezing. Use if needed dos bring 1 g 5   • aspirin (aspirin) 81 MG EC tablet aspirin 81 mg tablet,delayed release     • baclofen (LIORESAL) 10 MG tablet Take 10 mg by mouth 3 (Three) Times a Day. Do not take dos  3   • carbidopa-levodopa ER (SINEMET CR)  MG per tablet Take 2 tablets by mouth 3 (Three) Times a Day.     • clonazePAM (KlonoPIN) 0.5 MG tablet TAKE 1 TABLET BY MOUTH 3 TIMES A DAY AS NEEDED FOR ANXIETY. 90 tablet 0   • escitalopram (LEXAPRO) 10 MG tablet Take 1.5 tablets by mouth Daily. 135 tablet 1   • furosemide (LASIX) 40 MG tablet TAKE 1 TABLET BY MOUTH EVERY DAY (Patient taking differently: Take 40 mg by mouth 2 (Two) Times a Day. Do not take preop) 90 tablet 0   • gabapentin (NEURONTIN) 300 MG capsule Take 300 mg by  mouth 3 (Three) Times a Day.     • HYDROcodone-acetaminophen (NORCO) 5-325 MG per tablet Take 1 tablet by mouth Every 12 (Twelve) Hours As Needed.     • ipratropium-albuterol (DUO-NEB) 0.5-2.5 mg/3 ml nebulizer Take 3 mL by nebulization Every 4 (Four) Hours As Needed for Wheezing. Use preop if needed     • KLOR-CON 20 MEQ CR tablet TAKE 1 TABLET BY MOUTH DAILY (Patient taking differently: Take 20 mEq by mouth 2 (Two) Times a Day. Take preop) 90 tablet 0   • lactulose (Generlac) 10 GM/15ML solution solution (encephalopathy) Use as directed     • lurasidone (Latuda) 40 MG tablet tablet Take 1 tablet by mouth Every Night. 30 tablet 3   • Melatonin 10 MG tablet Take 10 mg by mouth At Night As Needed.     • Melatonin ER 10 MG tablet controlled-release Take 1 tablet by mouth every night at bedtime.     • Multiple Vitamins-Minerals (MULTIVITAMIN WITH MINERALS) tablet tablet Take 1 tablet by mouth Daily. Do not take dos     • nadolol (CORGARD) 20 MG tablet Take 20 mg by mouth Every Morning. Take dos     • O2 (OXYGEN) Inhale 1 L/min 1 (One) Time. @@ night     • omeprazole (priLOSEC) 40 MG capsule Take 40 mg by mouth Daily. Take preop     • pioglitazone (ACTOS) 30 MG tablet TAKE 1 TABLET BY MOUTH EVERY DAY 90 tablet 0   • pramipexole (MIRAPEX) 0.5 MG tablet TAKE 1 TABLET BY MOUTH TWICE A  tablet 1   • rOPINIRole (REQUIP) 0.25 MG tablet TAKE 1 TABLET BY MOUTH 2 (TWO) TIMES A DAY. 180 tablet 1   • Spiriva HandiHaler 18 MCG per inhalation capsule PLACE 1 CAPSULE INTO INHALER AND INHALE DAILY 90 capsule 1   • vitamin C (ASCORBIC ACID) 500 MG tablet Take 1,000 mg by mouth Daily. dont take preop       No facility-administered medications prior to visit.       Opioid medication/s are on active medication list.  and I have evaluated her active treatment plan and pain score trends (see table).  Vitals:    02/28/22 1136   PainSc:   7   PainLoc: Back     I have reviewed the chart for potential of high risk medication and harmful  "drug interactions in the elderly.            Aspirin is on active medication list. Aspirin use is indicated based on review of current medical condition/s. Pros and cons of this therapy have been discussed today. Benefits of this medication outweigh potential harm.  Patient has been encouraged to continue taking this medication.  .      Patient Active Problem List   Diagnosis   • Bipolar 1 disorder, depressed, moderate (HCC)   • Body mass index (BMI) of 45.0-49.9 in adult (HCC)   • Chronic back pain   • Chronic kidney disease, unspecified   • Chronic obstructive pulmonary disease (HCC)   • Cirrhosis (HCC)   • Gastroesophageal reflux disease   • Hypertension   • Sleep apnea   • Parkinson's disease (HCC)   • Recurrent urinary tract infection   • Seizure (HCC)   • Spinal stenosis of cervical region   • Type 2 diabetes mellitus with other diabetic neurological complication ([facility])   • Generalized anxiety disorder   • Hyperlipidemia   • Varices of esophagus determined by endoscopy ([facility])   • Injury of cervical spine (HCC)   • Cor pulmonale (HCC)     Advance Care Planning  Advance Directive is on file.  ACP discussion was held with the patient during this visit. Patient has an advance directive in EMR which is still valid.           Objective    Vitals:    02/28/22 1136   BP: 125/65   BP Location: Left arm   Patient Position: Sitting   Cuff Size: Large Adult   Pulse: 56   Resp: 19   Temp: 96.9 °F (36.1 °C)   TempSrc: Infrared   SpO2: 97%   Weight: 108 kg (238 lb)   Height: 160 cm (63\")   PainSc:   7   PainLoc: Back     BMI Readings from Last 1 Encounters:   02/28/22 42.16 kg/m²   BMI is above normal parameters. Recommendations include: exercise counseling    Does the patient have evidence of cognitive impairment? No    Physical Exam  Vitals and nursing note reviewed.   Constitutional:       Appearance: She is well-developed. She is obese.   HENT:      Head: Normocephalic and atraumatic.   Eyes:      Pupils: Pupils are equal, " round, and reactive to light.   Cardiovascular:      Rate and Rhythm: Normal rate and regular rhythm.      Pulses: Normal pulses.      Heart sounds: Normal heart sounds. No murmur heard.  No friction rub. No gallop.    Pulmonary:      Effort: Pulmonary effort is normal.      Breath sounds: Normal breath sounds.   Abdominal:      General: Bowel sounds are normal.      Palpations: Abdomen is soft.   Musculoskeletal:         General: Normal range of motion.      Cervical back: Neck supple.   Skin:     General: Skin is warm and dry.   Neurological:      Mental Status: She is alert and oriented to person, place, and time.   Psychiatric:         Behavior: Behavior normal.         Thought Content: Thought content normal.         Judgment: Judgment normal.                 HEALTH RISK ASSESSMENT    Smoking Status:  Social History     Tobacco Use   Smoking Status Former Smoker   • Packs/day: 0.50   • Years: 10.00   • Pack years: 5.00   • Types: Cigarettes   • Start date: 1974   • Quit date:    • Years since quittin.7   Smokeless Tobacco Never Used   Tobacco Comment    Stopped off and on.  Stopped once for more than 2 yrs     Alcohol Consumption:  Social History     Substance and Sexual Activity   Alcohol Use No     Fall Risk Screen:    STEADI Fall Risk Assessment was completed, and patient is at LOW risk for falls.Assessment completed on:2022    Depression Screening:  PHQ-2/PHQ-9 Depression Screening 2022   Little interest or pleasure in doing things 0   Feeling down, depressed, or hopeless 0   Trouble falling or staying asleep, or sleeping too much -   Feeling tired or having little energy -   Poor appetite or overeating -   Feeling bad about yourself - or that you are a failure or have let yourself or your family down -   Trouble concentrating on things, such as reading the newspaper or watching television -   Moving or speaking so slowly that other people could have noticed. Or the opposite - being  so fidgety or restless that you have been moving around a lot more than usual -   Thoughts that you would be better off dead, or of hurting yourself in some way -   Total Score 0   If you checked off any problems, how difficult have these problems made it for you to do your work, take care of things at home, or get along with other people? -       Health Habits and Functional and Cognitive Screening:  Functional & Cognitive Status 2/28/2022   Do you have difficulty preparing food and eating? No   Do you have difficulty bathing yourself, getting dressed or grooming yourself? No   Do you have difficulty using the toilet? No   Do you have difficulty moving around from place to place? Yes   Do you have trouble with steps or getting out of a bed or a chair? No   Current Diet Well Balanced Diet   Dental Exam Not up to date   Eye Exam Not up to date   Exercise (times per week) 0 times per week   Current Exercises Include No Regular Exercise   Current Exercise Activities Include -   Do you need help using the phone?  No   Are you deaf or do you have serious difficulty hearing?  No   Do you need help with transportation? Yes   Do you need help shopping? Yes   Do you need help preparing meals?  No   Do you need help with housework?  Yes   Do you need help with laundry? Yes   Do you need help taking your medications? No   Do you need help managing money? No   Do you ever drive or ride in a car without wearing a seat belt? No   Have you felt unusual stress, anger or loneliness in the last month? No   Who do you live with? Spouse   If you need help, do you have trouble finding someone available to you? No   Have you been bothered in the last four weeks by sexual problems? No   Do you have difficulty concentrating, remembering or making decisions? No       Age-appropriate Screening Schedule:  Refer to the list below for future screening recommendations based on patient's age, sex and/or medical conditions. Orders for these  recommended tests are listed in the plan section. The patient has been provided with a written plan.    Health Maintenance   Topic Date Due   • LIPID PANEL  02/28/2022 (Originally 2/24/2022)   • DIABETIC FOOT EXAM  03/15/2022 (Originally 2/17/2022)   • URINE MICROALBUMIN  03/18/2022 (Originally 2/24/2022)   • HEMOGLOBIN A1C  03/22/2022 (Originally 11/19/2021)   • DIABETIC EYE EXAM  04/18/2022 (Originally 5/20/2019)   • TDAP/TD VACCINES (1 - Tdap) 11/24/2022 (Originally 9/20/1974)   • DXA SCAN  09/16/2022   • MAMMOGRAM  12/21/2023   • INFLUENZA VACCINE  Completed   • ZOSTER VACCINE  Completed   • PAP SMEAR  Discontinued              Assessment/Plan   CMS Preventative Services Quick Reference  Risk Factors Identified During Encounter  Immunizations Discussed/Encouraged (specific Immunizations; Td, Influenza, Pneumococcal 23, Shingrix and COVID19  The above risks/problems have been discussed with the patient.  Follow up actions/plans if indicated are seen below in the Assessment/Plan Section.  Pertinent information has been shared with the patient in the After Visit Summary.    There are no diagnoses linked to this encounter.    Follow Up:   Return in about 6 months (around 8/28/2022).     An After Visit Summary and PPPS were made available to the patient.

## 2022-03-04 ENCOUNTER — LAB (OUTPATIENT)
Dept: LAB | Facility: HOSPITAL | Age: 67
End: 2022-03-04

## 2022-03-04 DIAGNOSIS — E78.2 MIXED HYPERLIPIDEMIA: Chronic | ICD-10-CM

## 2022-03-04 DIAGNOSIS — I10 PRIMARY HYPERTENSION: Chronic | ICD-10-CM

## 2022-03-04 DIAGNOSIS — E11.49 TYPE 2 DIABETES MELLITUS WITH OTHER DIABETIC NEUROLOGICAL COMPLICATION: Chronic | ICD-10-CM

## 2022-03-04 DIAGNOSIS — F31.32 BIPOLAR 1 DISORDER, DEPRESSED, MODERATE: Chronic | ICD-10-CM

## 2022-03-04 DIAGNOSIS — F41.1 GENERALIZED ANXIETY DISORDER: Chronic | ICD-10-CM

## 2022-03-04 LAB
ALT SERPL W P-5'-P-CCNC: 18 U/L (ref 1–33)
ANION GAP SERPL CALCULATED.3IONS-SCNC: 12.8 MMOL/L (ref 5–15)
BUN SERPL-MCNC: 22 MG/DL (ref 8–23)
BUN/CREAT SERPL: 22.7 (ref 7–25)
CALCIUM SPEC-SCNC: 10.1 MG/DL (ref 8.6–10.5)
CHLORIDE SERPL-SCNC: 97 MMOL/L (ref 98–107)
CHOLEST SERPL-MCNC: 176 MG/DL (ref 0–200)
CO2 SERPL-SCNC: 29.2 MMOL/L (ref 22–29)
CREAT SERPL-MCNC: 0.97 MG/DL (ref 0.57–1)
CREAT UR-MCNC: 52.6 MG/DL
EGFRCR SERPLBLD CKD-EPI 2021: 65 ML/MIN/1.73
GLUCOSE SERPL-MCNC: 143 MG/DL (ref 65–99)
HBA1C MFR BLD: 6.3 % (ref 3.5–5.6)
HDLC SERPL-MCNC: 62 MG/DL (ref 40–60)
LDLC SERPL CALC-MCNC: 86 MG/DL (ref 0–100)
LDLC/HDLC SERPL: 1.31 {RATIO}
POTASSIUM SERPL-SCNC: 3.8 MMOL/L (ref 3.5–5.2)
PROT ?TM UR-MCNC: 7.4 MG/DL
PROT/CREAT UR: 140.7 MG/G CREA (ref 0–200)
SODIUM SERPL-SCNC: 139 MMOL/L (ref 136–145)
TRIGL SERPL-MCNC: 163 MG/DL (ref 0–150)
VLDLC SERPL-MCNC: 28 MG/DL (ref 5–40)

## 2022-03-04 PROCEDURE — 83036 HEMOGLOBIN GLYCOSYLATED A1C: CPT

## 2022-03-04 PROCEDURE — 36415 COLL VENOUS BLD VENIPUNCTURE: CPT

## 2022-03-04 PROCEDURE — 84156 ASSAY OF PROTEIN URINE: CPT

## 2022-03-04 PROCEDURE — 84460 ALANINE AMINO (ALT) (SGPT): CPT

## 2022-03-04 PROCEDURE — 80061 LIPID PANEL: CPT

## 2022-03-04 PROCEDURE — 80048 BASIC METABOLIC PNL TOTAL CA: CPT

## 2022-03-04 PROCEDURE — 82570 ASSAY OF URINE CREATININE: CPT

## 2022-03-04 RX ORDER — LURASIDONE HYDROCHLORIDE 40 MG/1
40 TABLET, FILM COATED ORAL NIGHTLY
Qty: 30 TABLET | Refills: 3 | Status: SHIPPED | OUTPATIENT
Start: 2022-03-04 | End: 2022-04-27 | Stop reason: SDUPTHER

## 2022-03-04 RX ORDER — ROPINIROLE 0.25 MG/1
0.25 TABLET, FILM COATED ORAL 2 TIMES DAILY
Qty: 180 TABLET | Refills: 1 | Status: SHIPPED | OUTPATIENT
Start: 2022-03-04 | End: 2022-04-27 | Stop reason: SDUPTHER

## 2022-03-04 RX ORDER — CLONAZEPAM 0.5 MG/1
0.5 TABLET ORAL 3 TIMES DAILY PRN
Qty: 90 TABLET | Refills: 0 | OUTPATIENT
Start: 2022-03-04

## 2022-03-04 NOTE — TELEPHONE ENCOUNTER
Pharmacy requesting the following refills.  lurasidone (Latuda) 40 MG tablet tablet  rOPINIRole (REQUIP) 0.25 MG tablet  clonazePAM (KlonoPIN) 0.5 MG tablet  03/04/22 [facility]

## 2022-03-08 ENCOUNTER — HOSPITAL ENCOUNTER (OUTPATIENT)
Dept: ULTRASOUND IMAGING | Facility: HOSPITAL | Age: 67
Discharge: HOME | End: 2022-03-08
Admitting: INTERNAL MEDICINE

## 2022-03-08 DIAGNOSIS — R79.89 ABNORMAL LIVER FUNCTION TESTS: ICD-10-CM

## 2022-03-08 PROCEDURE — 76705 ECHO EXAM OF ABDOMEN: CPT

## 2022-03-21 DIAGNOSIS — F41.1 GENERALIZED ANXIETY DISORDER: Chronic | ICD-10-CM

## 2022-03-21 NOTE — TELEPHONE ENCOUNTER
Rx Refill Note  Requested Prescriptions     Pending Prescriptions Disp Refills   • clonazePAM (KlonoPIN) 0.5 MG tablet 90 tablet 0     Sig: Take 1 tablet by mouth 3 (Three) Times a Day As Needed for Anxiety.      Last office visit with prescribing clinician: 10/26/2021      Next office visit with prescribing clinician: 4/27/2022     Office Visit with Archana Singh MD (10/26/2021)         Ira Naranjo MA  03/21/22, 16:09 EDT     [facility] Fax requesting refill and inspect printed.

## 2022-03-22 RX ORDER — CLONAZEPAM 0.5 MG/1
0.5 TABLET ORAL 3 TIMES DAILY PRN
Qty: 90 TABLET | Refills: 0 | Status: SHIPPED | OUTPATIENT
Start: 2022-03-22 | End: 2022-04-27 | Stop reason: SDUPTHER

## 2022-04-13 ENCOUNTER — LAB (OUTPATIENT)
Dept: LAB | Facility: HOSPITAL | Age: 67
End: 2022-04-13

## 2022-04-13 ENCOUNTER — TRANSCRIBE ORDERS (OUTPATIENT)
Dept: ADMINISTRATIVE | Facility: HOSPITAL | Age: 67
End: 2022-04-13

## 2022-04-13 ENCOUNTER — OFFICE VISIT (OUTPATIENT)
Dept: PULMONOLOGY | Facility: HOSPITAL | Age: 67
End: 2022-04-13

## 2022-04-13 VITALS
HEIGHT: 63 IN | WEIGHT: 281 LBS | HEART RATE: 72 BPM | OXYGEN SATURATION: 90 % | DIASTOLIC BLOOD PRESSURE: 74 MMHG | SYSTOLIC BLOOD PRESSURE: 123 MMHG | RESPIRATION RATE: 14 BRPM | BODY MASS INDEX: 49.79 KG/M2

## 2022-04-13 DIAGNOSIS — J44.9 CHRONIC OBSTRUCTIVE PULMONARY DISEASE, UNSPECIFIED COPD TYPE: Chronic | ICD-10-CM

## 2022-04-13 DIAGNOSIS — E87.1 HYPONATREMIA: Primary | ICD-10-CM

## 2022-04-13 DIAGNOSIS — G47.33 OBSTRUCTIVE SLEEP APNEA SYNDROME: Primary | Chronic | ICD-10-CM

## 2022-04-13 DIAGNOSIS — E87.1 HYPONATREMIA: ICD-10-CM

## 2022-04-13 DIAGNOSIS — I10 PRIMARY HYPERTENSION: Chronic | ICD-10-CM

## 2022-04-13 LAB
ALBUMIN SERPL-MCNC: 4.2 G/DL (ref 3.5–5.2)
ALBUMIN/GLOB SERPL: 1.2 G/DL
ALP SERPL-CCNC: 67 U/L (ref 39–117)
ALT SERPL W P-5'-P-CCNC: 18 U/L (ref 1–33)
ANION GAP SERPL CALCULATED.3IONS-SCNC: 15.2 MMOL/L (ref 5–15)
AST SERPL-CCNC: 31 U/L (ref 1–32)
BACTERIA UR QL AUTO: ABNORMAL /HPF
BASOPHILS # BLD AUTO: 0.04 10*3/MM3 (ref 0–0.2)
BASOPHILS NFR BLD AUTO: 0.5 % (ref 0–1.5)
BILIRUB SERPL-MCNC: 0.6 MG/DL (ref 0–1.2)
BILIRUB UR QL STRIP: NEGATIVE
BUN SERPL-MCNC: 21 MG/DL (ref 8–23)
BUN/CREAT SERPL: 27.3 (ref 7–25)
CALCIUM SPEC-SCNC: 9.8 MG/DL (ref 8.6–10.5)
CHLORIDE SERPL-SCNC: 96 MMOL/L (ref 98–107)
CLARITY UR: CLEAR
CO2 SERPL-SCNC: 25.8 MMOL/L (ref 22–29)
COLOR UR: YELLOW
CREAT SERPL-MCNC: 0.77 MG/DL (ref 0.57–1)
CREAT UR-MCNC: 26.9 MG/DL
DEPRECATED RDW RBC AUTO: 41.3 FL (ref 37–54)
EGFRCR SERPLBLD CKD-EPI 2021: 85.7 ML/MIN/1.73
EOSINOPHIL # BLD AUTO: 0.11 10*3/MM3 (ref 0–0.4)
EOSINOPHIL NFR BLD AUTO: 1.3 % (ref 0.3–6.2)
ERYTHROCYTE [DISTWIDTH] IN BLOOD BY AUTOMATED COUNT: 12.8 % (ref 12.3–15.4)
GLOBULIN UR ELPH-MCNC: 3.6 GM/DL
GLUCOSE SERPL-MCNC: 142 MG/DL (ref 65–99)
GLUCOSE UR STRIP-MCNC: NEGATIVE MG/DL
HCT VFR BLD AUTO: 41.7 % (ref 34–46.6)
HGB BLD-MCNC: 14 G/DL (ref 12–15.9)
HGB UR QL STRIP.AUTO: NEGATIVE
HYALINE CASTS UR QL AUTO: ABNORMAL /LPF
IMM GRANULOCYTES # BLD AUTO: 0.03 10*3/MM3 (ref 0–0.05)
IMM GRANULOCYTES NFR BLD AUTO: 0.4 % (ref 0–0.5)
KETONES UR QL STRIP: NEGATIVE
LEUKOCYTE ESTERASE UR QL STRIP.AUTO: ABNORMAL
LYMPHOCYTES # BLD AUTO: 1.7 10*3/MM3 (ref 0.7–3.1)
LYMPHOCYTES NFR BLD AUTO: 20.1 % (ref 19.6–45.3)
MAGNESIUM SERPL-MCNC: 1.9 MG/DL (ref 1.6–2.4)
MCH RBC QN AUTO: 30 PG (ref 26.6–33)
MCHC RBC AUTO-ENTMCNC: 33.6 G/DL (ref 31.5–35.7)
MCV RBC AUTO: 89.5 FL (ref 79–97)
MONOCYTES # BLD AUTO: 0.67 10*3/MM3 (ref 0.1–0.9)
MONOCYTES NFR BLD AUTO: 7.9 % (ref 5–12)
NEUTROPHILS NFR BLD AUTO: 5.9 10*3/MM3 (ref 1.7–7)
NEUTROPHILS NFR BLD AUTO: 69.8 % (ref 42.7–76)
NITRITE UR QL STRIP: NEGATIVE
NRBC BLD AUTO-RTO: 0 /100 WBC (ref 0–0.2)
PH UR STRIP.AUTO: 5.5 [PH] (ref 5–8)
PLATELET # BLD AUTO: 175 10*3/MM3 (ref 140–450)
PMV BLD AUTO: 10.8 FL (ref 6–12)
POTASSIUM SERPL-SCNC: 3.8 MMOL/L (ref 3.5–5.2)
PROT ?TM UR-MCNC: <4 MG/DL
PROT SERPL-MCNC: 7.8 G/DL (ref 6–8.5)
PROT UR QL STRIP: NEGATIVE
PROT/CREAT UR: NORMAL MG/G{CREAT}
RBC # BLD AUTO: 4.66 10*6/MM3 (ref 3.77–5.28)
RBC # UR STRIP: ABNORMAL /HPF
REF LAB TEST METHOD: ABNORMAL
SODIUM SERPL-SCNC: 137 MMOL/L (ref 136–145)
SP GR UR STRIP: 1.01 (ref 1–1.03)
SQUAMOUS #/AREA URNS HPF: ABNORMAL /HPF
URATE SERPL-MCNC: 7.2 MG/DL (ref 2.4–5.7)
UROBILINOGEN UR QL STRIP: ABNORMAL
WBC # UR STRIP: ABNORMAL /HPF
WBC NRBC COR # BLD: 8.45 10*3/MM3 (ref 3.4–10.8)

## 2022-04-13 PROCEDURE — 80053 COMPREHEN METABOLIC PANEL: CPT

## 2022-04-13 PROCEDURE — 81001 URINALYSIS AUTO W/SCOPE: CPT

## 2022-04-13 PROCEDURE — 84156 ASSAY OF PROTEIN URINE: CPT

## 2022-04-13 PROCEDURE — 84550 ASSAY OF BLOOD/URIC ACID: CPT

## 2022-04-13 PROCEDURE — 82570 ASSAY OF URINE CREATININE: CPT

## 2022-04-13 PROCEDURE — 85025 COMPLETE CBC W/AUTO DIFF WBC: CPT

## 2022-04-13 PROCEDURE — 83735 ASSAY OF MAGNESIUM: CPT

## 2022-04-13 PROCEDURE — 36415 COLL VENOUS BLD VENIPUNCTURE: CPT

## 2022-04-13 PROCEDURE — G0463 HOSPITAL OUTPT CLINIC VISIT: HCPCS

## 2022-04-13 RX ORDER — MELATONIN 10 MG
1 CAPSULE ORAL
COMMUNITY
Start: 2022-02-22

## 2022-04-13 RX ORDER — BUDESONIDE AND FORMOTEROL FUMARATE DIHYDRATE 160; 4.5 UG/1; UG/1
2 AEROSOL RESPIRATORY (INHALATION)
Qty: 3 EACH | Refills: 3 | Status: SHIPPED | OUTPATIENT
Start: 2022-04-13

## 2022-04-13 RX ORDER — PENTOXIFYLLINE 400 MG/1
400 TABLET, EXTENDED RELEASE ORAL
COMMUNITY

## 2022-04-13 RX ORDER — BUDESONIDE AND FORMOTEROL FUMARATE DIHYDRATE 160; 4.5 UG/1; UG/1
2 AEROSOL RESPIRATORY (INHALATION)
COMMUNITY
End: 2022-04-13 | Stop reason: SDUPTHER

## 2022-04-13 NOTE — PROGRESS NOTES
SLEEP/PULMONARY  CLINIC NOTE      PATIENT IDENTIFICATION:  Name: Dariana Rivera  Age: 66 y.o.  Sex: female  :  1955  MRN: [id number]    DATE OF CONSULTATION:  2022                     CHIEF COMPLAINT: Sleep apnea    History of Present Illness:   Dariana Rivera is a 66 y.o. female Pt on CPAP feeling better more energy especially the night he use it more than 4 hours, no sleepiness no fatigue no tiredness, no mask irritation no dryness, compliance report reviewed with pt AHI< 5 with good usage.  Patient have 34% leak but does not bother her  Patient using her Symbicort feeling significantly better less shortness of breath less coughing no wheezing less dyspnea exertion      Review of Systems:   Constitutional:  As above   Eyes: negative   ENT/oropharynx: negative   Cardiovascular: negative   Respiratory: negative   Gastrointestinal: negative   Genitourinary: negative   Neurological: negative   Musculoskeletal: negative   Integument/breast: negative   Endocrine: negative   Allergic/Immunologic: negative     Past Medical History:  Past Medical History:   Diagnosis Date   • Allergic Not sure    Penicillin, cipro, clindamycin, methadone, ambien   • Angina at rest ([facility])     DR. Amador   • Anxiety disorder     [facility]    • Back pain    • Bipolar disorder ([facility])    • Cervical dystonia    • Chronic pain     with stenosis   • Cirrhosis ([facility])    • Colitis    • Colon polyps    • COPD (chronic obstructive pulmonary disease) ([facility])    • Cramping of feet    • Cramping of hands    • DDD (degenerative disc disease), cervical    • DDD (degenerative disc disease), lumbar    • DDD (degenerative disc disease), lumbar    • DDD (degenerative disc disease), thoracic    • Depression    • Diverticulosis     Not sure when diagnosed   • Dysphagia 2020   • Eosinophilic colitis    • Gastritis    • GERD (gastroesophageal reflux disease)    • Headache Not sure    Migraines stopped after complete hyst in    • Hepatic  steatosis     non alcoholic steo-hepatitis    • Hx of lithotripsy 02/10/2021   • Hypertension    • IBS (irritable bowel syndrome)    • Infectious viral hepatitis     [facility]   • Insomnia    • Kidney stones    • Low back pain     Not sure when diagnosed   • Lumbar stenosis    • Neck pain    • Neuromuscular disorder (HCC) Not sure    Parkinsons and Cervical Dystonia   • Neuropathy    • Obesity     Obese since about 1966   • Osteoarthritis    • Parkinson's disease (HCC)    • Peripheral edema    • PTSD (post-traumatic stress disorder)    • Recurrent UTI    • Renal insufficiency     Not sure when diagnosed   • Seizure (HCC)     onset 7/2016.  Last seizure 10/2019   • Sleep apnea     Using Bipap machine at night. advised to bring dos   • Sleep apnea, obstructive    • Type II diabetes mellitus (HCC)    • Urinary incontinence        Past Surgical History:  Past Surgical History:   Procedure Laterality Date   • BACK SURGERY     • CARDIAC CATHETERIZATION  02/2019   • CARPAL TUNNEL RELEASE Bilateral     Wrist    • CHOLECYSTECTOMY  1997   • COLONOSCOPY     • CYSTOSCOPY BLADDER STONE LITHOTRIPSY     • ENDOSCOPY     • ENDOSCOPY N/A 4/15/2020    Procedure: ESOPHAGOGASTRODUODENOSCOPY with dilitation (60FR.);  Surgeon: Julieta Allison MD;  Location: [facility] ENDOSCOPY;  Service: Gastroenterology;  Laterality: N/A;  varices,hiatal hernia,gastritis   • ENDOSCOPY N/A 9/29/2020    Procedure: ESOPHAGOGASTRODUODENOSCOPY with esophageal dilitation (54 bougie);  Surgeon: Julieta Allison MD;  Location: [facility] ENDOSCOPY;  Service: Gastroenterology;  Laterality: N/A;   post op: hiatal hernia, esophageal stricture   • ENDOSCOPY N/A 9/30/2021    Procedure: ESOPHAGOGASTRODUODENOSCOPY WITH DILATATION (BOUGIE #60);  Surgeon: Julieta Allison MD;  Location: [facility] ENDOSCOPY;  Service: Gastroenterology;  Laterality: N/A;  ESOPHAGEAL VARICES AND HIATAL HERNIA   • EYE SURGERY  3-14 & 3-    Cataract surgery R 3-14, L 3-   • HYSTERECTOMY   2004    complete   • INTERSTIM PLACEMENT      bladder   • JOINT REPLACEMENT Bilateral     knees   • KNEE ARTHROSCOPY Bilateral     Arthroscopic surgery to bilateral knees    • OTHER SURGICAL HISTORY      Stress test    • OTHER SURGICAL HISTORY  10/2016    Lithotripsy total 4-5   • OTHER SURGICAL HISTORY      Right arm cellulits- spider bites    • OTHER SURGICAL HISTORY  2018    Lithotripsy   • OTHER SURGICAL HISTORY  2018    ACDF C3-C4 & C6-C7   • REPLACEMENT TOTAL KNEE     • REPLACEMENT TOTAL KNEE Left 2016   • SHOULDER ROTATOR CUFF REPAIR Right 2020    Procedure: RT ROTATOR CUFF REPAIR OPEN;  Surgeon: Curly Vaughn MD;  Location: [facility] MAIN OR;  Service: Orthopedics   • SPINE SURGERY  2018    Fusion surgery C3-C4 and C6-C7        Family History:  Family History   Problem Relation Age of Onset   • Hypertension Mother    • Hyperlipidemia Mother    • Arthritis Mother    • Cancer Mother         Skin cancers   • Depression Mother    • Mental illness Mother         Depression and Alzheimers   • Diabetes Father             • Heart disease Father         Had angina. Was diabetic.  of heart attack at age 57.   • Early death Father          at 57 from heart attack   • Hyperlipidemia Sister    • Arthritis Sister    • Cancer Sister         Skin cancers   • Depression Sister    • Diabetes Sister         Diagnosed    • Heart disease Sister         2 mini strokes, atrial fribulation, pacemaker insertion   • Hypertension Sister    • Mental illness Sister         Depression   • Stroke Sister         2 mini strokes   • Diabetes Brother         Not sure when diagnosed   • Heart disease Brother         Had open heart surgery about 4 yrs ago.   • Hypertension Brother    • Hyperlipidemia Brother    • Arthritis Brother    • Cancer Brother         Skin cancers   • Breast cancer Maternal Grandmother    • Breast cancer Maternal Aunt         Social History:   Social History      Tobacco Use   • Smoking status: Former Smoker     Packs/day: 0.50     Years: 10.00     Pack years: 5.00     Types: Cigarettes     Start date: 1974     Quit date:      Years since quittin.8   • Smokeless tobacco: Never Used   • Tobacco comment: Stopped off and on.  Stopped once for more than 2 yrs   Substance Use Topics   • Alcohol use: No        Allergies:  Allergies   Allergen Reactions   • Ambien  [Zolpidem Tartrate] Confusion   • Ciprofloxacin Hcl Rash   • Penicillins Rash   • Zolpidem Unknown - High Severity     Other reaction(s): Confusion   • Methadone Hallucinations   • Clindamycin Rash       Home Meds:  (Not in a hospital admission)      Objective:    Vitals Ranges:   Heart Rate:  [72] 72  Resp:  [14] 14  BP: (123)/(74) 123/74  Body mass index is 49.78 kg/m².     Exam:  General Appearance:  WDWN    HEENT:   without obvious abnormality,  Conjunctiva/corneas clear,  Normal external ear canals, no drainage    Clear orsalmucosa,  Mallampati score 3    Neck:  Supple, symmetrical, trachea midline. No JVD.  Lungs:   Bilateral basal rhonchi bilaterally, respirations unlabored symmetrical wall movement.    Chest wall:  No tenderness or deformity.    Heart:  Regular rate and rhythm, S1 and S2 normal.  Extremities: Trace edema no clubbing or Cyanosis        Data Review:  All labs (24hrs): No results found for this or any previous visit (from the past 24 hour(s)).     Imaging:  US Abdomen Limited  Narrative: US ABDOMEN LIMITED-     Date of Exam: 3/8/2022 10:14 AM     Indication: R74.0; R94.5-Abnormal results of liver function studies.     Comparison: None available.     Technique: Transverse and sagittal ultrasound images of the right upper  quadrant were obtained. Doppler evaluation was also conducted.     FINDINGS:     The liver demonstrates a coarsened echotexture with subtle surface  nodular contour, compatible with the previous stated history of  cirrhosis. No focal liver lesions are identified on  today's examination.  No ascites is seen. The liver is not enlarged.     Right kidney measures 10.8 cm in length without focal cortical lesion,  shadowing stone or hydronephrosis.     The gallbladder surgically absent. Common bile duct measures 7 mm,  within normal limits status post cholecystectomy. No intrahepatic  biliary ductal dilation is identified.     The pancreas is partially obscured by bowel gas but the visualized  pancreatic segments appear normal.        Impression: 1. Cirrhotic liver morphology. No focal liver lesions. No significant  change from 8/21/2020.  2. Cholecystectomy.     Electronically Signed By-Maria L Foster MD On:3/8/2022 10:42 AM  This report was finalized on [id number] by  Maria L Foster MD.       ASSESSMENT:  Diagnoses and all orders for this visit:    Obstructive sleep apnea syndrome    Chronic obstructive pulmonary disease, unspecified COPD type (HCC)    Body mass index (BMI) of 45.0-49.9 in adult (HCC)    Primary hypertension    Other orders  -     Melatonin 10 MG capsule; Take 1 capsule by mouth every night at bedtime.  -     pentoxifylline (TRENtal) 400 MG CR tablet; Take 400 mg by mouth 3 (Three) Times a Day With Meals.  -     Discontinue: budesonide-formoterol (SYMBICORT) 160-4.5 MCG/ACT inhaler; Inhale 2 puffs 2 (Two) Times a Day.  -     budesonide-formoterol (SYMBICORT) 160-4.5 MCG/ACT inhaler; Inhale 2 puffs 2 (Two) Times a Day.        PLAN:      Bronchodilator inhaled corticosteroid    Education how to use inhalers    Encouraged to use incentive spirometer    Continue to exercise slowly as tolerated    Monitor for any change in the color of the sputum    Avoid any exposure to fumes, gas or any irritant        This patient with obstructive sleep apnea, compliance is improved. Encourage to use it more frequent, I re-emphasized on pt the long and short term benefit of treating LISANDRO.     Treating LISANDRO will improve BP control     Follow-up 6 months    Sasha Khan MD. ROMAN,  ABSM.  4/13/2022  15:09 EDT

## 2022-04-27 ENCOUNTER — OFFICE VISIT (OUTPATIENT)
Dept: PSYCHIATRY | Facility: CLINIC | Age: 67
End: 2022-04-27

## 2022-04-27 DIAGNOSIS — F31.32 BIPOLAR 1 DISORDER, DEPRESSED, MODERATE: Primary | Chronic | ICD-10-CM

## 2022-04-27 DIAGNOSIS — F41.1 GENERALIZED ANXIETY DISORDER: Chronic | ICD-10-CM

## 2022-04-27 PROCEDURE — 99214 OFFICE O/P EST MOD 30 MIN: CPT | Performed by: PSYCHIATRY & NEUROLOGY

## 2022-04-27 RX ORDER — CLONAZEPAM 0.5 MG/1
0.5 TABLET ORAL 3 TIMES DAILY PRN
Qty: 90 TABLET | Refills: 3 | Status: SHIPPED | OUTPATIENT
Start: 2022-04-27 | End: 2022-08-25

## 2022-04-27 RX ORDER — PRAMIPEXOLE DIHYDROCHLORIDE 0.5 MG/1
0.5 TABLET ORAL 2 TIMES DAILY
Qty: 180 TABLET | Refills: 1 | Status: SHIPPED | OUTPATIENT
Start: 2022-04-27 | End: 2022-12-07

## 2022-04-27 RX ORDER — ROPINIROLE 0.25 MG/1
0.25 TABLET, FILM COATED ORAL 2 TIMES DAILY
Qty: 180 TABLET | Refills: 1 | Status: SHIPPED | OUTPATIENT
Start: 2022-04-27 | End: 2022-10-25

## 2022-04-27 RX ORDER — PIOGLITAZONEHYDROCHLORIDE 30 MG/1
30 TABLET ORAL DAILY
Qty: 90 TABLET | Refills: 0 | Status: SHIPPED | OUTPATIENT
Start: 2022-04-27 | End: 2022-07-11

## 2022-04-27 RX ORDER — ESCITALOPRAM OXALATE 10 MG/1
15 TABLET ORAL DAILY
Qty: 135 TABLET | Refills: 1 | Status: SHIPPED | OUTPATIENT
Start: 2022-04-27 | End: 2022-11-08 | Stop reason: SDUPTHER

## 2022-04-27 RX ORDER — LURASIDONE HYDROCHLORIDE 40 MG/1
40 TABLET, FILM COATED ORAL NIGHTLY
Qty: 30 TABLET | Refills: 3 | Status: SHIPPED | OUTPATIENT
Start: 2022-04-27 | End: 2022-09-22

## 2022-04-27 NOTE — TELEPHONE ENCOUNTER
Caller: Dariana Rivera    Relationship: Self    Best call back number: [phone]    Requested Prescriptions:   Requested Prescriptions     Pending Prescriptions Disp Refills   • pioglitazone (ACTOS) 30 MG tablet 90 tablet 0     Sig: Take 1 tablet by mouth Daily.        Pharmacy where request should be sent: [facility] [id number] [place], [place] [phone] FX     Does the patient have less than a 3 day supply:  [x] Yes  [] No    Julia Ogden, [facility] Rep   04/27/22 09:57 EDT
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
## 2022-05-23 ENCOUNTER — OFFICE (AMBULATORY)
Dept: URBAN - METROPOLITAN AREA CLINIC 64 | Facility: CLINIC | Age: 67
End: 2022-05-23

## 2022-05-23 VITALS
SYSTOLIC BLOOD PRESSURE: 116 MMHG | HEART RATE: 59 BPM | HEIGHT: 67 IN | DIASTOLIC BLOOD PRESSURE: 63 MMHG | WEIGHT: 285 LBS

## 2022-05-23 DIAGNOSIS — K74.69 OTHER CIRRHOSIS OF LIVER: ICD-10-CM

## 2022-05-23 PROCEDURE — 99214 OFFICE O/P EST MOD 30 MIN: CPT | Performed by: NURSE PRACTITIONER

## 2022-06-23 ENCOUNTER — TELEPHONE (OUTPATIENT)
Dept: FAMILY MEDICINE CLINIC | Facility: CLINIC | Age: 67
End: 2022-06-23

## 2022-06-23 ENCOUNTER — HOSPITAL ENCOUNTER (OUTPATIENT)
Dept: GENERAL RADIOLOGY | Facility: HOSPITAL | Age: 67
Discharge: HOME | End: 2022-06-23
Admitting: UROLOGY

## 2022-06-23 ENCOUNTER — TRANSCRIBE ORDERS (OUTPATIENT)
Dept: ADMINISTRATIVE | Facility: HOSPITAL | Age: 67
End: 2022-06-23

## 2022-06-23 DIAGNOSIS — N20.0 CALCULUS, RENAL: Primary | ICD-10-CM

## 2022-06-23 DIAGNOSIS — N20.0 CALCULUS, RENAL: ICD-10-CM

## 2022-06-23 PROCEDURE — 74018 RADEX ABDOMEN 1 VIEW: CPT

## 2022-06-23 NOTE — TELEPHONE ENCOUNTER
Caller: Dariana Rivera    Relationship: Self    Best call back number: [phone]*    What is the best time to reach you: ANYTIME    Who are you requesting to speak with (clinical staff, provider,  specific staff member): CLINICAL    What was the call regarding: PATIENT CALLING AND STATING THAT HER LIFT CHAIR IS HAVING PROBLEMS AND SHE CALLED THE COMPANY THE CHAIR CAME FROM, AND THEY ADVISED THE PATIENT THAT SINCE IT HAS BEEN OVER 5 YEARS THEY CAN PROVIDE THE PATIENT WITH A NEW LIFT CHAIR. THE PATIENT IS REQUESTING AN ORDER FOR A NEW LIFT CHAIR TO BE SENT TO THE [facility] BY DR. NAPIER.    SEND ORDER TO:  [facility]  ATTENTION: ANALI  FAX#: [phone]    Do you require a callback: YES TO ADVISE WHEN ORDER HAS BEEN SENT.

## 2022-07-07 ENCOUNTER — TRANSCRIBE ORDERS (OUTPATIENT)
Dept: ADMINISTRATIVE | Facility: HOSPITAL | Age: 67
End: 2022-07-07

## 2022-07-07 ENCOUNTER — HOSPITAL ENCOUNTER (OUTPATIENT)
Dept: CARDIOLOGY | Facility: HOSPITAL | Age: 67
Discharge: HOME | End: 2022-07-07

## 2022-07-07 ENCOUNTER — LAB (OUTPATIENT)
Dept: LAB | Facility: HOSPITAL | Age: 67
End: 2022-07-07

## 2022-07-07 DIAGNOSIS — N20.0 KIDNEY STONE: ICD-10-CM

## 2022-07-07 DIAGNOSIS — N20.0 KIDNEY STONE: Primary | ICD-10-CM

## 2022-07-07 LAB
ANION GAP SERPL CALCULATED.3IONS-SCNC: 9.5 MMOL/L (ref 5–15)
BASOPHILS # BLD AUTO: 0.03 10*3/MM3 (ref 0–0.2)
BASOPHILS NFR BLD AUTO: 0.4 % (ref 0–1.5)
BUN SERPL-MCNC: 17 MG/DL (ref 8–23)
BUN/CREAT SERPL: 20.7 (ref 7–25)
CALCIUM SPEC-SCNC: 9.7 MG/DL (ref 8.6–10.5)
CHLORIDE SERPL-SCNC: 98 MMOL/L (ref 98–107)
CO2 SERPL-SCNC: 30.5 MMOL/L (ref 22–29)
CREAT SERPL-MCNC: 0.82 MG/DL (ref 0.57–1)
DEPRECATED RDW RBC AUTO: 43.2 FL (ref 37–54)
EGFRCR SERPLBLD CKD-EPI 2021: 79.5 ML/MIN/1.73
EOSINOPHIL # BLD AUTO: 0.14 10*3/MM3 (ref 0–0.4)
EOSINOPHIL NFR BLD AUTO: 2 % (ref 0.3–6.2)
ERYTHROCYTE [DISTWIDTH] IN BLOOD BY AUTOMATED COUNT: 13.3 % (ref 12.3–15.4)
GLUCOSE SERPL-MCNC: 109 MG/DL (ref 65–99)
HCT VFR BLD AUTO: 41.3 % (ref 34–46.6)
HGB BLD-MCNC: 13.5 G/DL (ref 12–15.9)
IMM GRANULOCYTES # BLD AUTO: 0.01 10*3/MM3 (ref 0–0.05)
IMM GRANULOCYTES NFR BLD AUTO: 0.1 % (ref 0–0.5)
LYMPHOCYTES # BLD AUTO: 1.47 10*3/MM3 (ref 0.7–3.1)
LYMPHOCYTES NFR BLD AUTO: 21.2 % (ref 19.6–45.3)
MCH RBC QN AUTO: 29.3 PG (ref 26.6–33)
MCHC RBC AUTO-ENTMCNC: 32.7 G/DL (ref 31.5–35.7)
MCV RBC AUTO: 89.6 FL (ref 79–97)
MONOCYTES # BLD AUTO: 0.69 10*3/MM3 (ref 0.1–0.9)
MONOCYTES NFR BLD AUTO: 10 % (ref 5–12)
NEUTROPHILS NFR BLD AUTO: 4.59 10*3/MM3 (ref 1.7–7)
NEUTROPHILS NFR BLD AUTO: 66.3 % (ref 42.7–76)
NRBC BLD AUTO-RTO: 0 /100 WBC (ref 0–0.2)
PLATELET # BLD AUTO: 162 10*3/MM3 (ref 140–450)
PMV BLD AUTO: 10.6 FL (ref 6–12)
POTASSIUM SERPL-SCNC: 4.1 MMOL/L (ref 3.5–5.2)
RBC # BLD AUTO: 4.61 10*6/MM3 (ref 3.77–5.28)
SODIUM SERPL-SCNC: 138 MMOL/L (ref 136–145)
WBC NRBC COR # BLD: 6.93 10*3/MM3 (ref 3.4–10.8)

## 2022-07-07 PROCEDURE — 36415 COLL VENOUS BLD VENIPUNCTURE: CPT

## 2022-07-07 PROCEDURE — 93010 ELECTROCARDIOGRAM REPORT: CPT | Performed by: INTERNAL MEDICINE

## 2022-07-07 PROCEDURE — 93005 ELECTROCARDIOGRAM TRACING: CPT | Performed by: ANESTHESIOLOGY

## 2022-07-07 PROCEDURE — 80048 BASIC METABOLIC PNL TOTAL CA: CPT

## 2022-07-07 PROCEDURE — 85025 COMPLETE CBC W/AUTO DIFF WBC: CPT

## 2022-07-08 LAB — QT INTERVAL: 439 MS

## 2022-07-11 RX ORDER — PIOGLITAZONEHYDROCHLORIDE 30 MG/1
TABLET ORAL
Qty: 90 TABLET | Refills: 0 | Status: SHIPPED | OUTPATIENT
Start: 2022-07-11 | End: 2022-09-29

## 2022-07-12 ENCOUNTER — OFFICE VISIT (OUTPATIENT)
Dept: FAMILY MEDICINE CLINIC | Facility: CLINIC | Age: 67
End: 2022-07-12

## 2022-07-12 VITALS
SYSTOLIC BLOOD PRESSURE: 121 MMHG | HEART RATE: 61 BPM | DIASTOLIC BLOOD PRESSURE: 71 MMHG | HEIGHT: 64 IN | TEMPERATURE: 97.1 F | RESPIRATION RATE: 18 BRPM | WEIGHT: 285 LBS | BODY MASS INDEX: 48.65 KG/M2 | OXYGEN SATURATION: 94 %

## 2022-07-12 DIAGNOSIS — Z01.818 PREOPERATIVE EXAMINATION: Primary | ICD-10-CM

## 2022-07-12 PROCEDURE — 99213 OFFICE O/P EST LOW 20 MIN: CPT | Performed by: FAMILY MEDICINE

## 2022-07-12 NOTE — PROGRESS NOTES
"Subjective   Dariana Rivera is a 66 y.o. female.     Chief Complaint   Patient presents with   • surgery clearance     For renal stones       HPI  Chief complaint: Surgical clearance    The patient is a 66-year-old white female who was seen today for preoperative clearance.  Patient is scheduled to have ESWL of a left renal stone.  The renal stones found by her urologist on a KUB.  Patient is not having flank pain.  Patient's renal function is stable.    Patient has multiple medical problems.    I been asked to provide medical clearance.  Patient current problems include hypertension hyperlipidemia cor pulmonale type 2 diabetes mellitus cirrhosis gastroesophageal reflux disease with esophageal varices bipolar disease chronic pain syndrome Parkinson's disease history of seizures and pseudoseizures COPD sleep apnea and nocturnal hypoxemia.    Her current medications include melatonin 10 mg at nighttime aspirin 81 mg daily Norco 5/3/2025 every 12 hours as needed Spiriva 18 mcg inhaled daily a rescue inhaler as needed clonazepam 0.53 times a day gabapentin 300 mg 3 times a day escitalopram 1015 mg daily Actos 30 mg daily Requip 0.25 mg twice a day Mirapex 0.5 mg twice a day Sinemet 25/102 tablets 3 times a day Latuda 40 mg daily Corgard 20 mg daily potassium 20 mEq twice a day Lasix 40 mg twice a day Trental 400 mg 3 times a day lactulose baclofen 10 3 times a day Prilosec 40 mg twice a day and oxygen at nighttime.        The following portions of the patient's history were reviewed and updated as appropriate: allergies, current medications, past family history, past medical history, past social history, past surgical history and problem list.    Review of Systems    Objective     /71 (BP Location: Left arm, Patient Position: Sitting, Cuff Size: Adult)   Pulse 61   Temp 97.1 °F (36.2 °C) (Infrared)   Resp 18   Ht 162.6 cm (64\")   Wt 129 kg (285 lb)   SpO2 94%   BMI 48.92 kg/m²     Physical Exam  Vitals " and nursing note reviewed.   Constitutional:       Appearance: She is well-developed. She is obese.   HENT:      Head: Normocephalic and atraumatic.   Eyes:      Pupils: Pupils are equal, round, and reactive to light.   Cardiovascular:      Rate and Rhythm: Normal rate and regular rhythm.      Pulses: Normal pulses.      Heart sounds: Normal heart sounds. No murmur heard.    No friction rub. No gallop.   Pulmonary:      Effort: Pulmonary effort is normal.      Breath sounds: Normal breath sounds.   Abdominal:      General: Bowel sounds are normal.      Palpations: Abdomen is soft.   Musculoskeletal:         General: Normal range of motion.      Cervical back: Neck supple.   Skin:     General: Skin is warm and dry.   Neurological:      Mental Status: She is alert and oriented to person, place, and time.   Psychiatric:         Behavior: Behavior normal.         Thought Content: Thought content normal.         Judgment: Judgment normal.         Basic Metabolic Panel (07/07/2022 14:04)  CBC & Differential (07/07/2022 14:04)    Assessment & Plan   Diagnoses and all orders for this visit:    1. Preoperative examination (Primary)-the patient was advised that she is medically stable.  She was advised that she can proceed with surgery however she is at increased perioperative morbidity and mortality due to her medical problems and her obesity.  I also discussed this with the patient's surgeon and .  Patient will be scheduled at a facility who can adequately handle perioperative complications that potentially could occur.      Patient Instructions   Continue your current medications and treatment.    Follow up in the office at yournext appointment.        Paul Larson Jr., MD    07/12/22

## 2022-07-12 NOTE — PATIENT INSTRUCTIONS
Continue your current medications and treatment.    Follow up in the office at yournext appointment.

## 2022-07-19 ENCOUNTER — LAB (OUTPATIENT)
Dept: LAB | Facility: HOSPITAL | Age: 67
End: 2022-07-19

## 2022-07-19 LAB — SARS-COV-2 ORF1AB RESP QL NAA+PROBE: NOT DETECTED

## 2022-07-19 PROCEDURE — U0005 INFEC AGEN DETEC AMPLI PROBE: HCPCS

## 2022-07-19 PROCEDURE — U0004 COV-19 TEST NON-CDC HGH THRU: HCPCS

## 2022-07-19 PROCEDURE — C9803 HOPD COVID-19 SPEC COLLECT: HCPCS

## 2022-07-20 ENCOUNTER — ANESTHESIA EVENT (OUTPATIENT)
Dept: GASTROENTEROLOGY | Facility: HOSPITAL | Age: 67
End: 2022-07-20

## 2022-07-20 PROBLEM — R13.10 DYSPHAGIA: Status: ACTIVE | Noted: 2022-07-20

## 2022-07-20 RX ORDER — SODIUM CHLORIDE 0.9 % (FLUSH) 0.9 %
10 SYRINGE (ML) INJECTION EVERY 12 HOURS SCHEDULED
Status: CANCELLED | OUTPATIENT
Start: 2022-07-20

## 2022-07-20 RX ORDER — SODIUM CHLORIDE 9 MG/ML
9 INJECTION, SOLUTION INTRAVENOUS CONTINUOUS PRN
Status: CANCELLED | OUTPATIENT
Start: 2022-07-20

## 2022-07-20 RX ORDER — SODIUM CHLORIDE 0.9 % (FLUSH) 0.9 %
10 SYRINGE (ML) INJECTION AS NEEDED
Status: CANCELLED | OUTPATIENT
Start: 2022-07-20

## 2022-07-20 NOTE — H&P
GI CONSULT  NOTE:    Referring Provider:    Paul Larson Jr., MD  [unfilled]    Chief complaint: Dysphagia    History of present illness:      Dariana Rivera is a 66 y.o. female who presents today for Procedure(s):  ESOPHAGOGASTRODUODENOSCOPY for the indications listed below.     The updated Patient Profile was reviewed prior to the procedure, in conjunction with the Physical Exam, including medical conditions, surgical procedures, medications, allergies, family history and social history.     Pre-operatively, I reviewed the indication(s) for the procedure, the risks of the procedure [including but not limited to: unexpected bleeding possibly requiring hospitalization and/or unplanned repeat procedures, perforation possibly requiring surgical treatment, missed lesions and complications of sedation/MAC (also explained by anesthesia staff)].     I have evaluated the patient for risks associated with the planned anesthesia and the procedure to be performed and find the patient an acceptable candidate for IV sedation.    Multiple opportunities were provided for any questions or concerns, and all questions were answered satisfactorily before any anesthesia was administered. We will proceed with the planned procedure.    Past Medical History:  Past Medical History:   Diagnosis Date   • Allergic Not sure    Penicillin, cipro, clindamycin, methadone, ambien   • Angina at rest ([facility])     DR. Amador   • Anxiety disorder     [facility]    • Back pain    • Bipolar disorder ([facility])    • Cervical dystonia    • Chronic pain     with stenosis   • Cirrhosis ([facility])    • Colitis    • Colon polyps    • COPD (chronic obstructive pulmonary disease) ([facility])    • Cramping of feet    • Cramping of hands    • DDD (degenerative disc disease), cervical    • DDD (degenerative disc disease), lumbar    • DDD (degenerative disc disease), lumbar    • DDD (degenerative disc disease), thoracic    • Depression    • Diverticulosis     Not sure  when diagnosed   • Dysphagia 09/2020   • Eosinophilic colitis    • Gastritis    • GERD (gastroesophageal reflux disease)    • Headache Not sure    Migraines stopped after complete hyst in 2004   • Hepatic steatosis     non alcoholic steo-hepatitis    • Hx of lithotripsy 02/10/2021   • Hypertension    • IBS (irritable bowel syndrome)    • Infectious viral hepatitis     ALEXANDER   • Insomnia    • Kidney stones    • Low back pain     Not sure when diagnosed   • Lumbar stenosis    • Neck pain    • Neuromuscular disorder (HCC) Not sure    Parkinsons and Cervical Dystonia   • Neuropathy    • Obesity     Obese since about 1966   • Osteoarthritis    • Parkinson's disease (HCC)    • Peripheral edema    • PTSD (post-traumatic stress disorder)    • Recurrent UTI    • Renal insufficiency     Not sure when diagnosed   • Seizure (HCC)     onset 7/2016.  Last seizure 10/2019   • Sleep apnea     Using Bipap machine at night. advised to bring dos   • Sleep apnea, obstructive    • Type II diabetes mellitus (HCC)    • Urinary incontinence        Past Surgical History:  Past Surgical History:   Procedure Laterality Date   • BACK SURGERY     • CARDIAC CATHETERIZATION  02/2019   • CARPAL TUNNEL RELEASE Bilateral     Wrist    • CHOLECYSTECTOMY  1997   • COLONOSCOPY     • CYSTOSCOPY BLADDER STONE LITHOTRIPSY     • ENDOSCOPY     • ENDOSCOPY N/A 4/15/2020    Procedure: ESOPHAGOGASTRODUODENOSCOPY with dilitation (60FR.);  Surgeon: Julieta Allison MD;  Location: [facility] ENDOSCOPY;  Service: Gastroenterology;  Laterality: N/A;  varices,hiatal hernia,gastritis   • ENDOSCOPY N/A 9/29/2020    Procedure: ESOPHAGOGASTRODUODENOSCOPY with esophageal dilitation (54 bougie);  Surgeon: Julieta Allison MD;  Location: [facility] ENDOSCOPY;  Service: Gastroenterology;  Laterality: N/A;   post op: hiatal hernia, esophageal stricture   • ENDOSCOPY N/A 9/30/2021    Procedure: ESOPHAGOGASTRODUODENOSCOPY WITH DILATATION (BOUGIE #60);  Surgeon: Julieta Allison MD;   Location: [facility] ENDOSCOPY;  Service: Gastroenterology;  Laterality: N/A;  ESOPHAGEAL VARICES AND HIATAL HERNIA   • EYE SURGERY  3-14 & 3-    Cataract surgery R 3-14, L 3-   • HYSTERECTOMY  2004    complete   • INTERSTIM PLACEMENT      bladder   • JOINT REPLACEMENT Bilateral     knees   • KNEE ARTHROSCOPY Bilateral     Arthroscopic surgery to bilateral knees    • OTHER SURGICAL HISTORY      Stress test    • OTHER SURGICAL HISTORY  10/2016    Lithotripsy total 4-5   • OTHER SURGICAL HISTORY      Right arm cellulits- spider bites    • OTHER SURGICAL HISTORY  2018    Lithotripsy   • OTHER SURGICAL HISTORY  2018    ACDF C3-C4 & C6-C7   • REPLACEMENT TOTAL KNEE     • REPLACEMENT TOTAL KNEE Left 2016   • SHOULDER ROTATOR CUFF REPAIR Right 2020    Procedure: RT ROTATOR CUFF REPAIR OPEN;  Surgeon: Curly Vaughn MD;  Location: [facility] MAIN OR;  Service: Orthopedics   • SPINE SURGERY  2018    Fusion surgery C3-C4 and C6-C7       Social History:  Social History     Tobacco Use   • Smoking status: Former Smoker     Packs/day: 0.50     Years: 10.00     Pack years: 5.00     Types: Cigarettes     Start date: 1974     Quit date:      Years since quittin.0   • Smokeless tobacco: Never Used   • Tobacco comment: Stopped off and on.  Stopped once for more than 2 yrs   Vaping Use   • Vaping Use: Never used   Substance Use Topics   • Alcohol use: No   • Drug use: No       Family History:  Family History   Problem Relation Age of Onset   • Hypertension Mother    • Hyperlipidemia Mother    • Arthritis Mother    • Cancer Mother         Skin cancers   • Depression Mother    • Mental illness Mother         Depression and Alzheimers   • Diabetes Father             • Heart disease Father         Had angina. Was diabetic.  of heart attack at age 57.   • Early death Father          at 57 from heart attack   • Hyperlipidemia Sister    • Arthritis Sister    • Cancer  "Sister         Skin cancers   • Depression Sister    • Diabetes Sister         Diagnosed 2019   • Heart disease Sister         2 mini strokes, atrial fribulation, pacemaker insertion   • Hypertension Sister    • Mental illness Sister         Depression   • Stroke Sister         2 mini strokes   • Diabetes Brother         Not sure when diagnosed   • Heart disease Brother         Had open heart surgery about 4 yrs ago.   • Hypertension Brother    • Hyperlipidemia Brother    • Arthritis Brother    • Cancer Brother         Skin cancers   • Breast cancer Maternal Grandmother    • Breast cancer Maternal Aunt        Medications:  No medications prior to admission.       Scheduled Meds:  Continuous Infusions:No current facility-administered medications for this encounter.    PRN Meds:.    ALLERGIES:  Ambien  [zolpidem tartrate], Ciprofloxacin hcl, Penicillins, Zolpidem, Methadone, and Clindamycin    ROS:  The following systems were reviewed and negative;   Constitution:  No fevers, chills, no unintentional weight loss  Skin: no rash, no jaundice  Eyes:  No blurry vision, no eye pain  HENT:  No change in hearing or smell  Resp:  No dyspnea or cough  CV:  No chest pain or palpitations  :  No dysuria, hematuria  Musculoskeletal:  No leg cramps or arthralgias  Neuro:  No tremor, no numbness  Psych:  No depression or confusion    Objective     Vital Signs:   Vitals:    07/12/22 1156   Weight: 127 kg (280 lb)   Height: 162.6 cm (64\")       Physical Exam:       General Appearance:    Awake and alert, in no acute distress   Head:    Normocephalic, without obvious abnormality, atraumatic   Throat:   No oral lesions, no thrush, oral mucosa moist   Lungs:     respirations regular, even and unlabored   Skin:   No rash, no jaundice       Results Review:  Lab Results (last 24 hours)     ** No results found for the last 24 hours. **          Imaging Results (Last 24 Hours)     ** No results found for the last 24 hours. **           I " reviewed the patient's labs and imaging.    ASSESSMENT AND PLAN:      Principal Problem:    Dysphagia       Procedure(s):  ESOPHAGOGASTRODUODENOSCOPY      I discussed the patients findings and my recommendations with the patient.    Electronically signed by Zachary Mcgarry MD, 07/20/22, 3:08 PM EDT.

## 2022-07-21 ENCOUNTER — HOSPITAL ENCOUNTER (OUTPATIENT)
Facility: HOSPITAL | Age: 67
Setting detail: HOSPITAL OUTPATIENT SURGERY
Discharge: HOME | End: 2022-07-21
Attending: INTERNAL MEDICINE | Admitting: INTERNAL MEDICINE

## 2022-07-21 ENCOUNTER — ANESTHESIA (OUTPATIENT)
Dept: GASTROENTEROLOGY | Facility: HOSPITAL | Age: 67
End: 2022-07-21

## 2022-07-21 ENCOUNTER — ON CAMPUS - OUTPATIENT (AMBULATORY)
Dept: URBAN - METROPOLITAN AREA HOSPITAL 85 | Facility: HOSPITAL | Age: 67
End: 2022-07-21

## 2022-07-21 VITALS
OXYGEN SATURATION: 95 % | RESPIRATION RATE: 19 BRPM | DIASTOLIC BLOOD PRESSURE: 60 MMHG | HEART RATE: 65 BPM | HEIGHT: 64 IN | TEMPERATURE: 98.5 F | SYSTOLIC BLOOD PRESSURE: 123 MMHG | BODY MASS INDEX: 4.97 KG/M2 | WEIGHT: 29.1 LBS

## 2022-07-21 DIAGNOSIS — K29.50 UNSPECIFIED CHRONIC GASTRITIS WITHOUT BLEEDING: ICD-10-CM

## 2022-07-21 DIAGNOSIS — K76.6 PORTAL HYPERTENSION: ICD-10-CM

## 2022-07-21 DIAGNOSIS — K31.89 OTHER DISEASES OF STOMACH AND DUODENUM: ICD-10-CM

## 2022-07-21 DIAGNOSIS — R13.10 DYSPHAGIA, UNSPECIFIED: ICD-10-CM

## 2022-07-21 DIAGNOSIS — K75.81 NASH (NONALCOHOLIC STEATOHEPATITIS): ICD-10-CM

## 2022-07-21 DIAGNOSIS — K74.60 CIRRHOSIS: ICD-10-CM

## 2022-07-21 DIAGNOSIS — B37.81 CANDIDAL ESOPHAGITIS: ICD-10-CM

## 2022-07-21 DIAGNOSIS — R13.10 DYSPHAGIA: ICD-10-CM

## 2022-07-21 LAB — GLUCOSE BLDC GLUCOMTR-MCNC: 132 MG/DL (ref 70–105)

## 2022-07-21 PROCEDURE — 88342 IMHCHEM/IMCYTCHM 1ST ANTB: CPT | Performed by: INTERNAL MEDICINE

## 2022-07-21 PROCEDURE — 25010000002 PROPOFOL 200 MG/20ML EMULSION: Performed by: ANESTHESIOLOGY

## 2022-07-21 PROCEDURE — 43239 EGD BIOPSY SINGLE/MULTIPLE: CPT | Performed by: INTERNAL MEDICINE

## 2022-07-21 PROCEDURE — 43450 DILATE ESOPHAGUS 1/MULT PASS: CPT | Performed by: INTERNAL MEDICINE

## 2022-07-21 PROCEDURE — 88305 TISSUE EXAM BY PATHOLOGIST: CPT | Performed by: INTERNAL MEDICINE

## 2022-07-21 PROCEDURE — 82962 GLUCOSE BLOOD TEST: CPT

## 2022-07-21 RX ORDER — SODIUM CHLORIDE 9 MG/ML
INJECTION, SOLUTION INTRAVENOUS CONTINUOUS PRN
Status: DISCONTINUED | OUTPATIENT
Start: 2022-07-21 | End: 2022-07-21 | Stop reason: SURG

## 2022-07-21 RX ORDER — ONDANSETRON 2 MG/ML
4 INJECTION INTRAMUSCULAR; INTRAVENOUS ONCE AS NEEDED
Status: DISCONTINUED | OUTPATIENT
Start: 2022-07-21 | End: 2022-07-21 | Stop reason: HOSPADM

## 2022-07-21 RX ORDER — PROPOFOL 10 MG/ML
INJECTION, EMULSION INTRAVENOUS AS NEEDED
Status: DISCONTINUED | OUTPATIENT
Start: 2022-07-21 | End: 2022-07-21 | Stop reason: SURG

## 2022-07-21 RX ADMIN — SODIUM CHLORIDE: 0.9 INJECTION, SOLUTION INTRAVENOUS at 10:30

## 2022-07-21 RX ADMIN — PROPOFOL 100 MG: 10 INJECTION, EMULSION INTRAVENOUS at 10:33

## 2022-07-21 RX ADMIN — PROPOFOL 50 MG: 10 INJECTION, EMULSION INTRAVENOUS at 10:38

## 2022-07-21 NOTE — ANESTHESIA POSTPROCEDURE EVALUATION
Patient: Dariana Rivera    Procedure Summary     Date: 07/21/22 Room / Location: [facility] ENDOSCOPY 1 / [facility] ENDOSCOPY    Anesthesia Start: 1030 Anesthesia Stop: 1045    Procedure: ESOPHAGOGASTRODUODENOSCOPY WITH BIOPSY X1 AREA  AND DILATATION (BOUGIE  #54) (N/A ) Diagnosis:       Cirrhosis (HCC)      Dysphagia      ALEXANDER (nonalcoholic steatohepatitis)      (Cirrhosis (HCC) [K74.60])      (Dysphagia [R13.10])      (ALEXANDER (nonalcoholic steatohepatitis) [K75.81])    Surgeons: Zachary Mcgarry MD Provider: Charli Blake MD    Anesthesia Type: MAC ASA Status: 4          Anesthesia Type: MAC    Vitals  Vitals Value Taken Time   /60 07/21/22 1111   Temp     Pulse 66 07/21/22 1112   Resp 19 07/21/22 1110   SpO2 90 % 07/21/22 1112   Vitals shown include unvalidated device data.        Post Anesthesia Care and Evaluation    Patient location during evaluation: PACU  Patient participation: complete - patient participated  Level of consciousness: awake  Pain scale: See nurse's notes for pain score.  Pain management: adequate    Airway patency: patent  Anesthetic complications: No anesthetic complications  PONV Status: none  Cardiovascular status: acceptable  Respiratory status: acceptable  Hydration status: acceptable    Comments: Patient seen and examined postoperatively; vital signs stable; SpO2 greater than or equal to 90%; cardiopulmonary status stable; nausea/vomiting adequately controlled; pain adequately controlled; no apparent anesthesia complications; patient discharged from anesthesia care when discharge criteria were met

## 2022-07-21 NOTE — OP NOTE
ESOPHAGOGASTRODUODENOSCOPY Procedure Report    Patient Name:  Dariana Rivera  YOB: 1955    Date of Surgery:  7/21/2022     Pre-Op Diagnosis:  Dysphagia  Portal hypertension    Post-Op Diagnosis:  Candida esophagitis  No appreciable esophageal varices  Bile acid gastritis    Procedure/CPT® Codes:      Procedure(s):  ESOPHAGOGASTRODUODENOSCOPY WITH BIOPSY X1 AREA  AND NONGUIDED BOUGIE DILATION DILATATION (BOUGIE  #54)    Staff:  Surgeon(s):  Zachary Mcgarry MD      Anesthesia: Monitored Anesthesia Care    Implants:    Nothing was implanted during the procedure    Specimen:        See Below    Estimated blood loss: Minimal     Complications:  None    Description of Procedure:  Informed consent was obtained for the procedure, including sedation.  Risks of perforation, hemorrhage, adverse drug reaction and aspiration were discussed.  The patient was brought into the endoscopy suite. Continuous cardiopulmonary monitoring was performed. The patient was placed in the left lateral decubitus position.  The bite block was inserted into the patient's mouth. After adequate sedation was attained, the Olympus gastroscope was inserted into the patient's mouth and advanced to the second portion of the duodenum without difficulty.  Circumferential examination was performed. A retroflex exam was performed in the patient's stomach.  On completion of the exam, the bowel was decompressed, the scope was removed from the patient, the patient tolerated the procedure well, there were no immediate post-operative complications.     Examination of the esophagus: White plaques were seen in the upper and midesophagus consistent with Candida esophagitis.  No strictures were noted.  The GE junction was normal.  There were no appreciable esophageal varices seen.  A 54 Sami nonguided bougie was advanced blindly through the esophagus with no resistance noted and second look revealed no mucosal disruptions or  strictures.  Examination of the stomach: Diffuse erythema and congestion with some nodularity was seen throughout the stomach especially in the antrum consistent with bile acid gastritis plus or minus portal hypertensive gastropathy.  Cold forceps biopsies were taken for histology and to rule out H. pylori.  Examination of the duodenum: Normal mucosa to second portion of duodenum          Impression:  Candida esophagitis  Bile acid/portal hypertensive gastropathy  No esophageal or gastric varices    Recommendations:  Fluconazole 200 mg daily x7 days  Follow-up on gastric biopsies and treat H. pylori if positive  Follow-up with GI nurse practitioner as scheduled  Recall EGD in 3 years to screen for varices      We appreciate the referral    Electronically signed by Zachary Mcgarry MD, 07/21/22, 10:48 AM EDT.

## 2022-07-21 NOTE — DISCHARGE INSTRUCTIONS
A responsible adult should stay with you and you should rest quietly for the rest of the day.    Do not drink alcohol, drive, operate any heavy machinery or power tools or make any legal/important decisions for the next 24 hours.     Progress your diet as tolerated.  If you begin to experience severe pain, increased shortness of breath, racing heartbeat or a fever above 101 F, seek immediate medical attention.     Follow up with MD as instructed. Call office for results in 3 to 5 days if needed.       Fluconazole 200 mg daily x7 days  Follow-up on gastric biopsies and treat H. pylori if positive  Follow-up with GI nurse practitioner as scheduled  Recall EGD in 3 years to screen for varices

## 2022-07-21 NOTE — ANESTHESIA PREPROCEDURE EVALUATION
Anesthesia Evaluation     Patient summary reviewed and Nursing notes reviewed   NPO Solid Status: > 8 hours  NPO Liquid Status: > 2 hours           Airway   Mallampati: IV  TM distance: <3 FB  Neck ROM: limited  Small opening, Large neck circumference and Possible difficult intubation  Dental    (+) edentulous    Pulmonary    (+) COPD moderate, home oxygen, sleep apnea,   Cardiovascular     (+) hypertension, hyperlipidemia,       Neuro/Psych  (+) seizures, headaches, numbness, psychiatric history Anxiety, Depression, Bipolar and PTSD,    GI/Hepatic/Renal/Endo    (+) morbid obesity, GERD,  hepatitis, liver disease cirrhosis, renal disease CRI and stones, diabetes mellitus,     Musculoskeletal     (+) back pain, neck pain,   Abdominal    Substance History      OB/GYN          Other   arthritis,                      Anesthesia Plan    ASA 4     MAC     intravenous induction     Anesthetic plan, risks, benefits, and alternatives have been provided, discussed and informed consent has been obtained with: patient.        CODE STATUS:

## 2022-07-22 LAB
LAB AP CASE REPORT: NORMAL
LAB AP CLINICAL INFORMATION: NORMAL
PATH REPORT.FINAL DX SPEC: NORMAL
PATH REPORT.GROSS SPEC: NORMAL

## 2022-07-28 ENCOUNTER — TRANSCRIBE ORDERS (OUTPATIENT)
Dept: ADMINISTRATIVE | Facility: HOSPITAL | Age: 67
End: 2022-07-28

## 2022-07-28 ENCOUNTER — HOSPITAL ENCOUNTER (OUTPATIENT)
Dept: GENERAL RADIOLOGY | Facility: HOSPITAL | Age: 67
Discharge: HOME | End: 2022-07-28
Admitting: UROLOGY

## 2022-07-28 DIAGNOSIS — N20.1 CALCULUS OF URETER: ICD-10-CM

## 2022-07-28 DIAGNOSIS — N20.0 CALCULUS, RENAL: Primary | ICD-10-CM

## 2022-07-28 DIAGNOSIS — N20.0 CALCULUS, RENAL: ICD-10-CM

## 2022-07-28 PROCEDURE — 74018 RADEX ABDOMEN 1 VIEW: CPT

## 2022-08-08 ENCOUNTER — OFFICE (AMBULATORY)
Dept: URBAN - METROPOLITAN AREA CLINIC 64 | Facility: CLINIC | Age: 67
End: 2022-08-08

## 2022-08-08 ENCOUNTER — TRANSCRIBE ORDERS (OUTPATIENT)
Dept: ADMINISTRATIVE | Facility: HOSPITAL | Age: 67
End: 2022-08-08

## 2022-08-08 VITALS
HEIGHT: 67 IN | HEART RATE: 61 BPM | DIASTOLIC BLOOD PRESSURE: 60 MMHG | SYSTOLIC BLOOD PRESSURE: 107 MMHG | WEIGHT: 280 LBS

## 2022-08-08 DIAGNOSIS — R13.10 DYSPHAGIA, UNSPECIFIED: ICD-10-CM

## 2022-08-08 DIAGNOSIS — K74.69 OTHER CIRRHOSIS OF LIVER: ICD-10-CM

## 2022-08-08 DIAGNOSIS — K74.60 HEPATIC CIRRHOSIS, UNSPECIFIED HEPATIC CIRRHOSIS TYPE, UNSPECIFIED WHETHER ASCITES PRESENT: Primary | ICD-10-CM

## 2022-08-08 DIAGNOSIS — K75.81 NONALCOHOLIC STEATOHEPATITIS (NASH): ICD-10-CM

## 2022-08-08 DIAGNOSIS — R13.10 DYSPHAGIA, UNSPECIFIED TYPE: ICD-10-CM

## 2022-08-08 DIAGNOSIS — K75.81 NONALCOHOLIC STEATOHEPATITIS: ICD-10-CM

## 2022-08-08 PROCEDURE — 99213 OFFICE O/P EST LOW 20 MIN: CPT | Performed by: NURSE PRACTITIONER

## 2022-08-16 ENCOUNTER — LAB (OUTPATIENT)
Dept: LAB | Facility: HOSPITAL | Age: 67
End: 2022-08-16

## 2022-08-16 ENCOUNTER — HOSPITAL ENCOUNTER (OUTPATIENT)
Dept: GENERAL RADIOLOGY | Facility: HOSPITAL | Age: 67
Discharge: HOME | End: 2022-08-16

## 2022-08-16 ENCOUNTER — OFFICE VISIT (OUTPATIENT)
Dept: FAMILY MEDICINE CLINIC | Facility: CLINIC | Age: 67
End: 2022-08-16

## 2022-08-16 VITALS
HEART RATE: 68 BPM | RESPIRATION RATE: 16 BRPM | SYSTOLIC BLOOD PRESSURE: 114 MMHG | WEIGHT: 286 LBS | TEMPERATURE: 97 F | HEIGHT: 64 IN | BODY MASS INDEX: 48.83 KG/M2 | OXYGEN SATURATION: 92 % | DIASTOLIC BLOOD PRESSURE: 65 MMHG

## 2022-08-16 DIAGNOSIS — R05.9 COUGH: Primary | ICD-10-CM

## 2022-08-16 DIAGNOSIS — M79.602 PAIN OF LEFT UPPER EXTREMITY: ICD-10-CM

## 2022-08-16 DIAGNOSIS — R05.9 COUGH: ICD-10-CM

## 2022-08-16 LAB
B PARAPERT DNA SPEC QL NAA+PROBE: NOT DETECTED
B PERT DNA SPEC QL NAA+PROBE: NOT DETECTED
BASOPHILS # BLD AUTO: 0.02 10*3/MM3 (ref 0–0.2)
BASOPHILS NFR BLD AUTO: 0.3 % (ref 0–1.5)
C PNEUM DNA NPH QL NAA+NON-PROBE: NOT DETECTED
DEPRECATED RDW RBC AUTO: 42 FL (ref 37–54)
EOSINOPHIL # BLD AUTO: 0.09 10*3/MM3 (ref 0–0.4)
EOSINOPHIL NFR BLD AUTO: 1.3 % (ref 0.3–6.2)
ERYTHROCYTE [DISTWIDTH] IN BLOOD BY AUTOMATED COUNT: 13.3 % (ref 12.3–15.4)
FLUAV SUBTYP SPEC NAA+PROBE: NOT DETECTED
FLUBV RNA ISLT QL NAA+PROBE: NOT DETECTED
HADV DNA SPEC NAA+PROBE: NOT DETECTED
HCOV 229E RNA SPEC QL NAA+PROBE: NOT DETECTED
HCOV HKU1 RNA SPEC QL NAA+PROBE: NOT DETECTED
HCOV NL63 RNA SPEC QL NAA+PROBE: NOT DETECTED
HCOV OC43 RNA SPEC QL NAA+PROBE: NOT DETECTED
HCT VFR BLD AUTO: 39.7 % (ref 34–46.6)
HGB BLD-MCNC: 13.2 G/DL (ref 12–15.9)
HMPV RNA NPH QL NAA+NON-PROBE: NOT DETECTED
HPIV1 RNA ISLT QL NAA+PROBE: NOT DETECTED
HPIV2 RNA SPEC QL NAA+PROBE: NOT DETECTED
HPIV3 RNA NPH QL NAA+PROBE: NOT DETECTED
HPIV4 P GENE NPH QL NAA+PROBE: NOT DETECTED
IMM GRANULOCYTES # BLD AUTO: 0.02 10*3/MM3 (ref 0–0.05)
IMM GRANULOCYTES NFR BLD AUTO: 0.3 % (ref 0–0.5)
LYMPHOCYTES # BLD AUTO: 1.59 10*3/MM3 (ref 0.7–3.1)
LYMPHOCYTES NFR BLD AUTO: 22.7 % (ref 19.6–45.3)
M PNEUMO IGG SER IA-ACNC: NOT DETECTED
MCH RBC QN AUTO: 29 PG (ref 26.6–33)
MCHC RBC AUTO-ENTMCNC: 33.2 G/DL (ref 31.5–35.7)
MCV RBC AUTO: 87.3 FL (ref 79–97)
MONOCYTES # BLD AUTO: 0.62 10*3/MM3 (ref 0.1–0.9)
MONOCYTES NFR BLD AUTO: 8.8 % (ref 5–12)
NEUTROPHILS NFR BLD AUTO: 4.67 10*3/MM3 (ref 1.7–7)
NEUTROPHILS NFR BLD AUTO: 66.6 % (ref 42.7–76)
NRBC BLD AUTO-RTO: 0 /100 WBC (ref 0–0.2)
PLATELET # BLD AUTO: 182 10*3/MM3 (ref 140–450)
PMV BLD AUTO: 10.5 FL (ref 6–12)
PROCALCITONIN SERPL-MCNC: 0.07 NG/ML (ref 0–0.25)
RBC # BLD AUTO: 4.55 10*6/MM3 (ref 3.77–5.28)
RHINOVIRUS RNA SPEC NAA+PROBE: NOT DETECTED
RSV RNA NPH QL NAA+NON-PROBE: NOT DETECTED
SARS-COV-2 RNA NPH QL NAA+NON-PROBE: NOT DETECTED
WBC NRBC COR # BLD: 7.01 10*3/MM3 (ref 3.4–10.8)

## 2022-08-16 PROCEDURE — 84145 PROCALCITONIN (PCT): CPT

## 2022-08-16 PROCEDURE — 0202U NFCT DS 22 TRGT SARS-COV-2: CPT

## 2022-08-16 PROCEDURE — 71046 X-RAY EXAM CHEST 2 VIEWS: CPT

## 2022-08-16 PROCEDURE — 99213 OFFICE O/P EST LOW 20 MIN: CPT | Performed by: FAMILY MEDICINE

## 2022-08-16 PROCEDURE — 36415 COLL VENOUS BLD VENIPUNCTURE: CPT

## 2022-08-16 PROCEDURE — 85025 COMPLETE CBC W/AUTO DIFF WBC: CPT

## 2022-08-16 NOTE — PATIENT INSTRUCTIONS
Have the xrays amd the labs done and call for results.    Furhtewr care will depend on the test results and how upon progress.

## 2022-08-16 NOTE — PROGRESS NOTES
"Subjective   Dariana Rivera is a 66 y.o. female.     Chief Complaint   Patient presents with   • Cough     With green mucus   • Arm Pain     After getting blood drawn shocking pain down to wrist       HPI  Chief complaint: Cough, arm pain    The patient is a 66-year-old white female states that for the past 3 weeks she has had cough that is productive of sputum.  She denied fever or chills.  She denied shortness of breath.  She denied wheezing.  She states that she has had this since she had the lithotripsy.    Patient also states that 3 to 4 days ago she had laboratory testing done.  She states that the blood was drawn from the left antecubital fossa.  She states that during the procedure while the phlebotomist had the Vacutainer needle in the patient's antecubital fossa on the left the patient developed shooting pain that radiated down into her and also shooting pain that radiated up into her left shoulder.  Patient states that since then the pain that radiates into the left shoulder is gone.  She states however she continues to have pain that shoots down into the left thumb when she supinates and pronates the forearm.        The following portions of the patient's history were reviewed and updated as appropriate: allergies, current medications, past family history, past medical history, past social history, past surgical history and problem list.    Review of Systems    Objective     /65 (BP Location: Left arm, Patient Position: Sitting, Cuff Size: Adult)   Pulse 68   Temp 97 °F (36.1 °C) (Infrared)   Resp 16   Ht 162.6 cm (64\")   Wt 130 kg (286 lb)   SpO2 92%   BMI 49.09 kg/m²     Physical Exam  Vitals and nursing note reviewed.   Constitutional:       Appearance: She is well-developed. She is obese.   HENT:      Head: Normocephalic and atraumatic.   Eyes:      Pupils: Pupils are equal, round, and reactive to light.   Cardiovascular:      Rate and Rhythm: Normal rate and regular rhythm.      " Pulses: Normal pulses.      Heart sounds: Normal heart sounds. No murmur heard.    No friction rub.   Pulmonary:      Effort: Pulmonary effort is normal. No respiratory distress.      Breath sounds: Normal breath sounds. No stridor. No wheezing, rhonchi or rales.   Chest:      Chest wall: No tenderness.   Abdominal:      General: Bowel sounds are normal.      Palpations: Abdomen is soft.   Musculoskeletal:         General: Normal range of motion.      Cervical back: Neck supple.   Skin:     General: Skin is warm and dry.   Neurological:      Mental Status: She is alert and oriented to person, place, and time.      Cranial Nerves: No cranial nerve deficit.      Sensory: No sensory deficit.      Motor: No weakness.      Coordination: Coordination normal.      Gait: Gait normal.      Deep Tendon Reflexes: Reflexes normal.   Psychiatric:         Behavior: Behavior normal.         Thought Content: Thought content normal.         Judgment: Judgment normal.           Assessment & Plan   Diagnoses and all orders for this visit:    1. Cough (Primary)-the patient was advised that her cough is likely due to bronchitis.  She is to have a chest x-ray and laboratory testing to further evaluate this.  -     CBC & Differential; Future  -     Procalcitonin; Future  -     Respiratory Virus Antigens Panel - Swab, Nasopharynx; Future  -     XR Chest 2 View; Future    2. Pain of left upper extremity patient was advised that when the phlebotomist to her blood the needle may have hit the median nerve.  She was advised that this should gradually get better.  Patient has a good pulse left radial pulse.  She had full range of motion of the upper extremity.  No neurological deficits were noted.  Patient is to have an EMG and nerve conduction study done.  -     EMG & Nerve Conduction Test; Future      Patient Instructions   Have the xrays amd the labs done and call for results.    Furhtewr care will depend on the test results and how upon  progress.      Paul Larson Jr., MD    08/16/22

## 2022-08-22 ENCOUNTER — TELEPHONE (OUTPATIENT)
Dept: FAMILY MEDICINE CLINIC | Facility: CLINIC | Age: 67
End: 2022-08-22

## 2022-08-22 NOTE — TELEPHONE ENCOUNTER
Caller: Dariana Rivera    Relationship: Self    Best call back number: [phone]    Caller requesting test results: SELF    What test was performed: CHEST X-RAY AND LABS    When was the test performed: 8/16    Where was the test performed: HOSPITAL     Additional notes: PLEASE CALL WITH RESULTS

## 2022-08-22 NOTE — TELEPHONE ENCOUNTER
I spoke with the patient.  Her tests are okay.  The clinical picture is most consistent with Bronchitis.  She is to follow up if her cough does not improve.

## 2022-08-24 DIAGNOSIS — F41.1 GENERALIZED ANXIETY DISORDER: Chronic | ICD-10-CM

## 2022-08-25 RX ORDER — CLONAZEPAM 0.5 MG/1
TABLET ORAL
Qty: 90 TABLET | Refills: 1 | Status: SHIPPED | OUTPATIENT
Start: 2022-08-25 | End: 2022-10-14

## 2022-08-29 DIAGNOSIS — J44.9 CHRONIC OBSTRUCTIVE PULMONARY DISEASE, UNSPECIFIED COPD TYPE: Primary | ICD-10-CM

## 2022-08-29 RX ORDER — ALBUTEROL SULFATE 90 UG/1
2 AEROSOL, METERED RESPIRATORY (INHALATION) EVERY 6 HOURS PRN
Qty: 18 G | Refills: 3 | Status: SHIPPED | OUTPATIENT
Start: 2022-08-29 | End: 2023-01-12 | Stop reason: SDUPTHER

## 2022-08-30 ENCOUNTER — OFFICE VISIT (OUTPATIENT)
Dept: FAMILY MEDICINE CLINIC | Facility: CLINIC | Age: 67
End: 2022-08-30

## 2022-08-30 VITALS
WEIGHT: 287 LBS | DIASTOLIC BLOOD PRESSURE: 77 MMHG | HEIGHT: 64 IN | HEART RATE: 63 BPM | SYSTOLIC BLOOD PRESSURE: 127 MMHG | BODY MASS INDEX: 49 KG/M2 | OXYGEN SATURATION: 93 % | RESPIRATION RATE: 18 BRPM | TEMPERATURE: 96.9 F

## 2022-08-30 DIAGNOSIS — E11.49 TYPE 2 DIABETES MELLITUS WITH OTHER DIABETIC NEUROLOGICAL COMPLICATION: Chronic | ICD-10-CM

## 2022-08-30 DIAGNOSIS — E78.2 MIXED HYPERLIPIDEMIA: Chronic | ICD-10-CM

## 2022-08-30 DIAGNOSIS — J44.9 CHRONIC OBSTRUCTIVE PULMONARY DISEASE, UNSPECIFIED COPD TYPE: Chronic | ICD-10-CM

## 2022-08-30 DIAGNOSIS — S54.12XD: ICD-10-CM

## 2022-08-30 DIAGNOSIS — K74.60 CIRRHOSIS OF LIVER WITHOUT ASCITES, UNSPECIFIED HEPATIC CIRRHOSIS TYPE: Chronic | ICD-10-CM

## 2022-08-30 DIAGNOSIS — I10 PRIMARY HYPERTENSION: Primary | Chronic | ICD-10-CM

## 2022-08-30 DIAGNOSIS — K21.9 GASTROESOPHAGEAL REFLUX DISEASE WITHOUT ESOPHAGITIS: Chronic | ICD-10-CM

## 2022-08-30 DIAGNOSIS — R56.9 SEIZURE: Chronic | ICD-10-CM

## 2022-08-30 DIAGNOSIS — G20 PARKINSON'S DISEASE: Chronic | ICD-10-CM

## 2022-08-30 DIAGNOSIS — F31.32 BIPOLAR 1 DISORDER, DEPRESSED, MODERATE: Chronic | ICD-10-CM

## 2022-08-30 PROBLEM — R13.10 DYSPHAGIA: Status: RESOLVED | Noted: 2022-07-20 | Resolved: 2022-08-30

## 2022-08-30 PROCEDURE — 99214 OFFICE O/P EST MOD 30 MIN: CPT | Performed by: FAMILY MEDICINE

## 2022-08-30 NOTE — PROGRESS NOTES
Subjective   Dariana Rivera is a 66 y.o. female.     Chief Complaint   Patient presents with   • Diabetes     6 mth f/u   • Hypertension   • Hyperlipidemia       HPI  Chief complaint: Type 2 diabetes mellitus hypertension hyperlipidemia cirrhosis gastroesophageal reflux disease    The patient is a 66-year-old white female comes in for follow-up and maintenance of her current problems which include    1.  Hypertension-stable-the patient is currently on Corgard 20 mg daily Lasix 40 mg twice a day potassium 20 mill colons twice a day.  Patient's blood pressure stable.    2.  Hyperlipidemia-stable after patient is current on a diet.    3.  COPD-stable-the patient is currently taking Spiriva 18 mcg 1 inhaled capsule a day Symbicort 160/4.52 puffs twice a day rescue inhaler and oxygen.  She denied recent shortness of breath or wheezing.  She does have cough productive of sputum.  Patient recently had chest x-ray and laboratory testing done.  Studies did not suggest infection.  Patient most likely has bronchitis.    4.  Type 2 diabetes mellitus-stable after patient is on Actos 30 mg daily.  She is due for an A1c.    5.  Parkinson's disease-stable-the patient is on ropinirole 0.25 mg twice a day Mirapex 0.5 mg twice a day Sinemet 25/102 tablets 3 times a day.  Patient has a chronic tremor.    6.  Cirrhosis/esophageal varices-stable-the patient is currently on Corgard 20 mg daily and lactulose.  Patient denies jaundice izzy colored stools dark urine or ascites.  Patient recently saw her gastroenterologist.  She has been scheduled for a ultrasound of the liver.    7.  Gastroesophageal reflux disease-stable-the patient is currently on Prilosec 40 mg daily.  She denies dysphagia or heartburn.    8.  Bipolar disease-stable-the patient is on Latuda 40 mg daily Lexapro 15 mg daily clonazepam 0.5 mg 3 times a day as needed.  Patient is stable.    9.  Seizures-stable-the patient is currently taking gabapentin 300 mg 3 times a  "day.  She denied recent seizures.        The following portions of the patient's history were reviewed and updated as appropriate: allergies, current medications, past family history, past medical history, past social history, past surgical history and problem list.    Review of Systems    Objective     /77 (BP Location: Right arm, Patient Position: Sitting, Cuff Size: Adult)   Pulse 63   Temp 96.9 °F (36.1 °C) (Infrared)   Resp 18   Ht 162.6 cm (64\")   Wt 130 kg (287 lb)   SpO2 93%   BMI 49.26 kg/m²     Physical Exam  Vitals and nursing note reviewed.   Constitutional:       Appearance: She is well-developed. She is obese.   HENT:      Head: Normocephalic and atraumatic.   Eyes:      Pupils: Pupils are equal, round, and reactive to light.   Cardiovascular:      Rate and Rhythm: Normal rate and regular rhythm.      Pulses: Normal pulses.      Heart sounds: Normal heart sounds. No murmur heard.    No friction rub. No gallop.   Pulmonary:      Effort: Pulmonary effort is normal.      Breath sounds: Normal breath sounds.   Abdominal:      General: Bowel sounds are normal.      Palpations: Abdomen is soft.   Musculoskeletal:         General: Normal range of motion.      Cervical back: Neck supple.   Skin:     General: Skin is warm and dry.   Neurological:      Mental Status: She is alert and oriented to person, place, and time.   Psychiatric:         Behavior: Behavior normal.         Thought Content: Thought content normal.         Judgment: Judgment normal.           Assessment & Plan   Diagnoses and all orders for this visit:    1. Primary hypertension (Primary)    2. Mixed hyperlipidemia    3. Type 2 diabetes mellitus with other diabetic neurological complication (HCC)  -     Basic Metabolic Panel; Future  -     Hemoglobin A1c; Future    4. Cirrhosis of liver without ascites, unspecified hepatic cirrhosis type (HCC)    5. Dysphagia, unspecified type    6. Gastroesophageal reflux disease without " esophagitis    7. Stage 3 chronic kidney disease, unspecified whether stage 3a or 3b CKD (HCC)    8. Bipolar 1 disorder, depressed, moderate (HCC)    9. Parkinson's disease (HCC)    10. Seizure (HCC)    11. Chronic obstructive pulmonary disease, unspecified COPD type (HCC)    12. Injury of left median nerve, unspecified injury location, subsequent encounter      Patient Instructions   Continue your current medications and treatment.    Have the follow up labs done and call for results.    Follow up in 6 months.          Paul Larson Jr., MD    08/30/22

## 2022-08-30 NOTE — PATIENT INSTRUCTIONS
Continue your current medications and treatment.    Have the follow up labs done and call for results.    Follow up in 6 months.

## 2022-09-01 ENCOUNTER — LAB (OUTPATIENT)
Dept: LAB | Facility: HOSPITAL | Age: 67
End: 2022-09-01

## 2022-09-01 DIAGNOSIS — E11.49 TYPE 2 DIABETES MELLITUS WITH OTHER DIABETIC NEUROLOGICAL COMPLICATION: Chronic | ICD-10-CM

## 2022-09-01 LAB
ANION GAP SERPL CALCULATED.3IONS-SCNC: 13 MMOL/L (ref 5–15)
BUN SERPL-MCNC: 20 MG/DL (ref 8–23)
BUN/CREAT SERPL: 21.3 (ref 7–25)
CALCIUM SPEC-SCNC: 10 MG/DL (ref 8.6–10.5)
CHLORIDE SERPL-SCNC: 98 MMOL/L (ref 98–107)
CO2 SERPL-SCNC: 30 MMOL/L (ref 22–29)
CREAT SERPL-MCNC: 0.94 MG/DL (ref 0.57–1)
EGFRCR SERPLBLD CKD-EPI 2021: 67.5 ML/MIN/1.73
GLUCOSE SERPL-MCNC: 173 MG/DL (ref 65–99)
HBA1C MFR BLD: 6.8 % (ref 3.5–5.6)
POTASSIUM SERPL-SCNC: 3.9 MMOL/L (ref 3.5–5.2)
SODIUM SERPL-SCNC: 141 MMOL/L (ref 136–145)

## 2022-09-01 PROCEDURE — 80048 BASIC METABOLIC PNL TOTAL CA: CPT

## 2022-09-01 PROCEDURE — 83036 HEMOGLOBIN GLYCOSYLATED A1C: CPT

## 2022-09-01 PROCEDURE — 36415 COLL VENOUS BLD VENIPUNCTURE: CPT

## 2022-09-15 RX ORDER — TIOTROPIUM BROMIDE 18 UG/1
CAPSULE ORAL; RESPIRATORY (INHALATION)
Qty: 90 CAPSULE | Refills: 0 | Status: SHIPPED | OUTPATIENT
Start: 2022-09-15 | End: 2022-09-29

## 2022-09-19 ENCOUNTER — HOSPITAL ENCOUNTER (OUTPATIENT)
Dept: ULTRASOUND IMAGING | Facility: HOSPITAL | Age: 67
Discharge: HOME | End: 2022-09-19
Admitting: NURSE PRACTITIONER

## 2022-09-19 DIAGNOSIS — R13.10 DYSPHAGIA, UNSPECIFIED TYPE: ICD-10-CM

## 2022-09-19 DIAGNOSIS — K74.60 HEPATIC CIRRHOSIS, UNSPECIFIED HEPATIC CIRRHOSIS TYPE, UNSPECIFIED WHETHER ASCITES PRESENT: ICD-10-CM

## 2022-09-19 DIAGNOSIS — K75.81 NONALCOHOLIC STEATOHEPATITIS: ICD-10-CM

## 2022-09-19 PROCEDURE — 76705 ECHO EXAM OF ABDOMEN: CPT

## 2022-09-21 DIAGNOSIS — F31.32 BIPOLAR 1 DISORDER, DEPRESSED, MODERATE: Chronic | ICD-10-CM

## 2022-09-22 RX ORDER — LURASIDONE HYDROCHLORIDE 40 MG/1
TABLET, FILM COATED ORAL
Qty: 30 TABLET | Refills: 1 | Status: SHIPPED | OUTPATIENT
Start: 2022-09-22 | End: 2022-11-08 | Stop reason: SDUPTHER

## 2022-09-29 RX ORDER — PIOGLITAZONEHYDROCHLORIDE 30 MG/1
TABLET ORAL
Qty: 90 TABLET | Refills: 0 | Status: SHIPPED | OUTPATIENT
Start: 2022-09-29 | End: 2022-12-08

## 2022-09-29 RX ORDER — TIOTROPIUM BROMIDE 18 UG/1
CAPSULE ORAL; RESPIRATORY (INHALATION)
Qty: 90 CAPSULE | Refills: 0 | Status: SHIPPED | OUTPATIENT
Start: 2022-09-29 | End: 2023-03-11

## 2022-10-12 ENCOUNTER — LAB (OUTPATIENT)
Dept: LAB | Facility: HOSPITAL | Age: 67
End: 2022-10-12

## 2022-10-12 ENCOUNTER — TRANSCRIBE ORDERS (OUTPATIENT)
Dept: ADMINISTRATIVE | Facility: HOSPITAL | Age: 67
End: 2022-10-12

## 2022-10-12 DIAGNOSIS — E87.1 HYPOSMOLALITY SYNDROME: Primary | ICD-10-CM

## 2022-10-12 DIAGNOSIS — E87.1 HYPOSMOLALITY SYNDROME: ICD-10-CM

## 2022-10-12 LAB
ALBUMIN SERPL-MCNC: 4 G/DL (ref 3.5–5.2)
ALBUMIN/GLOB SERPL: 1 G/DL
ALP SERPL-CCNC: 66 U/L (ref 39–117)
ALT SERPL W P-5'-P-CCNC: 20 U/L (ref 1–33)
ANION GAP SERPL CALCULATED.3IONS-SCNC: 11 MMOL/L (ref 5–15)
AST SERPL-CCNC: 40 U/L (ref 1–32)
BASOPHILS # BLD AUTO: 0.05 10*3/MM3 (ref 0–0.2)
BASOPHILS NFR BLD AUTO: 0.7 % (ref 0–1.5)
BILIRUB SERPL-MCNC: 0.6 MG/DL (ref 0–1.2)
BUN SERPL-MCNC: 21 MG/DL (ref 8–23)
BUN/CREAT SERPL: 25 (ref 7–25)
CALCIUM SPEC-SCNC: 9.7 MG/DL (ref 8.6–10.5)
CHLORIDE SERPL-SCNC: 97 MMOL/L (ref 98–107)
CO2 SERPL-SCNC: 31 MMOL/L (ref 22–29)
CREAT SERPL-MCNC: 0.84 MG/DL (ref 0.57–1)
DEPRECATED RDW RBC AUTO: 41.1 FL (ref 37–54)
EGFRCR SERPLBLD CKD-EPI 2021: 76.8 ML/MIN/1.73
EOSINOPHIL # BLD AUTO: 0.12 10*3/MM3 (ref 0–0.4)
EOSINOPHIL NFR BLD AUTO: 1.6 % (ref 0.3–6.2)
ERYTHROCYTE [DISTWIDTH] IN BLOOD BY AUTOMATED COUNT: 13 % (ref 12.3–15.4)
GLOBULIN UR ELPH-MCNC: 3.9 GM/DL
GLUCOSE SERPL-MCNC: 83 MG/DL (ref 65–99)
HCT VFR BLD AUTO: 39.5 % (ref 34–46.6)
HGB BLD-MCNC: 13.1 G/DL (ref 12–15.9)
IMM GRANULOCYTES # BLD AUTO: 0.02 10*3/MM3 (ref 0–0.05)
IMM GRANULOCYTES NFR BLD AUTO: 0.3 % (ref 0–0.5)
LYMPHOCYTES # BLD AUTO: 1.56 10*3/MM3 (ref 0.7–3.1)
LYMPHOCYTES NFR BLD AUTO: 21.1 % (ref 19.6–45.3)
MCH RBC QN AUTO: 29 PG (ref 26.6–33)
MCHC RBC AUTO-ENTMCNC: 33.2 G/DL (ref 31.5–35.7)
MCV RBC AUTO: 87.6 FL (ref 79–97)
MONOCYTES # BLD AUTO: 0.68 10*3/MM3 (ref 0.1–0.9)
MONOCYTES NFR BLD AUTO: 9.2 % (ref 5–12)
NEUTROPHILS NFR BLD AUTO: 4.95 10*3/MM3 (ref 1.7–7)
NEUTROPHILS NFR BLD AUTO: 67.1 % (ref 42.7–76)
NRBC BLD AUTO-RTO: 0 /100 WBC (ref 0–0.2)
PLATELET # BLD AUTO: 162 10*3/MM3 (ref 140–450)
PMV BLD AUTO: 10.7 FL (ref 6–12)
POTASSIUM SERPL-SCNC: 3.8 MMOL/L (ref 3.5–5.2)
PROT SERPL-MCNC: 7.9 G/DL (ref 6–8.5)
RBC # BLD AUTO: 4.51 10*6/MM3 (ref 3.77–5.28)
SODIUM SERPL-SCNC: 139 MMOL/L (ref 136–145)
URATE SERPL-MCNC: 6.7 MG/DL (ref 2.4–5.7)
WBC NRBC COR # BLD: 7.38 10*3/MM3 (ref 3.4–10.8)

## 2022-10-12 PROCEDURE — 85025 COMPLETE CBC W/AUTO DIFF WBC: CPT

## 2022-10-12 PROCEDURE — 36415 COLL VENOUS BLD VENIPUNCTURE: CPT

## 2022-10-12 PROCEDURE — 84550 ASSAY OF BLOOD/URIC ACID: CPT

## 2022-10-12 PROCEDURE — 80053 COMPREHEN METABOLIC PANEL: CPT

## 2022-10-14 DIAGNOSIS — F41.1 GENERALIZED ANXIETY DISORDER: Chronic | ICD-10-CM

## 2022-10-14 RX ORDER — CLONAZEPAM 0.5 MG/1
TABLET ORAL
Qty: 90 TABLET | Refills: 0 | Status: SHIPPED | OUTPATIENT
Start: 2022-10-14 | End: 2022-11-08 | Stop reason: SDUPTHER

## 2022-10-25 RX ORDER — ROPINIROLE 0.25 MG/1
TABLET, FILM COATED ORAL
Qty: 180 TABLET | Refills: 0 | Status: SHIPPED | OUTPATIENT
Start: 2022-10-25 | End: 2023-01-30

## 2022-11-02 ENCOUNTER — TELEPHONE (OUTPATIENT)
Dept: FAMILY MEDICINE CLINIC | Facility: CLINIC | Age: 67
End: 2022-11-02

## 2022-11-02 RX ORDER — BLOOD-GLUCOSE METER
EACH MISCELLANEOUS
Qty: 1 EACH | Refills: 0 | Status: SHIPPED | OUTPATIENT
Start: 2022-11-02

## 2022-11-02 NOTE — TELEPHONE ENCOUNTER
Caller:     Relationship:     Best call back number:    [facility] [phone]         What medication are you requesting: STRIPS AND METER   EITHER ACCUCHECK OR ONE TOUCH       What are your current symptoms:     How long have you been experiencing symptoms:     Have you had these symptoms before:    [x] Yes  [] No    Have you been treated for these symptoms before:   [x] Yes  [] No    If a prescription is needed, what is your preferred pharmacy and phone number: [address] [phone] [place] [phone]      Additional notes:    CURRENT STRIPS NOT COVERED   NEEDS NEW RX      WILL COVER   ONE TOUCH   ACCUCHECK

## 2022-11-03 ENCOUNTER — TRANSCRIBE ORDERS (OUTPATIENT)
Dept: ADMINISTRATIVE | Facility: HOSPITAL | Age: 67
End: 2022-11-03

## 2022-11-03 ENCOUNTER — LAB (OUTPATIENT)
Dept: LAB | Facility: HOSPITAL | Age: 67
End: 2022-11-03

## 2022-11-03 DIAGNOSIS — E87.1 HYPONATREMIA: ICD-10-CM

## 2022-11-03 DIAGNOSIS — E87.1 HYPONATREMIA: Primary | ICD-10-CM

## 2022-11-03 LAB
ANION GAP SERPL CALCULATED.3IONS-SCNC: 11 MMOL/L (ref 5–15)
BUN SERPL-MCNC: 23 MG/DL (ref 8–23)
BUN/CREAT SERPL: 24.2 (ref 7–25)
CALCIUM SPEC-SCNC: 9.8 MG/DL (ref 8.6–10.5)
CHLORIDE SERPL-SCNC: 98 MMOL/L (ref 98–107)
CO2 SERPL-SCNC: 28 MMOL/L (ref 22–29)
CREAT SERPL-MCNC: 0.95 MG/DL (ref 0.57–1)
EGFRCR SERPLBLD CKD-EPI 2021: 66.2 ML/MIN/1.73
GLUCOSE SERPL-MCNC: 149 MG/DL (ref 65–99)
MAGNESIUM SERPL-MCNC: 2.1 MG/DL (ref 1.6–2.4)
POTASSIUM SERPL-SCNC: 4.4 MMOL/L (ref 3.5–5.2)
SODIUM SERPL-SCNC: 137 MMOL/L (ref 136–145)

## 2022-11-03 PROCEDURE — 36415 COLL VENOUS BLD VENIPUNCTURE: CPT

## 2022-11-03 PROCEDURE — 80048 BASIC METABOLIC PNL TOTAL CA: CPT

## 2022-11-03 PROCEDURE — 83735 ASSAY OF MAGNESIUM: CPT

## 2022-11-08 ENCOUNTER — OFFICE VISIT (OUTPATIENT)
Dept: PSYCHIATRY | Facility: CLINIC | Age: 67
End: 2022-11-08

## 2022-11-08 DIAGNOSIS — F41.1 GENERALIZED ANXIETY DISORDER: Chronic | ICD-10-CM

## 2022-11-08 DIAGNOSIS — F31.32 BIPOLAR 1 DISORDER, DEPRESSED, MODERATE: Primary | Chronic | ICD-10-CM

## 2022-11-08 PROCEDURE — 99214 OFFICE O/P EST MOD 30 MIN: CPT | Performed by: PSYCHIATRY & NEUROLOGY

## 2022-11-08 RX ORDER — CLONAZEPAM 0.5 MG/1
0.5 TABLET ORAL 3 TIMES DAILY PRN
Qty: 90 TABLET | Refills: 3 | Status: SHIPPED | OUTPATIENT
Start: 2022-11-08 | End: 2023-03-10 | Stop reason: SDUPTHER

## 2022-11-08 RX ORDER — ESCITALOPRAM OXALATE 10 MG/1
15 TABLET ORAL DAILY
Qty: 135 TABLET | Refills: 1 | Status: SHIPPED | OUTPATIENT
Start: 2022-11-08 | End: 2023-03-10 | Stop reason: SDUPTHER

## 2022-11-08 RX ORDER — LURASIDONE HYDROCHLORIDE 40 MG/1
40 TABLET, FILM COATED ORAL
Qty: 30 TABLET | Refills: 3 | Status: SHIPPED | OUTPATIENT
Start: 2022-11-08 | End: 2023-03-10 | Stop reason: SDUPTHER

## 2022-11-08 NOTE — PROGRESS NOTES
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 Daily.     Dispense:  135 tablet     Refill:  1   • clonazePAM (KlonoPIN) 0.5 MG tablet     Sig: Take 1 tablet by mouth 3 (Three) Times a Day As Needed for Anxiety. for anxiety     Dispense:  90 tablet     Refill:  3     Please dispense on or after Nov 17, 2022       Return in about 4 months (around 3/8/2023).       This document has been electronically signed by Archana Singh MD  November 8, 2022 11:10 EST

## 2022-11-29 ENCOUNTER — OFFICE VISIT (OUTPATIENT)
Dept: PULMONOLOGY | Facility: HOSPITAL | Age: 67
End: 2022-11-29

## 2022-11-29 VITALS
SYSTOLIC BLOOD PRESSURE: 122 MMHG | WEIGHT: 286 LBS | DIASTOLIC BLOOD PRESSURE: 74 MMHG | OXYGEN SATURATION: 92 % | BODY MASS INDEX: 48.83 KG/M2 | RESPIRATION RATE: 14 BRPM | HEIGHT: 64 IN | HEART RATE: 66 BPM

## 2022-11-29 DIAGNOSIS — G47.33 OBSTRUCTIVE SLEEP APNEA SYNDROME: Chronic | ICD-10-CM

## 2022-11-29 DIAGNOSIS — J44.9 CHRONIC OBSTRUCTIVE PULMONARY DISEASE, UNSPECIFIED COPD TYPE: Primary | Chronic | ICD-10-CM

## 2022-11-29 DIAGNOSIS — I10 PRIMARY HYPERTENSION: Chronic | ICD-10-CM

## 2022-11-29 PROCEDURE — G0463 HOSPITAL OUTPT CLINIC VISIT: HCPCS

## 2022-11-29 RX ORDER — SPIRONOLACTONE 25 MG/1
1 TABLET ORAL DAILY
COMMUNITY
Start: 2022-10-19

## 2022-11-29 RX ORDER — BACLOFEN 20 MG/1
1 TABLET ORAL 3 TIMES DAILY
COMMUNITY
Start: 2022-08-29

## 2022-11-29 NOTE — PROGRESS NOTES
SLEEP/PULMONARY  CLINIC NOTE      PATIENT IDENTIFICATION:  Name: Dariana Rivera  Age: 67 y.o.  Sex: female  :  1955  MRN: [id number]    DATE OF CONSULTATION:  2022                     CHIEF COMPLAINT: LISANDRO    History of Present Illness:   Dariana Rivera is a 67 y.o. female Pt on CPAP feeling better more energy especially the night he use it more than 4 hours, no sleepiness no fatigue no tiredness, no mask irritation no dryness, compliance report reviewed with pt d discussed with the patient the download data and encouarged the patient to continue to use the CPAP. The residual AHI is acceptable,  Patient chronic struct of airway disease she has some difficulty breathing in August chest x-ray was done was negative, currently no shortness of breath no coughing no wheezing      Review of Systems:   Constitutional:  As above   Eyes: negative   ENT/oropharynx: negative   Cardiovascular: negative   Respiratory:  As above   Gastrointestinal: negative   Genitourinary: negative   Neurological: negative   Musculoskeletal: negative   Integument/breast: negative   Endocrine: negative   Allergic/Immunologic: negative     Past Medical History:  Past Medical History:   Diagnosis Date   • Allergic Not sure    Penicillin, cipro, clindamycin, methadone, ambien   • Angina at rest ([facility])     DR. Amador   • Anxiety disorder     [facility]    • Back pain    • Bipolar disorder (HCC)    • Cervical dystonia    • Chronic pain     with stenosis   • Cirrhosis (HCC)    • Colitis    • Colon polyps    • Cramping of feet    • Cramping of hands    • DDD (degenerative disc disease), cervical    • DDD (degenerative disc disease), lumbar    • DDD (degenerative disc disease), lumbar    • DDD (degenerative disc disease), thoracic    • Depression    • Diverticulosis     Not sure when diagnosed   • Dysphagia 2020   • Eosinophilic colitis    • Gastritis    • GERD (gastroesophageal reflux disease)    • Headache Not sure    Migraines  stopped after complete hyst in 2004   • Hepatic steatosis     non alcoholic steo-hepatitis    • Hx of lithotripsy 02/10/2021   • Hypertension    • IBS (irritable bowel syndrome)    • Infectious viral hepatitis     [facility]   • Insomnia    • Kidney stones    • Low back pain     Not sure when diagnosed   • Lumbar stenosis    • Neck pain    • Neuromuscular disorder (HCC) Not sure    Parkinsons and Cervical Dystonia   • Neuropathy    • Obesity     Obese since about 1966   • Osteoarthritis    • Parkinson's disease (HCC)    • Peripheral edema    • PTSD (post-traumatic stress disorder)    • Recurrent UTI    • Renal insufficiency     Not sure when diagnosed   • Seizure (HCC)     onset 7/2016.  Last seizure 10/2019   • Sleep apnea     Using Bipap machine at night. advised to bring dos   • Sleep apnea, obstructive    • Urinary incontinence        Past Surgical History:  Past Surgical History:   Procedure Laterality Date   • BACK SURGERY     • CARDIAC CATHETERIZATION  02/2019   • CARPAL TUNNEL RELEASE Bilateral     Wrist    • CHOLECYSTECTOMY  1997   • COLONOSCOPY     • CYSTOSCOPY BLADDER STONE LITHOTRIPSY     • ENDOSCOPY     • ENDOSCOPY N/A 4/15/2020    Procedure: ESOPHAGOGASTRODUODENOSCOPY with dilitation (60FR.);  Surgeon: Julieta Allison MD;  Location: [facility] ENDOSCOPY;  Service: Gastroenterology;  Laterality: N/A;  varices,hiatal hernia,gastritis   • ENDOSCOPY N/A 9/29/2020    Procedure: ESOPHAGOGASTRODUODENOSCOPY with esophageal dilitation (54 bougie);  Surgeon: Julieta Allison MD;  Location: [facility] ENDOSCOPY;  Service: Gastroenterology;  Laterality: N/A;   post op: hiatal hernia, esophageal stricture   • ENDOSCOPY N/A 9/30/2021    Procedure: ESOPHAGOGASTRODUODENOSCOPY WITH DILATATION (BOUGIE #60);  Surgeon: Julieta Allison MD;  Location: [facility] ENDOSCOPY;  Service: Gastroenterology;  Laterality: N/A;  ESOPHAGEAL VARICES AND HIATAL HERNIA   • ENDOSCOPY N/A 7/21/2022    Procedure: ESOPHAGOGASTRODUODENOSCOPY WITH BIOPSY  X1 AREA  AND DILATATION (BOUGIE  #54);  Surgeon: Zachary Mcgarry MD;  Location: [facility] ENDOSCOPY;  Service: Gastroenterology;  Laterality: N/A;  Post: candida esophagitis,gastritis   • EYE SURGERY  3-14 & 3-    Cataract surgery R 3-14, L 3-   • HYSTERECTOMY  2004    complete   • INTERSTIM PLACEMENT      bladder   • JOINT REPLACEMENT Bilateral     knees   • KNEE ARTHROSCOPY Bilateral     Arthroscopic surgery to bilateral knees    • OTHER SURGICAL HISTORY      Stress test    • OTHER SURGICAL HISTORY  10/2016    Lithotripsy total -   • OTHER SURGICAL HISTORY      Right arm cellulits- spider bites    • OTHER SURGICAL HISTORY  2018    Lithotripsy   • OTHER SURGICAL HISTORY  2018    ACDF C3-C4 & C6-C7   • REPLACEMENT TOTAL KNEE     • REPLACEMENT TOTAL KNEE Left 2016   • SHOULDER ROTATOR CUFF REPAIR Right 2020    Procedure: RT ROTATOR CUFF REPAIR OPEN;  Surgeon: Curly Vaughn MD;  Location: [facility] MAIN OR;  Service: Orthopedics   • SPINE SURGERY  2018    Fusion surgery C3-C4 and C6-C7        Family History:  Family History   Problem Relation Age of Onset   • Hypertension Mother    • Hyperlipidemia Mother    • Arthritis Mother    • Cancer Mother         Skin cancers   • Depression Mother    • Mental illness Mother         Depression and Alzheimers   • Diabetes Father             • Heart disease Father         Had angina. Was diabetic.  of heart attack at age 57.   • Early death Father          at 57 from heart attack   • Hyperlipidemia Sister    • Arthritis Sister    • Cancer Sister         Skin cancers   • Depression Sister    • Diabetes Sister         Diagnosed    • Heart disease Sister         2 mini strokes, atrial fribulation, pacemaker insertion   • Hypertension Sister    • Mental illness Sister         Depression   • Stroke Sister         2 mini strokes   • Diabetes Brother         Not sure when diagnosed   • Heart disease Brother          Had open heart surgery about 4 yrs ago.   • Hypertension Brother    • Hyperlipidemia Brother    • Arthritis Brother    • Cancer Brother         Skin cancers   • Breast cancer Maternal Grandmother    • Breast cancer Maternal Aunt         Social History:   Social History     Tobacco Use   • Smoking status: Former     Packs/day: 0.50     Years: 10.00     Pack years: 5.00     Types: Cigarettes     Start date: 1974     Quit date: 1990     Years since quittin.4     Passive exposure: Past   • Smokeless tobacco: Never   • Tobacco comments:     Stopped off and on.  Stopped once for more than 2 yrs   Substance Use Topics   • Alcohol use: No        Allergies:  Allergies   Allergen Reactions   • Ambien  [Zolpidem Tartrate] Confusion   • Ciprofloxacin Hcl Rash   • Penicillins Rash   • Zolpidem Unknown - High Severity     Other reaction(s): Confusion   • Methadone Hallucinations   • Clindamycin Rash       Home Meds:  (Not in a hospital admission)      Objective:    Vitals Ranges:   Heart Rate:  [66] 66  Resp:  [14] 14  BP: (122)/(74) 122/74  Body mass index is 49.09 kg/m².     Exam:  General Appearance:  WDWN    HEENT:   without obvious abnormality,  Conjunctiva/corneas clear,  Normal external ear canals, no drainage    Clear orsalmucosa,  Mallampati score 3    Neck:  Supple, symmetrical, trachea midline. No JVD.  Lungs:   Bilateral basal rhonchi bilaterally, respirations unlabored symmetrical wall movement.    Chest wall:  No tenderness or deformity.    Heart:  Regular rate and rhythm, S1 and S2 normal.  Extremities: Trace edema no clubbing or Cyanosis        Data Review:  All labs (24hrs): No results found for this or any previous visit (from the past 24 hour(s)).     Imaging:  US Abdomen Limited  Narrative: US ABDOMEN LIMITED-     Date of Exam: 2022 9:38 AM     Indication: CIRRHOSIS OF LIVER, DYSPHAGIA,UNSPECIFIED, NONALCOHOLIC  STEATOHEPATITIS; K74.60-Unspecified cirrhosis of liver;  R13.10-Dysphagia,  unspecified; K75.81-Nonalcoholic steatohepatitis  ([facility]).     Comparison: Right upper quadrant abdominal ultrasound 3 2022.     Technique: Transverse and sagittal ultrasound images of the right upper  quadrant were obtained. Doppler evaluation was also conducted.     FINDINGS:        The liver demonstrates a coarsened echotexture with surface nodular  contour compatible with the appearance of cirrhosis. No focal liver  lesions are identified on today's examination. No ascites is seen.  Imaged hepatic veins are patent. Main portal vein is patent with  hepatopedal flow.     The common bile duct caliber is normal at 4 mm. No intrahepatic biliary  ductal dilation is seen. The gallbladder is surgically absent.     The right kidney measures 10.8 cm in length without focal cortical  lesion, shadowing stone or hydronephrosis.     The pancreas appears unremarkable.           Impression: 1. Cirrhotic liver morphology. No focal liver lesions.  2. Cholecystectomy.     Electronically Signed By-Maria L Foster MD On:9/19/2022 11:22 AM  This report was finalized on [id number] by  MariaL Foster MD.       ASSESSMENT:  Diagnoses and all orders for this visit:    Chronic obstructive pulmonary disease, unspecified COPD type (HCC)    Obstructive sleep apnea syndrome    Primary hypertension    Other orders  -     spironolactone (ALDACTONE) 25 MG tablet; Take 1 tablet by mouth Daily.  -     baclofen (LIORESAL) 20 MG tablet; Take 1 tablet by mouth 3 (Three) Times a Day.    Morbid obesity    PLAN:  Bronchodilator inhaled corticosteroid    Education how to use inhalers    Encouraged to use incentive spirometer    Continue to exercise slowly as tolerated    Monitor for any change in the color of the sputum    Avoid any exposure to fumes, gas or any irritant        This patient with obstructive sleep apnea, compliance is improved. Encourage to use it more frequent, I re-emphasized on pt the long and short term benefit of treating LISANDRO. The  device is benefiting the patient.  The patient is also instructed to get the CPAP supplies from the [employer] company and see me in 1 year for follow-up.    Treating LISANDRO will improve BP control         Follow-up 6    Sasha Khan MD. D, ABSM.  11/29/2022  10:49 EST

## 2022-12-01 ENCOUNTER — HOSPITAL ENCOUNTER (OUTPATIENT)
Dept: GENERAL RADIOLOGY | Facility: HOSPITAL | Age: 67
Discharge: HOME | End: 2022-12-01
Admitting: UROLOGY

## 2022-12-01 ENCOUNTER — TRANSCRIBE ORDERS (OUTPATIENT)
Dept: ADMINISTRATIVE | Facility: HOSPITAL | Age: 67
End: 2022-12-01

## 2022-12-01 DIAGNOSIS — K80.50 STONES COMMON DUCT: Primary | ICD-10-CM

## 2022-12-01 DIAGNOSIS — K80.50 STONES COMMON DUCT: ICD-10-CM

## 2022-12-01 PROCEDURE — 74018 RADEX ABDOMEN 1 VIEW: CPT

## 2022-12-05 ENCOUNTER — OFFICE VISIT (OUTPATIENT)
Dept: CARDIOLOGY | Facility: CLINIC | Age: 67
End: 2022-12-05

## 2022-12-05 VITALS
HEART RATE: 70 BPM | DIASTOLIC BLOOD PRESSURE: 56 MMHG | HEIGHT: 64 IN | WEIGHT: 292 LBS | BODY MASS INDEX: 49.85 KG/M2 | SYSTOLIC BLOOD PRESSURE: 117 MMHG | OXYGEN SATURATION: 93 %

## 2022-12-05 DIAGNOSIS — I10 ESSENTIAL HYPERTENSION: ICD-10-CM

## 2022-12-05 DIAGNOSIS — G47.30 SLEEP APNEA, UNSPECIFIED TYPE: ICD-10-CM

## 2022-12-05 DIAGNOSIS — E78.2 MIXED HYPERLIPIDEMIA: Primary | ICD-10-CM

## 2022-12-05 DIAGNOSIS — E11.49 TYPE 2 DIABETES MELLITUS WITH OTHER DIABETIC NEUROLOGICAL COMPLICATION: ICD-10-CM

## 2022-12-05 DIAGNOSIS — J41.0 SIMPLE CHRONIC BRONCHITIS: ICD-10-CM

## 2022-12-05 PROCEDURE — 99214 OFFICE O/P EST MOD 30 MIN: CPT | Performed by: INTERNAL MEDICINE

## 2022-12-05 NOTE — PROGRESS NOTES
Encounter Date:12/05/2022  Last seen 12/2/2021      Patient ID: Dariana Rivera is a 67 y.o. female.    Chief Complaint:  History of chest discomfort  History of shortness of breath  Hypertension  Diabetes     History of Present Illness  Since I have last seen, the patient has been without any chest discomfort ,shortness of breath, palpitations, dizziness or syncope.  Denies having any headache ,abdominal pain ,nausea, vomiting , diarrhea constipation, loss of weight or loss of appetite.  Denies having any excessive bruising ,hematuria or blood in the stool.    Review of all systems negative except as indicated.    Reviewed ROS.    Assessment and Plan         [[[[[[[[[[[[[[[   impression  ====  -Chest pain-atypical     Normal left ventricular function and normal coronary arteries 02/15/2019     -history of pulmonary problems and is being evaluated by Pulmonary Medicine also.  It is my understanding her risk of surgery is increased from pulmonary standpoint.  Patient was intubated and on the respirator May of 2018. patient had seizure around the time of admission at that time.     -hypertension diabetes bipolar disorder anxiety disorder Parkinson's disease cirrhosis nonalcoholic steatohepatitis GERD.  History of seizure disorder     -History of left arm numbness had fullness and drooling from mouth.-Improved  Cervical spine surgery was not performed and patient is taking Botox injections for cervical dystonia.     -status post cholecystectomy hysterectomy lithotripsy new left knee replacement arthroscopic bilateral knee surgery cervical spine surgery     -exogenous obesity.     -former smoker     -allergic to penicillin Ambien methadone Cipro and clindamycin.  =============  Plan  ==========  Chest pain-atypical-improved  Patient is not having any angina pectoris or congestive heart failure.  History of normal coronary arteries February 2019     Hypertension-well-controlled  117/56     Diabetes-patient is on  Actos.     Medications were reviewed and updated today.    Followup in the office in 1 year  Further plan will depend on patient's progress.  [[[[[[[[[[[[[[[[[[[                       Diagnosis Plan   1. Mixed hyperlipidemia        2. Essential hypertension        3. Simple chronic bronchitis (HCC)        4. Type 2 diabetes mellitus with other diabetic neurological complication (HCC)        5. Sleep apnea, unspecified type        LAB RESULTS (LAST 7 DAYS)    CBC        BMP        CMP         BNP        TROPONIN        CoAg        Creatinine Clearance  CrCl cannot be calculated (Patient's most recent lab result is older than the maximum 30 days allowed.).    ABG        Radiology  No radiology results for the last day                The following portions of the patient's history were reviewed and updated as appropriate: allergies, current medications, past family history, past medical history, past social history, past surgical history and problem list.    Review of Systems   Constitutional: Negative for malaise/fatigue.   Cardiovascular: Negative for chest pain, dyspnea on exertion, leg swelling and palpitations.   Respiratory: Negative for cough and shortness of breath.    Gastrointestinal: Negative for abdominal pain, nausea and vomiting.   Neurological: Negative for dizziness, focal weakness, headaches, light-headedness and numbness.   All other systems reviewed and are negative.        Current Outpatient Medications:   •  albuterol sulfate  (90 Base) MCG/ACT inhaler, Inhale 2 puffs Every 6 (Six) Hours As Needed for Wheezing or Shortness of Air., Disp: 18 g, Rfl: 3  •  aspirin 81 MG EC tablet, aspirin 81 mg tablet,delayed release, Disp: , Rfl:   •  baclofen (LIORESAL) 20 MG tablet, Take 1 tablet by mouth 3 (Three) Times a Day., Disp: , Rfl:   •  Blood Glucose Monitoring Suppl (ONE TOUCH ULTRA 2) w/Device kit, Use to check blood sugars once a day; Dx E11.9, Disp: 1 each, Rfl: 0  •  budesonide-formoterol  (SYMBICORT) 160-4.5 MCG/ACT inhaler, Inhale 2 puffs 2 (Two) Times a Day., Disp: 3 each, Rfl: 3  •  carbidopa-levodopa ER (SINEMET CR)  MG per tablet, Take 2 tablets by mouth 3 (Three) Times a Day., Disp: , Rfl:   •  clonazePAM (KlonoPIN) 0.5 MG tablet, Take 1 tablet by mouth 3 (Three) Times a Day As Needed for Anxiety. for anxiety, Disp: 90 tablet, Rfl: 3  •  escitalopram (LEXAPRO) 10 MG tablet, Take 1.5 tablets by mouth Daily., Disp: 135 tablet, Rfl: 1  •  furosemide (LASIX) 40 MG tablet, TAKE 1 TABLET BY MOUTH EVERY DAY (Patient taking differently: Take 40 mg by mouth 2 (Two) Times a Day. Do not take preop), Disp: 90 tablet, Rfl: 0  •  gabapentin (NEURONTIN) 300 MG capsule, Take 300 mg by mouth 3 (Three) Times a Day., Disp: , Rfl:   •  glucose blood (OneTouch Ultra) test strip, Use t check blood sugars once a day; Dx E11.9, Disp: 100 each, Rfl: 1  •  HYDROcodone-acetaminophen (NORCO) 5-325 MG per tablet, Take 1 tablet by mouth Every 12 (Twelve) Hours As Needed., Disp: , Rfl:   •  ipratropium-albuterol (DUO-NEB) 0.5-2.5 mg/3 ml nebulizer, Take 3 mL by nebulization Every 4 (Four) Hours As Needed for Wheezing. Use preop if needed, Disp: , Rfl:   •  KLOR-CON 20 MEQ CR tablet, TAKE 1 TABLET BY MOUTH DAILY (Patient taking differently: Take 20 mEq by mouth 2 (Two) Times a Day. Take preop), Disp: 90 tablet, Rfl: 0  •  lactulose (CHRONULAC) 10 GM/15ML solution solution (encephalopathy), Use as directed, Disp: , Rfl:   •  lurasidone (Latuda) 40 MG tablet tablet, Take 1 tablet by mouth every night at bedtime., Disp: 30 tablet, Rfl: 3  •  Melatonin 10 MG capsule, Take 1 capsule by mouth every night at bedtime., Disp: , Rfl:   •  Multiple Vitamins-Minerals (MULTIVITAMIN WITH MINERALS) tablet tablet, Take 1 tablet by mouth Daily. Do not take dos, Disp: , Rfl:   •  nadolol (CORGARD) 20 MG tablet, Take 20 mg by mouth Every Morning. Take dos, Disp: , Rfl:   •  O2 (OXYGEN), Inhale 1 L/min 1 (One) Time. @@ night, Disp: , Rfl:    •  omeprazole (priLOSEC) 40 MG capsule, Take 40 mg by mouth Daily. Take preop, Disp: , Rfl:   •  pentoxifylline (TRENtal) 400 MG CR tablet, Take 400 mg by mouth 3 (Three) Times a Day With Meals., Disp: , Rfl:   •  pioglitazone (ACTOS) 30 MG tablet, TAKE 1 TABLET BY MOUTH EVERY DAY, Disp: 90 tablet, Rfl: 0  •  pramipexole (MIRAPEX) 0.5 MG tablet, Take 1 tablet by mouth 2 (Two) Times a Day., Disp: 180 tablet, Rfl: 1  •  rOPINIRole (REQUIP) 0.25 MG tablet, TAKE 1 TABLET BY MOUTH TWICE DAILY (( TAKE 1 HOUR BEFORE BEDTIME )), Disp: 180 tablet, Rfl: 0  •  Spiriva HandiHaler 18 MCG per inhalation capsule, PLACE 1 CAPSULE INTO INHALER AND INHALE DAILY, Disp: 90 capsule, Rfl: 0  •  spironolactone (ALDACTONE) 25 MG tablet, Take 1 tablet by mouth Daily., Disp: , Rfl:   •  vitamin C (ASCORBIC ACID) 500 MG tablet, Take 1,000 mg by mouth Daily. dont take preop, Disp: , Rfl:     Allergies   Allergen Reactions   • Ambien  [Zolpidem Tartrate] Confusion   • Ciprofloxacin Hcl Rash   • Penicillins Rash   • Zolpidem Unknown - High Severity     Other reaction(s): Confusion   • Methadone Hallucinations   • Clindamycin Rash       Family History   Problem Relation Age of Onset   • Hypertension Mother    • Hyperlipidemia Mother    • Arthritis Mother    • Cancer Mother         Skin cancers   • Depression Mother    • Mental illness Mother         Depression and Alzheimers   • Diabetes Father             • Heart disease Father         Had angina. Was diabetic.  of heart attack at age 57.   • Early death Father          at 57 from heart attack   • Hyperlipidemia Sister    • Arthritis Sister    • Cancer Sister         Skin cancers   • Depression Sister    • Diabetes Sister         Diagnosed    • Heart disease Sister         2 mini strokes, atrial fribulation, pacemaker insertion   • Hypertension Sister    • Mental illness Sister         Depression   • Stroke Sister         2 mini strokes   • Diabetes Brother         Not  sure when diagnosed   • Heart disease Brother         Had open heart surgery about 4 yrs ago.   • Hypertension Brother    • Hyperlipidemia Brother    • Arthritis Brother    • Cancer Brother         Skin cancers   • Breast cancer Maternal Grandmother    • Breast cancer Maternal Aunt        Past Surgical History:   Procedure Laterality Date   • BACK SURGERY     • CARDIAC CATHETERIZATION  02/2019   • CARPAL TUNNEL RELEASE Bilateral     Wrist    • CHOLECYSTECTOMY  1997   • COLONOSCOPY     • CYSTOSCOPY BLADDER STONE LITHOTRIPSY     • ENDOSCOPY     • ENDOSCOPY N/A 4/15/2020    Procedure: ESOPHAGOGASTRODUODENOSCOPY with dilitation (60FR.);  Surgeon: Julieta Allison MD;  Location: [facility] ENDOSCOPY;  Service: Gastroenterology;  Laterality: N/A;  varices,hiatal hernia,gastritis   • ENDOSCOPY N/A 9/29/2020    Procedure: ESOPHAGOGASTRODUODENOSCOPY with esophageal dilitation (54 bougie);  Surgeon: Julieta Allison MD;  Location: [facility] ENDOSCOPY;  Service: Gastroenterology;  Laterality: N/A;   post op: hiatal hernia, esophageal stricture   • ENDOSCOPY N/A 9/30/2021    Procedure: ESOPHAGOGASTRODUODENOSCOPY WITH DILATATION (BOUGIE #60);  Surgeon: Julieta Allison MD;  Location: [facility] ENDOSCOPY;  Service: Gastroenterology;  Laterality: N/A;  ESOPHAGEAL VARICES AND HIATAL HERNIA   • ENDOSCOPY N/A 7/21/2022    Procedure: ESOPHAGOGASTRODUODENOSCOPY WITH BIOPSY X1 AREA  AND DILATATION (BOUGIE  #54);  Surgeon: Zachary Mcgarry MD;  Location: [facility] ENDOSCOPY;  Service: Gastroenterology;  Laterality: N/A;  Post: candida esophagitis,gastritis   • EYE SURGERY  3-14 & 3-    Cataract surgery R 3-14, L 3-   • HYSTERECTOMY  09/2004    complete   • INTERSTIM PLACEMENT      bladder   • JOINT REPLACEMENT Bilateral     knees   • KNEE ARTHROSCOPY Bilateral     Arthroscopic surgery to bilateral knees    • OTHER SURGICAL HISTORY  2015    Stress test    • OTHER SURGICAL HISTORY  10/2016    Lithotripsy total 4-5   • OTHER  SURGICAL HISTORY      Right arm cellulits- spider bites    • OTHER SURGICAL HISTORY  02/08/2018    Lithotripsy   • OTHER SURGICAL HISTORY  04/20/2018    ACDF C3-C4 & C6-C7   • REPLACEMENT TOTAL KNEE  2000   • REPLACEMENT TOTAL KNEE Left 11/2016   • SHOULDER ROTATOR CUFF REPAIR Right 1/8/2020    Procedure: RT ROTATOR CUFF REPAIR OPEN;  Surgeon: Curly Vaughn MD;  Location: [facility] MAIN OR;  Service: Orthopedics   • SPINE SURGERY  4/2018    Fusion surgery C3-C4 and C6-C7       Past Medical History:   Diagnosis Date   • Allergic Not sure    Penicillin, cipro, clindamycin, methadone, ambien   • Angina at rest ([facility])     DR. Amador   • Anxiety disorder     [facility]    • Back pain    • Bipolar disorder (HCC)    • Cervical dystonia    • Chronic pain     with stenosis   • Cirrhosis (HCC)    • Colitis    • Colon polyps    • Cramping of feet    • Cramping of hands    • DDD (degenerative disc disease), cervical    • DDD (degenerative disc disease), lumbar    • DDD (degenerative disc disease), lumbar    • DDD (degenerative disc disease), thoracic    • Depression    • Diverticulosis     Not sure when diagnosed   • Dysphagia 09/2020   • Eosinophilic colitis    • Gastritis    • GERD (gastroesophageal reflux disease)    • Headache Not sure    Migraines stopped after complete hyst in 2004   • Hepatic steatosis     non alcoholic steo-hepatitis    • Hx of lithotripsy 02/10/2021   • Hypertension    • IBS (irritable bowel syndrome)    • Infectious viral hepatitis     [facility]   • Insomnia    • Kidney stones    • Low back pain     Not sure when diagnosed   • Lumbar stenosis    • Neck pain    • Neuromuscular disorder (HCC) Not sure    Parkinsons and Cervical Dystonia   • Neuropathy    • Obesity     Obese since about 1966   • Osteoarthritis    • Parkinson's disease (HCC)    • Peripheral edema    • PTSD (post-traumatic stress disorder)    • Recurrent UTI    • Renal insufficiency     Not sure when diagnosed   • Seizure (HCC)     onset 7/2016.   Last seizure 10/2019   • Sleep apnea     Using Bipap machine at night. advised to bring dos   • Sleep apnea, obstructive    • Urinary incontinence        Family History   Problem Relation Age of Onset   • Hypertension Mother    • Hyperlipidemia Mother    • Arthritis Mother    • Cancer Mother         Skin cancers   • Depression Mother    • Mental illness Mother         Depression and Alzheimers   • Diabetes Father             • Heart disease Father         Had angina. Was diabetic.  of heart attack at age 57.   • Early death Father          at 57 from heart attack   • Hyperlipidemia Sister    • Arthritis Sister    • Cancer Sister         Skin cancers   • Depression Sister    • Diabetes Sister         Diagnosed    • Heart disease Sister         2 mini strokes, atrial fribulation, pacemaker insertion   • Hypertension Sister    • Mental illness Sister         Depression   • Stroke Sister         2 mini strokes   • Diabetes Brother         Not sure when diagnosed   • Heart disease Brother         Had open heart surgery about 4 yrs ago.   • Hypertension Brother    • Hyperlipidemia Brother    • Arthritis Brother    • Cancer Brother         Skin cancers   • Breast cancer Maternal Grandmother    • Breast cancer Maternal Aunt        Social History     Socioeconomic History   • Marital status:    Tobacco Use   • Smoking status: Former     Packs/day: 0.50     Years: 10.00     Pack years: 5.00     Types: Cigarettes     Start date: 1974     Quit date:      Years since quittin.4     Passive exposure: Past   • Smokeless tobacco: Never   • Tobacco comments:     Stopped off and on.  Stopped once for more than 2 yrs   Vaping Use   • Vaping Use: Never used   Substance and Sexual Activity   • Alcohol use: No   • Drug use: No   • Sexual activity: Never         Procedures      Objective:       Physical Exam    /56 (BP Location: Right arm, Patient Position: Sitting, Cuff Size: Adult)    "Pulse 70   Ht 162.6 cm (64\")   Wt 132 kg (292 lb)   SpO2 93%   BMI 50.12 kg/m²   The patient is alert, oriented and in no distress.    Vital signs as noted above.  Exogenous obesity.    Head and neck revealed no carotid bruits or jugular venous distension.  No thyromegaly or lymphadenopathy is present.    Lungs clear.  No wheezing.  Breath sounds are normal bilaterally.    Heart normal first and second heart sounds.  No murmur..  No pericardial rub is present.  No gallop is present.    Abdomen soft and nontender.  No organomegaly is present.    Extremities revealed good peripheral pulses without any pedal edema.    Skin warm and dry.    Musculoskeletal system is grossly normal.    CNS grossly normal.    Reviewed and updated.        "

## 2022-12-07 RX ORDER — PRAMIPEXOLE DIHYDROCHLORIDE 0.5 MG/1
TABLET ORAL
Qty: 180 TABLET | Refills: 0 | Status: SHIPPED | OUTPATIENT
Start: 2022-12-07 | End: 2023-03-10 | Stop reason: SDUPTHER

## 2022-12-08 RX ORDER — PIOGLITAZONEHYDROCHLORIDE 30 MG/1
TABLET ORAL
Qty: 90 TABLET | Refills: 0 | Status: SHIPPED | OUTPATIENT
Start: 2022-12-08

## 2023-01-06 ENCOUNTER — APPOINTMENT (OUTPATIENT)
Dept: NEUROLOGY | Facility: HOSPITAL | Age: 68
End: 2023-01-06

## 2023-01-12 DIAGNOSIS — J44.9 CHRONIC OBSTRUCTIVE PULMONARY DISEASE, UNSPECIFIED COPD TYPE: Primary | ICD-10-CM

## 2023-01-12 RX ORDER — ALBUTEROL SULFATE 90 UG/1
2 AEROSOL, METERED RESPIRATORY (INHALATION) EVERY 6 HOURS PRN
Qty: 18 G | Refills: 3 | Status: SHIPPED | OUTPATIENT
Start: 2023-01-12

## 2023-01-24 ENCOUNTER — LAB (OUTPATIENT)
Dept: LAB | Facility: HOSPITAL | Age: 68
End: 2023-01-24
Payer: MEDICARE

## 2023-01-24 ENCOUNTER — TRANSCRIBE ORDERS (OUTPATIENT)
Dept: ADMINISTRATIVE | Facility: HOSPITAL | Age: 68
End: 2023-01-24
Payer: MEDICARE

## 2023-01-24 DIAGNOSIS — E87.1 HYPONATREMIA: ICD-10-CM

## 2023-01-24 DIAGNOSIS — E87.1 HYPONATREMIA: Primary | ICD-10-CM

## 2023-01-24 LAB
ALBUMIN SERPL-MCNC: 3.9 G/DL (ref 3.5–5.2)
ALBUMIN/GLOB SERPL: 0.9 G/DL
ALP SERPL-CCNC: 61 U/L (ref 39–117)
ALT SERPL W P-5'-P-CCNC: 15 U/L (ref 1–33)
ANION GAP SERPL CALCULATED.3IONS-SCNC: 10 MMOL/L (ref 5–15)
AST SERPL-CCNC: 40 U/L (ref 1–32)
BASOPHILS # BLD AUTO: 0.04 10*3/MM3 (ref 0–0.2)
BASOPHILS NFR BLD AUTO: 0.5 % (ref 0–1.5)
BILIRUB SERPL-MCNC: 0.7 MG/DL (ref 0–1.2)
BUN SERPL-MCNC: 22 MG/DL (ref 8–23)
BUN/CREAT SERPL: 20 (ref 7–25)
CALCIUM SPEC-SCNC: 9.9 MG/DL (ref 8.6–10.5)
CHLORIDE SERPL-SCNC: 97 MMOL/L (ref 98–107)
CO2 SERPL-SCNC: 31 MMOL/L (ref 22–29)
CREAT SERPL-MCNC: 1.1 MG/DL (ref 0.57–1)
DEPRECATED RDW RBC AUTO: 43.2 FL (ref 37–54)
EGFRCR SERPLBLD CKD-EPI 2021: 55.5 ML/MIN/1.73
EOSINOPHIL # BLD AUTO: 0.11 10*3/MM3 (ref 0–0.4)
EOSINOPHIL NFR BLD AUTO: 1.3 % (ref 0.3–6.2)
ERYTHROCYTE [DISTWIDTH] IN BLOOD BY AUTOMATED COUNT: 13.6 % (ref 12.3–15.4)
GLOBULIN UR ELPH-MCNC: 4.3 GM/DL
GLUCOSE SERPL-MCNC: 134 MG/DL (ref 65–99)
HCT VFR BLD AUTO: 41 % (ref 34–46.6)
HGB BLD-MCNC: 13 G/DL (ref 12–15.9)
IMM GRANULOCYTES # BLD AUTO: 0.02 10*3/MM3 (ref 0–0.05)
IMM GRANULOCYTES NFR BLD AUTO: 0.2 % (ref 0–0.5)
LYMPHOCYTES # BLD AUTO: 1.63 10*3/MM3 (ref 0.7–3.1)
LYMPHOCYTES NFR BLD AUTO: 19.1 % (ref 19.6–45.3)
MAGNESIUM SERPL-MCNC: 1.8 MG/DL (ref 1.6–2.4)
MCH RBC QN AUTO: 27.9 PG (ref 26.6–33)
MCHC RBC AUTO-ENTMCNC: 31.7 G/DL (ref 31.5–35.7)
MCV RBC AUTO: 88 FL (ref 79–97)
MONOCYTES # BLD AUTO: 0.77 10*3/MM3 (ref 0.1–0.9)
MONOCYTES NFR BLD AUTO: 9 % (ref 5–12)
NEUTROPHILS NFR BLD AUTO: 5.97 10*3/MM3 (ref 1.7–7)
NEUTROPHILS NFR BLD AUTO: 69.9 % (ref 42.7–76)
NRBC BLD AUTO-RTO: 0 /100 WBC (ref 0–0.2)
PLATELET # BLD AUTO: 195 10*3/MM3 (ref 140–450)
PMV BLD AUTO: 10.6 FL (ref 6–12)
POTASSIUM SERPL-SCNC: 4.2 MMOL/L (ref 3.5–5.2)
PROT SERPL-MCNC: 8.2 G/DL (ref 6–8.5)
RBC # BLD AUTO: 4.66 10*6/MM3 (ref 3.77–5.28)
SODIUM SERPL-SCNC: 138 MMOL/L (ref 136–145)
URATE SERPL-MCNC: 6.7 MG/DL (ref 2.4–5.7)
WBC NRBC COR # BLD: 8.54 10*3/MM3 (ref 3.4–10.8)

## 2023-01-24 PROCEDURE — 84550 ASSAY OF BLOOD/URIC ACID: CPT

## 2023-01-24 PROCEDURE — 36415 COLL VENOUS BLD VENIPUNCTURE: CPT

## 2023-01-24 PROCEDURE — 80053 COMPREHEN METABOLIC PANEL: CPT

## 2023-01-24 PROCEDURE — 85025 COMPLETE CBC W/AUTO DIFF WBC: CPT

## 2023-01-24 PROCEDURE — 83735 ASSAY OF MAGNESIUM: CPT

## 2023-01-30 RX ORDER — ROPINIROLE 0.25 MG/1
TABLET, FILM COATED ORAL
Qty: 180 TABLET | Refills: 0 | Status: SHIPPED | OUTPATIENT
Start: 2023-01-30

## 2023-01-30 NOTE — TELEPHONE ENCOUNTER
Rx Refill Note  Requested Prescriptions     Pending Prescriptions Disp Refills   • rOPINIRole (REQUIP) 0.25 MG tablet [Pharmacy Med Name: ROPINIROLE 0.25MG^] 180 tablet 0     Sig: TAKE 1 TABLET BY MOUTH TWICE DAILY **TAKE THE NIGHT DOSE 1 HOUR BEDTIME*      Last office visit with prescribing clinician: 11/8/2022   Last telemedicine visit with prescribing clinician: 3/10/2023   Next office visit with prescribing clinician: 3/10/2023   Office Visit with Archana Singh MD (11/08/2022)                        Would you like a call back once the refill request has been completed: [] Yes [] No    If the office needs to give you a call back, can they leave a voicemail: [] Yes [] No    Ira Naranjo MA  01/30/23, 09:27 EST

## 2023-02-13 ENCOUNTER — OFFICE (AMBULATORY)
Dept: URBAN - METROPOLITAN AREA CLINIC 64 | Facility: CLINIC | Age: 68
End: 2023-02-13

## 2023-02-13 ENCOUNTER — TRANSCRIBE ORDERS (OUTPATIENT)
Dept: ADMINISTRATIVE | Facility: HOSPITAL | Age: 68
End: 2023-02-13
Payer: MEDICARE

## 2023-02-13 VITALS
HEIGHT: 67 IN | HEART RATE: 61 BPM | SYSTOLIC BLOOD PRESSURE: 116 MMHG | DIASTOLIC BLOOD PRESSURE: 62 MMHG | WEIGHT: 293 LBS

## 2023-02-13 DIAGNOSIS — R13.10 DYSPHAGIA, UNSPECIFIED: ICD-10-CM

## 2023-02-13 DIAGNOSIS — K74.69 OTHER CIRRHOSIS OF LIVER: ICD-10-CM

## 2023-02-13 DIAGNOSIS — R13.10 DYSPHAGIA, UNSPECIFIED TYPE: ICD-10-CM

## 2023-02-13 DIAGNOSIS — K74.69 OTHER CIRRHOSIS OF LIVER: Primary | ICD-10-CM

## 2023-02-13 PROCEDURE — 99214 OFFICE O/P EST MOD 30 MIN: CPT | Performed by: NURSE PRACTITIONER

## 2023-02-20 ENCOUNTER — HOSPITAL ENCOUNTER (OUTPATIENT)
Dept: ULTRASOUND IMAGING | Facility: HOSPITAL | Age: 68
Discharge: HOME | End: 2023-02-20
Admitting: NURSE PRACTITIONER
Payer: MEDICARE

## 2023-02-20 DIAGNOSIS — R13.10 DYSPHAGIA, UNSPECIFIED TYPE: ICD-10-CM

## 2023-02-20 DIAGNOSIS — K74.69 OTHER CIRRHOSIS OF LIVER: ICD-10-CM

## 2023-02-20 PROCEDURE — 76705 ECHO EXAM OF ABDOMEN: CPT

## 2023-03-02 ENCOUNTER — LAB (OUTPATIENT)
Dept: FAMILY MEDICINE CLINIC | Facility: CLINIC | Age: 68
End: 2023-03-02
Payer: MEDICARE

## 2023-03-02 ENCOUNTER — OFFICE VISIT (OUTPATIENT)
Dept: FAMILY MEDICINE CLINIC | Facility: CLINIC | Age: 68
End: 2023-03-02
Payer: MEDICARE

## 2023-03-02 VITALS
DIASTOLIC BLOOD PRESSURE: 65 MMHG | TEMPERATURE: 97.7 F | BODY MASS INDEX: 48.82 KG/M2 | OXYGEN SATURATION: 93 % | HEIGHT: 65 IN | HEART RATE: 60 BPM | WEIGHT: 293 LBS | SYSTOLIC BLOOD PRESSURE: 115 MMHG

## 2023-03-02 DIAGNOSIS — E11.49 TYPE 2 DIABETES MELLITUS WITH OTHER DIABETIC NEUROLOGICAL COMPLICATION: ICD-10-CM

## 2023-03-02 DIAGNOSIS — E78.2 MIXED HYPERLIPIDEMIA: Chronic | ICD-10-CM

## 2023-03-02 DIAGNOSIS — E11.49 TYPE 2 DIABETES MELLITUS WITH OTHER DIABETIC NEUROLOGICAL COMPLICATION: Chronic | ICD-10-CM

## 2023-03-02 DIAGNOSIS — Z00.00 ENCOUNTER FOR ANNUAL WELLNESS EXAM IN MEDICARE PATIENT: Primary | ICD-10-CM

## 2023-03-02 DIAGNOSIS — I10 PRIMARY HYPERTENSION: Chronic | ICD-10-CM

## 2023-03-02 LAB
CHOLEST SERPL-MCNC: 186 MG/DL (ref 0–200)
HBA1C MFR BLD: 7.1 % (ref 4.8–5.6)
HDLC SERPL-MCNC: 55 MG/DL (ref 40–60)
LDLC SERPL CALC-MCNC: 112 MG/DL (ref 0–100)
LDLC/HDLC SERPL: 2 {RATIO}
TRIGL SERPL-MCNC: 105 MG/DL (ref 0–150)
VLDLC SERPL-MCNC: 19 MG/DL (ref 5–40)

## 2023-03-02 PROCEDURE — 3078F DIAST BP <80 MM HG: CPT | Performed by: FAMILY MEDICINE

## 2023-03-02 PROCEDURE — 3074F SYST BP LT 130 MM HG: CPT | Performed by: FAMILY MEDICINE

## 2023-03-02 PROCEDURE — 99213 OFFICE O/P EST LOW 20 MIN: CPT | Performed by: FAMILY MEDICINE

## 2023-03-02 PROCEDURE — 1159F MED LIST DOCD IN RCRD: CPT | Performed by: FAMILY MEDICINE

## 2023-03-02 PROCEDURE — 83036 HEMOGLOBIN GLYCOSYLATED A1C: CPT | Performed by: FAMILY MEDICINE

## 2023-03-02 PROCEDURE — 36415 COLL VENOUS BLD VENIPUNCTURE: CPT

## 2023-03-02 PROCEDURE — 3051F HG A1C>EQUAL 7.0%<8.0%: CPT | Performed by: FAMILY MEDICINE

## 2023-03-02 PROCEDURE — G0439 PPPS, SUBSEQ VISIT: HCPCS | Performed by: FAMILY MEDICINE

## 2023-03-02 PROCEDURE — 80061 LIPID PANEL: CPT | Performed by: FAMILY MEDICINE

## 2023-03-02 NOTE — PATIENT INSTRUCTIONS
Keep working to lose weight through healthy eating and exercise.   No fried foods and limit pasta, bread, and sweets.  Need to see eye doctor

## 2023-03-02 NOTE — PROGRESS NOTES
The ABCs of the Annual Wellness Visit  Subsequent Medicare Wellness Visit    Subjective    Dariana Rivera is a 67 y.o. female who presents for a Subsequent Medicare Wellness Visit.    The following portions of the patient's history were reviewed and   updated as appropriate: allergies, current medications, past family history, past medical history, past social history, past surgical history and problem list.    Compared to one year ago, the patient feels her physical   health is the same.    Compared to one year ago, the patient feels her mental   health is better.    Recent Hospitalizations:  She was not admitted to the hospital during the last year.       Current Medical Providers:  Patient Care Team:  Franny Strickland MD as PCP - General (Family Medicine)  Nabor Amador MD as Consulting Physician (Cardiology)  Tucker Best MD as Consulting Physician (Nephrology)  Mariano Rodriguez MD as Consulting Physician (Urology)    Outpatient Medications Prior to Visit   Medication Sig Dispense Refill   • albuterol sulfate  (90 Base) MCG/ACT inhaler Inhale 2 puffs Every 6 (Six) Hours As Needed for Wheezing or Shortness of Air. 18 g 3   • aspirin 81 MG EC tablet aspirin 81 mg tablet,delayed release     • baclofen (LIORESAL) 20 MG tablet Take 1 tablet by mouth 3 (Three) Times a Day.     • Blood Glucose Monitoring Suppl (ONE TOUCH ULTRA 2) w/Device kit Use to check blood sugars once a day; Dx E11.9 1 each 0   • budesonide-formoterol (SYMBICORT) 160-4.5 MCG/ACT inhaler Inhale 2 puffs 2 (Two) Times a Day. 3 each 3   • carbidopa-levodopa ER (SINEMET CR)  MG per tablet Take 2 tablets by mouth 3 (Three) Times a Day.     • furosemide (LASIX) 40 MG tablet TAKE 1 TABLET BY MOUTH EVERY DAY (Patient taking differently: Take 1 tablet by mouth 2 (Two) Times a Day. Do not take preop) 90 tablet 0   • gabapentin (NEURONTIN) 300 MG capsule Take 1 capsule by mouth 3 (Three) Times a Day.     • glucose blood  (OneTouch Ultra) test strip Use t check blood sugars once a day; Dx E11.9 100 each 1   • HYDROcodone-acetaminophen (NORCO) 5-325 MG per tablet Take 1 tablet by mouth Every 12 (Twelve) Hours As Needed.     • ipratropium-albuterol (DUO-NEB) 0.5-2.5 mg/3 ml nebulizer Take 3 mL by nebulization Every 4 (Four) Hours As Needed for Wheezing. Use preop if needed     • KLOR-CON 20 MEQ CR tablet TAKE 1 TABLET BY MOUTH DAILY (Patient taking differently: Take 1 tablet by mouth 2 (Two) Times a Day. Take preop) 90 tablet 0   • lactulose (CHRONULAC) 10 GM/15ML solution solution (encephalopathy) Use as directed     • Melatonin 10 MG capsule Take 1 capsule by mouth every night at bedtime.     • Multiple Vitamins-Minerals (MULTIVITAMIN WITH MINERALS) tablet tablet Take 1 tablet by mouth Daily. Do not take dos     • nadolol (CORGARD) 20 MG tablet Take 1 tablet by mouth Every Morning. Take dos     • O2 (OXYGEN) Inhale 1 L/min 1 (One) Time. @@ night     • omeprazole (priLOSEC) 40 MG capsule Take 1 capsule by mouth Daily. Take preop     • pentoxifylline (TRENtal) 400 MG CR tablet Take 1 tablet by mouth 3 (Three) Times a Day With Meals.     • pioglitazone (ACTOS) 30 MG tablet TAKE 1 TABLET BY MOUTH EVERY DAY 90 tablet 0   • rOPINIRole (REQUIP) 0.25 MG tablet TAKE 1 TABLET BY MOUTH TWICE DAILY **TAKE THE NIGHT DOSE 1 HOUR BEDTIME* 180 tablet 0   • spironolactone (ALDACTONE) 25 MG tablet Take 1 tablet by mouth Daily.     • vitamin C (ASCORBIC ACID) 500 MG tablet Take 2 tablets by mouth Daily. dont take preop     • clonazePAM (KlonoPIN) 0.5 MG tablet Take 1 tablet by mouth 3 (Three) Times a Day As Needed for Anxiety. for anxiety 90 tablet 3   • escitalopram (LEXAPRO) 10 MG tablet Take 1.5 tablets by mouth Daily. 135 tablet 1   • lurasidone (Latuda) 40 MG tablet tablet Take 1 tablet by mouth every night at bedtime. 30 tablet 3   • pramipexole (MIRAPEX) 0.5 MG tablet TAKE 1 TABLET BY MOUTH TWICE DAILY 180 tablet 0   • Spiriva HandiHaler 18 MCG  per inhalation capsule PLACE 1 CAPSULE INTO INHALER AND INHALE DAILY 90 capsule 0     No facility-administered medications prior to visit.       Opioid medication/s are on active medication list.  and I have evaluated her active treatment plan and pain score trends (see table).  Vitals:    03/02/23 1012   PainSc:   5   PainLoc: Back     I have reviewed the chart for potential of high risk medication and harmful drug interactions in the elderly.            Aspirin is on active medication list. Aspirin use is indicated based on review of current medical condition/s. Pros and cons of this therapy have been discussed today. Benefits of this medication outweigh potential harm.  Patient has been encouraged to continue taking this medication.  .      Patient Active Problem List   Diagnosis   • Bipolar 1 disorder, depressed, moderate (HCC)   • Body mass index (BMI) of 45.0-49.9 in adult (HCC)   • Chronic back pain   • Chronic kidney disease, unspecified   • Chronic obstructive pulmonary disease (HCC)   • Cirrhosis (HCC)   • Gastroesophageal reflux disease   • Hypertension   • Sleep apnea   • Parkinson's disease (HCC)   • Recurrent urinary tract infection   • Seizure (HCC)   • Spinal stenosis of cervical region   • Type 2 diabetes mellitus with other diabetic neurological complication (HCC)   • Generalized anxiety disorder   • Hyperlipidemia   • Varices of esophagus determined by endoscopy (HCC)   • Injury of cervical spine (HCC)   • Cor pulmonale (HCC)   • Encounter for annual wellness exam in Medicare patient     Advance Care Planning  Advance Directive is on file.  ACP discussion was held with the patient during this visit. Patient has an advance directive in EMR which is still valid.      Objective    Vitals:    03/02/23 1012   BP: 115/65   BP Location: Left arm   Patient Position: Sitting   Cuff Size: Large Adult   Pulse: 60   Temp: 97.7 °F (36.5 °C)   TempSrc: Temporal   SpO2: 93%   Weight: 135 kg (298 lb)   Height:  "165.1 cm (65\")   PainSc:   5   PainLoc: Back     Estimated body mass index is 49.59 kg/m² as calculated from the following:    Height as of this encounter: 165.1 cm (65\").    Weight as of this encounter: 135 kg (298 lb).    Class 3 Severe Obesity (BMI >=40). Obesity-related health conditions include the following: hypertension. Obesity is unchanged. BMI is is above average; BMI management plan is completed. We discussed portion control and increasing exercise.      Does the patient have evidence of cognitive impairment? No  MMSE done   Lab Results   Component Value Date    TRIG 105 2023    HDL 55 2023     (H) 2023    VLDL 19 2023    HGBA1C 7.10 (H) 2023        HEALTH RISK ASSESSMENT    Smoking Status:  Social History     Tobacco Use   Smoking Status Former   • Packs/day: 0.50   • Years: 10.00   • Pack years: 5.00   • Types: Cigarettes   • Start date: 1974   • Quit date: 1990   • Years since quittin.7   • Passive exposure: Past   Smokeless Tobacco Never   Tobacco Comments    Stopped off and on.  Stopped once for more than 2 yrs     Alcohol Consumption:  Social History     Substance and Sexual Activity   Alcohol Use No     Fall Risk Screen:    LATOYAADI Fall Risk Assessment was completed, and patient is at LOW risk for falls.Assessment completed on:3/2/2023    Depression Screening:  PHQ-2/PHQ-9 Depression Screening 3/10/2023   Little Interest or Pleasure in Doing Things 1-->several days   Feeling Down, Depressed or Hopeless 1-->several days   Trouble Falling or Staying Asleep, or Sleeping Too Much 1-->several days   Feeling Tired or Having Little Energy 2-->more than half the days   Poor Appetite or Overeating 0-->not at all   Feeling Bad about Yourself - or that You are a Failure or Have Let Yourself or Your Family Down 1-->several days   Trouble Concentrating on Things, Such as Reading the Newspaper or Watching Television 2-->more than half the days   Moving or " Speaking So Slowly that Other People Could Have Noticed? Or the Opposite - Being So Fidgety 2-->more than half the days   Thoughts that You Would be Better Off Dead or of Hurting Yourself in Some Way 0-->not at all   PHQ-9: Brief Depression Severity Measure Score 10   If You Checked Off Any Problems, How Difficult Have These Problems Made It For You to Do Your Work, Take Care of Things at Home, or Get Along with Other People? very difficult       Health Habits and Functional and Cognitive Screening:  Functional & Cognitive Status 3/2/2023   Do you have difficulty preparing food and eating? Yes   Do you have difficulty bathing yourself, getting dressed or grooming yourself? Yes   Do you have difficulty using the toilet? No   Do you have difficulty moving around from place to place? Yes   Do you have trouble with steps or getting out of a bed or a chair? Yes   Current Diet Unhealthy Diet   Dental Exam Not up to date   Eye Exam Not up to date   Exercise (times per week) 0 times per week   Current Exercises Include No Regular Exercise   Current Exercise Activities Include -   Do you need help using the phone?  No   Are you deaf or do you have serious difficulty hearing?  No   Do you need help with transportation? Yes   Do you need help shopping? Yes   Do you need help preparing meals?  Yes   Do you need help with housework?  Yes   Do you need help with laundry? Yes   Do you need help taking your medications? Yes   Do you need help managing money? No   Do you ever drive or ride in a car without wearing a seat belt? No   Have you felt unusual stress, anger or loneliness in the last month? No   Who do you live with? Spouse   If you need help, do you have trouble finding someone available to you? Yes   Have you been bothered in the last four weeks by sexual problems? No   Do you have difficulty concentrating, remembering or making decisions? No       Age-appropriate Screening Schedule:  Refer to the list below for future  screening recommendations based on patient's age, sex and/or medical conditions. Orders for these recommended tests are listed in the plan section. The patient has been provided with a written plan.    Health Maintenance   Topic Date Due   • TDAP/TD VACCINES (1 - Tdap) Never done   • Hepatitis B (1 of 3 - Risk 3-dose series) Never done   • DIABETIC EYE EXAM  05/20/2019   • Pneumococcal Vaccine 65+ (2 - PCV) 01/08/2020   • COVID-19 Vaccine (4 - Booster for Pfizer series) 01/18/2022   • INFLUENZA VACCINE  08/01/2022   • DXA SCAN  09/16/2022   • URINE MICROALBUMIN  04/13/2023   • DIABETIC FOOT EXAM  08/23/2023   • HEMOGLOBIN A1C  09/02/2023   • MAMMOGRAM  12/21/2023   • ANNUAL WELLNESS VISIT  03/02/2024   • LIPID PANEL  03/02/2024   • COLORECTAL CANCER SCREENING  01/03/2026   • HEPATITIS C SCREENING  Completed   • ZOSTER VACCINE  Completed   • PAP SMEAR  Discontinued              She was counseled on the need for weight loss through improved diet and exercise  CMS Preventative Services Quick Reference  Risk Factors Identified During Encounter  Inactivity/Sedentary: Patient was advised to exercise at least 150 minutes a week per CDC recommendations.  The above risks/problems have been discussed with the patient.  Pertinent information has been shared with the patient in the After Visit Summary.  An After Visit Summary and PPPS were made available to the patient.    Follow Up:   Next Medicare Wellness visit to be scheduled in 1 year.       Additional E&M Note during same encounter follows:  Patient has multiple medical problems which are significant and separately identifiable that require additional work above and beyond the Medicare Wellness Visit.      Chief Complaint  Establish Care and Medicare Wellness-subsequent    Subjective        HPI  Dariana Rivera is also being seen today for follow up on bp chol and dm  Dr. Hartley sees for sz and parkinsons  Some increased SOA  Copd followed by pulm  This cough/SOA is  "worse  BS running less than 200  This is high for her but she says she has been noncompliant with diet         Objective   Vital Signs:  /65 (BP Location: Left arm, Patient Position: Sitting, Cuff Size: Large Adult)   Pulse 60   Temp 97.7 °F (36.5 °C) (Temporal)   Ht 165.1 cm (65\")   Wt 135 kg (298 lb)   SpO2 93%   BMI 49.59 kg/m²     Physical Exam  Vitals and nursing note reviewed.   Constitutional:       Appearance: Normal appearance. She is morbidly obese.   Cardiovascular:      Rate and Rhythm: Normal rate and regular rhythm.      Heart sounds: Normal heart sounds.   Pulmonary:      Effort: Pulmonary effort is normal.      Breath sounds: Normal breath sounds.   Musculoskeletal:      Cervical back: Neck supple.      Right lower leg: No edema.      Left lower leg: No edema.   Neurological:      Mental Status: She is alert and oriented to person, place, and time.   Psychiatric:         Mood and Affect: Mood normal.         Behavior: Behavior is cooperative.                    Lab Results   Component Value Date    GLUCOSE 134 (H) 01/24/2023    BUN 22 01/24/2023    CREATININE 1.10 (H) 01/24/2023    EGFR 55.5 (L) 01/24/2023    BCR 20.0 01/24/2023    K 4.2 01/24/2023    CO2 31.0 (H) 01/24/2023    CALCIUM 9.9 01/24/2023    ALBUMIN 3.9 01/24/2023    BILITOT 0.7 01/24/2023    AST 40 (H) 01/24/2023    ALT 15 01/24/2023          Assessment and Plan   Diagnoses and all orders for this visit:    1. Encounter for annual wellness exam in Medicare patient (Primary)    2. Primary hypertension  -     Lipid Panel; Future    3. Mixed hyperlipidemia  -     Lipid Panel; Future    4. Type 2 diabetes mellitus with other diabetic neurological complication (HCC)  -     Lipid Panel; Future  -     Hemoglobin A1c; Future    Other orders  -     SCANNED COGNITIVE ASSESSMENT    Again she was counseled on the need for weight loss.  She will continue her current meds for hypertension, hyperlipidemia, diabetes.  Most recent CMP " reviewed.  Lipid and A1c ordered.  I will see her back in 6 months.         Follow Up   Return in about 6 months (around 9/2/2023) for Recheck.  Patient was given instructions and counseling regarding her condition or for health maintenance advice. Please see specific information pulled into the AVS if appropriate.

## 2023-03-10 ENCOUNTER — OFFICE VISIT (OUTPATIENT)
Dept: PSYCHIATRY | Facility: CLINIC | Age: 68
End: 2023-03-10
Payer: MEDICARE

## 2023-03-10 DIAGNOSIS — F31.32 BIPOLAR 1 DISORDER, DEPRESSED, MODERATE: Primary | Chronic | ICD-10-CM

## 2023-03-10 DIAGNOSIS — Z79.899 ENCOUNTER FOR LONG-TERM (CURRENT) USE OF OTHER MEDICATIONS: ICD-10-CM

## 2023-03-10 DIAGNOSIS — F41.1 GENERALIZED ANXIETY DISORDER: Chronic | ICD-10-CM

## 2023-03-10 PROCEDURE — 99214 OFFICE O/P EST MOD 30 MIN: CPT | Performed by: PSYCHIATRY & NEUROLOGY

## 2023-03-10 PROCEDURE — 1160F RVW MEDS BY RX/DR IN RCRD: CPT | Performed by: PSYCHIATRY & NEUROLOGY

## 2023-03-10 PROCEDURE — 1159F MED LIST DOCD IN RCRD: CPT | Performed by: PSYCHIATRY & NEUROLOGY

## 2023-03-10 RX ORDER — PRAMIPEXOLE DIHYDROCHLORIDE 0.5 MG/1
0.5 TABLET ORAL 2 TIMES DAILY
Qty: 180 TABLET | Refills: 1 | Status: SHIPPED | OUTPATIENT
Start: 2023-03-10

## 2023-03-10 RX ORDER — LURASIDONE HYDROCHLORIDE 40 MG/1
40 TABLET, FILM COATED ORAL
Qty: 30 TABLET | Refills: 3 | Status: SHIPPED | OUTPATIENT
Start: 2023-03-10

## 2023-03-10 RX ORDER — CLONAZEPAM 0.5 MG/1
0.5 TABLET ORAL 3 TIMES DAILY PRN
Qty: 90 TABLET | Refills: 3 | Status: SHIPPED | OUTPATIENT
Start: 2023-03-10

## 2023-03-10 RX ORDER — ESCITALOPRAM OXALATE 10 MG/1
15 TABLET ORAL DAILY
Qty: 135 TABLET | Refills: 1 | Status: SHIPPED | OUTPATIENT
Start: 2023-03-10
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
## 2023-03-11 RX ORDER — TIOTROPIUM BROMIDE 18 UG/1
CAPSULE ORAL; RESPIRATORY (INHALATION)
Qty: 90 CAPSULE | Refills: 0 | Status: SHIPPED | OUTPATIENT
Start: 2023-03-11

## 2023-03-15 PROBLEM — Z00.00 ENCOUNTER FOR ANNUAL WELLNESS EXAM IN MEDICARE PATIENT: Status: ACTIVE | Noted: 2023-03-15

## 2023-04-11 RX ORDER — PIOGLITAZONEHYDROCHLORIDE 30 MG/1
TABLET ORAL
Qty: 90 TABLET | Refills: 0 | Status: SHIPPED | OUTPATIENT
Start: 2023-04-11

## 2023-05-01 ENCOUNTER — ANESTHESIA EVENT (OUTPATIENT)
Dept: GASTROENTEROLOGY | Facility: HOSPITAL | Age: 68
End: 2023-05-01
Payer: MEDICARE

## 2023-05-02 ENCOUNTER — ANESTHESIA (OUTPATIENT)
Dept: GASTROENTEROLOGY | Facility: HOSPITAL | Age: 68
End: 2023-05-02
Payer: MEDICARE

## 2023-05-02 ENCOUNTER — HOSPITAL ENCOUNTER (OUTPATIENT)
Facility: HOSPITAL | Age: 68
Setting detail: HOSPITAL OUTPATIENT SURGERY
Discharge: HOME | End: 2023-05-02
Attending: INTERNAL MEDICINE | Admitting: INTERNAL MEDICINE
Payer: MEDICARE

## 2023-05-02 ENCOUNTER — ON CAMPUS - OUTPATIENT (AMBULATORY)
Dept: URBAN - METROPOLITAN AREA HOSPITAL 85 | Facility: HOSPITAL | Age: 68
End: 2023-05-02

## 2023-05-02 VITALS
BODY MASS INDEX: 50.02 KG/M2 | HEIGHT: 64 IN | OXYGEN SATURATION: 92 % | HEART RATE: 67 BPM | RESPIRATION RATE: 13 BRPM | DIASTOLIC BLOOD PRESSURE: 60 MMHG | TEMPERATURE: 98.2 F | SYSTOLIC BLOOD PRESSURE: 126 MMHG | WEIGHT: 293 LBS

## 2023-05-02 DIAGNOSIS — I85.00 ESOPHAGEAL VARICES WITHOUT BLEEDING: ICD-10-CM

## 2023-05-02 DIAGNOSIS — K29.50 UNSPECIFIED CHRONIC GASTRITIS WITHOUT BLEEDING: ICD-10-CM

## 2023-05-02 DIAGNOSIS — R13.10 DYSPHAGIA, UNSPECIFIED: ICD-10-CM

## 2023-05-02 DIAGNOSIS — K74.60 CIRRHOSIS: ICD-10-CM

## 2023-05-02 DIAGNOSIS — R13.10 DYSPHAGIA: ICD-10-CM

## 2023-05-02 LAB — GLUCOSE BLDC GLUCOMTR-MCNC: 131 MG/DL (ref 70–105)

## 2023-05-02 PROCEDURE — 82948 REAGENT STRIP/BLOOD GLUCOSE: CPT

## 2023-05-02 PROCEDURE — 43450 DILATE ESOPHAGUS 1/MULT PASS: CPT | Performed by: INTERNAL MEDICINE

## 2023-05-02 PROCEDURE — 43239 EGD BIOPSY SINGLE/MULTIPLE: CPT | Performed by: INTERNAL MEDICINE

## 2023-05-02 PROCEDURE — 25010000002 PROPOFOL 200 MG/20ML EMULSION: Performed by: NURSE ANESTHETIST, CERTIFIED REGISTERED

## 2023-05-02 PROCEDURE — 88342 IMHCHEM/IMCYTCHM 1ST ANTB: CPT | Performed by: INTERNAL MEDICINE

## 2023-05-02 PROCEDURE — 88305 TISSUE EXAM BY PATHOLOGIST: CPT | Performed by: INTERNAL MEDICINE

## 2023-05-02 RX ORDER — SODIUM CHLORIDE 9 MG/ML
50 INJECTION, SOLUTION INTRAVENOUS CONTINUOUS
Status: DISCONTINUED | OUTPATIENT
Start: 2023-05-02 | End: 2023-05-02 | Stop reason: HOSPADM

## 2023-05-02 RX ORDER — LIDOCAINE HYDROCHLORIDE 20 MG/ML
INJECTION, SOLUTION INFILTRATION; PERINEURAL AS NEEDED
Status: DISCONTINUED | OUTPATIENT
Start: 2023-05-02 | End: 2023-05-02 | Stop reason: SURG

## 2023-05-02 RX ORDER — PROPOFOL 10 MG/ML
INJECTION, EMULSION INTRAVENOUS AS NEEDED
Status: DISCONTINUED | OUTPATIENT
Start: 2023-05-02 | End: 2023-05-02 | Stop reason: SURG

## 2023-05-02 RX ORDER — ONDANSETRON 2 MG/ML
4 INJECTION INTRAMUSCULAR; INTRAVENOUS ONCE AS NEEDED
Status: DISCONTINUED | OUTPATIENT
Start: 2023-05-02 | End: 2023-05-02 | Stop reason: HOSPADM

## 2023-05-02 RX ADMIN — PROPOFOL 30 MG: 10 INJECTION, EMULSION INTRAVENOUS at 07:21

## 2023-05-02 RX ADMIN — PROPOFOL 20 MG: 10 INJECTION, EMULSION INTRAVENOUS at 07:15

## 2023-05-02 RX ADMIN — LIDOCAINE HYDROCHLORIDE 100 MG: 20 INJECTION, SOLUTION INFILTRATION; PERINEURAL at 07:14

## 2023-05-02 RX ADMIN — PROPOFOL 80 MG: 10 INJECTION, EMULSION INTRAVENOUS at 07:14

## 2023-05-02 RX ADMIN — SODIUM CHLORIDE 50 ML/HR: 9 INJECTION, SOLUTION INTRAVENOUS at 06:43

## 2023-05-02 NOTE — H&P
GI CONSULT  NOTE:    Referring Provider:    Franny Strickland MD  [unfilled]    Chief complaint: Dysphagia    History of present illness:      Dariana Rivera is a 67 y.o. female who presents today for Procedure(s):  ESOPHAGOGASTRODUODENOSCOPY for the indications listed below.     The updated Patient Profile was reviewed prior to the procedure, in conjunction with the Physical Exam, including medical conditions, surgical procedures, medications, allergies, family history and social history.     Pre-operatively, I reviewed the indication(s) for the procedure, the risks of the procedure [including but not limited to: unexpected bleeding possibly requiring hospitalization and/or unplanned repeat procedures, perforation possibly requiring surgical treatment, missed lesions and complications of sedation/MAC (also explained by anesthesia staff)].     I have evaluated the patient for risks associated with the planned anesthesia and the procedure to be performed and find the patient an acceptable candidate for IV sedation.    Multiple opportunities were provided for any questions or concerns, and all questions were answered satisfactorily before any anesthesia was administered. We will proceed with the planned procedure.    Past Medical History:  Past Medical History:   Diagnosis Date   • Allergic Not sure    Penicillin, cipro, clindamycin, methadone, ambien   • Angina at rest     DR. Amador   • Anxiety disorder     [facility]    • Back pain    • Bipolar disorder    • Cataract     Left & right removed in 3/2016   • Cervical dystonia    • Cholelithiasis     Gallbladder removed in 1995 or 97   • Chronic pain     with stenosis   • Cirrhosis    • Colitis    • Colon polyps    • COPD (chronic obstructive pulmonary disease)     Not sure when diagnosed   • Cramping of feet    • Cramping of hands    • DDD (degenerative disc disease), cervical    • DDD (degenerative disc disease), lumbar    • DDD (degenerative disc  disease), lumbar    • DDD (degenerative disc disease), thoracic    • Depression    • Diabetes mellitus     Not sure when diagnosed.   • Diverticulosis     Not sure when diagnosed   • Dysphagia 09/2020   • Eosinophilic colitis    • Gastritis    • GERD (gastroesophageal reflux disease)    • Headache Not sure    Migraines stopped after complete hyst in 2004   • Hepatic steatosis     non alcoholic steo-hepatitis    • Hx of lithotripsy 02/10/2021   • Hypertension    • IBS (irritable bowel syndrome)    • Infectious viral hepatitis     [facility]   • Insomnia    • Kidney stones    • Low back pain     Not sure when diagnosed   • Lumbar stenosis    • Neck pain    • Neuromuscular disorder Not sure    Parkinsons and Cervical Dystonia   • Neuropathy    • Obesity     Obese since about 1966   • Osteoarthritis    • Parkinson's disease    • Peripheral edema    • Pneumonia May 2018    About a month after neck fusion surgery   • PTSD (post-traumatic stress disorder)    • Recurrent UTI    • Renal insufficiency     Not sure when diagnosed   • Seizure     onset 7/2016.  Last seizure 10/2019   • Sleep apnea     Using Bipap machine at night. advised to bring dos   • Sleep apnea, obstructive    • Urinary incontinence        Past Surgical History:  Past Surgical History:   Procedure Laterality Date   • BACK SURGERY     • CARDIAC CATHETERIZATION  02/2019   • CARPAL TUNNEL RELEASE Bilateral     Wrist    • CHOLECYSTECTOMY  1997   • COLONOSCOPY     • CYSTOSCOPY BLADDER STONE LITHOTRIPSY     • ENDOSCOPY     • ENDOSCOPY N/A 4/15/2020    Procedure: ESOPHAGOGASTRODUODENOSCOPY with dilitation (60FR.);  Surgeon: Julieta Allison MD;  Location: [facility] ENDOSCOPY;  Service: Gastroenterology;  Laterality: N/A;  varices,hiatal hernia,gastritis   • ENDOSCOPY N/A 9/29/2020    Procedure: ESOPHAGOGASTRODUODENOSCOPY with esophageal dilitation (54 bougie);  Surgeon: Julieta Allison MD;  Location: [facility] ENDOSCOPY;  Service: Gastroenterology;  Laterality: N/A;    post op: hiatal hernia, esophageal stricture   • ENDOSCOPY N/A 2021    Procedure: ESOPHAGOGASTRODUODENOSCOPY WITH DILATATION (BOUGIE #60);  Surgeon: Julieta Allison MD;  Location: [facility] ENDOSCOPY;  Service: Gastroenterology;  Laterality: N/A;  ESOPHAGEAL VARICES AND HIATAL HERNIA   • ENDOSCOPY N/A 2022    Procedure: ESOPHAGOGASTRODUODENOSCOPY WITH BIOPSY X1 AREA  AND DILATATION (BOUGIE  #54);  Surgeon: Zachary Mcgarry MD;  Location: [facility] ENDOSCOPY;  Service: Gastroenterology;  Laterality: N/A;  Post: candida esophagitis,gastritis   • EYE SURGERY  3-14 & 3-    Cataract surgery R 3-14, L 3-   • HYSTERECTOMY  2004    complete   • INTERSTIM PLACEMENT      bladder   • JOINT REPLACEMENT Bilateral     knees   • KNEE ARTHROSCOPY Bilateral     Arthroscopic surgery to bilateral knees    • OTHER SURGICAL HISTORY      Stress test    • OTHER SURGICAL HISTORY  10/2016    Lithotripsy total 4-5   • OTHER SURGICAL HISTORY      Right arm cellulits- spider bites    • OTHER SURGICAL HISTORY  2018    Lithotripsy   • OTHER SURGICAL HISTORY  2018    ACDF C3-C4 & C6-C7   • REPLACEMENT TOTAL KNEE     • REPLACEMENT TOTAL KNEE Left 2016   • SHOULDER ROTATOR CUFF REPAIR Right 2020    Procedure: RT ROTATOR CUFF REPAIR OPEN;  Surgeon: Curly Vaughn MD;  Location: [facility] MAIN OR;  Service: Orthopedics   • SPINE SURGERY  2018    Fusion surgery C3-C4 and C6-C7       Social History:  Social History     Tobacco Use   • Smoking status: Former     Packs/day: 0.50     Years: 10.00     Pack years: 5.00     Types: Cigarettes     Start date: 1974     Quit date: 1990     Years since quittin.8     Passive exposure: Past   • Smokeless tobacco: Never   • Tobacco comments:     Stopped off and on.  Stopped once for more than 2 yrs   Vaping Use   • Vaping Use: Never used   Substance Use Topics   • Alcohol use: No   • Drug use: No       Family History:  Family History    Problem Relation Age of Onset   • Hypertension Mother    • Hyperlipidemia Mother    • Arthritis Mother    • Cancer Mother         Skin cancers   • Depression Mother    • Mental illness Mother         Depression and Alzheimers   • Diabetes Father             • Heart disease Father         Had angina. Was diabetic.  of heart attack at age 57.   • Early death Father          at 57 from heart attack   • Hyperlipidemia Sister    • Arthritis Sister    • Cancer Sister         Skin cancers   • Depression Sister    • Diabetes Sister         Diagnosed    • Heart disease Sister         2 mini strokes, atrial fribulation, pacemaker insertion   • Hypertension Sister    • Mental illness Sister         Depression   • Stroke Sister         2 mini strokes   • Diabetes Brother         Not sure when diagnosed   • Heart disease Brother         Had open heart surgery about 4 yrs ago.   • Hypertension Brother    • Hyperlipidemia Brother    • Arthritis Brother    • Cancer Brother         Skin cancers   • Breast cancer Maternal Grandmother    • Breast cancer Maternal Aunt        Medications:  Medications Prior to Admission   Medication Sig Dispense Refill Last Dose   • albuterol sulfate  (90 Base) MCG/ACT inhaler Inhale 2 puffs Every 6 (Six) Hours As Needed for Wheezing or Shortness of Air. 18 g 3 2023   • aspirin 81 MG EC tablet aspirin 81 mg tablet,delayed release   2023   • baclofen (LIORESAL) 20 MG tablet Take 1 tablet by mouth 3 (Three) Times a Day.   2023   • budesonide-formoterol (SYMBICORT) 160-4.5 MCG/ACT inhaler Inhale 2 puffs 2 (Two) Times a Day. 3 each 3 2023   • carbidopa-levodopa ER (SINEMET CR)  MG per tablet Take 2 tablets by mouth 3 (Three) Times a Day.   2023   • clonazePAM (KlonoPIN) 0.5 MG tablet Take 1 tablet by mouth 3 (Three) Times a Day As Needed for Anxiety. \ 90 tablet 3 2023   • escitalopram (LEXAPRO) 10 MG tablet Take 1.5 tablets by mouth Daily. 135  tablet 1 5/2/2023   • furosemide (LASIX) 40 MG tablet TAKE 1 TABLET BY MOUTH EVERY DAY (Patient taking differently: Take 1 tablet by mouth 2 (Two) Times a Day. Do not take preop) 90 tablet 0 5/1/2023   • gabapentin (NEURONTIN) 300 MG capsule Take 1 capsule by mouth 3 (Three) Times a Day.   5/2/2023   • HYDROcodone-acetaminophen (NORCO) 5-325 MG per tablet Take 1 tablet by mouth Every 12 (Twelve) Hours As Needed.   5/2/2023   • ipratropium-albuterol (DUO-NEB) 0.5-2.5 mg/3 ml nebulizer Take 3 mL by nebulization Every 4 (Four) Hours As Needed for Wheezing. Use preop if needed   Past Week   • KLOR-CON 20 MEQ CR tablet TAKE 1 TABLET BY MOUTH DAILY (Patient taking differently: Take 1 tablet by mouth 2 (Two) Times a Day. Take preop) 90 tablet 0 5/2/2023   • lactulose (CHRONULAC) 10 GM/15ML solution solution (encephalopathy) Use as directed   5/1/2023   • lurasidone (Latuda) 40 MG tablet tablet Take 1 tablet by mouth every night at bedtime. 30 tablet 3 5/1/2023   • Melatonin 10 MG capsule Take 1 capsule by mouth every night at bedtime.   5/1/2023   • Multiple Vitamins-Minerals (MULTIVITAMIN WITH MINERALS) tablet tablet Take 1 tablet by mouth Daily. Do not take dos   5/2/2023   • nadolol (CORGARD) 20 MG tablet Take 1 tablet by mouth Every Morning. Take dos   5/2/2023   • O2 (OXYGEN) Inhale 1 L/min 1 (One) Time. @@ night   5/1/2023   • omeprazole (priLOSEC) 40 MG capsule Take 1 capsule by mouth Daily. Take preop   5/2/2023   • pentoxifylline (TRENtal) 400 MG CR tablet Take 1 tablet by mouth 3 (Three) Times a Day With Meals.   Past Week   • pioglitazone (ACTOS) 30 MG tablet TAKE 1 TABLET BY MOUTH EVERY DAY 90 tablet 0 5/2/2023   • pramipexole (MIRAPEX) 0.5 MG tablet Take 1 tablet by mouth 2 (Two) Times a Day. 180 tablet 1 5/2/2023   • rOPINIRole (REQUIP) 0.25 MG tablet TAKE 1 TABLET BY MOUTH TWICE DAILY **TAKE THE NIGHT DOSE 1 HOUR BEDTIME* 180 tablet 0 5/1/2023   • Spiriva HandiHaler 18 MCG per inhalation capsule INHALE  "CONTENTS OF 1 CAPSULE VIA DEVICE ONCE DAILY 90 capsule 0 5/2/2023   • spironolactone (ALDACTONE) 25 MG tablet Take 1 tablet by mouth Daily.   5/2/2023   • vitamin C (ASCORBIC ACID) 500 MG tablet Take 2 tablets by mouth Daily. dont take preop   5/2/2023   • Blood Glucose Monitoring Suppl (ONE TOUCH ULTRA 2) w/Device kit Use to check blood sugars once a day; Dx E11.9 1 each 0    • glucose blood (OneTouch Ultra) test strip Use t check blood sugars once a day; Dx E11.9 100 each 1        Scheduled Meds:   Continuous Infusions:sodium chloride, 50 mL/hr, Last Rate: 50 mL/hr (05/02/23 0643)      PRN Meds:.    ALLERGIES:  Ambien  [zolpidem tartrate], Ciprofloxacin hcl, Penicillins, Zolpidem, Methadone, and Clindamycin    ROS:  The following systems were reviewed and negative;   Constitution:  No fevers, chills, no unintentional weight loss  Skin: no rash, no jaundice  Eyes:  No blurry vision, no eye pain  HENT:  No change in hearing or smell  Resp:  No dyspnea or cough  CV:  No chest pain or palpitations  :  No dysuria, hematuria  Musculoskeletal:  No leg cramps or arthralgias  Neuro:  No tremor, no numbness  Psych:  No depression or confusion    Objective     Vital Signs:   Vitals:    04/17/23 1104 05/02/23 0636   BP:  131/59   BP Location:  Left arm   Patient Position:  Sitting   Pulse:  62   Resp:  18   Temp:  98.2 °F (36.8 °C)   TempSrc:  Oral   SpO2:  91%   Weight: 134 kg (295 lb) 135 kg (297 lb 3.2 oz)   Height: 165.1 cm (65\") 162.6 cm (64\")       Physical Exam:       General Appearance:    Awake and alert, in no acute distress   Head:    Normocephalic, without obvious abnormality, atraumatic   Throat:   No oral lesions, no thrush, oral mucosa moist   Lungs:     respirations regular, even and unlabored   Skin:   No rash, no jaundice       Results Review:  Lab Results (last 24 hours)     Procedure Component Value Units Date/Time    POC Glucose Once [[id number]]  (Abnormal) Collected: 05/02/23 0633    Specimen: Blood " Updated: 05/02/23 0635     Glucose 131 mg/dL      Comment: Serial Number: [id number]:  [id number]             Imaging Results (Last 24 Hours)     ** No results found for the last 24 hours. **           I reviewed the patient's labs and imaging.    ASSESSMENT AND PLAN:      Principal Problem:    Dysphagia       Procedure(s):  ESOPHAGOGASTRODUODENOSCOPY      I discussed the patients findings and my recommendations with the patient.    Electronically signed by Zachary Mcgarry MD, 05/02/23, 7:11 AM EDT.

## 2023-05-02 NOTE — DISCHARGE INSTRUCTIONS
A responsible adult should stay with you and you should rest quietly for the rest of the day.    Do not drink alcohol, drive, operate any heavy machinery or power tools or make any legal/important decisions for the next 24 hours.     Progress your diet as tolerated.  If you begin to experience severe pain, increased shortness of breath, racing heartbeat or a fever above 101 F, seek immediate medical attention.     Follow up with MD as instructed. Call office for results in 3 to 5 days if needed. [phone]     Impression:  67-year-old female with Braun cirrhosis and dysphagia with EGD showing small nonbleeding esophageal varices and gastritis.  Empiric 48 [place] bougie dilation was performed of the esophagus.  The hyperal pharyngeal exam showed dry mucous membranes with crusted secretions     Recommendations:  Monitor for response to dilation  Follow-up on pathology  If H. pylori is positive treat with twice daily PPI and Pylera x14 days  Continue nadolol 20 mg daily to decrease portal pressures  Recommend exercise and weight loss  Follow-up with GI nurse practitioner in 3 months

## 2023-05-02 NOTE — OP NOTE
ESOPHAGOGASTRODUODENOSCOPY Procedure Report    Patient Name:  Dariana Rivera  YOB: 1955    Date of Surgery:  5/2/2023     Preoperative diagnosis:  Dysphagia  Esophageal varices without bleeding    Postoperative diagnosis:  2 cords of F1 esophageal varices without bleeding  Diffuse nonerosive gastritis with nodularity  Normal duodenum        Procedure(s):  ESOPHAGOGASTRODUODENOSCOPY WITH BIOPSY X 1  AREA  AND DILATATON (48 nonguided BOUGIE)    Staff:  Surgeon(s):  Zachary Mcgarry MD      Anesthesia: Monitored Anesthesia Care    Implants:    Nothing was implanted during the procedure    Specimen:        See Below    Estimated blood loss: Minimal     Complications:  None    Description of Procedure:  Informed consent was obtained for the procedure, including sedation.  Risks of perforation, hemorrhage, adverse drug reaction and aspiration were discussed.  The patient was brought into the endoscopy suite. Continuous cardiopulmonary monitoring was performed. The patient was placed in the left lateral decubitus position.  The bite block was inserted into the patient's mouth. After adequate sedation was attained, the Olympus gastroscope was inserted into the patient's mouth and advanced to the second portion of the duodenum without difficulty.  Circumferential examination was performed. A retroflex exam was performed in the patient's stomach.  On completion of the exam, the bowel was decompressed, the scope was removed from the patient, the patient tolerated the procedure well, there were no immediate post-operative complications.     Examination of the esophagus: 2 cords of small F1 esophageal varices without bleeding were noted in the distal esophagus.  These were too small to band.  A 48 [place] nonguided bougie was advanced blindly through the esophagus with no resistance noted.  Second look into the esophagus was normal.  Examination of the stomach: Diffuse beefy erythema with nodularity  was seen in the stomach antrum consistent with nonerosive gastritis.  Cold forceps biopsies were taken from the antrum body for histology and to rule out H. pylori.  No gastric varices were noted on retroflexed view.  Examination of the duodenum: Normal mucosa to second portion of duodenum.    Impression:  67-year-old female with Braun cirrhosis and dysphagia with EGD showing small nonbleeding esophageal varices and gastritis.  Empiric 48 [place] bougie dilation was performed of the esophagus.  The hyperal pharyngeal exam showed dry mucous membranes with crusted secretions    Recommendations:  Monitor for response to dilation  Follow-up on pathology  If H. pylori is positive treat with twice daily PPI and Pylera x14 days  Continue nadolol 20 mg daily to decrease portal pressures  Recommend exercise and weight loss  Follow-up with GI nurse practitioner in 3 months    We appreciate the referral    Electronically signed by Zachary Mcgarry MD, 05/02/23, 7:23 AM EDT.

## 2023-05-02 NOTE — ANESTHESIA PREPROCEDURE EVALUATION
Anesthesia Evaluation     Patient summary reviewed and Nursing notes reviewed   NPO Solid Status: > 8 hours  NPO Liquid Status: > 8 hours           Airway   Mallampati: II  TM distance: >3 FB  Neck ROM: full  No difficulty expected  Dental    (+) edentulous    Pulmonary    (+) COPD, sleep apnea,   Cardiovascular     (+) hypertension, CAD, hyperlipidemia,       Neuro/Psych  (+) seizures, numbness, psychiatric history PTSD,    GI/Hepatic/Renal/Endo    (+) morbid obesity, GERD,  hepatitis, liver disease cirrhosis, renal disease, diabetes mellitus,     Musculoskeletal     (+) back pain, neck pain,   Abdominal    Substance History      OB/GYN          Other   arthritis,                    Anesthesia Plan    ASA 4     general     intravenous induction     Anesthetic plan, risks, benefits, and alternatives have been provided, discussed and informed consent has been obtained with: patient.    Plan discussed with CRNA.        CODE STATUS:

## 2023-05-02 NOTE — ANESTHESIA POSTPROCEDURE EVALUATION
Patient: Dariana Rivera    Procedure Summary     Date: 05/02/23 Room / Location: [facility] ENDOSCOPY 4 / [facility] ENDOSCOPY    Anesthesia Start: 0709 Anesthesia Stop: 0725    Procedure: ESOPHAGOGASTRODUODENOSCOPY WITH BIOPSY X 1  AREA  AND DILATATON (48 NON GUIDED BOUGIE) Diagnosis:       Dysphagia      Cirrhosis      (Dysphagia [R13.10])      (Cirrhosis (HCC) [K74.60])    Surgeons: Zachary Mcgarry MD Provider: Apollo Skelton MD    Anesthesia Type: general ASA Status: 4          Anesthesia Type: general    Vitals  Vitals Value Taken Time   /60 05/02/23 0735   Temp     Pulse 67 05/02/23 0735   Resp 13 05/02/23 0735   SpO2 92 % 05/02/23 0735           Post Anesthesia Care and Evaluation    Patient location during evaluation: PACU  Patient participation: complete - patient participated  Level of consciousness: awake  Pain scale: See nurse's notes for pain score.  Pain management: adequate    Airway patency: patent  Anesthetic complications: No anesthetic complications  PONV Status: none  Cardiovascular status: acceptable  Respiratory status: acceptable and spontaneous ventilation  Hydration status: acceptable    Comments: Patient seen and examined postoperatively; vital signs stable; SpO2 greater than or equal to 90%; cardiopulmonary status stable; nausea/vomiting adequately controlled; pain adequately controlled; no apparent anesthesia complications; patient discharged from anesthesia care when discharge criteria were met

## 2023-05-03 LAB
LAB AP CASE REPORT: NORMAL
PATH REPORT.FINAL DX SPEC: NORMAL
PATH REPORT.GROSS SPEC: NORMAL

## 2023-05-09 RX ORDER — ROPINIROLE 0.25 MG/1
TABLET, FILM COATED ORAL
Qty: 180 TABLET | Refills: 0 | Status: SHIPPED | OUTPATIENT
Start: 2023-05-09

## 2023-05-09 RX ORDER — CALCIUM CITRATE/VITAMIN D3 200MG-6.25
TABLET ORAL
Qty: 100 EACH | Refills: 0 | Status: SHIPPED | OUTPATIENT
Start: 2023-05-09

## 2023-05-09 NOTE — TELEPHONE ENCOUNTER
Rx Refill Note  Requested Prescriptions     Pending Prescriptions Disp Refills   • rOPINIRole (REQUIP) 0.25 MG tablet [Pharmacy Med Name: ROPINIROLE 0.25MG^] 180 tablet 0     Sig: TAKE 1 TABLET BY MOUTH TWICE DAILY **TAKE THE NIGHT DOSE 1 HOUR BEFORE BEDTIME**      Last office visit with prescribing clinician: 3/10/2023   Last telemedicine visit with prescribing clinician: 3/10/2023   Next office visit with prescribing clinician: 9/7/2023   Office Visit with Archana Singh MD (03/10/2023)       [facility]ED - LABS (03/10/2023)                   Would you like a call back once the refill request has been completed: [] Yes [] No    If the office needs to give you a call back, can they leave a voicemail: [] Yes [] No    Ira Naranjo MA  05/09/23, 12:11 EDT

## 2023-05-12 RX ORDER — BUDESONIDE AND FORMOTEROL FUMARATE DIHYDRATE 160; 4.5 UG/1; UG/1
AEROSOL RESPIRATORY (INHALATION)
Qty: 30.6 G | Refills: 2 | OUTPATIENT
Start: 2023-05-12

## 2023-05-15 RX ORDER — BUDESONIDE AND FORMOTEROL FUMARATE DIHYDRATE 160; 4.5 UG/1; UG/1
AEROSOL RESPIRATORY (INHALATION)
Qty: 30.6 G | Refills: 2 | Status: SHIPPED | OUTPATIENT
Start: 2023-05-15

## 2023-06-06 RX ORDER — TIOTROPIUM BROMIDE 18 UG/1
CAPSULE ORAL; RESPIRATORY (INHALATION)
Qty: 90 CAPSULE | Refills: 0 | Status: SHIPPED | OUTPATIENT
Start: 2023-06-06

## 2023-08-08 RX ORDER — ROPINIROLE 0.25 MG/1
TABLET, FILM COATED ORAL
Qty: 180 TABLET | Refills: 0 | Status: SHIPPED | OUTPATIENT
Start: 2023-08-08

## 2023-08-09 ENCOUNTER — OFFICE VISIT (OUTPATIENT)
Dept: PULMONOLOGY | Facility: HOSPITAL | Age: 68
End: 2023-08-09
Payer: MEDICARE

## 2023-08-09 VITALS
BODY MASS INDEX: 50.02 KG/M2 | HEART RATE: 65 BPM | HEIGHT: 64 IN | SYSTOLIC BLOOD PRESSURE: 124 MMHG | WEIGHT: 293 LBS | OXYGEN SATURATION: 90 % | DIASTOLIC BLOOD PRESSURE: 76 MMHG

## 2023-08-09 DIAGNOSIS — I10 PRIMARY HYPERTENSION: Chronic | ICD-10-CM

## 2023-08-09 DIAGNOSIS — J43.1 PANLOBULAR EMPHYSEMA: Primary | Chronic | ICD-10-CM

## 2023-08-09 DIAGNOSIS — G47.33 OBSTRUCTIVE SLEEP APNEA SYNDROME: Chronic | ICD-10-CM

## 2023-08-09 NOTE — PROGRESS NOTES
SLEEP/PULMONARY  CLINIC NOTE      PATIENT IDENTIFICATION:  Name: Dariana Rivera  Age: 67 y.o.  Sex: female  :  1955  MRN: [id number]    DATE OF CONSULTATION:  2023                     CHIEF COMPLAINT: Follow-up on COPD    History of Present Illness:   Dariana Rivera is a 67 y.o. female patient for obstructive airway disease, still having significant dyspnea exertion and coughing on and off no sputum notices no fever no chills and feeling wheezing sometimes, no postnasal drip, and no exposure to any fume or smoking, patient is not compliant with using her nebulizer machine, patient reporting that she does not feel as helping as it was  Pt on CPAP feeling better more energy especially the night he use it more than 4 hours, no sleepiness no fatigue no tiredness, no mask irritation no dryness, compliance report reviewed with pt d discussed with the patient the download data and encouarged the patient to continue to use the CPAP. The residual AHI is acceptable.         Review of Systems:   Constitutional: As above   Eyes: negative   ENT/oropharynx: negative   Cardiovascular: negative   Respiratory: As above   Gastrointestinal: negative   Genitourinary: negative   Neurological: negative   Musculoskeletal: negative   Integument/breast: negative   Endocrine: negative   Allergic/Immunologic: negative     Past Medical History:  Past Medical History:   Diagnosis Date    Allergic Not sure    Penicillin, cipro, clindamycin, methadone, ambien    Angina at rest     DR. Amador    Anxiety disorder     [facility]     Back pain     Bipolar disorder     Cataract     Left & right removed in 3/2016    Cervical dystonia     Cholelithiasis     Gallbladder removed in  or     Chronic pain     with stenosis    Cirrhosis     Colitis     Colon polyps     COPD (chronic obstructive pulmonary disease)     Not sure when diagnosed    Cramping of feet     Cramping of hands     DDD (degenerative disc disease), cervical      DDD (degenerative disc disease), lumbar     DDD (degenerative disc disease), lumbar     DDD (degenerative disc disease), thoracic     Depression     Diabetes mellitus     Not sure when diagnosed.    Diverticulosis     Not sure when diagnosed    Dysphagia 09/2020    Eosinophilic colitis     Gastritis     GERD (gastroesophageal reflux disease)     Headache Not sure    Migraines stopped after complete hyst in 2004    Hepatic steatosis     non alcoholic steo-hepatitis     Hx of lithotripsy 02/10/2021    Hypertension     IBS (irritable bowel syndrome)     Infectious viral hepatitis     [facility]    Insomnia     Kidney stones     Low back pain     Not sure when diagnosed    Lumbar stenosis     Neck pain     Neuromuscular disorder Not sure    Parkinsons and Cervical Dystonia    Neuropathy     Obesity     Obese since about 1966    Osteoarthritis     Parkinson's disease     Peripheral edema     Pneumonia May 2018    About a month after neck fusion surgery    PTSD (post-traumatic stress disorder)     Recurrent UTI     Renal insufficiency     Not sure when diagnosed    Seizure     onset 7/2016.  Last seizure 10/2019    Sleep apnea     Using Bipap machine at night. advised to bring dos    Sleep apnea, obstructive     Urinary incontinence        Past Surgical History:  Past Surgical History:   Procedure Laterality Date    BACK SURGERY      CARDIAC CATHETERIZATION  02/2019    CARPAL TUNNEL RELEASE Bilateral     Wrist     CHOLECYSTECTOMY  1997    COLONOSCOPY      CYSTOSCOPY BLADDER STONE LITHOTRIPSY      ENDOSCOPY      ENDOSCOPY N/A 4/15/2020    Procedure: ESOPHAGOGASTRODUODENOSCOPY with dilitation (60FR.);  Surgeon: Julieta Allison MD;  Location: [facility] ENDOSCOPY;  Service: Gastroenterology;  Laterality: N/A;  varices,hiatal hernia,gastritis    ENDOSCOPY N/A 9/29/2020    Procedure: ESOPHAGOGASTRODUODENOSCOPY with esophageal dilitation (54 bougie);  Surgeon: Julieta Allison MD;  Location: [facility] ENDOSCOPY;  Service:  Gastroenterology;  Laterality: N/A;   post op: hiatal hernia, esophageal stricture    ENDOSCOPY N/A 9/30/2021    Procedure: ESOPHAGOGASTRODUODENOSCOPY WITH DILATATION (BOUGIE #60);  Surgeon: Julieta Allison MD;  Location: [facility] ENDOSCOPY;  Service: Gastroenterology;  Laterality: N/A;  ESOPHAGEAL VARICES AND HIATAL HERNIA    ENDOSCOPY N/A 7/21/2022    Procedure: ESOPHAGOGASTRODUODENOSCOPY WITH BIOPSY X1 AREA  AND DILATATION (BOUGIE  #54);  Surgeon: Zachary Mcgarry MD;  Location: [facility] ENDOSCOPY;  Service: Gastroenterology;  Laterality: N/A;  Post: candida esophagitis,gastritis    ENDOSCOPY N/A 5/2/2023    Procedure: ESOPHAGOGASTRODUODENOSCOPY WITH BIOPSY X 1  AREA  AND DILATATON (48 NON GUIDED BOUGIE);  Surgeon: Zachary Mcgarry MD;  Location: [facility] ENDOSCOPY;  Service: Gastroenterology;  Laterality: N/A;  post op: GASTRITIS, SMALL ESOPHAGEAL VARICES    EYE SURGERY  3-14 & 3-    Cataract surgery R 3-14, L 3-    HYSTERECTOMY  09/2004    complete    INTERSTIM PLACEMENT      bladder    JOINT REPLACEMENT Bilateral     knees    KNEE ARTHROSCOPY Bilateral     Arthroscopic surgery to bilateral knees     OTHER SURGICAL HISTORY  2015    Stress test     OTHER SURGICAL HISTORY  10/2016    Lithotripsy total 4-5    OTHER SURGICAL HISTORY      Right arm cellulits- spider bites     OTHER SURGICAL HISTORY  02/08/2018    Lithotripsy    OTHER SURGICAL HISTORY  04/20/2018    ACDF C3-C4 & C6-C7    REPLACEMENT TOTAL KNEE  2000    REPLACEMENT TOTAL KNEE Left 11/2016    SHOULDER ROTATOR CUFF REPAIR Right 1/8/2020    Procedure: RT ROTATOR CUFF REPAIR OPEN;  Surgeon: Curly Vaughn MD;  Location: [facility] MAIN OR;  Service: Orthopedics    SPINE SURGERY  4/2018    Fusion surgery C3-C4 and C6-C7        Family History:  Family History   Problem Relation Age of Onset    Hypertension Mother     Hyperlipidemia Mother     Arthritis Mother     Cancer Mother         Skin cancers    Depression Mother     Mental  illness Mother         Depression and Alzheimers    Diabetes Father              Heart disease Father         Had angina. Was diabetic.  of heart attack at age 57.    Early death Father          at 57 from heart attack    Hyperlipidemia Sister     Arthritis Sister     Cancer Sister         Skin cancers    Depression Sister     Diabetes Sister         Diagnosed 2019    Heart disease Sister         2 mini strokes, atrial fribulation, pacemaker insertion    Hypertension Sister     Mental illness Sister         Depression    Stroke Sister         2 mini strokes    Diabetes Brother         Not sure when diagnosed    Heart disease Brother         Had open heart surgery about 4 yrs ago.    Hypertension Brother     Hyperlipidemia Brother     Arthritis Brother     Cancer Brother         Skin cancers    Breast cancer Maternal Grandmother     Breast cancer Maternal Aunt         Social History:   Social History     Tobacco Use    Smoking status: Former     Packs/day: 0.50     Years: 10.00     Pack years: 5.00     Types: Cigarettes     Start date: 1974     Quit date: 1990     Years since quittin.1     Passive exposure: Past    Smokeless tobacco: Never    Tobacco comments:     Stopped off and on.  Stopped once for more than 2 yrs   Substance Use Topics    Alcohol use: No        Allergies:  Allergies   Allergen Reactions    Ambien  [Zolpidem Tartrate] Confusion    Ciprofloxacin Hcl Rash    Penicillins Rash    Zolpidem Unknown - High Severity     Other reaction(s): Confusion    Methadone Hallucinations    Clindamycin Rash       Home Meds:  (Not in a hospital admission)      Objective:    Vitals Ranges:   Heart Rate:  [65] 65  BP: (124)/(76) 124/76  Body mass index is 50.46 kg/mý.     Exam:  General Appearance:  WDWN    HEENT:   without obvious abnormality,  Conjunctiva/corneas clear,  Normal external ear canals, no drainage    Clear orsalmucosa,  Mallampati score 3    Neck:  Supple, symmetrical,  trachea midline. No JVD.  Lungs:   Bilateral basal rhonchi bilaterally, respirations unlabored symmetrical wall movement.    Chest wall:  No tenderness or deformity.    Heart:  Regular rate and rhythm, S1 and S2 normal.  Extremities: Trace edema no clubbing or Cyanosis        Data Review:  All labs (24hrs): No results found for this or any previous visit (from the past 24 hour(s)).     Imaging:  US Abdomen Limited  Narrative: US ABDOMEN LIMITED    Date of Exam: 2/20/2023 11:06 AM EST    Indication: K74.69. History of cirrhosis. Dysphagia.    Comparison: Right upper quadrant abdominal ultrasound 9/19/2022.    Technique: Grayscale and color Doppler ultrasound evaluation of the right upper quadrant was performed.    Findings:  The pancreas has an unremarkable sonographic appearance. The liver size is normal at 17.6 m in length. The liver demonstrates a mildly coarsened echotexture with slightly nodular surface contour, suggesting features of cirrhosis. No focal liver lesion is   identified. Main portal vein is patent with hepatopedal flow. Imaged hepatic veins are patent. The gallbladder surgically absent. Common bile duct caliber is normal at 4 mm. There is no intrahepatic biliary ductal dilation. Right kidney measures 10.3 cm   in length without focal cortical lesion, shadowing stone or hydronephrosis. No ascites is seen.  Impression: Impression:    1. Coarsened hepatic echotexture with slight surface nodular contour of the liver, compatible with the appearance of cirrhosis. No focal liver lesions.  2. Cholecystectomy.    Electronically Signed: Maria L Foster    2/20/2023 1:44 PM EST    Workstation ID: [id number]       ASSESSMENT:  Diagnoses and all orders for this visit:    Panlobular emphysema    Obstructive sleep apnea syndrome    Body mass index (BMI) of 45.0-49.9 in adult    Primary hypertension    Other orders  -     SCANNED - PULMONARY RESULTS        PLAN:  Pt with few months history of shortness of breath with  activity, with cough with white sputum, no hemotysis, no fever no chills no chest pain no gastroesophageal reflex.    Bronchodilator inhaled corticosteroid    Education how to use inhalers    Encouraged to use incentive spirometer    Continue to exercise slowly as tolerated    Monitor for any change in the color of the sputum    Avoid any exposure to fumes, gas or any irritant    this patient with obstructive sleep apnea, compliance is improved. Encourage to use it more frequent, I re-emphasized on pt the long and short term benefit of treating LISANDRO. The device is benefiting the patient.  The patient is also instructed to get the CPAP supplies from the [facility] and see me in 1 year for follow-up.    Treating LISANDRO will improve BP control       Follow-up 3 months    Sasha Khan MD. D, ABSM.  8/9/2023  11:51 EDT

## 2023-08-30 ENCOUNTER — TRANSCRIBE ORDERS (OUTPATIENT)
Dept: ADMINISTRATIVE | Facility: HOSPITAL | Age: 68
End: 2023-08-30
Payer: MEDICARE

## 2023-08-30 ENCOUNTER — LAB (OUTPATIENT)
Dept: LAB | Facility: HOSPITAL | Age: 68
End: 2023-08-30
Payer: MEDICARE

## 2023-08-30 DIAGNOSIS — E55.9 AVITAMINOSIS D: ICD-10-CM

## 2023-08-30 DIAGNOSIS — E87.1 HYPOSMOLALITY SYNDROME: ICD-10-CM

## 2023-08-30 DIAGNOSIS — E87.1 HYPOSMOLALITY SYNDROME: Primary | ICD-10-CM

## 2023-08-30 DIAGNOSIS — E83.52 HYPERCALCEMIA: ICD-10-CM

## 2023-08-30 DIAGNOSIS — E11.8 DIABETIC COMPLICATION: ICD-10-CM

## 2023-08-30 LAB
25(OH)D3 SERPL-MCNC: 34.9 NG/ML (ref 30–100)
ALBUMIN SERPL-MCNC: 4.1 G/DL (ref 3.5–5.2)
ALBUMIN/GLOB SERPL: 1.1 G/DL
ALP SERPL-CCNC: 68 U/L (ref 39–117)
ALT SERPL W P-5'-P-CCNC: 24 U/L (ref 1–33)
ANION GAP SERPL CALCULATED.3IONS-SCNC: 15 MMOL/L (ref 5–15)
AST SERPL-CCNC: 32 U/L (ref 1–32)
BACTERIA UR QL AUTO: ABNORMAL /HPF
BASOPHILS # BLD AUTO: 0.04 10*3/MM3 (ref 0–0.2)
BASOPHILS NFR BLD AUTO: 0.5 % (ref 0–1.5)
BILIRUB SERPL-MCNC: 0.5 MG/DL (ref 0–1.2)
BILIRUB UR QL STRIP: NEGATIVE
BUN SERPL-MCNC: 25 MG/DL (ref 8–23)
BUN/CREAT SERPL: 22.9 (ref 7–25)
CALCIUM SPEC-SCNC: 10.1 MG/DL (ref 8.6–10.5)
CHLORIDE SERPL-SCNC: 99 MMOL/L (ref 98–107)
CLARITY UR: ABNORMAL
CO2 SERPL-SCNC: 27 MMOL/L (ref 22–29)
COD CRY URNS QL: ABNORMAL /HPF
COLOR UR: YELLOW
CREAT SERPL-MCNC: 1.09 MG/DL (ref 0.57–1)
CREAT UR-MCNC: 95.1 MG/DL
DEPRECATED RDW RBC AUTO: 46.1 FL (ref 37–54)
EGFRCR SERPLBLD CKD-EPI 2021: 56.1 ML/MIN/1.73
EOSINOPHIL # BLD AUTO: 0.13 10*3/MM3 (ref 0–0.4)
EOSINOPHIL NFR BLD AUTO: 1.7 % (ref 0.3–6.2)
ERYTHROCYTE [DISTWIDTH] IN BLOOD BY AUTOMATED COUNT: 14.4 % (ref 12.3–15.4)
GLOBULIN UR ELPH-MCNC: 3.8 GM/DL
GLUCOSE SERPL-MCNC: 115 MG/DL (ref 65–99)
GLUCOSE UR STRIP-MCNC: NEGATIVE MG/DL
HBA1C MFR BLD: 7 % (ref 4.8–5.6)
HCT VFR BLD AUTO: 41.1 % (ref 34–46.6)
HGB BLD-MCNC: 14 G/DL (ref 12–15.9)
HGB UR QL STRIP.AUTO: NEGATIVE
HYALINE CASTS UR QL AUTO: ABNORMAL /LPF
IMM GRANULOCYTES # BLD AUTO: 0.01 10*3/MM3 (ref 0–0.05)
IMM GRANULOCYTES NFR BLD AUTO: 0.1 % (ref 0–0.5)
KETONES UR QL STRIP: NEGATIVE
LEUKOCYTE ESTERASE UR QL STRIP.AUTO: ABNORMAL
LYMPHOCYTES # BLD AUTO: 1.4 10*3/MM3 (ref 0.7–3.1)
LYMPHOCYTES NFR BLD AUTO: 17.9 % (ref 19.6–45.3)
MAGNESIUM SERPL-MCNC: 4.5 MG/DL (ref 1.6–2.4)
MCH RBC QN AUTO: 30.4 PG (ref 26.6–33)
MCHC RBC AUTO-ENTMCNC: 34.1 G/DL (ref 31.5–35.7)
MCV RBC AUTO: 89.2 FL (ref 79–97)
MONOCYTES # BLD AUTO: 0.68 10*3/MM3 (ref 0.1–0.9)
MONOCYTES NFR BLD AUTO: 8.7 % (ref 5–12)
NEUTROPHILS NFR BLD AUTO: 5.57 10*3/MM3 (ref 1.7–7)
NEUTROPHILS NFR BLD AUTO: 71.1 % (ref 42.7–76)
NITRITE UR QL STRIP: NEGATIVE
NRBC BLD AUTO-RTO: 0 /100 WBC (ref 0–0.2)
PH UR STRIP.AUTO: 5.5 [PH] (ref 5–8)
PLATELET # BLD AUTO: 188 10*3/MM3 (ref 140–450)
PMV BLD AUTO: 10.8 FL (ref 6–12)
POTASSIUM SERPL-SCNC: 4.1 MMOL/L (ref 3.5–5.2)
PROT ?TM UR-MCNC: 15.6 MG/DL
PROT SERPL-MCNC: 7.9 G/DL (ref 6–8.5)
PROT UR QL STRIP: NEGATIVE
PROT/CREAT UR: 164 MG/G CREA (ref 0–200)
PTH-INTACT SERPL-MCNC: 27 PG/ML (ref 15–65)
RBC # BLD AUTO: 4.61 10*6/MM3 (ref 3.77–5.28)
RBC # UR STRIP: ABNORMAL /HPF
REF LAB TEST METHOD: ABNORMAL
SODIUM SERPL-SCNC: 141 MMOL/L (ref 136–145)
SP GR UR STRIP: 1.02 (ref 1–1.03)
SQUAMOUS #/AREA URNS HPF: ABNORMAL /HPF
URATE SERPL-MCNC: 7 MG/DL (ref 2.4–5.7)
UROBILINOGEN UR QL STRIP: ABNORMAL
WBC # UR STRIP: ABNORMAL /HPF
WBC NRBC COR # BLD: 7.83 10*3/MM3 (ref 3.4–10.8)

## 2023-08-30 PROCEDURE — 83970 ASSAY OF PARATHORMONE: CPT

## 2023-08-30 PROCEDURE — 82570 ASSAY OF URINE CREATININE: CPT

## 2023-08-30 PROCEDURE — 84550 ASSAY OF BLOOD/URIC ACID: CPT

## 2023-08-30 PROCEDURE — 82306 VITAMIN D 25 HYDROXY: CPT

## 2023-08-30 PROCEDURE — 81001 URINALYSIS AUTO W/SCOPE: CPT

## 2023-08-30 PROCEDURE — 83036 HEMOGLOBIN GLYCOSYLATED A1C: CPT

## 2023-08-30 PROCEDURE — 85025 COMPLETE CBC W/AUTO DIFF WBC: CPT

## 2023-08-30 PROCEDURE — 84156 ASSAY OF PROTEIN URINE: CPT

## 2023-08-30 PROCEDURE — 83735 ASSAY OF MAGNESIUM: CPT

## 2023-08-30 PROCEDURE — 80053 COMPREHEN METABOLIC PANEL: CPT

## 2023-08-30 PROCEDURE — 36415 COLL VENOUS BLD VENIPUNCTURE: CPT

## 2023-09-05 ENCOUNTER — OFFICE VISIT (OUTPATIENT)
Dept: FAMILY MEDICINE CLINIC | Facility: CLINIC | Age: 68
End: 2023-09-05
Payer: MEDICARE

## 2023-09-05 VITALS
HEART RATE: 101 BPM | DIASTOLIC BLOOD PRESSURE: 48 MMHG | SYSTOLIC BLOOD PRESSURE: 80 MMHG | OXYGEN SATURATION: 91 % | TEMPERATURE: 98.2 F | BODY MASS INDEX: 50.02 KG/M2 | HEIGHT: 64 IN | WEIGHT: 293 LBS

## 2023-09-05 DIAGNOSIS — E11.49 TYPE 2 DIABETES MELLITUS WITH OTHER DIABETIC NEUROLOGICAL COMPLICATION: Chronic | ICD-10-CM

## 2023-09-05 DIAGNOSIS — E78.2 MIXED HYPERLIPIDEMIA: Primary | Chronic | ICD-10-CM

## 2023-09-05 DIAGNOSIS — I10 PRIMARY HYPERTENSION: Chronic | ICD-10-CM

## 2023-09-05 DIAGNOSIS — Z23 NEED FOR PNEUMOCOCCAL 20-VALENT CONJUGATE VACCINATION: ICD-10-CM

## 2023-09-05 NOTE — PATIENT INSTRUCTIONS
No fried foods and limit pasta, bread, and sweets.  Get a flu shot this Fall  Keep working to lose weight through healthy eating and exercise.   See your eye doctor

## 2023-09-05 NOTE — PROGRESS NOTES
Subjective   Dariana Rivera is a 67 y.o. female.     History of Present Illness       Ms. Dariana Rivera is a 67-year-old female who presents today for follow-up on her blood pressure, cholesterol, and diabetes.    The patient reports her blood glucose levels have been running in the 120s to 130s mg/dL range. She monitors her blood pressure at home, and this morning it was 122 mg/dL. She denies any chest pain, shortness of breath, or syncope. She has not seen her eye doctor.    The following portions of the patient's history were reviewed and updated as appropriate: allergies, current medications, past family history, past medical history, past social history, past surgical history, and problem list.  Past Medical History:   Diagnosis Date    Allergic Not sure    Penicillin, cipro, clindamycin, methadone, ambien    Angina at rest     DR. Amador    Anxiety disorder     [facility]     Back pain     Bipolar disorder     Cataract     Left & right removed in 3/2016    Cervical dystonia     Cholelithiasis     Gallbladder removed in 1995 or 97    Chronic pain     with stenosis    Cirrhosis     Colitis     Colon polyps     COPD (chronic obstructive pulmonary disease)     Not sure when diagnosed    Cramping of feet     Cramping of hands     DDD (degenerative disc disease), cervical     DDD (degenerative disc disease), lumbar     DDD (degenerative disc disease), lumbar     DDD (degenerative disc disease), thoracic     Depression     Diabetes mellitus     Not sure when diagnosed.    Diverticulosis     Not sure when diagnosed    Dysphagia 09/2020    Eosinophilic colitis     Gastritis     GERD (gastroesophageal reflux disease)     Headache Not sure    Migraines stopped after complete hyst in 2004    Hepatic steatosis     non alcoholic steo-hepatitis     Hx of lithotripsy 02/10/2021    Hypertension     IBS (irritable bowel syndrome)     Infectious viral hepatitis     [facility]    Insomnia     Kidney stones     Low back pain      Not sure when diagnosed    Lumbar stenosis     Neck pain     Neuromuscular disorder Not sure    Parkinsons and Cervical Dystonia    Neuropathy     Obesity     Obese since about 1966    Osteoarthritis     Parkinson's disease     Peripheral edema     Pneumonia May 2018    About a month after neck fusion surgery    PTSD (post-traumatic stress disorder)     Recurrent UTI     Renal insufficiency     Not sure when diagnosed    Seizure     onset 7/2016.  Last seizure 10/2019    Sleep apnea     Using Bipap machine at night. advised to bring dos    Sleep apnea, obstructive     Urinary incontinence      Past Surgical History:   Procedure Laterality Date    BACK SURGERY      CARDIAC CATHETERIZATION  02/2019    CARPAL TUNNEL RELEASE Bilateral     Wrist     CHOLECYSTECTOMY  1997    COLONOSCOPY      CYSTOSCOPY BLADDER STONE LITHOTRIPSY      ENDOSCOPY      ENDOSCOPY N/A 4/15/2020    Procedure: ESOPHAGOGASTRODUODENOSCOPY with dilitation (60FR.);  Surgeon: Julieta Allison MD;  Location: [facility] ENDOSCOPY;  Service: Gastroenterology;  Laterality: N/A;  varices,hiatal hernia,gastritis    ENDOSCOPY N/A 9/29/2020    Procedure: ESOPHAGOGASTRODUODENOSCOPY with esophageal dilitation (54 bougie);  Surgeon: Julieta Allison MD;  Location: [facility] ENDOSCOPY;  Service: Gastroenterology;  Laterality: N/A;   post op: hiatal hernia, esophageal stricture    ENDOSCOPY N/A 9/30/2021    Procedure: ESOPHAGOGASTRODUODENOSCOPY WITH DILATATION (BOUGIE #60);  Surgeon: Julieta Allison MD;  Location: [facility] ENDOSCOPY;  Service: Gastroenterology;  Laterality: N/A;  ESOPHAGEAL VARICES AND HIATAL HERNIA    ENDOSCOPY N/A 7/21/2022    Procedure: ESOPHAGOGASTRODUODENOSCOPY WITH BIOPSY X1 AREA  AND DILATATION (BOUGIE  #54);  Surgeon: Zachary Mcgarry MD;  Location: [facility] ENDOSCOPY;  Service: Gastroenterology;  Laterality: N/A;  Post: candida esophagitis,gastritis    ENDOSCOPY N/A 5/2/2023    Procedure: ESOPHAGOGASTRODUODENOSCOPY WITH BIOPSY X 1  AREA   AND DILATATON (48 NON GUIDED BOUGIE);  Surgeon: Zachary Mcgarry MD;  Location: [facility] ENDOSCOPY;  Service: Gastroenterology;  Laterality: N/A;  post op: GASTRITIS, SMALL ESOPHAGEAL VARICES    EYE SURGERY  3-14 & 3-    Cataract surgery R 3-14, L 3-    HYSTERECTOMY  2004    complete    INTERSTIM PLACEMENT      bladder    JOINT REPLACEMENT Bilateral     knees    KNEE ARTHROSCOPY Bilateral     Arthroscopic surgery to bilateral knees     OTHER SURGICAL HISTORY      Stress test     OTHER SURGICAL HISTORY  10/2016    Lithotripsy total 4-5    OTHER SURGICAL HISTORY      Right arm cellulits- spider bites     OTHER SURGICAL HISTORY  2018    Lithotripsy    OTHER SURGICAL HISTORY  2018    ACDF C3-C4 & C6-C7    REPLACEMENT TOTAL KNEE  2000    REPLACEMENT TOTAL KNEE Left 2016    SHOULDER ROTATOR CUFF REPAIR Right 2020    Procedure: RT ROTATOR CUFF REPAIR OPEN;  Surgeon: Curly Vaughn MD;  Location: [facility] MAIN OR;  Service: Orthopedics    SPINE SURGERY  2018    Fusion surgery C3-C4 and C6-C7     Family History   Problem Relation Age of Onset    Hypertension Mother     Hyperlipidemia Mother     Arthritis Mother     Cancer Mother         Skin cancers    Depression Mother     Mental illness Mother         Depression and Alzheimers    Diabetes Father              Heart disease Father         Had angina. Was diabetic.  of heart attack at age 57.    Early death Father          at 57 from heart attack    Hyperlipidemia Sister     Arthritis Sister     Cancer Sister         Skin cancers    Depression Sister     Diabetes Sister         Diagnosed     Heart disease Sister         2 mini strokes, atrial fribulation, pacemaker insertion    Hypertension Sister     Mental illness Sister         Depression    Stroke Sister         2 mini strokes    Diabetes Brother         Not sure when diagnosed    Heart disease Brother         Had open heart surgery about 4 yrs ago.     Hypertension Brother     Hyperlipidemia Brother     Arthritis Brother     Cancer Brother         Skin cancers    Breast cancer Maternal Grandmother     Breast cancer Maternal Aunt      Social History     Socioeconomic History    Marital status:    Tobacco Use    Smoking status: Former     Packs/day: 0.50     Years: 10.00     Pack years: 5.00     Types: Cigarettes     Start date: 1974     Quit date: 1990     Years since quittin.2     Passive exposure: Past    Smokeless tobacco: Never    Tobacco comments:     Stopped off and on.  Stopped once for more than 2 yrs   Vaping Use    Vaping Use: Never used   Substance and Sexual Activity    Alcohol use: No    Drug use: No    Sexual activity: Not Currently     Partners: Male         Current Outpatient Medications:     albuterol sulfate  (90 Base) MCG/ACT inhaler, Inhale 2 puffs Every 6 (Six) Hours As Needed for Wheezing or Shortness of Air., Disp: 18 g, Rfl: 3    aspirin 81 MG EC tablet, aspirin 81 mg tablet,delayed release, Disp: , Rfl:     baclofen (LIORESAL) 20 MG tablet, Take 1 tablet by mouth 3 (Three) Times a Day., Disp: , Rfl:     Blood Glucose Monitoring Suppl (ONE TOUCH ULTRA 2) w/Device kit, Use to check blood sugars once a day; Dx E11.9, Disp: 1 each, Rfl: 0    carbidopa-levodopa ER (SINEMET CR)  MG per tablet, Take 2 tablets by mouth 3 (Three) Times a Day., Disp: , Rfl:     clonazePAM (KlonoPIN) 0.5 MG tablet, TAKE 1 TABLET BY MOUTH THREE TIMES DAILY AS NEEDED FOR ANXIETY, Disp: 90 tablet, Rfl: 2    escitalopram (LEXAPRO) 10 MG tablet, Take 1.5 tablets by mouth Daily., Disp: 135 tablet, Rfl: 1    furosemide (LASIX) 40 MG tablet, TAKE 1 TABLET BY MOUTH EVERY DAY (Patient taking differently: Take 1 tablet by mouth 2 (Two) Times a Day. Do not take preop), Disp: 90 tablet, Rfl: 0    gabapentin (NEURONTIN) 300 MG capsule, Take 1 capsule by mouth 3 (Three) Times a Day., Disp: , Rfl:     HYDROcodone-acetaminophen (NORCO) 5-325 MG  per tablet, Take 1 tablet by mouth Every 12 (Twelve) Hours As Needed., Disp: , Rfl:     ipratropium-albuterol (DUO-NEB) 0.5-2.5 mg/3 ml nebulizer, Take 3 mL by nebulization Every 4 (Four) Hours As Needed for Wheezing. Use preop if needed, Disp: , Rfl:     KLOR-CON 20 MEQ CR tablet, TAKE 1 TABLET BY MOUTH DAILY (Patient taking differently: Take 1 tablet by mouth 2 (Two) Times a Day. Take preop), Disp: 90 tablet, Rfl: 0    lactulose (CHRONULAC) 10 GM/15ML solution solution (encephalopathy), Use as directed, Disp: , Rfl:     lurasidone (LATUDA) 40 MG tablet tablet, TAKE 1 TABLET BY MOUTH EVERY EVENING, Disp: 30 tablet, Rfl: 2    Melatonin 10 MG capsule, Take 1 capsule by mouth every night at bedtime., Disp: , Rfl:     Multiple Vitamins-Minerals (MULTIVITAMIN WITH MINERALS) tablet tablet, Take 1 tablet by mouth Daily. Do not take dos, Disp: , Rfl:     nadolol (CORGARD) 20 MG tablet, Take 1 tablet by mouth Every Morning. Take dos, Disp: , Rfl:     O2 (OXYGEN), Inhale 1 L/min 1 (One) Time. @@ night, Disp: , Rfl:     omeprazole (priLOSEC) 40 MG capsule, Take 1 capsule by mouth Daily. Take preop, Disp: , Rfl:     pentoxifylline (TRENtal) 400 MG CR tablet, Take 1 tablet by mouth 3 (Three) Times a Day With Meals., Disp: , Rfl:     pioglitazone (ACTOS) 30 MG tablet, TAKE 1 TABLET BY MOUTH EVERY DAY, Disp: 90 tablet, Rfl: 0    pramipexole (MIRAPEX) 0.5 MG tablet, Take 1 tablet by mouth 2 (Two) Times a Day., Disp: 180 tablet, Rfl: 1    rOPINIRole (REQUIP) 0.25 MG tablet, TAKE 1 TABLET BY MOUTH TWICE DAILY **TAKE THE NIGHT DOSE 1 HOUR BEFORE BEDTIME**, Disp: 180 tablet, Rfl: 0    Spiriva HandiHaler 18 MCG per inhalation capsule, INHALE CONTENTS OF 1 CAPSULE VIA DEVICE ONCE DAILY, Disp: 90 capsule, Rfl: 0    spironolactone (ALDACTONE) 25 MG tablet, Take 1 tablet by mouth Daily., Disp: , Rfl:     Symbicort 160-4.5 MCG/ACT inhaler, INHALE 2 PUFFS 2 TIMES A DAY, Disp: 30.6 g, Rfl: 2    True Metrix Blood Glucose Test test strip, USE  "TO CHECK BLOOD SUGARS ONCE A DAY; DX E11.9, Disp: 100 each, Rfl: 0    vitamin C (ASCORBIC ACID) 500 MG tablet, Take 2 tablets by mouth Daily. dont take preop, Disp: , Rfl:     Review of Systems  A review of systems was performed, and the pertinent positives are noted in the HPI.    BP (!) 80/48 (BP Location: Left arm, Patient Position: Sitting, Cuff Size: Large Adult)   Pulse 101   Temp 98.2 °F (36.8 °C) (Temporal)   Ht 162.6 cm (64\")   Wt 133 kg (294 lb)   SpO2 91%   BMI 50.46 kg/m²       Objective   Physical Exam  Vitals and nursing note reviewed.   Constitutional:       Appearance: Normal appearance. She is well-developed and well-groomed. She is morbidly obese.   Cardiovascular:      Rate and Rhythm: Normal rate and regular rhythm.      Heart sounds: Normal heart sounds.   Pulmonary:      Breath sounds: Normal breath sounds.   Musculoskeletal:      Right lower leg: No edema.      Left lower leg: No edema.   Neurological:      Mental Status: She is alert.   Psychiatric:         Mood and Affect: Mood normal.         Behavior: Behavior is cooperative.     Lab Results   Component Value Date    GLUCOSE 115 (H) 08/30/2023    BUN 25 (H) 08/30/2023    CREATININE 1.09 (H) 08/30/2023    EGFR 56.1 (L) 08/30/2023    BCR 22.9 08/30/2023    K 4.1 08/30/2023    CO2 27.0 08/30/2023    CALCIUM 10.1 08/30/2023    ALBUMIN 4.1 08/30/2023    BILITOT 0.5 08/30/2023    AST 32 08/30/2023    ALT 24 08/30/2023     Lab Results   Component Value Date    HGBA1C 7.00 (H) 08/30/2023     Lab Results   Component Value Date    CHOL 186 03/02/2023    TRIG 105 03/02/2023    HDL 55 03/02/2023     (H) 03/02/2023           Assessment & Plan   Problems Addressed this Visit          Cardiac and Vasculature    Hypertension (Chronic)    Hyperlipidemia - Primary (Chronic)       Endocrine and Metabolic    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 Hyperlipidemia  - She is not currently on medications.  - Most recent lipid panel was done in 03/2023 and was fine.    2. Primary hypertension  - She will continue her current medications.  - Blood pressure at home is running normal with systolics of 120 and she is asymptomatic with the blood pressure she has here today.    3. Type 2 diabetes mellitus  - She will continue her current medication.  - Her A1c was 7, which is an improvement from a few months ago.  - She will continue her current medications.  - She was counseled on the need for dietary modifications.  - She was encouraged to work on weight loss.    4. Health maintenance  - Her pneumonia vaccine was updated and she was given a Prevnar 20.  - She was encouraged to get a flu shot this fall.  - We talked about the possibility of her getting a COVID-19 booster this fall.    I will see her back in 6 months for follow-up and Medicare wellness.               Transcribed from ambient dictation for Franny Strickland MD by Liliana Boo.  09/05/23   11:11 EDT    Patient or patient representative verbalized consent to the visit recording.  I have personally performed the services described in this document as transcribed by the above individual, and it is both accurate and complete.

## 2023-09-07 ENCOUNTER — OFFICE VISIT (OUTPATIENT)
Dept: PSYCHIATRY | Facility: CLINIC | Age: 68
End: 2023-09-07
Payer: MEDICARE

## 2023-09-07 DIAGNOSIS — F31.32 BIPOLAR 1 DISORDER, DEPRESSED, MODERATE: Primary | Chronic | ICD-10-CM

## 2023-09-07 DIAGNOSIS — F41.1 GENERALIZED ANXIETY DISORDER: Chronic | ICD-10-CM

## 2023-09-07 RX ORDER — ESCITALOPRAM OXALATE 10 MG/1
15 TABLET ORAL DAILY
Qty: 135 TABLET | Refills: 1 | Status: SHIPPED | OUTPATIENT
Start: 2023-09-07

## 2023-09-07 RX ORDER — CLONAZEPAM 0.5 MG/1
0.5 TABLET ORAL 3 TIMES DAILY PRN
Qty: 90 TABLET | Refills: 3 | Status: SHIPPED | OUTPATIENT
Start: 2023-09-07

## 2023-09-07 RX ORDER — LURASIDONE HYDROCHLORIDE 40 MG/1
40 TABLET, FILM COATED ORAL EVERY EVENING
Qty: 30 TABLET | Refills: 5 | Status: SHIPPED | OUTPATIENT
Start: 2023-09-07

## 2023-09-07 RX ORDER — PRAMIPEXOLE DIHYDROCHLORIDE 0.5 MG/1
0.5 TABLET ORAL 2 TIMES DAILY
Qty: 180 TABLET | Refills: 1 | Status: SHIPPED | OUTPATIENT
Start: 2023-09-07
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
## 2023-09-11 RX ORDER — TIOTROPIUM BROMIDE 18 UG/1
CAPSULE ORAL; RESPIRATORY (INHALATION)
Qty: 90 CAPSULE | Refills: 0 | Status: SHIPPED | OUTPATIENT
Start: 2023-09-11

## 2023-09-14 ENCOUNTER — OFFICE (AMBULATORY)
Dept: URBAN - METROPOLITAN AREA CLINIC 64 | Facility: CLINIC | Age: 68
End: 2023-09-14
Payer: MEDICAID

## 2023-09-14 VITALS
DIASTOLIC BLOOD PRESSURE: 60 MMHG | HEART RATE: 68 BPM | HEIGHT: 67 IN | SYSTOLIC BLOOD PRESSURE: 117 MMHG | WEIGHT: 292 LBS

## 2023-09-14 DIAGNOSIS — K74.69 OTHER CIRRHOSIS OF LIVER: ICD-10-CM

## 2023-09-14 DIAGNOSIS — K75.81 NONALCOHOLIC STEATOHEPATITIS (NASH): ICD-10-CM

## 2023-09-14 PROCEDURE — 99213 OFFICE O/P EST LOW 20 MIN: CPT | Performed by: NURSE PRACTITIONER

## 2023-09-15 RX ORDER — CALCIUM CITRATE/VITAMIN D3 200MG-6.25
TABLET ORAL
Qty: 100 EACH | Refills: 0 | Status: SHIPPED | OUTPATIENT
Start: 2023-09-15

## 2023-10-10 RX ORDER — PIOGLITAZONEHYDROCHLORIDE 30 MG/1
TABLET ORAL
Qty: 90 TABLET | Refills: 0 | Status: SHIPPED | OUTPATIENT
Start: 2023-10-10

## 2023-11-13 NOTE — TELEPHONE ENCOUNTER
Rx Refill Note  Requested Prescriptions     Pending Prescriptions Disp Refills    rOPINIRole (REQUIP) 0.25 MG tablet [Pharmacy Med Name: ROPINIROLE 0.25MG^] 180 tablet 0     Sig: TAKE 1 TABLET BY MOUTH TWICE DAILY **TAKE THE NIGHT DOSE 1 HOUR BEFORE BEDTIME**      Last office visit with prescribing clinician: 9/7/2023   Last telemedicine visit with prescribing clinician: Visit date not found   Next office visit with prescribing clinician: 3/7/2024   Office Visit with Archana Singh MD (09/07/2023)                       Would you like a call back once the refill request has been completed: [] Yes [] No    If the office needs to give you a call back, can they leave a voicemail: [] Yes [] No    Ira Naranjo MA  11/13/23, 11:27 EST

## 2023-11-14 RX ORDER — ROPINIROLE 0.25 MG/1
TABLET, FILM COATED ORAL
Qty: 180 TABLET | Refills: 0 | Status: SHIPPED | OUTPATIENT
Start: 2023-11-14

## 2023-12-08 RX ORDER — TIOTROPIUM BROMIDE 18 UG/1
CAPSULE ORAL; RESPIRATORY (INHALATION)
Qty: 90 CAPSULE | Refills: 0 | Status: SHIPPED | OUTPATIENT
Start: 2023-12-08

## 2024-01-04 DIAGNOSIS — F41.1 GENERALIZED ANXIETY DISORDER: Chronic | ICD-10-CM

## 2024-01-04 RX ORDER — PIOGLITAZONEHYDROCHLORIDE 30 MG/1
TABLET ORAL
Qty: 90 TABLET | Refills: 0 | Status: SHIPPED | OUTPATIENT
Start: 2024-01-04

## 2024-01-04 NOTE — TELEPHONE ENCOUNTER
Rx Refill Note  Requested Prescriptions     Pending Prescriptions Disp Refills    clonazePAM (KlonoPIN) 0.5 MG tablet [Pharmacy Med Name: CLONAZEPAM  0.5MG^] 90 tablet 2     Sig: TAKE 1 TABLET BY MOUTH THREE TIMES DAILY AS NEEDED FOR ANXIETY      Last office visit with prescribing clinician: 9/7/2023   Last telemedicine visit with prescribing clinician: Visit date not found   Next office visit with prescribing clinician: 3/7/2024   Office Visit with Archana Singh MD (09/07/2023)   SC[facility]ED - LABS (03/10/2023)                     Would you like a call back once the refill request has been completed: [] Yes [] No    If the office needs to give you a call back, can they leave a voicemail: [] Yes [] No    Ira Naranjo MA  01/04/24, 16:21 EST

## 2024-01-05 RX ORDER — CLONAZEPAM 0.5 MG/1
0.5 TABLET ORAL 3 TIMES DAILY PRN
Qty: 90 TABLET | Refills: 1 | Status: SHIPPED | OUTPATIENT
Start: 2024-01-05

## 2024-01-29 RX ORDER — CALCIUM CITRATE/VITAMIN D3 200MG-6.25
TABLET ORAL
Qty: 100 EACH | Refills: 0 | Status: SHIPPED | OUTPATIENT
Start: 2024-01-29

## 2024-01-29 RX ORDER — BUDESONIDE AND FORMOTEROL FUMARATE DIHYDRATE 160; 4.5 UG/1; UG/1
2 AEROSOL RESPIRATORY (INHALATION) 2 TIMES DAILY
Qty: 30.6 G | Refills: 1 | Status: SHIPPED | OUTPATIENT
Start: 2024-01-29

## 2024-01-30 RX ORDER — ROPINIROLE 0.25 MG/1
TABLET, FILM COATED ORAL
Qty: 180 TABLET | Refills: 0 | Status: SHIPPED | OUTPATIENT
Start: 2024-01-30

## 2024-01-30 NOTE — TELEPHONE ENCOUNTER
Rx Refill Note  Requested Prescriptions     Pending Prescriptions Disp Refills    rOPINIRole (REQUIP) 0.25 MG tablet [Pharmacy Med Name: ROPINIROLE 0.25MG^] 180 tablet 0     Sig: TAKE 1 TABLET BY MOUTH TWICE DAILY **TAKE THE NIGHT DOSE 1 HOUR BEFORE BEDTIME**      Last office visit with prescribing clinician: 9/7/2023   Last telemedicine visit with prescribing clinician: Visit date not found   Next office visit with prescribing clinician: 3/7/2024   Office Visit with Archana Singh MD (09/07/2023)                       Would you like a call back once the refill request has been completed: [] Yes [] No    If the office needs to give you a call back, can they leave a voicemail: [] Yes [] No    Ira Naranjo MA  01/30/24, 09:04 EST

## 2024-03-04 RX ORDER — TIOTROPIUM BROMIDE 18 UG/1
CAPSULE ORAL; RESPIRATORY (INHALATION)
Qty: 90 CAPSULE | Refills: 0 | Status: SHIPPED | OUTPATIENT
Start: 2024-03-04

## 2024-03-07 ENCOUNTER — LAB (OUTPATIENT)
Dept: LAB | Facility: HOSPITAL | Age: 69
End: 2024-03-07
Payer: MEDICARE

## 2024-03-07 ENCOUNTER — OFFICE VISIT (OUTPATIENT)
Dept: PSYCHIATRY | Facility: CLINIC | Age: 69
End: 2024-03-07
Payer: MEDICARE

## 2024-03-07 ENCOUNTER — TRANSCRIBE ORDERS (OUTPATIENT)
Dept: ADMINISTRATIVE | Facility: HOSPITAL | Age: 69
End: 2024-03-07
Payer: MEDICARE

## 2024-03-07 DIAGNOSIS — N18.31 CHRONIC KIDNEY DISEASE (CKD) STAGE G3A/A1, MODERATELY DECREASED GLOMERULAR FILTRATION RATE (GFR) BETWEEN 45-59 ML/MIN/1.73 SQUARE METER AND ALBUMINURIA CREATININE RATIO LESS THAN 30 MG/G (CMS/H*: ICD-10-CM

## 2024-03-07 DIAGNOSIS — F31.32 BIPOLAR 1 DISORDER, DEPRESSED, MODERATE: Primary | Chronic | ICD-10-CM

## 2024-03-07 DIAGNOSIS — Z79.899 ENCOUNTER FOR LONG-TERM (CURRENT) USE OF OTHER MEDICATIONS: ICD-10-CM

## 2024-03-07 DIAGNOSIS — E11.8 DIABETIC COMPLICATION: ICD-10-CM

## 2024-03-07 DIAGNOSIS — F41.1 GENERALIZED ANXIETY DISORDER: Chronic | ICD-10-CM

## 2024-03-07 DIAGNOSIS — E11.8 DIABETIC COMPLICATION: Primary | ICD-10-CM

## 2024-03-07 LAB
25(OH)D3 SERPL-MCNC: 39.3 NG/ML (ref 30–100)
ALBUMIN SERPL-MCNC: 4.1 G/DL (ref 3.5–5.2)
ALBUMIN/GLOB SERPL: 1.1 G/DL
ALP SERPL-CCNC: 61 U/L (ref 39–117)
ALT SERPL W P-5'-P-CCNC: 11 U/L (ref 1–33)
ANION GAP SERPL CALCULATED.3IONS-SCNC: 11 MMOL/L (ref 5–15)
AST SERPL-CCNC: 41 U/L (ref 1–32)
BACTERIA UR QL AUTO: ABNORMAL /HPF
BASOPHILS # BLD AUTO: 0 10*3/MM3 (ref 0–0.2)
BASOPHILS NFR BLD AUTO: 0.4 % (ref 0–1.5)
BILIRUB SERPL-MCNC: 0.8 MG/DL (ref 0–1.2)
BILIRUB UR QL STRIP: NEGATIVE
BUN SERPL-MCNC: 21 MG/DL (ref 8–23)
BUN/CREAT SERPL: 21.2 (ref 7–25)
CALCIUM SPEC-SCNC: 10.2 MG/DL (ref 8.6–10.5)
CHLORIDE SERPL-SCNC: 98 MMOL/L (ref 98–107)
CK SERPL-CCNC: 37 U/L (ref 20–180)
CLARITY UR: ABNORMAL
CO2 SERPL-SCNC: 31 MMOL/L (ref 22–29)
COLOR UR: YELLOW
CREAT SERPL-MCNC: 0.99 MG/DL (ref 0.57–1)
CREAT UR-MCNC: 69 MG/DL
DEPRECATED RDW RBC AUTO: 48.1 FL (ref 37–54)
EGFRCR SERPLBLD CKD-EPI 2021: 62.6 ML/MIN/1.73
EOSINOPHIL # BLD AUTO: 0.1 10*3/MM3 (ref 0–0.4)
EOSINOPHIL NFR BLD AUTO: 1.2 % (ref 0.3–6.2)
ERYTHROCYTE [DISTWIDTH] IN BLOOD BY AUTOMATED COUNT: 14.9 % (ref 12.3–15.4)
GLOBULIN UR ELPH-MCNC: 3.9 GM/DL
GLUCOSE SERPL-MCNC: 98 MG/DL (ref 65–99)
GLUCOSE UR STRIP-MCNC: NEGATIVE MG/DL
HBA1C MFR BLD: 6.9 % (ref 4.8–5.6)
HCT VFR BLD AUTO: 44.6 % (ref 34–46.6)
HGB BLD-MCNC: 14.6 G/DL (ref 12–15.9)
HGB UR QL STRIP.AUTO: NEGATIVE
HYALINE CASTS UR QL AUTO: ABNORMAL /LPF
KETONES UR QL STRIP: NEGATIVE
LEUKOCYTE ESTERASE UR QL STRIP.AUTO: ABNORMAL
LYMPHOCYTES # BLD AUTO: 1.4 10*3/MM3 (ref 0.7–3.1)
LYMPHOCYTES NFR BLD AUTO: 15 % (ref 19.6–45.3)
MAGNESIUM SERPL-MCNC: 1.8 MG/DL (ref 1.6–2.4)
MCH RBC QN AUTO: 30.8 PG (ref 26.6–33)
MCHC RBC AUTO-ENTMCNC: 32.8 G/DL (ref 31.5–35.7)
MCV RBC AUTO: 94.1 FL (ref 79–97)
MONOCYTES # BLD AUTO: 0.7 10*3/MM3 (ref 0.1–0.9)
MONOCYTES NFR BLD AUTO: 7.9 % (ref 5–12)
NEUTROPHILS NFR BLD AUTO: 7.1 10*3/MM3 (ref 1.7–7)
NEUTROPHILS NFR BLD AUTO: 75.5 % (ref 42.7–76)
NITRITE UR QL STRIP: NEGATIVE
NRBC BLD AUTO-RTO: 0.1 /100 WBC (ref 0–0.2)
PH UR STRIP.AUTO: <=5 [PH] (ref 5–8)
PHOSPHATE SERPL-MCNC: 2 MG/DL (ref 2.5–4.5)
PLATELET # BLD AUTO: 203 10*3/MM3 (ref 140–450)
PMV BLD AUTO: 9.1 FL (ref 6–12)
POTASSIUM SERPL-SCNC: 4.1 MMOL/L (ref 3.5–5.2)
PROT ?TM UR-MCNC: 9.2 MG/DL
PROT SERPL-MCNC: 8 G/DL (ref 6–8.5)
PROT UR QL STRIP: NEGATIVE
PROT/CREAT UR: 133.3 MG/G CREA (ref 0–200)
PTH-INTACT SERPL-MCNC: 32.8 PG/ML (ref 15–65)
RBC # BLD AUTO: 4.74 10*6/MM3 (ref 3.77–5.28)
RBC # UR STRIP: ABNORMAL /HPF
REF LAB TEST METHOD: ABNORMAL
SODIUM SERPL-SCNC: 140 MMOL/L (ref 136–145)
SP GR UR STRIP: 1.01 (ref 1–1.03)
SQUAMOUS #/AREA URNS HPF: ABNORMAL /HPF
URATE SERPL-MCNC: 7.7 MG/DL (ref 2.4–5.7)
UROBILINOGEN UR QL STRIP: ABNORMAL
WBC # UR STRIP: ABNORMAL /HPF
WBC NRBC COR # BLD AUTO: 9.4 10*3/MM3 (ref 3.4–10.8)

## 2024-03-07 PROCEDURE — 82570 ASSAY OF URINE CREATININE: CPT

## 2024-03-07 PROCEDURE — 85025 COMPLETE CBC W/AUTO DIFF WBC: CPT

## 2024-03-07 PROCEDURE — 83036 HEMOGLOBIN GLYCOSYLATED A1C: CPT

## 2024-03-07 PROCEDURE — 81001 URINALYSIS AUTO W/SCOPE: CPT

## 2024-03-07 PROCEDURE — 83735 ASSAY OF MAGNESIUM: CPT

## 2024-03-07 PROCEDURE — 84156 ASSAY OF PROTEIN URINE: CPT

## 2024-03-07 PROCEDURE — 80053 COMPREHEN METABOLIC PANEL: CPT

## 2024-03-07 PROCEDURE — 84100 ASSAY OF PHOSPHORUS: CPT

## 2024-03-07 PROCEDURE — 83970 ASSAY OF PARATHORMONE: CPT

## 2024-03-07 PROCEDURE — 36415 COLL VENOUS BLD VENIPUNCTURE: CPT

## 2024-03-07 PROCEDURE — 84550 ASSAY OF BLOOD/URIC ACID: CPT

## 2024-03-07 PROCEDURE — 82550 ASSAY OF CK (CPK): CPT

## 2024-03-07 PROCEDURE — 82306 VITAMIN D 25 HYDROXY: CPT

## 2024-03-07 RX ORDER — ESCITALOPRAM OXALATE 10 MG/1
15 TABLET ORAL DAILY
Qty: 135 TABLET | Refills: 1 | Status: SHIPPED | OUTPATIENT
Start: 2024-03-07

## 2024-03-07 RX ORDER — CLONAZEPAM 0.5 MG/1
0.5 TABLET ORAL 3 TIMES DAILY PRN
Qty: 90 TABLET | Refills: 3 | Status: SHIPPED | OUTPATIENT
Start: 2024-03-07

## 2024-03-07 RX ORDER — PRAMIPEXOLE DIHYDROCHLORIDE 0.5 MG/1
0.5 TABLET ORAL 2 TIMES DAILY
Qty: 180 TABLET | Refills: 1 | Status: SHIPPED | OUTPATIENT
Start: 2024-03-07

## 2024-03-07 RX ORDER — LURASIDONE HYDROCHLORIDE 40 MG/1
40 TABLET, FILM COATED ORAL EVERY EVENING
Qty: 30 TABLET | Refills: 5 | Status: SHIPPED | OUTPATIENT
Start: 2024-03-07
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
## 2024-03-14 ENCOUNTER — OFFICE (AMBULATORY)
Dept: URBAN - METROPOLITAN AREA CLINIC 64 | Facility: CLINIC | Age: 69
End: 2024-03-14
Payer: MEDICAID

## 2024-03-14 ENCOUNTER — TRANSCRIBE ORDERS (OUTPATIENT)
Dept: ADMINISTRATIVE | Facility: HOSPITAL | Age: 69
End: 2024-03-14
Payer: MEDICARE

## 2024-03-14 VITALS
HEIGHT: 67 IN | SYSTOLIC BLOOD PRESSURE: 110 MMHG | WEIGHT: 276 LBS | DIASTOLIC BLOOD PRESSURE: 59 MMHG | HEART RATE: 65 BPM

## 2024-03-14 DIAGNOSIS — K74.69 OTHER CIRRHOSIS OF LIVER: Primary | ICD-10-CM

## 2024-03-14 DIAGNOSIS — R13.10 DYSPHAGIA, UNSPECIFIED: ICD-10-CM

## 2024-03-14 DIAGNOSIS — R13.10 DYSPHAGIA, UNSPECIFIED TYPE: ICD-10-CM

## 2024-03-14 DIAGNOSIS — K74.69 OTHER CIRRHOSIS OF LIVER: ICD-10-CM

## 2024-03-14 PROCEDURE — 99213 OFFICE O/P EST LOW 20 MIN: CPT | Performed by: NURSE PRACTITIONER

## 2024-03-27 ENCOUNTER — HOSPITAL ENCOUNTER (OUTPATIENT)
Dept: ULTRASOUND IMAGING | Facility: HOSPITAL | Age: 69
Discharge: HOME | End: 2024-03-27
Admitting: NURSE PRACTITIONER
Payer: MEDICARE

## 2024-03-27 DIAGNOSIS — R13.10 DYSPHAGIA, UNSPECIFIED TYPE: ICD-10-CM

## 2024-03-27 DIAGNOSIS — K74.69 OTHER CIRRHOSIS OF LIVER: ICD-10-CM

## 2024-03-27 PROCEDURE — 76705 ECHO EXAM OF ABDOMEN: CPT

## 2024-04-04 ENCOUNTER — OFFICE VISIT (OUTPATIENT)
Dept: FAMILY MEDICINE CLINIC | Facility: CLINIC | Age: 69
End: 2024-04-04
Payer: MEDICARE

## 2024-04-04 ENCOUNTER — LAB (OUTPATIENT)
Dept: FAMILY MEDICINE CLINIC | Facility: CLINIC | Age: 69
End: 2024-04-04
Payer: MEDICARE

## 2024-04-04 VITALS
SYSTOLIC BLOOD PRESSURE: 103 MMHG | HEART RATE: 59 BPM | WEIGHT: 293 LBS | TEMPERATURE: 97.3 F | OXYGEN SATURATION: 94 % | DIASTOLIC BLOOD PRESSURE: 69 MMHG | BODY MASS INDEX: 50.02 KG/M2 | HEIGHT: 64 IN

## 2024-04-04 DIAGNOSIS — I10 PRIMARY HYPERTENSION: ICD-10-CM

## 2024-04-04 DIAGNOSIS — E78.2 MIXED HYPERLIPIDEMIA: ICD-10-CM

## 2024-04-04 DIAGNOSIS — Z12.31 ENCOUNTER FOR SCREENING MAMMOGRAM FOR MALIGNANT NEOPLASM OF BREAST: ICD-10-CM

## 2024-04-04 DIAGNOSIS — E11.49 TYPE 2 DIABETES MELLITUS WITH OTHER DIABETIC NEUROLOGICAL COMPLICATION: Chronic | ICD-10-CM

## 2024-04-04 DIAGNOSIS — Z00.00 ENCOUNTER FOR ANNUAL WELLNESS EXAM IN MEDICARE PATIENT: Primary | ICD-10-CM

## 2024-04-04 DIAGNOSIS — Z78.0 POSTMENOPAUSAL STATUS: ICD-10-CM

## 2024-04-04 LAB
CHOLEST SERPL-MCNC: 201 MG/DL (ref 0–200)
HDLC SERPL-MCNC: 60 MG/DL (ref 40–60)
LDLC SERPL CALC-MCNC: 127 MG/DL (ref 0–100)
LDLC/HDLC SERPL: 2.1 {RATIO}
TRIGL SERPL-MCNC: 75 MG/DL (ref 0–150)
VLDLC SERPL-MCNC: 14 MG/DL (ref 5–40)

## 2024-04-04 PROCEDURE — 80061 LIPID PANEL: CPT | Performed by: FAMILY MEDICINE

## 2024-04-04 PROCEDURE — 36415 COLL VENOUS BLD VENIPUNCTURE: CPT

## 2024-04-04 RX ORDER — PIOGLITAZONEHYDROCHLORIDE 30 MG/1
TABLET ORAL
Qty: 90 TABLET | Refills: 0 | Status: SHIPPED | OUTPATIENT
Start: 2024-04-04

## 2024-04-04 NOTE — PROGRESS NOTES
The ABCs of the Annual Wellness Visit  Subsequent Medicare Wellness Visit    Subjective    Dariana Rivera is a 68 y.o. female who presents for a Subsequent Medicare Wellness Visit.    The following portions of the patient's history were reviewed and   updated as appropriate: allergies, current medications, past family history, past medical history, past social history, past surgical history, and problem list.    Compared to one year ago, the patient feels her physical   health is worse.    Compared to one year ago, the patient feels her mental   health is better.    Recent Hospitalizations:  She was not admitted to the hospital during the last year.       Current Medical Providers:  Patient Care Team:  Franny Strickland MD as PCP - General (Family Medicine)  Nabor Amador MD as Consulting Physician (Cardiology)  Tucker Best MD as Consulting Physician (Nephrology)  Mariano Rodriguez MD as Consulting Physician (Urology)    Outpatient Medications Prior to Visit   Medication Sig Dispense Refill    albuterol sulfate  (90 Base) MCG/ACT inhaler Inhale 2 puffs Every 6 (Six) Hours As Needed for Wheezing or Shortness of Air. 18 g 3    aspirin 81 MG EC tablet aspirin 81 mg tablet,delayed release      baclofen (LIORESAL) 20 MG tablet Take 1 tablet by mouth 3 (Three) Times a Day.      Blood Glucose Monitoring Suppl (ONE TOUCH ULTRA 2) w/Device kit Use to check blood sugars once a day; Dx E11.9 1 each 0    carbidopa-levodopa ER (SINEMET CR)  MG per tablet Take 2 tablets by mouth 3 (Three) Times a Day.      clonazePAM (KlonoPIN) 0.5 MG tablet Take 1 tablet by mouth 3 (Three) Times a Day As Needed for Anxiety. for anxiety 90 tablet 3    escitalopram (LEXAPRO) 10 MG tablet Take 1.5 tablets by mouth Daily. 135 tablet 1    furosemide (LASIX) 40 MG tablet TAKE 1 TABLET BY MOUTH EVERY DAY (Patient taking differently: Take 1 tablet by mouth 2 (Two) Times a Day. Do not take preop) 90 tablet 0     gabapentin (NEURONTIN) 300 MG capsule Take 1 capsule by mouth 3 (Three) Times a Day.      ipratropium-albuterol (DUO-NEB) 0.5-2.5 mg/3 ml nebulizer Take 3 mL by nebulization Every 4 (Four) Hours As Needed for Wheezing. Use preop if needed      KLOR-CON 20 MEQ CR tablet TAKE 1 TABLET BY MOUTH DAILY (Patient taking differently: Take 1 tablet by mouth 2 (Two) Times a Day. Take preop) 90 tablet 0    lactulose (CHRONULAC) 10 GM/15ML solution solution (encephalopathy) Use as directed      lurasidone (LATUDA) 40 MG tablet tablet Take 1 tablet by mouth Every Evening. 30 tablet 5    Melatonin 10 MG capsule Take 1 capsule by mouth every night at bedtime.      Multiple Vitamins-Minerals (MULTIVITAMIN WITH MINERALS) tablet tablet Take 1 tablet by mouth Daily. Do not take dos      nadolol (CORGARD) 20 MG tablet Take 1 tablet by mouth Every Morning. Take dos      O2 (OXYGEN) Inhale 1 L/min 1 (One) Time. @@ night      omeprazole (priLOSEC) 40 MG capsule Take 1 capsule by mouth Daily. Take preop      pentoxifylline (TRENtal) 400 MG CR tablet Take 1 tablet by mouth 3 (Three) Times a Day With Meals.      pioglitazone (ACTOS) 30 MG tablet TAKE 1 TABLET BY MOUTH EVERY DAY 90 tablet 0    pramipexole (MIRAPEX) 0.5 MG tablet Take 1 tablet by mouth 2 (Two) Times a Day. 180 tablet 1    rOPINIRole (REQUIP) 0.25 MG tablet TAKE 1 TABLET BY MOUTH TWICE DAILY **TAKE THE NIGHT DOSE 1 HOUR BEFORE BEDTIME** 180 tablet 0    Spiriva HandiHaler 18 MCG per inhalation capsule INHALE CONTENTS OF 1 CAPSULE VIA DEVICE ONCE DAILY 90 capsule 0    spironolactone (ALDACTONE) 25 MG tablet Take 1 tablet by mouth Daily.      Symbicort 160-4.5 MCG/ACT inhaler INHALE 2 PUFFS TWICE DAILY 30.6 g 1    True Metrix Blood Glucose Test test strip USE TO CHECK BLOOD SUGARS ONCE A DAY; DX E11.9 100 each 0    vitamin C (ASCORBIC ACID) 500 MG tablet Take 2 tablets by mouth Daily. dont take preop      HYDROcodone-acetaminophen (NORCO) 5-325 MG per tablet Take 1 tablet by mouth  "Every 12 (Twelve) Hours As Needed.       No facility-administered medications prior to visit.       No opioid medication identified on active medication list. I have reviewed chart for other potential  high risk medication/s and harmful drug interactions in the elderly.        Aspirin is on active medication list. Aspirin use is indicated based on review of current medical condition/s. Pros and cons of this therapy have been discussed today. Benefits of this medication outweigh potential harm.  Patient has been encouraged to continue taking this medication.  .      Patient Active Problem List   Diagnosis    Bipolar 1 disorder, depressed, moderate    Body mass index (BMI) of 45.0-49.9 in adult    Chronic back pain    Chronic kidney disease, unspecified    Chronic obstructive pulmonary disease    Cirrhosis    Gastroesophageal reflux disease    Hypertension    Sleep apnea    Parkinson's disease    Recurrent urinary tract infection    Seizure    Spinal stenosis of cervical region    Type 2 diabetes mellitus with other diabetic neurological complication    Generalized anxiety disorder    Hyperlipidemia    Varices of esophagus determined by endoscopy    Injury of cervical spine    Cor pulmonale    Dysphagia    Encounter for annual wellness exam in Medicare patient    Idiopathic osteoarthritis    Degeneration of lumbar intervertebral disc    Panniculitis affecting back    Panniculitis of neck    Spinal stenosis of lumbar region     Advance Care Planning   Advance Care Planning     Advance Directive is on file.  ACP discussion was held with the patient during this visit. Patient has an advance directive in EMR which is still valid.      Objective    Vitals:    04/04/24 0953   BP: 103/69   BP Location: Left arm   Patient Position: Sitting   Cuff Size: Large Adult   Pulse: 59   Temp: 97.3 °F (36.3 °C)   TempSrc: Temporal   SpO2: 94%   Weight: 135 kg (297 lb)   Height: 162.6 cm (64\")   PainSc:   6   PainLoc: Back     Estimated " "body mass index is 50.98 kg/m² as calculated from the following:    Height as of this encounter: 162.6 cm (64\").    Weight as of this encounter: 135 kg (297 lb).    Class 3 Severe Obesity (BMI >=40). Obesity-related health conditions include the following: diabetes mellitus. Obesity is unchanged. BMI is is above average; BMI management plan is completed. We discussed portion control and increasing exercise.      Does the patient have evidence of cognitive impairment? No  MMSE done     Lab Results   Component Value Date    HGBA1C 6.90 (H) 2024        HEALTH RISK ASSESSMENT    Smoking Status:  Social History     Tobacco Use   Smoking Status Former    Current packs/day: 0.00    Average packs/day: 0.5 packs/day for 15.6 years (7.8 ttl pk-yrs)    Types: Cigarettes    Start date: 1974    Quit date: 1990    Years since quittin.8    Passive exposure: Past   Smokeless Tobacco Never   Tobacco Comments    Stopped off and on.  Stopped once for more than 2 yrs     Alcohol Consumption:  Social History     Substance and Sexual Activity   Alcohol Use No     Fall Risk Screen:    STEADI Fall Risk Assessment was completed, and patient is at LOW risk for falls.Assessment completed on:2024    Depression Screenin/4/2024     9:57 AM   PHQ-2/PHQ-9 Depression Screening   Little Interest or Pleasure in Doing Things 0-->not at all   Feeling Down, Depressed or Hopeless 0-->not at all   PHQ-9: Brief Depression Severity Measure Score 0       Health Habits and Functional and Cognitive Screenin/4/2024     9:57 AM   Functional & Cognitive Status   Do you have difficulty preparing food and eating? Yes   Do you have difficulty bathing yourself, getting dressed or grooming yourself? Yes   Do you have difficulty using the toilet? No   Do you have difficulty moving around from place to place? Yes   Do you have trouble with steps or getting out of a bed or a chair? Yes   Current Diet Limited Junk Food "   Dental Exam Not up to date   Eye Exam Not up to date   Exercise (times per week) 0 times per week   Current Exercises Include No Regular Exercise   Do you need help using the phone?  No   Are you deaf or do you have serious difficulty hearing?  No   Do you need help to go to places out of walking distance? Yes   Do you need help shopping? No   Do you need help preparing meals?  Yes   Do you need help with housework?  Yes   Do you need help with laundry? No   Do you need help taking your medications? No   Do you need help managing money? No   Do you ever drive or ride in a car without wearing a seat belt? No   Have you felt unusual stress, anger or loneliness in the last month? No   Who do you live with? Spouse   If you need help, do you have trouble finding someone available to you? No   Have you been bothered in the last four weeks by sexual problems? No   Do you have difficulty concentrating, remembering or making decisions? Yes       Age-appropriate Screening Schedule:  Refer to the list below for future screening recommendations based on patient's age, sex and/or medical conditions. Orders for these recommended tests are listed in the plan section. The patient has been provided with a written plan.    Health Maintenance   Topic Date Due    TDAP/TD VACCINES (1 - Tdap) Never done    Hepatitis B (1 of 3 - Risk 3-dose series) Never done    DIABETIC EYE EXAM  02/23/2017    DXA SCAN  09/16/2022    DIABETIC FOOT EXAM  08/23/2023    LIPID PANEL  03/02/2024    INFLUENZA VACCINE  08/01/2024    HEMOGLOBIN A1C  09/07/2024    URINE MICROALBUMIN  03/07/2025    ANNUAL WELLNESS VISIT  04/04/2025    BMI FOLLOWUP  04/04/2025    MAMMOGRAM  12/21/2025    COLORECTAL CANCER SCREENING  01/03/2026    HEPATITIS C SCREENING  Completed    COVID-19 Vaccine  Completed    RSV Vaccine - Adults  Completed    Pneumococcal Vaccine 65+  Completed    ZOSTER VACCINE  Completed    PAP SMEAR  Discontinued                  CMS Preventative Services  "Quick Reference  Risk Factors Identified During Encounter  Inactivity/Sedentary: Patient was advised to exercise at least 150 minutes a week per CDC recommendations.  The above risks/problems have been discussed with the patient.  Pertinent information has been shared with the patient in the After Visit Summary.  An After Visit Summary and PPPS were made available to the patient.    Follow Up:   Next Medicare Wellness visit to be scheduled in 1 year.       Additional E&M Note during same encounter follows:  Patient has multiple medical problems which are significant and separately identifiable that require additional work above and beyond the Medicare Wellness Visit.      Chief Complaint  Back Pain (Low back pain. )    Subjective        HPI  Dariana Rivera is also being seen today for follow up on bp/chol/dm  Does not check her BS  Has not seen her eye doctor since 2016  Feels well  Does not need refills      Review of Systems   Constitutional: Negative.    Respiratory: Negative.     Cardiovascular: Negative.    Psychiatric/Behavioral: Negative.         Objective   Vital Signs:  /69 (BP Location: Left arm, Patient Position: Sitting, Cuff Size: Large Adult)   Pulse 59   Temp 97.3 °F (36.3 °C) (Temporal)   Ht 162.6 cm (64\")   Wt 135 kg (297 lb)   SpO2 94%   BMI 50.98 kg/m²     Physical Exam  Vitals and nursing note reviewed.   Constitutional:       Appearance: Normal appearance. She is well-developed and well-groomed. She is morbidly obese.   Cardiovascular:      Rate and Rhythm: Normal rate and regular rhythm.      Heart sounds: Normal heart sounds.   Pulmonary:      Effort: Pulmonary effort is normal.      Breath sounds: Normal breath sounds.   Musculoskeletal:      Right lower leg: No edema.      Left lower leg: No edema.   Neurological:      Mental Status: She is alert and oriented to person, place, and time.      Comments: Using cane   Psychiatric:         Mood and Affect: Mood normal.         " Behavior: Behavior is cooperative.                    Lab Results   Component Value Date    GLUCOSE 98 03/07/2024    BUN 21 03/07/2024    CREATININE 0.99 03/07/2024    EGFR 62.6 03/07/2024    BCR 21.2 03/07/2024    K 4.1 03/07/2024    CO2 31.0 (H) 03/07/2024    CALCIUM 10.2 03/07/2024    ALBUMIN 4.1 03/07/2024    BILITOT 0.8 03/07/2024    AST 41 (H) 03/07/2024    ALT 11 03/07/2024     Lab Results   Component Value Date    HGBA1C 6.90 (H) 03/07/2024          Assessment and Plan   Diagnoses and all orders for this visit:    1. Encounter for annual wellness exam in Medicare patient (Primary)    2. Primary hypertension  -     Lipid Panel; Future    3. Mixed hyperlipidemia  -     Lipid Panel; Future    4. Type 2 diabetes mellitus with other diabetic neurological complication    5. Encounter for screening mammogram for malignant neoplasm of breast  -     Mammo Screening Digital Tomosynthesis Bilateral With CAD; Future    6. Postmenopausal status  -     DEXA Bone Density Axial; Future    Counseled on the need for weight loss  Mammogram and dexa ordered  Counseled on the need to see her eye doctor  Recent labs incl cmp and A1c reviewed  Counseled on the need for dietaray compliance  Lipid panel ordered  She will continue current meds for dm/bp/chol  I will see her back in 6 mo         Follow Up   Return in about 6 months (around 10/4/2024) for Recheck.  Patient was given instructions and counseling regarding her condition or for health maintenance advice. Please see specific information pulled into the AVS if appropriate.

## 2024-04-04 NOTE — PATIENT INSTRUCTIONS
Keep working to lose weight through healthy eating and exercise.   No fried foods and limit pasta, bread, and sweets.  CALL AND MAKE AN APPT WITH YOUR EYE DOCTOR

## 2024-04-12 ENCOUNTER — TELEPHONE (OUTPATIENT)
Dept: FAMILY MEDICINE CLINIC | Facility: CLINIC | Age: 69
End: 2024-04-12
Payer: MEDICARE

## 2024-04-12 NOTE — TELEPHONE ENCOUNTER
Pharmacy Name: [facility] - [place], [place] - [address] [phone] [place] [phone] FX     What medication are you calling in regards to: pioglitazone (ACTOS) 30 MG tablet     What question does the pharmacy have: PHARMACY WANTED TO INFORM PCP THAT AFTER 2 YEARS OF USING THIS MEDICATION BLADDER CANCER RISKS SIGNIFICANTLY INCREASE. FYI ONLY

## 2024-04-16 ENCOUNTER — OFFICE VISIT (OUTPATIENT)
Dept: PULMONOLOGY | Facility: HOSPITAL | Age: 69
End: 2024-04-16
Payer: MEDICARE

## 2024-04-16 ENCOUNTER — TELEPHONE (OUTPATIENT)
Dept: CARDIOLOGY | Facility: CLINIC | Age: 69
End: 2024-04-16
Payer: MEDICARE

## 2024-04-16 VITALS
OXYGEN SATURATION: 93 % | WEIGHT: 293 LBS | HEART RATE: 59 BPM | DIASTOLIC BLOOD PRESSURE: 65 MMHG | HEIGHT: 64 IN | SYSTOLIC BLOOD PRESSURE: 102 MMHG | BODY MASS INDEX: 50.02 KG/M2 | RESPIRATION RATE: 16 BRPM

## 2024-04-16 DIAGNOSIS — G47.33 OBSTRUCTIVE SLEEP APNEA SYNDROME: Primary | Chronic | ICD-10-CM

## 2024-04-16 DIAGNOSIS — J44.9 CHRONIC OBSTRUCTIVE PULMONARY DISEASE, UNSPECIFIED COPD TYPE: ICD-10-CM

## 2024-04-16 DIAGNOSIS — I10 PRIMARY HYPERTENSION: Chronic | ICD-10-CM

## 2024-04-16 PROCEDURE — G0463 HOSPITAL OUTPT CLINIC VISIT: HCPCS

## 2024-04-16 RX ORDER — ALBUTEROL SULFATE 90 UG/1
2 AEROSOL, METERED RESPIRATORY (INHALATION) EVERY 6 HOURS PRN
Qty: 18 G | Refills: 5 | Status: SHIPPED | OUTPATIENT
Start: 2024-04-16

## 2024-04-16 RX ORDER — FLUTICASONE FUROATE, UMECLIDINIUM BROMIDE AND VILANTEROL TRIFENATATE 100; 62.5; 25 UG/1; UG/1; UG/1
1 POWDER RESPIRATORY (INHALATION)
Qty: 1 EACH | Refills: 11 | Status: SHIPPED | OUTPATIENT
Start: 2024-04-16

## 2024-04-16 NOTE — PROGRESS NOTES
SLEEP/PULMONARY  CLINIC NOTE      PATIENT IDENTIFICATION:  Name: Dariana Rivera  Age: 68 y.o.  Sex: female  :  1955  MRN: [id number]    DATE OF CONSULTATION:  2024                     CHIEF COMPLAINT: Chronic obstructive airway disease    History of Present Illness:   Dariana Rivera is a 68 y.o. female patient with chronic struct of airway disease on Symbicort feeling better less recurrent infection less shortness of breath less coughing less wheezing still having dizziness exertion and she gets fatigued and tired very easy  Pain obstructive sleep apnea she was on CPAP using it but recently she said she is not using it because changed in her room to the living room and she is sleeping in a recliner even she is not using her oxygen at night      Review of Systems:   Constitutional: As above   Eyes: negative   ENT/oropharynx: negative   Cardiovascular: negative   Respiratory: As above   Gastrointestinal: negative   Genitourinary: negative   Neurological: negative   Musculoskeletal: negative   Integument/breast: negative   Endocrine: negative   Allergic/Immunologic: negative     Past Medical History:  Past Medical History:   Diagnosis Date    Allergic Not sure    Penicillin, cipro, clindamycin, methadone, ambien    Angina at rest     DR. Amador    Anxiety disorder     [facility]     Asthma About 5 yrs ago    Shortness of breath with exertion    Back pain     Bipolar disorder     Cataract     Left & right removed in 3/2016    Cervical dystonia     Cholelithiasis     Gallbladder removed in  or     Chronic pain     with stenosis    Cirrhosis     Colitis     Colon polyps     COPD (chronic obstructive pulmonary disease)     Not sure when diagnosed    Cramping of feet     Cramping of hands     DDD (degenerative disc disease), cervical     DDD (degenerative disc disease), lumbar     DDD (degenerative disc disease), lumbar     DDD (degenerative disc disease), thoracic     Depression     Diabetes  mellitus     Not sure when diagnosed.    Diverticulosis     Not sure when diagnosed    Dysphagia 09/2020    Eosinophilic colitis     Gastritis     GERD (gastroesophageal reflux disease)     Headache Not sure    Migraines stopped after complete hyst in 2004    Hepatic steatosis     non alcoholic steo-hepatitis     Hx of lithotripsy 02/10/2021    Hypertension     IBS (irritable bowel syndrome)     Infectious viral hepatitis     [facility]    Insomnia     Kidney stones     Low back pain     Not sure when diagnosed    Lumbar stenosis     Neck pain     Neuromuscular disorder Not sure    Parkinsons and Cervical Dystonia    Neuropathy     Obesity     Obese since about 1966    Osteoarthritis     Parkinson's disease     Peripheral edema     Pneumonia May 2018    About a month after neck fusion surgery    PTSD (post-traumatic stress disorder)     Recurrent UTI     Renal insufficiency     Not sure when diagnosed    Seizure     onset 7/2016.  Last seizure 10/2019    Sleep apnea     Using Bipap machine at night. advised to bring dos    Sleep apnea, obstructive     Urinary incontinence        Past Surgical History:  Past Surgical History:   Procedure Laterality Date    BACK SURGERY      CARDIAC CATHETERIZATION  02/2019    CARPAL TUNNEL RELEASE Bilateral     Wrist     CHOLECYSTECTOMY  1997    COLONOSCOPY      CYSTOSCOPY BLADDER STONE LITHOTRIPSY      ENDOMETRIAL ABLATION      ENDOSCOPY      ENDOSCOPY N/A 04/15/2020    Procedure: ESOPHAGOGASTRODUODENOSCOPY with dilitation (60FR.);  Surgeon: Julieta Allison MD;  Location: [facility] ENDOSCOPY;  Service: Gastroenterology;  Laterality: N/A;  varices,hiatal hernia,gastritis    ENDOSCOPY N/A 09/29/2020    Procedure: ESOPHAGOGASTRODUODENOSCOPY with esophageal dilitation (54 bougie);  Surgeon: Julieta Allison MD;  Location: [facility] ENDOSCOPY;  Service: Gastroenterology;  Laterality: N/A;   post op: hiatal hernia, esophageal stricture    ENDOSCOPY N/A 09/30/2021    Procedure:  ESOPHAGOGASTRODUODENOSCOPY WITH DILATATION (BOUGIE #60);  Surgeon: Julieta Allison MD;  Location: [facility] ENDOSCOPY;  Service: Gastroenterology;  Laterality: N/A;  ESOPHAGEAL VARICES AND HIATAL HERNIA    ENDOSCOPY N/A 2022    Procedure: ESOPHAGOGASTRODUODENOSCOPY WITH BIOPSY X1 AREA  AND DILATATION (BOUGIE  #54);  Surgeon: Zachary Mcgarry MD;  Location: [facility] ENDOSCOPY;  Service: Gastroenterology;  Laterality: N/A;  Post: candida esophagitis,gastritis    ENDOSCOPY N/A 2023    Procedure: ESOPHAGOGASTRODUODENOSCOPY WITH BIOPSY X 1  AREA  AND DILATATON (48 NON GUIDED BOUGIE);  Surgeon: Zachary Mcgarry MD;  Location: [facility] ENDOSCOPY;  Service: Gastroenterology;  Laterality: N/A;  post op: GASTRITIS, SMALL ESOPHAGEAL VARICES    EYE SURGERY  3-14 & 3-    Cataract surgery R 3-14, L 3-    HYSTERECTOMY  2004    complete    INTERSTIM PLACEMENT      bladder    JOINT REPLACEMENT Bilateral     knees    KNEE ARTHROSCOPY Bilateral     Arthroscopic surgery to bilateral knees     OTHER SURGICAL HISTORY      Stress test     OTHER SURGICAL HISTORY  10/2016    Lithotripsy total 4-5    OTHER SURGICAL HISTORY      Right arm cellulits- spider bites     OTHER SURGICAL HISTORY  2018    Lithotripsy    OTHER SURGICAL HISTORY  2018    ACDF C3-C4 & C6-C7    REPLACEMENT TOTAL KNEE  2000    REPLACEMENT TOTAL KNEE Left 2016    SHOULDER ROTATOR CUFF REPAIR Right 2020    Procedure: RT ROTATOR CUFF REPAIR OPEN;  Surgeon: Curly Vaughn MD;  Location: [facility] MAIN OR;  Service: Orthopedics    SPINE SURGERY  2018    Fusion surgery C3-C4 and C6-C7        Family History:  Family History   Problem Relation Age of Onset    Hypertension Mother     Hyperlipidemia Mother     Arthritis Mother     Cancer Mother         Skin cancers    Depression Mother     Mental illness Mother         Depression and Alzheimers    Diabetes Father              Heart disease Father          Had angina. Was diabetic.  of heart attack at age 57.    Early death Father          at 57 from heart attack    Hyperlipidemia Sister     Arthritis Sister     Cancer Sister         Skin cancers    Depression Sister     Diabetes Sister         Diagnosed 2019    Heart disease Sister         2 mini strokes, atrial fribulation, pacemaker insertion    Hypertension Sister     Mental illness Sister         Depression    Stroke Sister         2 mini strokes    Diabetes Brother         Not sure when diagnosed    Heart disease Brother         Had open heart surgery about 4 yrs ago.    Hypertension Brother     Hyperlipidemia Brother     Arthritis Brother     Cancer Brother         Skin cancers, tumor found and removed from colon recently that was cancer    Breast cancer Maternal Grandmother     Breast cancer Maternal Aunt         Social History:   Social History     Tobacco Use    Smoking status: Former     Current packs/day: 0.00     Average packs/day: 0.5 packs/day for 15.6 years (7.8 ttl pk-yrs)     Types: Cigarettes     Start date: 1974     Quit date: 1990     Years since quittin.8     Passive exposure: Past    Smokeless tobacco: Never    Tobacco comments:     Stopped off and on.  Stopped once for more than 2 yrs   Substance Use Topics    Alcohol use: No        Allergies:  Allergies   Allergen Reactions    Ambien  [Zolpidem Tartrate] Confusion    Ciprofloxacin Hcl Rash    Penicillins Rash    Zolpidem Unknown - High Severity     Other reaction(s): Confusion    Methadone Hallucinations    Clindamycin Rash       Home Meds:  (Not in a hospital admission)      Objective:    Vitals Ranges:   Heart Rate:  [59] 59  Resp:  [16] 16  BP: (102)/(65) 102/65  Body mass index is 51.49 kg/m².     Exam:  General Appearance:  WDWN    HEENT:   without obvious abnormality,  Conjunctiva/corneas clear,  Normal external ear canals, no drainage    Clear orsalmucosa,  Mallampati score 3    Neck:  Supple, symmetrical,  trachea midline. No JVD.  Lungs:   Bilateral basal rhonchi bilaterally, respirations unlabored symmetrical wall movement.    Chest wall:  No tenderness or deformity.    Heart:  Regular rate and rhythm, S1 and S2 normal.  Extremities: Trace edema no clubbing or Cyanosis        Data Review:  All labs (24hrs): No results found for this or any previous visit (from the past 24 hour(s)).     Imaging:  US Abdomen Limited  Narrative: US ABDOMEN LIMITED    Date of Exam: 3/27/2024 10:18 AM EDT    Indication: K74.69.    Comparison: 2/20/2023    Technique: Grayscale and color Doppler ultrasound evaluation of the right upper quadrant was performed.    Findings:  Visualized portions of the pancreas are normal. The liver is of diffusely increased echogenicity with coarsening of the echotexture. The liver measures 15.2 cm in the sagittal plane. Normal flow is documented in the portal and hepatic veins. No focal   lesions are identified within the liver. The gallbladder is absent. The common bile duct measures 4.7 mm. The right kidney has a maximal ggok-no-xepn length of 9.7 cm and appears sonographically normal.  Impression: Impression:    1. Coarsened echotexture suggestive of cirrhosis.  2. Cholecystectomy.    Electronically Signed: Ric Carmona MD    3/27/2024 3:41 PM EDT    Workstation ID: [id number]       ASSESSMENT:  Diagnoses and all orders for this visit:    Obstructive sleep apnea syndrome    Chronic obstructive pulmonary disease, unspecified COPD type  -     albuterol sulfate  (90 Base) MCG/ACT inhaler; Inhale 2 puffs Every 6 (Six) Hours As Needed for Wheezing or Shortness of Air.    Primary hypertension    Body mass index (BMI) of 45.0-49.9 in adult    Other orders  -     Fluticasone-Umeclidin-Vilant (Trelegy Ellipta) 100-62.5-25 MCG/ACT inhaler; Inhale 1 puff Daily.        PLAN:   This is patient with symptoms of obstructive sleep apnea, patient is sleeping in a recliner now and will oxygen while sleeping keep the  head of the bed elevated 60 degree, Avoid supine avoid sedative meds in pm, weight loss, Avoid driving. Long discussion with patient about the physiology of LISANDRO, and long term and short term   benefit of treating LISANDRO     Pt with few months history of shortness of breath with activity, with cough with white sputum, no hemotysis, no fever no chills no chest pain no gastroesophageal reflex.    Bronchodilator inhaled corticosteroid    Education how to use inhalers    Encouraged to use incentive spirometer    Continue to exercise slowly as tolerated    Monitor for any change in the color of the sputum    Avoid any exposure to fumes, gas or any irritant        Treating LISANDRO will improve BP control       Follow-up 6 months    Sasha Khan MD. D, ABSM.  4/16/2024  11:35 EDT

## 2024-04-16 NOTE — TELEPHONE ENCOUNTER
FACILITY: [facility]  DR: Emir   PHONE:   FAX:   PROCEDURE: Intrstim Battery Replacement  SCHEDULED: TBD  MEDS TO HOLD: ASA

## 2024-04-18 NOTE — TELEPHONE ENCOUNTER
It is recommended that her diabetic med (pioglitazone) be changed after at least 2  years use  She can stop it and I will change it   If she just filled it, she can finish it and let me know when she finishes it, and I will change it then.  She will just need to let me know when she is ready for the change  Just let me know if she needs the new med now

## 2024-04-18 NOTE — TELEPHONE ENCOUNTER
Pt notified and advised. She recently filled it. She was advised to call when finished with her bottle and that an alternative med can be sent in at that time.

## 2024-04-30 ENCOUNTER — HOSPITAL ENCOUNTER (OUTPATIENT)
Dept: MAMMOGRAPHY | Facility: HOSPITAL | Age: 69
Discharge: HOME | End: 2024-04-30
Payer: MEDICARE

## 2024-04-30 ENCOUNTER — HOSPITAL ENCOUNTER (OUTPATIENT)
Dept: BONE DENSITY | Facility: HOSPITAL | Age: 69
Discharge: HOME | End: 2024-04-30
Payer: MEDICARE

## 2024-04-30 DIAGNOSIS — Z12.31 ENCOUNTER FOR SCREENING MAMMOGRAM FOR MALIGNANT NEOPLASM OF BREAST: ICD-10-CM

## 2024-04-30 DIAGNOSIS — Z78.0 POSTMENOPAUSAL STATUS: ICD-10-CM

## 2024-04-30 PROBLEM — M85.80 OSTEOPENIA AFTER MENOPAUSE: Status: ACTIVE | Noted: 2024-04-30

## 2024-04-30 PROCEDURE — 77063 BREAST TOMOSYNTHESIS BI: CPT

## 2024-04-30 PROCEDURE — 77080 DXA BONE DENSITY AXIAL: CPT

## 2024-04-30 PROCEDURE — 77067 SCR MAMMO BI INCL CAD: CPT

## 2024-05-10 RX ORDER — ROPINIROLE 0.25 MG/1
TABLET, FILM COATED ORAL
Qty: 180 TABLET | Refills: 0 | Status: SHIPPED | OUTPATIENT
Start: 2024-05-10

## 2024-05-13 ENCOUNTER — TELEPHONE (OUTPATIENT)
Dept: FAMILY MEDICINE CLINIC | Facility: CLINIC | Age: 69
End: 2024-05-13
Payer: MEDICARE

## 2024-05-13 DIAGNOSIS — E11.49 TYPE 2 DIABETES MELLITUS WITH OTHER DIABETIC NEUROLOGICAL COMPLICATION: Primary | Chronic | ICD-10-CM

## 2024-05-13 RX ORDER — DAPAGLIFLOZIN 10 MG/1
10 TABLET, FILM COATED ORAL DAILY
Qty: 90 TABLET | Refills: 0 | Status: SHIPPED | OUTPATIENT
Start: 2024-05-13

## 2024-05-13 NOTE — TELEPHONE ENCOUNTER
Pt says that you told her to call when she ran out of her Actos as you were going to prescribe something else.

## 2024-05-14 ENCOUNTER — TELEPHONE (OUTPATIENT)
Dept: FAMILY MEDICINE CLINIC | Facility: CLINIC | Age: 69
End: 2024-05-14
Payer: MEDICARE

## 2024-05-14 NOTE — TELEPHONE ENCOUNTER
Prior Auth completed for Dapagliflozin Propanediol 10MG tablets via covermymeds. Pending determination within 72 hours minium.   (Key: [id number])  Rx #: [id number]  PA Case ID #: [id number]    Form  [employer] Electronic PA Form (2017 NCPDP)

## 2024-05-14 NOTE — TELEPHONE ENCOUNTER
Prior Authorization for Dapagliflozin Propanediol 10MG tablets APPROVED.       Approved today  PA Case: [id number], Status: Approved, Coverage Starts on: 2/13/2024 12:00:00 AM, Coverage Ends on: 5/14/2025 12:00:00 AM.  Authorization Expiration Date: 5/13/2025          Faxed approval to pharmacy.

## 2024-05-23 ENCOUNTER — OFFICE VISIT (OUTPATIENT)
Dept: CARDIOLOGY | Facility: CLINIC | Age: 69
End: 2024-05-23
Payer: MEDICARE

## 2024-05-23 VITALS
HEIGHT: 64 IN | OXYGEN SATURATION: 91 % | SYSTOLIC BLOOD PRESSURE: 128 MMHG | BODY MASS INDEX: 47.03 KG/M2 | DIASTOLIC BLOOD PRESSURE: 77 MMHG | WEIGHT: 275.5 LBS | HEART RATE: 70 BPM

## 2024-05-23 DIAGNOSIS — Z01.810 PREOPERATIVE CARDIOVASCULAR EXAMINATION: Primary | ICD-10-CM

## 2024-05-23 DIAGNOSIS — I10 ESSENTIAL HYPERTENSION: ICD-10-CM

## 2024-05-23 PROCEDURE — 1159F MED LIST DOCD IN RCRD: CPT | Performed by: INTERNAL MEDICINE

## 2024-05-23 PROCEDURE — 1160F RVW MEDS BY RX/DR IN RCRD: CPT | Performed by: INTERNAL MEDICINE

## 2024-05-23 PROCEDURE — 3078F DIAST BP <80 MM HG: CPT | Performed by: INTERNAL MEDICINE

## 2024-05-23 PROCEDURE — 3074F SYST BP LT 130 MM HG: CPT | Performed by: INTERNAL MEDICINE

## 2024-05-23 PROCEDURE — 99214 OFFICE O/P EST MOD 30 MIN: CPT | Performed by: INTERNAL MEDICINE

## 2024-05-23 PROCEDURE — 93000 ELECTROCARDIOGRAM COMPLETE: CPT | Performed by: INTERNAL MEDICINE

## 2024-05-23 RX ORDER — PHENOL 1.4 %
AEROSOL, SPRAY (ML) MUCOUS MEMBRANE
COMMUNITY

## 2024-05-23 RX ORDER — ACETAMINOPHEN 160 MG
TABLET,DISINTEGRATING ORAL
COMMUNITY

## 2024-05-23 NOTE — PROGRESS NOTES
Encounter Date:05/23/2024    Last seen 12/5/2022      Patient ID: Dariana Rivera is a 68 y.o. female.      Chief Complaint:  History of chest discomfort  History of shortness of breath  Hypertension  Diabetes     History of Present Illness  Patient was last seen 12/5/2022.  Preoperative cardiovascular evaluation prior to having InterStim battery replacement    Occasional sharp chest pains nonexertional nonradiating.  No unusual shortness of breath.    Since I have last seen, the patient has been without any unusual shortness of breath, palpitations, dizziness or syncope.  Denies having any headache ,abdominal pain ,nausea, vomiting , diarrhea constipation, loss of weight or loss of appetite.  Denies having any excessive bruising ,hematuria or blood in the stool.    Review of all systems negative except as indicated.    Reviewed ROS.  Assessment and Plan         [[[[[[[[[[[[[[[   impression  ====  -Chest pain-atypical     Normal left ventricular function and normal coronary arteries 02/15/2019     -history of pulmonary problems    -History of seizure.    -hypertension diabetes bipolar disorder anxiety disorder Parkinson's disease cirrhosis nonalcoholic steatohepatitis GERD.  History of seizure disorder     -History of left arm numbness had fullness and drooling from mouth.-Improved  Cervical spine surgery was not performed and patient is taking Botox injections for cervical dystonia.     -status post cholecystectomy hysterectomy lithotripsy new left knee replacement arthroscopic bilateral knee surgery cervical spine surgery     -exogenous obesity.     -former smoker     -allergic to penicillin Ambien methadone Cipro and clindamycin.  =============  Plan  ==========  Chest pain-atypical  Patient is not having any angina pectoris or congestive heart failure.  History of normal coronary arteries February 2019  EKG showed sinus rhythm without ischemic changes-5/23/2024.    Preoperative cardiovascular evaluation prior  to having InterStim battery replacement.  Okay with planned procedure.  Patient is not having any cardiac symptoms.  Patient had normal coronary arteries in the past.  Patient was provided with supporting document.  Okay to hold aspirin for 7 days prior to the procedure.     Hypertension-well-controlled  128/77     Diabetes-patient is on Actos.     Medications were reviewed and updated today.     Followup in the office as needed.    Further plan will depend on patient's progress.    Reviewed and updated-5/23/2024.  [[[[[[[[[[[[[[[[[[[                       Diagnosis Plan   1. Preoperative cardiovascular examination  ECG 12 Lead      2. Essential hypertension  ECG 12 Lead      LAB RESULTS (LAST 7 DAYS)    CBC        BMP        CMP         BNP        TROPONIN        CoAg        Creatinine Clearance  CrCl cannot be calculated (Patient's most recent lab result is older than the maximum 30 days allowed.).    ABG        Radiology  No radiology results for the last day                The following portions of the patient's history were reviewed and updated as appropriate: allergies, current medications, past family history, past medical history, past social history, past surgical history, and problem list.    ROS      Current Outpatient Medications:     albuterol sulfate  (90 Base) MCG/ACT inhaler, Inhale 2 puffs Every 6 (Six) Hours As Needed for Wheezing or Shortness of Air., Disp: 18 g, Rfl: 5    aspirin 81 MG EC tablet, aspirin 81 mg tablet,delayed release, Disp: , Rfl:     baclofen (LIORESAL) 20 MG tablet, Take 1 tablet by mouth 3 (Three) Times a Day., Disp: , Rfl:     Blood Glucose Monitoring Suppl (ONE TOUCH ULTRA 2) w/Device kit, Use to check blood sugars once a day; Dx E11.9, Disp: 1 each, Rfl: 0    calcium carbonate (Calcium 600) 600 MG tablet, , Disp: , Rfl:     carbidopa-levodopa ER (SINEMET CR)  MG per tablet, Take 2 tablets by mouth 3 (Three) Times a Day., Disp: , Rfl:     Cholecalciferol (Vitamin  D3) 50 MCG (2000 UT) capsule, , Disp: , Rfl:     clonazePAM (KlonoPIN) 0.5 MG tablet, Take 1 tablet by mouth 3 (Three) Times a Day As Needed for Anxiety. for anxiety, Disp: 90 tablet, Rfl: 3    dapagliflozin Propanediol (Farxiga) 10 MG tablet, Take 10 mg by mouth Daily. For diabetes, Disp: 90 tablet, Rfl: 0    escitalopram (LEXAPRO) 10 MG tablet, Take 1.5 tablets by mouth Daily., Disp: 135 tablet, Rfl: 1    Fluticasone-Umeclidin-Vilant (Trelegy Ellipta) 100-62.5-25 MCG/ACT inhaler, Inhale 1 puff Daily., Disp: 1 each, Rfl: 11    furosemide (LASIX) 40 MG tablet, TAKE 1 TABLET BY MOUTH EVERY DAY (Patient taking differently: Take 1 tablet by mouth 2 (Two) Times a Day. Do not take preop), Disp: 90 tablet, Rfl: 0    gabapentin (NEURONTIN) 300 MG capsule, Take 1 capsule by mouth 3 (Three) Times a Day., Disp: , Rfl:     ipratropium-albuterol (DUO-NEB) 0.5-2.5 mg/3 ml nebulizer, Take 3 mL by nebulization Every 4 (Four) Hours As Needed for Wheezing. Use preop if needed, Disp: , Rfl:     KLOR-CON 20 MEQ CR tablet, TAKE 1 TABLET BY MOUTH DAILY (Patient taking differently: Take 1 tablet by mouth 2 (Two) Times a Day. Take preop), Disp: 90 tablet, Rfl: 0    lactulose (CHRONULAC) 10 GM/15ML solution solution (encephalopathy), Use as directed, Disp: , Rfl:     lurasidone (LATUDA) 40 MG tablet tablet, Take 1 tablet by mouth Every Evening., Disp: 30 tablet, Rfl: 5    Melatonin 10 MG capsule, Take 1 capsule by mouth every night at bedtime., Disp: , Rfl:     Multiple Vitamins-Minerals (MULTIVITAMIN WITH MINERALS) tablet tablet, Take 1 tablet by mouth Daily. Do not take dos, Disp: , Rfl:     nadolol (CORGARD) 20 MG tablet, Take 1 tablet by mouth Every Morning. Take dos, Disp: , Rfl:     O2 (OXYGEN), Inhale 1 L/min 1 (One) Time. @@ night, Disp: , Rfl:     omeprazole (priLOSEC) 40 MG capsule, Take 1 capsule by mouth Daily. Take preop, Disp: , Rfl:     pentoxifylline (TRENtal) 400 MG CR tablet, Take 1 tablet by mouth 3 (Three) Times a Day  With Meals., Disp: , Rfl:     pramipexole (MIRAPEX) 0.5 MG tablet, Take 1 tablet by mouth 2 (Two) Times a Day., Disp: 180 tablet, Rfl: 1    rOPINIRole (REQUIP) 0.25 MG tablet, TAKE 1 TABLET BY MOUTH TWICE DAILY **TAKE THE NIGHT DOSE 1 HOUR BEFORE BEDTIME**, Disp: 180 tablet, Rfl: 0    Spiriva HandiHaler 18 MCG per inhalation capsule, INHALE CONTENTS OF 1 CAPSULE VIA DEVICE ONCE DAILY, Disp: 90 capsule, Rfl: 0    spironolactone (ALDACTONE) 25 MG tablet, Take 1 tablet by mouth Daily., Disp: , Rfl:     Symbicort 160-4.5 MCG/ACT inhaler, INHALE 2 PUFFS TWICE DAILY, Disp: 30.6 g, Rfl: 1    True Metrix Blood Glucose Test test strip, USE TO CHECK BLOOD SUGARS ONCE A DAY; DX E11.9, Disp: 100 each, Rfl: 0    vitamin C (ASCORBIC ACID) 500 MG tablet, Take 2 tablets by mouth Daily. dont take preop, Disp: , Rfl:     Allergies   Allergen Reactions    Ambien  [Zolpidem Tartrate] Confusion    Ciprofloxacin Hcl Rash    Penicillins Rash    Zolpidem Unknown - High Severity     Other reaction(s): Confusion    Methadone Hallucinations    Clindamycin Rash       Family History   Problem Relation Age of Onset    Hypertension Mother     Hyperlipidemia Mother     Arthritis Mother     Cancer Mother         Skin cancers    Depression Mother     Mental illness Mother         Depression and Alzheimers    Diabetes Father              Heart disease Father         Had angina. Was diabetic.  of heart attack at age 57.    Early death Father          at 57 from heart attack    Heart attack Father          of heart attack in     Heart failure Father          of heart attack in .    Hyperlipidemia Sister     Arthritis Sister     Cancer Sister         Skin cancers    Depression Sister     Diabetes Sister         Diagnosed 2019    Heart disease Sister         2 mini strokes, atrial fribulation, pacemaker insertion    Hypertension Sister     Mental illness Sister         Depression    Stroke Sister         2 mini strokes     Diabetes Brother         Not sure when diagnosed    Heart disease Brother         Had open heart surgery about 4 yrs ago.    Hypertension Brother     Hyperlipidemia Brother     Arthritis Brother     Cancer Brother         Skin cancers, tumor found and removed from colon recently that was cancer    Heart attack Brother         Had a heart attack about 4 yrs ago    Breast cancer Maternal Grandmother     Breast cancer Maternal Aunt        Past Surgical History:   Procedure Laterality Date    BACK SURGERY      CARDIAC CATHETERIZATION  02/2019    CARPAL TUNNEL RELEASE Bilateral     Wrist     CHOLECYSTECTOMY  1997    COLONOSCOPY      CYSTOSCOPY BLADDER STONE LITHOTRIPSY      ENDOMETRIAL ABLATION      ENDOSCOPY      ENDOSCOPY N/A 04/15/2020    Procedure: ESOPHAGOGASTRODUODENOSCOPY with dilitation (60FR.);  Surgeon: Julieta Allison MD;  Location: [facility] ENDOSCOPY;  Service: Gastroenterology;  Laterality: N/A;  varices,hiatal hernia,gastritis    ENDOSCOPY N/A 09/29/2020    Procedure: ESOPHAGOGASTRODUODENOSCOPY with esophageal dilitation (54 bougie);  Surgeon: Julieta Allison MD;  Location: [facility] ENDOSCOPY;  Service: Gastroenterology;  Laterality: N/A;   post op: hiatal hernia, esophageal stricture    ENDOSCOPY N/A 09/30/2021    Procedure: ESOPHAGOGASTRODUODENOSCOPY WITH DILATATION (BOUGIE #60);  Surgeon: Julieta Allison MD;  Location: [facility] ENDOSCOPY;  Service: Gastroenterology;  Laterality: N/A;  ESOPHAGEAL VARICES AND HIATAL HERNIA    ENDOSCOPY N/A 07/21/2022    Procedure: ESOPHAGOGASTRODUODENOSCOPY WITH BIOPSY X1 AREA  AND DILATATION (BOUGIE  #54);  Surgeon: Zachary Mcgarry MD;  Location: [facility] ENDOSCOPY;  Service: Gastroenterology;  Laterality: N/A;  Post: candida esophagitis,gastritis    ENDOSCOPY N/A 05/02/2023    Procedure: ESOPHAGOGASTRODUODENOSCOPY WITH BIOPSY X 1  AREA  AND DILATATON (48 NON GUIDED BOUGIE);  Surgeon: Zachary Mcgarry MD;  Location: [facility] ENDOSCOPY;  Service:  Gastroenterology;  Laterality: N/A;  post op: GASTRITIS, SMALL ESOPHAGEAL VARICES    EYE SURGERY  3-14 & 3-    Cataract surgery R 3-14, L 3-    HYSTERECTOMY  09/2004    complete    INTERSTIM PLACEMENT      bladder    JOINT REPLACEMENT Bilateral     knees    KNEE ARTHROSCOPY Bilateral     Arthroscopic surgery to bilateral knees     OTHER SURGICAL HISTORY  2015    Stress test     OTHER SURGICAL HISTORY  10/2016    Lithotripsy total 4-5    OTHER SURGICAL HISTORY      Right arm cellulits- spider bites     OTHER SURGICAL HISTORY  02/08/2018    Lithotripsy    OTHER SURGICAL HISTORY  04/20/2018    ACDF C3-C4 & C6-C7    REPLACEMENT TOTAL KNEE  2000    REPLACEMENT TOTAL KNEE Left 11/2016    SHOULDER ROTATOR CUFF REPAIR Right 01/08/2020    Procedure: RT ROTATOR CUFF REPAIR OPEN;  Surgeon: Curly Vaughn MD;  Location: [facility] MAIN OR;  Service: Orthopedics    SPINE SURGERY  04/2018    Fusion surgery C3-C4 and C6-C7       Past Medical History:   Diagnosis Date    Allergic Not sure    Penicillin, cipro, clindamycin, methadone, ambien    Angina at rest     DR. Amador    Anxiety disorder     [facility]     Asthma About 5 yrs ago    Shortness of breath with exertion    Back pain     Bipolar disorder     Cataract     Left & right removed in 3/2016    Cervical dystonia     Cholelithiasis     Gallbladder removed in 1995 or 97    Chronic pain     with stenosis    Cirrhosis     Colitis     Colon polyps     COPD (chronic obstructive pulmonary disease)     Not sure when diagnosed    Cramping of feet     Cramping of hands     DDD (degenerative disc disease), cervical     DDD (degenerative disc disease), lumbar     DDD (degenerative disc disease), lumbar     DDD (degenerative disc disease), thoracic     Depression     Diabetes mellitus     Not sure when diagnosed.    Diverticulosis     Not sure when diagnosed    Dysphagia 09/2020    Eosinophilic colitis     Gastritis     GERD (gastroesophageal reflux disease)     Headache Not  sure    Migraines stopped after complete hyst in     Hepatic steatosis     non alcoholic steo-hepatitis     Hx of lithotripsy 02/10/2021    Hypertension     IBS (irritable bowel syndrome)     Infectious viral hepatitis     [facility]ER    Insomnia     Kidney stones     Low back pain     Not sure when diagnosed    Lumbar stenosis     Neck pain     Neuromuscular disorder Not sure    Parkinsons and Cervical Dystonia    Neuropathy     Obesity     Obese since about     Osteoarthritis     Parkinson's disease     Peripheral edema     Pneumonia May 2018    About a month after neck fusion surgery    PTSD (post-traumatic stress disorder)     Recurrent UTI     Renal insufficiency     Not sure when diagnosed    Seizure     onset 2016.  Last seizure 10/2019    Sleep apnea     Using Bipap machine at night. advised to bring dos    Sleep apnea, obstructive     Urinary incontinence        Family History   Problem Relation Age of Onset    Hypertension Mother     Hyperlipidemia Mother     Arthritis Mother     Cancer Mother         Skin cancers    Depression Mother     Mental illness Mother         Depression and Alzheimers    Diabetes Father              Heart disease Father         Had angina. Was diabetic.  of heart attack at age 57.    Early death Father          at 57 from heart attack    Heart attack Father          of heart attack in     Heart failure Father          of heart attack in .    Hyperlipidemia Sister     Arthritis Sister     Cancer Sister         Skin cancers    Depression Sister     Diabetes Sister         Diagnosed     Heart disease Sister         2 mini strokes, atrial fribulation, pacemaker insertion    Hypertension Sister     Mental illness Sister         Depression    Stroke Sister         2 mini strokes    Diabetes Brother         Not sure when diagnosed    Heart disease Brother         Had open heart surgery about 4 yrs ago.    Hypertension Brother     Hyperlipidemia  "Brother     Arthritis Brother     Cancer Brother         Skin cancers, tumor found and removed from colon recently that was cancer    Heart attack Brother         Had a heart attack about 4 yrs ago    Breast cancer Maternal Grandmother     Breast cancer Maternal Aunt        Social History     Socioeconomic History    Marital status:    Tobacco Use    Smoking status: Former     Current packs/day: 0.00     Average packs/day: 0.5 packs/day for 15.6 years (7.8 ttl pk-yrs)     Types: Cigarettes     Start date: 1974     Quit date: 1990     Years since quittin.9     Passive exposure: Past    Smokeless tobacco: Never    Tobacco comments:     Stopped off and on.  Stopped once for more than 2 yrs   Vaping Use    Vaping status: Never Used   Substance and Sexual Activity    Alcohol use: No    Drug use: No    Sexual activity: Not Currently     Partners: Male     Birth control/protection: Hysterectomy, Surgical     Comment: Complete hyst            ECG 12 Lead    Date/Time: 2024 12:16 PM  Performed by: Nabor Amador MD    Authorized by: Nabor Amador MD  Comparison: compared with previous ECG   Similar to previous ECG  Comparison to previous ECG: Normal sinus rhythm nonspecific ST-T wave changes baseline artifact normal axis normal intervals no ectopy no significant change from previous EKG.          Objective:       Physical Exam    /77 (BP Location: Right arm, Patient Position: Sitting, Cuff Size: Large Adult)   Pulse 70   Ht 162.6 cm (64\")   Wt 125 kg (275 lb 8 oz)   SpO2 91%   BMI 47.29 kg/m²   The patient is alert, oriented and in no distress.    Vital signs as noted above.  Exogenous obesity.    Head and neck revealed no carotid bruits or jugular venous distension.  No thyromegaly or lymphadenopathy is present.    Lungs clear.  No wheezing.  Breath sounds are normal bilaterally.    Heart normal first and second heart sounds.  No murmur..  No pericardial rub is present.  " No gallop is present.    Abdomen soft and nontender.  No organomegaly is present.    Extremities revealed good peripheral pulses without any pedal edema.    Skin warm and dry.    Musculoskeletal system is grossly normal.    CNS grossly normal.    Reviewed and updated.

## 2024-05-31 RX ORDER — TIOTROPIUM BROMIDE 18 UG/1
CAPSULE ORAL; RESPIRATORY (INHALATION)
Qty: 90 CAPSULE | Refills: 0 | Status: SHIPPED | OUTPATIENT
Start: 2024-05-31

## 2024-06-10 ENCOUNTER — TELEPHONE (OUTPATIENT)
Dept: FAMILY MEDICINE CLINIC | Facility: CLINIC | Age: 69
End: 2024-06-10
Payer: MEDICARE

## 2024-06-10 RX ORDER — NYSTATIN 100000 U/G
1 CREAM TOPICAL 2 TIMES DAILY
Qty: 30 G | Refills: 0 | Status: SHIPPED | OUTPATIENT
Start: 2024-06-10

## 2024-06-10 RX ORDER — FLUCONAZOLE 150 MG/1
150 TABLET ORAL ONCE
Qty: 1 TABLET | Refills: 0 | Status: SHIPPED | OUTPATIENT
Start: 2024-06-10 | End: 2024-06-10

## 2024-06-10 NOTE — TELEPHONE ENCOUNTER
"  Caller: Dariana Rivera \"Pat\"    Relationship: Self    Best call back number: [phone]     What medication are you requesting:      What are your current symptoms:      How long have you been experiencing symptoms:      Have you had these symptoms before:    [] Yes  [] No    Have you been treated for these symptoms before:   [] Yes  [] No    If a prescription is needed, what is your preferred pharmacy and phone number: [address] [phone] [facility] [phone]      Additional notes: PATIENT CALLED STATED DR FRANKS HAD PUT HER ON Located within [facility] AND IT HAS GIVEN HER A YEAST INFECTION WITH SYMPTOMS ODOR, BURNING, ITCHING.  WANTS TO KNOW IF DR FRANKS CAN CALL SOMETHING INTO THE PHARMACY FOR HER.                   "

## 2024-06-14 ENCOUNTER — TELEPHONE (OUTPATIENT)
Dept: FAMILY MEDICINE CLINIC | Facility: CLINIC | Age: 69
End: 2024-06-14
Payer: MEDICARE

## 2024-06-14 NOTE — TELEPHONE ENCOUNTER
They have a bunch of duplicate medicine, \      3 different inhalers and some are from a different doctor.   They are not sure which one she needs to be on.

## 2024-06-14 NOTE — TELEPHONE ENCOUNTER
The albuterol inhaler as her rescue inhaler.  The fluticasone-umeclidin-filant (tregley) inhaler is the one she takes every day    All the other inhalers can be stopped

## 2024-07-08 DIAGNOSIS — F41.1 GENERALIZED ANXIETY DISORDER: Chronic | ICD-10-CM

## 2024-07-08 NOTE — TELEPHONE ENCOUNTER
Rx Refill Note  Requested Prescriptions     Pending Prescriptions Disp Refills    clonazePAM (KlonoPIN) 0.5 MG tablet [Pharmacy Med Name: CLONAZEPAM 0.5MG^] 90 tablet 2     Sig: TAKE 1 TABLET BY MOUTH THREE TIMES DAILY AS NEEDED FOR ANXIETY      Last office visit with prescribing clinician: 3/7/2024   Last telemedicine visit with prescribing clinician: Visit date not found   Next office visit with prescribing clinician: 9/10/2024   Office Visit with Archana Singh MD (03/07/2024)   Tete Villarreal Urine Drug Screen - (03/07/2024)                     Would you like a call back once the refill request has been completed: [] Yes [] No    If the office needs to give you a call back, can they leave a voicemail: [] Yes [] No  BEHAVIORAL HEALTH - SCAN - Inspect HENNA Rivera 7/8/24 (07/08/2024)   Last Filled on 6/12/24  Jill Aly  07/08/24, 10:13 EDT

## 2024-07-10 RX ORDER — CLONAZEPAM 0.5 MG/1
0.5 TABLET ORAL 3 TIMES DAILY PRN
Qty: 90 TABLET | Refills: 0 | Status: SHIPPED | OUTPATIENT
Start: 2024-07-10

## 2024-07-25 ENCOUNTER — TELEPHONE (OUTPATIENT)
Dept: FAMILY MEDICINE CLINIC | Facility: CLINIC | Age: 69
End: 2024-07-25
Payer: MEDICARE

## 2024-07-25 NOTE — TELEPHONE ENCOUNTER
Prior Auth completed for True Metrix Blood Glucose Test strips via covermymeds. Pending determination within 72 hours minium.   (Ybarra: [id number])  PA Case ID #: [id number]    Form  [employer] Electronic PA Form (2017 NCPDP)

## 2024-07-25 NOTE — TELEPHONE ENCOUNTER
Prior Authorization for True Metrix Blood Glucose Test strips APPROVED.     Approved today    PA Case: [id number],   Status: Approved,   Coverage Starts on: 4/25/2024 12:00:00 AM,   Coverage Ends on: 7/25/2025 12:00:00 AM.    Authorization Expiration Date: 7/24/2025      Faxed approval to pharmacy.

## 2024-07-30 DIAGNOSIS — E11.49 TYPE 2 DIABETES MELLITUS WITH OTHER DIABETIC NEUROLOGICAL COMPLICATION: Chronic | ICD-10-CM

## 2024-07-30 RX ORDER — DAPAGLIFLOZIN 10 MG/1
1 TABLET, FILM COATED ORAL DAILY
Qty: 90 TABLET | Refills: 0 | Status: SHIPPED | OUTPATIENT
Start: 2024-07-30

## 2024-07-31 RX ORDER — ROPINIROLE 0.25 MG/1
TABLET, FILM COATED ORAL
Qty: 180 TABLET | Refills: 0 | Status: SHIPPED | OUTPATIENT
Start: 2024-07-31

## 2024-08-05 ENCOUNTER — TELEPHONE (OUTPATIENT)
Dept: FAMILY MEDICINE CLINIC | Facility: CLINIC | Age: 69
End: 2024-08-05
Payer: MEDICARE

## 2024-08-05 DIAGNOSIS — M48.061 SPINAL STENOSIS OF LUMBAR REGION, UNSPECIFIED WHETHER NEUROGENIC CLAUDICATION PRESENT: Primary | ICD-10-CM

## 2024-08-05 NOTE — TELEPHONE ENCOUNTER
Caller: [facility]    Best call back number: [phone]    What medication are you requesting: LIDOCAINE PATCHES     What are your current symptoms: STATES THAT PT IS NO LONGER SEEING PAIN MANAGEMENT FOR BACK PAIN     If a prescription is needed, what is your preferred pharmacy and phone number: [facility] [address] [place] [address] [phone] [facility] [phone]

## 2024-08-05 NOTE — TELEPHONE ENCOUNTER
Can you please call the pharmacy and see who was writing the rx and what the instructions were and how many she was getting a month

## 2024-08-06 RX ORDER — LIDOCAINE 50 MG/G
1 PATCH TOPICAL EVERY 24 HOURS
Qty: 30 PATCH | Refills: 0 | Status: SHIPPED | OUTPATIENT
Start: 2024-08-06

## 2024-08-07 ENCOUNTER — TRANSCRIBE ORDERS (OUTPATIENT)
Dept: ADMINISTRATIVE | Facility: HOSPITAL | Age: 69
End: 2024-08-07
Payer: MEDICARE

## 2024-08-07 ENCOUNTER — HOSPITAL ENCOUNTER (OUTPATIENT)
Dept: CARDIOLOGY | Facility: HOSPITAL | Age: 69
Discharge: HOME | End: 2024-08-07
Payer: MEDICARE

## 2024-08-07 ENCOUNTER — LAB (OUTPATIENT)
Dept: LAB | Facility: HOSPITAL | Age: 69
End: 2024-08-07
Payer: MEDICARE

## 2024-08-07 DIAGNOSIS — Z01.818 PRE-OP TESTING: ICD-10-CM

## 2024-08-07 DIAGNOSIS — Z01.818 PRE-OP TESTING: Primary | ICD-10-CM

## 2024-08-07 LAB
ANION GAP SERPL CALCULATED.3IONS-SCNC: 13 MMOL/L (ref 5–15)
BASOPHILS # BLD AUTO: 0.04 10*3/MM3 (ref 0–0.2)
BASOPHILS NFR BLD AUTO: 0.4 % (ref 0–1.5)
BUN SERPL-MCNC: 20 MG/DL (ref 8–23)
BUN/CREAT SERPL: 19.8 (ref 7–25)
CALCIUM SPEC-SCNC: 10.4 MG/DL (ref 8.6–10.5)
CHLORIDE SERPL-SCNC: 96 MMOL/L (ref 98–107)
CO2 SERPL-SCNC: 29 MMOL/L (ref 22–29)
CREAT SERPL-MCNC: 1.01 MG/DL (ref 0.57–1)
DEPRECATED RDW RBC AUTO: 48.3 FL (ref 37–54)
EGFRCR SERPLBLD CKD-EPI 2021: 61.1 ML/MIN/1.73
EOSINOPHIL # BLD AUTO: 0.11 10*3/MM3 (ref 0–0.4)
EOSINOPHIL NFR BLD AUTO: 1 % (ref 0.3–6.2)
ERYTHROCYTE [DISTWIDTH] IN BLOOD BY AUTOMATED COUNT: 14.1 % (ref 12.3–15.4)
GLUCOSE SERPL-MCNC: 119 MG/DL (ref 65–99)
HCT VFR BLD AUTO: 48.7 % (ref 34–46.6)
HGB BLD-MCNC: 16 G/DL (ref 12–15.9)
IMM GRANULOCYTES # BLD AUTO: 0.03 10*3/MM3 (ref 0–0.05)
IMM GRANULOCYTES NFR BLD AUTO: 0.3 % (ref 0–0.5)
LYMPHOCYTES # BLD AUTO: 2.17 10*3/MM3 (ref 0.7–3.1)
LYMPHOCYTES NFR BLD AUTO: 20.6 % (ref 19.6–45.3)
MCH RBC QN AUTO: 30.5 PG (ref 26.6–33)
MCHC RBC AUTO-ENTMCNC: 32.9 G/DL (ref 31.5–35.7)
MCV RBC AUTO: 92.8 FL (ref 79–97)
MONOCYTES # BLD AUTO: 0.87 10*3/MM3 (ref 0.1–0.9)
MONOCYTES NFR BLD AUTO: 8.3 % (ref 5–12)
NEUTROPHILS NFR BLD AUTO: 69.4 % (ref 42.7–76)
NEUTROPHILS NFR BLD AUTO: 7.31 10*3/MM3 (ref 1.7–7)
NRBC BLD AUTO-RTO: 0 /100 WBC (ref 0–0.2)
PLATELET # BLD AUTO: 198 10*3/MM3 (ref 140–450)
PMV BLD AUTO: 10.9 FL (ref 6–12)
POTASSIUM SERPL-SCNC: 4.1 MMOL/L (ref 3.5–5.2)
RBC # BLD AUTO: 5.25 10*6/MM3 (ref 3.77–5.28)
SODIUM SERPL-SCNC: 138 MMOL/L (ref 136–145)
WBC NRBC COR # BLD AUTO: 10.53 10*3/MM3 (ref 3.4–10.8)

## 2024-08-07 PROCEDURE — 80048 BASIC METABOLIC PNL TOTAL CA: CPT

## 2024-08-07 PROCEDURE — 93005 ELECTROCARDIOGRAM TRACING: CPT | Performed by: ANESTHESIOLOGY

## 2024-08-07 PROCEDURE — 85025 COMPLETE CBC W/AUTO DIFF WBC: CPT

## 2024-08-07 PROCEDURE — 36415 COLL VENOUS BLD VENIPUNCTURE: CPT

## 2024-08-08 NOTE — TELEPHONE ENCOUNTER
Prior Authorization for Lidocaine 5% patches APPROVED.       Approved today by [employer] 2017    PA Case: [id number],   Status: Approved,   Coverage Starts on: 5/10/2024 12:00:00 AM,   Coverage Ends on: 8/8/2025 12:00:00 AM.    Authorization Expiration Date: 8/7/2025    Faxed approval to pharmacy.

## 2024-08-08 NOTE — TELEPHONE ENCOUNTER
Prior Auth completed for Lidocaine 5% patches via covermymeds. Pending determination within 72 hours minium.   (Key: [id number])  Rx #: [id number]  PA Case ID #: [id number]    Form  [employer] Electronic PA Form (2017 NCPDP)

## 2024-08-09 LAB
QT INTERVAL: 478 MS
QTC INTERVAL: 477 MS

## 2024-08-23 DIAGNOSIS — F41.1 GENERALIZED ANXIETY DISORDER: Chronic | ICD-10-CM

## 2024-08-26 NOTE — TELEPHONE ENCOUNTER
Rx Refill Note  Requested Prescriptions     Pending Prescriptions Disp Refills    clonazePAM (KlonoPIN) 0.5 MG tablet [Pharmacy Med Name: CLONAZEPAM 0.5MG^] 90 tablet 0     Sig: TAKE 1 TABLET BY MOUTH THREE TIMES DAILY AS NEEDED FOR ANXIETY      Last office visit with prescribing clinician: 3/7/2024   Last telemedicine visit with prescribing clinician: Visit date not found   Next office visit with prescribing clinician: 9/10/2024   Office Visit with Archana Singh MD (03/07/2024)   Tete Abbreviated Urine Drug Screen - (03/07/2024)                     Would you like a call back once the refill request has been completed: [] Yes [] No    If the office needs to give you a call back, can they leave a voicemail: [] Yes [] No    Ira Naranjo MA  08/26/24, 09:47 EDT    UPLOADED

## 2024-08-27 RX ORDER — CLONAZEPAM 0.5 MG/1
0.5 TABLET ORAL 3 TIMES DAILY PRN
Qty: 90 TABLET | Refills: 0 | Status: SHIPPED | OUTPATIENT
Start: 2024-08-27

## 2024-08-30 DIAGNOSIS — F31.32 BIPOLAR 1 DISORDER, DEPRESSED, MODERATE: Chronic | ICD-10-CM

## 2024-08-30 DIAGNOSIS — F41.1 GENERALIZED ANXIETY DISORDER: Chronic | ICD-10-CM

## 2024-08-30 RX ORDER — PRAMIPEXOLE DIHYDROCHLORIDE 0.5 MG/1
0.5 TABLET ORAL 2 TIMES DAILY
Qty: 180 TABLET | Refills: 0 | Status: SHIPPED | OUTPATIENT
Start: 2024-08-30

## 2024-08-30 RX ORDER — ESCITALOPRAM OXALATE 10 MG/1
15 TABLET ORAL DAILY
Qty: 135 TABLET | Refills: 0 | Status: SHIPPED | OUTPATIENT
Start: 2024-08-30

## 2024-08-30 RX ORDER — LURASIDONE HYDROCHLORIDE 40 MG/1
40 TABLET, FILM COATED ORAL EVERY EVENING
Qty: 30 TABLET | Refills: 0 | Status: SHIPPED | OUTPATIENT
Start: 2024-08-30

## 2024-08-30 NOTE — TELEPHONE ENCOUNTER
Rx Refill Note  Requested Prescriptions     Pending Prescriptions Disp Refills    pramipexole (MIRAPEX) 0.5 MG tablet [Pharmacy Med Name: PRAMIPEXOLE 0.5MG] 180 tablet 0     Sig: TAKE 1 TABLET BY MOUTH TWICE DAILY    lurasidone (LATUDA) 40 MG tablet tablet [Pharmacy Med Name: LURASIDONE 40MG] 30 tablet 4     Sig: TAKE 1 TABLET BY MOUTH EVERY EVENING    escitalopram (LEXAPRO) 10 MG tablet [Pharmacy Med Name: ESCITALOPRAM 10MG^] 135 tablet 0     Sig: TAKE ONE AND ONE-HALF TABLETS BY MOUTH DAILY      Last office visit with prescribing clinician: 3/7/2024   Last telemedicine visit with prescribing clinician: Visit date not found   Next office visit with prescribing clinician: 9/10/2024   Office Visit with Archana Singh MD (03/07/2024)                       Would you like a call back once the refill request has been completed: [] Yes [] No    If the office needs to give you a call back, can they leave a voicemail: [] Yes [] No    Ira Naranjo MA  08/30/24, 11:45 EDT

## 2024-09-10 ENCOUNTER — OFFICE VISIT (OUTPATIENT)
Dept: PSYCHIATRY | Facility: CLINIC | Age: 69
End: 2024-09-10
Payer: MEDICARE

## 2024-09-10 DIAGNOSIS — F31.32 BIPOLAR 1 DISORDER, DEPRESSED, MODERATE: Primary | Chronic | ICD-10-CM

## 2024-09-10 DIAGNOSIS — F41.1 GENERALIZED ANXIETY DISORDER: Chronic | ICD-10-CM

## 2024-09-10 PROCEDURE — 1159F MED LIST DOCD IN RCRD: CPT | Performed by: PSYCHIATRY & NEUROLOGY

## 2024-09-10 PROCEDURE — 1160F RVW MEDS BY RX/DR IN RCRD: CPT | Performed by: PSYCHIATRY & NEUROLOGY

## 2024-09-10 PROCEDURE — 99214 OFFICE O/P EST MOD 30 MIN: CPT | Performed by: PSYCHIATRY & NEUROLOGY

## 2024-09-10 RX ORDER — LURASIDONE HYDROCHLORIDE 40 MG/1
40 TABLET, FILM COATED ORAL EVERY EVENING
Qty: 30 TABLET | Refills: 5 | Status: SHIPPED | OUTPATIENT
Start: 2024-09-10

## 2024-09-10 RX ORDER — ESCITALOPRAM OXALATE 10 MG/1
15 TABLET ORAL DAILY
Qty: 135 TABLET | Refills: 0 | Status: SHIPPED | OUTPATIENT
Start: 2024-09-10

## 2024-09-10 RX ORDER — CLONAZEPAM 0.5 MG/1
0.5 TABLET ORAL 2 TIMES DAILY PRN
Qty: 60 TABLET | Refills: 2 | Status: SHIPPED | OUTPATIENT
Start: 2024-09-10
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
## 2024-09-30 ENCOUNTER — OFFICE (AMBULATORY)
Age: 69
End: 2024-09-30
Payer: MEDICAID

## 2024-09-30 ENCOUNTER — OFFICE (AMBULATORY)
Dept: URBAN - METROPOLITAN AREA CLINIC 64 | Facility: CLINIC | Age: 69
End: 2024-09-30
Payer: MEDICAID

## 2024-09-30 ENCOUNTER — TRANSCRIBE ORDERS (OUTPATIENT)
Dept: ADMINISTRATIVE | Facility: HOSPITAL | Age: 69
End: 2024-09-30
Payer: MEDICARE

## 2024-09-30 VITALS
SYSTOLIC BLOOD PRESSURE: 80 MMHG | DIASTOLIC BLOOD PRESSURE: 50 MMHG | SYSTOLIC BLOOD PRESSURE: 80 MMHG | SYSTOLIC BLOOD PRESSURE: 80 MMHG | SYSTOLIC BLOOD PRESSURE: 80 MMHG | HEIGHT: 67 IN | HEIGHT: 67 IN | WEIGHT: 232 LBS | SYSTOLIC BLOOD PRESSURE: 80 MMHG | DIASTOLIC BLOOD PRESSURE: 50 MMHG | WEIGHT: 232 LBS | HEART RATE: 78 BPM | DIASTOLIC BLOOD PRESSURE: 50 MMHG | HEIGHT: 67 IN | HEART RATE: 78 BPM | DIASTOLIC BLOOD PRESSURE: 50 MMHG | DIASTOLIC BLOOD PRESSURE: 50 MMHG | HEIGHT: 67 IN | WEIGHT: 232 LBS | SYSTOLIC BLOOD PRESSURE: 80 MMHG | HEART RATE: 78 BPM | WEIGHT: 232 LBS | WEIGHT: 232 LBS | HEART RATE: 78 BPM | WEIGHT: 232 LBS | DIASTOLIC BLOOD PRESSURE: 50 MMHG | HEART RATE: 78 BPM | HEART RATE: 78 BPM | DIASTOLIC BLOOD PRESSURE: 50 MMHG | SYSTOLIC BLOOD PRESSURE: 80 MMHG | HEART RATE: 78 BPM | WEIGHT: 232 LBS | HEIGHT: 67 IN | HEIGHT: 67 IN | HEIGHT: 67 IN

## 2024-09-30 DIAGNOSIS — K74.69 OTHER CIRRHOSIS OF LIVER: ICD-10-CM

## 2024-09-30 DIAGNOSIS — R13.10 DYSPHAGIA, UNSPECIFIED: ICD-10-CM

## 2024-09-30 DIAGNOSIS — K21.9 GASTRO-ESOPHAGEAL REFLUX DISEASE WITHOUT ESOPHAGITIS: ICD-10-CM

## 2024-09-30 DIAGNOSIS — K74.69 OTHER CIRRHOSIS OF LIVER: Primary | ICD-10-CM

## 2024-09-30 DIAGNOSIS — F31.32 BIPOLAR 1 DISORDER, DEPRESSED, MODERATE: Chronic | ICD-10-CM

## 2024-09-30 DIAGNOSIS — R13.10 DYSPHAGIA, UNSPECIFIED TYPE: ICD-10-CM

## 2024-09-30 DIAGNOSIS — K21.9 CHRONIC GERD: ICD-10-CM

## 2024-09-30 PROCEDURE — 99214 OFFICE O/P EST MOD 30 MIN: CPT | Performed by: NURSE PRACTITIONER

## 2024-09-30 NOTE — TELEPHONE ENCOUNTER
Rx Refill Note  Requested Prescriptions     Pending Prescriptions Disp Refills    lurasidone (LATUDA) 40 MG tablet tablet [Pharmacy Med Name: LURASIDONE 40MG^^] 30 tablet 0     Sig: TAKE ONE TABLET BY MOUTH EVERY EVENING      Last office visit with prescribing clinician: 9/10/2024   Last telemedicine visit with prescribing clinician: Visit date not found   Next office visit with prescribing clinician: 3/6/2025   Office Visit with Archana Singh MD (09/10/2024)                       Would you like a call back once the refill request has been completed: [] Yes [] No    If the office needs to give you a call back, can they leave a voicemail: [] Yes [] No    Ira Naranjo MA  09/30/24, 08:41 EDT

## 2024-10-02 ENCOUNTER — TELEPHONE (OUTPATIENT)
Dept: FAMILY MEDICINE CLINIC | Facility: CLINIC | Age: 69
End: 2024-10-02
Payer: MEDICARE

## 2024-10-02 RX ORDER — LURASIDONE HYDROCHLORIDE 40 MG/1
40 TABLET, FILM COATED ORAL EVERY EVENING
Qty: 30 TABLET | Refills: 1 | Status: SHIPPED | OUTPATIENT
Start: 2024-10-02

## 2024-10-02 NOTE — TELEPHONE ENCOUNTER
Pt family inquired at last visit if pt can start HH PT due to weakness.   Pt has appt on 10/10/24 and is asking that this be discussed at visit and ordered then if you are agreeable.

## 2024-10-04 ENCOUNTER — TRANSCRIBE ORDERS (OUTPATIENT)
Dept: ADMINISTRATIVE | Facility: HOSPITAL | Age: 69
End: 2024-10-04
Payer: MEDICARE

## 2024-10-04 DIAGNOSIS — R13.10 DYSPHAGIA, UNSPECIFIED TYPE: ICD-10-CM

## 2024-10-04 DIAGNOSIS — K21.9 CHRONIC GERD: ICD-10-CM

## 2024-10-04 DIAGNOSIS — K74.60 CIRRHOSIS OF LIVER WITHOUT ASCITES, UNSPECIFIED HEPATIC CIRRHOSIS TYPE: Primary | ICD-10-CM

## 2024-10-09 ENCOUNTER — HOSPITAL ENCOUNTER (OUTPATIENT)
Dept: ULTRASOUND IMAGING | Facility: HOSPITAL | Age: 69
Discharge: HOME | End: 2024-10-09
Admitting: NURSE PRACTITIONER
Payer: MEDICARE

## 2024-10-09 DIAGNOSIS — K21.9 CHRONIC GERD: ICD-10-CM

## 2024-10-09 DIAGNOSIS — R13.10 DYSPHAGIA, UNSPECIFIED TYPE: ICD-10-CM

## 2024-10-09 DIAGNOSIS — K74.60 CIRRHOSIS OF LIVER WITHOUT ASCITES, UNSPECIFIED HEPATIC CIRRHOSIS TYPE: ICD-10-CM

## 2024-10-09 PROCEDURE — 76705 ECHO EXAM OF ABDOMEN: CPT

## 2024-10-10 ENCOUNTER — HOME HEALTH ADMISSION (OUTPATIENT)
Dept: HOME HEALTH SERVICES | Facility: HOME HEALTHCARE | Age: 69
End: 2024-10-10
Payer: COMMERCIAL

## 2024-10-10 ENCOUNTER — LAB (OUTPATIENT)
Dept: FAMILY MEDICINE CLINIC | Facility: CLINIC | Age: 69
End: 2024-10-10
Payer: MEDICARE

## 2024-10-10 ENCOUNTER — OFFICE VISIT (OUTPATIENT)
Dept: FAMILY MEDICINE CLINIC | Facility: CLINIC | Age: 69
End: 2024-10-10
Payer: MEDICARE

## 2024-10-10 VITALS
OXYGEN SATURATION: 94 % | WEIGHT: 229 LBS | HEART RATE: 67 BPM | HEIGHT: 64 IN | BODY MASS INDEX: 39.09 KG/M2 | SYSTOLIC BLOOD PRESSURE: 121 MMHG | DIASTOLIC BLOOD PRESSURE: 61 MMHG

## 2024-10-10 DIAGNOSIS — R53.1 GENERALIZED WEAKNESS: ICD-10-CM

## 2024-10-10 DIAGNOSIS — E78.2 MIXED HYPERLIPIDEMIA: Chronic | ICD-10-CM

## 2024-10-10 DIAGNOSIS — E11.49 TYPE 2 DIABETES MELLITUS WITH OTHER DIABETIC NEUROLOGICAL COMPLICATION: Chronic | ICD-10-CM

## 2024-10-10 DIAGNOSIS — Z23 NEED FOR INFLUENZA VACCINATION: ICD-10-CM

## 2024-10-10 DIAGNOSIS — I10 PRIMARY HYPERTENSION: Chronic | ICD-10-CM

## 2024-10-10 DIAGNOSIS — I10 PRIMARY HYPERTENSION: Primary | Chronic | ICD-10-CM

## 2024-10-10 DIAGNOSIS — Z91.81 AT RISK FOR FALLS: ICD-10-CM

## 2024-10-10 LAB
ALBUMIN SERPL-MCNC: 4 G/DL (ref 3.5–5.2)
ALBUMIN/GLOB SERPL: 1.1 G/DL
ALP SERPL-CCNC: 57 U/L (ref 39–117)
ALT SERPL W P-5'-P-CCNC: 20 U/L (ref 1–33)
ANION GAP SERPL CALCULATED.3IONS-SCNC: 15.1 MMOL/L (ref 5–15)
AST SERPL-CCNC: 29 U/L (ref 1–32)
BILIRUB SERPL-MCNC: 0.9 MG/DL (ref 0–1.2)
BUN SERPL-MCNC: 20 MG/DL (ref 8–23)
BUN/CREAT SERPL: 19.8 (ref 7–25)
CALCIUM SPEC-SCNC: 10.3 MG/DL (ref 8.6–10.5)
CHLORIDE SERPL-SCNC: 93 MMOL/L (ref 98–107)
CO2 SERPL-SCNC: 28.9 MMOL/L (ref 22–29)
CREAT SERPL-MCNC: 1.01 MG/DL (ref 0.57–1)
EGFRCR SERPLBLD CKD-EPI 2021: 60.8 ML/MIN/1.73
GLOBULIN UR ELPH-MCNC: 3.7 GM/DL
GLUCOSE SERPL-MCNC: 117 MG/DL (ref 65–99)
HBA1C MFR BLD: 6.4 % (ref 4.8–5.6)
POTASSIUM SERPL-SCNC: 3.7 MMOL/L (ref 3.5–5.2)
PROT SERPL-MCNC: 7.7 G/DL (ref 6–8.5)
SODIUM SERPL-SCNC: 137 MMOL/L (ref 136–145)

## 2024-10-10 PROCEDURE — 80053 COMPREHEN METABOLIC PANEL: CPT | Performed by: FAMILY MEDICINE

## 2024-10-10 PROCEDURE — 83036 HEMOGLOBIN GLYCOSYLATED A1C: CPT | Performed by: FAMILY MEDICINE

## 2024-10-10 PROCEDURE — 36415 COLL VENOUS BLD VENIPUNCTURE: CPT

## 2024-10-10 RX ORDER — AMANTADINE HYDROCHLORIDE 100 MG/1
100 CAPSULE, GELATIN COATED ORAL NIGHTLY
COMMUNITY
Start: 2024-09-10

## 2024-10-10 NOTE — PROGRESS NOTES
Answers submitted by the patient for this visit:  Primary Reason for Visit (Submitted on 10/7/2024)  What is the primary reason for your visit?: Diabetes  Diabetes Questionnaire (Submitted on 10/7/2024)  Chief Complaint: Diabetes problem  Diabetes type: type 2  MedicAlert ID: Yes  Disease duration: 20 Years  Treatment compliance: most of the time  Symptom course: improving  Home blood tests: 1-2 x per day  High score: 110-130  Below 70: never  foot paresthesias: Yes  foot ulcerations: No  blackouts: No  hospitalization: No  nocturnal hypoglycemia: No  required assistance: No  required glucagon: No  headaches: No  sweats: No  Current diet: diabetic  Meal planning: avoidance of concentrated sweets, low carbohydrate diet  Exercise: never  Eye exam current: No  Sees podiatrist: Yes  Subjective   Dariana Rivera is a 69 y.o. female.     History of Present Illness  The patient is a 68-year-old female who presents for follow-up on her blood pressure, cholesterol, and diabetes.    She monitors her blood sugar daily and has brought her log for review. Her medication regimen was recently adjusted. She has lost weight, dropping from 220 pounds, and attributes this to healthier eating habits. She has a new caregiver who prepares diabetic-friendly meals for her.    She does not experience any chest pain. Her breathing difficulties are attributed to her COPD. She occasionally feels weak but maintains an active lifestyle, driving herself to appointments and managing her household tasks. She uses a cane for mobility outside the home and relies on furniture and walls for support indoors.    She experienced a large, hard bowel movement yesterday that required assistance for removal. Her caregiver recommended Dulcolax and sugar-free Metamucil before each meal to soften her stool and prevent future issues.    She has expressed interest in receiving the influenza vaccine today.    IMMUNIZATIONS  She had RSV vaccine.       The  following portions of the patient's history were reviewed and updated as appropriate: allergies, current medications, past family history, past medical history, past social history, past surgical history, and problem list.  Past Medical History:   Diagnosis Date    Allergic Not sure    Penicillin, cipro, clindamycin, methadone, ambien    Angina at rest     DR. [facility]    Anxiety disorder     [facility]     Asthma About 5 yrs ago    Shortness of breath with exertion    Back pain     Bipolar disorder     Cataract     Left & right removed in 3/2016    Cervical dystonia     Cholelithiasis     Gallbladder removed in 1995 or 97    Chronic pain     with stenosis    Cirrhosis     Colitis     Colon polyps     COPD (chronic obstructive pulmonary disease)     Not sure when diagnosed    Cramping of feet     Cramping of hands     DDD (degenerative disc disease), cervical     DDD (degenerative disc disease), lumbar     DDD (degenerative disc disease), lumbar     DDD (degenerative disc disease), thoracic     Depression     Diabetes mellitus     Not sure when diagnosed.    Diverticulosis     Not sure when diagnosed    Dysphagia 09/2020    Eosinophilic colitis     Gastritis     GERD (gastroesophageal reflux disease)     Headache Not sure    Migraines stopped after complete hyst in 2004    Hepatic steatosis     non alcoholic steo-hepatitis     Hx of lithotripsy 02/10/2021    Hypertension     IBS (irritable bowel syndrome)     Infectious viral hepatitis     [facility]    Insomnia     Kidney stones     Low back pain     Not sure when diagnosed    Lumbar stenosis     Neck pain     Neuromuscular disorder Not sure    Parkinsons and Cervical Dystonia    Neuropathy     Obesity     Obese since about 1966    Osteoarthritis     Osteopenia     Osteopenia diagnosed after bone density test in 2024.    Parkinson's disease     Peripheral edema     Pneumonia May 2018    About a month after neck fusion surgery    PTSD (post-traumatic stress disorder)      Recurrent UTI     Renal insufficiency     Not sure when diagnosed    Seizure     onset 7/2016.  Last seizure 10/2019    Sleep apnea     Using Bipap machine at night. advised to bring dos    Sleep apnea, obstructive     Urinary incontinence      Past Surgical History:   Procedure Laterality Date    BACK SURGERY      CARDIAC CATHETERIZATION  02/2019    CARPAL TUNNEL RELEASE Bilateral     Wrist     CHOLECYSTECTOMY  1997    COLONOSCOPY      CYSTOSCOPY BLADDER STONE LITHOTRIPSY      ENDOMETRIAL ABLATION      ENDOSCOPY      ENDOSCOPY N/A 04/15/2020    Procedure: ESOPHAGOGASTRODUODENOSCOPY with dilitation (60FR.);  Surgeon: Julieta Allison MD;  Location: [facility] ENDOSCOPY;  Service: Gastroenterology;  Laterality: N/A;  varices,hiatal hernia,gastritis    ENDOSCOPY N/A 09/29/2020    Procedure: ESOPHAGOGASTRODUODENOSCOPY with esophageal dilitation (54 bougie);  Surgeon: Julieta Allison MD;  Location: [facility] ENDOSCOPY;  Service: Gastroenterology;  Laterality: N/A;   post op: hiatal hernia, esophageal stricture    ENDOSCOPY N/A 09/30/2021    Procedure: ESOPHAGOGASTRODUODENOSCOPY WITH DILATATION (BOUGIE #60);  Surgeon: Julieta Allison MD;  Location: [facility] ENDOSCOPY;  Service: Gastroenterology;  Laterality: N/A;  ESOPHAGEAL VARICES AND HIATAL HERNIA    ENDOSCOPY N/A 07/21/2022    Procedure: ESOPHAGOGASTRODUODENOSCOPY WITH BIOPSY X1 AREA  AND DILATATION (BOUGIE  #54);  Surgeon: Zachary Mcagrry MD;  Location: [facility] ENDOSCOPY;  Service: Gastroenterology;  Laterality: N/A;  Post: candida esophagitis,gastritis    ENDOSCOPY N/A 05/02/2023    Procedure: ESOPHAGOGASTRODUODENOSCOPY WITH BIOPSY X 1  AREA  AND DILATATON (48 NON GUIDED BOUGIE);  Surgeon: Zachary Mcgarry MD;  Location: [facility] ENDOSCOPY;  Service: Gastroenterology;  Laterality: N/A;  post op: GASTRITIS, SMALL ESOPHAGEAL VARICES    EYE SURGERY  3-14 & 3-    Cataract surgery R 3-14, L 3-    HYSTERECTOMY  09/2004    complete    INTERSTIM  PLACEMENT      bladder    JOINT REPLACEMENT Bilateral     knees    KNEE ARTHROSCOPY Bilateral     Arthroscopic surgery to bilateral knees     OTHER SURGICAL HISTORY      Stress test     OTHER SURGICAL HISTORY  10/2016    Lithotripsy total 4-5    OTHER SURGICAL HISTORY      Right arm cellulits- spider bites     OTHER SURGICAL HISTORY  2018    Lithotripsy    OTHER SURGICAL HISTORY  2018    ACDF C3-C4 & C6-C7    REPLACEMENT TOTAL KNEE  2000    REPLACEMENT TOTAL KNEE Left 2016    SHOULDER ROTATOR CUFF REPAIR Right 2020    Procedure: RT ROTATOR CUFF REPAIR OPEN;  Surgeon: Curly Vaughn MD;  Location: [facility] MAIN OR;  Service: Orthopedics    SPINE SURGERY  2018    Fusion surgery C3-C4 and C6-C7     Family History   Problem Relation Age of Onset    Hypertension Mother     Hyperlipidemia Mother     Arthritis Mother     Cancer Mother         Skin cancers    Depression Mother     Mental illness Mother         Depression and Alzheimers    Diabetes Father              Heart disease Father         Had angina. Was diabetic.  of heart attack at age 57.    Early death Father          at 57 from heart attack    Heart attack Father          of heart attack in     Heart failure Father          of heart attack in .    Hyperlipidemia Sister     Arthritis Sister     Cancer Sister         Skin cancers    Depression Sister     Diabetes Sister         Diagnosed 2019    Heart disease Sister         2 mini strokes, atrial fribulation, pacemaker insertion    Hypertension Sister     Mental illness Sister         Depression    Stroke Sister         2 mini strokes    Diabetes Brother         Not sure when diagnosed    Heart disease Brother         Had open heart surgery about 4 yrs ago.    Hypertension Brother     Hyperlipidemia Brother     Arthritis Brother     Cancer Brother         Skin cancers and colon cancer ()    Heart attack Brother         Had a heart attack about 4  yrs ago    Breast cancer Maternal Grandmother     Breast cancer Maternal Aunt      Social History     Socioeconomic History    Marital status:    Tobacco Use    Smoking status: Former     Current packs/day: 0.00     Average packs/day: 0.5 packs/day for 15.6 years (7.8 ttl pk-yrs)     Types: Cigarettes     Start date: 1974     Quit date: 1990     Years since quittin.3     Passive exposure: Past    Smokeless tobacco: Never    Tobacco comments:     Stopped off and on.  Stopped once for more than 2 yrs   Vaping Use    Vaping status: Never Used   Substance and Sexual Activity    Alcohol use: No    Drug use: No    Sexual activity: Not Currently     Partners: Male     Birth control/protection: Hysterectomy, Surgical     Comment: Complete hyst          Current Outpatient Medications:     albuterol sulfate  (90 Base) MCG/ACT inhaler, Inhale 2 puffs Every 6 (Six) Hours As Needed for Wheezing or Shortness of Air., Disp: 18 g, Rfl: 5    amantadine (SYMMETREL) 100 MG capsule, Take 1 capsule by mouth Every Night., Disp: , Rfl:     aspirin 81 MG EC tablet, aspirin 81 mg tablet,delayed release, Disp: , Rfl:     baclofen (LIORESAL) 20 MG tablet, Take 1 tablet by mouth 3 (Three) Times a Day., Disp: , Rfl:     Blood Glucose Monitoring Suppl (ONE TOUCH ULTRA 2) w/Device kit, Use to check blood sugars once a day; Dx E11.9, Disp: 1 each, Rfl: 0    calcium carbonate (Calcium 600) 600 MG tablet, , Disp: , Rfl:     carbidopa-levodopa ER (SINEMET CR)  MG per tablet, Take 2 tablets by mouth 3 (Three) Times a Day., Disp: , Rfl:     Cholecalciferol (Vitamin D3) 50 MCG (2000 UT) capsule, , Disp: , Rfl:     clonazePAM (KlonoPIN) 0.5 MG tablet, Take 1 tablet by mouth 2 (Two) Times a Day As Needed for Anxiety., Disp: 60 tablet, Rfl: 2    escitalopram (LEXAPRO) 10 MG tablet, Take 1.5 tablets by mouth Daily., Disp: 135 tablet, Rfl: 0    Farxiga 10 MG tablet, TAKE ONE TABLET BY MOUTH DAILY, Disp: 90 tablet,  Rfl: 0    Fluticasone-Umeclidin-Vilant (Trelegy Ellipta) 100-62.5-25 MCG/ACT inhaler, Inhale 1 puff Daily., Disp: 1 each, Rfl: 11    furosemide (LASIX) 40 MG tablet, TAKE 1 TABLET BY MOUTH EVERY DAY (Patient taking differently: Take 1 tablet by mouth 2 (Two) Times a Day. Do not take preop), Disp: 90 tablet, Rfl: 0    gabapentin (NEURONTIN) 300 MG capsule, Take 1 capsule by mouth 3 (Three) Times a Day., Disp: , Rfl:     ipratropium-albuterol (DUO-NEB) 0.5-2.5 mg/3 ml nebulizer, Take 3 mL by nebulization Every 4 (Four) Hours As Needed for Wheezing. Use preop if needed, Disp: , Rfl:     KLOR-CON 20 MEQ CR tablet, TAKE 1 TABLET BY MOUTH DAILY (Patient taking differently: Take 1 tablet by mouth 2 (Two) Times a Day. Take preop), Disp: 90 tablet, Rfl: 0    lactulose (CHRONULAC) 10 GM/15ML solution solution (encephalopathy), Use as directed, Disp: , Rfl:     lidocaine (LIDODERM) 5 %, Place 1 patch on the skin as directed by provider Daily. Remove & Discard patch within 12 hours or as directed by MD, Disp: 30 patch, Rfl: 0    lurasidone (LATUDA) 40 MG tablet tablet, TAKE ONE TABLET BY MOUTH EVERY EVENING, Disp: 30 tablet, Rfl: 1    Melatonin 10 MG capsule, Take 1 capsule by mouth every night at bedtime., Disp: , Rfl:     Multiple Vitamins-Minerals (MULTIVITAMIN WITH MINERALS) tablet tablet, Take 1 tablet by mouth Daily. Do not take dos, Disp: , Rfl:     nadolol (CORGARD) 20 MG tablet, Take 1 tablet by mouth Every Morning. Take dos, Disp: , Rfl:     nystatin (MYCOSTATIN) 129330 UNIT/GM cream, Apply 1 Application topically to the appropriate area as directed 2 (Two) Times a Day., Disp: 30 g, Rfl: 0    O2 (OXYGEN), Inhale 1 L/min 1 (One) Time. @@ night, Disp: , Rfl:     omeprazole (priLOSEC) 40 MG capsule, Take 1 capsule by mouth Daily. Take preop, Disp: , Rfl:     pentoxifylline (TRENtal) 400 MG CR tablet, Take 1 tablet by mouth 3 (Three) Times a Day With Meals., Disp: , Rfl:     pramipexole (MIRAPEX) 0.5 MG tablet, TAKE 1  "TABLET BY MOUTH TWICE DAILY, Disp: 180 tablet, Rfl: 0    spironolactone (ALDACTONE) 25 MG tablet, Take 1 tablet by mouth Daily., Disp: , Rfl:     True Metrix Blood Glucose Test test strip, USE TO CHECK BLOOD SUGARS ONCE A DAY; DX E11.9, Disp: 100 each, Rfl: 0    vitamin C (ASCORBIC ACID) 500 MG tablet, Take 2 tablets by mouth Daily. dont take preop, Disp: , Rfl:     Review of Systems  ROS done and noted in HPI    /61 (BP Location: Left arm, Patient Position: Sitting, Cuff Size: Large Adult)   Pulse 67   Ht 162.6 cm (64\")   Wt 104 kg (229 lb)   SpO2 94%   BMI 39.31 kg/m²           Objective   Physical Exam  Vitals and nursing note reviewed.   Constitutional:       Appearance: Normal appearance. She is obese.   Cardiovascular:      Rate and Rhythm: Normal rate and regular rhythm.      Heart sounds: Normal heart sounds.   Pulmonary:      Effort: Pulmonary effort is normal.      Breath sounds: Normal breath sounds.   Musculoskeletal:      Right lower leg: No edema.      Left lower leg: No edema.   Neurological:      Mental Status: She is alert.      Comments: Strength 4/5 in all extremities     Physical Exam         Results  Laboratory Studies  Blood sugar between 110 and 130. Blood sugar was 96 on the fifth and 103 yesterday.       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)    Relevant Orders    Hemoglobin A1c (Completed)    Comprehensive Metabolic Panel (Completed)    Ambulatory Referral to Home Health     Other Visit Diagnoses       Generalized weakness        Relevant Orders    Ambulatory " Referral to Home Health    At risk for falls        Relevant Orders    Ambulatory Referral to Home Health    Need for influenza vaccination        Relevant Orders    Fluzone High-Dose 65+yrs (Completed)          Diagnoses         Codes Comments    Primary hypertension    -  Primary ICD-10-CM: I10  ICD-9-CM: 401.9     Mixed hyperlipidemia     ICD-10-CM: E78.2  ICD-9-CM: 272.2     Type 2 diabetes mellitus with other diabetic neurological complication     ICD-10-CM: E11.49  ICD-9-CM: 250.60     Generalized weakness     ICD-10-CM: R53.1  ICD-9-CM: 780.79     At risk for falls     ICD-10-CM: Z91.81  ICD-9-CM: V15.88     Need for influenza vaccination     ICD-10-CM: Z23  ICD-9-CM: V04.81           Assessment & Plan  1. Type 2 Diabetes Mellitus.  Blood sugar levels have improved significantly, now consistently between 120s and 130s, with occasional fluctuations. Recent readings include 96 on 10/05/2024 and 103 on 10/09/2024. She has been adhering to a diabetic diet prepared by her caregiver. Continue current diabetes management plan.    2. Hypertension.  No chest pain or other concerning symptoms reported. Continue monitoring blood pressure and current management plan.    3. Hyperlipidemia.  Continue current management plan. No new symptoms or concerns reported.    4. Health Maintenance.  She will receive an influenza vaccine today. She is up to date with her RSV vaccine. The recommendation for an annual COVID-19 vaccine was discussed; she can obtain it at the pharmacy if she decides to proceed.     5. Generalized weakness; risk for falls  Referral to PT    CMP and A1c were ordered today  Follow-up  Return in 6 months for follow-up.            Patient or patient representative verbalized consent for the use of Ambient Listening during the visit with  Franny Strickland MD for chart documentation. 10/11/2024  12:16 EDT

## 2024-10-22 ENCOUNTER — LAB (OUTPATIENT)
Dept: LAB | Facility: HOSPITAL | Age: 69
End: 2024-10-22
Payer: MEDICARE

## 2024-10-22 ENCOUNTER — OFFICE VISIT (OUTPATIENT)
Dept: PULMONOLOGY | Facility: HOSPITAL | Age: 69
End: 2024-10-22
Payer: MEDICARE

## 2024-10-22 ENCOUNTER — TRANSCRIBE ORDERS (OUTPATIENT)
Dept: ADMINISTRATIVE | Facility: HOSPITAL | Age: 69
End: 2024-10-22
Payer: MEDICARE

## 2024-10-22 VITALS
WEIGHT: 229 LBS | BODY MASS INDEX: 39.09 KG/M2 | HEIGHT: 64 IN | RESPIRATION RATE: 14 BRPM | SYSTOLIC BLOOD PRESSURE: 103 MMHG | OXYGEN SATURATION: 93 % | DIASTOLIC BLOOD PRESSURE: 68 MMHG | HEART RATE: 66 BPM

## 2024-10-22 DIAGNOSIS — J43.1 PANLOBULAR EMPHYSEMA: Primary | Chronic | ICD-10-CM

## 2024-10-22 DIAGNOSIS — K21.9 GASTROESOPHAGEAL REFLUX DISEASE WITHOUT ESOPHAGITIS: Chronic | ICD-10-CM

## 2024-10-22 DIAGNOSIS — N18.31 CHRONIC KIDNEY DISEASE (CKD) STAGE G3A/A1, MODERATELY DECREASED GLOMERULAR FILTRATION RATE (GFR) BETWEEN 45-59 ML/MIN/1.73 SQUARE METER AND ALBUMINURIA CREATININE RATIO LESS THAN 30 MG/G (CMS/H*: ICD-10-CM

## 2024-10-22 DIAGNOSIS — E11.8 DIABETIC COMPLICATION: ICD-10-CM

## 2024-10-22 DIAGNOSIS — G47.33 OBSTRUCTIVE SLEEP APNEA SYNDROME: Chronic | ICD-10-CM

## 2024-10-22 DIAGNOSIS — E66.01 MORBID OBESITY: ICD-10-CM

## 2024-10-22 DIAGNOSIS — E11.8 DIABETIC COMPLICATION: Primary | ICD-10-CM

## 2024-10-22 DIAGNOSIS — I27.81 COR PULMONALE: ICD-10-CM

## 2024-10-22 LAB
25(OH)D3 SERPL-MCNC: 59.9 NG/ML (ref 30–100)
BACTERIA UR QL AUTO: ABNORMAL /HPF
BASOPHILS # BLD AUTO: 0.04 10*3/MM3 (ref 0–0.2)
BASOPHILS NFR BLD AUTO: 0.4 % (ref 0–1.5)
BILIRUB UR QL STRIP: NEGATIVE
CLARITY UR: ABNORMAL
COLOR UR: YELLOW
DEPRECATED RDW RBC AUTO: 51 FL (ref 37–54)
EOSINOPHIL # BLD AUTO: 0.06 10*3/MM3 (ref 0–0.4)
EOSINOPHIL NFR BLD AUTO: 0.5 % (ref 0.3–6.2)
ERYTHROCYTE [DISTWIDTH] IN BLOOD BY AUTOMATED COUNT: 14.5 % (ref 12.3–15.4)
GLUCOSE UR STRIP-MCNC: ABNORMAL MG/DL
HBA1C MFR BLD: 6.69 % (ref 4.8–5.6)
HCT VFR BLD AUTO: 52.7 % (ref 34–46.6)
HGB BLD-MCNC: 16.9 G/DL (ref 12–15.9)
HGB UR QL STRIP.AUTO: ABNORMAL
HYALINE CASTS UR QL AUTO: ABNORMAL /LPF
IMM GRANULOCYTES # BLD AUTO: 0.03 10*3/MM3 (ref 0–0.05)
IMM GRANULOCYTES NFR BLD AUTO: 0.3 % (ref 0–0.5)
KETONES UR QL STRIP: ABNORMAL
LEUKOCYTE ESTERASE UR QL STRIP.AUTO: ABNORMAL
LYMPHOCYTES # BLD AUTO: 1.65 10*3/MM3 (ref 0.7–3.1)
LYMPHOCYTES NFR BLD AUTO: 15.1 % (ref 19.6–45.3)
MCH RBC QN AUTO: 30.7 PG (ref 26.6–33)
MCHC RBC AUTO-ENTMCNC: 32.1 G/DL (ref 31.5–35.7)
MCV RBC AUTO: 95.8 FL (ref 79–97)
MONOCYTES # BLD AUTO: 0.84 10*3/MM3 (ref 0.1–0.9)
MONOCYTES NFR BLD AUTO: 7.7 % (ref 5–12)
NEUTROPHILS NFR BLD AUTO: 76 % (ref 42.7–76)
NEUTROPHILS NFR BLD AUTO: 8.32 10*3/MM3 (ref 1.7–7)
NITRITE UR QL STRIP: NEGATIVE
NRBC BLD AUTO-RTO: 0 /100 WBC (ref 0–0.2)
PH UR STRIP.AUTO: <=5 [PH] (ref 5–8)
PHOSPHATE SERPL-MCNC: 2.7 MG/DL (ref 2.5–4.5)
PLATELET # BLD AUTO: 200 10*3/MM3 (ref 140–450)
PMV BLD AUTO: 11.2 FL (ref 6–12)
PROT UR QL STRIP: NEGATIVE
PTH-INTACT SERPL-MCNC: 52.4 PG/ML (ref 15–65)
RBC # BLD AUTO: 5.5 10*6/MM3 (ref 3.77–5.28)
RBC # UR STRIP: ABNORMAL /HPF
RBC MORPH BLD: NORMAL
REF LAB TEST METHOD: ABNORMAL
SMALL PLATELETS BLD QL SMEAR: ADEQUATE
SP GR UR STRIP: 1.01 (ref 1–1.03)
SQUAMOUS #/AREA URNS HPF: ABNORMAL /HPF
URATE SERPL-MCNC: 7.9 MG/DL (ref 2.4–5.7)
UROBILINOGEN UR QL STRIP: ABNORMAL
WBC # UR STRIP: ABNORMAL /HPF
WBC MORPH BLD: NORMAL
WBC NRBC COR # BLD AUTO: 10.94 10*3/MM3 (ref 3.4–10.8)

## 2024-10-22 PROCEDURE — 81001 URINALYSIS AUTO W/SCOPE: CPT

## 2024-10-22 PROCEDURE — 36415 COLL VENOUS BLD VENIPUNCTURE: CPT

## 2024-10-22 PROCEDURE — 84550 ASSAY OF BLOOD/URIC ACID: CPT

## 2024-10-22 PROCEDURE — G0463 HOSPITAL OUTPT CLINIC VISIT: HCPCS

## 2024-10-22 PROCEDURE — 85007 BL SMEAR W/DIFF WBC COUNT: CPT

## 2024-10-22 PROCEDURE — 84100 ASSAY OF PHOSPHORUS: CPT

## 2024-10-22 PROCEDURE — 85025 COMPLETE CBC W/AUTO DIFF WBC: CPT

## 2024-10-22 PROCEDURE — 82306 VITAMIN D 25 HYDROXY: CPT

## 2024-10-22 PROCEDURE — 83970 ASSAY OF PARATHORMONE: CPT

## 2024-10-22 PROCEDURE — 83036 HEMOGLOBIN GLYCOSYLATED A1C: CPT

## 2024-10-22 NOTE — PROGRESS NOTES
SLEEP/PULMONARY  CLINIC NOTE      PATIENT IDENTIFICATION:  Name: Dariana Rivera  Age: 69 y.o.  Sex: female  :  1955  MRN: [id number]    DATE OF CONSULTATION:  10/22/2024     Referring physician:    Franny Strickland MD            CHIEF COMPLAINT: LISANDRO    History of Present Illness:   Dariana Rivera is a 69 y.o. female Pt on an oxygen supplement and keeping the head of the bed elevated feeling better more energy   no sleepiness no fatigue no tiredness, no mask irritation no dryness, patient is losing weight also.     Review chronic obstructive airway disease currently on Trelegy no shortness of breath no coughing no wheezing no requirement for hospitalization    Review of Systems:   Constitutional: As above   Eyes: negative   ENT/oropharynx: negative   Cardiovascular: negative   Respiratory: As above   Gastrointestinal: negative   Genitourinary: negative   Neurological: negative   Musculoskeletal: negative   Integument/breast: negative   Endocrine: negative   Allergic/Immunologic: negative     Past Medical History:  Past Medical History:   Diagnosis Date    Allergic Not sure    Penicillin, cipro, clindamycin, methadone, ambien    Angina at rest     DR. Amador    Anxiety disorder     [facility]     Asthma About 5 yrs ago    Shortness of breath with exertion    Back pain     Bipolar disorder     Cataract     Left & right removed in 3/2016    Cervical dystonia     Cholelithiasis     Gallbladder removed in  or     Chronic pain     with stenosis    Cirrhosis     Colitis     Colon polyps     COPD (chronic obstructive pulmonary disease)     Not sure when diagnosed    Cramping of feet     Cramping of hands     DDD (degenerative disc disease), cervical     DDD (degenerative disc disease), lumbar     DDD (degenerative disc disease), lumbar     DDD (degenerative disc disease), thoracic     Depression     Diabetes mellitus     Not sure when diagnosed.    Diverticulosis     Not sure when diagnosed     Dysphagia 09/2020    Eosinophilic colitis     Gastritis     GERD (gastroesophageal reflux disease)     Headache Not sure    Migraines stopped after complete hyst in 2004    Hepatic steatosis     non alcoholic steo-hepatitis     Hx of lithotripsy 02/10/2021    Hypertension     IBS (irritable bowel syndrome)     Infectious viral hepatitis     [facility]    Insomnia     Kidney stones     Low back pain     Not sure when diagnosed    Lumbar stenosis     Neck pain     Neuromuscular disorder Not sure    Parkinsons and Cervical Dystonia    Neuropathy     Obesity     Obese since about 1966    Osteoarthritis     Osteopenia     Osteopenia diagnosed after bone density test in 2024.    Parkinson's disease     Peripheral edema     Pneumonia May 2018    About a month after neck fusion surgery    PTSD (post-traumatic stress disorder)     Recurrent UTI     Renal insufficiency     Not sure when diagnosed    Seizure     onset 7/2016.  Last seizure 10/2019    Sleep apnea     Using Bipap machine at night. advised to bring dos    Sleep apnea, obstructive     Urinary incontinence        Past Surgical History:  Past Surgical History:   Procedure Laterality Date    BACK SURGERY      CARDIAC CATHETERIZATION  02/2019    CARPAL TUNNEL RELEASE Bilateral     Wrist     CHOLECYSTECTOMY  1997    COLONOSCOPY      CYSTOSCOPY BLADDER STONE LITHOTRIPSY      ENDOMETRIAL ABLATION      ENDOSCOPY      ENDOSCOPY N/A 04/15/2020    Procedure: ESOPHAGOGASTRODUODENOSCOPY with dilitation (60FR.);  Surgeon: Julieta Allison MD;  Location: [facility] ENDOSCOPY;  Service: Gastroenterology;  Laterality: N/A;  varices,hiatal hernia,gastritis    ENDOSCOPY N/A 09/29/2020    Procedure: ESOPHAGOGASTRODUODENOSCOPY with esophageal dilitation (54 bougie);  Surgeon: Julieta Allison MD;  Location: [facility] ENDOSCOPY;  Service: Gastroenterology;  Laterality: N/A;   post op: hiatal hernia, esophageal stricture    ENDOSCOPY N/A 09/30/2021    Procedure: ESOPHAGOGASTRODUODENOSCOPY WITH  DILATATION (BOUGIE #60);  Surgeon: Julieta Allison MD;  Location: [facility] ENDOSCOPY;  Service: Gastroenterology;  Laterality: N/A;  ESOPHAGEAL VARICES AND HIATAL HERNIA    ENDOSCOPY N/A 2022    Procedure: ESOPHAGOGASTRODUODENOSCOPY WITH BIOPSY X1 AREA  AND DILATATION (BOUGIE  #54);  Surgeon: Zachary Mcgarry MD;  Location: [facility] ENDOSCOPY;  Service: Gastroenterology;  Laterality: N/A;  Post: candida esophagitis,gastritis    ENDOSCOPY N/A 2023    Procedure: ESOPHAGOGASTRODUODENOSCOPY WITH BIOPSY X 1  AREA  AND DILATATON (48 NON GUIDED BOUGIE);  Surgeon: Zachary Mcgarry MD;  Location: [facility] ENDOSCOPY;  Service: Gastroenterology;  Laterality: N/A;  post op: GASTRITIS, SMALL ESOPHAGEAL VARICES    EYE SURGERY  3-14 & 3-    Cataract surgery R 3-14, L 3-    HYSTERECTOMY  2004    complete    INTERSTIM PLACEMENT      bladder    JOINT REPLACEMENT Bilateral     knees    KNEE ARTHROSCOPY Bilateral     Arthroscopic surgery to bilateral knees     OTHER SURGICAL HISTORY      Stress test     OTHER SURGICAL HISTORY  10/2016    Lithotripsy total 4-5    OTHER SURGICAL HISTORY      Right arm cellulits- spider bites     OTHER SURGICAL HISTORY  2018    Lithotripsy    OTHER SURGICAL HISTORY  2018    ACDF C3-C4 & C6-C7    REPLACEMENT TOTAL KNEE  2000    REPLACEMENT TOTAL KNEE Left 2016    SHOULDER ROTATOR CUFF REPAIR Right 2020    Procedure: RT ROTATOR CUFF REPAIR OPEN;  Surgeon: Curly Vaughn MD;  Location: [facility] MAIN OR;  Service: Orthopedics    SPINE SURGERY  2018    Fusion surgery C3-C4 and C6-C7        Family History:  Family History   Problem Relation Age of Onset    Hypertension Mother     Hyperlipidemia Mother     Arthritis Mother     Cancer Mother         Skin cancers    Depression Mother     Mental illness Mother         Depression and Alzheimers    Diabetes Father              Heart disease Father         Had angina. Was diabetic.   of heart attack at age 57.    Early death Father          at 57 from heart attack    Heart attack Father          of heart attack in     Heart failure Father              Hyperlipidemia Sister     Arthritis Sister     Cancer Sister         Skin cancers    Depression Sister     Diabetes Sister         Diagnosed 2019    Heart disease Sister         2 mini strokes, atrial fribulation, pacemaker insertion    Hypertension Sister     Mental illness Sister         Depression    Stroke Sister         2 mini strokes    Diabetes Brother         Not sure when diagnosed    Heart disease Brother         Had open heart surgery about 4 yrs ago.    Hypertension Brother     Hyperlipidemia Brother     Arthritis Brother     Cancer Brother         Skin cancers and colon cancer    Heart attack Brother         Had a heart attack about 4 yrs ago    Breast cancer Maternal Grandmother     Breast cancer Maternal Aunt         Social History:   Social History     Tobacco Use    Smoking status: Former     Current packs/day: 0.00     Average packs/day: 0.5 packs/day for 15.6 years (7.8 ttl pk-yrs)     Types: Cigarettes     Start date: 1974     Quit date: 1990     Years since quittin.3     Passive exposure: Past    Smokeless tobacco: Never    Tobacco comments:     Stopped off and on.  Stopped once for more than 2 yrs   Substance Use Topics    Alcohol use: No        Allergies:  Allergies   Allergen Reactions    Ambien  [Zolpidem Tartrate] Confusion    Ciprofloxacin Hcl Rash    Penicillins Rash    Zolpidem Unknown - High Severity     Other reaction(s): Confusion    Methadone Hallucinations    Clindamycin Rash       Home Meds:  (Not in a hospital admission)      Objective:    Vitals Ranges:   Heart Rate:  [66] 66  Resp:  [14] 14  BP: (103)/(68) 103/68  Body mass index is 39.31 kg/m².     Exam:  General Appearance:  WDWN morbidly obese  HEENT:   without obvious abnormality,  Conjunctiva/corneas clear,   Normal external ear canals, no drainage    Clear orsalmucosa,  Mallampati score 3    Neck:  Supple, symmetrical, trachea midline. No JVD.  Lungs:   Bilateral basal rhonchi bilaterally, respirations unlabored symmetrical wall movement.    Chest wall:  No tenderness or deformity.    Heart:  Regular rate and rhythm, S1 and S2 normal.  Extremities: Trace edema no clubbing or Cyanosis        Data Review:  All labs (24hrs): No results found for this or any previous visit (from the past 24 hours).     Imaging:  US Abdomen Limited  Narrative: US ABDOMEN LIMITED    Date of Exam: 10/9/2024 10:43 AM EDT    Indication: cirrhosis of liver, GERD, dysphagia.    Comparison: Ultrasound abdomen 3/27/2024    Technique: Grayscale and color Doppler ultrasound evaluation of the right upper quadrant was performed.    Findings:  Slight increased hepatic echogenicity. There is coarsened hepatic echotexture with irregular nodular peripheral hepatic contour in keeping with chronic liver disease. No focal hepatic lesion. Hepatopetal flow of the main portal vein. No visualized   ascites.    Cholecystectomy    No intrahepatic duct dilation. The common bile duct measures maximally 8 mm, likely postcholecystectomy related ectasia..    The visualized portions of the head and body of the pancreas are normal. The pancreatic tail is not seen due to bowel gas.    Homogeneous cortical echotexture of the right kidney. No hydronephrosis, shadowing echogenicities or solid masses.  Impression: Morphologic changes of chronic liver disease without evidence of hepatocellular carcinoma.    No recent cross-sectional imaging available regarding patient's reported cirrhosis. Consider nonemergent CT or MRI to establish new baseline regarding HCC screening.    Electronically Signed: Selvin Marquez MD    10/10/2024 10:27 AM EDT    Workstation ID: [id number]       ASSESSMENT:  Diagnoses and all orders for this visit:    Panlobular emphysema    Obstructive sleep apnea  syndrome    Gastroesophageal reflux disease without esophagitis    Cor pulmonale    Morbid obesity        PLAN:  This is patient with symptoms of obstructive sleep apnea, continue oxygen supplement avoid supine avoid sedative meds in pm, weight loss, Avoid driving. Long discussion with patient about the physiology of LISANDRO, and long term and short term benefit of treating LISANDRO    Bronchodilator inhaled corticosteroid    Education how to use inhalers    Encouraged to use incentive spirometer    Continue to exercise slowly as tolerated    Monitor for any change in the color of the sputum    Avoid any exposure to fumes, gas or any irritant    Treating LISANDRO will improve BP control      Follow-up 6 months    Sasha Khan MD. D, ABSM.  10/22/2024  10:56 EDT

## 2024-10-24 ENCOUNTER — LAB (OUTPATIENT)
Dept: LAB | Facility: HOSPITAL | Age: 69
End: 2024-10-24
Payer: MEDICARE

## 2024-10-24 DIAGNOSIS — E11.8 DIABETIC COMPLICATION: ICD-10-CM

## 2024-10-24 DIAGNOSIS — N18.31 CHRONIC KIDNEY DISEASE (CKD) STAGE G3A/A1, MODERATELY DECREASED GLOMERULAR FILTRATION RATE (GFR) BETWEEN 45-59 ML/MIN/1.73 SQUARE METER AND ALBUMINURIA CREATININE RATIO LESS THAN 30 MG/G (CMS/H*: ICD-10-CM

## 2024-10-24 LAB
ALBUMIN SERPL-MCNC: 4.2 G/DL (ref 3.5–5.2)
ALBUMIN/GLOB SERPL: 1.1 G/DL
ALP SERPL-CCNC: 64 U/L (ref 39–117)
ALT SERPL W P-5'-P-CCNC: 9 U/L (ref 1–33)
ANION GAP SERPL CALCULATED.3IONS-SCNC: 14.4 MMOL/L (ref 5–15)
AST SERPL-CCNC: 38 U/L (ref 1–32)
BILIRUB SERPL-MCNC: 1.2 MG/DL (ref 0–1.2)
BUN SERPL-MCNC: 20 MG/DL (ref 8–23)
BUN/CREAT SERPL: 17.4 (ref 7–25)
CALCIUM SPEC-SCNC: 10.7 MG/DL (ref 8.6–10.5)
CHLORIDE SERPL-SCNC: 96 MMOL/L (ref 98–107)
CK SERPL-CCNC: 39 U/L (ref 20–180)
CO2 SERPL-SCNC: 29.6 MMOL/L (ref 22–29)
CREAT SERPL-MCNC: 1.15 MG/DL (ref 0.57–1)
EGFRCR SERPLBLD CKD-EPI 2021: 52 ML/MIN/1.73
GLOBULIN UR ELPH-MCNC: 4 GM/DL
GLUCOSE SERPL-MCNC: 140 MG/DL (ref 65–99)
MAGNESIUM SERPL-MCNC: 2.1 MG/DL (ref 1.6–2.4)
POTASSIUM SERPL-SCNC: 4.5 MMOL/L (ref 3.5–5.2)
PROT SERPL-MCNC: 8.2 G/DL (ref 6–8.5)
SODIUM SERPL-SCNC: 140 MMOL/L (ref 136–145)

## 2024-10-24 PROCEDURE — 80053 COMPREHEN METABOLIC PANEL: CPT

## 2024-10-24 PROCEDURE — 36415 COLL VENOUS BLD VENIPUNCTURE: CPT

## 2024-10-24 PROCEDURE — 82550 ASSAY OF CK (CPK): CPT

## 2024-10-24 PROCEDURE — 83735 ASSAY OF MAGNESIUM: CPT

## 2024-10-30 DIAGNOSIS — E11.49 TYPE 2 DIABETES MELLITUS WITH OTHER DIABETIC NEUROLOGICAL COMPLICATION: Chronic | ICD-10-CM

## 2024-10-30 RX ORDER — ROPINIROLE 0.25 MG/1
TABLET, FILM COATED ORAL
Qty: 180 TABLET | Refills: 0 | Status: SHIPPED | OUTPATIENT
Start: 2024-10-30

## 2024-10-30 RX ORDER — DAPAGLIFLOZIN 10 MG/1
1 TABLET, FILM COATED ORAL DAILY
Qty: 90 TABLET | Refills: 0 | Status: SHIPPED | OUTPATIENT
Start: 2024-10-30

## 2024-10-30 NOTE — TELEPHONE ENCOUNTER
Rx Refill Note  Requested Prescriptions     Pending Prescriptions Disp Refills    rOPINIRole (REQUIP) 0.25 MG tablet [Pharmacy Med Name: ROPINIROLE 0.25MG^] 180 tablet 0     Sig: TAKE 1 TABLET BY MOUTH TWICE DAILY **TAKE THE NIGHT DOSE 1 HOUR BEFORE BEDTIME**      Last office visit with prescribing clinician: 9/10/2024   Last telemedicine visit with prescribing clinician: Visit date not found   Next office visit with prescribing clinician: 3/6/2025   Office Visit with Archana Singh MD (09/10/2024)                       Would you like a call back once the refill request has been completed: [] Yes [] No    If the office needs to give you a call back, can they leave a voicemail: [] Yes [] No    Ira Naranjo MA  10/30/24, 09:18 EDT

## 2024-11-02 RX ORDER — BLOOD SUGAR DIAGNOSTIC
STRIP MISCELLANEOUS
Qty: 100 EACH | Refills: 0 | Status: SHIPPED | OUTPATIENT
Start: 2024-11-02

## 2024-12-09 RX ORDER — PRAMIPEXOLE DIHYDROCHLORIDE 0.5 MG/1
0.5 TABLET ORAL 2 TIMES DAILY
Qty: 180 TABLET | Refills: 0 | Status: SHIPPED | OUTPATIENT
Start: 2024-12-09

## 2024-12-26 ENCOUNTER — TELEPHONE (OUTPATIENT)
Dept: FAMILY MEDICINE CLINIC | Facility: CLINIC | Age: 69
End: 2024-12-26
Payer: MEDICARE

## 2024-12-26 NOTE — TELEPHONE ENCOUNTER
She needs to push fluids and rest.  She needs to avoid sudden movements.  She needs to have an appointment and see 1 of us in the office tomorrow if there is an opening or Monday at the latest.  If symptoms get worse she is to be in the emergency room

## 2024-12-26 NOTE — TELEPHONE ENCOUNTER
Swapna with help at home called to report that pt fell last night. Hit her head. Skin tear on left forearm.   Pt was walking to kitchen and got dizzy when she turned around. Left side of her head. They called EMS and was checked out. EMS wrapped it.   Heart rate was running in the 50s at today's visit. 49-55. Bp was okay low 100s. 96/65 while EMS was there last night. Refused ER. No knots on her head and had a good neuro eval today at home health visit. Blood sugars have been okay- ranging 90s-120s.   Swapna  nurse is concerned about bp hr being lower and the dizziness.   Pt reported to Swapna that she normally does not get dizzy, but has been lately.

## 2024-12-27 ENCOUNTER — HOSPITAL ENCOUNTER (INPATIENT)
Facility: HOSPITAL | Age: 69
LOS: 2 days | Discharge: NURSING, REHAB OR LONG-TERM CARE | End: 2024-12-31
Attending: EMERGENCY MEDICINE | Admitting: HOSPITALIST
Payer: COMMERCIAL

## 2024-12-27 ENCOUNTER — APPOINTMENT (OUTPATIENT)
Dept: GENERAL RADIOLOGY | Facility: HOSPITAL | Age: 69
End: 2024-12-27
Payer: MEDICARE

## 2024-12-27 ENCOUNTER — APPOINTMENT (OUTPATIENT)
Dept: CT IMAGING | Facility: HOSPITAL | Age: 69
End: 2024-12-27
Payer: MEDICARE

## 2024-12-27 DIAGNOSIS — S30.0XXA CONTUSION OF LOWER BACK, INITIAL ENCOUNTER: ICD-10-CM

## 2024-12-27 DIAGNOSIS — F31.32 BIPOLAR 1 DISORDER, DEPRESSED, MODERATE: Chronic | ICD-10-CM

## 2024-12-27 DIAGNOSIS — N39.0 URINARY TRACT INFECTION WITHOUT HEMATURIA, SITE UNSPECIFIED: ICD-10-CM
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
## 2024-12-27 DIAGNOSIS — R53.1 GENERAL WEAKNESS: ICD-10-CM

## 2024-12-27 LAB
ALBUMIN SERPL-MCNC: 3.6 G/DL (ref 3.5–5.2)
ALBUMIN/GLOB SERPL: 0.9 G/DL
ALP SERPL-CCNC: 61 U/L (ref 39–117)
ALT SERPL W P-5'-P-CCNC: 9 U/L (ref 1–33)
ANION GAP SERPL CALCULATED.3IONS-SCNC: 12.4 MMOL/L (ref 5–15)
AST SERPL-CCNC: 34 U/L (ref 1–32)
BACTERIA UR QL AUTO: ABNORMAL /HPF
BASOPHILS # BLD AUTO: 0.03 10*3/MM3 (ref 0–0.2)
BASOPHILS NFR BLD AUTO: 0.4 % (ref 0–1.5)
BILIRUB SERPL-MCNC: 1 MG/DL (ref 0–1.2)
BILIRUB UR QL STRIP: NEGATIVE
BUN SERPL-MCNC: 14 MG/DL (ref 8–23)
BUN/CREAT SERPL: 15.4 (ref 7–25)
CALCIUM SPEC-SCNC: 9.8 MG/DL (ref 8.6–10.5)
CHLORIDE SERPL-SCNC: 100 MMOL/L (ref 98–107)
CLARITY UR: ABNORMAL
CO2 SERPL-SCNC: 28.6 MMOL/L (ref 22–29)
COLOR UR: YELLOW
CREAT SERPL-MCNC: 0.91 MG/DL (ref 0.57–1)
DEPRECATED RDW RBC AUTO: 49.6 FL (ref 37–54)
EGFRCR SERPLBLD CKD-EPI 2021: 68.9 ML/MIN/1.73
EOSINOPHIL # BLD AUTO: 0.12 10*3/MM3 (ref 0–0.4)
EOSINOPHIL NFR BLD AUTO: 1.8 % (ref 0.3–6.2)
ERYTHROCYTE [DISTWIDTH] IN BLOOD BY AUTOMATED COUNT: 13.8 % (ref 12.3–15.4)
GEN 5 1HR TROPONIN T REFLEX: 29 NG/L
GLOBULIN UR ELPH-MCNC: 3.8 GM/DL
GLUCOSE SERPL-MCNC: 102 MG/DL (ref 65–99)
GLUCOSE UR STRIP-MCNC: ABNORMAL MG/DL
HCT VFR BLD AUTO: 44.8 % (ref 34–46.6)
HGB BLD-MCNC: 15 G/DL (ref 12–15.9)
HGB UR QL STRIP.AUTO: ABNORMAL
HOLD SPECIMEN: NORMAL
HOLD SPECIMEN: NORMAL
HYALINE CASTS UR QL AUTO: ABNORMAL /LPF
IMM GRANULOCYTES # BLD AUTO: 0.02 10*3/MM3 (ref 0–0.05)
IMM GRANULOCYTES NFR BLD AUTO: 0.3 % (ref 0–0.5)
KETONES UR QL STRIP: ABNORMAL
LEUKOCYTE ESTERASE UR QL STRIP.AUTO: ABNORMAL
LIPASE SERPL-CCNC: 26 U/L (ref 13–60)
LYMPHOCYTES # BLD AUTO: 1.05 10*3/MM3 (ref 0.7–3.1)
LYMPHOCYTES NFR BLD AUTO: 15.7 % (ref 19.6–45.3)
MCH RBC QN AUTO: 32.5 PG (ref 26.6–33)
MCHC RBC AUTO-ENTMCNC: 33.5 G/DL (ref 31.5–35.7)
MCV RBC AUTO: 97 FL (ref 79–97)
MONOCYTES # BLD AUTO: 0.6 10*3/MM3 (ref 0.1–0.9)
MONOCYTES NFR BLD AUTO: 9 % (ref 5–12)
NEUTROPHILS NFR BLD AUTO: 4.87 10*3/MM3 (ref 1.7–7)
NEUTROPHILS NFR BLD AUTO: 72.8 % (ref 42.7–76)
NITRITE UR QL STRIP: POSITIVE
NRBC BLD AUTO-RTO: 0 /100 WBC (ref 0–0.2)
PH UR STRIP.AUTO: 5.5 [PH] (ref 5–8)
PLATELET # BLD AUTO: 148 10*3/MM3 (ref 140–450)
PMV BLD AUTO: 11 FL (ref 6–12)
POTASSIUM SERPL-SCNC: 4.2 MMOL/L (ref 3.5–5.2)
PROT SERPL-MCNC: 7.4 G/DL (ref 6–8.5)
PROT UR QL STRIP: NEGATIVE
RBC # BLD AUTO: 4.62 10*6/MM3 (ref 3.77–5.28)
RBC # UR STRIP: ABNORMAL /HPF
REF LAB TEST METHOD: ABNORMAL
SODIUM SERPL-SCNC: 141 MMOL/L (ref 136–145)
SP GR UR STRIP: 1.01 (ref 1–1.03)
SQUAMOUS #/AREA URNS HPF: ABNORMAL /HPF
TROPONIN T % DELTA: -6 %
TROPONIN T NUMERIC DELTA: -2 NG/L
TROPONIN T SERPL HS-MCNC: 31 NG/L
UROBILINOGEN UR QL STRIP: ABNORMAL
WBC # UR STRIP: ABNORMAL /HPF
WBC NRBC COR # BLD AUTO: 6.69 10*3/MM3 (ref 3.4–10.8)
WHOLE BLOOD HOLD COAG: NORMAL
WHOLE BLOOD HOLD SPECIMEN: NORMAL

## 2024-12-27 PROCEDURE — 93005 ELECTROCARDIOGRAM TRACING: CPT

## 2024-12-27 PROCEDURE — P9612 CATHETERIZE FOR URINE SPEC: HCPCS

## 2024-12-27 PROCEDURE — 71045 X-RAY EXAM CHEST 1 VIEW: CPT

## 2024-12-27 PROCEDURE — 87086 URINE CULTURE/COLONY COUNT: CPT | Performed by: EMERGENCY MEDICINE

## 2024-12-27 PROCEDURE — 70450 CT HEAD/BRAIN W/O DYE: CPT

## 2024-12-27 PROCEDURE — 83690 ASSAY OF LIPASE: CPT | Performed by: EMERGENCY MEDICINE

## 2024-12-27 PROCEDURE — 99285 EMERGENCY DEPT VISIT HI MDM: CPT

## 2024-12-27 PROCEDURE — 84484 ASSAY OF TROPONIN QUANT: CPT | Performed by: EMERGENCY MEDICINE

## 2024-12-27 PROCEDURE — 85025 COMPLETE CBC W/AUTO DIFF WBC: CPT | Performed by: EMERGENCY MEDICINE

## 2024-12-27 PROCEDURE — 87186 SC STD MICRODIL/AGAR DIL: CPT | Performed by: EMERGENCY MEDICINE

## 2024-12-27 PROCEDURE — G0378 HOSPITAL OBSERVATION PER HR: HCPCS

## 2024-12-27 PROCEDURE — 80053 COMPREHEN METABOLIC PANEL: CPT | Performed by: EMERGENCY MEDICINE

## 2024-12-27 PROCEDURE — 81001 URINALYSIS AUTO W/SCOPE: CPT | Performed by: EMERGENCY MEDICINE

## 2024-12-27 PROCEDURE — 36415 COLL VENOUS BLD VENIPUNCTURE: CPT

## 2024-12-27 PROCEDURE — 25010000002 CEFTRIAXONE PER 250 MG: Performed by: EMERGENCY MEDICINE

## 2024-12-27 PROCEDURE — 93005 ELECTROCARDIOGRAM TRACING: CPT | Performed by: EMERGENCY MEDICINE

## 2024-12-27 PROCEDURE — 74177 CT ABD & PELVIS W/CONTRAST: CPT

## 2024-12-27 PROCEDURE — 25810000003 SODIUM CHLORIDE 0.9 % SOLUTION: Performed by: EMERGENCY MEDICINE

## 2024-12-27 PROCEDURE — 25510000001 IOPAMIDOL PER 1 ML: Performed by: EMERGENCY MEDICINE

## 2024-12-27 PROCEDURE — 87077 CULTURE AEROBIC IDENTIFY: CPT | Performed by: EMERGENCY MEDICINE

## 2024-12-27 RX ORDER — MULTIPLE VITAMINS W/ MINERALS TAB 9MG-400MCG
1 TAB ORAL DAILY
Status: DISCONTINUED | OUTPATIENT
Start: 2024-12-28 | End: 2024-12-31 | Stop reason: HOSPADM

## 2024-12-27 RX ORDER — LIDOCAINE 4 G/G
1 PATCH TOPICAL EVERY 24 HOURS
Status: DISCONTINUED | OUTPATIENT
Start: 2024-12-27 | End: 2024-12-31 | Stop reason: HOSPADM

## 2024-12-27 RX ORDER — ROPINIROLE 0.25 MG/1
0.25 TABLET, FILM COATED ORAL EVERY 12 HOURS SCHEDULED
Status: DISCONTINUED | OUTPATIENT
Start: 2024-12-27 | End: 2024-12-31 | Stop reason: HOSPADM

## 2024-12-27 RX ORDER — CLONAZEPAM 0.5 MG/1
0.5 TABLET ORAL 2 TIMES DAILY
Status: ON HOLD | COMMUNITY
End: 2024-12-31

## 2024-12-27 RX ORDER — CALCIUM CARBONATE 500 MG/1
2 TABLET, CHEWABLE ORAL 2 TIMES DAILY PRN
Status: DISCONTINUED | OUTPATIENT
Start: 2024-12-27 | End: 2024-12-31 | Stop reason: HOSPADM

## 2024-12-27 RX ORDER — IPRATROPIUM BROMIDE AND ALBUTEROL SULFATE 2.5; .5 MG/3ML; MG/3ML
3 SOLUTION RESPIRATORY (INHALATION) EVERY 4 HOURS PRN
Status: DISCONTINUED | OUTPATIENT
Start: 2024-12-27 | End: 2024-12-31 | Stop reason: HOSPADM

## 2024-12-27 RX ORDER — NADOLOL 20 MG/1
20 TABLET ORAL EVERY MORNING
Status: DISCONTINUED | OUTPATIENT
Start: 2024-12-28 | End: 2024-12-28

## 2024-12-27 RX ORDER — ALBUTEROL SULFATE 90 UG/1
2 INHALANT RESPIRATORY (INHALATION) EVERY 6 HOURS PRN
Status: DISCONTINUED | OUTPATIENT
Start: 2024-12-27 | End: 2024-12-31 | Stop reason: HOSPADM

## 2024-12-27 RX ORDER — BISACODYL 10 MG
10 SUPPOSITORY, RECTAL RECTAL DAILY PRN
Status: DISCONTINUED | OUTPATIENT
Start: 2024-12-27 | End: 2024-12-31 | Stop reason: HOSPADM

## 2024-12-27 RX ORDER — SPIRONOLACTONE 25 MG/1
25 TABLET ORAL DAILY
Status: DISCONTINUED | OUTPATIENT
Start: 2024-12-28 | End: 2024-12-31 | Stop reason: HOSPADM

## 2024-12-27 RX ORDER — IOPAMIDOL 755 MG/ML
100 INJECTION, SOLUTION INTRAVASCULAR
Status: COMPLETED | OUTPATIENT
Start: 2024-12-27 | End: 2024-12-27

## 2024-12-27 RX ORDER — ASCORBIC ACID 500 MG
1000 TABLET ORAL DAILY
Status: DISCONTINUED | OUTPATIENT
Start: 2024-12-28 | End: 2024-12-31 | Stop reason: HOSPADM

## 2024-12-27 RX ORDER — GABAPENTIN 300 MG/1
300 CAPSULE ORAL 3 TIMES DAILY
Status: DISCONTINUED | OUTPATIENT
Start: 2024-12-27 | End: 2024-12-31 | Stop reason: HOSPADM

## 2024-12-27 RX ORDER — POLYETHYLENE GLYCOL 3350 17 G/17G
17 POWDER, FOR SOLUTION ORAL DAILY PRN
Status: DISCONTINUED | OUTPATIENT
Start: 2024-12-27 | End: 2024-12-31 | Stop reason: HOSPADM

## 2024-12-27 RX ORDER — PRAMIPEXOLE DIHYDROCHLORIDE 0.25 MG/1
0.5 TABLET ORAL 2 TIMES DAILY
Status: DISCONTINUED | OUTPATIENT
Start: 2024-12-27 | End: 2024-12-31 | Stop reason: HOSPADM

## 2024-12-27 RX ORDER — CHOLECALCIFEROL (VITAMIN D3) 25 MCG
2000 TABLET ORAL DAILY
Status: DISCONTINUED | OUTPATIENT
Start: 2024-12-28 | End: 2024-12-31 | Stop reason: HOSPADM

## 2024-12-27 RX ORDER — BUDESONIDE AND FORMOTEROL FUMARATE DIHYDRATE 160; 4.5 UG/1; UG/1
2 AEROSOL RESPIRATORY (INHALATION)
Status: DISCONTINUED | OUTPATIENT
Start: 2024-12-27 | End: 2024-12-31 | Stop reason: HOSPADM

## 2024-12-27 RX ORDER — PANTOPRAZOLE SODIUM 40 MG/1
40 TABLET, DELAYED RELEASE ORAL
Status: DISCONTINUED | OUTPATIENT
Start: 2024-12-28 | End: 2024-12-31 | Stop reason: HOSPADM

## 2024-12-27 RX ORDER — SODIUM CHLORIDE 0.9 % (FLUSH) 0.9 %
10 SYRINGE (ML) INJECTION AS NEEDED
Status: DISCONTINUED | OUTPATIENT
Start: 2024-12-27 | End: 2024-12-31 | Stop reason: HOSPADM

## 2024-12-27 RX ORDER — ACETAMINOPHEN 325 MG/1
650 TABLET ORAL EVERY 4 HOURS PRN
Status: DISCONTINUED | OUTPATIENT
Start: 2024-12-27 | End: 2024-12-31 | Stop reason: HOSPADM

## 2024-12-27 RX ORDER — AMOXICILLIN 250 MG
2 CAPSULE ORAL 2 TIMES DAILY
Status: DISCONTINUED | OUTPATIENT
Start: 2024-12-27 | End: 2024-12-31 | Stop reason: HOSPADM

## 2024-12-27 RX ORDER — AMANTADINE HYDROCHLORIDE 100 MG/1
100 CAPSULE, GELATIN COATED ORAL NIGHTLY
Status: DISCONTINUED | OUTPATIENT
Start: 2024-12-27 | End: 2024-12-31 | Stop reason: HOSPADM

## 2024-12-27 RX ORDER — FUROSEMIDE 40 MG/1
40 TABLET ORAL DAILY
Status: DISCONTINUED | OUTPATIENT
Start: 2024-12-28 | End: 2024-12-31 | Stop reason: HOSPADM

## 2024-12-27 RX ORDER — BISACODYL 5 MG/1
5 TABLET, DELAYED RELEASE ORAL DAILY PRN
Status: DISCONTINUED | OUTPATIENT
Start: 2024-12-27 | End: 2024-12-31 | Stop reason: HOSPADM

## 2024-12-27 RX ORDER — ONDANSETRON 2 MG/ML
4 INJECTION INTRAMUSCULAR; INTRAVENOUS EVERY 6 HOURS PRN
Status: DISCONTINUED | OUTPATIENT
Start: 2024-12-27 | End: 2024-12-31 | Stop reason: HOSPADM

## 2024-12-27 RX ORDER — ESCITALOPRAM OXALATE 10 MG/1
15 TABLET ORAL DAILY
Status: DISCONTINUED | OUTPATIENT
Start: 2024-12-28 | End: 2024-12-31 | Stop reason: HOSPADM

## 2024-12-27 RX ORDER — ACETAMINOPHEN 160 MG/5ML
650 SOLUTION ORAL EVERY 4 HOURS PRN
Status: DISCONTINUED | OUTPATIENT
Start: 2024-12-27 | End: 2024-12-31 | Stop reason: HOSPADM

## 2024-12-27 RX ORDER — PENTOXIFYLLINE 400 MG/1
400 TABLET, EXTENDED RELEASE ORAL
Status: DISCONTINUED | OUTPATIENT
Start: 2024-12-28 | End: 2024-12-31 | Stop reason: HOSPADM

## 2024-12-27 RX ORDER — CARBIDOPA AND LEVODOPA 25; 100 MG/1; MG/1
2 TABLET, EXTENDED RELEASE ORAL 3 TIMES DAILY
Status: DISCONTINUED | OUTPATIENT
Start: 2024-12-27 | End: 2024-12-31 | Stop reason: HOSPADM

## 2024-12-27 RX ORDER — BACLOFEN 10 MG/1
20 TABLET ORAL 3 TIMES DAILY
Status: DISCONTINUED | OUTPATIENT
Start: 2024-12-27 | End: 2024-12-29

## 2024-12-27 RX ORDER — CLONAZEPAM 0.5 MG/1
0.5 TABLET ORAL 2 TIMES DAILY
Status: DISCONTINUED | OUTPATIENT
Start: 2024-12-27 | End: 2024-12-31 | Stop reason: HOSPADM

## 2024-12-27 RX ORDER — ASPIRIN 81 MG/1
81 TABLET ORAL DAILY
Status: DISCONTINUED | OUTPATIENT
Start: 2024-12-28 | End: 2024-12-31 | Stop reason: HOSPADM

## 2024-12-27 RX ADMIN — CARBIDOPA AND LEVODOPA 2 TABLET: 25; 100 TABLET, EXTENDED RELEASE ORAL at 23:24

## 2024-12-27 RX ADMIN — CLONAZEPAM 0.5 MG: 0.5 TABLET ORAL at 23:24

## 2024-12-27 RX ADMIN — AMANTADINE HYDROCHLORIDE 100 MG: 100 CAPSULE ORAL at 23:25

## 2024-12-27 RX ADMIN — LIDOCAINE 1 PATCH: 4 PATCH TOPICAL at 23:24

## 2024-12-27 RX ADMIN — ROPINIROLE HYDROCHLORIDE 0.25 MG: 0.25 TABLET, FILM COATED ORAL at 23:24

## 2024-12-27 RX ADMIN — PRAMIPEXOLE DIHYDROCHLORIDE 0.5 MG: 0.25 TABLET ORAL at 23:24

## 2024-12-27 RX ADMIN — BACLOFEN 20 MG: 10 TABLET ORAL at 23:24

## 2024-12-27 RX ADMIN — SODIUM CHLORIDE 1000 ML: 9 INJECTION, SOLUTION INTRAVENOUS at 17:41

## 2024-12-27 RX ADMIN — GABAPENTIN 300 MG: 300 CAPSULE ORAL at 23:25

## 2024-12-27 RX ADMIN — SENNOSIDES AND DOCUSATE SODIUM 2 TABLET: 50; 8.6 TABLET ORAL at 23:25

## 2024-12-27 RX ADMIN — CEFTRIAXONE SODIUM 1000 MG: 1 INJECTION, POWDER, FOR SOLUTION INTRAMUSCULAR; INTRAVENOUS at 18:42

## 2024-12-27 RX ADMIN — IOPAMIDOL 100 ML: 755 INJECTION, SOLUTION INTRAVENOUS at 18:31

## 2024-12-27 NOTE — TELEPHONE ENCOUNTER
12-27 Patient returned my call and got her schedule with Dr. Holloway per patient. Advised the patient if she doesn't feel better to go straight to the ER.

## 2024-12-28 LAB
ANION GAP SERPL CALCULATED.3IONS-SCNC: 12 MMOL/L (ref 5–15)
BUN SERPL-MCNC: 12 MG/DL (ref 8–23)
BUN/CREAT SERPL: 14.6 (ref 7–25)
CALCIUM SPEC-SCNC: 9.4 MG/DL (ref 8.6–10.5)
CHLORIDE SERPL-SCNC: 102 MMOL/L (ref 98–107)
CO2 SERPL-SCNC: 25 MMOL/L (ref 22–29)
CREAT SERPL-MCNC: 0.82 MG/DL (ref 0.57–1)
DEPRECATED RDW RBC AUTO: 48.4 FL (ref 37–54)
EGFRCR SERPLBLD CKD-EPI 2021: 78 ML/MIN/1.73
ERYTHROCYTE [DISTWIDTH] IN BLOOD BY AUTOMATED COUNT: 13.5 % (ref 12.3–15.4)
GLUCOSE SERPL-MCNC: 127 MG/DL (ref 65–99)
HCT VFR BLD AUTO: 41.8 % (ref 34–46.6)
HGB BLD-MCNC: 14.3 G/DL (ref 12–15.9)
MCH RBC QN AUTO: 32.8 PG (ref 26.6–33)
MCHC RBC AUTO-ENTMCNC: 34.2 G/DL (ref 31.5–35.7)
MCV RBC AUTO: 95.9 FL (ref 79–97)
PLATELET # BLD AUTO: 134 10*3/MM3 (ref 140–450)
PMV BLD AUTO: 10.9 FL (ref 6–12)
POTASSIUM SERPL-SCNC: 3.6 MMOL/L (ref 3.5–5.2)
QT INTERVAL: 510 MS
QTC INTERVAL: 478 MS
RBC # BLD AUTO: 4.36 10*6/MM3 (ref 3.77–5.28)
SODIUM SERPL-SCNC: 139 MMOL/L (ref 136–145)
WBC NRBC COR # BLD AUTO: 6.61 10*3/MM3 (ref 3.4–10.8)

## 2024-12-28 PROCEDURE — 94640 AIRWAY INHALATION TREATMENT: CPT

## 2024-12-28 PROCEDURE — 94799 UNLISTED PULMONARY SVC/PX: CPT

## 2024-12-28 PROCEDURE — 94761 N-INVAS EAR/PLS OXIMETRY MLT: CPT

## 2024-12-28 PROCEDURE — 85027 COMPLETE CBC AUTOMATED: CPT

## 2024-12-28 PROCEDURE — 25010000002 CEFTRIAXONE PER 250 MG

## 2024-12-28 PROCEDURE — 97162 PT EVAL MOD COMPLEX 30 MIN: CPT

## 2024-12-28 PROCEDURE — 25810000003 SODIUM CHLORIDE 0.9 % SOLUTION: Performed by: HOSPITALIST

## 2024-12-28 PROCEDURE — 80048 BASIC METABOLIC PNL TOTAL CA: CPT

## 2024-12-28 PROCEDURE — G0378 HOSPITAL OBSERVATION PER HR: HCPCS

## 2024-12-28 RX ORDER — NADOLOL 20 MG/1
10 TABLET ORAL EVERY MORNING
Status: DISCONTINUED | OUTPATIENT
Start: 2024-12-29 | End: 2024-12-31 | Stop reason: HOSPADM

## 2024-12-28 RX ADMIN — Medication 1 TABLET: at 08:12

## 2024-12-28 RX ADMIN — ASPIRIN 81 MG: 81 TABLET, COATED ORAL at 08:11

## 2024-12-28 RX ADMIN — BACLOFEN 20 MG: 10 TABLET ORAL at 08:11

## 2024-12-28 RX ADMIN — FUROSEMIDE 40 MG: 40 TABLET ORAL at 08:11

## 2024-12-28 RX ADMIN — PANTOPRAZOLE SODIUM 40 MG: 40 TABLET, DELAYED RELEASE ORAL at 05:25

## 2024-12-28 RX ADMIN — ROPINIROLE HYDROCHLORIDE 0.25 MG: 0.25 TABLET, FILM COATED ORAL at 08:11

## 2024-12-28 RX ADMIN — CARBIDOPA AND LEVODOPA 2 TABLET: 25; 100 TABLET, EXTENDED RELEASE ORAL at 17:17

## 2024-12-28 RX ADMIN — Medication 1 TABLET: at 08:11

## 2024-12-28 RX ADMIN — SODIUM CHLORIDE 1000 ML: 9 INJECTION, SOLUTION INTRAVENOUS at 17:06

## 2024-12-28 RX ADMIN — SPIRONOLACTONE 25 MG: 25 TABLET ORAL at 08:11

## 2024-12-28 RX ADMIN — SENNOSIDES AND DOCUSATE SODIUM 2 TABLET: 50; 8.6 TABLET ORAL at 21:31

## 2024-12-28 RX ADMIN — CLONAZEPAM 0.5 MG: 0.5 TABLET ORAL at 21:31

## 2024-12-28 RX ADMIN — GABAPENTIN 300 MG: 300 CAPSULE ORAL at 17:06

## 2024-12-28 RX ADMIN — SENNOSIDES AND DOCUSATE SODIUM 2 TABLET: 50; 8.6 TABLET ORAL at 08:11

## 2024-12-28 RX ADMIN — OXYCODONE HYDROCHLORIDE AND ACETAMINOPHEN 1000 MG: 500 TABLET ORAL at 08:11

## 2024-12-28 RX ADMIN — CEFTRIAXONE 2000 MG: 2 INJECTION, POWDER, FOR SOLUTION INTRAMUSCULAR; INTRAVENOUS at 17:07

## 2024-12-28 RX ADMIN — BUDESONIDE AND FORMOTEROL FUMARATE DIHYDRATE 2 PUFF: 160; 4.5 AEROSOL RESPIRATORY (INHALATION) at 19:15

## 2024-12-28 RX ADMIN — PRAMIPEXOLE DIHYDROCHLORIDE 0.5 MG: 0.25 TABLET ORAL at 21:31

## 2024-12-28 RX ADMIN — PENTOXIFYLLINE 400 MG: 400 TABLET, EXTENDED RELEASE ORAL at 12:42

## 2024-12-28 RX ADMIN — PENTOXIFYLLINE 400 MG: 400 TABLET, EXTENDED RELEASE ORAL at 17:06

## 2024-12-28 RX ADMIN — CARBIDOPA AND LEVODOPA 2 TABLET: 25; 100 TABLET, EXTENDED RELEASE ORAL at 08:11

## 2024-12-28 RX ADMIN — ROPINIROLE HYDROCHLORIDE 0.25 MG: 0.25 TABLET, FILM COATED ORAL at 21:31

## 2024-12-28 RX ADMIN — AMANTADINE HYDROCHLORIDE 100 MG: 100 CAPSULE ORAL at 21:31

## 2024-12-28 RX ADMIN — CARBIDOPA AND LEVODOPA 2 TABLET: 25; 100 TABLET, EXTENDED RELEASE ORAL at 21:31

## 2024-12-28 RX ADMIN — TIOTROPIUM BROMIDE INHALATION SPRAY 2 PUFF: 3.12 SPRAY, METERED RESPIRATORY (INHALATION) at 10:34

## 2024-12-28 RX ADMIN — BACLOFEN 20 MG: 10 TABLET ORAL at 21:31

## 2024-12-28 RX ADMIN — Medication 2000 UNITS: at 08:12

## 2024-12-28 RX ADMIN — GABAPENTIN 300 MG: 300 CAPSULE ORAL at 21:31

## 2024-12-28 RX ADMIN — ESCITALOPRAM 15 MG: 10 TABLET, FILM COATED ORAL at 08:12

## 2024-12-28 RX ADMIN — CLONAZEPAM 0.5 MG: 0.5 TABLET ORAL at 08:11

## 2024-12-28 RX ADMIN — PENTOXIFYLLINE 400 MG: 400 TABLET, EXTENDED RELEASE ORAL at 08:11

## 2024-12-28 RX ADMIN — BUDESONIDE AND FORMOTEROL FUMARATE DIHYDRATE 2 PUFF: 160; 4.5 AEROSOL RESPIRATORY (INHALATION) at 10:36

## 2024-12-28 RX ADMIN — LIDOCAINE 1 PATCH: 4 PATCH TOPICAL at 22:18

## 2024-12-28 RX ADMIN — PRAMIPEXOLE DIHYDROCHLORIDE 0.5 MG: 0.25 TABLET ORAL at 10:00

## 2024-12-28 RX ADMIN — BACLOFEN 20 MG: 10 TABLET ORAL at 17:06

## 2024-12-28 RX ADMIN — EMPAGLIFLOZIN 25 MG: 25 TABLET, FILM COATED ORAL at 08:12

## 2024-12-28 RX ADMIN — GABAPENTIN 300 MG: 300 CAPSULE ORAL at 08:11

## 2024-12-28 NOTE — PLAN OF CARE
Goal Outcome Evaluation:                                          Patient admitted from ED for UTI, and fall.

## 2024-12-28 NOTE — PLAN OF CARE
Goal Outcome Evaluation:  Plan of Care Reviewed With: patient           Outcome Evaluation: Pt is a 68 YO F admitted with c/o dizziness resulting in multiple falls. PT this date performed orthostatic BP assessment and was (+). BP was as following in different positions: Supine 119/62, sitting 91/58, and standing 76/30 pt was symptomatic in standing and had to return to supien. After return to supine 129/60. Pt states she lives at home with spouse, typically she requires Min A with ADLs, and ambulates safely with RWx. Pt this date requiring assistance to come to sitting EOB and MOD A to come to standing. In standing pt with posterior lean requiring MIN A to maintain standing balance. Pt began feeling weak in standing and required return to supine. PT is below baseline and may require SNF at d/c pending progress.    Anticipated Discharge Disposition (PT): skilled nursing facility                         no

## 2024-12-28 NOTE — H&P
[facility] Medicine Services  History & Physical    Patient Name: Dariana Rivera  : 1955  MRN: [id number]  Primary Care Physician:  Franny Strickland MD  Date of admission: 2024  Date and Time of Service: 2024 at 2120      Assessment & Plan      Chief Complaint: Multiple falls with generalized weakness    Plan:    UTI  Generalized Weakness, likely secondary to UTI  Fall  -CT head with no acute abnormality  -CT abdomen with no acute abnormality  -Chest x-ray also showed no acute abnormality  -UA reviewed, positive nitrites, moderate leukocytes, 21-50 WBC, 4+ bacteria, 7-12 squamous epithelial cells, 3+ blood  -Urine culture pending  -Blood cultures ordered  -Rocephin given in ED, continue  -PT/OT consult  -Fall Precautions  -Case management consult for discharge planning      Home medications for chronic conditions will be restarted as appropriate when verified by pharmacy      History of Present Illness     History of Present Illness: Dariana Rivera is a 69 y.o. female with a previous medical history of Parkinson's, CAD, chronic pain, depression, HTN, COPD, RLS who presented to [facility] on 2024 with complaints of generalized weakness and multiple falls for the past few days.  She does report significant pain to her lower back and right flank area.      In the ED, CT of the head, abdomen, chest x-ray showed no acute abnormalities.  UA showed positive nitrites, moderate leukocytes, 21-50 WBC, 4+ bacteria.  She is afebrile, all vital signs are stable.  She was given Rocephin in the ED and hospitalist was consulted for further management.    12 point ROS reviewed and negative except as mentioned above    Objective      Vitals:   Temp:  [98.7 °F (37.1 °C)] 98.7 °F (37.1 °C)  Heart Rate:  [51-86] 58  Resp:  [20] 20  BP: ()/(53-64) 119/60  Body mass index is 39.36 kg/m².    Physical Exam  Vitals and nursing note reviewed.   Constitutional:        Appearance: Normal appearance.   HENT:      Mouth/Throat:      Mouth: Mucous membranes are moist.   Cardiovascular:      Rate and Rhythm: Normal rate and regular rhythm.   Pulmonary:      Effort: Pulmonary effort is normal.      Breath sounds: Normal breath sounds.   Abdominal:      General: Bowel sounds are normal.      Palpations: Abdomen is soft.   Musculoskeletal:         General: Normal range of motion.   Skin:     General: Skin is warm and dry.      Findings: Bruising present.          Neurological:      General: No focal deficit present.      Mental Status: She is alert and oriented to person, place, and time. Mental status is at baseline.   Psychiatric:         Mood and Affect: Mood normal.         Behavior: Behavior normal.            Personal History     This is a 69 y.o. female with:    Past Medical History:   Diagnosis Date    Allergic Not sure    Penicillin, cipro, clindamycin, methadone, ambien    Angina at rest     DR. [facility]    Anxiety disorder     [facility]     Asthma About 5 yrs ago    Shortness of breath with exertion    Back pain     Bipolar disorder     Cataract     Left & right removed in 3/2016    Cervical dystonia     Cholelithiasis     Gallbladder removed in 1995 or 97    Chronic pain     with stenosis    Cirrhosis     Colitis     Colon polyps     COPD (chronic obstructive pulmonary disease)     Not sure when diagnosed    Cramping of feet     Cramping of hands     DDD (degenerative disc disease), cervical     DDD (degenerative disc disease), lumbar     DDD (degenerative disc disease), lumbar     DDD (degenerative disc disease), thoracic     Depression     Diabetes mellitus     Not sure when diagnosed.    Diverticulosis     Not sure when diagnosed    Dysphagia 09/2020    Eosinophilic colitis     Gastritis     GERD (gastroesophageal reflux disease)     Headache Not sure    Migraines stopped after complete hyst in 2004    Hepatic steatosis     non alcoholic steo-hepatitis     Hx of lithotripsy  02/10/2021    Hypertension     IBS (irritable bowel syndrome)     Infectious viral hepatitis     [facility]    Insomnia     Kidney stones     Low back pain     Not sure when diagnosed    Lumbar stenosis     Neck pain     Neuromuscular disorder Not sure    Parkinsons and Cervical Dystonia    Neuropathy     Obesity     Obese since about 1966    Osteoarthritis     Osteopenia     Osteopenia diagnosed after bone density test in 2024.    Parkinson's disease     Peripheral edema     Pneumonia May 2018    About a month after neck fusion surgery    PTSD (post-traumatic stress disorder)     Recurrent UTI     Renal insufficiency     Not sure when diagnosed    Seizure     onset 7/2016.  Last seizure 10/2019    Sleep apnea     Using Bipap machine at night. advised to bring dos    Sleep apnea, obstructive     Urinary incontinence        Past Surgical History:   Procedure Laterality Date    BACK SURGERY      CARDIAC CATHETERIZATION  02/2019    CARPAL TUNNEL RELEASE Bilateral     Wrist     CHOLECYSTECTOMY  1997    COLONOSCOPY      CYSTOSCOPY BLADDER STONE LITHOTRIPSY      ENDOMETRIAL ABLATION      ENDOSCOPY      ENDOSCOPY N/A 04/15/2020    Procedure: ESOPHAGOGASTRODUODENOSCOPY with dilitation (60FR.);  Surgeon: Julieta Allison MD;  Location: [facility] ENDOSCOPY;  Service: Gastroenterology;  Laterality: N/A;  varices,hiatal hernia,gastritis    ENDOSCOPY N/A 09/29/2020    Procedure: ESOPHAGOGASTRODUODENOSCOPY with esophageal dilitation (54 bougie);  Surgeon: Julieta Allison MD;  Location: [facility] ENDOSCOPY;  Service: Gastroenterology;  Laterality: N/A;   post op: hiatal hernia, esophageal stricture    ENDOSCOPY N/A 09/30/2021    Procedure: ESOPHAGOGASTRODUODENOSCOPY WITH DILATATION (BOUGIE #60);  Surgeon: Julieta Allison MD;  Location: [facility] ENDOSCOPY;  Service: Gastroenterology;  Laterality: N/A;  ESOPHAGEAL VARICES AND HIATAL HERNIA    ENDOSCOPY N/A 07/21/2022    Procedure: ESOPHAGOGASTRODUODENOSCOPY WITH BIOPSY X1 AREA  AND  DILATATION (BOUGIE  #54);  Surgeon: Zachary Mcgarry MD;  Location: [facility] ENDOSCOPY;  Service: Gastroenterology;  Laterality: N/A;  Post: candida esophagitis,gastritis    ENDOSCOPY N/A 05/02/2023    Procedure: ESOPHAGOGASTRODUODENOSCOPY WITH BIOPSY X 1  AREA  AND DILATATON (48 NON GUIDED BOUGIE);  Surgeon: Zachary Mcgarry MD;  Location: [facility] ENDOSCOPY;  Service: Gastroenterology;  Laterality: N/A;  post op: GASTRITIS, SMALL ESOPHAGEAL VARICES    EYE SURGERY  3-14 & 3-    Cataract surgery R 3-14, L 3-    HYSTERECTOMY  09/2004    complete    INTERSTIM PLACEMENT      bladder    JOINT REPLACEMENT Bilateral     knees    KNEE ARTHROSCOPY Bilateral     Arthroscopic surgery to bilateral knees     OTHER SURGICAL HISTORY  2015    Stress test     OTHER SURGICAL HISTORY  10/2016    Lithotripsy total 4-5    OTHER SURGICAL HISTORY      Right arm cellulits- spider bites     OTHER SURGICAL HISTORY  02/08/2018    Lithotripsy    OTHER SURGICAL HISTORY  04/20/2018    ACDF C3-C4 & C6-C7    REPLACEMENT TOTAL KNEE  2000    REPLACEMENT TOTAL KNEE Left 11/2016    SHOULDER ROTATOR CUFF REPAIR Right 01/08/2020    Procedure: RT ROTATOR CUFF REPAIR OPEN;  Surgeon: Curly Vaughn MD;  Location: [facility] MAIN OR;  Service: Orthopedics    SPINE SURGERY  04/2018    Fusion surgery C3-C4 and C6-C7       Active and Resolved Problems  Active Hospital Problems    Diagnosis  POA    **UTI (urinary tract infection) [N39.0]  Yes      Resolved Hospital Problems   No resolved problems to display.       Family History: family history includes Arthritis in her brother, mother, and sister; Breast cancer in her maternal aunt and maternal grandmother; Cancer in her brother, mother, and sister; Depression in her mother and sister; Diabetes in her brother, father, and sister; Early death in her father; Heart attack in her brother and father; Heart disease in her brother, father, and sister; Heart failure in her father;  Hyperlipidemia in her brother, mother, and sister; Hypertension in her brother, mother, and sister; Mental illness in her mother and sister; Stroke in her sister. Otherwise pertinent FHx was reviewed and not pertinent to current issue.    Social History:  reports that she quit smoking about 34 years ago. Her smoking use included cigarettes. She started smoking about 50 years ago. She has a 7.8 pack-year smoking history. She has been exposed to tobacco smoke. She has never used smokeless tobacco. She reports that she does not drink alcohol and does not use drugs.    Home Medications:  Prior to Admission Medications       Prescriptions Last Dose Informant Patient Reported? Taking?    albuterol sulfate  (90 Base) MCG/ACT inhaler   No Yes    Inhale 2 puffs Every 6 (Six) Hours As Needed for Wheezing or Shortness of Air.    amantadine (SYMMETREL) 100 MG capsule 12/26/2024  Yes Yes    Take 1 capsule by mouth Every Night.    aspirin 81 MG EC tablet 12/27/2024  Yes Yes    aspirin 81 mg tablet,delayed release    baclofen (LIORESAL) 20 MG tablet 12/27/2024  Yes Yes    Take 1 tablet by mouth 3 (Three) Times a Day.    calcium carbonate (Calcium 600) 600 MG tablet 12/27/2024  Yes Yes    Take 1 tablet by mouth Daily.    carbidopa-levodopa ER (SINEMET CR)  MG per tablet 12/27/2024  Yes Yes    Take 2 tablets by mouth 3 (Three) Times a Day.    Cholecalciferol (Vitamin D3) 50 MCG (2000 UT) capsule 12/27/2024  Yes Yes    Take 1 capsule by mouth Daily.    clonazePAM (KlonoPIN) 0.5 MG tablet 12/27/2024  Yes Yes    Take 1 tablet by mouth 2 (Two) Times a Day.    escitalopram (LEXAPRO) 10 MG tablet 12/27/2024  No Yes    Take 1.5 tablets by mouth Daily.    Farxiga 10 MG tablet 12/27/2024  No Yes    TAKE ONE TABLET BY MOUTH DAILY    Fluticasone-Umeclidin-Vilant (Trelegy Ellipta) 100-62.5-25 MCG/ACT inhaler 12/27/2024  No Yes    Inhale 1 puff Daily.    furosemide (LASIX) 40 MG tablet 12/27/2024  No Yes    TAKE 1 TABLET BY MOUTH  EVERY DAY    Patient taking differently:  Take 1 tablet by mouth Daily.    gabapentin (NEURONTIN) 300 MG capsule 12/27/2024 Pharmacy Yes Yes    Take 1 capsule by mouth 3 (Three) Times a Day.    ipratropium-albuterol (DUO-NEB) 0.5-2.5 mg/3 ml nebulizer   Yes Yes    Take 3 mL by nebulization Every 4 (Four) Hours As Needed for Wheezing. Use preop if needed    KLOR-CON 20 MEQ CR tablet 12/27/2024  No Yes    TAKE 1 TABLET BY MOUTH DAILY    Patient taking differently:  Take 1 tablet by mouth Daily.    lidocaine (LIDODERM) 5 %   No Yes    Place 1 patch on the skin as directed by provider Daily. Remove & Discard patch within 12 hours or as directed by MD    Patient taking differently:  Place 1 patch on the skin as directed by provider Daily As Needed. Remove & Discard patch within 12 hours or as directed by MD    lurasidone (LATUDA) 40 MG tablet tablet 12/26/2024  No Yes    TAKE ONE TABLET BY MOUTH EVERY EVENING    Melatonin 10 MG capsule 12/26/2024  Yes Yes    Take 1 capsule by mouth every night at bedtime.    Multiple Vitamins-Minerals (MULTIVITAMIN WITH MINERALS) tablet tablet 12/27/2024  Yes Yes    Take 1 tablet by mouth Daily. Do not take dos    nadolol (CORGARD) 20 MG tablet 12/27/2024  Yes Yes    Take 1 tablet by mouth Every Morning. Take dos    nystatin (MYCOSTATIN) 128445 UNIT/GM cream   No Yes    Apply 1 Application topically to the appropriate area as directed 2 (Two) Times a Day.    Patient taking differently:  Apply 1 Application topically to the appropriate area as directed As Needed.    O2 (OXYGEN) 12/26/2024  Yes Yes    Inhale 1 L/min 1 (One) Time. @@ night    omeprazole (priLOSEC) 40 MG capsule 12/27/2024 Pharmacy Yes Yes    Take 1 capsule by mouth Daily. Take preop    pentoxifylline (TRENtal) 400 MG CR tablet 12/27/2024  Yes Yes    Take 1 tablet by mouth 3 (Three) Times a Day With Meals.    pramipexole (MIRAPEX) 0.5 MG tablet 12/27/2024  No Yes    TAKE ONE TABLET BY MOUTH TWICE DAILY    rOPINIRole (REQUIP)  0.25 MG tablet 12/27/2024  No Yes    TAKE 1 TABLET BY MOUTH TWICE DAILY **TAKE THE NIGHT DOSE 1 HOUR BEFORE BEDTIME**    spironolactone (ALDACTONE) 25 MG tablet 12/27/2024  Yes Yes    Take 1 tablet by mouth Daily.    vitamin C (ASCORBIC ACID) 500 MG tablet 12/27/2024  Yes Yes    Take 2 tablets by mouth Daily. dont take preop              Allergies:  Allergies   Allergen Reactions    Ambien  [Zolpidem Tartrate] Confusion    Ciprofloxacin Hcl Rash    Penicillins Rash    Zolpidem Unknown - High Severity     Other reaction(s): Confusion    Methadone Hallucinations    Clindamycin Rash           VTE Prophylaxis:  Mechanical VTE prophylaxis orders are present.        CODE STATUS:    Code Status (Patient has no pulse and is not breathing): CPR (Attempt to Resuscitate)  Medical Interventions (Patient has pulse or is breathing): Full Support        Admission Status:  I believe this patient meets inpatient status.    I discussed the patient's findings and my recommendations with patient.    Signature:     This document has been electronically signed by Dian Child, STEPHANIE, APRN, AGACNP-BC on December 27, 2024 20:35 [facility] Hospitalist Team

## 2024-12-28 NOTE — PROGRESS NOTES
[facility] MEDICINE SERVICE  DAILY PROGRESS NOTE    NAME: Dariana Rivera  : 1955  MRN: [id number]      LOS: 0 days     PROVIDER OF SERVICE: Sagar Way MD    Chief Complaint: UTI (urinary tract infection)    Subjective:     Interval History:  History taken from: patient chart RN    Afebrile lower back pain improved        Review of Systems:   Review of Systems  All negative except as above  Objective:     Vital Signs  Temp:  [97.7 °F (36.5 °C)-98.7 °F (37.1 °C)] 98.5 °F (36.9 °C)  Heart Rate:  [51-86] 64  Resp:  [16-20] 17  BP: ()/(43-64) 91/55   Body mass index is 39.36 kg/m².    Physical Exam  Physical Exam  AOx3 NAD  RRR S1 is normal  Lungs with fair air entry  Abdomen soft nontender nondistended.  No CVA tenderness     Diagnostic Data    Results from last 7 days   Lab Units 24  0302 24  1645   WBC 10*3/mm3 6.61 6.69   HEMOGLOBIN g/dL 14.3 15.0   HEMATOCRIT % 41.8 44.8   PLATELETS 10*3/mm3 134* 148   GLUCOSE mg/dL 127* 102*   CREATININE mg/dL 0.82 0.91   BUN mg/dL 12 14   SODIUM mmol/L 139 141   POTASSIUM mmol/L 3.6 4.2   AST (SGOT) U/L  --  34*   ALT (SGPT) U/L  --  9   ALK PHOS U/L  --  61   BILIRUBIN mg/dL  --  1.0   ANION GAP mmol/L 12.0 12.4       CT Abdomen Pelvis With Contrast    Result Date: 2024  1. No acute traumatic abnormality demonstrated 2. Cirrhotic morphology of the liver with mild splenomegaly and no ascites 3. Bilateral nephrolithiasis Electronically Signed: Leonides Osborne  2024 6:40 PM EST  Workstation ID: [id number]    CT Head Without Contrast    Result Date: 2024  Impression: No acute intracranial findings. Electronically Signed: Alex Weston  2024 6:36 PM EST  Workstation ID: [id number]    XR Chest 1 View    Result Date: 2024  Impression: No active cardiopulmonary disease Electronically Signed: Leonides Osborne  2024 5:38 PM EST  Workstation ID: [id number]       I reviewed the patient's new clinical results.  I  reviewed the patient's new imaging results and agree with the interpretation.    Assessment/Plan:     Active and Resolved Problems  Active Hospital Problems    Diagnosis  POA    **UTI (urinary tract infection) [N39.0]  Yes    Hyperlipidemia [E78.5]  Yes    Generalized anxiety disorder [F41.1]  Yes    Chronic obstructive pulmonary disease [J44.9]  Yes    Chronic kidney disease, unspecified [N18.9]  Yes    Type 2 diabetes mellitus with other diabetic neurological complication [E11.49]  Yes    Chronic back pain [M54.9, G89.29]  Yes    Bipolar 1 disorder, depressed, moderate [F31.32]  Yes    Cirrhosis [K74.60]  Yes    Hypertension [I10]  Yes      Resolved Hospital Problems   No resolved problems to display.       69-year-old female with history of Parkinson's, CAD, chronic pain, depression, hypertension, COPD, RLS admitted with right flank pain and fall    #UTI  #Fall  #Generalized weakness  CT head no acute findings.  CT abdomen and pelvis 5 mm right lower pole renal calculus.  7 mm left lower pole calculus    Continue ceftriaxone  Follow urine cultures and blood cultures  PT/OT    #History of Parkinson's  Continue home dose of Sinemet and baclofen    #Anxiety/depression  Continue home dose Klonopin Lexapro    #History of CAD-stable  Details unclear  Continue home medications #RLS  Continue Requip    VTE Prophylaxis:  Mechanical VTE prophylaxis orders are present.             Disposition Planning:     Barriers to Discharge: Medical workup  Anticipated Date of Discharge: 12/30  Place of Discharge: Home versus rehab      Time: 45 minutes     Code Status and Medical Interventions: CPR (Attempt to Resuscitate); Full Support   Ordered at: 12/27/24 1930     Code Status (Patient has no pulse and is not breathing):    CPR (Attempt to Resuscitate)     Medical Interventions (Patient has pulse or is breathing):    Full Support       Signature: Electronically signed by Sagar Way MD, 12/28/24, 12:18 POLO.  [facility]  Hospitalist Team

## 2024-12-28 NOTE — ED NOTES
Nursing report ED to floor  Dariana Rivera  69 y.o.  female    HPI:   Chief Complaint   Patient presents with    Fall       Admitting doctor:   Shiva Ma DO    Admitting diagnosis:   The primary encounter diagnosis was Fall, initial encounter. Diagnoses of General weakness, Urinary tract infection without hematuria, site unspecified, and Contusion of lower back, initial encounter were also pertinent to this visit.    Code status:   Current Code Status       Date Active Code Status Order ID Comments User Context       12/27/2024 1930 CPR (Attempt to Resuscitate) [id number]  Dian Child APRN ED        Question Answer    Code Status (Patient has no pulse and is not breathing) CPR (Attempt to Resuscitate)    Medical Interventions (Patient has pulse or is breathing) Full Support                    Allergies:   Ambien  [zolpidem tartrate], Ciprofloxacin hcl, Penicillins, Zolpidem, Methadone, and Clindamycin    Isolation:  No active isolations     Fall Risk:  Fall Risk Assessment was completed, and patient is at low risk for falls.   Predictive Model Details         15 (Low) Factor Value    Calculated 12/27/2024 21:25 Age 69    Risk of Fall Model Active Peripheral IV Present     Imaging order in this encounter Present     Respiratory Rate 20     Magnesium not on file     Diastolic BP 60     Number of Distinct Medication Classes administered 3     Nemesio Scale not on file     Albumin 3.6 g/dL     Total Bilirubin 1 mg/dL     Chloride 100 mmol/L     Cardiac Assessment X     Creatinine 0.91 mg/dL     Days after Admission 0.216     Tobacco Use Quit     ALT 9 U/L     Potassium 4.2 mmol/L     Calcium 9.8 mg/dL         Weight:       12/27/24  1644   Weight: 104 kg (229 lb 4.5 oz)       Intake and Output    Intake/Output Summary (Last 24 hours) at 12/27/2024 2125  Last data filed at 12/27/2024 1808  Gross per 24 hour   Intake --   Output 350 ml   Net -350 ml       Diet:   Dietary Orders (From admission, onward)        Start     Ordered    12/27/24 1929  Diet: Cardiac; Healthy Heart (2-3 Na+); Fluid Consistency: Thin (IDDSI 0)  Diet Effective Now        References:    Diet Order Definitions   Question Answer Comment   Diets: Cardiac    Cardiac Diet: Healthy Heart (2-3 Na+)    Fluid Consistency: Thin (IDDSI 0)        12/27/24 1930                     Most recent vitals:   Vitals:    12/27/24 1946 12/27/24 2046 12/27/24 2103 12/27/24 2116   BP: 109/60 118/64 126/60 124/60   Pulse: 53 54 53 57   Resp:       Temp:       TempSrc:       SpO2: 97% 97% 96% 98%   Weight:       Height:           Active LDAs/IV Access:   Lines, Drains & Airways       Active LDAs       Name Placement date Placement time Site Days    Peripheral IV 12/27/24 1645 Left Antecubital 12/27/24  1645  Antecubital  less than 1    Peripheral IV 12/27/24 1757 Anterior;Left Wrist 12/27/24  1757  Wrist  less than 1                    Skin Condition:   Skin Assessments (last day)       None             Labs (abnormal labs have a star):   Labs Reviewed   COMPREHENSIVE METABOLIC PANEL - Abnormal; Notable for the following components:       Result Value    Glucose 102 (*)     AST (SGOT) 34 (*)     All other components within normal limits    Narrative:     GFR Categories in Chronic Kidney Disease (CKD)      GFR Category          GFR (mL/min/1.73)    Interpretation  G1                     90 or greater         Normal or high (1)  G2                      60-89                Mild decrease (1)  G3a                   45-59                Mild to moderate decrease  G3b                   30-44                Moderate to severe decrease  G4                    15-29                Severe decrease  G5                    14 or less           Kidney failure          (1)In the absence of evidence of kidney disease, neither GFR category G1 or G2 fulfill the criteria for CKD.    eGFR calculation 2021 CKD-EPI creatinine equation, which does not include race as a factor   URINALYSIS W/  MICROSCOPIC IF INDICATED (NO CULTURE) - Abnormal; Notable for the following components:    Appearance, UA Cloudy (*)     Glucose,  mg/dL (2+) (*)     Ketones, UA Trace (*)     Blood, UA Large (3+) (*)     Leuk Esterase, UA Moderate (2+) (*)     Nitrite, UA Positive (*)     All other components within normal limits   TROPONIN - Abnormal; Notable for the following components:    HS Troponin T 31 (*)     All other components within normal limits    Narrative:     High Sensitive Troponin T Reference Range:  <14.0 ng/L- Negative Female for AMI  <22.0 ng/L- Negative Male for AMI  >=14 - Abnormal Female indicating possible myocardial injury.  >=22 - Abnormal Male indicating possible myocardial injury.   Clinicians would have to utilize clinical acumen, EKG, Troponin, and serial changes to determine if it is an Acute Myocardial Infarction or myocardial injury due to an underlying chronic condition.        CBC WITH AUTO DIFFERENTIAL - Abnormal; Notable for the following components:    Lymphocyte % 15.7 (*)     All other components within normal limits   HIGH SENSITIVITIY TROPONIN T 1HR - Abnormal; Notable for the following components:    HS Troponin T 29 (*)     All other components within normal limits    Narrative:     High Sensitive Troponin T Reference Range:  <14.0 ng/L- Negative Female for AMI  <22.0 ng/L- Negative Male for AMI  >=14 - Abnormal Female indicating possible myocardial injury.  >=22 - Abnormal Male indicating possible myocardial injury.   Clinicians would have to utilize clinical acumen, EKG, Troponin, and serial changes to determine if it is an Acute Myocardial Infarction or myocardial injury due to an underlying chronic condition.        URINALYSIS, MICROSCOPIC ONLY - Abnormal; Notable for the following components:    RBC, UA 11-20 (*)     WBC, UA 21-50 (*)     Bacteria, UA 4+ (*)     Squamous Epithelial Cells, UA 7-12 (*)     All other components within normal limits   LIPASE - Normal   URINE  CULTURE   RAINBOW DRAW    Narrative:     The following orders were created for panel order [place] Draw.  Procedure                               Abnormality         Status                     ---------                               -----------         ------                     Green Top (Gel)[[id number]]                                  Final result               Lavender Top[[id number]]                                     Final result               Gold Top - SST[[id number]]                                   Final result               Light Blue Top[[id number]]                                   Final result                 Please view results for these tests on the individual orders.   BASIC METABOLIC PANEL   GREEN TOP   LAVENDER TOP   GOLD TOP - SST   LIGHT BLUE TOP   CBC AND DIFFERENTIAL    Narrative:     The following orders were created for panel order CBC & Differential.  Procedure                               Abnormality         Status                     ---------                               -----------         ------                     CBC Auto Differential[[id number]]        Abnormal            Final result                 Please view results for these tests on the individual orders.       LOC: Person, Place, Time, and Situation    Telemetry:  Med/Surg    Cardiac Monitoring Ordered: yes    EKG:   ECG 12 Lead Syncope   Preliminary Result   HEART RATE=53  bpm   RR Pbudixgo=5716  ms   IL Fcaxxfed=305  ms   P Horizontal Axis=26  deg   P Front Axis=10  deg   QRSD Mnjmuefd=735  ms   QT Fjplpgri=053  ms   NGpR=674  ms   QRS Axis=-14  deg   T Wave Axis=71  deg   - ABNORMAL ECG -   Sinus bradycardia   Inferior infarct, old   Nonspecific T abnormalities, lateral leads   Date and Time of Study:2024-12-27 16:24:18          Medications Given in the ED:   Medications   sodium chloride 0.9 % flush 10 mL (has no administration in time range)   acetaminophen (TYLENOL) tablet 650 mg (has no administration in time range)      Or   acetaminophen (TYLENOL) 160 MG/5ML oral solution 650 mg (has no administration in time range)   calcium carbonate (TUMS) chewable tablet 500 mg (200 mg elemental) (has no administration in time range)   sennosides-docusate (PERICOLACE) 8.6-50 MG per tablet 2 tablet (has no administration in time range)     And   polyethylene glycol (MIRALAX) packet 17 g (has no administration in time range)     And   bisacodyl (DULCOLAX) EC tablet 5 mg (has no administration in time range)     And   bisacodyl (DULCOLAX) suppository 10 mg (has no administration in time range)   ondansetron (ZOFRAN) injection 4 mg (has no administration in time range)   melatonin tablet 5 mg (has no administration in time range)   cefTRIAXone (ROCEPHIN) 2,000 mg in sodium chloride 0.9 % 100 mL MBP (has no administration in time range)   sodium chloride 0.9 % bolus 1,000 mL (1,000 mL Intravenous New Bag 12/27/24 1741)   iopamidol (ISOVUE-370) 76 % injection 100 mL (100 mL Intravenous Given 12/27/24 1831)   cefTRIAXone (ROCEPHIN) 1,000 mg in sodium chloride 0.9 % 100 mL MBP (1,000 mg Intravenous New Bag 12/27/24 1842)       Imaging results:  CT Abdomen Pelvis With Contrast    Result Date: 12/27/2024  1. No acute traumatic abnormality demonstrated 2. Cirrhotic morphology of the liver with mild splenomegaly and no ascites 3. Bilateral nephrolithiasis Electronically Signed: Leonides Osborne  12/27/2024 6:40 PM EST  Workstation ID: [id number]    CT Head Without Contrast    Result Date: 12/27/2024  Impression: No acute intracranial findings. Electronically Signed: Alex Weston  12/27/2024 6:36 PM EST  Workstation ID: [id number]    XR Chest 1 View    Result Date: 12/27/2024  Impression: No active cardiopulmonary disease Electronically Signed: Leonides Osborne  12/27/2024 5:38 PM EST  Workstation ID: [id number]     Social issues:   Social History     Socioeconomic History    Marital status:    Tobacco Use    Smoking status: Former     Current packs/day:  0.00     Average packs/day: 0.5 packs/day for 15.6 years (7.8 ttl pk-yrs)     Types: Cigarettes     Start date: 1974     Quit date: 1990     Years since quittin.5     Passive exposure: Past    Smokeless tobacco: Never    Tobacco comments:     Stopped off and on.  Stopped once for more than 2 yrs   Vaping Use    Vaping status: Never Used   Substance and Sexual Activity    Alcohol use: No    Drug use: No    Sexual activity: Not Currently     Partners: Male     Birth control/protection: Hysterectomy, Surgical     Comment: Complete hyst        NIH Stroke Scale:  Interval: (not recorded)  1a. Level of Consciousness: (not recorded)  1b. LOC Questions: (not recorded)  1c. LOC Commands: (not recorded)  2. Best Gaze: (not recorded)  3. Visual: (not recorded)  4. Facial Palsy: (not recorded)  5a. Motor Arm, Left: (not recorded)  5b. Motor Arm, Right: (not recorded)  6a. Motor Leg, Left: (not recorded)  6b. Motor Leg, Right: (not recorded)  7. Limb Ataxia: (not recorded)  8. Sensory: (not recorded)  9. Best Language: (not recorded)  10. Dysarthria: (not recorded)  11. Extinction and Inattention (formerly Neglect): (not recorded)    Total (NIH Stroke Scale): (not recorded)     Additional notable assessment information:    Nursing report ED to floor:  marcy Ruiz RN   24 21:25 EST

## 2024-12-28 NOTE — THERAPY EVALUATION
Patient Name: Dariana Rivera  : 1955    MRN: [id number]                              Today's Date: 2024       Admit Date: 2024    Visit Dx:     ICD-10-CM ICD-9-CM   1. Fall, initial encounter  W19.XXXA E888.9   2. General weakness  R53.1 780.79   3. Urinary tract infection without hematuria, site unspecified  N39.0 599.0   4. Contusion of lower back, initial encounter  S30.0XXA 922.31     Patient Active Problem List   Diagnosis    Morbid obesity    Bipolar 1 disorder, depressed, moderate    Chronic back pain    Chronic kidney disease, unspecified    Chronic obstructive pulmonary disease    Cirrhosis    Gastroesophageal reflux disease    Hypertension    Sleep apnea    Parkinson's disease    Recurrent urinary tract infection    Seizure    Spinal stenosis of cervical region    Type 2 diabetes mellitus with other diabetic neurological complication    Generalized anxiety disorder    Hyperlipidemia    Varices of esophagus determined by endoscopy    Injury of cervical spine    Cor pulmonale    Dysphagia    Encounter for annual wellness exam in Medicare patient    Idiopathic osteoarthritis    Degeneration of lumbar intervertebral disc    Panniculitis affecting back    Panniculitis of neck    Spinal stenosis of lumbar region    Osteopenia after menopause    UTI (urinary tract infection)     Past Medical History:   Diagnosis Date    Allergic Not sure    Penicillin, cipro, clindamycin, methadone, ambien    Angina at rest     DR. [place]    Anxiety disorder     [facility]     Asthma About 5 yrs ago    Shortness of breath with exertion    Back pain     Bipolar disorder     Cataract     Left & right removed in 3/2016    Cervical dystonia     Cholelithiasis     Gallbladder removed in  or     Chronic pain     with stenosis    Cirrhosis     Colitis     Colon polyps     COPD (chronic obstructive pulmonary disease)     Not sure when diagnosed    Cramping of feet     Cramping of hands     DDD (degenerative  disc disease), cervical     DDD (degenerative disc disease), lumbar     DDD (degenerative disc disease), lumbar     DDD (degenerative disc disease), thoracic     Depression     Diabetes mellitus     Not sure when diagnosed.    Diverticulosis     Not sure when diagnosed    Dysphagia 09/2020    Eosinophilic colitis     Gastritis     GERD (gastroesophageal reflux disease)     Headache Not sure    Migraines stopped after complete hyst in 2004    Hepatic steatosis     non alcoholic steo-hepatitis     Hx of lithotripsy 02/10/2021    Hypertension     IBS (irritable bowel syndrome)     Infectious viral hepatitis     [facility]    Insomnia     Kidney stones     Low back pain     Not sure when diagnosed    Lumbar stenosis     Neck pain     Neuromuscular disorder Not sure    Parkinsons and Cervical Dystonia    Neuropathy     Obesity     Obese since about 1966    Osteoarthritis     Osteopenia     Osteopenia diagnosed after bone density test in 2024.    Parkinson's disease     Peripheral edema     Pneumonia May 2018    About a month after neck fusion surgery    PTSD (post-traumatic stress disorder)     Recurrent UTI     Renal insufficiency     Not sure when diagnosed    Seizure     onset 7/2016.  Last seizure 10/2019    Sleep apnea     Using Bipap machine at night. advised to bring dos    Sleep apnea, obstructive     Urinary incontinence      Past Surgical History:   Procedure Laterality Date    BACK SURGERY      CARDIAC CATHETERIZATION  02/2019    CARPAL TUNNEL RELEASE Bilateral     Wrist     CHOLECYSTECTOMY  1997    COLONOSCOPY      CYSTOSCOPY BLADDER STONE LITHOTRIPSY      ENDOMETRIAL ABLATION      ENDOSCOPY      ENDOSCOPY N/A 04/15/2020    Procedure: ESOPHAGOGASTRODUODENOSCOPY with dilitation (60FR.);  Surgeon: Julieta Allison MD;  Location: [facility] ENDOSCOPY;  Service: Gastroenterology;  Laterality: N/A;  varices,hiatal hernia,gastritis    ENDOSCOPY N/A 09/29/2020    Procedure: ESOPHAGOGASTRODUODENOSCOPY with esophageal  dilitation (54 bougie);  Surgeon: Julieta Allison MD;  Location: [facility] ENDOSCOPY;  Service: Gastroenterology;  Laterality: N/A;   post op: hiatal hernia, esophageal stricture    ENDOSCOPY N/A 09/30/2021    Procedure: ESOPHAGOGASTRODUODENOSCOPY WITH DILATATION (BOUGIE #60);  Surgeon: Julieta Allison MD;  Location: [facility] ENDOSCOPY;  Service: Gastroenterology;  Laterality: N/A;  ESOPHAGEAL VARICES AND HIATAL HERNIA    ENDOSCOPY N/A 07/21/2022    Procedure: ESOPHAGOGASTRODUODENOSCOPY WITH BIOPSY X1 AREA  AND DILATATION (BOUGIE  #54);  Surgeon: Zachary Mcgarry MD;  Location: [facility] ENDOSCOPY;  Service: Gastroenterology;  Laterality: N/A;  Post: candida esophagitis,gastritis    ENDOSCOPY N/A 05/02/2023    Procedure: ESOPHAGOGASTRODUODENOSCOPY WITH BIOPSY X 1  AREA  AND DILATATON (48 NON GUIDED BOUGIE);  Surgeon: Zachary Mcgarry MD;  Location: [facility] ENDOSCOPY;  Service: Gastroenterology;  Laterality: N/A;  post op: GASTRITIS, SMALL ESOPHAGEAL VARICES    EYE SURGERY  3-14 & 3-    Cataract surgery R 3-14, L 3-    HYSTERECTOMY  09/2004    complete    INTERSTIM PLACEMENT      bladder    JOINT REPLACEMENT Bilateral     knees    KNEE ARTHROSCOPY Bilateral     Arthroscopic surgery to bilateral knees     OTHER SURGICAL HISTORY  2015    Stress test     OTHER SURGICAL HISTORY  10/2016    Lithotripsy total 4-5    OTHER SURGICAL HISTORY      Right arm cellulits- spider bites     OTHER SURGICAL HISTORY  02/08/2018    Lithotripsy    OTHER SURGICAL HISTORY  04/20/2018    ACDF C3-C4 & C6-C7    REPLACEMENT TOTAL KNEE  2000    REPLACEMENT TOTAL KNEE Left 11/2016    SHOULDER ROTATOR CUFF REPAIR Right 01/08/2020    Procedure: RT ROTATOR CUFF REPAIR OPEN;  Surgeon: Curly Vaughn MD;  Location: [facility] MAIN OR;  Service: Orthopedics    SPINE SURGERY  04/2018    Fusion surgery C3-C4 and C6-C7      General Information       Row Name 12/28/24 1677          Physical Therapy Time and Intention    Document  Type evaluation  -EL     Mode of Treatment individual therapy;physical therapy  -[facility] Name 12/28/24 1824          General Information    Prior Level of Function independent:;all household mobility;min assist:;ADL's  -[facility] Name 12/28/24 1824          Living Environment    People in Home spouse  -[facility] Name 12/28/24 1824          Home Main Entrance    Number of Stairs, Main Entrance none  -[facility] Name 12/28/24 1824          Stairs Within Home, Primary    Number of Stairs, Within Home, Primary none  -[facility] Name 12/28/24 1824          Cognition    Orientation Status (Cognition) oriented x 4  -[facility] Name 12/28/24 1824          Safety Issues/Impairments Affecting Functional Mobility    Impairments Affecting Function (Mobility) balance;endurance/activity tolerance;strength;other (see comments)  dizziness  -EL               User Key  (r) = Recorded By, (t) = Taken By, (c) = Cosigned By      Initials Name Provider Type    Chente Borrego PT Physical Therapist                   Mobility       [facility] Name 12/28/24 1825          Bed Mobility    Bed Mobility supine-sit;sit-supine  -EL     Supine-Sit [place] (Bed Mobility) moderate assist (50% patient effort)  -EL     Sit-Supine [place] (Bed Mobility) moderate assist (50% patient effort)  -EL     Comment, (Bed Mobility) requiring assistance wiht BLE  -[facility] Name 12/28/24 1825          Sit-Stand Transfer    Sit-Stand [place] (Transfers) moderate assist (50% patient effort)  -EL     Assistive Device (Sit-Stand Transfers) walker, front-wheeled  -EL     Comment, (Sit-Stand Transfer) Limited to transfers due to symptomatic dizziness  -[facility] Name 12/28/24 1825          Gait/Stairs (Locomotion)    [place] Level (Gait) not tested  -EL               User Key  (r) = Recorded By, (t) = Taken By, (c) = Cosigned By      Initials Name Provider Type    Chente Borrego, GRZEGORZ Physical Therapist                    Obj/Interventions       Row Name 12/28/24 1826          Range of Motion Comprehensive    General Range of Motion bilateral lower extremity ROM WFL  -EL       Row Name 12/28/24 1826          Strength Comprehensive (MMT)    General Manual Muscle Testing (MMT) Assessment lower extremity strength deficits identified  -EL     Comment, General Manual Muscle Testing (MMT) Assessment BLE 4-/5 gross  -EL       Row Name 12/28/24 1826          Balance    Balance Assessment sitting static balance;standing static balance  -EL     Static Sitting Balance independent  -EL     Static Standing Balance minimal assist  -EL     Comment, Balance posterior lean in standing.  -EL       Row Name 12/28/24 1826          Sensory Assessment (Somatosensory)    Sensory Assessment (Somatosensory) sensation intact  -EL               User Key  (r) = Recorded By, (t) = Taken By, (c) = Cosigned By      Initials Name Provider Type    Chente Borrego, PT Physical Therapist                   Goals/Plan       [facility] Name 12/28/24 1835          Bed Mobility Goal 1 (PT)    Activity/Assistive Device (Bed Mobility Goal 1, PT) bed mobility activities, all  -EL     [place] Level/Cues Needed (Bed Mobility Goal 1, PT) modified independence  -EL     Time Frame (Bed Mobility Goal 1, PT) long term goal (LTG);2 weeks  -EL       [facility] Name 12/28/24 1835          Transfer Goal 1 (PT)    Activity/Assistive Device (Transfer Goal 1, PT) transfers, all;walker, rolling  -EL     [place] Level/Cues Needed (Transfer Goal 1, PT) modified independence  -EL     Time Frame (Transfer Goal 1, PT) long term goal (LTG);2 weeks  -EL       Row Name 12/28/24 1835          Gait Training Goal 1 (PT)    Activity/Assistive Device (Gait Training Goal 1, PT) gait (walking locomotion);walker, rolling  -EL     [place] Level (Gait Training Goal 1, PT) modified independence  -EL     Distance (Gait Training Goal 1, PT) 100  -EL     Time Frame (Gait Training Goal 1, PT) long term goal  (LTG);2 weeks  -EL       Row Name 12/28/24 1835          Therapy Assessment/Plan (PT)    Planned Therapy Interventions (PT) neuromuscular re-education;balance training;bed mobility training;transfer training;gait training;patient/family education;strengthening  -EL               User Key  (r) = Recorded By, (t) = Taken By, (c) = Cosigned By      Initials Name Provider Type    Chente Borrego, PT Physical Therapist                   Clinical Impression       Row Name 12/28/24 1829          Pain    Pretreatment Pain Rating 0/10 - no pain  -EL     Posttreatment Pain Rating 0/10 - no pain  -EL       Row Name 12/28/24 1829          Plan of Care Review    Plan of Care Reviewed With patient  -EL     Outcome Evaluation Pt is a 68 YO F admitted with c/o dizziness resulting in multiple falls. PT this date performed orthostatic BP assessment and was (+). BP was as following in different positions: Supine 119/62, sitting 91/58, and standing 76/30 pt was symptomatic in standing and had to return to supien. After return to supine 129/60. Pt states she lives at home with spouse, typically she requires Min A with ADLs, and ambulates safely with RWx. Pt this date requiring assistance to come to sitting EOB and MOD A to come to standing. In standing pt with posterior lean requiring MIN A to maintain standing balance. Pt began feeling weak in standing and required return to supine. PT is below baseline and may require SNF at d/c pending progress.  -EL       Row Name 12/28/24 1829          Therapy Assessment/Plan (PT)    Rehab Potential (PT) good  -EL     Criteria for Skilled Interventions Met (PT) yes  -EL     Therapy Frequency (PT) 5 times/wk  -EL     Predicted Duration of Therapy Intervention (PT) Until d/c  -EL       Row Name 12/28/24 1829          Vital Signs    Pre Patient Position Supine  -EL     Intra Patient Position Standing  -EL     Post Patient Position Supine  -EL       Row Name 12/28/24 1829          Positioning and  Restraints    Pre-Treatment Position in bed  -EL     Post Treatment Position bed  -EL     In Bed notified nsg;supine;call light within reach;encouraged to call for assist;exit alarm on  -EL               User Key  (r) = Recorded By, (t) = Taken By, (c) = Cosigned By      Initials Name Provider Type    Chente Borrego, PT Physical Therapist                   Outcome Measures       Row Name 12/28/24 1836 12/28/24 0802       How much help from another person do you currently need...    Turning from your back to your side while in flat bed without using bedrails? 3  -EL 3  -DP    Moving from lying on back to sitting on the side of a flat bed without bedrails? 2  -EL 3  -DP    Moving to and from a bed to a chair (including a wheelchair)? 2  -EL 3  -DP    Standing up from a chair using your arms (e.g., wheelchair, bedside chair)? 3  -EL 3  -DP    Climbing 3-5 steps with a railing? 1  -EL 2  -DP    To walk in hospital room? 2  -EL 2  -DP    AM-PAC 6 Clicks Score (PT) 13  -EL 16  -DP    Highest Level of Mobility Goal 4 --> Transfer to chair/commode  -EL 5 --> Static standing  -DP      Row Name 12/28/24 1836          Functional Assessment    Outcome Measure Options AM-PAC 6 Clicks Basic Mobility (PT)  -EL               User Key  (r) = Recorded By, (t) = Taken By, (c) = Cosigned By      Initials Name Provider Type    Chente Borrego PT Physical Therapist    Maribel Alcaraz, RN Registered Nurse                                 Physical Therapy Education       Title: PT OT SLP Therapies (Done)       Topic: Physical Therapy (Done)       Point: Mobility training (Done)       Learning Progress Summary            Patient Acceptance, E,TB, VU by FLAVIA at 12/28/2024 1836                      Point: Precautions (Done)       Learning Progress Summary            Patient Acceptance, E,TB, VU by  at 12/28/2024 1836                                      User Key       Initials Effective Dates Name Provider Type [facility] 06/23/20 -   Chente Skelton, PT Physical Therapist PT                  PT Recommendation and Plan  Planned Therapy Interventions (PT): neuromuscular re-education, balance training, bed mobility training, transfer training, gait training, patient/family education, strengthening  Outcome Evaluation: Pt is a 68 YO F admitted with c/o dizziness resulting in multiple falls. PT this date performed orthostatic BP assessment and was (+). BP was as following in different positions: Supine 119/62, sitting 91/58, and standing 76/30 pt was symptomatic in standing and had to return to supien. After return to supine 129/60. Pt states she lives at home with spouse, typically she requires Min A with ADLs, and ambulates safely with RWx. Pt this date requiring assistance to come to sitting EOB and MOD A to come to standing. In standing pt with posterior lean requiring MIN A to maintain standing balance. Pt began feeling weak in standing and required return to supine. PT is below baseline and may require SNF at d/c pending progress.     Time Calculation:   PT Evaluation Complexity  History, PT Evaluation Complexity: 1-2 personal factors and/or comorbidities  Examination of Body Systems (PT Eval Complexity): total of 3 or more elements  Clinical Presentation (PT Evaluation Complexity): evolving  Clinical Decision Making (PT Evaluation Complexity): moderate complexity  Overall Complexity (PT Evaluation Complexity): moderate complexity     PT Charges       Row Name 12/28/24 1837             Time Calculation    Start Time 1611  -EL      Stop Time 1632  -EL      Time Calculation (min) 21 min  -EL      PT Received On 12/28/24  -EL      PT - Next Appointment 12/30/24  -EL      PT Goal Re-Cert Due Date 01/11/25  -EL                User Key  (r) = Recorded By, (t) = Taken By, (c) = Cosigned By      Initials Name Provider Type    Chente Borrego, PT Physical Therapist                  Therapy Charges for Today       Code Description Service Date Service Provider  Modifiers Qty    [id number] HC PT EVAL MOD COMPLEXITY 4 12/28/2024 Chente Skelton, PT GP 1            PT G-Codes  Outcome Measure Options: AM-PAC 6 Clicks Basic Mobility (PT)  AM-PAC 6 Clicks Score (PT): 13  PT Discharge Summary  Anticipated Discharge Disposition (PT): skilled nursing facility    Chente Skelton PT  12/28/2024

## 2024-12-29 LAB
ANION GAP SERPL CALCULATED.3IONS-SCNC: 10.6 MMOL/L (ref 5–15)
BACTERIA SPEC AEROBE CULT: ABNORMAL
BUN SERPL-MCNC: 13 MG/DL (ref 8–23)
BUN/CREAT SERPL: 15.9 (ref 7–25)
CALCIUM SPEC-SCNC: 9.4 MG/DL (ref 8.6–10.5)
CHLORIDE SERPL-SCNC: 104 MMOL/L (ref 98–107)
CO2 SERPL-SCNC: 25.4 MMOL/L (ref 22–29)
CREAT SERPL-MCNC: 0.82 MG/DL (ref 0.57–1)
DEPRECATED RDW RBC AUTO: 49.9 FL (ref 37–54)
EGFRCR SERPLBLD CKD-EPI 2021: 78 ML/MIN/1.73
ERYTHROCYTE [DISTWIDTH] IN BLOOD BY AUTOMATED COUNT: 13.9 % (ref 12.3–15.4)
GLUCOSE SERPL-MCNC: 108 MG/DL (ref 65–99)
HCT VFR BLD AUTO: 43.1 % (ref 34–46.6)
HGB BLD-MCNC: 13.9 G/DL (ref 12–15.9)
MCH RBC QN AUTO: 31.2 PG (ref 26.6–33)
MCHC RBC AUTO-ENTMCNC: 32.3 G/DL (ref 31.5–35.7)
MCV RBC AUTO: 96.9 FL (ref 79–97)
PLATELET # BLD AUTO: 125 10*3/MM3 (ref 140–450)
PMV BLD AUTO: 10.8 FL (ref 6–12)
POTASSIUM SERPL-SCNC: 3.3 MMOL/L (ref 3.5–5.2)
RBC # BLD AUTO: 4.45 10*6/MM3 (ref 3.77–5.28)
SODIUM SERPL-SCNC: 140 MMOL/L (ref 136–145)
WBC NRBC COR # BLD AUTO: 6.57 10*3/MM3 (ref 3.4–10.8)

## 2024-12-29 PROCEDURE — 94664 DEMO&/EVAL PT USE INHALER: CPT

## 2024-12-29 PROCEDURE — 25010000002 CEFTRIAXONE PER 250 MG

## 2024-12-29 PROCEDURE — 85027 COMPLETE CBC AUTOMATED: CPT | Performed by: HOSPITALIST

## 2024-12-29 PROCEDURE — 94761 N-INVAS EAR/PLS OXIMETRY MLT: CPT

## 2024-12-29 PROCEDURE — 94799 UNLISTED PULMONARY SVC/PX: CPT

## 2024-12-29 PROCEDURE — 80048 BASIC METABOLIC PNL TOTAL CA: CPT

## 2024-12-29 RX ORDER — POTASSIUM CHLORIDE 1500 MG/1
40 TABLET, EXTENDED RELEASE ORAL ONCE
Status: COMPLETED | OUTPATIENT
Start: 2024-12-29 | End: 2024-12-29

## 2024-12-29 RX ORDER — BACLOFEN 10 MG/1
10 TABLET ORAL 3 TIMES DAILY
Status: DISCONTINUED | OUTPATIENT
Start: 2024-12-29 | End: 2024-12-31 | Stop reason: HOSPADM

## 2024-12-29 RX ADMIN — ROPINIROLE HYDROCHLORIDE 0.25 MG: 0.25 TABLET, FILM COATED ORAL at 20:04

## 2024-12-29 RX ADMIN — GABAPENTIN 300 MG: 300 CAPSULE ORAL at 09:31

## 2024-12-29 RX ADMIN — LIDOCAINE 1 PATCH: 4 PATCH TOPICAL at 22:03

## 2024-12-29 RX ADMIN — Medication 1 TABLET: at 09:31

## 2024-12-29 RX ADMIN — Medication 10 ML: at 09:31

## 2024-12-29 RX ADMIN — ESCITALOPRAM 15 MG: 10 TABLET, FILM COATED ORAL at 09:30

## 2024-12-29 RX ADMIN — CARBIDOPA AND LEVODOPA 2 TABLET: 25; 100 TABLET, EXTENDED RELEASE ORAL at 17:26

## 2024-12-29 RX ADMIN — BUDESONIDE AND FORMOTEROL FUMARATE DIHYDRATE 2 PUFF: 160; 4.5 AEROSOL RESPIRATORY (INHALATION) at 20:50

## 2024-12-29 RX ADMIN — PENTOXIFYLLINE 400 MG: 400 TABLET, EXTENDED RELEASE ORAL at 17:26

## 2024-12-29 RX ADMIN — PANTOPRAZOLE SODIUM 40 MG: 40 TABLET, DELAYED RELEASE ORAL at 05:56

## 2024-12-29 RX ADMIN — CEFTRIAXONE 2000 MG: 2 INJECTION, POWDER, FOR SOLUTION INTRAMUSCULAR; INTRAVENOUS at 17:26

## 2024-12-29 RX ADMIN — GABAPENTIN 300 MG: 300 CAPSULE ORAL at 20:04

## 2024-12-29 RX ADMIN — PENTOXIFYLLINE 400 MG: 400 TABLET, EXTENDED RELEASE ORAL at 12:16

## 2024-12-29 RX ADMIN — PENTOXIFYLLINE 400 MG: 400 TABLET, EXTENDED RELEASE ORAL at 09:30

## 2024-12-29 RX ADMIN — SENNOSIDES AND DOCUSATE SODIUM 2 TABLET: 50; 8.6 TABLET ORAL at 20:04

## 2024-12-29 RX ADMIN — AMANTADINE HYDROCHLORIDE 100 MG: 100 CAPSULE ORAL at 20:04

## 2024-12-29 RX ADMIN — GABAPENTIN 300 MG: 300 CAPSULE ORAL at 17:26

## 2024-12-29 RX ADMIN — CARBIDOPA AND LEVODOPA 2 TABLET: 25; 100 TABLET, EXTENDED RELEASE ORAL at 20:04

## 2024-12-29 RX ADMIN — PRAMIPEXOLE DIHYDROCHLORIDE 0.5 MG: 0.25 TABLET ORAL at 09:31

## 2024-12-29 RX ADMIN — BACLOFEN 10 MG: 10 TABLET ORAL at 17:26

## 2024-12-29 RX ADMIN — ROPINIROLE HYDROCHLORIDE 0.25 MG: 0.25 TABLET, FILM COATED ORAL at 09:31

## 2024-12-29 RX ADMIN — BUDESONIDE AND FORMOTEROL FUMARATE DIHYDRATE 2 PUFF: 160; 4.5 AEROSOL RESPIRATORY (INHALATION) at 10:28

## 2024-12-29 RX ADMIN — BACLOFEN 10 MG: 10 TABLET ORAL at 20:05

## 2024-12-29 RX ADMIN — BACLOFEN 10 MG: 10 TABLET ORAL at 09:30

## 2024-12-29 RX ADMIN — SENNOSIDES AND DOCUSATE SODIUM 2 TABLET: 50; 8.6 TABLET ORAL at 09:30

## 2024-12-29 RX ADMIN — CARBIDOPA AND LEVODOPA 2 TABLET: 25; 100 TABLET, EXTENDED RELEASE ORAL at 09:30

## 2024-12-29 RX ADMIN — OXYCODONE HYDROCHLORIDE AND ACETAMINOPHEN 1000 MG: 500 TABLET ORAL at 09:31

## 2024-12-29 RX ADMIN — ASPIRIN 81 MG: 81 TABLET, COATED ORAL at 09:31

## 2024-12-29 RX ADMIN — Medication 2000 UNITS: at 09:30

## 2024-12-29 RX ADMIN — CLONAZEPAM 0.5 MG: 0.5 TABLET ORAL at 09:31

## 2024-12-29 RX ADMIN — PRAMIPEXOLE DIHYDROCHLORIDE 0.5 MG: 0.25 TABLET ORAL at 20:04

## 2024-12-29 RX ADMIN — POTASSIUM CHLORIDE 40 MEQ: 1500 TABLET, EXTENDED RELEASE ORAL at 05:55

## 2024-12-29 RX ADMIN — CLONAZEPAM 0.5 MG: 0.5 TABLET ORAL at 20:04

## 2024-12-29 RX ADMIN — TIOTROPIUM BROMIDE INHALATION SPRAY 2 PUFF: 3.12 SPRAY, METERED RESPIRATORY (INHALATION) at 10:28

## 2024-12-29 RX ADMIN — EMPAGLIFLOZIN 25 MG: 25 TABLET, FILM COATED ORAL at 09:31

## 2024-12-29 NOTE — PLAN OF CARE
Goal Outcome Evaluation:      Pt ortostatics remain positive, diuretics held as a result. Urine CX revealed gram negative bacilli. Pt continues on IV ABX will finish on 12/30. Per Hospitalist discharge 12/30; home versus rehab. Per Physical Therapy;  patient is below baseline and may require SNF at d/c pending progress.  & K 3.3 with morning labs, MD aware. Orders placed for one time dose of potassium chloride 40 MEQ.

## 2024-12-29 NOTE — PROGRESS NOTES
[facility] MEDICINE SERVICE  DAILY PROGRESS NOTE    NAME: Dariana Rivera  : 1955  MRN: [id number]      LOS: 0 days     PROVIDER OF SERVICE: Sagar Way MD    Chief Complaint: UTI (urinary tract infection)    Subjective:     Interval History:  History taken from: patient chart RN    Afebrile   Pain controlled     Review of Systems:   Review of Systems  All negative except as above   Objective:     Vital Signs  Temp:  [97.7 °F (36.5 °C)-98.8 °F (37.1 °C)] 98.2 °F (36.8 °C)  Heart Rate:  [59-69] 61  Resp:  [16-20] 16  BP: ()/(41-67) 112/67   Body mass index is 39.36 kg/m².    Physical Exam  Physical Exam  AOx3 NAD  RRR S1 is normal  Lungs with fair air entry  Abdomen soft nontender nondistended.  No CVA tenderness        Diagnostic Data    Results from last 7 days   Lab Units 24  0150 24  0302 24  1645   WBC 10*3/mm3 6.57   < > 6.69   HEMOGLOBIN g/dL 13.9   < > 15.0   HEMATOCRIT % 43.1   < > 44.8   PLATELETS 10*3/mm3 125*   < > 148   GLUCOSE mg/dL 108*   < > 102*   CREATININE mg/dL 0.82   < > 0.91   BUN mg/dL 13   < > 14   SODIUM mmol/L 140   < > 141   POTASSIUM mmol/L 3.3*   < > 4.2   AST (SGOT) U/L  --   --  34*   ALT (SGPT) U/L  --   --  9   ALK PHOS U/L  --   --  61   BILIRUBIN mg/dL  --   --  1.0   ANION GAP mmol/L 10.6   < > 12.4    < > = values in this interval not displayed.       CT Abdomen Pelvis With Contrast    Result Date: 2024  1. No acute traumatic abnormality demonstrated 2. Cirrhotic morphology of the liver with mild splenomegaly and no ascites 3. Bilateral nephrolithiasis Electronically Signed: Leonides Osborne  2024 6:40 PM EST  Workstation ID: [id number]    CT Head Without Contrast    Result Date: 2024  Impression: No acute intracranial findings. Electronically Signed: Alex Weston  2024 6:36 PM EST  Workstation ID: [id number]    XR Chest 1 View    Result Date: 2024  Impression: No active cardiopulmonary disease  Electronically Signed: Leonides Osborne  12/27/2024 5:38 PM EST  Workstation ID: [id number]       I reviewed the patient's new clinical results.  I reviewed the patient's new imaging results and agree with the interpretation.    Assessment/Plan:     Active and Resolved Problems  Active Hospital Problems    Diagnosis  POA    **UTI (urinary tract infection) [N39.0]  Yes    Hyperlipidemia [E78.5]  Yes    Generalized anxiety disorder [F41.1]  Yes    Chronic obstructive pulmonary disease [J44.9]  Yes    Chronic kidney disease, unspecified [N18.9]  Yes    Type 2 diabetes mellitus with other diabetic neurological complication [E11.49]  Yes    Chronic back pain [M54.9, G89.29]  Yes    Bipolar 1 disorder, depressed, moderate [F31.32]  Yes    Cirrhosis [K74.60]  Yes    Hypertension [I10]  Yes      Resolved Hospital Problems   No resolved problems to display.       69-year-old female with history of Parkinson's, CAD, chronic pain, depression, hypertension, COPD, RLS admitted with right flank pain and fall     #UTI  #Fall  #Generalized weakness  CT head no acute findings.  CT abdomen and pelvis 5 mm right lower pole renal calculus.  7 mm left lower pole calculus     Continue ceftriaxone  Ucx growing Ecoli   PT/OT     #History of Parkinson's  Continue home dose of Sinemet  Dose of baclofen reduced to 10 mg TID give hypotension      #Anxiety/depression  Continue home dose Klonopin Lexapro     #History of CAD-stable  #orhostatic hypotension   Orthostatic vitals positive 12/28. S/p IVF with improvement   Nadolol held  Lasix held  Dose of baclofen reduced to 10 3 times daily from 20 3 times daily    #RLS  Continue Requip    VTE Prophylaxis:  Mechanical VTE prophylaxis orders are present.             Disposition Planning:     Barriers to Discharge: Medical workup  Anticipated Date of Discharge: 12/30  Place of Discharge: Home versus rehab      Time: 45 minutes     Code Status and Medical Interventions: CPR (Attempt to Resuscitate); Full  Support   Ordered at: 12/27/24 1930     Code Status (Patient has no pulse and is not breathing):    CPR (Attempt to Resuscitate)     Medical Interventions (Patient has pulse or is breathing):    Full Support       Signature: Electronically signed by Sagar Way MD, 12/29/24, 13:39 EST.  [facility]

## 2024-12-29 NOTE — PLAN OF CARE
Goal Outcome Evaluation:              Outcome Evaluation: Pt. has been resting throughout the shift. Pt. A&Ox4, makes needs known. Pt. sitting in chair throughout shift. Pt. takes medication whole. When medically stable plan to DC home with spouse vs. SNF.

## 2024-12-30 LAB
ANION GAP SERPL CALCULATED.3IONS-SCNC: 10.2 MMOL/L (ref 5–15)
BUN SERPL-MCNC: 13 MG/DL (ref 8–23)
BUN/CREAT SERPL: 15.7 (ref 7–25)
CALCIUM SPEC-SCNC: 9.5 MG/DL (ref 8.6–10.5)
CHLORIDE SERPL-SCNC: 104 MMOL/L (ref 98–107)
CO2 SERPL-SCNC: 26.8 MMOL/L (ref 22–29)
CREAT SERPL-MCNC: 0.83 MG/DL (ref 0.57–1)
DEPRECATED RDW RBC AUTO: 50 FL (ref 37–54)
EGFRCR SERPLBLD CKD-EPI 2021: 76.9 ML/MIN/1.73
ERYTHROCYTE [DISTWIDTH] IN BLOOD BY AUTOMATED COUNT: 13.8 % (ref 12.3–15.4)
GLUCOSE SERPL-MCNC: 94 MG/DL (ref 65–99)
HCT VFR BLD AUTO: 43.1 % (ref 34–46.6)
HGB BLD-MCNC: 14.4 G/DL (ref 12–15.9)
MCH RBC QN AUTO: 32.6 PG (ref 26.6–33)
MCHC RBC AUTO-ENTMCNC: 33.4 G/DL (ref 31.5–35.7)
MCV RBC AUTO: 97.5 FL (ref 79–97)
PLATELET # BLD AUTO: 116 10*3/MM3 (ref 140–450)
PMV BLD AUTO: 10.7 FL (ref 6–12)
POTASSIUM SERPL-SCNC: 3.6 MMOL/L (ref 3.5–5.2)
RBC # BLD AUTO: 4.42 10*6/MM3 (ref 3.77–5.28)
SODIUM SERPL-SCNC: 141 MMOL/L (ref 136–145)
WBC NRBC COR # BLD AUTO: 5.49 10*3/MM3 (ref 3.4–10.8)

## 2024-12-30 PROCEDURE — 94761 N-INVAS EAR/PLS OXIMETRY MLT: CPT

## 2024-12-30 PROCEDURE — 85027 COMPLETE CBC AUTOMATED: CPT | Performed by: HOSPITALIST

## 2024-12-30 PROCEDURE — 25010000002 CEFTRIAXONE PER 250 MG: Performed by: HOSPITALIST

## 2024-12-30 PROCEDURE — 25810000003 SODIUM CHLORIDE 0.9 % SOLUTION: Performed by: HOSPITALIST

## 2024-12-30 PROCEDURE — 97530 THERAPEUTIC ACTIVITIES: CPT

## 2024-12-30 PROCEDURE — 94799 UNLISTED PULMONARY SVC/PX: CPT

## 2024-12-30 PROCEDURE — 80048 BASIC METABOLIC PNL TOTAL CA: CPT | Performed by: HOSPITALIST

## 2024-12-30 PROCEDURE — 97166 OT EVAL MOD COMPLEX 45 MIN: CPT

## 2024-12-30 RX ORDER — SODIUM CHLORIDE 9 MG/ML
100 INJECTION, SOLUTION INTRAVENOUS CONTINUOUS
Status: DISPENSED | OUTPATIENT
Start: 2024-12-30 | End: 2024-12-30

## 2024-12-30 RX ADMIN — CLONAZEPAM 0.5 MG: 0.5 TABLET ORAL at 09:51

## 2024-12-30 RX ADMIN — PENTOXIFYLLINE 400 MG: 400 TABLET, EXTENDED RELEASE ORAL at 12:06

## 2024-12-30 RX ADMIN — PENTOXIFYLLINE 400 MG: 400 TABLET, EXTENDED RELEASE ORAL at 17:36

## 2024-12-30 RX ADMIN — ROPINIROLE HYDROCHLORIDE 0.25 MG: 0.25 TABLET, FILM COATED ORAL at 21:13

## 2024-12-30 RX ADMIN — TIOTROPIUM BROMIDE INHALATION SPRAY 2 PUFF: 3.12 SPRAY, METERED RESPIRATORY (INHALATION) at 07:29

## 2024-12-30 RX ADMIN — BUDESONIDE AND FORMOTEROL FUMARATE DIHYDRATE 2 PUFF: 160; 4.5 AEROSOL RESPIRATORY (INHALATION) at 20:46

## 2024-12-30 RX ADMIN — Medication 2000 UNITS: at 09:51

## 2024-12-30 RX ADMIN — CARBIDOPA AND LEVODOPA 2 TABLET: 25; 100 TABLET, EXTENDED RELEASE ORAL at 16:09

## 2024-12-30 RX ADMIN — OXYCODONE HYDROCHLORIDE AND ACETAMINOPHEN 1000 MG: 500 TABLET ORAL at 09:51

## 2024-12-30 RX ADMIN — CLONAZEPAM 0.5 MG: 0.5 TABLET ORAL at 21:13

## 2024-12-30 RX ADMIN — GABAPENTIN 300 MG: 300 CAPSULE ORAL at 09:52

## 2024-12-30 RX ADMIN — EMPAGLIFLOZIN 25 MG: 25 TABLET, FILM COATED ORAL at 09:51

## 2024-12-30 RX ADMIN — BUDESONIDE AND FORMOTEROL FUMARATE DIHYDRATE 2 PUFF: 160; 4.5 AEROSOL RESPIRATORY (INHALATION) at 07:35

## 2024-12-30 RX ADMIN — ASPIRIN 81 MG: 81 TABLET, COATED ORAL at 09:51

## 2024-12-30 RX ADMIN — GABAPENTIN 300 MG: 300 CAPSULE ORAL at 16:09

## 2024-12-30 RX ADMIN — CARBIDOPA AND LEVODOPA 2 TABLET: 25; 100 TABLET, EXTENDED RELEASE ORAL at 21:12

## 2024-12-30 RX ADMIN — SENNOSIDES AND DOCUSATE SODIUM 2 TABLET: 50; 8.6 TABLET ORAL at 09:50

## 2024-12-30 RX ADMIN — BACLOFEN 10 MG: 10 TABLET ORAL at 09:50

## 2024-12-30 RX ADMIN — CEFTRIAXONE 2000 MG: 2 INJECTION, POWDER, FOR SOLUTION INTRAMUSCULAR; INTRAVENOUS at 12:49

## 2024-12-30 RX ADMIN — PRAMIPEXOLE DIHYDROCHLORIDE 0.5 MG: 0.25 TABLET ORAL at 09:51

## 2024-12-30 RX ADMIN — LIDOCAINE 1 PATCH: 4 PATCH TOPICAL at 23:17

## 2024-12-30 RX ADMIN — PANTOPRAZOLE SODIUM 40 MG: 40 TABLET, DELAYED RELEASE ORAL at 05:13

## 2024-12-30 RX ADMIN — AMANTADINE HYDROCHLORIDE 100 MG: 100 CAPSULE ORAL at 21:12

## 2024-12-30 RX ADMIN — ROPINIROLE HYDROCHLORIDE 0.25 MG: 0.25 TABLET, FILM COATED ORAL at 09:51

## 2024-12-30 RX ADMIN — CARBIDOPA AND LEVODOPA 2 TABLET: 25; 100 TABLET, EXTENDED RELEASE ORAL at 09:50

## 2024-12-30 RX ADMIN — SENNOSIDES AND DOCUSATE SODIUM 2 TABLET: 50; 8.6 TABLET ORAL at 21:13

## 2024-12-30 RX ADMIN — PENTOXIFYLLINE 400 MG: 400 TABLET, EXTENDED RELEASE ORAL at 09:51

## 2024-12-30 RX ADMIN — GABAPENTIN 300 MG: 300 CAPSULE ORAL at 21:13

## 2024-12-30 RX ADMIN — SODIUM CHLORIDE 100 ML/HR: 9 INJECTION, SOLUTION INTRAVENOUS at 12:50

## 2024-12-30 RX ADMIN — Medication 1 TABLET: at 09:51

## 2024-12-30 RX ADMIN — ESCITALOPRAM 15 MG: 10 TABLET, FILM COATED ORAL at 09:51

## 2024-12-30 RX ADMIN — Medication 1 TABLET: at 09:52

## 2024-12-30 NOTE — PLAN OF CARE
Goal Outcome Evaluation:  Plan of Care Reviewed With: patient           Outcome Evaluation: 68 YO F admitted with c/o dizziness resulting in multiple falls. She has a history of PD. Pt states she lives at home with spouse, typically she requires Min A with ADLs, and ambulates safely with RWx. PT tested pt for orthostatic hypotension, positive results and symptomatic with dizziness, weakness. This date, pt on BSC on entry. She is A&Ox4. BP seated 110/77. Mod A needed for both lower body dressing and toilet hygiene. Min A provided to come to standing, with BP dropping to 81/55 and pt reporting dizziness and lightheadedness. She was assisted to the chair at this point, unsafe to prgoress to further mobility. After sitting and feet elevated, BP rebounded to 128/69. Pt unsafe to dc home at this time, with deficits in self care, functional transfers, functional mobility, and increased falls risk indicating need for skilled OT services. Recommend SNF.    Anticipated Discharge Disposition (OT): skilled nursing facility

## 2024-12-30 NOTE — CASE MANAGEMENT/SOCIAL WORK
Continued Stay Note   [facility]     Patient Name: Dariana Rivera  MRN: [id number]  Today's Date: 12/30/2024    Admit Date: 12/27/2024    Plan: [facility] accepted. Precert approved 12/30-01/06. PASRR approved.   Discharge Plan       Row Name 12/30/24 1538       Plan    Plan [facility] accepted. Precert approved 12/30-01/06. PASRR approved.    Patient/Family in Agreement with Plan yes    Plan Comments CM received phone call from patient's room. Pt reports she spoke with her spouse and she is agreeable to rehab at [facility]. CM sent referral to facility and notified liaison Chelly. Chelly confirmed acceptance. Pharmacy updated to [facility]. [facility] started precert and it is approved 12/30-01/06. Discharge anticipated for tomorrow by hospitalist.        Megan Naegele, RN     Office Phone: [phone]  Office Cell: [phone]

## 2024-12-30 NOTE — CASE MANAGEMENT/SOCIAL WORK
Discharge Planning Assessment   [place]     Patient Name: Dariana Rivera  MRN: [id number]  Today's Date: 12/30/2024    Admit Date: 12/27/2024    Plan: From home with spouse and caregivers. SNF choices pending if patient agreeable. Will need precert and PASRR.   Discharge Needs Assessment       Row Name 12/30/24 1015       Living Environment    People in Home spouse    Name(s) of People in Home Trinity    Current Living Arrangements home    Potentially Unsafe Housing Conditions none    In the past 12 months has the electric, gas, oil, or water company threatened to shut off services in your home? No    Primary Care Provided by self;spouse/significant other;homecare agency    Provides Primary Care For no one    Family Caregiver if Needed spouse;other (see comments)    Family Caregiver Names Trinity; 2 Caregivers through [facility]    Quality of Family Relationships helpful;involved;supportive    Able to Return to Prior Arrangements yes       Resource/Environmental Concerns    Resource/Environmental Concerns none    Transportation Concerns none       Transportation Needs    In the past 12 months, has lack of transportation kept you from medical appointments or from getting medications? no    In the past 12 months, has lack of transportation kept you from meetings, work, or from getting things needed for daily living? No       Food Insecurity    Within the past 12 months, you worried that your food would run out before you got the money to buy more. Never true    Within the past 12 months, the food you bought just didn't last and you didn't have money to get more. Never true       Transition Planning    Patient/Family Anticipates Transition to other (see comments)  SNF    Patient/Family Anticipated Services at Transition skilled nursing    Transportation Anticipated family or friend will provide       Discharge Needs Assessment    Readmission Within the Last 30 Days no previous admission in last 30  days    Current Outpatient/Agency/Support Group homecare agency    Equipment Currently Used at Home cane, straight;walker, rolling;wheelchair;shower chair;oxygen;nebulizer;bp cuff;glucometer;grab bar    Concerns to be Addressed discharge planning;care coordination/care conferences    Do you want help finding or keeping work or a job? Patient declined    Do you want help with school or training? For example, starting or completing job training or getting a high school diploma, GED or equivalent No    Anticipated Changes Related to Illness none    Equipment Needed After Discharge none    Outpatient/Agency/Support Group Needs skilled nursing facility    Discharge Facility/Level of Care Needs nursing facility, skilled    Provided Post Acute Provider List? Yes    Post Acute Provider List Nursing Home    Delivered To Patient    Method of Delivery In person                   Discharge Plan       Row Name 12/30/24 1017       Plan    Plan From home with spouse and caregivers. SNF choices pending if patient agreeable. Will need precert and PASRR.    Patient/Family in Agreement with Plan yes    Plan Comments CM met with patient at bedside. Pt lives at home with  Joe; neither of them drive and their caregivers provide transportation. Pt reports assistance needed for bathing, dressing, but is independent with toileting. Pt has 2 caregivers through [facility] Monday's through Thursday who assist with bathing, dressing, cooking, cleaning, and laundry. Pt denies any other HHC/PT services in the home. DME includes cane, walker, wheelchair, nebulizer, BP cuff, glucometer, and 1L O2 HS supplied by [facility]. Explained therapy's recommendation for SNF placement at discharge and SNF list provided. Pt reports she has been to [facility] previously.  will be coming in around 11 AM and they will discuss further; CM to follow up. IMM letter provided and signature obtained.              Demographic Summary        Row Name 12/30/24 1015       General Information    Admission Type inpatient    Arrived From emergency department    Required Notices Provided Important Message from Medicare    Referral Source admission list    Reason for Consult care coordination/care conference;discharge planning    Preferred Language English       Contact Information    Permission Granted to Share Info With                    Functional Status       Row Name 12/30/24 1015       Functional Status    Usual Activity Tolerance moderate    Current Activity Tolerance moderate       Functional Status, IADL    Medications assistive person    Meal Preparation assistive person    Housekeeping assistive person    Laundry assistive person    Shopping assistive person    If for any reason you need help with day-to-day activities such as bathing, preparing meals, shopping, managing finances, etc., do you get the help you need? I get all the help I need             Megan Naegele, RN     Office Phone: [phone]  Office Cell: [phone]

## 2024-12-30 NOTE — PLAN OF CARE
Assessment: Dariana Rivera presents with functional mobility impairments which indicate the need for skilled intervention. Pt reported she was diagnosed with PD several years ago. Pt on BSC upon room entry. Able to stand with Jaden and RW with c/o dizziness and BP decreasing to 81/55. Pt only able to transfer to chair this date. Will continue to follow and progress as tolerated.

## 2024-12-30 NOTE — SIGNIFICANT NOTE
12/30/24 1534   Post Acute Pre-Cert Documentation   Request Submitted by Facility - Type: Hospital   Post-Acute Authorization Type Submitted: SNF   Date Post Acute Pre-Cert Inititated per Facility 12/30/24   Verification from Payer Yes   Date Post Acute Pre-Cert Completed 12/30/24   Accepting Facility [facility]   Hospital Discharge Date Requested 12/30/24   All Clinicals Submitted? Yes   Had Accepting Facility at Time of Submission Yes   Response Received from Insurance? Approval   Response Communicated to:    Authorization Number: [id number]   Post Acute Pre-Cert Initiated Comment CM submitted pre-cert for [facility] via [employer]. Pre-cert auto approved, valid 12/30-1/06. Auth ID [id number]. CM updated [facility]MIP CM.

## 2024-12-30 NOTE — THERAPY TREATMENT NOTE
"Subjective: Pt agreeable to therapeutic plan of care.    Objective:     Precautions - high fall risk    Bed mobility - N/A or Not attempted.  Transfers - Min-A and with rolling walker  Ambulation - 0 feet N/A or Not attempted.    Vitals: Hypotensive and Orthostatic  110/77 sitting on BSC  81/55 standing  128/69 sitting    Pain: 0 VAS   Location: N/A  Intervention for pain: N/A    Education: Provided education on the importance of mobility in the acute care setting, Verbal/Tactile Cues, and Transfer Training    Assessment: Dariana Rivera presents with functional mobility impairments which indicate the need for skilled intervention. Pt reported she was diagnosed with PD several years ago. [facility] [place] [facility]. Able to stand with Jaden and RW with c/o dizziness and BP decreasing to 81/55. Pt only able to transfer to chair this date. Will continue to follow and progress as tolerated.     Plan/Recommendations:   If medically appropriate, Moderate Intensity Therapy recommended post-acute care. This is recommended as therapy feels the patient would require 3-4 days per week and wouldn't tolerate \"3 hour daily\" rehab intensity. SNF would be the preferred choice. If the patient does not agree to SNF, arrange HH or OP depending on home bound status. If patient is medically complex, consider LTACH. Pt requires no DME at discharge.     Pt desires Skilled Rehab placement at discharge. Pt cooperative; agreeable to therapeutic recommendations and plan of care.     Modified Iris: N/A = No pre-op stroke/TIA    Post-Tx Position: Up in Chair, Alarms activated, and Call light and personal items within reach  PPE: gloves    Therapy Charges for Today       Code Description Service Date Service Provider Modifiers Qty    [id number]  PT THERAPEUTIC ACT EA 15 MIN 12/30/2024 Enedina Esqueda, PT GP 1           PT Charges       Row Name 12/30/24 1414             Time Calculation    Start Time 1053  -AH      Stop Time 1106  " [facility] ICU  Pulmonary/Critical Care  Progress Note  2024    Patient Name: Red Aaron Jr.  MRN: [id number]  Admission Date: 2024  Code Status: No Order      Subjective:     HPI:  The patient is a 69-year-old male who has a history of schizophrenia, hypertension, chronic seizure disorder, and abdominal aortic aneurysm.  There were admitted to ICU postoperative open repair of infrarenal AAA on 2023.  Patient remained intubated on mechanical ventilatory support postoperative on arrival to ICU.    Hospital Course:  2024:  Open repair of infrarenal AAA   Hypotension requiring levophed, concern for bleeding from site, extubated later in the day successfully    24hr Interval History:   No issues overnight.  On 2L NC, negligible bleeding now.  Awake without complaints although tachycardic.  BP good.     Scheduled Medications:   benztropine  0.5 mg Oral BID    gabapentin  300 mg Oral Nightly    mupirocin   Nasal BID    OLANZapine  10 mg Oral BID    potassium bicarbonate  25 mEq Oral Once     PRN Medications:  0.9%  NaCl infusion (for blood administration), [] fentaNYL **FOLLOWED BY** fentaNYL, morphine, ondansetron  Continuous Infusions:   sodium chloride 0.9% 50 mL/hr at 24 1000    clevidipine Stopped (24 2341)    NORepinephrine bitartrate-D5W Stopped (24 0804)    propofoL Stopped (24 1405)       Past Medical History:   Diagnosis Date    Abdominal pain     Digestive disorder     acid indigestion    Hyperlipidemia     Hypertension     Infrarenal abdominal aortic aneurysm (AAA) without rupture     PTSD (post-traumatic stress disorder)     Schizophrenia     Seizures     last seizure 8 years ago       Past Surgical History:   Procedure Laterality Date    COLONOSCOPY      CREATION OF BILATERAL AORTOFEMORAL ARTERIAL BYPASS N/A 2024    Procedure: CREATION, BYPASS, ARTERIAL, AORTA TO FEMORAL, BILATERAL;  Surgeon: Royce Gatica MD;   -      Time Calculation (min) 13 min  -      PT Received On 12/30/24  -      PT - Next Appointment 12/31/24  -         Time Calculation- PT    Total Timed Code Minutes- PT 13 minute(s)  -                User Key  (r) = Recorded By, (t) = Taken By, (c) = Cosigned By      Initials Name Provider Type     Enedina Esqueda, PT Physical Therapist                    Location: [facility];  Service: Peripheral Vascular;  Laterality: N/A;    TONSILLECTOMY         Objective:     Input/output:    Intake/Output Summary (Last 24 hours) at 1/13/2024 1200  Last data filed at 1/13/2024 1000  Gross per 24 hour   Intake 3428.31 ml   Output 3855 ml   Net -426.69 ml         Vital Signs (Most Recent):  Temp: 99.2 °F (37.3 °C) (01/13/24 0800)  Pulse: (!) 117 (01/13/24 1000)  Resp: (!) 27 (01/13/24 1000)  BP: 135/78 (01/13/24 1000)  SpO2: 100 % (01/13/24 1000)  Body mass index is 19.3 kg/m².  Weight: 55.9 kg (123 lb 3.8 oz) Vital Signs (24h Range):  Temp:  [97.9 °F (36.6 °C)-100.1 °F (37.8 °C)] 99.2 °F (37.3 °C)  Pulse:  [100-138] 117  Resp:  [7-41] 27  SpO2:  [89 %-100 %] 100 %  BP: ()/() 135/78  Arterial Line BP: ()/() 153/53     Physical Exam  Constitutional:       Appearance: Normal appearance.   Eyes:      Conjunctiva/sclera: Conjunctivae normal.      Pupils: Pupils are equal, round, and reactive to light.   Cardiovascular:      Rate and Rhythm: Normal rate and regular rhythm.      Pulses: Normal pulses.      Heart sounds: No murmur heard.  Pulmonary:      Breath sounds: No wheezing, rhonchi or rales.   Abdominal:      General: Bowel sounds are normal. There is no distension.      Palpations: Abdomen is soft.   Musculoskeletal:      Right lower leg: No edema.      Left lower leg: No edema.   Skin:     General: Skin is warm and dry.   Neurological:      General: No focal deficit present.      Mental Status: He is alert and oriented to person, place, and time.   Psychiatric:         Mood and Affect: Mood normal.         Lines/Drains/Airways       Drain  Duration                  Urethral Catheter 01/11/24 Non-latex;Silicone 16 Fr. 2 days              Arterial Line  Duration             Arterial Line 01/11/24 0645 Right Radial 2 days              Peripheral Intravenous Line  Duration                  Peripheral IV - Single Lumen 01/11/24 0729 18 G Left Forearm 2 days          Peripheral IV - Single Lumen 01/12/24 2200 20 G Left;Posterior Hand <1 day                    Vent:  Vent Mode: A/C (01/12/24 0521)  Ventilator Initiated: Yes (01/11/24 1211)  Set Rate: 12 BPM (01/12/24 0521)  Vt Set: 500 mL (01/12/24 0521)  Pressure Support: 10 cmH20 (01/12/24 0521)  PEEP/CPAP: 5 cmH20 (01/12/24 0521)  Oxygen Concentration (%): 30 (01/12/24 0521)  Peak Airway Pressure: 14 cmH20 (01/12/24 0521)  Total Ve: 6 L/m (01/12/24 0521)  F/VT Ratio<105 (RSBI): (!) 42.17 (01/11/24 1620)    ABGs:  Lab Results   Component Value Date    PH 7.350 01/12/2024    PO2 104.0 (H) 01/12/2024    PCO2 44.0 01/12/2024    POCSATURATED 100 01/11/2024         Significant Labs:    Lab Results   Component Value Date    WBC 13.33 (H) 01/13/2024    HGB 9.7 (L) 01/13/2024    HCT 28.4 (L) 01/13/2024    MCV 89.9 01/13/2024     (L) 01/13/2024         Recent Labs   Lab 01/13/24  0034   *   K 3.5   CO2 23   BUN 11.1   CREATININE 0.90   CALCIUM 7.7*   MG 1.40*   PHOS 2.1*       Imaging: None today    Assessment:     Infrarenal AAA, post open repair 01/11/2024  Acute respiratory failure postoperative requiring ongoing mechanical ventilation.  Multiple episodes of transient hypotension postoperative, now more stable through the night.  Episodes appear to have responded to IV fluid boluses and transfusion with PRBCs.  No current clinical evidence of active bleeding.  History of chronic seizure disorder  Schizophrenia    Plan:     Extubated yesterday without issues  Bleeding from surgical site essentially resolved now.  H&H stable.  Post-op care per vascular  Can likely downgrade to floor.                Itzel Flores, ELAYNEP  Pulmonary/Critical Care

## 2024-12-30 NOTE — DISCHARGE PLACEMENT REQUEST
"Miguel Dariana SAHU \"Pat\" (69 y.o. Female)       Date of Birth   1955    Social Security Number       Address   [address] [place] [address] [place] [address]    Home Phone   [phone]    MRN   [id number]       Lutheran   [employer]    Marital Status                               Admission Date   12/27/24    Admission Type   Emergency    Admitting Provider   Sagar Way MD    Attending Provider   Sagar Way MD    Department, Room/Bed   [facility] INPATIENT, 359/1       Discharge Date       Discharge Disposition       Discharge Destination                                 Attending Provider: Sagar Way MD    Allergies: Ambien  [Zolpidem Tartrate], Ciprofloxacin Hcl, Penicillins, Zolpidem, Methadone, Clindamycin    Isolation: None   Infection: None   Code Status: CPR    Ht: 162.6 cm (64\")   Wt: 104 kg (229 lb 4.5 oz)    Admission Cmt: None   Principal Problem: UTI (urinary tract infection) [N39.0]                   Active Insurance as of 12/27/2024       Primary Coverage       Payor Plan Insurance Group Employer/Plan Group    ANTHEM MEDICARE REPLACEMENT ANTHEM MED ADV SNP [id number]       Payor Plan Address Payor Plan Phone Number Payor Plan Fax Number Effective Dates    [address] [phone]  1/1/2024 - None Entered    [place] [phone]         Subscriber Name Subscriber Birth Date Member ID       DARIANA FANG 1955 [id number]               Secondary Coverage       Payor Plan Insurance Group Employer/Plan Group    ANTHEM MEDICAID ANTHEM [id number]       Payor Plan Address Payor Plan Phone Number Payor Plan Fax Number Effective Dates    [address]   7/1/2024 - None Entered    [facility] [phone]         Subscriber Name Subscriber Birth Date Member ID       DARIANA FANG 1955 [id number]                     Emergency Contacts        (Rel.) Home Phone Work Phone Mobile Phone    JIMBO FANG " (Spouse) [phone] -- [phone]    CHADWICKAPRIL (Sister) -- -- [phone]                 History & Physical        Dian Child APRN at 24       Attestation signed by Shiva Ma DO at 24 0103    I have reviewed this documentation and agree.                      [facility] Medicine Services  History & Physical    Patient Name: Dariana Rivera  : 1955  MRN: [id number]  Primary Care Physician:  Franny Strickland MD  Date of admission: 2024  Date and Time of Service: 2024 at 2120      Assessment & Plan      Chief Complaint: Multiple falls with generalized weakness    Plan:    UTI  Generalized Weakness, likely secondary to UTI  Fall  -CT head with no acute abnormality  -CT abdomen with no acute abnormality  -Chest x-ray also showed no acute abnormality  -UA reviewed, positive nitrites, moderate leukocytes, 21-50 WBC, 4+ bacteria, 7-12 squamous epithelial cells, 3+ blood  -Urine culture pending  -Blood cultures ordered  -Rocephin given in ED, continue  -PT/OT consult  -Fall Precautions  -Case management consult for discharge planning      Home medications for chronic conditions will be restarted as appropriate when verified by pharmacy      History of Present Illness     History of Present Illness: Dariana Rivera is a 69 y.o. female with a previous medical history of Parkinson's, CAD, chronic pain, depression, HTN, COPD, RLS who presented to [facility] on 2024 with complaints of generalized weakness and multiple falls for the past few days.  She does report significant pain to her lower back and right flank area.      In the ED, CT of the head, abdomen, chest x-ray showed no acute abnormalities.  UA showed positive nitrites, moderate leukocytes, 21-50 WBC, 4+ bacteria.  She is afebrile, all vital signs are stable.  She was given Rocephin in the ED and hospitalist was consulted for further management.    12 point ROS reviewed and  negative except as mentioned above    Objective      Vitals:   Temp:  [98.7 °F (37.1 °C)] 98.7 °F (37.1 °C)  Heart Rate:  [51-86] 58  Resp:  [20] 20  BP: ()/(53-64) 119/60  Body mass index is 39.36 kg/m².    Physical Exam  Vitals and nursing note reviewed.   Constitutional:       Appearance: Normal appearance.   HENT:      Mouth/Throat:      Mouth: Mucous membranes are moist.   Cardiovascular:      Rate and Rhythm: Normal rate and regular rhythm.   Pulmonary:      Effort: Pulmonary effort is normal.      Breath sounds: Normal breath sounds.   Abdominal:      General: Bowel sounds are normal.      Palpations: Abdomen is soft.   Musculoskeletal:         General: Normal range of motion.   Skin:     General: Skin is warm and dry.      Findings: Bruising present.          Neurological:      General: No focal deficit present.      Mental Status: She is alert and oriented to person, place, and time. Mental status is at baseline.   Psychiatric:         Mood and Affect: Mood normal.         Behavior: Behavior normal.            Personal History     This is a 69 y.o. female with:    Past Medical History:   Diagnosis Date    Allergic Not sure    Penicillin, cipro, clindamycin, methadone, ambien    Angina at rest     DR. Amador    Anxiety disorder     [facility]     Asthma About 5 yrs ago    Shortness of breath with exertion    Back pain     Bipolar disorder     Cataract     Left & right removed in 3/2016    Cervical dystonia     Cholelithiasis     Gallbladder removed in 1995 or 97    Chronic pain     with stenosis    Cirrhosis     Colitis     Colon polyps     COPD (chronic obstructive pulmonary disease)     Not sure when diagnosed    Cramping of feet     Cramping of hands     DDD (degenerative disc disease), cervical     DDD (degenerative disc disease), lumbar     DDD (degenerative disc disease), lumbar     DDD (degenerative disc disease), thoracic     Depression     Diabetes mellitus     Not sure when diagnosed.     Diverticulosis     Not sure when diagnosed    Dysphagia 09/2020    Eosinophilic colitis     Gastritis     GERD (gastroesophageal reflux disease)     Headache Not sure    Migraines stopped after complete hyst in 2004    Hepatic steatosis     non alcoholic steo-hepatitis     Hx of lithotripsy 02/10/2021    Hypertension     IBS (irritable bowel syndrome)     Infectious viral hepatitis     ALEXANDER    Insomnia     Kidney stones     Low back pain     Not sure when diagnosed    Lumbar stenosis     Neck pain     Neuromuscular disorder Not sure    Parkinsons and Cervical Dystonia    Neuropathy     Obesity     Obese since about 1966    Osteoarthritis     Osteopenia     Osteopenia diagnosed after bone density test in 2024.    Parkinson's disease     Peripheral edema     Pneumonia May 2018    About a month after neck fusion surgery    PTSD (post-traumatic stress disorder)     Recurrent UTI     Renal insufficiency     Not sure when diagnosed    Seizure     onset 7/2016.  Last seizure 10/2019    Sleep apnea     Using Bipap machine at night. advised to bring dos    Sleep apnea, obstructive     Urinary incontinence        Past Surgical History:   Procedure Laterality Date    BACK SURGERY      CARDIAC CATHETERIZATION  02/2019    CARPAL TUNNEL RELEASE Bilateral     Wrist     CHOLECYSTECTOMY  1997    COLONOSCOPY      CYSTOSCOPY BLADDER STONE LITHOTRIPSY      ENDOMETRIAL ABLATION      ENDOSCOPY      ENDOSCOPY N/A 04/15/2020    Procedure: ESOPHAGOGASTRODUODENOSCOPY with dilitation (60FR.);  Surgeon: Julieta Allison MD;  Location: [facility] ENDOSCOPY;  Service: Gastroenterology;  Laterality: N/A;  varices,hiatal hernia,gastritis    ENDOSCOPY N/A 09/29/2020    Procedure: ESOPHAGOGASTRODUODENOSCOPY with esophageal dilitation (54 bougie);  Surgeon: Julieta Allison MD;  Location: [facility] ENDOSCOPY;  Service: Gastroenterology;  Laterality: N/A;   post op: hiatal hernia, esophageal stricture    ENDOSCOPY N/A 09/30/2021    Procedure:  ESOPHAGOGASTRODUODENOSCOPY WITH DILATATION (BOUGIE #60);  Surgeon: Julieta Allison MD;  Location: [facility] ENDOSCOPY;  Service: Gastroenterology;  Laterality: N/A;  ESOPHAGEAL VARICES AND HIATAL HERNIA    ENDOSCOPY N/A 07/21/2022    Procedure: ESOPHAGOGASTRODUODENOSCOPY WITH BIOPSY X1 AREA  AND DILATATION (BOUGIE  #54);  Surgeon: Zachary Mcgarry MD;  Location: [facility] ENDOSCOPY;  Service: Gastroenterology;  Laterality: N/A;  Post: candida esophagitis,gastritis    ENDOSCOPY N/A 05/02/2023    Procedure: ESOPHAGOGASTRODUODENOSCOPY WITH BIOPSY X 1  AREA  AND DILATATON (48 NON GUIDED BOUGIE);  Surgeon: Zachary Mcgarry MD;  Location: [facility] ENDOSCOPY;  Service: Gastroenterology;  Laterality: N/A;  post op: GASTRITIS, SMALL ESOPHAGEAL VARICES    EYE SURGERY  3-14 & 3-    Cataract surgery R 3-14, L 3-    HYSTERECTOMY  09/2004    complete    INTERSTIM PLACEMENT      bladder    JOINT REPLACEMENT Bilateral     knees    KNEE ARTHROSCOPY Bilateral     Arthroscopic surgery to bilateral knees     OTHER SURGICAL HISTORY  2015    Stress test     OTHER SURGICAL HISTORY  10/2016    Lithotripsy total 4-5    OTHER SURGICAL HISTORY      Right arm cellulits- spider bites     OTHER SURGICAL HISTORY  02/08/2018    Lithotripsy    OTHER SURGICAL HISTORY  04/20/2018    ACDF C3-C4 & C6-C7    REPLACEMENT TOTAL KNEE  2000    REPLACEMENT TOTAL KNEE Left 11/2016    SHOULDER ROTATOR CUFF REPAIR Right 01/08/2020    Procedure: RT ROTATOR CUFF REPAIR OPEN;  Surgeon: Curly Vaughn MD;  Location: [facility] MAIN OR;  Service: Orthopedics    SPINE SURGERY  04/2018    Fusion surgery C3-C4 and C6-C7       Active and Resolved Problems  Active Hospital Problems    Diagnosis  POA    **UTI (urinary tract infection) [N39.0]  Yes      Resolved Hospital Problems   No resolved problems to display.       Family History: family history includes Arthritis in her brother, mother, and sister; Breast cancer in her maternal aunt and  maternal grandmother; Cancer in her brother, mother, and sister; Depression in her mother and sister; Diabetes in her brother, father, and sister; Early death in her father; Heart attack in her brother and father; Heart disease in her brother, father, and sister; Heart failure in her father; Hyperlipidemia in her brother, mother, and sister; Hypertension in her brother, mother, and sister; Mental illness in her mother and sister; Stroke in her sister. Otherwise pertinent FHx was reviewed and not pertinent to current issue.    Social History:  reports that she quit smoking about 34 years ago. Her smoking use included cigarettes. She started smoking about 50 years ago. She has a 7.8 pack-year smoking history. She has been exposed to tobacco smoke. She has never used smokeless tobacco. She reports that she does not drink alcohol and does not use drugs.    Home Medications:  Prior to Admission Medications       Prescriptions Last Dose Informant Patient Reported? Taking?    albuterol sulfate  (90 Base) MCG/ACT inhaler   No Yes    Inhale 2 puffs Every 6 (Six) Hours As Needed for Wheezing or Shortness of Air.    amantadine (SYMMETREL) 100 MG capsule 12/26/2024  Yes Yes    Take 1 capsule by mouth Every Night.    aspirin 81 MG EC tablet 12/27/2024  Yes Yes    aspirin 81 mg tablet,delayed release    baclofen (LIORESAL) 20 MG tablet 12/27/2024  Yes Yes    Take 1 tablet by mouth 3 (Three) Times a Day.    calcium carbonate (Calcium 600) 600 MG tablet 12/27/2024  Yes Yes    Take 1 tablet by mouth Daily.    carbidopa-levodopa ER (SINEMET CR)  MG per tablet 12/27/2024  Yes Yes    Take 2 tablets by mouth 3 (Three) Times a Day.    Cholecalciferol (Vitamin D3) 50 MCG (2000 UT) capsule 12/27/2024  Yes Yes    Take 1 capsule by mouth Daily.    clonazePAM (KlonoPIN) 0.5 MG tablet 12/27/2024  Yes Yes    Take 1 tablet by mouth 2 (Two) Times a Day.    escitalopram (LEXAPRO) 10 MG tablet 12/27/2024  No Yes    Take 1.5 tablets by  mouth Daily.    Farxiga 10 MG tablet 12/27/2024  No Yes    TAKE ONE TABLET BY MOUTH DAILY    Fluticasone-Umeclidin-Vilant (Trelegy Ellipta) 100-62.5-25 MCG/ACT inhaler 12/27/2024  No Yes    Inhale 1 puff Daily.    furosemide (LASIX) 40 MG tablet 12/27/2024  No Yes    TAKE 1 TABLET BY MOUTH EVERY DAY    Patient taking differently:  Take 1 tablet by mouth Daily.    gabapentin (NEURONTIN) 300 MG capsule 12/27/2024 Pharmacy Yes Yes    Take 1 capsule by mouth 3 (Three) Times a Day.    ipratropium-albuterol (DUO-NEB) 0.5-2.5 mg/3 ml nebulizer   Yes Yes    Take 3 mL by nebulization Every 4 (Four) Hours As Needed for Wheezing. Use preop if needed    KLOR-CON 20 MEQ CR tablet 12/27/2024  No Yes    TAKE 1 TABLET BY MOUTH DAILY    Patient taking differently:  Take 1 tablet by mouth Daily.    lidocaine (LIDODERM) 5 %   No Yes    Place 1 patch on the skin as directed by provider Daily. Remove & Discard patch within 12 hours or as directed by MD    Patient taking differently:  Place 1 patch on the skin as directed by provider Daily As Needed. Remove & Discard patch within 12 hours or as directed by MD    lurasidone (LATUDA) 40 MG tablet tablet 12/26/2024  No Yes    TAKE ONE TABLET BY MOUTH EVERY EVENING    Melatonin 10 MG capsule 12/26/2024  Yes Yes    Take 1 capsule by mouth every night at bedtime.    Multiple Vitamins-Minerals (MULTIVITAMIN WITH MINERALS) tablet tablet 12/27/2024  Yes Yes    Take 1 tablet by mouth Daily. Do not take dos    nadolol (CORGARD) 20 MG tablet 12/27/2024  Yes Yes    Take 1 tablet by mouth Every Morning. Take dos    nystatin (MYCOSTATIN) 037454 UNIT/GM cream   No Yes    Apply 1 Application topically to the appropriate area as directed 2 (Two) Times a Day.    Patient taking differently:  Apply 1 Application topically to the appropriate area as directed As Needed.    O2 (OXYGEN) 12/26/2024  Yes Yes    Inhale 1 L/min 1 (One) Time. @@ night    omeprazole (priLOSEC) 40 MG capsule 12/27/2024 Pharmacy Yes Yes     Take 1 capsule by mouth Daily. Take preop    pentoxifylline (TRENtal) 400 MG CR tablet 2024  Yes Yes    Take 1 tablet by mouth 3 (Three) Times a Day With Meals.    pramipexole (MIRAPEX) 0.5 MG tablet 2024  No Yes    TAKE ONE TABLET BY MOUTH TWICE DAILY    rOPINIRole (REQUIP) 0.25 MG tablet 2024  No Yes    TAKE 1 TABLET BY MOUTH TWICE DAILY **TAKE THE NIGHT DOSE 1 HOUR BEFORE BEDTIME**    spironolactone (ALDACTONE) 25 MG tablet 2024  Yes Yes    Take 1 tablet by mouth Daily.    vitamin C (ASCORBIC ACID) 500 MG tablet 2024  Yes Yes    Take 2 tablets by mouth Daily. dont take preop              Allergies:  Allergies   Allergen Reactions    Ambien  [Zolpidem Tartrate] Confusion    Ciprofloxacin Hcl Rash    Penicillins Rash    Zolpidem Unknown - High Severity     Other reaction(s): Confusion    Methadone Hallucinations    Clindamycin Rash           VTE Prophylaxis:  Mechanical VTE prophylaxis orders are present.        CODE STATUS:    Code Status (Patient has no pulse and is not breathing): CPR (Attempt to Resuscitate)  Medical Interventions (Patient has pulse or is breathing): Full Support        Admission Status:  I believe this patient meets inpatient status.    I discussed the patient's findings and my recommendations with patient.    Signature:     This document has been electronically signed by Dian Child, STEPHANIE, APRN, AGACNP-BC on 2024 20:35 [facility]    Electronically signed by Shiva Ma DO at 24 0103          Physician Progress Notes (most recent note)        Sagar Way MD at 24 1217              [facility] MEDICINE SERVICE  DAILY PROGRESS NOTE    NAME: Dariana Rivera  : 1955  MRN: [id number]      LOS: 1 day     PROVIDER OF SERVICE: Sagar Way MD    Chief Complaint: UTI (urinary tract infection)    Subjective:     Interval History:  History taken from: patient chart RN    No  lightheadedness shortness of breath afebrile      Review of Systems:   Review of Systems  All negative except as above  Objective:     Vital Signs  Temp:  [96.9 °F (36.1 °C)-98.5 °F (36.9 °C)] 98.3 °F (36.8 °C)  Heart Rate:  [59-85] 65  Resp:  [10-20] 18  BP: (105-133)/(48-75) 118/64  Flow (L/min) (Oxygen Therapy):  [4] 4   Body mass index is 39.36 kg/m².    Physical Exam  Physical Exam  AOx3 NAD  RRR S1 is normal  Lungs with fair air entry  Abdomen soft nontender nondistended.  No CVA tenderness     Diagnostic Data    Results from last 7 days   Lab Units 12/30/24  0521 12/28/24  0302 12/27/24  1645   WBC 10*3/mm3 5.49   < > 6.69   HEMOGLOBIN g/dL 14.4   < > 15.0   HEMATOCRIT % 43.1   < > 44.8   PLATELETS 10*3/mm3 116*   < > 148   GLUCOSE mg/dL 94   < > 102*   CREATININE mg/dL 0.83   < > 0.91   BUN mg/dL 13   < > 14   SODIUM mmol/L 141   < > 141   POTASSIUM mmol/L 3.6   < > 4.2   AST (SGOT) U/L  --   --  34*   ALT (SGPT) U/L  --   --  9   ALK PHOS U/L  --   --  61   BILIRUBIN mg/dL  --   --  1.0   ANION GAP mmol/L 10.2   < > 12.4    < > = values in this interval not displayed.       No radiology results for the last day      I reviewed the patient's new clinical results.  I reviewed the patient's new imaging results and agree with the interpretation.    Assessment/Plan:     Active and Resolved Problems  Active Hospital Problems    Diagnosis  POA    **UTI (urinary tract infection) [N39.0]  Yes    Hyperlipidemia [E78.5]  Yes    Generalized anxiety disorder [F41.1]  Yes    Chronic obstructive pulmonary disease [J44.9]  Yes    Chronic kidney disease, unspecified [N18.9]  Yes    Type 2 diabetes mellitus with other diabetic neurological complication [E11.49]  Yes    Chronic back pain [M54.9, G89.29]  Yes    Bipolar 1 disorder, depressed, moderate [F31.32]  Yes    Cirrhosis [K74.60]  Yes    Hypertension [I10]  Yes      Resolved Hospital Problems   No resolved problems to display.       69-year-old female with history of  Parkinson's, CAD, chronic pain, depression, hypertension, COPD, RLS admitted with right flank pain and fall     #UTI  #Fall  #Generalized weakness  CT head no acute findings.  CT abdomen and pelvis 5 mm right lower pole renal calculus.  7 mm left lower pole calculus     Continue ceftriaxone day 4/5  Ucx growing Ecoli   PT/OT.  Home versus rehab.       #History of Parkinson's  Continue home dose of Sinemet  Dose of baclofen reduced to 10 mg TID give hypotension   Currently on hold     #Anxiety/depression  Continue home dose Klonopin Lexapro     #History of CAD-stable  #orhostatic hypotension   Orthostatic vitals positive 12/28. S/p IVF with improvement   No underlying dementia on hold  Continue IV fluids  Lasix held  Baclofen currently on hold  Suspect underlying autonomic dysfunction      #RLS  Continue Requip    VTE Prophylaxis:  Mechanical VTE prophylaxis orders are present.             Disposition Planning:     Barriers to Discharge: Medical workup   Anticipated Date of Discharge: 12/31  Place of Discharge: Home vs Rehab    Anticipate pt will require less the 30 days at rehab       Time: 45 minutes     Code Status and Medical Interventions: CPR (Attempt to Resuscitate); Full Support   Ordered at: 12/27/24 1930     Code Status (Patient has no pulse and is not breathing):    CPR (Attempt to Resuscitate)     Medical Interventions (Patient has pulse or is breathing):    Full Support       Signature: Electronically signed by Sagar Way MD, 12/30/24, 12:17 EST.  [facility] [place]      Electronically signed by Sagar Way MD at 12/30/24 1405       Consult Notes (most recent note)    No notes of this type exist for this encounter.          Physical Therapy Notes (most recent note)        Enedina Esqueda, PT at 12/30/24 1419  Version 1 of 1         Assessment: Dariana Rivera presents with functional mobility impairments which indicate the need for skilled intervention. Pt reported  she was diagnosed with PD several years ago. Pt on BSC upon room entry. Able to stand with Jaden and RW with c/o dizziness and BP decreasing to 81/55. Pt only able to transfer to chair this date. Will continue to follow and progress as tolerated.                                            Electronically signed by Enedina Esqueda, PT at 12/30/24 6204       Occupational Therapy Notes (most recent note)    No notes exist for this encounter.

## 2024-12-30 NOTE — DISCHARGE PLACEMENT REQUEST
"Miguel Dariana SAHU \"Pat\" (69 y.o. Female)       Date of Birth   1955    Social Security Number       Address   [address] [place] [address] [place] [address]    Home Phone   [phone]    MRN   [id number]       Worship   [employer]    Marital Status                               Admission Date   12/27/24    Admission Type   Emergency    Admitting Provider   Sagar Way MD    Attending Provider   Sagar Way MD    Department, Room/Bed   [facility] INPATIENT, 359/1       Discharge Date       Discharge Disposition       Discharge Destination                                 Attending Provider: Sagar Way MD    Allergies: Ambien  [Zolpidem Tartrate], Ciprofloxacin Hcl, Penicillins, Zolpidem, Methadone, Clindamycin    Isolation: None   Infection: None   Code Status: CPR    Ht: 162.6 cm (64\")   Wt: 104 kg (229 lb 4.5 oz)    Admission Cmt: None   Principal Problem: UTI (urinary tract infection) [N39.0]                   Active Insurance as of 12/27/2024       Primary Coverage       Payor Plan Insurance Group Employer/Plan Group    ANTHEM MEDICARE REPLACEMENT ANTHEM MED ADV SNP [id number]       Payor Plan Address Payor Plan Phone Number Payor Plan Fax Number Effective Dates    [address] [phone]  1/1/2024 - None Entered    [place] [phone]         Subscriber Name Subscriber Birth Date Member ID       DARIANA FANG 1955 [id number]               Secondary Coverage       Payor Plan Insurance Group Employer/Plan Group    ANTHEM MEDICAID ANTHEM [id number]       Payor Plan Address Payor Plan Phone Number Payor Plan Fax Number Effective Dates    [address]   7/1/2024 - None Entered    [facility] [address]         Subscriber Name Subscriber Birth Date Member ID       DARIANA FANG 1955 [id number]                     Emergency Contacts        (Rel.) Home Phone Work Phone Mobile Phone    JIMBO FANG " (Spouse) [phone] -- [phone]    CHADWICKAPRIL (Sister) -- -- [phone]

## 2024-12-30 NOTE — PLAN OF CARE
Goal Outcome Evaluation:  Plan of Care Reviewed With: patient        Progress: no change     Pt currently resting abed. No complaints or distress noted. VSS cont plan of care.

## 2024-12-30 NOTE — CASE MANAGEMENT/SOCIAL WORK
Social Work Assessment  [facility]     Patient Name: Dariana Rivera  MRN: [id number]  Today's Date: 12/30/2024    Admit Date: 12/27/2024       Discharge Plan       Row Name 12/30/24 1516       Plan    Plan From home with spouse and caregivers. SNF choices pending if patient agreeable. Will need precert. PASRR approved.    Plan Comments CM updated on status of PASRR.        ROSANNA Brasher, LSW    Phone: [phone]  Fax: [phone]  [email or web address]

## 2024-12-30 NOTE — THERAPY EVALUATION
Patient Name: Dariana Rivera  : 1955    MRN: [id number]                              Today's Date: 2024       Admit Date: 2024    Visit Dx:     ICD-10-CM ICD-9-CM   1. Fall, initial encounter  W19.XXXA E888.9   2. General weakness  R53.1 780.79   3. Urinary tract infection without hematuria, site unspecified  N39.0 599.0   4. Contusion of lower back, initial encounter  S30.0XXA 922.31     Patient Active Problem List   Diagnosis    Morbid obesity    Bipolar 1 disorder, depressed, moderate    Chronic back pain    Chronic kidney disease, unspecified    Chronic obstructive pulmonary disease    Cirrhosis    Gastroesophageal reflux disease    Hypertension    Sleep apnea    Parkinson's disease    Recurrent urinary tract infection    Seizure    Spinal stenosis of cervical region    Type 2 diabetes mellitus with other diabetic neurological complication    Generalized anxiety disorder    Hyperlipidemia    Varices of esophagus determined by endoscopy    Injury of cervical spine    Cor pulmonale    Dysphagia    Encounter for annual wellness exam in Medicare patient    Idiopathic osteoarthritis    Degeneration of lumbar intervertebral disc    Panniculitis affecting back    Panniculitis of neck    Spinal stenosis of lumbar region    Osteopenia after menopause    UTI (urinary tract infection)     Past Medical History:   Diagnosis Date    Allergic Not sure    Penicillin, cipro, clindamycin, methadone, ambien    Angina at rest     DR. [place]    Anxiety disorder     [facility]     Asthma About 5 yrs ago    Shortness of breath with exertion    Back pain     Bipolar disorder     Cataract     Left & right removed in 3/2016    Cervical dystonia     Cholelithiasis     Gallbladder removed in  or     Chronic pain     with stenosis    Cirrhosis     Colitis     Colon polyps     COPD (chronic obstructive pulmonary disease)     Not sure when diagnosed    Cramping of feet     Cramping of hands     DDD (degenerative  disc disease), cervical     DDD (degenerative disc disease), lumbar     DDD (degenerative disc disease), lumbar     DDD (degenerative disc disease), thoracic     Depression     Diabetes mellitus     Not sure when diagnosed.    Diverticulosis     Not sure when diagnosed    Dysphagia 09/2020    Eosinophilic colitis     Gastritis     GERD (gastroesophageal reflux disease)     Headache Not sure    Migraines stopped after complete hyst in 2004    Hepatic steatosis     non alcoholic steo-hepatitis     Hx of lithotripsy 02/10/2021    Hypertension     IBS (irritable bowel syndrome)     Infectious viral hepatitis     [facility]    Insomnia     Kidney stones     Low back pain     Not sure when diagnosed    Lumbar stenosis     Neck pain     Neuromuscular disorder Not sure    Parkinsons and Cervical Dystonia    Neuropathy     Obesity     Obese since about 1966    Osteoarthritis     Osteopenia     Osteopenia diagnosed after bone density test in 2024.    Parkinson's disease     Peripheral edema     Pneumonia May 2018    About a month after neck fusion surgery    PTSD (post-traumatic stress disorder)     Recurrent UTI     Renal insufficiency     Not sure when diagnosed    Seizure     onset 7/2016.  Last seizure 10/2019    Sleep apnea     Using Bipap machine at night. advised to bring dos    Sleep apnea, obstructive     Urinary incontinence      Past Surgical History:   Procedure Laterality Date    BACK SURGERY      CARDIAC CATHETERIZATION  02/2019    CARPAL TUNNEL RELEASE Bilateral     Wrist     CHOLECYSTECTOMY  1997    COLONOSCOPY      CYSTOSCOPY BLADDER STONE LITHOTRIPSY      ENDOMETRIAL ABLATION      ENDOSCOPY      ENDOSCOPY N/A 04/15/2020    Procedure: ESOPHAGOGASTRODUODENOSCOPY with dilitation (60FR.);  Surgeon: Julieta Allison MD;  Location: [facility] ENDOSCOPY;  Service: Gastroenterology;  Laterality: N/A;  varices,hiatal hernia,gastritis    ENDOSCOPY N/A 09/29/2020    Procedure: ESOPHAGOGASTRODUODENOSCOPY with esophageal  dilitation (54 bougie);  Surgeon: Julieta Allison MD;  Location: [facility] ENDOSCOPY;  Service: Gastroenterology;  Laterality: N/A;   post op: hiatal hernia, esophageal stricture    ENDOSCOPY N/A 09/30/2021    Procedure: ESOPHAGOGASTRODUODENOSCOPY WITH DILATATION (BOUGIE #60);  Surgeon: Julieta Allison MD;  Location: [facility] ENDOSCOPY;  Service: Gastroenterology;  Laterality: N/A;  ESOPHAGEAL VARICES AND HIATAL HERNIA    ENDOSCOPY N/A 07/21/2022    Procedure: ESOPHAGOGASTRODUODENOSCOPY WITH BIOPSY X1 AREA  AND DILATATION (BOUGIE  #54);  Surgeon: Zachary Mcgarry MD;  Location: [facility] ENDOSCOPY;  Service: Gastroenterology;  Laterality: N/A;  Post: candida esophagitis,gastritis    ENDOSCOPY N/A 05/02/2023    Procedure: ESOPHAGOGASTRODUODENOSCOPY WITH BIOPSY X 1  AREA  AND DILATATON (48 NON GUIDED BOUGIE);  Surgeon: Zachary Mcgarry MD;  Location: [facility] ENDOSCOPY;  Service: Gastroenterology;  Laterality: N/A;  post op: GASTRITIS, SMALL ESOPHAGEAL VARICES    EYE SURGERY  3-14 & 3-    Cataract surgery R 3-14, L 3-    HYSTERECTOMY  09/2004    complete    INTERSTIM PLACEMENT      bladder    JOINT REPLACEMENT Bilateral     knees    KNEE ARTHROSCOPY Bilateral     Arthroscopic surgery to bilateral knees     OTHER SURGICAL HISTORY  2015    Stress test     OTHER SURGICAL HISTORY  10/2016    Lithotripsy total 4-5    OTHER SURGICAL HISTORY      Right arm cellulits- spider bites     OTHER SURGICAL HISTORY  02/08/2018    Lithotripsy    OTHER SURGICAL HISTORY  04/20/2018    ACDF C3-C4 & C6-C7    REPLACEMENT TOTAL KNEE  2000    REPLACEMENT TOTAL KNEE Left 11/2016    SHOULDER ROTATOR CUFF REPAIR Right 01/08/2020    Procedure: RT ROTATOR CUFF REPAIR OPEN;  Surgeon: Curly Vaughn MD;  Location: [facility] MAIN OR;  Service: Orthopedics    SPINE SURGERY  04/2018    Fusion surgery C3-C4 and C6-C7      General Information       Row Name 12/30/24 1610          OT Time and Intention    Document Type  evaluation  -     Mode of Treatment occupational therapy  -       Row Name 12/30/24 1630          General Information    Patient Profile Reviewed yes  -     Prior Level of Function independent:;all household mobility;min assist:;ADL's  Uses a rolling walker  -     Existing Precautions/Restrictions fall;orthostatic hypotension  -     Barriers to Rehab previous functional deficit;medically complex  -       Row Name 12/30/24 1630          Living Environment    People in Home spouse  -       Row Name 12/30/24 1630          Home Main Entrance    Number of Stairs, Main Entrance none  -       Row Name 12/30/24 1630          Stairs Within Home, Primary    Number of Stairs, Within Home, Primary one  -       Row Name 12/30/24 1630          Cognition    Orientation Status (Cognition) oriented x 4  -       Row Name 12/30/24 1630          Safety Issues/Impairments Affecting Functional Mobility    Impairments Affecting Function (Mobility) balance;endurance/activity tolerance;strength;other (see comments)  -               User Key  (r) = Recorded By, (t) = Taken By, (c) = Cosigned By      Initials Name Provider Type     Arturo Lopez OT Occupational Therapist                     Mobility/ADL's       Row Name 12/30/24 1631          Bed Mobility    Comment, (Bed Mobility) Not tested, on [facility] upon arrival  -       Row Name 12/30/24 1631          Transfers    Transfers sit-stand transfer;toilet transfer  -       Row Name 12/30/24 1631          Sit-Stand Transfer    Sit-Stand [place] (Transfers) minimum assist (75% patient effort)  -     Assistive Device (Sit-Stand Transfers) walker, front-wheeled  -       Row Name 12/30/24 1631          Toilet Transfer    Type (Toilet Transfer) stand pivot/stand step  -     [place] Level (Toilet Transfer) minimum assist (75% patient effort)  -     Assistive Device (Toilet Transfer) walker, front-wheeled;commode, bedside without drop arms  -       Row  Name 12/30/24 1631          Functional Mobility    Functional Mobility- Ind. Level minimum assist (75% patient effort)  -LS     Functional Mobility- Device walker, front-wheeled  -LS     Patient was able to Ambulate yes  -LS       Row Name 12/30/24 1631          Activities of Daily Living    BADL Assessment/Intervention lower body dressing;toileting  -       Row Name 12/30/24 1631          Lower Body Dressing Assessment/Training    [place] Level (Lower Body Dressing) lower body dressing skills;moderate assist (50% patient effort)  -       Row Name 12/30/24 1631          Toileting Assessment/Training    [place] Level (Toileting) toileting skills;moderate assist (50% patient effort)  -               User Key  (r) = Recorded By, (t) = Taken By, (c) = Cosigned By      Initials Name Provider Type    Arturo Xiong OT Occupational Therapist                   Obj/Interventions       Row Name 12/30/24 1632          Sensory Assessment (Somatosensory)    Sensory Assessment (Somatosensory) sensation intact  -       Row Name 12/30/24 1632          Vision Assessment/Intervention    Visual Impairment/Limitations WFL  -       Row Name 12/30/24 1632          Range of Motion Comprehensive    Comment, General Range of Motion BUEs grossly functional  -       Row Name 12/30/24 1632          Strength Comprehensive (MMT)    Comment, General Manual Muscle Testing (MMT) Assessment BUE grossly 3+/5  -       Row Name 12/30/24 1632          Motor Skills    Motor Skills coordination  -     Coordination minimal impairment;other (see comments)  tremors due to PD  -       Row Name 12/30/24 1632          Balance    Balance Assessment sitting static balance;sitting dynamic balance;standing static balance;standing dynamic balance  -LS     Static Sitting Balance independent  -LS     Dynamic Sitting Balance standby assist  -LS     Position, Sitting Balance supported;sitting in chair  -LS     Static Standing Balance  minimal assist  -LS     Dynamic Standing Balance minimal assist  -LS     Position/Device Used, Standing Balance walker, front-wheeled  -LS               User Key  (r) = Recorded By, (t) = Taken By, (c) = Cosigned By      Initials Name Provider Type    Arturo Xiong OT Occupational Therapist                   Goals/Plan       Row Name 12/30/24 1636          Bed Mobility Goal 1 (OT)    Activity/Assistive Device (Bed Mobility Goal 1, OT) bed mobility activities, all  -LS     [place] Level/Cues Needed (Bed Mobility Goal 1, OT) modified independence  -LS     Time Frame (Bed Mobility Goal 1, OT) long term goal (LTG);2 weeks  -LS       Row Name 12/30/24 1638          Transfer Goal 1 (OT)    Activity/Assistive Device (Transfer Goal 1, OT) transfers, all  -LS     [place] Level/Cues Needed (Transfer Goal 1, OT) modified independence  -LS     Time Frame (Transfer Goal 1, OT) long term goal (LTG);2 weeks  -LS       Row Name 12/30/24 1636          Dressing Goal 1 (OT)    Activity/Device (Dressing Goal 1, OT) dressing skills, all  -LS     [place]/Cues Needed (Dressing Goal 1, OT) minimum assist (75% or more patient effort)  -LS     Time Frame (Dressing Goal 1, OT) long term goal (LTG);2 weeks  -       Row Name 12/30/24 1630          Toileting Goal 1 (OT)    Activity/Device (Toileting Goal 1, OT) toileting skills, all  -LS     [place] Level/Cues Needed (Toileting Goal 1, OT) minimum assist (75% or more patient effort)  -LS     Time Frame (Toileting Goal 1, OT) long term goal (LTG);2 weeks  -LS       Row Name 12/30/24 1634          Therapy Assessment/Plan (OT)    Planned Therapy Interventions (OT) activity tolerance training;BADL retraining;functional balance retraining;IADL retraining;occupation/activity based interventions;ROM/therapeutic exercise;strengthening exercise;transfer/mobility retraining;patient/caregiver education/training  -LS               User Key  (r) = Recorded By, (t) = Taken By, (c) =  Cosigned By      Initials Name Provider Type    LS Arturo Lopez OT Occupational Therapist                   Clinical Impression       Row Name 12/30/24 1634          Pain Assessment    Pretreatment Pain Rating 3/10  -LS     Posttreatment Pain Rating 3/10  -LS     Pain Location other (see comments)  tail bone  -       Row Name 12/30/24 1634          Plan of Care Review    Plan of Care Reviewed With patient  -     Outcome Evaluation 70 YO F admitted with c/o dizziness resulting in multiple falls. She has a history of PD. Pt states she lives at home with spouse, typically she requires Min A with ADLs, and ambulates safely with RWx. PT tested pt for orthostatic hypotension, positive results and symptomatic with dizziness, weakness. This date, pt on BSC on entry. She is A&Ox4. BP seated 110/77. Mod A needed for both lower body dressing and toilet hygiene. Min A provided to come to standing, with BP dropping to 81/55 and pt reporting dizziness and lightheadedness. She was assisted to the chair at this point, unsafe to prgoress to further mobility. After sitting and feet elevated, BP rebounded to 128/69. Pt unsafe to dc home at this time, with deficits in self care, functional transfers, functional mobility, and increased falls risk indicating need for skilled OT services. Recommend SNF.  -       Row Name 12/30/24 1634          Therapy Assessment/Plan (OT)    Rehab Potential (OT) good  -LS     Criteria for Skilled Therapeutic Interventions Met (OT) yes;skilled treatment is necessary  -     Therapy Frequency (OT) 3 times/wk  -     Predicted Duration of Therapy Intervention (OT) until dc  -       Row Name 12/30/24 1634          Therapy Plan Review/Discharge Plan (OT)    Anticipated Discharge Disposition (OT) skilled nursing facility  -       Row Name 12/30/24 1634          Vital Signs    O2 Delivery Pre Treatment room air  -LS     O2 Delivery Intra Treatment room air  -LS     O2 Delivery Post Treatment room  air  -LS     Pre Patient Position Sitting  -LS     Intra Patient Position Standing  -LS     Post Patient Position Sitting  -LS       Row Name 12/30/24 1634          Positioning and Restraints    Pre-Treatment Position bedside commode  -LS     Post Treatment Position chair  -LS     In Chair notified nsg;reclined;call light within reach;encouraged to call for assist;exit alarm on  -LS               User Key  (r) = Recorded By, (t) = Taken By, (c) = Cosigned By      Initials Name Provider Type    Arturo Xiong OT Occupational Therapist                   Outcome Measures       Row Name 12/30/24 1639          How much help from another is currently needed...    Putting on and taking off regular lower body clothing? 2  -LS     Bathing (including washing, rinsing, and drying) 2  -LS     Toileting (which includes using toilet bed pan or urinal) 2  -LS     Putting on and taking off regular upper body clothing 3  -LS     Taking care of personal grooming (such as brushing teeth) 3  -LS     Eating meals 4  -LS     AM-PAC 6 Clicks Score (OT) 16  -LS       Row Name 12/30/24 0800          How much help from another person do you currently need...    Turning from your back to your side while in flat bed without using bedrails? 3  -CW     Moving from lying on back to sitting on the side of a flat bed without bedrails? 3  -CW     Moving to and from a bed to a chair (including a wheelchair)? 3  -CW     Standing up from a chair using your arms (e.g., wheelchair, bedside chair)? 3  -CW     Climbing 3-5 steps with a railing? 2  -CW     To walk in hospital room? 2  -CW     AM-PAC 6 Clicks Score (PT) 16  -CW     Highest Level of Mobility Goal 5 --> Static standing  -CW       Row Name 12/30/24 1639          Functional Assessment    Outcome Measure Options AM-PAC 6 Clicks Daily Activity (OT)  -LS               User Key  (r) = Recorded By, (t) = Taken By, (c) = Cosigned By      Initials Name Provider Type    Arturo Xiong, HENOK  Occupational Therapist    Glendy Bishop LPN Licensed Nurse                    Occupational Therapy Education       Title: PT OT SLP Therapies (Done)       Topic: Occupational Therapy (Done)       Point: ADL training (Done)       Description:   Instruct learner(s) on proper safety adaptation and remediation techniques during self care or transfers.   Instruct in proper use of assistive devices.                  Learning Progress Summary            Patient Acceptance, E,TB, VU by  at 12/30/2024 1639                      Point: Precautions (Done)       Description:   Instruct learner(s) on prescribed precautions during self-care and functional transfers.                  Learning Progress Summary            Patient Acceptance, E,TB, VU by  at 12/30/2024 1639                      Point: Body mechanics (Done)       Description:   Instruct learner(s) on proper positioning and spine alignment during self-care, functional mobility activities and/or exercises.                  Learning Progress Summary            Patient Acceptance, E,TB, VU by  at 12/30/2024 1639                                      User Key       Initials Effective Dates Name Provider Type Discipline     09/22/22 -  Arturo Lopez OT Occupational Therapist OT                  OT Recommendation and Plan  Planned Therapy Interventions (OT): activity tolerance training, BADL retraining, functional balance retraining, IADL retraining, occupation/activity based interventions, ROM/therapeutic exercise, strengthening exercise, transfer/mobility retraining, patient/caregiver education/training  Therapy Frequency (OT): 3 times/wk  Plan of Care Review  Plan of Care Reviewed With: patient  Outcome Evaluation: 68 YO F admitted with c/o dizziness resulting in multiple falls. She has a history of PD. Pt states she lives at home with spouse, typically she requires Min A with ADLs, and ambulates safely with RWx. PT tested pt for orthostatic hypotension,  positive results and symptomatic with dizziness, weakness. This date, pt on BSC on entry. She is A&Ox4. BP seated 110/77. Mod A needed for both lower body dressing and toilet hygiene. Min A provided to come to standing, with BP dropping to 81/55 and pt reporting dizziness and lightheadedness. She was assisted to the chair at this point, unsafe to prgoress to further mobility. After sitting and feet elevated, BP rebounded to 128/69. Pt unsafe to dc home at this time, with deficits in self care, functional transfers, functional mobility, and increased falls risk indicating need for skilled OT services. Recommend SNF.     Time Calculation:         Time Calculation- OT       Row Name 12/30/24 1639             Time Calculation- OT    OT Start Time 1049  -LS      OT Stop Time 1107  -LS      OT Time Calculation (min) 18 min  -LS      OT Received On 12/30/24  -      OT - Next Appointment 01/02/25  -      OT Goal Re-Cert Due Date 01/13/25  -         Untimed Charges    OT Eval/Re-eval Minutes 18  -LS         Total Minutes    Untimed Charges Total Minutes 18  -LS       Total Minutes 18  -LS                User Key  (r) = Recorded By, (t) = Taken By, (c) = Cosigned By      Initials Name Provider Type    LS Arturo Lopez OT Occupational Therapist                  Therapy Charges for Today       Code Description Service Date Service Provider Modifiers Qty    83567064000 HC OT EVAL MOD COMPLEXITY 4 12/30/2024 Arturo Lopez OT GO 1                 Arturo Lopez OT  12/30/2024

## 2024-12-30 NOTE — PROGRESS NOTES
[facility] MEDICINE SERVICE  DAILY PROGRESS NOTE    NAME: Dariana Rivera  : 1955  MRN: [id number]      LOS: 1 day     PROVIDER OF SERVICE: Sagar Way MD    Chief Complaint: UTI (urinary tract infection)    Subjective:     Interval History:  History taken from: patient chart RN    No lightheadedness shortness of breath afebrile      Review of Systems:   Review of Systems  All negative except as above  Objective:     Vital Signs  Temp:  [96.9 °F (36.1 °C)-98.5 °F (36.9 °C)] 98.3 °F (36.8 °C)  Heart Rate:  [59-85] 65  Resp:  [10-20] 18  BP: (105-133)/(48-75) 118/64  Flow (L/min) (Oxygen Therapy):  [4] 4   Body mass index is 39.36 kg/m².    Physical Exam  Physical Exam  AOx3 NAD  RRR S1 is normal  Lungs with fair air entry  Abdomen soft nontender nondistended.  No CVA tenderness     Diagnostic Data    Results from last 7 days   Lab Units 24  0521 24  0302 24  1645   WBC 10*3/mm3 5.49   < > 6.69   HEMOGLOBIN g/dL 14.4   < > 15.0   HEMATOCRIT % 43.1   < > 44.8   PLATELETS 10*3/mm3 116*   < > 148   GLUCOSE mg/dL 94   < > 102*   CREATININE mg/dL 0.83   < > 0.91   BUN mg/dL 13   < > 14   SODIUM mmol/L 141   < > 141   POTASSIUM mmol/L 3.6   < > 4.2   AST (SGOT) U/L  --   --  34*   ALT (SGPT) U/L  --   --  9   ALK PHOS U/L  --   --  61   BILIRUBIN mg/dL  --   --  1.0   ANION GAP mmol/L 10.2   < > 12.4    < > = values in this interval not displayed.       No radiology results for the last day      I reviewed the patient's new clinical results.  I reviewed the patient's new imaging results and agree with the interpretation.    Assessment/Plan:     Active and Resolved Problems  Active Hospital Problems    Diagnosis  POA    **UTI (urinary tract infection) [N39.0]  Yes    Hyperlipidemia [E78.5]  Yes    Generalized anxiety disorder [F41.1]  Yes    Chronic obstructive pulmonary disease [J44.9]  Yes    Chronic kidney disease, unspecified [N18.9]  Yes    Type 2 diabetes mellitus with other  diabetic neurological complication [E11.49]  Yes    Chronic back pain [M54.9, G89.29]  Yes    Bipolar 1 disorder, depressed, moderate [F31.32]  Yes    Cirrhosis [K74.60]  Yes    Hypertension [I10]  Yes      Resolved Hospital Problems   No resolved problems to display.       69-year-old female with history of Parkinson's, CAD, chronic pain, depression, hypertension, COPD, RLS admitted with right flank pain and fall     #UTI  #Fall  #Generalized weakness  CT head no acute findings.  CT abdomen and pelvis 5 mm right lower pole renal calculus.  7 mm left lower pole calculus     Continue ceftriaxone day 4/5  Ucx growing Ecoli   PT/OT.  Home versus rehab.       #History of Parkinson's  Continue home dose of Sinemet  Dose of baclofen reduced to 10 mg TID give hypotension   Currently on hold     #Anxiety/depression  Continue home dose Klonopin Lexapro     #History of CAD-stable  #orhostatic hypotension   Orthostatic vitals positive 12/28. S/p IVF with improvement   No underlying dementia on hold  Continue IV fluids  Lasix held  Baclofen currently on hold  Suspect underlying autonomic dysfunction      #RLS  Continue Requip    VTE Prophylaxis:  Mechanical VTE prophylaxis orders are present.             Disposition Planning:     Barriers to Discharge: Medical workup   Anticipated Date of Discharge: 12/31  Place of Discharge: Home vs Rehab    Anticipate pt will require less the 30 days at rehab       Time: 45 minutes     Code Status and Medical Interventions: CPR (Attempt to Resuscitate); Full Support   Ordered at: 12/27/24 1930     Code Status (Patient has no pulse and is not breathing):    CPR (Attempt to Resuscitate)     Medical Interventions (Patient has pulse or is breathing):    Full Support       Signature: Electronically signed by Sagar Way MD, 12/30/24, 12:17 EST.  [facility] Hospitalist Team

## 2024-12-30 NOTE — PLAN OF CARE
Goal Outcome Evaluation:      Patient Aox4 Standby BSC. Pills taken whole. Standing orthostatics 81/59. 100ml/ns started today. Coccyx red blanchable. Possible discharge tomorrow. Patient has rested in chair throughout the day.

## 2024-12-30 NOTE — CASE MANAGEMENT/SOCIAL WORK
Social Work Assessment  [facility]     Patient Name: Dariana Rivera  MRN: [id number]  Today's Date: 12/30/2024    Admit Date: 12/27/2024       Discharge Plan       Row Name 12/30/24 1404       Plan    Plan From home with spouse and caregivers. SNF choices pending if patient agreeable. Will need precert. PASRR QFR.    Plan Comments CM updated on status of PASRR.             ROSANNA Brasher, LSW    Phone: [phone]  Fax: [phone]  [email or web address]

## 2024-12-31 VITALS
RESPIRATION RATE: 17 BRPM | HEART RATE: 69 BPM | TEMPERATURE: 98.6 F | WEIGHT: 229.28 LBS | BODY MASS INDEX: 39.14 KG/M2 | SYSTOLIC BLOOD PRESSURE: 128 MMHG | OXYGEN SATURATION: 96 % | HEIGHT: 64 IN | DIASTOLIC BLOOD PRESSURE: 64 MMHG

## 2024-12-31 LAB
ANION GAP SERPL CALCULATED.3IONS-SCNC: 10.5 MMOL/L (ref 5–15)
BUN SERPL-MCNC: 12 MG/DL (ref 8–23)
BUN/CREAT SERPL: 15.2 (ref 7–25)
CALCIUM SPEC-SCNC: 9.5 MG/DL (ref 8.6–10.5)
CHLORIDE SERPL-SCNC: 104 MMOL/L (ref 98–107)
CO2 SERPL-SCNC: 26.5 MMOL/L (ref 22–29)
CREAT SERPL-MCNC: 0.79 MG/DL (ref 0.57–1)
DEPRECATED RDW RBC AUTO: 49.3 FL (ref 37–54)
EGFRCR SERPLBLD CKD-EPI 2021: 81.6 ML/MIN/1.73
ERYTHROCYTE [DISTWIDTH] IN BLOOD BY AUTOMATED COUNT: 13.8 % (ref 12.3–15.4)
GLUCOSE SERPL-MCNC: 99 MG/DL (ref 65–99)
HCT VFR BLD AUTO: 42.9 % (ref 34–46.6)
HGB BLD-MCNC: 14.3 G/DL (ref 12–15.9)
MCH RBC QN AUTO: 32.4 PG (ref 26.6–33)
MCHC RBC AUTO-ENTMCNC: 33.3 G/DL (ref 31.5–35.7)
MCV RBC AUTO: 97.1 FL (ref 79–97)
PLATELET # BLD AUTO: 114 10*3/MM3 (ref 140–450)
PMV BLD AUTO: 10.6 FL (ref 6–12)
POTASSIUM SERPL-SCNC: 3.7 MMOL/L (ref 3.5–5.2)
RBC # BLD AUTO: 4.42 10*6/MM3 (ref 3.77–5.28)
SODIUM SERPL-SCNC: 141 MMOL/L (ref 136–145)
WBC NRBC COR # BLD AUTO: 5.96 10*3/MM3 (ref 3.4–10.8)

## 2024-12-31 PROCEDURE — 94664 DEMO&/EVAL PT USE INHALER: CPT

## 2024-12-31 PROCEDURE — 94799 UNLISTED PULMONARY SVC/PX: CPT

## 2024-12-31 PROCEDURE — 97110 THERAPEUTIC EXERCISES: CPT

## 2024-12-31 PROCEDURE — 80048 BASIC METABOLIC PNL TOTAL CA: CPT | Performed by: HOSPITALIST

## 2024-12-31 PROCEDURE — 97530 THERAPEUTIC ACTIVITIES: CPT

## 2024-12-31 PROCEDURE — 94761 N-INVAS EAR/PLS OXIMETRY MLT: CPT

## 2024-12-31 PROCEDURE — 85027 COMPLETE CBC AUTOMATED: CPT | Performed by: HOSPITALIST

## 2024-12-31 PROCEDURE — 25010000002 CEFTRIAXONE PER 250 MG: Performed by: HOSPITALIST

## 2024-12-31 RX ORDER — MIDODRINE HYDROCHLORIDE 5 MG/1
5 TABLET ORAL
Start: 2024-12-31

## 2024-12-31 RX ORDER — CLONAZEPAM 0.5 MG/1
0.5 TABLET ORAL 2 TIMES DAILY
Qty: 6 TABLET | Refills: 0 | Status: SHIPPED | OUTPATIENT
Start: 2024-12-31 | End: 2025-01-03

## 2024-12-31 RX ORDER — MIDODRINE HYDROCHLORIDE 5 MG/1
5 TABLET ORAL
Status: DISCONTINUED | OUTPATIENT
Start: 2024-12-31 | End: 2024-12-31 | Stop reason: HOSPADM

## 2024-12-31 RX ADMIN — PANTOPRAZOLE SODIUM 40 MG: 40 TABLET, DELAYED RELEASE ORAL at 05:09

## 2024-12-31 RX ADMIN — ESCITALOPRAM 15 MG: 10 TABLET, FILM COATED ORAL at 08:12

## 2024-12-31 RX ADMIN — TIOTROPIUM BROMIDE INHALATION SPRAY 2 PUFF: 3.12 SPRAY, METERED RESPIRATORY (INHALATION) at 10:44

## 2024-12-31 RX ADMIN — GABAPENTIN 300 MG: 300 CAPSULE ORAL at 08:13

## 2024-12-31 RX ADMIN — Medication 10 ML: at 08:14

## 2024-12-31 RX ADMIN — PRAMIPEXOLE DIHYDROCHLORIDE 0.5 MG: 0.25 TABLET ORAL at 08:12

## 2024-12-31 RX ADMIN — Medication 1 TABLET: at 08:12

## 2024-12-31 RX ADMIN — EMPAGLIFLOZIN 25 MG: 25 TABLET, FILM COATED ORAL at 08:15

## 2024-12-31 RX ADMIN — PENTOXIFYLLINE 400 MG: 400 TABLET, EXTENDED RELEASE ORAL at 08:13

## 2024-12-31 RX ADMIN — MIDODRINE HYDROCHLORIDE 5 MG: 5 TABLET ORAL at 13:28

## 2024-12-31 RX ADMIN — ASPIRIN 81 MG: 81 TABLET, COATED ORAL at 08:12

## 2024-12-31 RX ADMIN — SENNOSIDES AND DOCUSATE SODIUM 2 TABLET: 50; 8.6 TABLET ORAL at 08:12

## 2024-12-31 RX ADMIN — Medication 2000 UNITS: at 08:14

## 2024-12-31 RX ADMIN — CARBIDOPA AND LEVODOPA 2 TABLET: 25; 100 TABLET, EXTENDED RELEASE ORAL at 08:12

## 2024-12-31 RX ADMIN — PENTOXIFYLLINE 400 MG: 400 TABLET, EXTENDED RELEASE ORAL at 11:49

## 2024-12-31 RX ADMIN — OXYCODONE HYDROCHLORIDE AND ACETAMINOPHEN 1000 MG: 500 TABLET ORAL at 08:12

## 2024-12-31 RX ADMIN — CLONAZEPAM 0.5 MG: 0.5 TABLET ORAL at 08:13

## 2024-12-31 RX ADMIN — CEFTRIAXONE SODIUM 2000 MG: 2 INJECTION, POWDER, FOR SOLUTION INTRAMUSCULAR; INTRAVENOUS at 11:48

## 2024-12-31 RX ADMIN — ROPINIROLE HYDROCHLORIDE 0.25 MG: 0.25 TABLET, FILM COATED ORAL at 08:13

## 2024-12-31 RX ADMIN — BUDESONIDE AND FORMOTEROL FUMARATE DIHYDRATE 2 PUFF: 160; 4.5 AEROSOL RESPIRATORY (INHALATION) at 10:44

## 2024-12-31 NOTE — PLAN OF CARE
Assessment: Dariana Rivera presents with functional mobility impairments which indicate the need for skilled intervention. Pt continues to be hypotensive upon standing and unable to ambulate due to reported dizziness/lightheadedness. BP readings listed above, pt rebounds quickly upon sitting. Agreeable to complete seated exercises this date. Will continue to follow and progress as tolerated.     Vitals: Hypotensive and Orthostatic  124/66 Seated with legs elevated  135/63 seated with feet on floor  86/59 standing with RW  122/66 seated after ankle pumps

## 2024-12-31 NOTE — PLAN OF CARE
Problem: Adult Inpatient Plan of Care  Goal: Plan of Care Review  Outcome: Met  Goal: Patient-Specific Goal (Individualized)  Outcome: Met  Goal: Absence of Hospital-Acquired Illness or Injury  Outcome: Met  Goal: Optimal Comfort and Wellbeing  Outcome: Met  Goal: Readiness for Transition of Care  Outcome: Met     Problem: UTI (Urinary Tract Infection)  Goal: Improved Infection Symptoms  Outcome: Met     Problem: Skin Injury Risk Increased  Goal: Skin Health and Integrity  Outcome: Met     Problem: Fall Injury Risk  Goal: Absence of Fall and Fall-Related Injury  Outcome: Met   Goal Outcome Evaluation:

## 2024-12-31 NOTE — DISCHARGE SUMMARY
[facility] Medicine Services  Discharge Summary    Date of Service: 2024  Patient Name: Dariana Rivera  : 1955  MRN: [id number]    Date of Admission: 2024  Discharge Diagnosis:   #UTI  #Fall  #Generalized weakness  #orhostatic hypotension   Date of Discharge: 2024  Primary Care Physician: Franny Strickland MD      Presenting Problem:   UTI (urinary tract infection) [N39.0]  General weakness [R53.1]  Contusion of lower back, initial encounter [S30.0XXA]  Fall, initial encounter [W19.XXXA]  Urinary tract infection without hematuria, site unspecified [N39.0]    Active and Resolved Hospital Problems:  Active Hospital Problems    Diagnosis POA    **UTI (urinary tract infection) [N39.0] Yes    Hyperlipidemia [E78.5] Yes    Generalized anxiety disorder [F41.1] Yes    Chronic obstructive pulmonary disease [J44.9] Yes    Chronic kidney disease, unspecified [N18.9] Yes    Type 2 diabetes mellitus with other diabetic neurological complication [E11.49] Yes    Chronic back pain [M54.9, G89.29] Yes    Bipolar 1 disorder, depressed, moderate [F31.32] Yes    Cirrhosis [K74.60] Yes    Hypertension [I10] Yes      Resolved Hospital Problems   No resolved problems to display.         Hospital Course     HPI:  Admitting team HPI   Dariana Rivera is a 69 y.o. female with a previous medical history of Parkinson's, CAD, chronic pain, depression, HTN, COPD, RLS who presented to [facility] on 2024 with complaints of generalized weakness and multiple falls for the past few days.  She does report significant pain to her lower back and right flank area.         Hospital Course:  69-year-old female with history of Parkinson's, CAD, chronic pain, depression, hypertension, COPD, RLS admitted with right flank pain and fall     #UTI  #Fall  #Generalized weakness  CT head no acute findings.  CT abdomen and pelvis 5 mm right lower pole renal calculus.  7 mm left lower pole  calculus     Finished 5 days of ceftriaxone. Urine culture grew more than 100,000 colonies of E. coli with sensitivity noted       #History of Parkinson's  Continue home dose of Sinemet  Baclofen discontinued given hypotension      #Anxiety/depression  Continue home dose Klonopin Lexapro     #History of CAD-stable  #orhostatic hypotension   Orthostatic vitals positive 12/28. S/p IVF with improvement   Continue to hold nadolol and Lasix  Status post IV fluids  Baclofen discontinued as above  Suspect underlying autonomic dysfunction   Orthostatic vitals remained  positive despite IV fluids and holding antihypertensives will start patient on midodrine 5 mg 3 times daily.  Hold for SBP less than 160  Compression stockings  Fall precautions     #RLS  Continue Requip        DISCHARGE Follow Up Recommendations for labs and diagnostics:   Fall precautions  Hold midodrine for SBP more than 160  Compression stockings thigh high to be worn all day. May remove at night  Goal     Day of Discharge     Vital Signs:  Temp:  [97.8 °F (36.6 °C)-98.9 °F (37.2 °C)] 98.6 °F (37 °C)  Heart Rate:  [61-80] 69  Resp:  [15-20] 17  BP: ()/(59-71) 128/64    Physical Exam:  Physical Exam   AOx3 NAD  RRR S1 is normal  Lungs with fair air entry  Abdomen soft nontender nondistended.  No CVA tenderness         Pertinent  and/or Most Recent Results     LAB RESULTS:      Lab 12/31/24 0518 12/30/24 0521 12/29/24  0150 12/28/24  0302 12/27/24  1645   WBC 5.96 5.49 6.57 6.61 6.69   HEMOGLOBIN 14.3 14.4 13.9 14.3 15.0   HEMATOCRIT 42.9 43.1 43.1 41.8 44.8   PLATELETS 114* 116* 125* 134* 148   NEUTROS ABS  --   --   --   --  4.87   IMMATURE GRANS (ABS)  --   --   --   --  0.02   LYMPHS ABS  --   --   --   --  1.05   MONOS ABS  --   --   --   --  0.60   EOS ABS  --   --   --   --  0.12   MCV 97.1* 97.5* 96.9 95.9 97.0         Lab 12/31/24 0518 12/30/24 0521 12/29/24  0150 12/28/24  0302 12/27/24  1645   SODIUM 141 141 140 139 141    POTASSIUM 3.7 3.6 3.3* 3.6 4.2   CHLORIDE 104 104 104 102 100   CO2 26.5 26.8 25.4 25.0 28.6   ANION GAP 10.5 10.2 10.6 12.0 12.4   BUN 12 13 13 12 14   CREATININE 0.79 0.83 0.82 0.82 0.91   EGFR 81.6 76.9 78.0 78.0 68.9   GLUCOSE 99 94 108* 127* 102*   CALCIUM 9.5 9.5 9.4 9.4 9.8         Lab 12/27/24  1645   TOTAL PROTEIN 7.4   ALBUMIN 3.6   GLOBULIN 3.8   ALT (SGPT) 9   AST (SGOT) 34*   BILIRUBIN 1.0   ALK PHOS 61   LIPASE 26         Lab 12/27/24  1756 12/27/24  1645   HSTROP T 29* 31*                 Brief Urine Lab Results  (Last result in the past 365 days)        Color   Clarity   Blood   Leuk Est   Nitrite   Protein   CREAT   Urine HCG        12/27/24 1752 Yellow   Cloudy   Large (3+)   Moderate (2+)   Positive   Negative                 Microbiology Results (last 10 days)       Procedure Component Value - Date/Time    Urine Culture - Urine, Straight Cath [[id number]]  (Abnormal)  (Susceptibility) Collected: 12/27/24 1752    Lab Status: Final result Specimen: Urine from Straight Cath Updated: 12/29/24 1018     Urine Culture >100,000 CFU/mL Escherichia coli    Narrative:      Colonization of the urinary tract without infection is common. Treatment is discouraged unless the patient is symptomatic, pregnant, or undergoing an invasive urologic procedure.    Susceptibility        Escherichia coli      MARTI      Amoxicillin + Clavulanate Susceptible      Ampicillin Resistant      Ampicillin + Sulbactam Resistant      Cefazolin (Urine) Susceptible      Cefepime Susceptible      Ceftazidime Susceptible      Ceftriaxone Susceptible      Gentamicin Susceptible      Levofloxacin Intermediate      Nitrofurantoin Susceptible      Piperacillin + Tazobactam Susceptible      Trimethoprim + Sulfamethoxazole Resistant                                   CT Abdomen Pelvis With Contrast    Result Date: 12/27/2024  Impression: 1. No acute traumatic abnormality demonstrated 2. Cirrhotic morphology of the liver with mild splenomegaly  and no ascites 3. Bilateral nephrolithiasis Electronically Signed: Leonides Osborne  12/27/2024 6:40 PM EST  Workstation ID: [id number]    CT Head Without Contrast    Result Date: 12/27/2024  Impression: Impression: No acute intracranial findings. Electronically Signed: Alex Weston  12/27/2024 6:36 PM EST  Workstation ID: [id number]    XR Chest 1 View    Result Date: 12/27/2024  Impression: Impression: No active cardiopulmonary disease Electronically Signed: Leonides Osborne  12/27/2024 5:38 PM EST  Workstation ID: [id number]                 Labs Pending at Discharge:  Pending Results       None            Procedures Performed           Consults:   Consults       Date and Time Order Name Status Description    12/27/2024  7:09 PM Hospitalist (on-call MD unless specified)                Discharge Details        Discharge Medications        New Medications        Instructions Start Date   midodrine 5 MG tablet  Commonly known as: PROAMATINE   5 mg, Oral, 3 Times Daily Before Meals             Changes to Medications        Instructions Start Date   Klor-Con M20 20 MEQ CR tablet  Generic drug: potassium chloride  What changed: how much to take   TAKE 1 TABLET BY MOUTH DAILY             Continue These Medications        Instructions Start Date   albuterol sulfate  (90 Base) MCG/ACT inhaler  Commonly known as: PROVENTIL HFA;VENTOLIN HFA;PROAIR HFA   2 puffs, Inhalation, Every 6 Hours PRN      amantadine 100 MG capsule  Commonly known as: SYMMETREL   100 mg, Nightly      aspirin 81 MG EC tablet   aspirin 81 mg tablet,delayed release      Calcium 600 600 MG tablet  Generic drug: calcium carbonate   Take 1 tablet by mouth Daily.      carbidopa-levodopa ER  MG per tablet  Commonly known as: SINEMET CR   2 tablets, 3 Times Daily      clonazePAM 0.5 MG tablet  Commonly known as: KlonoPIN   0.5 mg, Oral, 2 Times Daily      escitalopram 10 MG tablet  Commonly known as: LEXAPRO   15 mg, Oral, Daily      Farxiga 10 MG  tablet  Generic drug: dapagliflozin Propanediol   10 mg, Oral, Daily      gabapentin 300 MG capsule  Commonly known as: NEURONTIN   300 mg, 3 Times Daily      ipratropium-albuterol 0.5-2.5 mg/3 ml nebulizer  Commonly known as: DUO-NEB   3 mL, Every 4 Hours PRN      lidocaine 5 %  Commonly known as: LIDODERM   1 patch, Transdermal, Every 24 Hours, Remove & Discard patch within 12 hours or as directed by MD      lurasidone 40 MG tablet tablet  Commonly known as: LATUDA   40 mg, Oral, Every Evening      Melatonin 10 MG capsule   1 capsule, Every Night at Bedtime      multivitamin with minerals tablet tablet   1 tablet, Daily      nystatin [id number] UNIT/GM cream  Commonly known as: MYCOSTATIN   1 Application, Topical, 2 Times Daily      O2  Commonly known as: OXYGEN   1 L/min, Once      omeprazole 40 MG capsule  Commonly known as: priLOSEC   40 mg, Daily      pentoxifylline 400 MG CR tablet  Commonly known as: TRENtal   400 mg, 3 Times Daily With Meals      pramipexole 0.5 MG tablet  Commonly known as: MIRAPEX   0.5 mg, Oral, 2 Times Daily      rOPINIRole 0.25 MG tablet  Commonly known as: REQUIP   TAKE 1 TABLET BY MOUTH TWICE DAILY **TAKE THE NIGHT DOSE 1 HOUR BEFORE BEDTIME**      spironolactone 25 MG tablet  Commonly known as: ALDACTONE   1 tablet, Daily      Trelegy Ellipta 100-62.5-25 MCG/ACT inhaler  Generic drug: Fluticasone-Umeclidin-Vilant   1 puff, Inhalation, Daily - RT      vitamin C 500 MG tablet  Commonly known as: ASCORBIC ACID   1,000 mg, Daily      Vitamin D3 50 MCG (2000 UT) capsule   Take 1 capsule by mouth Daily.             Stop These Medications      baclofen 20 MG tablet  Commonly known as: LIORESAL     furosemide 40 MG tablet  Commonly known as: LASIX     nadolol 20 MG tablet  Commonly known as: CORGARD              Allergies   Allergen Reactions    Ambien  [Zolpidem Tartrate] Confusion    Ciprofloxacin Hcl Rash    Penicillins Rash    Zolpidem Unknown - High Severity     Other reaction(s):  Confusion    Methadone Hallucinations    Clindamycin Rash         Discharge Disposition:   Skilled Nursing Facility (DC - External)    Diet:  Hospital:  Diet Order   Procedures    Diet: Cardiac; Healthy Heart (2-3 Na+); Fluid Consistency: Thin (IDDSI 0)         Discharge Activity:         CODE STATUS:  Code Status and Medical Interventions: CPR (Attempt to Resuscitate); Full Support   Ordered at: 12/27/24 1930     Code Status (Patient has no pulse and is not breathing):    CPR (Attempt to Resuscitate)     Medical Interventions (Patient has pulse or is breathing):    Full Support         Future Appointments   Date Time Provider Department Center   3/6/2025  1:00 PM Archana Singh MD [facility]   4/17/2025 12:30 PM Franny Strickland MD [facility]   4/29/2025 10:20 AM Sasha Khan MD [facility] None           Time spent on Discharge including face to face service:  50 minutes    Signature: Electronically signed by Sagar Way MD, 12/31/24, 12:00 EST.  [facility] Hospitalist Team

## 2024-12-31 NOTE — THERAPY TREATMENT NOTE
"Subjective: Pt agreeable to therapeutic plan of care.    Objective:     Precautions - high fall risk, orthostatic hypotension    Bed mobility - N/A or Not attempted. In chair upon room entry.  Transfers - CGA and with rolling walker STS transfer from chair.  Ambulation - 0 feet N/A or Not attempted. Not attempted this date due to orthostatic hypotension and reported dizziness/lightheadedness    Therapeutic Exercise - 20 Reps B LE AROM supported sitting / chair    Vitals: Hypotensive and Orthostatic  124/66 Seated with legs elevated  135/63 seated with feet on floor  86/59 standing with RW  122/66 seated after ankle pumps    Pain: 0 VAS   Location: N/A  Intervention for pain: N/A    Education: Provided education on the importance of mobility in the acute care setting, Verbal/Tactile Cues, Transfer Training, Gait Training, and HEP    Assessment: Dariana Rivera presents with functional mobility impairments which indicate the need for skilled intervention. Pt continues to be hypotensive upon standing and unable to ambulate due to reported dizziness/lightheadedness. BP readings listed above, pt rebounds quickly upon sitting. Agreeable to complete seated exercises this date. Will continue to follow and progress as tolerated.     Plan/Recommendations:   If medically appropriate, Moderate Intensity Therapy recommended post-acute care. This is recommended as therapy feels the patient would require 3-4 days per week and wouldn't tolerate \"3 hour daily\" rehab intensity. SNF would be the preferred choice. If the patient does not agree to SNF, arrange HH or OP depending on home bound status. If patient is medically complex, consider LTACH. Pt requires no DME at discharge.     Pt desires Skilled Rehab placement at discharge. Pt cooperative; agreeable to therapeutic recommendations and plan of care.     Modified [place]: N/A = No pre-op stroke/TIA    Post-Tx Position: Up in Chair, Alarms activated, and Call light and personal " items within reach  PPE: gloves    Therapy Charges for Today       Code Description Service Date Service Provider Modifiers Qty    95801253676 HC PT THERAPEUTIC ACT EA 15 MIN 12/30/2024 Enedina Esqueda, PT GP 1    22869855292 HC PT THERAPEUTIC ACT EA 15 MIN 12/31/2024 Enedina Esqueda, PT GP 1    02470571268 HC PT THER PROC EA 15 MIN 12/31/2024 Enedina Esqueda, PT GP 1           PT Charges       Row Name 12/31/24 1253             Time Calculation    Start Time 1025  -      Stop Time 1043  -      Time Calculation (min) 18 min  -      PT Received On 12/31/24  -      PT - Next Appointment 01/02/25  -         Time Calculation- PT    Total Timed Code Minutes- PT 18 minute(s)  -                User Key  (r) = Recorded By, (t) = Taken By, (c) = Cosigned By      Initials Name Provider Type     Enedina Esqueda, PT Physical Therapist

## 2024-12-31 NOTE — PLAN OF CARE
Goal Outcome Evaluation:      Pt has PMH of parkinson's, visible tremors. Pt assist x 1 to BSC and steady for RN. Pt U/O is clear, yellow. Pt Ax4, and able to make needs known.

## 2025-01-02 NOTE — CASE MANAGEMENT/SOCIAL WORK
Case Management Discharge Note      Final Note: [place].    Selected Continued Care - Discharged on 12/31/2024 Admission date: 12/27/2024 - Discharge disposition: Skilled Nursing Facility (DC - External)      Destination Coordination complete.      Service Provider Services Address Phone Fax Patient Preferred    [facility] [address] [place] [address] [phone]              Transportation Services  Private: Car    Final Discharge Disposition Code: 03 - skilled nursing facility (SNF)

## 2025-01-17 ENCOUNTER — TELEPHONE (OUTPATIENT)
Dept: FAMILY MEDICINE CLINIC | Facility: CLINIC | Age: 70
End: 2025-01-17
Payer: MEDICARE

## 2025-01-17 DIAGNOSIS — R29.6 RECURRENT FALLS: ICD-10-CM

## 2025-01-17 DIAGNOSIS — R53.1 GENERALIZED WEAKNESS: ICD-10-CM

## 2025-01-17 DIAGNOSIS — G20.A1 PARKINSON'S DISEASE, UNSPECIFIED WHETHER DYSKINESIA PRESENT, UNSPECIFIED WHETHER MANIFESTATIONS FLUCTUATE: Primary | Chronic | ICD-10-CM

## 2025-01-17 NOTE — TELEPHONE ENCOUNTER
Sorry I just talked to [facility] and she says that they dont do skilled nursing, so the PT referral would need to go to [facility].. per [facility] pt should be able to see [facility]. Pt was previously told she could not do both, but asha says that is not the case.

## 2025-01-17 NOTE — TELEPHONE ENCOUNTER
Asha nurse with [facility] called to report that patients visit today she noticed some bruising on patients bottom. She was discharged from rehab after the New yr and is home. Requesting order for PT also be faxed to [email or web address].

## 2025-01-28 ENCOUNTER — OFFICE VISIT (OUTPATIENT)
Dept: FAMILY MEDICINE CLINIC | Facility: CLINIC | Age: 70
End: 2025-01-28
Payer: MEDICARE

## 2025-01-28 VITALS
OXYGEN SATURATION: 100 % | HEIGHT: 64 IN | BODY MASS INDEX: 38.93 KG/M2 | DIASTOLIC BLOOD PRESSURE: 88 MMHG | SYSTOLIC BLOOD PRESSURE: 120 MMHG | WEIGHT: 228 LBS | TEMPERATURE: 98 F | HEART RATE: 48 BPM

## 2025-01-28 DIAGNOSIS — I95.9 HYPOTENSION, UNSPECIFIED HYPOTENSION TYPE: ICD-10-CM

## 2025-01-28 DIAGNOSIS — R53.1 GENERALIZED WEAKNESS: ICD-10-CM

## 2025-01-28 DIAGNOSIS — R31.29 MICROSCOPIC HEMATURIA: ICD-10-CM

## 2025-01-28 DIAGNOSIS — R29.6 RECURRENT FALLS: Primary | ICD-10-CM

## 2025-01-28 DIAGNOSIS — R31.9 HEMATURIA, UNSPECIFIED TYPE: ICD-10-CM

## 2025-01-28 DIAGNOSIS — Z87.440 HISTORY OF UTI: ICD-10-CM

## 2025-01-28 DIAGNOSIS — K42.9 UMBILICAL HERNIA WITHOUT OBSTRUCTION AND WITHOUT GANGRENE: ICD-10-CM

## 2025-01-28 LAB
BILIRUB BLD-MCNC: NEGATIVE MG/DL
CLARITY, POC: CLEAR
COLOR UR: YELLOW
EXPIRATION DATE: ABNORMAL
GLUCOSE UR STRIP-MCNC: ABNORMAL MG/DL
KETONES UR QL: NEGATIVE
LEUKOCYTE EST, POC: NEGATIVE
Lab: ABNORMAL
NITRITE UR-MCNC: NEGATIVE MG/ML
PH UR: 6 [PH] (ref 5–8)
PROT UR STRIP-MCNC: NEGATIVE MG/DL
RBC # UR STRIP: ABNORMAL /UL
SP GR UR: 1.02 (ref 1–1.03)
UROBILINOGEN UR QL: NORMAL

## 2025-01-28 PROCEDURE — 3074F SYST BP LT 130 MM HG: CPT | Performed by: FAMILY MEDICINE

## 2025-01-28 PROCEDURE — 81003 URINALYSIS AUTO W/O SCOPE: CPT | Performed by: FAMILY MEDICINE

## 2025-01-28 PROCEDURE — 99214 OFFICE O/P EST MOD 30 MIN: CPT | Performed by: FAMILY MEDICINE

## 2025-01-28 PROCEDURE — 1125F AMNT PAIN NOTED PAIN PRSNT: CPT | Performed by: FAMILY MEDICINE

## 2025-01-28 PROCEDURE — 3079F DIAST BP 80-89 MM HG: CPT | Performed by: FAMILY MEDICINE

## 2025-01-28 PROCEDURE — 87086 URINE CULTURE/COLONY COUNT: CPT | Performed by: FAMILY MEDICINE

## 2025-01-28 NOTE — PROGRESS NOTES
Subjective   Dariana Rivera is a 69 y.o. female.     History of Present Illness  The patient presents for evaluation of a suspected hernia, urinary tract infection, and blood pressure management.    She was hospitalized on 12/27/2024 due to weakness, falls, hypotension, and a urinary tract infection. Following her discharge, she was admitted to [facility] for rehabilitation until 01/16/2025. She reports an improvement in her strength and has been utilizing a cane for mobility. She has not experienced any further falls since her hospitalization and feels more stable upon standing. She reports no chest pain or shortness of breath. She also reports no nausea or vomiting and maintains a good appetite. She has been adhering to her prescribed midodrine regimen, which was initiated to manage her blood pressure.    She expresses concern about a potential hernia, which she first noticed during a period of weight loss. She describes the sensation as a small knot located near her umbilicus on the right side.    She continues to experience urinary symptoms, including burning sensation and frequent urination, and is uncertain if these are indicative of an ongoing infection.    MEDICATIONS  midodrine       The following portions of the patient's history were reviewed and updated as appropriate: allergies, current medications, past family history, past medical history, past social history, past surgical history, and problem list.  Past Medical History:   Diagnosis Date    Allergic Not sure    Penicillin, cipro, clindamycin, methadone, ambien    Angina at rest     DR. [facility]    An[facility] disorder     [facility]     Asthma About 5 yrs ago    Shortness of breath with exertion    Back pain     Bipolar disorder     Cataract     Left & right removed in 3/2016    Cervical dystonia     Cholelithiasis     Gallbladder removed in 1995 or 97    Chronic pain     with stenosis    Cirrhosis     Colitis     Colon polyps     COPD (chronic  obstructive pulmonary disease)     Not sure when diagnosed    Cramping of feet     Cramping of hands     DDD (degenerative disc disease), cervical     DDD (degenerative disc disease), lumbar     DDD (degenerative disc disease), lumbar     DDD (degenerative disc disease), thoracic     Depression     Diabetes mellitus     Not sure when diagnosed.    Diverticulosis     Not sure when diagnosed    Dysphagia 09/2020    Eosinophilic colitis     Gastritis     GERD (gastroesophageal reflux disease)     Headache Not sure    Migraines stopped after complete hyst in 2004    Hepatic steatosis     non alcoholic steo-hepatitis     Hx of lithotripsy 02/10/2021    Hypertension     IBS (irritable bowel syndrome)     Infectious viral hepatitis     [facility]    Insomnia     Kidney stones     Low back pain     Not sure when diagnosed    Lumbar stenosis     Neck pain     Neuromuscular disorder Not sure    Parkinsons and Cervical Dystonia    Neuropathy     Obesity     Obese since about 1966    Osteoarthritis     Osteopenia     Osteopenia diagnosed after bone density test in 2024.    Parkinson's disease     Peripheral edema     Pneumonia May 2018    About a month after neck fusion surgery    PTSD (post-traumatic stress disorder)     Recurrent UTI     Renal insufficiency     Not sure when diagnosed    Seizure     onset 7/2016.  Last seizure 10/2019    Sleep apnea     Using Bipap machine at night. advised to bring dos    Sleep apnea, obstructive     Urinary incontinence      Past Surgical History:   Procedure Laterality Date    BACK SURGERY      CARDIAC CATHETERIZATION  02/2019    CARPAL TUNNEL RELEASE Bilateral     Wrist     CHOLECYSTECTOMY  1997    COLONOSCOPY      CYSTOSCOPY BLADDER STONE LITHOTRIPSY      ENDOMETRIAL ABLATION      ENDOSCOPY      ENDOSCOPY N/A 04/15/2020    Procedure: ESOPHAGOGASTRODUODENOSCOPY with dilitation (60FR.);  Surgeon: Julieta Allison MD;  Location: [facility] ENDOSCOPY;  Service: Gastroenterology;  Laterality: N/A;   varices,hiatal hernia,gastritis    ENDOSCOPY N/A 09/29/2020    Procedure: ESOPHAGOGASTRODUODENOSCOPY with esophageal dilitation (54 bougie);  Surgeon: Julieta Allison MD;  Location: [facility] ENDOSCOPY;  Service: Gastroenterology;  Laterality: N/A;   post op: hiatal hernia, esophageal stricture    ENDOSCOPY N/A 09/30/2021    Procedure: ESOPHAGOGASTRODUODENOSCOPY WITH DILATATION (BOUGIE #60);  Surgeon: Julieta Allison MD;  Location: [facility] ENDOSCOPY;  Service: Gastroenterology;  Laterality: N/A;  ESOPHAGEAL VARICES AND HIATAL HERNIA    ENDOSCOPY N/A 07/21/2022    Procedure: ESOPHAGOGASTRODUODENOSCOPY WITH BIOPSY X1 AREA  AND DILATATION (BOUGIE  #54);  Surgeon: Zachary Mcgarry MD;  Location: [facility] ENDOSCOPY;  Service: Gastroenterology;  Laterality: N/A;  Post: candida esophagitis,gastritis    ENDOSCOPY N/A 05/02/2023    Procedure: ESOPHAGOGASTRODUODENOSCOPY WITH BIOPSY X 1  AREA  AND DILATATON (48 NON GUIDED BOUGIE);  Surgeon: Zachary Mcgarry MD;  Location: [facility] ENDOSCOPY;  Service: Gastroenterology;  Laterality: N/A;  post op: GASTRITIS, SMALL ESOPHAGEAL VARICES    EYE SURGERY  3-14 & 3-    Cataract surgery R 3-14, L 3-    HYSTERECTOMY  09/2004    complete    INTERSTIM PLACEMENT      bladder    JOINT REPLACEMENT Bilateral     knees    KNEE ARTHROSCOPY Bilateral     Arthroscopic surgery to bilateral knees     OTHER SURGICAL HISTORY  2015    Stress test     OTHER SURGICAL HISTORY  10/2016    Lithotripsy total 4-5    OTHER SURGICAL HISTORY      Right arm cellulits- spider bites     OTHER SURGICAL HISTORY  02/08/2018    Lithotripsy    OTHER SURGICAL HISTORY  04/20/2018    ACDF C3-C4 & C6-C7    REPLACEMENT TOTAL KNEE  2000    REPLACEMENT TOTAL KNEE Left 11/2016    SHOULDER ROTATOR CUFF REPAIR Right 01/08/2020    Procedure: RT ROTATOR CUFF REPAIR OPEN;  Surgeon: Curly Vaughn MD;  Location: [facility] MAIN OR;  Service: Orthopedics    SPINE SURGERY  04/2018    Fusion surgery C3-C4 and  C6-C7     Family History   Problem Relation Age of Onset    Hypertension Mother     Hyperlipidemia Mother     Arthritis Mother     Cancer Mother         Skin cancers    Depression Mother     Mental illness Mother         Depression and Alzheimers    Vision loss Mother         Has macular degeneration.    Diabetes Father              Heart disease Father         Had angina. Was diabetic.  of heart attack at age 57.    Early death Father          at 57 from heart attack    Heart attack Father          of heart attack in     Heart failure Father              Hyperlipidemia Sister     Arthritis Sister     Cancer Sister         Skin cancers    Depression Sister     Diabetes Sister         Diagnosed 2019    Heart disease Sister         2 mini strokes, atrial fribulation, pacemaker insertion    Hypertension Sister     Mental illness Sister         Depression    Stroke Sister         2 mini strokes    Diabetes Brother         Not sure when diagnosed    Heart disease Brother         Had open heart surgery about 4 yrs ago.    Hypertension Brother     Hyperlipidemia Brother     Arthritis Brother     Cancer Brother         Skin cancers and colon cancer    Heart attack Brother         Had a heart attack about 4 yrs ago    Breast cancer Maternal Grandmother     Breast cancer Maternal Aunt      Social History     Socioeconomic History    Marital status:    Tobacco Use    Smoking status: Former     Current packs/day: 0.00     Average packs/day: 0.5 packs/day for 15.6 years (7.8 ttl pk-yrs)     Types: Cigarettes     Start date: 1974     Quit date: 1990     Years since quittin.6     Passive exposure: Past    Smokeless tobacco: Never    Tobacco comments:     Stopped off and on.  Stopped once for more than 2 yrs   Vaping Use    Vaping status: Never Used   Substance and Sexual Activity    Alcohol use: No    Drug use: No    Sexual activity: Not Currently     Partners: Male      Birth control/protection: Hysterectomy, Surgical     Comment: Complete hyst          Current Outpatient Medications:     albuterol sulfate  (90 Base) MCG/ACT inhaler, Inhale 2 puffs Every 6 (Six) Hours As Needed for Wheezing or Shortness of Air., Disp: 18 g, Rfl: 5    amantadine (SYMMETREL) 100 MG capsule, Take 1 capsule by mouth Every Night., Disp: , Rfl:     aspirin 81 MG EC tablet, aspirin 81 mg tablet,delayed release, Disp: , Rfl:     calcium carbonate (Calcium 600) 600 MG tablet, Take 1 tablet by mouth Daily., Disp: , Rfl:     carbidopa-levodopa ER (SINEMET CR)  MG per tablet, Take 2 tablets by mouth 3 (Three) Times a Day., Disp: , Rfl:     Cholecalciferol (Vitamin D3) 50 MCG (2000 UT) capsule, Take 1 capsule by mouth Daily., Disp: , Rfl:     escitalopram (LEXAPRO) 10 MG tablet, Take 1.5 tablets by mouth Daily., Disp: 135 tablet, Rfl: 0    Farxiga 10 MG tablet, TAKE ONE TABLET BY MOUTH DAILY, Disp: 90 tablet, Rfl: 0    Fluticasone-Umeclidin-Vilant (Trelegy Ellipta) 100-62.5-25 MCG/ACT inhaler, Inhale 1 puff Daily., Disp: 1 each, Rfl: 11    gabapentin (NEURONTIN) 300 MG capsule, Take 1 capsule by mouth 3 (Three) Times a Day., Disp: , Rfl:     ipratropium-albuterol (DUO-NEB) 0.5-2.5 mg/3 ml nebulizer, Take 3 mL by nebulization Every 4 (Four) Hours As Needed for Wheezing. Use preop if needed, Disp: , Rfl:     KLOR-CON 20 MEQ CR tablet, TAKE 1 TABLET BY MOUTH DAILY (Patient taking differently: Take 1 tablet by mouth Daily.), Disp: 90 tablet, Rfl: 0    lidocaine (LIDODERM) 5 %, Place 1 patch on the skin as directed by provider Daily. Remove & Discard patch within 12 hours or as directed by MD (Patient taking differently: Place 1 patch on the skin as directed by provider Daily As Needed. Remove & Discard patch within 12 hours or as directed by MD), Disp: 30 patch, Rfl: 0    lurasidone (LATUDA) 40 MG tablet tablet, TAKE ONE TABLET BY MOUTH EVERY EVENING, Disp: 30 tablet, Rfl: 1    Melatonin 10 MG  "capsule, Take 1 capsule by mouth every night at bedtime., Disp: , Rfl:     midodrine (PROAMATINE) 5 MG tablet, Take 1 tablet by mouth 3 (Three) Times a Day Before Meals., Disp: , Rfl:     Multiple Vitamins-Minerals (MULTIVITAMIN WITH MINERALS) tablet tablet, Take 1 tablet by mouth Daily. Do not take dos, Disp: , Rfl:     nystatin (MYCOSTATIN) 402459 UNIT/GM cream, Apply 1 Application topically to the appropriate area as directed 2 (Two) Times a Day. (Patient taking differently: Apply 1 Application topically to the appropriate area as directed As Needed.), Disp: 30 g, Rfl: 0    O2 (OXYGEN), Inhale 1 L/min 1 (One) Time. @@ night, Disp: , Rfl:     omeprazole (priLOSEC) 40 MG capsule, Take 1 capsule by mouth Daily. Take preop, Disp: , Rfl:     pentoxifylline (TRENtal) 400 MG CR tablet, Take 1 tablet by mouth 3 (Three) Times a Day With Meals., Disp: , Rfl:     pramipexole (MIRAPEX) 0.5 MG tablet, TAKE ONE TABLET BY MOUTH TWICE DAILY, Disp: 180 tablet, Rfl: 0    rOPINIRole (REQUIP) 0.25 MG tablet, TAKE 1 TABLET BY MOUTH TWICE DAILY **TAKE THE NIGHT DOSE 1 HOUR BEFORE BEDTIME**, Disp: 180 tablet, Rfl: 0    spironolactone (ALDACTONE) 25 MG tablet, Take 1 tablet by mouth Daily., Disp: , Rfl:     vitamin C (ASCORBIC ACID) 500 MG tablet, Take 2 tablets by mouth Daily. dont take preop, Disp: , Rfl:     clonazePAM (KlonoPIN) 0.5 MG tablet, Take 1 tablet by mouth 2 (Two) Times a Day for 3 days., Disp: 6 tablet, Rfl: 0    Review of Systems  ROS done and noted in HPI    /88 (BP Location: Left arm, Patient Position: Sitting, Cuff Size: Large Adult)   Pulse (!) 48   Temp 98 °F (36.7 °C) (Temporal)   Ht 162.6 cm (64\")   Wt 103 kg (228 lb)   SpO2 100%   BMI 39.14 kg/m²           Objective   Physical Exam  Vitals and nursing note reviewed.   Constitutional:       Appearance: Normal appearance. She is obese.   Cardiovascular:      Rate and Rhythm: Normal rate and regular rhythm.      Heart sounds: Normal heart sounds. "   Pulmonary:      Effort: Pulmonary effort is normal.      Breath sounds: Normal breath sounds.   Abdominal:      General: Abdomen is flat. Bowel sounds are normal.      Palpations: Abdomen is soft.      Tenderness: There is no right CVA tenderness or left CVA tenderness.      Hernia: A hernia is present. Hernia is present in the umbilical area (reducible/sl tenderness).   Musculoskeletal:      Right lower leg: No edema.      Left lower leg: No edema.   Neurological:      Mental Status: She is alert.      Motor: Tremor present.      Comments: Using a cane         Physical Exam         Results  Urine Culture >100,000 CFU/mL Escherichia coli Abnormal       Colonization of the urinary tract without infection is common. Treatment is discouraged unless the patient is symptomatic, pregnant, or undergoing an invasive urologic procedure.        Resulting Agency: [facility]     Susceptibility     Escherichia coli    MARTI    Amoxicillin + Clavulanate 4 ug/ml Susceptible    Ampicillin >=32 ug/ml Resistant    Ampicillin + Sulbactam >=32 ug/ml Resistant    Cefazolin (Urine) 4 ug/ml Susceptible    Cefepime <=0.12 ug/ml Susceptible    Ceftazidime <=0.5 ug/ml Susceptible    Ceftriaxone <=0.25 ug/ml Susceptible    Gentamicin <=1 ug/ml Susceptible    Levofloxacin 1 ug/ml Intermediate    Nitrofurantoin <=16 ug/ml Susceptible    Piperacillin + Tazobactam <=4 ug/ml Susceptible    Trimethoprim + Sulfamethoxazole >=320 ug/ml Resistant              Linear View        Specimen Collected: 12/27/24 17:52 EST Last Resulted: 12/29/24 10:18 EST     Lab Results   Component Value Date    WBC 5.96 12/31/2024    HGB 14.3 12/31/2024    HCT 42.9 12/31/2024    MCV 97.1 (H) 12/31/2024     (L) 12/31/2024     Lab Results   Component Value Date    GLUCOSE 99 12/31/2024    BUN 12 12/31/2024    CREATININE 0.79 12/31/2024     12/31/2024    K 3.7 12/31/2024     12/31/2024    CALCIUM 9.5 12/31/2024    PROTEINTOT 7.4 12/27/2024    ALBUMIN 3.6  12/27/2024    ALT 9 12/27/2024    AST 34 (H) 12/27/2024    ALKPHOS 61 12/27/2024    BILITOT 1.0 12/27/2024    GLOB 3.8 12/27/2024    AGRATIO 0.9 12/27/2024    BCR 15.2 12/31/2024    ANIONGAP 10.5 12/31/2024    EGFR 81.6 12/31/2024     Brief Urine Lab Results  (Last result in the past 365 days)        Color   Clarity   Blood   Leuk Est   Nitrite   Protein   CREAT   Urine HCG        01/28/25 1616 Yellow   Clear   Large   Negative   Negative   Negative                        Assessment & Plan   Problems Addressed this Visit    None  Visit Diagnoses       Recurrent falls    -  Primary    Generalized weakness        History of UTI        Relevant Orders    POCT urinalysis dipstick, automated (Completed)    Urine Culture - Urine, Urine, Clean Catch    Umbilical hernia without obstruction and without gangrene        Relevant Orders    US Abdomen Limited    Hypotension, unspecified hypotension type        Microscopic hematuria        Relevant Orders    Urine Culture - Urine, Urine, Clean Catch    Hematuria, unspecified type        Relevant Orders    Urine Culture - Urine, Urine, Clean Catch          Diagnoses         Codes Comments    Recurrent falls    -  Primary ICD-10-CM: R29.6  ICD-9-CM: V15.88     Generalized weakness     ICD-10-CM: R53.1  ICD-9-CM: 780.79     History of UTI     ICD-10-CM: Z87.440  ICD-9-CM: V13.02     Umbilical hernia without obstruction and without gangrene     ICD-10-CM: K42.9  ICD-9-CM: 553.1     Hypotension, unspecified hypotension type     ICD-10-CM: I95.9  ICD-9-CM: 458.9     Microscopic hematuria     ICD-10-CM: R31.29  ICD-9-CM: 599.72     Hematuria, unspecified type     ICD-10-CM: R31.9  ICD-9-CM: 599.70           Assessment & Plan  1. Suspected hernia.  She reports pain around her umbilicus, which she noticed after losing weight. An ultrasound of the abdomen will be scheduled to evaluate the suspected hernia. She is advised to seek immediate medical attention at the emergency room if she  experiences severe pain prior to the ultrasound.    2. Urinary tract infection.  She reports ongoing urinary symptoms, including burning and increased frequency. A urine sample will be collected today to check for the presence of infection. She was treated with rocephin in the hosp    3. Blood pressure management.  Her blood pressure is well-regulated today. She has been taking midodrine (ProAmatine) regularly to help keep her blood pressure higher.    4. Falls.  No further falls since her hospitalization and subsequent admission to rehab.  She will continue to use her cane    5. Weakness has improved since her hospital admission    6. Microscopic hematuria. Will send for repeat urine culture            Patient or patient representative verbalized consent for the use of Ambient Listening during the visit with  Franny Strickland MD for chart documentation. 1/28/2025  16:05 EST

## 2025-01-29 DIAGNOSIS — E11.49 TYPE 2 DIABETES MELLITUS WITH OTHER DIABETIC NEUROLOGICAL COMPLICATION: Chronic | ICD-10-CM

## 2025-01-29 RX ORDER — DAPAGLIFLOZIN 10 MG/1
1 TABLET, FILM COATED ORAL DAILY
Qty: 90 TABLET | Refills: 0 | Status: SHIPPED | OUTPATIENT
Start: 2025-01-29

## 2025-01-30 LAB — BACTERIA SPEC AEROBE CULT: NORMAL

## 2025-01-30 RX ORDER — CEFDINIR 300 MG/1
300 CAPSULE ORAL 2 TIMES DAILY
Qty: 14 CAPSULE | Refills: 0 | Status: SHIPPED | OUTPATIENT
Start: 2025-01-30 | End: 2025-02-06

## 2025-01-31 ENCOUNTER — TELEPHONE (OUTPATIENT)
Dept: FAMILY MEDICINE CLINIC | Facility: CLINIC | Age: 70
End: 2025-01-31
Payer: MEDICARE

## 2025-01-31 RX ORDER — IPRATROPIUM BROMIDE AND ALBUTEROL SULFATE 2.5; .5 MG/3ML; MG/3ML
SOLUTION RESPIRATORY (INHALATION)
Qty: 180 ML | Refills: 0 | Status: SHIPPED | OUTPATIENT
Start: 2025-01-31

## 2025-01-31 NOTE — TELEPHONE ENCOUNTER
Asha with help at home called. She called [facility], and they have not received the referral. She is asking if it can be re-sent to [facility] for pt PT order placed on 1/17/25.  To confirm, the fax number is [phone] for [facility].

## 2025-02-05 RX ORDER — BLOOD SUGAR DIAGNOSTIC
STRIP MISCELLANEOUS
Qty: 100 EACH | Refills: 0 | Status: SHIPPED | OUTPATIENT
Start: 2025-02-05

## 2025-02-09 NOTE — H&P
GI Pre-operative History and Physical:    Referring Provider:    Franny Strickland MD Matthew David McColloug*    Chief complaint: Esophageal varices without bleeding    History of present illness:      Dariana Rivera is a 69 y.o. female who presents today for Procedure(s):  ESOPHAGOGASTRODUODENOSCOPY for the indications listed below.     The updated Patient Profile was reviewed prior to the procedure, in conjunction with the Physical Exam, including medical conditions, surgical procedures, medications, allergies, family history and social history.     Pre-operatively, I reviewed the indication(s) for the procedure, the risks of the procedure [including but not limited to: unexpected bleeding possibly requiring hospitalization and/or unplanned repeat procedures, perforation possibly requiring surgical treatment, missed lesions and complications of sedation/MAC (also explained by anesthesia staff)].     I have evaluated the patient for risks associated with the planned anesthesia and the procedure to be performed and find the patient an acceptable candidate for IV sedation.    Multiple opportunities were provided for any questions or concerns, and all questions were answered satisfactorily before any anesthesia was administered. We will proceed with the planned procedure.    Past Medical History:  Past Medical History:   Diagnosis Date    Allergic Not sure    Penicillin, cipro, clindamycin, methadone, ambien    Angina at rest     DR. Amador    Anxiety disorder     [facility]     Asthma About 5 yrs ago    Shortness of breath with exertion    Back pain     Bipolar disorder     Cataract     Left & right removed in 3/2016    Cervical dystonia     Cholelithiasis     Gallbladder removed in 1995 or 97    Chronic pain     with stenosis    Cirrhosis     Colitis     Colon polyps     COPD (chronic obstructive pulmonary disease)     Not sure when diagnosed    Cramping of feet     Cramping of hands     DDD  (degenerative disc disease), cervical     DDD (degenerative disc disease), lumbar     DDD (degenerative disc disease), lumbar     DDD (degenerative disc disease), thoracic     Depression     Diabetes mellitus     Not sure when diagnosed.    Diverticulosis     Not sure when diagnosed    Dysphagia 09/2020    Eosinophilic colitis     Gastritis     GERD (gastroesophageal reflux disease)     Headache Not sure    Migraines stopped after complete hyst in 2004    Hepatic steatosis     non alcoholic steo-hepatitis     Hx of lithotripsy 02/10/2021    Hypertension     IBS (irritable bowel syndrome)     Infectious viral hepatitis     [facility]    Insomnia     Kidney stones     Low back pain     Not sure when diagnosed    Lumbar stenosis     Neck pain     Neuromuscular disorder Not sure    Parkinsons and Cervical Dystonia    Neuropathy     Obesity     Obese since about 1966    Osteoarthritis     Osteopenia     Osteopenia diagnosed after bone density test in 2024.    Parkinson's disease     Peripheral edema     Pneumonia May 2018    About a month after neck fusion surgery    PTSD (post-traumatic stress disorder)     Recurrent UTI     Renal insufficiency     Not sure when diagnosed    Seizure     onset 7/2016.  Last seizure 10/2019    Sleep apnea     Using Bipap machine at night. advised to bring dos    Sleep apnea, obstructive     Urinary incontinence        Past Surgical History:  Past Surgical History:   Procedure Laterality Date    BACK SURGERY      CARDIAC CATHETERIZATION  02/2019    CARPAL TUNNEL RELEASE Bilateral     Wrist     CHOLECYSTECTOMY  1997    COLONOSCOPY      CYSTOSCOPY BLADDER STONE LITHOTRIPSY      ENDOMETRIAL ABLATION      ENDOSCOPY      ENDOSCOPY N/A 04/15/2020    Procedure: ESOPHAGOGASTRODUODENOSCOPY with dilitation (60FR.);  Surgeon: Julieta Allison MD;  Location: [facility] ENDOSCOPY;  Service: Gastroenterology;  Laterality: N/A;  varices,hiatal hernia,gastritis    ENDOSCOPY N/A 09/29/2020    Procedure:  ESOPHAGOGASTRODUODENOSCOPY with esophageal dilitation (54 bougie);  Surgeon: Julieta Allison MD;  Location: [facility] ENDOSCOPY;  Service: Gastroenterology;  Laterality: N/A;   post op: hiatal hernia, esophageal stricture    ENDOSCOPY N/A 09/30/2021    Procedure: ESOPHAGOGASTRODUODENOSCOPY WITH DILATATION (BOUGIE #60);  Surgeon: Julieta Allison MD;  Location: [facility] ENDOSCOPY;  Service: Gastroenterology;  Laterality: N/A;  ESOPHAGEAL VARICES AND HIATAL HERNIA    ENDOSCOPY N/A 07/21/2022    Procedure: ESOPHAGOGASTRODUODENOSCOPY WITH BIOPSY X1 AREA  AND DILATATION (BOUGIE  #54);  Surgeon: Zachary Mcgarry MD;  Location: [facility] ENDOSCOPY;  Service: Gastroenterology;  Laterality: N/A;  Post: candida esophagitis,gastritis    ENDOSCOPY N/A 05/02/2023    Procedure: ESOPHAGOGASTRODUODENOSCOPY WITH BIOPSY X 1  AREA  AND DILATATON (48 NON GUIDED BOUGIE);  Surgeon: Zachary Mcgarry MD;  Location: [facility] ENDOSCOPY;  Service: Gastroenterology;  Laterality: N/A;  post op: GASTRITIS, SMALL ESOPHAGEAL VARICES    EYE SURGERY  3-14 & 3-    Cataract surgery R 3-14, L 3-    HYSTERECTOMY  09/2004    complete    INTERSTIM PLACEMENT      bladder    JOINT REPLACEMENT Bilateral     knees    KNEE ARTHROSCOPY Bilateral     Arthroscopic surgery to bilateral knees     OTHER SURGICAL HISTORY  2015    Stress test     OTHER SURGICAL HISTORY  10/2016    Lithotripsy total 4-5    OTHER SURGICAL HISTORY      Right arm cellulits- spider bites     OTHER SURGICAL HISTORY  02/08/2018    Lithotripsy    OTHER SURGICAL HISTORY  04/20/2018    ACDF C3-C4 & C6-C7    REPLACEMENT TOTAL KNEE  2000    REPLACEMENT TOTAL KNEE Left 11/2016    SHOULDER ROTATOR CUFF REPAIR Right 01/08/2020    Procedure: RT ROTATOR CUFF REPAIR OPEN;  Surgeon: Curly Vaughn MD;  Location: [facility] MAIN OR;  Service: Orthopedics    SPINE SURGERY  04/2018    Fusion surgery C3-C4 and C6-C7       Social History:  Social History     Tobacco Use    Smoking  status: Former     Current packs/day: 0.00     Average packs/day: 0.5 packs/day for 15.6 years (7.8 ttl pk-yrs)     Types: Cigarettes     Start date: 1974     Quit date: 1990     Years since quittin.6     Passive exposure: Past    Smokeless tobacco: Never    Tobacco comments:     Stopped off and on.  Stopped once for more than 2 yrs   Vaping Use    Vaping status: Never Used   Substance Use Topics    Alcohol use: No    Drug use: No       Family History:  Family History   Problem Relation Age of Onset    Hypertension Mother     Hyperlipidemia Mother     Arthritis Mother     Cancer Mother         Skin cancers    Depression Mother     Mental illness Mother         Depression and Alzheimers    Vision loss Mother         Has macular degeneration.    Diabetes Father              Heart disease Father         Had angina. Was diabetic.  of heart attack at age 57.    Early death Father          at 57 from heart attack    Heart attack Father          of heart attack in     Heart failure Father              Hyperlipidemia Sister     Arthritis Sister     Cancer Sister         Skin cancers    Depression Sister     Diabetes Sister         Diagnosed 2019    Heart disease Sister         2 mini strokes, atrial fribulation, pacemaker insertion    Hypertension Sister     Mental illness Sister         Depression    Stroke Sister         2 mini strokes    Diabetes Brother         Not sure when diagnosed    Heart disease Brother         Had open heart surgery about 4 yrs ago.    Hypertension Brother     Hyperlipidemia Brother     Arthritis Brother     Cancer Brother         Skin cancers and colon cancer    Heart attack Brother         Had a heart attack about 4 yrs ago    Breast cancer Maternal Grandmother     Breast cancer Maternal Aunt        Medications:  No medications prior to admission.       Scheduled Meds:  Continuous Infusions:No current facility-administered medications for  "this encounter.    PRN Meds:.    ALLERGIES:  Ambien  [zolpidem tartrate], Ciprofloxacin hcl, Penicillins, Zolpidem, Methadone, and Clindamycin    ROS:  The following systems were reviewed and negative;   Constitution:  No fevers, chills, no unintentional weight loss  Skin: no rash, no jaundice  Eyes:  No blurry vision, no eye pain  HENT:  No change in hearing or smell  Resp:  No dyspnea or cough  CV:  No chest pain or palpitations  :  No dysuria, hematuria  Musculoskeletal:  No leg cramps or arthralgias  Neuro:  No tremor, no numbness  Psych:  No depression or confusion    Objective     Vital Signs:   Vitals:    01/29/25 1451   Weight: 89.8 kg (198 lb)   Height: 162.6 cm (64\")       Physical Exam:       General Appearance:    Awake and alert, in no acute distress   Head:    Normocephalic, without obvious abnormality, atraumatic   Throat:   No oral lesions, no thrush, oral mucosa moist   Lungs:     respirations regular, even and unlabored   Skin:   No rash, no jaundice       Results Review:  Lab Results (last 24 hours)       ** No results found for the last 24 hours. **            Imaging Results (Last 24 Hours)       ** No results found for the last 24 hours. **             I reviewed the patient's labs and imaging.    ASSESSMENT AND PLAN:      Principal Problem:    Esophageal varices without bleeding       Procedure(s):  ESOPHAGOGASTRODUODENOSCOPY      I discussed the patients findings and my recommendations with the patient.    Electronically signed by Zachary Mcgarry MD, 02/09/25, 4:29 PM EST.              "

## 2025-02-10 ENCOUNTER — ANESTHESIA EVENT (OUTPATIENT)
Dept: GASTROENTEROLOGY | Facility: HOSPITAL | Age: 70
End: 2025-02-10
Payer: MEDICARE

## 2025-02-10 ENCOUNTER — ON CAMPUS - OUTPATIENT (AMBULATORY)
Dept: URBAN - METROPOLITAN AREA HOSPITAL 85 | Facility: HOSPITAL | Age: 70
End: 2025-02-10
Payer: MEDICAID

## 2025-02-10 ENCOUNTER — ANESTHESIA (OUTPATIENT)
Dept: GASTROENTEROLOGY | Facility: HOSPITAL | Age: 70
End: 2025-02-10
Payer: MEDICARE

## 2025-02-10 ENCOUNTER — HOSPITAL ENCOUNTER (OUTPATIENT)
Facility: HOSPITAL | Age: 70
Setting detail: HOSPITAL OUTPATIENT SURGERY
Discharge: HOME | End: 2025-02-10
Attending: INTERNAL MEDICINE | Admitting: INTERNAL MEDICINE
Payer: MEDICARE

## 2025-02-10 VITALS
HEIGHT: 64 IN | DIASTOLIC BLOOD PRESSURE: 69 MMHG | HEART RATE: 74 BPM | SYSTOLIC BLOOD PRESSURE: 115 MMHG | BODY MASS INDEX: 33.2 KG/M2 | RESPIRATION RATE: 14 BRPM | TEMPERATURE: 97.9 F | WEIGHT: 194.45 LBS | OXYGEN SATURATION: 94 %

## 2025-02-10 DIAGNOSIS — K31.89 OTHER DISEASES OF STOMACH AND DUODENUM: ICD-10-CM

## 2025-02-10 DIAGNOSIS — K22.89 OTHER SPECIFIED DISEASE OF ESOPHAGUS: ICD-10-CM

## 2025-02-10 DIAGNOSIS — R13.10 DYSPHAGIA: ICD-10-CM

## 2025-02-10 DIAGNOSIS — K74.60 CIRRHOSIS: ICD-10-CM

## 2025-02-10 DIAGNOSIS — R13.10 DYSPHAGIA, UNSPECIFIED: ICD-10-CM

## 2025-02-10 LAB — GLUCOSE BLDC GLUCOMTR-MCNC: 114 MG/DL (ref 70–105)

## 2025-02-10 PROCEDURE — 25810000003 SODIUM CHLORIDE 0.9 % SOLUTION: Performed by: INTERNAL MEDICINE

## 2025-02-10 PROCEDURE — 43239 EGD BIOPSY SINGLE/MULTIPLE: CPT | Performed by: INTERNAL MEDICINE

## 2025-02-10 PROCEDURE — 25010000002 LIDOCAINE PF 2% 2 % SOLUTION

## 2025-02-10 PROCEDURE — 82948 REAGENT STRIP/BLOOD GLUCOSE: CPT

## 2025-02-10 PROCEDURE — 25810000003 SODIUM CHLORIDE 0.9 % SOLUTION

## 2025-02-10 PROCEDURE — 88305 TISSUE EXAM BY PATHOLOGIST: CPT | Performed by: INTERNAL MEDICINE

## 2025-02-10 PROCEDURE — 43450 DILATE ESOPHAGUS 1/MULT PASS: CPT | Performed by: INTERNAL MEDICINE

## 2025-02-10 PROCEDURE — 25010000002 PROPOFOL 10 MG/ML EMULSION

## 2025-02-10 RX ORDER — EPHEDRINE SULFATE 5 MG/ML
5 INJECTION INTRAVENOUS ONCE AS NEEDED
Status: DISCONTINUED | OUTPATIENT
Start: 2025-02-10 | End: 2025-02-10 | Stop reason: HOSPADM

## 2025-02-10 RX ORDER — SODIUM CHLORIDE 9 MG/ML
50 INJECTION, SOLUTION INTRAVENOUS CONTINUOUS
Status: DISCONTINUED | OUTPATIENT
Start: 2025-02-10 | End: 2025-02-10 | Stop reason: HOSPADM

## 2025-02-10 RX ORDER — ONDANSETRON 2 MG/ML
4 INJECTION INTRAMUSCULAR; INTRAVENOUS ONCE AS NEEDED
Status: DISCONTINUED | OUTPATIENT
Start: 2025-02-10 | End: 2025-02-10 | Stop reason: HOSPADM

## 2025-02-10 RX ORDER — HYDRALAZINE HYDROCHLORIDE 20 MG/ML
5 INJECTION INTRAMUSCULAR; INTRAVENOUS
Status: DISCONTINUED | OUTPATIENT
Start: 2025-02-10 | End: 2025-02-10 | Stop reason: HOSPADM

## 2025-02-10 RX ORDER — DIPHENHYDRAMINE HYDROCHLORIDE 50 MG/ML
12.5 INJECTION INTRAMUSCULAR; INTRAVENOUS
Status: DISCONTINUED | OUTPATIENT
Start: 2025-02-10 | End: 2025-02-10 | Stop reason: HOSPADM

## 2025-02-10 RX ORDER — LIDOCAINE HYDROCHLORIDE 20 MG/ML
INJECTION, SOLUTION EPIDURAL; INFILTRATION; INTRACAUDAL; PERINEURAL AS NEEDED
Status: DISCONTINUED | OUTPATIENT
Start: 2025-02-10 | End: 2025-02-10 | Stop reason: SURG

## 2025-02-10 RX ORDER — PROPOFOL 10 MG/ML
VIAL (ML) INTRAVENOUS CONTINUOUS PRN
Status: DISCONTINUED | OUTPATIENT
Start: 2025-02-10 | End: 2025-02-10 | Stop reason: SURG

## 2025-02-10 RX ORDER — MEPERIDINE HYDROCHLORIDE 25 MG/ML
12.5 INJECTION INTRAMUSCULAR; INTRAVENOUS; SUBCUTANEOUS
Status: DISCONTINUED | OUTPATIENT
Start: 2025-02-10 | End: 2025-02-10 | Stop reason: HOSPADM

## 2025-02-10 RX ORDER — LABETALOL HYDROCHLORIDE 5 MG/ML
5 INJECTION, SOLUTION INTRAVENOUS
Status: DISCONTINUED | OUTPATIENT
Start: 2025-02-10 | End: 2025-02-10 | Stop reason: HOSPADM

## 2025-02-10 RX ORDER — IPRATROPIUM BROMIDE AND ALBUTEROL SULFATE 2.5; .5 MG/3ML; MG/3ML
3 SOLUTION RESPIRATORY (INHALATION) ONCE AS NEEDED
Status: DISCONTINUED | OUTPATIENT
Start: 2025-02-10 | End: 2025-02-10 | Stop reason: HOSPADM

## 2025-02-10 RX ORDER — SODIUM CHLORIDE 9 MG/ML
INJECTION, SOLUTION INTRAVENOUS CONTINUOUS PRN
Status: DISCONTINUED | OUTPATIENT
Start: 2025-02-10 | End: 2025-02-10 | Stop reason: SURG

## 2025-02-10 RX ADMIN — LIDOCAINE HYDROCHLORIDE 100 MG: 20 INJECTION, SOLUTION EPIDURAL; INFILTRATION; INTRACAUDAL; PERINEURAL at 11:26

## 2025-02-10 RX ADMIN — SODIUM CHLORIDE 50 ML/HR: 9 INJECTION, SOLUTION INTRAVENOUS at 10:21

## 2025-02-10 RX ADMIN — PROPOFOL 200 MCG/KG/MIN: 10 INJECTION, EMULSION INTRAVENOUS at 11:25

## 2025-02-10 RX ADMIN — PROPOFOL 30 MG: 10 INJECTION, EMULSION INTRAVENOUS at 11:27

## 2025-02-10 RX ADMIN — PROPOFOL 70 MG: 10 INJECTION, EMULSION INTRAVENOUS at 11:26

## 2025-02-10 RX ADMIN — SODIUM CHLORIDE: 9 INJECTION, SOLUTION INTRAVENOUS at 11:17

## 2025-02-10 NOTE — ANESTHESIA PREPROCEDURE EVALUATION
Anesthesia Evaluation     Patient summary reviewed and Nursing notes reviewed   NPO Solid Status: > 8 hours  NPO Liquid Status: > 8 hours           Airway   Mallampati: II  TM distance: >3 FB  Neck ROM: full  No difficulty expected  Dental - normal exam     Pulmonary    (+) COPD, asthma,  Cardiovascular     (+) hypertension, CAD, angina, hyperlipidemia      Neuro/Psych  (+) seizures, Parkinson's disease, headaches, numbness, psychiatric history  GI/Hepatic/Renal/Endo    (+) obesity, GERD, hepatitis, liver disease cirrhosis, renal disease-, diabetes mellitus    Musculoskeletal     (+) back pain, neck pain  Abdominal    Substance History      OB/GYN          Other   arthritis,                 Anesthesia Plan    ASA 4     MAC     intravenous induction     Anesthetic plan, risks, benefits, and alternatives have been provided, discussed and informed consent has been obtained with: patient.    Plan discussed with CRNA.    CODE STATUS:

## 2025-02-10 NOTE — DISCHARGE INSTRUCTIONS
A responsible adult should stay with you and you should rest quietly for the rest of the day.    Do not drink alcohol, drive, operate any heavy machinery or power tools or make any legal/important decisions for the next 24 hours.     Progress your diet as tolerated.  If you begin to experience severe pain, increased shortness of breath, racing heartbeat or a fever above 101 F, seek immediate medical attention.     Follow up with MD as instructed. Call office for results in 3 to 5 days if needed. [phone]  Dr. Chowdhury at [phone]    Impression:  EGD shows likely pill induced injury in the midesophagus with no varices and nodular gastritis biopsied.  Empiric 50 Romansh dilation performed but no stricture noted.     Recommendations:  Follow-up with pathology  Monitor for response to dilation  Continue PPI daily  No recall on EGD  Follow-up with GI provider in 2 months  If H. pylori is positive treat with twice daily PPI flare x 14 days  Limit NSAIDs  Take medications with a full glass of liquid and stay in the upright position for 30 minutes to allow pills to advance to the stomach

## 2025-02-10 NOTE — ANESTHESIA POSTPROCEDURE EVALUATION
Patient: Dariana Rivera    Procedure Summary       Date: 02/10/25 Room / Location: [facility] / [facility]    Anesthesia Start: 1117 Anesthesia Stop: 1139    Procedure: ESOPHAGOGASTRODUODENOSCOPY, biopsy x2 areas Diagnosis:       Cirrhosis      Dysphagia      (Cirrhosis [K74.60])      (Dysphagia [R13.10])    Surgeons: Zachary Mcgarry MD Provider: Apollo Skelton MD    Anesthesia Type: MAC ASA Status: 4            Anesthesia Type: MAC    Vitals  Vitals Value Taken Time   /61 02/10/25 1209   Temp     Pulse 66 02/10/25 1214   Resp 14 02/10/25 1159   SpO2 94 % 02/10/25 1214   Vitals shown include unfiled device data.        Post Anesthesia Care and Evaluation    Patient location during evaluation: PACU  Patient participation: complete - patient participated  Level of consciousness: awake  Pain scale: See nurse's notes for pain score.  Pain management: adequate    Airway patency: patent  Anesthetic complications: No anesthetic complications  PONV Status: none  Cardiovascular status: acceptable  Respiratory status: acceptable and spontaneous ventilation  Hydration status: acceptable    Comments: Patient seen and examined postoperatively; vital signs stable; SpO2 greater than or equal to 90%; cardiopulmonary status stable; nausea/vomiting adequately controlled; pain adequately controlled; no apparent anesthesia complications; patient discharged from anesthesia care when discharge criteria were met

## 2025-02-10 NOTE — OP NOTE
ESOPHAGOGASTRODUODENOSCOPY Procedure Report    Patient Name:  Dariana Rivera  YOB: 1955    Date of Surgery:  2/10/2025     Preoperative diagnosis:  Dysphagia  Personal history of esophageal varices without bleeding    Postoperative diagnosis:  White plaque in esophagus mid suggesting pill induced injury  No varices  Nodular gastritis      ND ESOPHAGOGASTRODUODENOSCOPY TRANSORAL DIAGNOSTIC [86131]    Procedure(s):  ESOPHAGOGASTRODUODENOSCOPY, biopsy x2 areas, nonguided bougie dilation    Staff:  Surgeon(s):  Zachary Mcgarry MD      Anesthesia: Monitored Anesthesia Care    DNR status: Patient wishes full code during the procedure    Implants:    Nothing was implanted during the procedure    Specimen:        See Below    Estimated blood loss: Minimal     Complications:  None    Description of Procedure:  Informed consent was obtained for the procedure, including sedation.  Risks of perforation, hemorrhage, adverse drug reaction and aspiration were discussed.  The patient was brought into the endoscopy suite. Continuous cardiopulmonary monitoring was performed. The patient was placed in the left lateral decubitus position.  The bite block was inserted into the patient's mouth. After adequate sedation was attained, the Olympus gastroscope was inserted into the patient's mouth and advanced to the second portion of the duodenum without difficulty.  Circumferential examination was performed. A retroflex exam was performed in the patient's stomach.  On completion of the exam, the bowel was decompressed, the scope was removed from the patient, the patient tolerated the procedure well, there were no immediate post-operative complications.     Examination of the esophagus: A benign white plaque was seen in the midesophagus consistent with esophagitis dissecans possibly from medication induced injury.  Cold forceps biopsies obtained for histology.  No varices seen and no strictures seen.  The  oropharynx was very dry as well.  A 50 Liberian nonguided bougie was advanced blindly through the esophagus with no resistance noted removed.  Second look in the esophagus was normal  Examination of the stomach: Nodular erythema was noted in the stomach antrum consistent with gastritis.  Cold forceps biopsies were taken from the antrum and body for histology rule to out H. pylori.    Examination of the duodenum: Normal    Impression:  EGD shows likely pill induced injury in the midesophagus with no varices and nodular gastritis biopsied.  Empiric 50 Liberian dilation performed but no stricture noted.    Recommendations:  Follow-up with pathology  Monitor for response to dilation  Continue PPI daily  No recall on EGD  Follow-up with GI provider in 2 months  If H. pylori is positive treat with twice daily PPI flare x 14 days  Limit NSAIDs  Take medications with a full glass of liquid and stay in the upright position for 30 minutes to allow pills to advance to the stomach    We appreciate the referral    Electronically signed by Zachary Mcgarry MD, 02/10/25, 11:39 AM EST.

## 2025-02-11 ENCOUNTER — HOSPITAL ENCOUNTER (OUTPATIENT)
Dept: ULTRASOUND IMAGING | Facility: HOSPITAL | Age: 70
Discharge: HOME | End: 2025-02-11
Admitting: FAMILY MEDICINE
Payer: MEDICARE

## 2025-02-11 DIAGNOSIS — K42.9 UMBILICAL HERNIA WITHOUT OBSTRUCTION AND WITHOUT GANGRENE: ICD-10-CM

## 2025-02-11 PROCEDURE — 76705 ECHO EXAM OF ABDOMEN: CPT

## 2025-02-11 NOTE — TELEPHONE ENCOUNTER
Why she is seeing Dianflorencia Chinoy and getting these prescriptions?  She is listed as primary care in [place]

## 2025-02-13 LAB
LAB AP CASE REPORT: NORMAL
LAB AP CLINICAL INFORMATION: NORMAL
LAB AP DIAGNOSIS COMMENT: NORMAL
PATH REPORT.FINAL DX SPEC: NORMAL
PATH REPORT.GROSS SPEC: NORMAL

## 2025-02-18 ENCOUNTER — LAB (OUTPATIENT)
Dept: LAB | Facility: HOSPITAL | Age: 70
End: 2025-02-18
Payer: MEDICARE

## 2025-02-18 ENCOUNTER — TRANSCRIBE ORDERS (OUTPATIENT)
Dept: LAB | Facility: HOSPITAL | Age: 70
End: 2025-02-18
Payer: MEDICARE

## 2025-02-18 DIAGNOSIS — N18.31 CHRONIC KIDNEY DISEASE, STAGE 3A: ICD-10-CM

## 2025-02-18 DIAGNOSIS — E11.8 TYPE II DIABETES MELLITUS WITH COMPLICATION: Primary | ICD-10-CM

## 2025-02-18 DIAGNOSIS — E11.8 TYPE II DIABETES MELLITUS WITH COMPLICATION: ICD-10-CM

## 2025-02-18 LAB
25(OH)D3 SERPL-MCNC: 57.9 NG/ML (ref 30–100)
ALBUMIN SERPL-MCNC: 3.8 G/DL (ref 3.5–5.2)
ALBUMIN/GLOB SERPL: 0.9 G/DL
ALP SERPL-CCNC: 75 U/L (ref 39–117)
ALT SERPL W P-5'-P-CCNC: 12 U/L (ref 1–33)
ANION GAP SERPL CALCULATED.3IONS-SCNC: 8.7 MMOL/L (ref 5–15)
AST SERPL-CCNC: 24 U/L (ref 1–32)
BACTERIA UR QL AUTO: ABNORMAL /HPF
BASOPHILS # BLD AUTO: 0.05 10*3/MM3 (ref 0–0.2)
BASOPHILS NFR BLD AUTO: 0.6 % (ref 0–1.5)
BILIRUB SERPL-MCNC: 0.5 MG/DL (ref 0–1.2)
BILIRUB UR QL STRIP: NEGATIVE
BUN SERPL-MCNC: 22 MG/DL (ref 8–23)
BUN/CREAT SERPL: 23.9 (ref 7–25)
CALCIUM SPEC-SCNC: 10.4 MG/DL (ref 8.6–10.5)
CHLORIDE SERPL-SCNC: 99 MMOL/L (ref 98–107)
CK SERPL-CCNC: 27 U/L (ref 20–180)
CLARITY UR: ABNORMAL
CO2 SERPL-SCNC: 31.3 MMOL/L (ref 22–29)
COLOR UR: YELLOW
CREAT SERPL-MCNC: 0.92 MG/DL (ref 0.57–1)
CREAT UR-MCNC: 57.2 MG/DL
DEPRECATED RDW RBC AUTO: 45.1 FL (ref 37–54)
EGFRCR SERPLBLD CKD-EPI 2021: 68 ML/MIN/1.73
EOSINOPHIL # BLD AUTO: 0.08 10*3/MM3 (ref 0–0.4)
EOSINOPHIL NFR BLD AUTO: 1 % (ref 0.3–6.2)
ERYTHROCYTE [DISTWIDTH] IN BLOOD BY AUTOMATED COUNT: 12.7 % (ref 12.3–15.4)
GLOBULIN UR ELPH-MCNC: 4.3 GM/DL
GLUCOSE SERPL-MCNC: 97 MG/DL (ref 65–99)
GLUCOSE UR STRIP-MCNC: ABNORMAL MG/DL
HBA1C MFR BLD: 5.8 % (ref 4.8–5.6)
HCT VFR BLD AUTO: 48.3 % (ref 34–46.6)
HGB BLD-MCNC: 15.8 G/DL (ref 12–15.9)
HGB UR QL STRIP.AUTO: ABNORMAL
HYALINE CASTS UR QL AUTO: ABNORMAL /LPF
IMM GRANULOCYTES # BLD AUTO: 0.03 10*3/MM3 (ref 0–0.05)
IMM GRANULOCYTES NFR BLD AUTO: 0.4 % (ref 0–0.5)
KETONES UR QL STRIP: NEGATIVE
LEUKOCYTE ESTERASE UR QL STRIP.AUTO: ABNORMAL
LYMPHOCYTES # BLD AUTO: 1.74 10*3/MM3 (ref 0.7–3.1)
LYMPHOCYTES NFR BLD AUTO: 22.4 % (ref 19.6–45.3)
MAGNESIUM SERPL-MCNC: 2.1 MG/DL (ref 1.6–2.4)
MCH RBC QN AUTO: 31.3 PG (ref 26.6–33)
MCHC RBC AUTO-ENTMCNC: 32.7 G/DL (ref 31.5–35.7)
MCV RBC AUTO: 95.6 FL (ref 79–97)
MONOCYTES # BLD AUTO: 0.58 10*3/MM3 (ref 0.1–0.9)
MONOCYTES NFR BLD AUTO: 7.5 % (ref 5–12)
NEUTROPHILS NFR BLD AUTO: 5.3 10*3/MM3 (ref 1.7–7)
NEUTROPHILS NFR BLD AUTO: 68.1 % (ref 42.7–76)
NITRITE UR QL STRIP: POSITIVE
NRBC BLD AUTO-RTO: 0 /100 WBC (ref 0–0.2)
PH UR STRIP.AUTO: 5.5 [PH] (ref 5–8)
PHOSPHATE SERPL-MCNC: 3.4 MG/DL (ref 2.5–4.5)
PLATELET # BLD AUTO: 177 10*3/MM3 (ref 140–450)
PMV BLD AUTO: 9.9 FL (ref 6–12)
POTASSIUM SERPL-SCNC: 4.6 MMOL/L (ref 3.5–5.2)
PROT ?TM UR-MCNC: 12.6 MG/DL
PROT SERPL-MCNC: 8.1 G/DL (ref 6–8.5)
PROT UR QL STRIP: NEGATIVE
PROT/CREAT UR: 220.3 MG/G CREA (ref 0–200)
PTH-INTACT SERPL-MCNC: 28.7 PG/ML (ref 15–65)
RBC # BLD AUTO: 5.05 10*6/MM3 (ref 3.77–5.28)
RBC # UR STRIP: ABNORMAL /HPF
REF LAB TEST METHOD: ABNORMAL
SODIUM SERPL-SCNC: 139 MMOL/L (ref 136–145)
SP GR UR STRIP: 1.03 (ref 1–1.03)
SQUAMOUS #/AREA URNS HPF: ABNORMAL /HPF
URATE SERPL-MCNC: 4 MG/DL (ref 2.4–5.7)
UROBILINOGEN UR QL STRIP: ABNORMAL
WBC # UR STRIP: ABNORMAL /HPF
WBC NRBC COR # BLD AUTO: 7.78 10*3/MM3 (ref 3.4–10.8)
YEAST URNS QL MICRO: PRESENT /HPF

## 2025-02-18 PROCEDURE — 82306 VITAMIN D 25 HYDROXY: CPT

## 2025-02-18 PROCEDURE — 83970 ASSAY OF PARATHORMONE: CPT

## 2025-02-18 PROCEDURE — 83036 HEMOGLOBIN GLYCOSYLATED A1C: CPT

## 2025-02-18 PROCEDURE — 84156 ASSAY OF PROTEIN URINE: CPT

## 2025-02-18 PROCEDURE — 82570 ASSAY OF URINE CREATININE: CPT

## 2025-02-18 PROCEDURE — 80053 COMPREHEN METABOLIC PANEL: CPT

## 2025-02-18 PROCEDURE — 85025 COMPLETE CBC W/AUTO DIFF WBC: CPT

## 2025-02-18 PROCEDURE — 36415 COLL VENOUS BLD VENIPUNCTURE: CPT

## 2025-02-18 PROCEDURE — 82550 ASSAY OF CK (CPK): CPT

## 2025-02-18 PROCEDURE — 83735 ASSAY OF MAGNESIUM: CPT

## 2025-02-18 PROCEDURE — 84550 ASSAY OF BLOOD/URIC ACID: CPT

## 2025-02-18 PROCEDURE — 84100 ASSAY OF PHOSPHORUS: CPT

## 2025-02-18 PROCEDURE — 81001 URINALYSIS AUTO W/SCOPE: CPT

## 2025-02-18 RX ORDER — ROPINIROLE 0.5 MG/1
0.5 TABLET, FILM COATED ORAL EVERY 12 HOURS PRN
Qty: 60 TABLET | Refills: 0 | OUTPATIENT
Start: 2025-02-18

## 2025-02-18 RX ORDER — MIDODRINE HYDROCHLORIDE 5 MG/1
TABLET ORAL
Qty: 90 TABLET | Refills: 0 | OUTPATIENT
Start: 2025-02-18

## 2025-02-18 NOTE — TELEPHONE ENCOUNTER
Pt called back. Dr. Dian Thayer was her doctor while pt was in rehab. Per pt the midodrine was started in the hospital back in December.

## 2025-03-05 DIAGNOSIS — F41.1 GENERALIZED ANXIETY DISORDER: Chronic | ICD-10-CM

## 2025-03-05 NOTE — TELEPHONE ENCOUNTER
Rx Refill Note  Requested Prescriptions     Pending Prescriptions Disp Refills    rOPINIRole (REQUIP) 0.5 MG tablet [Pharmacy Med Name: ROPINIROLE HCL 0.500 MG TABLET] 60 tablet 0     Sig: TAKE 1 TABLET BY MOUTH MORNING AND AT BEDTIME FOR RESTLESS LEG SYNDROME ( INCREASE )      Last office visit with prescribing clinician: 9/10/2024   Last telemedicine visit with prescribing clinician: Visit date not found   Next office visit with prescribing clinician: 3/6/2025   Office Visit with Archana Singh MD (09/10/2024)                       Would you like a call back once the refill request has been completed: [] Yes [] No    If the office needs to give you a call back, can they leave a voicemail: [] Yes [] No    Ira Naranjo MA  03/05/25, 15:45 EST

## 2025-03-05 NOTE — TELEPHONE ENCOUNTER
Rx Refill Note  Requested Prescriptions     Pending Prescriptions Disp Refills    escitalopram (LEXAPRO) 10 MG tablet [Pharmacy Med Name: ESCITALOPRAM OXALATE 10 MG TABLET] 135 tablet 0     Sig: TAKE 1.5 TABLETS BY MOUTH DAILY.    pramipexole (MIRAPEX) 0.5 MG tablet [Pharmacy Med Name: PRAMIPEXOLE DIHYDROCHLORIDE 0.500 MG TABLET] 180 tablet 0     Sig: TAKE ONE TABLET BY MOUTH TWICE DAILY      Last office visit with prescribing clinician: 9/10/2024   Last telemedicine visit with prescribing clinician: Visit date not found   Next office visit with prescribing clinician: 3/6/2025   Office Visit with Archana Singh MD (09/10/2024)                       Would you like a call back once the refill request has been completed: [] Yes [] No    If the office needs to give you a call back, can they leave a voicemail: [] Yes [] No    Ira Naranjo MA  03/05/25, 13:18 EST

## 2025-03-06 ENCOUNTER — OFFICE VISIT (OUTPATIENT)
Dept: PSYCHIATRY | Facility: CLINIC | Age: 70
End: 2025-03-06
Payer: MEDICARE

## 2025-03-06 DIAGNOSIS — F41.1 GENERALIZED ANXIETY DISORDER: Chronic | ICD-10-CM

## 2025-03-06 DIAGNOSIS — F31.32 BIPOLAR 1 DISORDER, DEPRESSED, MODERATE: Primary | Chronic | ICD-10-CM

## 2025-03-06 RX ORDER — PRAMIPEXOLE DIHYDROCHLORIDE 0.5 MG/1
0.5 TABLET ORAL 2 TIMES DAILY
Qty: 180 TABLET | Refills: 0 | Status: SHIPPED | OUTPATIENT
Start: 2025-03-06

## 2025-03-06 RX ORDER — ESCITALOPRAM OXALATE 10 MG/1
15 TABLET ORAL DAILY
Qty: 135 TABLET | Refills: 0 | Status: SHIPPED | OUTPATIENT
Start: 2025-03-06

## 2025-03-06 RX ORDER — LURASIDONE HYDROCHLORIDE 20 MG/1
20 TABLET, FILM COATED ORAL EVERY EVENING
Qty: 30 TABLET | Refills: 3 | Status: SHIPPED | OUTPATIENT
Start: 2025-03-06

## 2025-03-06 RX ORDER — ROPINIROLE 0.5 MG/1
TABLET, FILM COATED ORAL
Qty: 60 TABLET | Refills: 0 | Status: SHIPPED | OUTPATIENT
Start: 2025-03-06

## 2025-03-06 RX ORDER — CLONAZEPAM 0.5 MG/1
0.5 TABLET ORAL 2 TIMES DAILY
Qty: 60 TABLET | Refills: 3 | Status: SHIPPED | OUTPATIENT
Start: 2025-03-06

## 2025-03-06 NOTE — PROGRESS NOTES
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 12/27/2024 0.91  0.57 - 1.00 mg/dL Final    Sodium 12/27/2024 141  136 - 145 mmol/L Final    Potassium 12/27/2024 4.2  3.5 - 5.2 mmol/L Final    Slight hemolysis detected by analyzer. Result may be falsely elevated.    Chloride 12/27/2024 100  98 - 107 mmol/L Final    CO2 12/27/2024 28.6  22.0 - 29.0 mmol/L Final    Calcium 12/27/2024 9.8  8.6 - 10.5 mg/dL Final    Total Protein 12/27/2024 7.4  6.0 - 8.5 g/dL Final    Albumin 12/27/2024 3.6  3.5 - 5.2 g/dL Final    ALT (SGPT) 12/27/2024 9  1 - 33 U/L Final    AST (SGOT) 12/27/2024 34 (H)  1 - 32 U/L Final    Alkaline Phosphatase 12/27/2024 61  39 - 117 U/L Final    Total Bilirubin 12/27/2024 1.0  0.0 - 1.2 mg/dL Final    Globulin 12/27/2024 3.8  gm/dL Final    A/G Ratio 12/27/2024 0.9  g/dL Final    BUN/Creatinine Ratio 12/27/2024 15.4  7.0 - 25.0 Final    Anion Gap 12/27/2024 12.4  5.0 - 15.0 mmol/L Final    eGFR 12/27/2024 68.9  >60.0 mL/min/1.73 Final    Color, UA 12/27/2024 Yellow  Yellow, Straw Final    Appearance, UA 12/27/2024 Cloudy (A)  Clear Final    pH, UA 12/27/2024 5.5  5.0 - 8.0 Final    Specific Gravity, UA 12/27/2024 1.013  1.005 - 1.030 Final     Glucose, UA 12/27/2024 500 mg/dL (2+) (A)  Negative Final    Ketones, UA 12/27/2024 Trace (A)  Negative Final    Bilirubin, UA 12/27/2024 Negative  Negative Final    Blood, UA 12/27/2024 Large (3+) (A)  Negative Final    Protein, UA 12/27/2024 Negative  Negative Final    Leuk Esterase, UA 12/27/2024 Moderate (2+) (A)  Negative Final    Nitrite, UA 12/27/2024 Positive (A)  Negative Final    Urobilinogen, UA 12/27/2024 1.0 E.U./dL  0.2 - 1.0 E.U./dL Final    Lipase 12/27/2024 26  13 - 60 U/L Final    HS Troponin T 12/27/2024 31 (H)  <14 ng/L Final    WBC 12/27/2024 6.69  3.40 - 10.80 10*3/mm3 Final    RBC 12/27/2024 4.62  3.77 - 5.28 10*6/mm3 Final    Hemoglobin 12/27/2024 15.0  12.0 - 15.9 g/dL Final    Hematocrit 12/27/2024 44.8  34.0 - 46.6 % Final    MCV 12/27/2024 97.0  79.0 - 97.0 fL Final    MCH 12/27/2024 32.5  26.6 - 33.0 pg Final    MCHC 12/27/2024 33.5  31.5 - 35.7 g/dL Final    RDW 12/27/2024 13.8  12.3 - 15.4 % Final    RDW-SD 12/27/2024 49.6  37.0 - 54.0 fl Final    MPV 12/27/2024 11.0  6.0 - 12.0 fL Final    Platelets 12/27/2024 148  140 - 450 10*3/mm3 Final    Neutrophil % 12/27/2024 72.8  42.7 - 76.0 % Final    Lymphocyte % 12/27/2024 15.7 (L)  19.6 - 45.3 % Final    Monocyte % 12/27/2024 9.0  5.0 - 12.0 % Final    Eosinophil % 12/27/2024 1.8  0.3 - 6.2 % Final    Basophil % 12/27/2024 0.4  0.0 - 1.5 % Final    Immature Grans % 12/27/2024 0.3  0.0 - 0.5 % Final    Neutrophils, Absolute 12/27/2024 4.87  1.70 - 7.00 10*3/mm3 Final    Lymphocytes, Absolute 12/27/2024 1.05  0.70 - 3.10 10*3/mm3 Final    Monocytes, Absolute 12/27/2024 0.60  0.10 - 0.90 10*3/mm3 Final    Eosinophils, Absolute 12/27/2024 0.12  0.00 - 0.40 10*3/mm3 Final    Basophils, Absolute 12/27/2024 0.03  0.00 - 0.20 10*3/mm3 Final    Immature Grans, Absolute 12/27/2024 0.02  0.00 - 0.05 10*3/mm3 Final    nRBC 12/27/2024 0.0  0.0 - 0.2 /100 WBC Final    HS Troponin T 12/27/2024 29 (H)  <14 ng/L Final    Troponin T Numeric Delta  12/27/2024 -2  ng/L Final    Troponin T % Delta 12/27/2024 -6  Abnormal if >/= 20% % Final    RBC, UA 12/27/2024 11-20 (A)  None Seen, 0-2 /HPF Final    WBC, UA 12/27/2024 21-50 (A)  None Seen, 0-2 /HPF Final    Bacteria, UA 12/27/2024 4+ (A)  None Seen /HPF Final    Squamous Epithelial Cells, UA 12/27/2024 7-12 (A)  None Seen, 0-2 /HPF Final    Hyaline Casts, UA 12/27/2024 None Seen  None Seen /LPF Final    Methodology 12/27/2024 Manual Light Microscopy   Final    Urine Culture 12/27/2024 >100,000 CFU/mL Escherichia coli (A)   Final    Glucose 12/29/2024 108 (H)  65 - 99 mg/dL Final    BUN 12/29/2024 13  8 - 23 mg/dL Final    Creatinine 12/29/2024 0.82  0.57 - 1.00 mg/dL Final    Sodium 12/29/2024 140  136 - 145 mmol/L Final    Potassium 12/29/2024 3.3 (L)  3.5 - 5.2 mmol/L Final    Chloride 12/29/2024 104  98 - 107 mmol/L Final    CO2 12/29/2024 25.4  22.0 - 29.0 mmol/L Final    Calcium 12/29/2024 9.4  8.6 - 10.5 mg/dL Final    BUN/Creatinine Ratio 12/29/2024 15.9  7.0 - 25.0 Final    Anion Gap 12/29/2024 10.6  5.0 - 15.0 mmol/L Final    eGFR 12/29/2024 78.0  >60.0 mL/min/1.73 Final    WBC 12/28/2024 6.61  3.40 - 10.80 10*3/mm3 Final    RBC 12/28/2024 4.36  3.77 - 5.28 10*6/mm3 Final    Hemoglobin 12/28/2024 14.3  12.0 - 15.9 g/dL Final    Hematocrit 12/28/2024 41.8  34.0 - 46.6 % Final    MCV 12/28/2024 95.9  79.0 - 97.0 fL Final    MCH 12/28/2024 32.8  26.6 - 33.0 pg Final    MCHC 12/28/2024 34.2  31.5 - 35.7 g/dL Final    RDW 12/28/2024 13.5  12.3 - 15.4 % Final    RDW-SD 12/28/2024 48.4  37.0 - 54.0 fl Final    MPV 12/28/2024 10.9  6.0 - 12.0 fL Final    Platelets 12/28/2024 134 (L)  140 - 450 10*3/mm3 Final    Result checked      Glucose 12/28/2024 127 (H)  65 - 99 mg/dL Final    BUN 12/28/2024 12  8 - 23 mg/dL Final    Creatinine 12/28/2024 0.82  0.57 - 1.00 mg/dL Final    Sodium 12/28/2024 139  136 - 145 mmol/L Final    Potassium 12/28/2024 3.6  3.5 - 5.2 mmol/L Final    Chloride 12/28/2024 102  98 - 107  mmol/L Final    CO2 12/28/2024 25.0  22.0 - 29.0 mmol/L Final    Calcium 12/28/2024 9.4  8.6 - 10.5 mg/dL Final    BUN/Creatinine Ratio 12/28/2024 14.6  7.0 - 25.0 Final    Anion Gap 12/28/2024 12.0  5.0 - 15.0 mmol/L Final    eGFR 12/28/2024 78.0  >60.0 mL/min/1.73 Final    WBC 12/29/2024 6.57  3.40 - 10.80 10*3/mm3 Final    RBC 12/29/2024 4.45  3.77 - 5.28 10*6/mm3 Final    Hemoglobin 12/29/2024 13.9  12.0 - 15.9 g/dL Final    Hematocrit 12/29/2024 43.1  34.0 - 46.6 % Final    MCV 12/29/2024 96.9  79.0 - 97.0 fL Final    MCH 12/29/2024 31.2  26.6 - 33.0 pg Final    MCHC 12/29/2024 32.3  31.5 - 35.7 g/dL Final    RDW 12/29/2024 13.9  12.3 - 15.4 % Final    RDW-SD 12/29/2024 49.9  37.0 - 54.0 fl Final    MPV 12/29/2024 10.8  6.0 - 12.0 fL Final    Platelets 12/29/2024 125 (L)  140 - 450 10*3/mm3 Final    WBC 12/30/2024 5.49  3.40 - 10.80 10*3/mm3 Final    RBC 12/30/2024 4.42  3.77 - 5.28 10*6/mm3 Final    Hemoglobin 12/30/2024 14.4  12.0 - 15.9 g/dL Final    Hematocrit 12/30/2024 43.1  34.0 - 46.6 % Final    MCV 12/30/2024 97.5 (H)  79.0 - 97.0 fL Final    MCH 12/30/2024 32.6  26.6 - 33.0 pg Final    MCHC 12/30/2024 33.4  31.5 - 35.7 g/dL Final    RDW 12/30/2024 13.8  12.3 - 15.4 % Final    RDW-SD 12/30/2024 50.0  37.0 - 54.0 fl Final    MPV 12/30/2024 10.7  6.0 - 12.0 fL Final    Platelets 12/30/2024 116 (L)  140 - 450 10*3/mm3 Final    Glucose 12/30/2024 94  65 - 99 mg/dL Final    BUN 12/30/2024 13  8 - 23 mg/dL Final    Creatinine 12/30/2024 0.83  0.57 - 1.00 mg/dL Final    Sodium 12/30/2024 141  136 - 145 mmol/L Final    Potassium 12/30/2024 3.6  3.5 - 5.2 mmol/L Final    Chloride 12/30/2024 104  98 - 107 mmol/L Final    CO2 12/30/2024 26.8  22.0 - 29.0 mmol/L Final    Calcium 12/30/2024 9.5  8.6 - 10.5 mg/dL Final    BUN/Creatinine Ratio 12/30/2024 15.7  7.0 - 25.0 Final    Anion Gap 12/30/2024 10.2  5.0 - 15.0 mmol/L Final    eGFR 12/30/2024 76.9  >60.0 mL/min/1.73 Final    Glucose 12/31/2024 99  65 - 99 mg/dL  Final    BUN 12/31/2024 12  8 - 23 mg/dL Final    Creatinine 12/31/2024 0.79  0.57 - 1.00 mg/dL Final    Sodium 12/31/2024 141  136 - 145 mmol/L Final    Potassium 12/31/2024 3.7  3.5 - 5.2 mmol/L Final    Specimen hemolyzed.  Result may be falsely elevated.    Chloride 12/31/2024 104  98 - 107 mmol/L Final    CO2 12/31/2024 26.5  22.0 - 29.0 mmol/L Final    Calcium 12/31/2024 9.5  8.6 - 10.5 mg/dL Final    BUN/Creatinine Ratio 12/31/2024 15.2  7.0 - 25.0 Final    Anion Gap 12/31/2024 10.5  5.0 - 15.0 mmol/L Final    eGFR 12/31/2024 81.6  >60.0 mL/min/1.73 Final    WBC 12/31/2024 5.96  3.40 - 10.80 10*3/mm3 Final    RBC 12/31/2024 4.42  3.77 - 5.28 10*6/mm3 Final    Hemoglobin 12/31/2024 14.3  12.0 - 15.9 g/dL Final    Hematocrit 12/31/2024 42.9  34.0 - 46.6 % Final    MCV 12/31/2024 97.1 (H)  79.0 - 97.0 fL Final    MCH 12/31/2024 32.4  26.6 - 33.0 pg Final    MCHC 12/31/2024 33.3  31.5 - 35.7 g/dL Final    RDW 12/31/2024 13.8  12.3 - 15.4 % Final    RDW-SD 12/31/2024 49.3  37.0 - 54.0 fl Final    MPV 12/31/2024 10.6  6.0 - 12.0 fL Final    Platelets 12/31/2024 114 (L)  140 - 450 10*3/mm3 Final       Assessment & Plan   Problems Addressed this Visit       Bipolar 1 disorder, depressed, moderate - Primary (Chronic)    Relevant Medications    escitalopram (LEXAPRO) 10 MG tablet    clonazePAM (KlonoPIN) 0.5 MG tablet    lurasidone (LATUDA) 20 MG tablet tablet    Generalized anxiety disorder (Chronic)    Relevant Medications    escitalopram (LEXAPRO) 10 MG tablet    lurasidone (LATUDA) 20 MG tablet tablet     Diagnoses         Codes Comments    Bipolar 1 disorder, depressed, moderate    -  Primary ICD-10-CM: F31.32  ICD-9-CM: 296.52     Generalized anxiety disorder     ICD-10-CM: F41.1  ICD-9-CM: 300.02             Visit Diagnoses:    ICD-10-CM ICD-9-CM   1. Bipolar 1 disorder, depressed, moderate  F31.32 296.52   2. Generalized anxiety disorder  F41.1 300.02               TREATMENT PLAN/GOALS: Continue supportive  psychotherapy efforts and medications as indicated. Treatment and medication options discussed during today's visit. Patient ackowledged and verbally consented to continue with current treatment plan and was educated on the importance of compliance with treatment and follow-up appointments.    MEDICATION ISSUES:  1. Bipolar d/o - cont latuda, decrease to  20 mg , monitor mood and tremor,  cont mirapex on current dose  PCP is monitoring HgA1c     2. Generalized anxiety disorder-continue clonazepam  0.5 mg   2 times a day (it was already decreased from 1 mg BID)  continue Lexapro 15 mg p.o. daily, no changes necessary.    Recommended to implement behavioral changes, to get distracted, to play games and work on puzzles     3. Long term therapeutic drug monitoring - UDS consistent  3/10/23    LABORATORY - SCAN - [facility] LAB REPORT, [facility], 03/10/2023 (03/10/2023)    3/7/24 - consistent   [facility] Abbreviated Urine Drug Screen - (03/07/2024)       Issues with meds discussed , long term benzo use in geriatric population     INSPECT reviewed as expected  2/24/25 - clonazepam 0.5 mg # 60         PHQ scored 12 and indicated  moderate  depression (mainly due to health issues)    FAVIOLA 7 scored 7 mild anxiety     Patient screened positive for depression based on a PHQ-9 score of 12 on 3/6/2025. Follow-up recommendations include: Prescribed antidepressant medication treatment.      Discussed medication options and treatment plan of prescribed medication as well as the risks, benefits, and side effects including potential falls, possible impaired driving and metabolic adversities among others. Patient is agreeable to call the office with any worsening of symptoms or onset of side effects. Patient is agreeable to call 911 or go to the nearest ER should he/she begin having SI/HI. No medication side effects or related complaints today.     MEDS ORDERED DURING VISIT:  New Medications Ordered This Visit   Medications     escitalopram (LEXAPRO) 10 MG tablet     Sig: Take 1.5 tablets by mouth Daily.     Dispense:  135 tablet     Refill:  0     * * N O T I C E * * Last quantity doesn't match original quantity    clonazePAM (KlonoPIN) 0.5 MG tablet     Sig: Take 1 tablet by mouth 2 (Two) Times a Day.     Dispense:  60 tablet     Refill:  3     Please dispense only when it is due, last fill was on 2/24/25    lurasidone (LATUDA) 20 MG tablet tablet     Sig: Take 1 tablet by mouth Every Evening.     Dispense:  30 tablet     Refill:  3       Return in about 3 months (around 6/6/2025).       This document has been electronically signed by Archana Singh MD  March 6, 2025 13:10 EST

## 2025-03-17 ENCOUNTER — TRANSCRIBE ORDERS (OUTPATIENT)
Dept: ADMINISTRATIVE | Facility: HOSPITAL | Age: 70
End: 2025-03-17
Payer: MEDICARE

## 2025-03-17 DIAGNOSIS — K59.00 CONSTIPATION, UNSPECIFIED CONSTIPATION TYPE: ICD-10-CM

## 2025-03-17 DIAGNOSIS — K74.69 OTHER CIRRHOSIS OF LIVER: Primary | ICD-10-CM

## 2025-03-17 DIAGNOSIS — K75.81 NONALCOHOLIC STEATOHEPATITIS (NASH): ICD-10-CM

## 2025-03-17 DIAGNOSIS — R19.4 CHANGE IN BOWEL HABITS: ICD-10-CM

## 2025-03-28 ENCOUNTER — TELEPHONE (OUTPATIENT)
Dept: FAMILY MEDICINE CLINIC | Facility: CLINIC | Age: 70
End: 2025-03-28
Payer: MEDICARE

## 2025-03-28 NOTE — TELEPHONE ENCOUNTER
Asha with [facility] reports pt having continued trouble with constipation. Pt was started on Laculose and colace by GI and still having hard formed stools for over a week.   Per Dr. Holloway advised that she or pt family contact GI as they are who prescribed those medications and recently saw pt on 3/17.

## 2025-04-03 NOTE — TELEPHONE ENCOUNTER
Rx Refill Note  Requested Prescriptions     Pending Prescriptions Disp Refills    rOPINIRole (REQUIP) 0.5 MG tablet [Pharmacy Med Name: ROPINIROLE HCL 0.500 MG TABLET] 60 tablet 0     Sig: TAKE ONE TABLET BY MOUTH EVERY MORNING AND EVERY NIGHT AT BEDTIME FOR RESTLESS LEG SYNDROME ( INCREASE )        Last office visit with prescribing clinician: 3/6/2025     Next office visit with prescribing clinician: 6/19/2025     Office Visit with Archana Singh MD (03/06/2025)     Melissa Cueva  04/03/25, 15:11 EDT

## 2025-04-04 RX ORDER — ROPINIROLE 0.5 MG/1
TABLET, FILM COATED ORAL
Qty: 60 TABLET | Refills: 0 | Status: SHIPPED | OUTPATIENT
Start: 2025-04-04

## 2025-04-07 ENCOUNTER — HOSPITAL ENCOUNTER (OUTPATIENT)
Dept: ULTRASOUND IMAGING | Facility: HOSPITAL | Age: 70
Discharge: HOME | End: 2025-04-07
Admitting: FAMILY MEDICINE
Payer: MEDICARE

## 2025-04-07 DIAGNOSIS — K74.69 OTHER CIRRHOSIS OF LIVER: ICD-10-CM

## 2025-04-07 DIAGNOSIS — K21.9 CHRONIC GERD: ICD-10-CM

## 2025-04-07 DIAGNOSIS — R13.10 DYSPHAGIA, UNSPECIFIED TYPE: ICD-10-CM

## 2025-04-07 PROCEDURE — 76705 ECHO EXAM OF ABDOMEN: CPT

## 2025-04-09 RX ORDER — FLUTICASONE FUROATE, UMECLIDINIUM BROMIDE AND VILANTEROL TRIFENATATE 100; 62.5; 25 UG/1; UG/1; UG/1
1 POWDER RESPIRATORY (INHALATION)
Qty: 1 EACH | Refills: 5 | Status: SHIPPED | OUTPATIENT
Start: 2025-04-09

## 2025-04-17 ENCOUNTER — OFFICE VISIT (OUTPATIENT)
Dept: FAMILY MEDICINE CLINIC | Facility: CLINIC | Age: 70
End: 2025-04-17
Payer: MEDICARE

## 2025-04-17 ENCOUNTER — LAB (OUTPATIENT)
Dept: FAMILY MEDICINE CLINIC | Facility: CLINIC | Age: 70
End: 2025-04-17
Payer: MEDICARE

## 2025-04-17 VITALS
DIASTOLIC BLOOD PRESSURE: 80 MMHG | HEIGHT: 64 IN | WEIGHT: 198 LBS | SYSTOLIC BLOOD PRESSURE: 122 MMHG | HEART RATE: 74 BPM | OXYGEN SATURATION: 100 % | TEMPERATURE: 97.7 F | BODY MASS INDEX: 33.8 KG/M2

## 2025-04-17 DIAGNOSIS — E11.49 TYPE 2 DIABETES MELLITUS WITH OTHER DIABETIC NEUROLOGICAL COMPLICATION: Chronic | ICD-10-CM

## 2025-04-17 DIAGNOSIS — E78.2 MIXED HYPERLIPIDEMIA: Chronic | ICD-10-CM

## 2025-04-17 DIAGNOSIS — Z00.00 ENCOUNTER FOR ANNUAL WELLNESS EXAM IN MEDICARE PATIENT: Primary | ICD-10-CM

## 2025-04-17 DIAGNOSIS — I10 PRIMARY HYPERTENSION: Chronic | ICD-10-CM

## 2025-04-17 LAB
CHOLEST SERPL-MCNC: 188 MG/DL (ref 0–200)
HDLC SERPL-MCNC: 58 MG/DL (ref 40–60)
LDLC SERPL CALC-MCNC: 107 MG/DL (ref 0–100)
LDLC/HDLC SERPL: 1.8 {RATIO}
TRIGL SERPL-MCNC: 129 MG/DL (ref 0–150)
VLDLC SERPL-MCNC: 23 MG/DL (ref 5–40)

## 2025-04-17 PROCEDURE — 80061 LIPID PANEL: CPT | Performed by: FAMILY MEDICINE

## 2025-04-17 PROCEDURE — 36415 COLL VENOUS BLD VENIPUNCTURE: CPT

## 2025-04-17 NOTE — PATIENT INSTRUCTIONS
Keep working to lose weight through healthy eating and exercise.   Consider getting an updated tetanus vaccine at the pharmacy (tdap)

## 2025-04-17 NOTE — ASSESSMENT & PLAN NOTE
She will continue her current meds as her blood pressure is under control    Orders:    Lipid Panel; Future

## 2025-04-17 NOTE — ASSESSMENT & PLAN NOTE
She was counseled on the need for weight loss.  She has advanced directives.  She would be due for colonoscopy next year.  She was encouraged to get an updated Tdap at the pharmacy.

## 2025-04-17 NOTE — PROGRESS NOTES
Subjective   The ABCs of the Annual Wellness Visit  Medicare Wellness Visit      Dariana Rivera is a 69 y.o. patient who presents for a Medicare Wellness Visit.    The following portions of the patient's history were reviewed and   updated as appropriate: allergies, current medications, past family history, past medical history, past social history, past surgical history, and problem list.    Compared to one year ago, the patient's physical   health is better.  Compared to one year ago, the patient's mental   health is better.    Recent Hospitalizations:  This patient has had a [facility] admission record on file within the last 365 days.  Current Medical Providers:  Patient Care Team:  Franny Strickland MD as PCP - General (Family Medicine)  Nabor Amador MD as Consulting Physician (Cardiology)  Tucker Best MD as Consulting Physician (Nephrology)  Mariano Rodriguez MD as Consulting Physician (Urology)  Archana Singh MD as Consulting Physician (Psychiatry)  Sasha Khan MD as Consulting Physician (Sleep Medicine)  Zachary Mcgarry MD as Consulting Physician (Gastroenterology)    Outpatient Medications Prior to Visit   Medication Sig Dispense Refill    Accu-Chek Guide Test test strip USE TO CHECK BLOOD SUGARS ONCE A  each 0    albuterol sulfate  (90 Base) MCG/ACT inhaler Inhale 2 puffs Every 6 (Six) Hours As Needed for Wheezing or Shortness of Air. 18 g 5    amantadine (SYMMETREL) 100 MG capsule Take 1 capsule by mouth Every Night.      aspirin 81 MG EC tablet aspirin 81 mg tablet,delayed release      calcium carbonate (Calcium 600) 600 MG tablet Take 1 tablet by mouth Daily.      carbidopa-levodopa ER (SINEMET CR)  MG per tablet Take 2 tablets by mouth 3 (Three) Times a Day.      Cholecalciferol (Vitamin D3) 50 MCG (2000 UT) capsule Take 1 capsule by mouth Daily.      clonazePAM (KlonoPIN) 0.5 MG tablet Take 1 tablet by mouth 2 (Two) Times a Day. 60  tablet 3    escitalopram (LEXAPRO) 10 MG tablet TAKE 1.5 TABLETS BY MOUTH DAILY. 135 tablet 0    Farxiga 10 MG tablet TAKE ONE TABLET BY MOUTH DAILY 90 tablet 0    Fluticasone-Umeclidin-Vilant (Trelegy Ellipta) 100-62.5-25 MCG/ACT inhaler Inhale 1 puff Daily. 1 each 5    gabapentin (NEURONTIN) 300 MG capsule Take 1 capsule by mouth 3 (Three) Times a Day.      ipratropium-albuterol (DUO-NEB) 0.5-2.5 mg/3 ml nebulizer Take 3 mL by nebulization Every 6 (Six) Hours As Needed for Shortness of Air or Wheezing for up to 30 days. 1080 mL 0    ipratropium-albuterol (DUO-NEB) 0.5-2.5 mg/3 ml nebulizer INHALE VIA NEBULIZER EVERY FOUR HOURS AS NEEDED FOR COPD( IN PATIENT ORDER ) 180 mL 0    KLOR-CON 20 MEQ CR tablet TAKE 1 TABLET BY MOUTH DAILY (Patient taking differently: Take 1 tablet by mouth Daily.) 90 tablet 0    lidocaine (LIDODERM) 5 % Place 1 patch on the skin as directed by provider Daily. Remove & Discard patch within 12 hours or as directed by MD (Patient taking differently: Place 1 patch on the skin as directed by provider Daily As Needed. Remove & Discard patch within 12 hours or as directed by MD) 30 patch 0    lurasidone (LATUDA) 20 MG tablet tablet Take 1 tablet by mouth Every Evening. 30 tablet 3    Melatonin 10 MG capsule Take 1 capsule by mouth every night at bedtime.      Multiple Vitamins-Minerals (MULTIVITAMIN WITH MINERALS) tablet tablet Take 1 tablet by mouth Daily. Do not take dos      nystatin (MYCOSTATIN) 372836 UNIT/GM cream Apply 1 Application topically to the appropriate area as directed 2 (Two) Times a Day. (Patient taking differently: Apply 1 Application topically to the appropriate area as directed As Needed.) 30 g 0    O2 (OXYGEN) Inhale 1 L/min 1 (One) Time. @@ night      omeprazole (priLOSEC) 40 MG capsule Take 1 capsule by mouth Daily. Take preop      pentoxifylline (TRENtal) 400 MG CR tablet Take 1 tablet by mouth 3 (Three) Times a Day With Meals.      pramipexole (MIRAPEX) 0.5 MG tablet  TAKE ONE TABLET BY MOUTH TWICE DAILY 180 tablet 0    rOPINIRole (REQUIP) 0.5 MG tablet TAKE ONE TABLET BY MOUTH EVERY MORNING AND EVERY NIGHT AT BEDTIME FOR RESTLESS LEG SYNDROME ( INCREASE ) 60 tablet 0    spironolactone (ALDACTONE) 25 MG tablet Take 1 tablet by mouth Daily.      vitamin C (ASCORBIC ACID) 500 MG tablet Take 2 tablets by mouth Daily. dont take preop      escitalopram (LEXAPRO) 10 MG tablet Take 1.5 tablets by mouth Daily. 135 tablet 0    rOPINIRole (REQUIP) 0.25 MG tablet TAKE 1 TABLET BY MOUTH TWICE DAILY **TAKE THE NIGHT DOSE 1 HOUR BEFORE BEDTIME** 180 tablet 0     No facility-administered medications prior to visit.     No opioid medication identified on active medication list. I have reviewed chart for other potential  high risk medication/s and harmful drug interactions in the elderly.      Aspirin is on active medication list. Aspirin use is indicated based on review of current medical condition/s. Pros and cons of this therapy have been discussed today. Benefits of this medication outweigh potential harm.  Patient has been encouraged to continue taking this medication.  .      Patient Active Problem List   Diagnosis    Morbid obesity    Bipolar 1 disorder, depressed, moderate    Chronic back pain    Chronic kidney disease, unspecified    Chronic obstructive pulmonary disease    Cirrhosis    Gastroesophageal reflux disease    Hypertension    Sleep apnea    Parkinson's disease    Recurrent urinary tract infection    Seizure    Spinal stenosis of cervical region    Type 2 diabetes mellitus with other diabetic neurological complication    Generalized anxiety disorder    Hyperlipidemia    Esophageal varices without bleeding    Injury of cervical spine    Cor pulmonale    Dysphagia    Encounter for annual wellness exam in Medicare patient    Idiopathic osteoarthritis    Degeneration of lumbar intervertebral disc    Panniculitis affecting back    Panniculitis of neck    Spinal stenosis of lumbar  "region    Osteopenia after menopause    UTI (urinary tract infection)     Advance Care Planning Advance Directive is on file.  ACP discussion was held with the patient during this visit. Patient has an advance directive in EMR which is still valid.             Objective   Vitals:    25 1215   BP: 122/80   BP Location: Left arm   Patient Position: Sitting   Cuff Size: Large Adult   Pulse: 74   Temp: 97.7 °F (36.5 °C)   TempSrc: Temporal   SpO2: 100%   Weight: 89.8 kg (198 lb)   Height: 162.6 cm (64\")   PainSc: 4    PainLoc: Back       Estimated body mass index is 33.99 kg/m² as calculated from the following:    Height as of this encounter: 162.6 cm (64\").    Weight as of this encounter: 89.8 kg (198 lb).                Does the patient have evidence of cognitive impairment? No  MMSE done                                                                                                Health  Risk Assessment    Smoking Status:  Social History     Tobacco Use   Smoking Status Former    Current packs/day: 0.00    Average packs/day: 0.5 packs/day for 15.6 years (7.8 ttl pk-yrs)    Types: Cigarettes    Start date: 1974    Quit date: 1990    Years since quittin.8    Passive exposure: Past   Smokeless Tobacco Never   Tobacco Comments    Stopped off and on.  Stopped once for more than 2 yrs     Alcohol Consumption:  Social History     Substance and Sexual Activity   Alcohol Use No       Fall Risk Screen  STEADI Fall Risk Assessment was completed, and patient is at MODERATE risk for falls. Assessment completed on:2025    Depression Screening   Little interest or pleasure in doing things? Not at all   Feeling down, depressed, or hopeless? Not at all   PHQ-2 Total Score 0      Health Habits and Functional and Cognitive Screenin/10/2025     8:54 AM   Functional & Cognitive Status   Do you have difficulty preparing food and eating? Yes   Do you have difficulty bathing yourself, getting dressed " or grooming yourself? Yes   Do you have difficulty using the toilet? No   Do you have difficulty moving around from place to place? No   Do you have trouble with steps or getting out of a bed or a chair? Yes   Current Diet Diabetic Diet   Dental Exam Not up to date   Eye Exam Up to date   Exercise (times per week) 6 times per week   Current Exercises Include Other;Walking   Do you need help using the phone?  No   Are you deaf or do you have serious difficulty hearing?  No   Do you need help to go to places out of walking distance? Yes   Do you need help shopping? No   Do you need help preparing meals?  Yes   Do you need help with housework?  Yes   Do you need help with laundry? No   Do you need help taking your medications? No   Do you need help managing money? No   Do you ever drive or ride in a car without wearing a seat belt? No   Have you felt unusual stress, anger or loneliness in the last month? No   Who do you live with? Spouse   If you need help, do you have trouble finding someone available to you? No   Have you been bothered in the last four weeks by sexual problems? No   Do you have difficulty concentrating, remembering or making decisions? No           Age-appropriate Screening Schedule:  Refer to the list below for future screening recommendations based on patient's age, sex and/or medical conditions. Orders for these recommended tests are listed in the plan section. The patient has been provided with a written plan.    Health Maintenance List  Health Maintenance   Topic Date Due    TDAP/TD VACCINES (1 - Tdap) Never done    Hepatitis B (1 of 3 - Risk 3-dose series) Never done    LIPID PANEL  04/04/2025    COVID-19 Vaccine (6 - 2024-25 season) 05/19/2025    INFLUENZA VACCINE  07/01/2025    HEMOGLOBIN A1C  08/18/2025    DIABETIC EYE EXAM  10/30/2025    COLORECTAL CANCER SCREENING  01/03/2026    ANNUAL WELLNESS VISIT  04/17/2026    MAMMOGRAM  04/30/2026    DXA SCAN  04/30/2026    HEPATITIS C SCREENING   "Completed    Pneumococcal Vaccine 50+  Completed    ZOSTER VACCINE  Completed    URINE MICROALBUMIN-CREATININE RATIO (uACR)  Discontinued                                                                                                                                                CMS Preventative Services Quick Reference  Risk Factors Identified During Encounter  Immunizations Discussed/Encouraged: Tdap    The above risks/problems have been discussed with the patient.  Pertinent information has been shared with the patient in the After Visit Summary.  An After Visit Summary and PPPS were made available to the patient.    Follow Up:   Next Medicare Wellness visit to be scheduled in 1 year.         Additional E&M Note during same encounter follows:  Patient has additional, significant, and separately identifiable condition(s)/problem(s) that require work above and beyond the Medicare Wellness Visit     Chief Complaint  Medicare Wellness-subsequent and Back Pain (DDD)    Subjective   HPI  Pat is also being seen today for additional medical problem/s: follow up on bp/chol/dm  BS   Actively eating better and losing weight  Feels well  Does not need refills    Review of Systems   Constitutional: Negative.    Respiratory: Negative.     Cardiovascular: Negative.               Objective   Vital Signs:  /80 (BP Location: Left arm, Patient Position: Sitting, Cuff Size: Large Adult)   Pulse 74   Temp 97.7 °F (36.5 °C) (Temporal)   Ht 162.6 cm (64\")   Wt 89.8 kg (198 lb)   SpO2 100%   BMI 33.99 kg/m²   Physical Exam  Vitals and nursing note reviewed.   Constitutional:       Appearance: Normal appearance. She is obese.   Cardiovascular:      Rate and Rhythm: Normal rate and regular rhythm.      Heart sounds: Normal heart sounds.   Pulmonary:      Effort: Pulmonary effort is normal.      Breath sounds: Normal breath sounds.   Musculoskeletal:      Right lower leg: No edema.      Left lower leg: No edema. "   Neurological:      Mental Status: She is alert and oriented to person, place, and time.   Psychiatric:         Mood and Affect: Mood normal.               Lab Results   Component Value Date    GLUCOSE 97 02/18/2025    BUN 22 02/18/2025    CREATININE 0.92 02/18/2025     02/18/2025    K 4.6 02/18/2025    CL 99 02/18/2025    CALCIUM 10.4 02/18/2025    PROTEINTOT 8.1 02/18/2025    ALBUMIN 3.8 02/18/2025    ALT 12 02/18/2025    AST 24 02/18/2025    ALKPHOS 75 02/18/2025    BILITOT 0.5 02/18/2025    GLOB 4.3 02/18/2025    AGRATIO 0.9 02/18/2025    BCR 23.9 02/18/2025    ANIONGAP 8.7 02/18/2025    EGFR 68.0 02/18/2025     Lab Results   Component Value Date    HGBA1C 5.80 (H) 02/18/2025          Assessment and Plan      Encounter for annual wellness exam in Medicare patient  She was counseled on the need for weight loss.  She has advanced directives.  She would be due for colonoscopy next year.  She was encouraged to get an updated Tdap at the pharmacy.       Mixed hyperlipidemia  She will continue her current meds and is due a lipid panel    Orders:    Lipid Panel; Future    Primary hypertension  She will continue her current meds as her blood pressure is under control    Orders:    Lipid Panel; Future    Type 2 diabetes mellitus with other diabetic neurological complication  She will continue current meds.  A1c was 5.8    Orders:    Lipid Panel; Future            Follow Up   Return in about 6 months (around 10/17/2025) for Recheck.  Patient was given instructions and counseling regarding her condition or for health maintenance advice. Please see specific information pulled into the AVS if appropriate.

## 2025-04-29 ENCOUNTER — OFFICE VISIT (OUTPATIENT)
Dept: PULMONOLOGY | Facility: HOSPITAL | Age: 70
End: 2025-04-29
Payer: MEDICARE

## 2025-04-29 VITALS
RESPIRATION RATE: 12 BRPM | OXYGEN SATURATION: 98 % | BODY MASS INDEX: 33.8 KG/M2 | HEART RATE: 66 BPM | SYSTOLIC BLOOD PRESSURE: 112 MMHG | HEIGHT: 64 IN | DIASTOLIC BLOOD PRESSURE: 70 MMHG | WEIGHT: 198 LBS

## 2025-04-29 DIAGNOSIS — G47.33 OBSTRUCTIVE SLEEP APNEA SYNDROME: Chronic | ICD-10-CM

## 2025-04-29 DIAGNOSIS — K21.9 GASTROESOPHAGEAL REFLUX DISEASE WITHOUT ESOPHAGITIS: Chronic | ICD-10-CM

## 2025-04-29 DIAGNOSIS — J43.1 PANLOBULAR EMPHYSEMA: Primary | Chronic | ICD-10-CM

## 2025-04-29 PROCEDURE — G0463 HOSPITAL OUTPT CLINIC VISIT: HCPCS

## 2025-04-29 NOTE — PROGRESS NOTES
SLEEP/PULMONARY  CLINIC NOTE      PATIENT IDENTIFICATION:  Name: Dariana Rivera  Age: 69 y.o.  Sex: female  :  1955  MRN: [id number]    DATE OF CONSULTATION:  2025     Referring physician:    Franny Strickland MD            CHIEF COMPLAINT: Obstructive disease    History of Present Illness:   Dariana Rivera is a 69 y.o. female patient with obstructive disease currently on Trelegy still have intermittent dry coughing rarely has to use a bronchodilator for emergency, and no significant shortness of breath no dizziness no lightheadedness no amputation, patient is currently using weight intentionally  Patient with obstructive apnea using oxygen at night refused to be on CPAP      Review of Systems:   Constitutional: As above   Eyes: negative   ENT/oropharynx: negative   Cardiovascular: negative   Respiratory: As above   Gastrointestinal: negative   Genitourinary: negative   Neurological: negative   Musculoskeletal: negative   Integument/breast: negative   Endocrine: negative   Allergic/Immunologic: negative     Past Medical History:  Past Medical History:   Diagnosis Date    Allergic Not sure    Penicillin, cipro, clindamycin, methadone, ambien    Angina at rest     DR. Amador    Anxiety disorder     [facility]     Asthma About 5 yrs ago    Shortness of breath with exertion    Back pain     Bipolar disorder     Cataract     Left & right removed in 3/2016    Cervical dystonia     Cholelithiasis     Gallbladder removed in  or     Chronic pain     with stenosis    Chronic pain disorder     Cervical, lumbar, L knee, both shoulders    Cirrhosis     Colitis     Colon polyps     COPD (chronic obstructive pulmonary disease)     Not sure when diagnosed    Cramping of feet     Cramping of hands     DDD (degenerative disc disease), cervical     DDD (degenerative disc disease), lumbar     DDD (degenerative disc disease), lumbar     DDD (degenerative disc disease), thoracic     Depression      Diabetes mellitus     Not sure when diagnosed.    Diverticulosis     Not sure when diagnosed    Dysphagia 09/2020    Eosinophilic colitis     Gastritis     GERD (gastroesophageal reflux disease)     Headache Not sure    Migraines stopped after complete hyst in 2004    Hepatic steatosis     non alcoholic steo-hepatitis     Hx of lithotripsy 02/10/2021    Hypertension     IBS (irritable bowel syndrome)     Infectious viral hepatitis     ALEXANDER    Insomnia     Kidney stones     Low back pain     Not sure when diagnosed    Lumbar stenosis     Neck pain     Neuromuscular disorder Not sure    Parkinsons and Cervical Dystonia    Neuropathy     Obesity     Obese since about 1966    Osteoarthritis     Osteopenia     Osteopenia diagnosed after bone density test in 2024.    Parkinson's disease     Peripheral edema     Pneumonia May 2018    About a month after neck fusion surgery    PTSD (post-traumatic stress disorder)     Recurrent UTI     Renal insufficiency     Not sure when diagnosed    Seizure     onset 7/2016.  Last seizure 10/2019    Self-injurious behavior     Tried to commit suicide in 2002    Sleep apnea     Using Bipap machine at night. advised to bring dos    Sleep apnea, obstructive     Suicide attempt     In 2001 or 2002    Urinary incontinence        Past Surgical History:  Past Surgical History:   Procedure Laterality Date    BACK SURGERY      BRONCHOSCOPY  Date Unknown    CARDIAC CATHETERIZATION  02/2019    CARPAL TUNNEL RELEASE Bilateral     Wrist     CHOLECYSTECTOMY  1997    COLONOSCOPY      CYSTOSCOPY BLADDER STONE LITHOTRIPSY      ENDOMETRIAL ABLATION      ENDOSCOPY      ENDOSCOPY N/A 04/15/2020    Procedure: ESOPHAGOGASTRODUODENOSCOPY with dilitation (60FR.);  Surgeon: Julieta Allison MD;  Location: [facility] ENDOSCOPY;  Service: Gastroenterology;  Laterality: N/A;  varices,hiatal hernia,gastritis    ENDOSCOPY N/A 09/29/2020    Procedure: ESOPHAGOGASTRODUODENOSCOPY with esophageal dilitation (54 bougie);   Surgeon: Julieta Allison MD;  Location: [facility] ENDOSCOPY;  Service: Gastroenterology;  Laterality: N/A;   post op: hiatal hernia, esophageal stricture    ENDOSCOPY N/A 09/30/2021    Procedure: ESOPHAGOGASTRODUODENOSCOPY WITH DILATATION (BOUGIE #60);  Surgeon: Julieta Allison MD;  Location: [facility] ENDOSCOPY;  Service: Gastroenterology;  Laterality: N/A;  ESOPHAGEAL VARICES AND HIATAL HERNIA    ENDOSCOPY N/A 07/21/2022    Procedure: ESOPHAGOGASTRODUODENOSCOPY WITH BIOPSY X1 AREA  AND DILATATION (BOUGIE  #54);  Surgeon: Zachary Mcgarry MD;  Location: [facility] ENDOSCOPY;  Service: Gastroenterology;  Laterality: N/A;  Post: candida esophagitis,gastritis    ENDOSCOPY N/A 05/02/2023    Procedure: ESOPHAGOGASTRODUODENOSCOPY WITH BIOPSY X 1  AREA  AND DILATATON (48 NON GUIDED BOUGIE);  Surgeon: Zachary Mcgarry MD;  Location: [facility] ENDOSCOPY;  Service: Gastroenterology;  Laterality: N/A;  post op: GASTRITIS, SMALL ESOPHAGEAL VARICES    ENDOSCOPY N/A 02/10/2025    Procedure: ESOPHAGOGASTRODUODENOSCOPY, biopsy x2 areas;  Surgeon: Zachary Mcgarry MD;  Location: [facility] ENDOSCOPY;  Service: Gastroenterology;  Laterality: N/A;  post: gastritis, abnormal mucosa mid esophagus    EYE SURGERY  3-14 & 3-    Cataract surgery R 3-14, L 3-    HYSTERECTOMY  09/2004    complete    INTERSTIM PLACEMENT      bladder    JOINT REPLACEMENT Bilateral     knees    KNEE ARTHROSCOPY Bilateral     Arthroscopic surgery to bilateral knees     OTHER SURGICAL HISTORY  2015    Stress test     OTHER SURGICAL HISTORY  10/2016    Lithotripsy total 4-5    OTHER SURGICAL HISTORY      Right arm cellulits- spider bites     OTHER SURGICAL HISTORY  02/08/2018    Lithotripsy    OTHER SURGICAL HISTORY  04/20/2018    ACDF C3-C4 & C6-C7    REPLACEMENT TOTAL KNEE  2000    REPLACEMENT TOTAL KNEE Left 11/2016    SHOULDER ROTATOR CUFF REPAIR Right 01/08/2020    Procedure: RT ROTATOR CUFF REPAIR OPEN;  Surgeon: Curly Vaughn  MD JUAN;  Location: [facility] MAIN OR;  Service: Orthopedics    SPINE SURGERY  2018    Fusion surgery C3-C4 and C6-C7        Family History:  Family History   Problem Relation Age of Onset    Hypertension Mother     Hyperlipidemia Mother     Arthritis Mother     Cancer Mother         Skin cancers    Depression Mother     Mental illness Mother         Depression and Alzheimers    Vision loss Mother         Has macular degeneration.    Dementia Mother     Diabetes Father              Heart disease Father         Had angina. Was diabetic.  of heart attack at age 57.    Early death Father          at 57 from heart attack    Heart attack Father          of heart attack in     Heart failure Father              Hyperlipidemia Sister     Arthritis Sister     Cancer Sister         Skin cancers    Depression Sister     Diabetes Sister         Diagnosed     Heart disease Sister         2 mini strokes, atrial fribulation, pacemaker insertion    Hypertension Sister     Mental illness Sister         Depression    Stroke Sister         2 mini strokes    Arrhythmia Sister     Diabetes Brother         Not sure when diagnosed    Heart disease Brother         Had open heart surgery about 4 yrs ago.    Hypertension Brother     Hyperlipidemia Brother     Arthritis Brother     Cancer Brother         Skin cancers and colon cancer    Heart attack Brother         Had a heart attack about 4 yrs ago    Breast cancer Maternal Grandmother     Breast cancer Maternal Aunt         Social History:   Social History     Tobacco Use    Smoking status: Former     Current packs/day: 0.00     Average packs/day: 0.5 packs/day for 15.6 years (7.8 ttl pk-yrs)     Types: Cigarettes     Start date: 1974     Quit date: 1990     Years since quittin.8     Passive exposure: Past    Smokeless tobacco: Never    Tobacco comments:     Stopped off and on.  Stopped once for more than 2 yrs   Substance Use Topics     Alcohol use: No        Allergies:  Allergies   Allergen Reactions    Ambien  [Zolpidem Tartrate] Confusion    Ciprofloxacin Hcl Rash    Penicillins Rash    Zolpidem Unknown - High Severity     Other reaction(s): Confusion    Methadone Hallucinations    Clindamycin Rash       Home Meds:  (Not in a hospital admission)      Objective:    Vitals Ranges:   Heart Rate:  [66] 66  Resp:  [12] 12  BP: (112)/(70) 112/70  Body mass index is 33.99 kg/m².     Exam:  General Appearance:  WDWN    HEENT:   without obvious abnormality,  Conjunctiva/corneas clear,  Normal external ear canals, no drainage    Clear orsalmucosa,  Mallampati score 3    Neck:  Supple, symmetrical, trachea midline. No JVD.  Lungs:   Bilateral basal rhonchi bilaterally, respirations unlabored symmetrical wall movement.    Chest wall:  No tenderness or deformity.    Heart:  Regular rate and rhythm, S1 and S2 normal.  Extremities: Trace edema no clubbing or Cyanosis        Data Review:  All labs (24hrs): No results found for this or any previous visit (from the past 24 hours).     Imaging:  US Abdomen Limited  Narrative: US ABDOMEN LIMITED    Date of Exam: 4/7/2025 10:53 AM EDT    Indication: K74.69, K21.9,R13.10.    Comparison: Ultrasound abdomen limited 2/11/2025 and CT abdomen and pelvis 4/27/2024    Technique: Grayscale and color Doppler ultrasound evaluation of the right upper quadrant was performed.    Findings:  LIVER: There is heterogeneous coarsened hepatic echotexture without nodule noted. Mildly nodular contour is noted. No focal lesions identified. Normal flow is present within the hepatic vasculature.     GALLBLADDER: The gallbladder surgically absent. The common bile duct measures 5 mm.    PANCREAS:  Visualized portions are unremarkable.    RIGHT KIDNEY:  Normal in size with no focal lesions. No evidence of nephrolithiasis or hydronephrosis.  Impression: Impression:    1. Nodular contour compatible with cirrhosis with heterogeneous  echogenicity without nodule or lesion noted.    Electronically Signed: Rachel Olmedo MD    4/10/2025 9:38 AM EDT    Workstation ID: [id number]       ASSESSMENT:  Diagnoses and all orders for this visit:    Panlobular emphysema    Obstructive sleep apnea syndrome    Gastroesophageal reflux disease without esophagitis        PLAN:  Bronchodilator inhaled corticosteroid    Education how to use inhalers    Encouraged to use incentive spirometer    Continue to exercise slowly as tolerated    Monitor for any change in the color of the sputum    Avoid any exposure to fumes, gas or any irritant    This is patient with symptoms of obstructive sleep apnea, continue oxygen at night avoid supine avoid sedative meds in pm, weight loss, Avoid driving. Long discussion with patient about the physiology of LISANDRO, and long term and short term benefit of treating LISANDRO      Treating LISANDRO will improve GERD symptoms control     Follow-up 6 months    Sasha Khan MD. D, ABSM.  4/29/2025  11:11 EDT

## 2025-05-06 DIAGNOSIS — E11.49 TYPE 2 DIABETES MELLITUS WITH OTHER DIABETIC NEUROLOGICAL COMPLICATION: Chronic | ICD-10-CM

## 2025-05-06 RX ORDER — ROPINIROLE 0.5 MG/1
TABLET, FILM COATED ORAL
Qty: 60 TABLET | Refills: 0 | Status: SHIPPED | OUTPATIENT
Start: 2025-05-06

## 2025-05-06 RX ORDER — DAPAGLIFLOZIN 10 MG/1
1 TABLET, FILM COATED ORAL DAILY
Qty: 90 TABLET | Refills: 0 | Status: SHIPPED | OUTPATIENT
Start: 2025-05-06

## 2025-05-19 ENCOUNTER — APPOINTMENT (OUTPATIENT)
Dept: GENERAL RADIOLOGY | Facility: HOSPITAL | Age: 70
DRG: 552 | End: 2025-05-19
Payer: MEDICARE

## 2025-05-19 ENCOUNTER — APPOINTMENT (OUTPATIENT)
Dept: CT IMAGING | Facility: HOSPITAL | Age: 70
DRG: 552 | End: 2025-05-19
Payer: MEDICARE

## 2025-05-19 ENCOUNTER — TELEPHONE (OUTPATIENT)
Dept: FAMILY MEDICINE CLINIC | Facility: CLINIC | Age: 70
End: 2025-05-19
Payer: MEDICARE

## 2025-05-19 ENCOUNTER — HOSPITAL ENCOUNTER (INPATIENT)
Facility: HOSPITAL | Age: 70
LOS: 2 days | Discharge: NURSING, REHAB OR LONG-TERM CARE | DRG: 552 | End: 2025-05-23
Attending: STUDENT IN AN ORGANIZED HEALTH CARE EDUCATION/TRAINING PROGRAM | Admitting: STUDENT IN AN ORGANIZED HEALTH CARE EDUCATION/TRAINING PROGRAM
Payer: MEDICARE

## 2025-05-19 DIAGNOSIS — R20.0 NUMBNESS AND TINGLING OF BOTH FEET: ICD-10-CM

## 2025-05-19 DIAGNOSIS — S32.050A CLOSED COMPRESSION FRACTURE OF L5 LUMBAR VERTEBRA, INITIAL ENCOUNTER: ICD-10-CM
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
## 2025-05-19 DIAGNOSIS — N39.0 URINARY TRACT INFECTION WITHOUT HEMATURIA, SITE UNSPECIFIED: Primary | ICD-10-CM

## 2025-05-19 DIAGNOSIS — Z74.09 MOBILITY IMPAIRED: ICD-10-CM

## 2025-05-19 DIAGNOSIS — R20.2 NUMBNESS AND TINGLING OF BOTH FEET: ICD-10-CM

## 2025-05-19 LAB
ALBUMIN SERPL-MCNC: 4.1 G/DL (ref 3.5–5.2)
ALBUMIN/GLOB SERPL: 1.2 G/DL
ALP SERPL-CCNC: 61 U/L (ref 39–117)
ALT SERPL W P-5'-P-CCNC: 16 U/L (ref 1–33)
ANION GAP SERPL CALCULATED.3IONS-SCNC: 13.1 MMOL/L (ref 5–15)
AST SERPL-CCNC: 22 U/L (ref 1–32)
BACTERIA UR QL AUTO: ABNORMAL /HPF
BASOPHILS # BLD AUTO: 0.03 10*3/MM3 (ref 0–0.2)
BASOPHILS NFR BLD AUTO: 0.4 % (ref 0–1.5)
BILIRUB SERPL-MCNC: 0.8 MG/DL (ref 0–1.2)
BILIRUB UR QL STRIP: NEGATIVE
BUN SERPL-MCNC: 32 MG/DL (ref 8–23)
BUN/CREAT SERPL: 39.5 (ref 7–25)
CALCIUM SPEC-SCNC: 10.1 MG/DL (ref 8.6–10.5)
CHLORIDE SERPL-SCNC: 103 MMOL/L (ref 98–107)
CK SERPL-CCNC: 101 U/L (ref 20–180)
CLARITY UR: ABNORMAL
CO2 SERPL-SCNC: 22.9 MMOL/L (ref 22–29)
COLOR UR: YELLOW
CREAT SERPL-MCNC: 0.81 MG/DL (ref 0.57–1)
DEPRECATED RDW RBC AUTO: 43 FL (ref 37–54)
EGFRCR SERPLBLD CKD-EPI 2021: 79.2 ML/MIN/1.73
EOSINOPHIL # BLD AUTO: 0.07 10*3/MM3 (ref 0–0.4)
EOSINOPHIL NFR BLD AUTO: 0.8 % (ref 0.3–6.2)
ERYTHROCYTE [DISTWIDTH] IN BLOOD BY AUTOMATED COUNT: 12.8 % (ref 12.3–15.4)
GIANT PLATELETS: NORMAL
GLOBULIN UR ELPH-MCNC: 3.4 GM/DL
GLUCOSE BLDC GLUCOMTR-MCNC: 156 MG/DL (ref 70–105)
GLUCOSE BLDC GLUCOMTR-MCNC: 92 MG/DL (ref 70–105)
GLUCOSE SERPL-MCNC: 87 MG/DL (ref 65–99)
GLUCOSE UR STRIP-MCNC: ABNORMAL MG/DL
HBA1C MFR BLD: 5.32 % (ref 4.8–5.6)
HCT VFR BLD AUTO: 44.7 % (ref 34–46.6)
HGB BLD-MCNC: 15 G/DL (ref 12–15.9)
HGB UR QL STRIP.AUTO: NEGATIVE
HYALINE CASTS UR QL AUTO: ABNORMAL /LPF
IMM GRANULOCYTES # BLD AUTO: 0.03 10*3/MM3 (ref 0–0.05)
IMM GRANULOCYTES NFR BLD AUTO: 0.4 % (ref 0–0.5)
KETONES UR QL STRIP: ABNORMAL
LEUKOCYTE ESTERASE UR QL STRIP.AUTO: ABNORMAL
LYMPHOCYTES # BLD AUTO: 1.82 10*3/MM3 (ref 0.7–3.1)
LYMPHOCYTES NFR BLD AUTO: 21.4 % (ref 19.6–45.3)
MCH RBC QN AUTO: 30.9 PG (ref 26.6–33)
MCHC RBC AUTO-ENTMCNC: 33.6 G/DL (ref 31.5–35.7)
MCV RBC AUTO: 92.2 FL (ref 79–97)
MONOCYTES # BLD AUTO: 0.75 10*3/MM3 (ref 0.1–0.9)
MONOCYTES NFR BLD AUTO: 8.8 % (ref 5–12)
NEUTROPHILS NFR BLD AUTO: 5.81 10*3/MM3 (ref 1.7–7)
NEUTROPHILS NFR BLD AUTO: 68.2 % (ref 42.7–76)
NITRITE UR QL STRIP: POSITIVE
NRBC BLD AUTO-RTO: 0 /100 WBC (ref 0–0.2)
PH UR STRIP.AUTO: 6.5 [PH] (ref 5–8)
PLATELET # BLD AUTO: 133 10*3/MM3 (ref 140–450)
PMV BLD AUTO: 10 FL (ref 6–12)
POTASSIUM SERPL-SCNC: 4.3 MMOL/L (ref 3.5–5.2)
PROT SERPL-MCNC: 7.5 G/DL (ref 6–8.5)
PROT UR QL STRIP: NEGATIVE
RBC # BLD AUTO: 4.85 10*6/MM3 (ref 3.77–5.28)
RBC # UR STRIP: ABNORMAL /HPF
RBC MORPH BLD: NORMAL
REF LAB TEST METHOD: ABNORMAL
SMALL PLATELETS BLD QL SMEAR: NORMAL
SODIUM SERPL-SCNC: 139 MMOL/L (ref 136–145)
SP GR UR STRIP: 1.03 (ref 1–1.03)
SQUAMOUS #/AREA URNS HPF: ABNORMAL /HPF
UROBILINOGEN UR QL STRIP: ABNORMAL
WBC # UR STRIP: ABNORMAL /HPF
WBC MORPH BLD: NORMAL
WBC NRBC COR # BLD AUTO: 8.51 10*3/MM3 (ref 3.4–10.8)

## 2025-05-19 PROCEDURE — P9612 CATHETERIZE FOR URINE SPEC: HCPCS

## 2025-05-19 PROCEDURE — 87077 CULTURE AEROBIC IDENTIFY: CPT

## 2025-05-19 PROCEDURE — 87481 CANDIDA DNA AMP PROBE: CPT | Performed by: STUDENT IN AN ORGANIZED HEALTH CARE EDUCATION/TRAINING PROGRAM

## 2025-05-19 PROCEDURE — 63710000001 INSULIN LISPRO (HUMAN) PER 5 UNITS: Performed by: STUDENT IN AN ORGANIZED HEALTH CARE EDUCATION/TRAINING PROGRAM

## 2025-05-19 PROCEDURE — 87086 URINE CULTURE/COLONY COUNT: CPT

## 2025-05-19 PROCEDURE — 82948 REAGENT STRIP/BLOOD GLUCOSE: CPT

## 2025-05-19 PROCEDURE — 25010000002 CEFTRIAXONE PER 250 MG

## 2025-05-19 PROCEDURE — 36415 COLL VENOUS BLD VENIPUNCTURE: CPT

## 2025-05-19 PROCEDURE — 81001 URINALYSIS AUTO W/SCOPE: CPT

## 2025-05-19 PROCEDURE — 72110 X-RAY EXAM L-2 SPINE 4/>VWS: CPT

## 2025-05-19 PROCEDURE — 83036 HEMOGLOBIN GLYCOSYLATED A1C: CPT | Performed by: STUDENT IN AN ORGANIZED HEALTH CARE EDUCATION/TRAINING PROGRAM

## 2025-05-19 PROCEDURE — 94799 UNLISTED PULMONARY SVC/PX: CPT

## 2025-05-19 PROCEDURE — 25010000002 ENOXAPARIN PER 10 MG: Performed by: STUDENT IN AN ORGANIZED HEALTH CARE EDUCATION/TRAINING PROGRAM

## 2025-05-19 PROCEDURE — 85025 COMPLETE CBC W/AUTO DIFF WBC: CPT

## 2025-05-19 PROCEDURE — 72125 CT NECK SPINE W/O DYE: CPT

## 2025-05-19 PROCEDURE — 82550 ASSAY OF CK (CPK): CPT

## 2025-05-19 PROCEDURE — 70450 CT HEAD/BRAIN W/O DYE: CPT

## 2025-05-19 PROCEDURE — 85007 BL SMEAR W/DIFF WBC COUNT: CPT

## 2025-05-19 PROCEDURE — G0378 HOSPITAL OBSERVATION PER HR: HCPCS

## 2025-05-19 PROCEDURE — 80053 COMPREHEN METABOLIC PANEL: CPT

## 2025-05-19 PROCEDURE — 25010000002 MORPHINE PER 10 MG

## 2025-05-19 PROCEDURE — 99285 EMERGENCY DEPT VISIT HI MDM: CPT

## 2025-05-19 PROCEDURE — 87186 SC STD MICRODIL/AGAR DIL: CPT

## 2025-05-19 PROCEDURE — 25010000002 ONDANSETRON PER 1 MG

## 2025-05-19 PROCEDURE — 94640 AIRWAY INHALATION TREATMENT: CPT

## 2025-05-19 RX ORDER — ACETAMINOPHEN 160 MG/5ML
650 SOLUTION ORAL EVERY 4 HOURS PRN
Status: DISCONTINUED | OUTPATIENT
Start: 2025-05-19 | End: 2025-05-23 | Stop reason: HOSPADM

## 2025-05-19 RX ORDER — ALBUTEROL SULFATE 90 UG/1
2 INHALANT RESPIRATORY (INHALATION) EVERY 6 HOURS PRN
Status: DISCONTINUED | OUTPATIENT
Start: 2025-05-19 | End: 2025-05-23 | Stop reason: HOSPADM

## 2025-05-19 RX ORDER — LACTULOSE 10 G/15ML
20 SOLUTION ORAL 2 TIMES DAILY PRN
Status: DISCONTINUED | OUTPATIENT
Start: 2025-05-19 | End: 2025-05-23 | Stop reason: HOSPADM

## 2025-05-19 RX ORDER — SPIRONOLACTONE 25 MG/1
25 TABLET ORAL DAILY
Status: DISCONTINUED | OUTPATIENT
Start: 2025-05-20 | End: 2025-05-23 | Stop reason: HOSPADM

## 2025-05-19 RX ORDER — MULTIPLE VITAMINS W/ MINERALS TAB 9MG-400MCG
1 TAB ORAL DAILY
Status: DISCONTINUED | OUTPATIENT
Start: 2025-05-20 | End: 2025-05-23 | Stop reason: HOSPADM

## 2025-05-19 RX ORDER — CHOLECALCIFEROL (VITAMIN D3) 25 MCG
2000 TABLET ORAL DAILY
Status: DISCONTINUED | OUTPATIENT
Start: 2025-05-20 | End: 2025-05-23 | Stop reason: HOSPADM

## 2025-05-19 RX ORDER — ASCORBIC ACID 500 MG
1000 TABLET ORAL DAILY
Status: DISCONTINUED | OUTPATIENT
Start: 2025-05-20 | End: 2025-05-23 | Stop reason: HOSPADM

## 2025-05-19 RX ORDER — ROPINIROLE 0.25 MG/1
0.5 TABLET, FILM COATED ORAL NIGHTLY
Status: DISCONTINUED | OUTPATIENT
Start: 2025-05-19 | End: 2025-05-23 | Stop reason: HOSPADM

## 2025-05-19 RX ORDER — ENOXAPARIN SODIUM 100 MG/ML
40 INJECTION SUBCUTANEOUS DAILY
Status: DISCONTINUED | OUTPATIENT
Start: 2025-05-19 | End: 2025-05-23 | Stop reason: HOSPADM

## 2025-05-19 RX ORDER — SODIUM CHLORIDE 0.9 % (FLUSH) 0.9 %
10 SYRINGE (ML) INJECTION AS NEEDED
Status: DISCONTINUED | OUTPATIENT
Start: 2025-05-19 | End: 2025-05-23 | Stop reason: HOSPADM

## 2025-05-19 RX ORDER — ONDANSETRON 2 MG/ML
4 INJECTION INTRAMUSCULAR; INTRAVENOUS ONCE
Status: COMPLETED | OUTPATIENT
Start: 2025-05-19 | End: 2025-05-19

## 2025-05-19 RX ORDER — LIDOCAINE 4 G/G
1 PATCH TOPICAL EVERY 24 HOURS
Status: DISCONTINUED | OUTPATIENT
Start: 2025-05-20 | End: 2025-05-23 | Stop reason: HOSPADM

## 2025-05-19 RX ORDER — LIDOCAINE 50 MG/G
1 PATCH TOPICAL EVERY 24 HOURS
COMMUNITY

## 2025-05-19 RX ORDER — ASPIRIN 81 MG/1
81 TABLET ORAL DAILY
Status: DISCONTINUED | OUTPATIENT
Start: 2025-05-20 | End: 2025-05-23 | Stop reason: HOSPADM

## 2025-05-19 RX ORDER — NITROGLYCERIN 0.4 MG/1
0.4 TABLET SUBLINGUAL
Status: DISCONTINUED | OUTPATIENT
Start: 2025-05-19 | End: 2025-05-23 | Stop reason: HOSPADM

## 2025-05-19 RX ORDER — PENTOXIFYLLINE 400 MG/1
400 TABLET, EXTENDED RELEASE ORAL
Status: DISCONTINUED | OUTPATIENT
Start: 2025-05-19 | End: 2025-05-23 | Stop reason: HOSPADM

## 2025-05-19 RX ORDER — CLONAZEPAM 0.5 MG/1
0.5 TABLET ORAL 2 TIMES DAILY PRN
COMMUNITY

## 2025-05-19 RX ORDER — ACETAMINOPHEN 325 MG/1
650 TABLET ORAL EVERY 4 HOURS PRN
Status: DISCONTINUED | OUTPATIENT
Start: 2025-05-19 | End: 2025-05-23 | Stop reason: HOSPADM

## 2025-05-19 RX ORDER — ESCITALOPRAM OXALATE 10 MG/1
15 TABLET ORAL DAILY
Status: DISCONTINUED | OUTPATIENT
Start: 2025-05-20 | End: 2025-05-23 | Stop reason: HOSPADM

## 2025-05-19 RX ORDER — CARBIDOPA AND LEVODOPA 25; 100 MG/1; MG/1
2 TABLET, EXTENDED RELEASE ORAL 3 TIMES DAILY
Status: DISCONTINUED | OUTPATIENT
Start: 2025-05-19 | End: 2025-05-23 | Stop reason: HOSPADM

## 2025-05-19 RX ORDER — PANTOPRAZOLE SODIUM 40 MG/1
40 TABLET, DELAYED RELEASE ORAL
Status: DISCONTINUED | OUTPATIENT
Start: 2025-05-20 | End: 2025-05-23 | Stop reason: HOSPADM

## 2025-05-19 RX ORDER — PRAMIPEXOLE DIHYDROCHLORIDE 0.25 MG/1
0.5 TABLET ORAL 2 TIMES DAILY
Status: DISCONTINUED | OUTPATIENT
Start: 2025-05-19 | End: 2025-05-23 | Stop reason: HOSPADM

## 2025-05-19 RX ORDER — POLYETHYLENE GLYCOL 3350 17 G/17G
17 POWDER, FOR SOLUTION ORAL DAILY PRN
Status: DISCONTINUED | OUTPATIENT
Start: 2025-05-19 | End: 2025-05-23 | Stop reason: HOSPADM

## 2025-05-19 RX ORDER — MORPHINE SULFATE 2 MG/ML
2 INJECTION, SOLUTION INTRAMUSCULAR; INTRAVENOUS ONCE
Status: COMPLETED | OUTPATIENT
Start: 2025-05-19 | End: 2025-05-19

## 2025-05-19 RX ORDER — INSULIN LISPRO 100 [IU]/ML
2-7 INJECTION, SOLUTION INTRAVENOUS; SUBCUTANEOUS
Status: DISCONTINUED | OUTPATIENT
Start: 2025-05-19 | End: 2025-05-23 | Stop reason: HOSPADM

## 2025-05-19 RX ORDER — AMANTADINE HYDROCHLORIDE 100 MG/1
100 CAPSULE, GELATIN COATED ORAL 2 TIMES DAILY
Status: DISCONTINUED | OUTPATIENT
Start: 2025-05-19 | End: 2025-05-23 | Stop reason: HOSPADM

## 2025-05-19 RX ORDER — NICOTINE POLACRILEX 4 MG
15 LOZENGE BUCCAL
Status: DISCONTINUED | OUTPATIENT
Start: 2025-05-19 | End: 2025-05-23 | Stop reason: HOSPADM

## 2025-05-19 RX ORDER — DEXTROSE MONOHYDRATE 25 G/50ML
25 INJECTION, SOLUTION INTRAVENOUS
Status: DISCONTINUED | OUTPATIENT
Start: 2025-05-19 | End: 2025-05-23 | Stop reason: HOSPADM

## 2025-05-19 RX ORDER — LACTULOSE 10 G/15ML
20 SOLUTION ORAL 2 TIMES DAILY PRN
COMMUNITY

## 2025-05-19 RX ORDER — ONDANSETRON 2 MG/ML
4 INJECTION INTRAMUSCULAR; INTRAVENOUS EVERY 6 HOURS PRN
Status: DISCONTINUED | OUTPATIENT
Start: 2025-05-19 | End: 2025-05-23 | Stop reason: HOSPADM

## 2025-05-19 RX ORDER — BUDESONIDE AND FORMOTEROL FUMARATE DIHYDRATE 160; 4.5 UG/1; UG/1
2 AEROSOL RESPIRATORY (INHALATION)
Status: DISCONTINUED | OUTPATIENT
Start: 2025-05-19 | End: 2025-05-23 | Stop reason: HOSPADM

## 2025-05-19 RX ORDER — NALOXONE HCL 0.4 MG/ML
0.4 VIAL (ML) INJECTION
Status: DISCONTINUED | OUTPATIENT
Start: 2025-05-19 | End: 2025-05-23 | Stop reason: HOSPADM

## 2025-05-19 RX ORDER — LURASIDONE HYDROCHLORIDE 40 MG/1
20 TABLET, FILM COATED ORAL NIGHTLY
Status: DISCONTINUED | OUTPATIENT
Start: 2025-05-19 | End: 2025-05-23 | Stop reason: HOSPADM

## 2025-05-19 RX ORDER — BISACODYL 10 MG
10 SUPPOSITORY, RECTAL RECTAL DAILY PRN
Status: DISCONTINUED | OUTPATIENT
Start: 2025-05-19 | End: 2025-05-23 | Stop reason: HOSPADM

## 2025-05-19 RX ORDER — IBUPROFEN 600 MG/1
1 TABLET ORAL
Status: DISCONTINUED | OUTPATIENT
Start: 2025-05-19 | End: 2025-05-23 | Stop reason: HOSPADM

## 2025-05-19 RX ORDER — GABAPENTIN 300 MG/1
300 CAPSULE ORAL 3 TIMES DAILY
Status: DISCONTINUED | OUTPATIENT
Start: 2025-05-19 | End: 2025-05-23 | Stop reason: HOSPADM

## 2025-05-19 RX ORDER — AMOXICILLIN 250 MG
2 CAPSULE ORAL 2 TIMES DAILY PRN
Status: DISCONTINUED | OUTPATIENT
Start: 2025-05-19 | End: 2025-05-23 | Stop reason: HOSPADM

## 2025-05-19 RX ORDER — AMANTADINE HYDROCHLORIDE 100 MG/1
100 CAPSULE, GELATIN COATED ORAL 2 TIMES DAILY
COMMUNITY

## 2025-05-19 RX ORDER — OXYCODONE HYDROCHLORIDE 5 MG/1
5 TABLET ORAL EVERY 6 HOURS PRN
Refills: 0 | Status: DISPENSED | OUTPATIENT
Start: 2025-05-19 | End: 2025-05-22

## 2025-05-19 RX ORDER — ROPINIROLE 0.5 MG/1
0.5 TABLET, FILM COATED ORAL NIGHTLY
COMMUNITY

## 2025-05-19 RX ORDER — BISACODYL 5 MG/1
5 TABLET, DELAYED RELEASE ORAL DAILY PRN
Status: DISCONTINUED | OUTPATIENT
Start: 2025-05-19 | End: 2025-05-23 | Stop reason: HOSPADM

## 2025-05-19 RX ORDER — CLONAZEPAM 0.5 MG/1
0.5 TABLET ORAL 2 TIMES DAILY PRN
Status: DISCONTINUED | OUTPATIENT
Start: 2025-05-19 | End: 2025-05-23 | Stop reason: HOSPADM

## 2025-05-19 RX ORDER — HYDROMORPHONE HYDROCHLORIDE 1 MG/ML
0.5 INJECTION, SOLUTION INTRAMUSCULAR; INTRAVENOUS; SUBCUTANEOUS EVERY 4 HOURS PRN
Status: DISPENSED | OUTPATIENT
Start: 2025-05-19 | End: 2025-05-21

## 2025-05-19 RX ORDER — POTASSIUM CHLORIDE 1500 MG/1
20 TABLET, EXTENDED RELEASE ORAL DAILY
COMMUNITY

## 2025-05-19 RX ADMIN — CARBIDOPA AND LEVODOPA 2 TABLET: 25; 100 TABLET, EXTENDED RELEASE ORAL at 21:06

## 2025-05-19 RX ADMIN — ENOXAPARIN SODIUM 40 MG: 100 INJECTION SUBCUTANEOUS at 15:45

## 2025-05-19 RX ADMIN — GABAPENTIN 300 MG: 300 CAPSULE ORAL at 20:26

## 2025-05-19 RX ADMIN — ROPINIROLE 0.5 MG: 1 TABLET, FILM COATED ORAL at 20:26

## 2025-05-19 RX ADMIN — BUDESONIDE AND FORMOTEROL FUMARATE DIHYDRATE 2 PUFF: 160; 4.5 AEROSOL RESPIRATORY (INHALATION) at 20:30

## 2025-05-19 RX ADMIN — INSULIN LISPRO 2 UNITS: 100 INJECTION, SOLUTION INTRAVENOUS; SUBCUTANEOUS at 20:25

## 2025-05-19 RX ADMIN — LURASIDONE HYDROCHLORIDE 20 MG: 40 TABLET, FILM COATED ORAL at 21:25

## 2025-05-19 RX ADMIN — OXYCODONE 5 MG: 5 TABLET ORAL at 15:45

## 2025-05-19 RX ADMIN — PRAMIPEXOLE DIHYDROCHLORIDE 0.5 MG: 0.5 TABLET ORAL at 21:06

## 2025-05-19 RX ADMIN — CEFTRIAXONE 2000 MG: 2 INJECTION, POWDER, FOR SOLUTION INTRAMUSCULAR; INTRAVENOUS at 14:41

## 2025-05-19 RX ADMIN — PENTOXIFYLLINE 400 MG: 400 TABLET, EXTENDED RELEASE ORAL at 21:06

## 2025-05-19 RX ADMIN — MORPHINE SULFATE 2 MG: 2 INJECTION, SOLUTION INTRAMUSCULAR; INTRAVENOUS at 13:18

## 2025-05-19 RX ADMIN — ONDANSETRON 4 MG: 2 INJECTION, SOLUTION INTRAMUSCULAR; INTRAVENOUS at 12:27

## 2025-05-19 RX ADMIN — AMANTADINE HYDROCHLORIDE 100 MG: 100 CAPSULE ORAL at 21:06

## 2025-05-19 RX ADMIN — MORPHINE SULFATE 2 MG: 2 INJECTION, SOLUTION INTRAMUSCULAR; INTRAVENOUS at 12:27

## 2025-05-19 NOTE — LETTER
EMS Transport Request  For use at [facility], [place], [place], [place], and [place] only   Patient Name: Dariana Rivera : 1955   Weight:88.3 kg (194 lb 10.7 oz) Pick-up Location: [facility] BLS/ALS: N/A   Insurance: [employer] MEDICARE REPLACEMENT Auth End Date:    Pre-Cert #: D/C Summary complete:    Destination: [facility], room #303   Contact Precautions: None   Equipment (O2, Fluids, etc.): O2, settings 2L NC and Other LSO BRACE   Arrive By Date/Time: Anytime Stretcher/WC: Wheelchair   CM Requesting: Megan Naegele, RN Ext: [phone]   Notes/Medical Necessity: Assist x1     ______________________________________________________________________    *Only 2 patient bags OR 1 carry-on size bag are permitted.  Wheelchairs and walkers CANNOT transported with the patient. Acknowledge: Yes

## 2025-05-19 NOTE — LETTER
EMS Transport Request  For use at [facility], [place], [place], [place], and [place] only   Patient Name: Dariana Rivera : 1955   Weight:88.3 kg (194 lb 10.7 oz) Pick-up Location: [facility] BLS/ALS: N/A   Insurance: [employer] MEDICARE REPLACEMENT Auth End Date:    Pre-Cert #: D/C Summary complete:    Destination: Other [place]   Contact Precautions: None   Equipment (O2, Fluids, etc.): O2, settings 2L NC and Other LSO BRACE   Arrive By Date/Time: Anytime Stretcher/WC: Wheelchair   CM Requesting: Megan Naegele, RN Ext: [phone]   Notes/Medical Necessity: Assist x1     ______________________________________________________________________    *Only 2 patient bags OR 1 carry-on size bag are permitted.  Wheelchairs and walkers CANNOT transported with the patient. Acknowledge: Yes

## 2025-05-19 NOTE — ED PROVIDER NOTES
Subjective     Chief Complaint   Patient presents with    Fall     Pt has fall on Friday, has pain to neck and back. Pt states she fell getting off toilet because her feet were numb and she fell into the sink.       History of Present Illness  Chief complaint: Multiple complaints after fall      Context: Patient is 69-year-old  female who presents to the emergency department with multiple complaints after a fall 3 days prior.  Patient states she was sitting on the toilet and when she went to stand both of her feet were numb causing her to fall backwards hit her head and neck on the sink.  Patient denies any LOC from event and states the fall was caused by the numbness in her feet not because of a dizzy spell.  Currently patient complains of headache and pain in the back of her head, pain in the back of her neck, pain across her entire lower back.  Patient also complains of constipation since fall.  Patient denies any change of sensation but states both feet are still numb up to just below her knees and states she can barely move either foot and lower leg.        PCP: Franny Strickland MD    LNMP: Hysterectomy               Review of Systems   Musculoskeletal:  Positive for back pain.       Past Medical History:   Diagnosis Date    Allergic Not sure    Penicillin, cipro, clindamycin, methadone, ambien    Angina at rest     DR. Amador    Anxiety disorder     [facility]     Asthma About 5 yrs ago    Shortness of breath with exertion    Back pain     Bipolar disorder     Cataract     Left & right removed in 3/2016    Cervical dystonia     Cholelithiasis     Gallbladder removed in 1995 or 97    Chronic pain     with stenosis    Chronic pain disorder     Cervical, lumbar, L knee, both shoulders    Cirrhosis     Colitis     Colon polyps     COPD (chronic obstructive pulmonary disease)     Not sure when diagnosed    Cramping of feet     Cramping of hands     DDD (degenerative disc disease), cervical     DDD  (degenerative disc disease), lumbar     DDD (degenerative disc disease), lumbar     DDD (degenerative disc disease), thoracic     Depression     Diabetes mellitus     Not sure when diagnosed.    Diverticulosis     Not sure when diagnosed    Dysphagia 09/2020    Eosinophilic colitis     Gastritis     GERD (gastroesophageal reflux disease)     Headache Not sure    Migraines stopped after complete hyst in 2004    Hepatic steatosis     non alcoholic steo-hepatitis     Hx of lithotripsy 02/10/2021    Hypertension     IBS (irritable bowel syndrome)     Infectious viral hepatitis     [facility]EX[facility]ER    Insomnia     Kidney stones     Low back pain     Not sure when diagnosed    Lumbar stenosis     Neck pain     Neuromuscular disorder Not sure    Parkinsons and Cervical Dystonia    Neuropathy     Obesity     Obese since about 1966    Osteoarthritis     Osteopenia     Osteopenia diagnosed after bone density test in 2024.    Parkinson's disease     Peripheral edema     Pneumonia May 2018    About a month after neck fusion surgery    PTSD (post-traumatic stress disorder)     Recurrent UTI     Renal insufficiency     Not sure when diagnosed    Seizure     onset 7/2016.  Last seizure 10/2019    Self-injurious behavior     Tried to commit suicide in 2002    Sleep apnea     Using Bipap machine at night. advised to bring dos    Sleep apnea, obstructive     Suicide attempt     In 2001 or 2002    Urinary incontinence        Allergies   Allergen Reactions    Ambien  [Zolpidem Tartrate] Confusion    Ciprofloxacin Hcl Rash    Penicillins Rash    Zolpidem Unknown - High Severity     Other reaction(s): Confusion    Methadone Hallucinations    Clindamycin Rash       Past Surgical History:   Procedure Laterality Date    BACK SURGERY      BRONCHOSCOPY  Date Unknown    CARDIAC CATHETERIZATION  02/2019    CARPAL TUNNEL RELEASE Bilateral     Wrist     CHOLECYSTECTOMY  1997    COLONOSCOPY      CYSTOSCOPY BLADDER STONE LITHOTRIPSY      ENDOMETRIAL ABLATION       ENDOSCOPY      ENDOSCOPY N/A 04/15/2020    Procedure: ESOPHAGOGASTRODUODENOSCOPY with dilitation (60FR.);  Surgeon: Julieta Allison MD;  Location: [facility] ENDOSCOPY;  Service: Gastroenterology;  Laterality: N/A;  varices,hiatal hernia,gastritis    ENDOSCOPY N/A 09/29/2020    Procedure: ESOPHAGOGASTRODUODENOSCOPY with esophageal dilitation (54 bougie);  Surgeon: Julieta Allison MD;  Location: [facility] ENDOSCOPY;  Service: Gastroenterology;  Laterality: N/A;   post op: hiatal hernia, esophageal stricture    ENDOSCOPY N/A 09/30/2021    Procedure: ESOPHAGOGASTRODUODENOSCOPY WITH DILATATION (BOUGIE #60);  Surgeon: Julieta Allison MD;  Location: [facility] ENDOSCOPY;  Service: Gastroenterology;  Laterality: N/A;  ESOPHAGEAL VARICES AND HIATAL HERNIA    ENDOSCOPY N/A 07/21/2022    Procedure: ESOPHAGOGASTRODUODENOSCOPY WITH BIOPSY X1 AREA  AND DILATATION (BOUGIE  #54);  Surgeon: Zachary Mcgarry MD;  Location: [facility] ENDOSCOPY;  Service: Gastroenterology;  Laterality: N/A;  Post: candida esophagitis,gastritis    ENDOSCOPY N/A 05/02/2023    Procedure: ESOPHAGOGASTRODUODENOSCOPY WITH BIOPSY X 1  AREA  AND DILATATON (48 NON GUIDED BOUGIE);  Surgeon: Zachary Mcgarry MD;  Location: [facility] ENDOSCOPY;  Service: Gastroenterology;  Laterality: N/A;  post op: GASTRITIS, SMALL ESOPHAGEAL VARICES    ENDOSCOPY N/A 02/10/2025    Procedure: ESOPHAGOGASTRODUODENOSCOPY, biopsy x2 areas;  Surgeon: Zachary Mcgarry MD;  Location: [facility] ENDOSCOPY;  Service: Gastroenterology;  Laterality: N/A;  post: gastritis, abnormal mucosa mid esophagus    EYE SURGERY  3-14 & 3-    Cataract surgery R 3-14, L 3-    HYSTERECTOMY  09/2004    complete    INTERSTIM PLACEMENT      bladder    JOINT REPLACEMENT Bilateral     knees    KNEE ARTHROSCOPY Bilateral     Arthroscopic surgery to bilateral knees     OTHER SURGICAL HISTORY  2015    Stress test     OTHER SURGICAL HISTORY  10/2016    Lithotripsy total 4-5     OTHER SURGICAL HISTORY      Right arm cellulits- spider bites     OTHER SURGICAL HISTORY  2018    Lithotripsy    OTHER SURGICAL HISTORY  2018    ACDF C3-C4 & C6-C7    REPLACEMENT TOTAL KNEE  2000    REPLACEMENT TOTAL KNEE Left 2016    SHOULDER ROTATOR CUFF REPAIR Right 2020    Procedure: RT ROTATOR CUFF REPAIR OPEN;  Surgeon: Curly Vaughn MD;  Location: [facility] MAIN OR;  Service: Orthopedics    SPINE SURGERY  2018    Fusion surgery C3-C4 and C6-C7       Family History   Problem Relation Age of Onset    Hypertension Mother     Hyperlipidemia Mother     Arthritis Mother     Cancer Mother         Skin cancers    Depression Mother     Mental illness Mother         Depression and Alzheimers    Vision loss Mother         Has macular degeneration.    Dementia Mother     Diabetes Father              Heart disease Father         Had angina. Was diabetic.  of heart attack at age 57.    Early death Father          at 57 from heart attack    Heart attack Father          of heart attack in     Heart failure Father              Hyperlipidemia Sister     Arthritis Sister     Cancer Sister         Skin cancers    Depression Sister     Diabetes Sister         Diagnosed 2019    Heart disease Sister         2 mini strokes, atrial fribulation, pacemaker insertion    Hypertension Sister     Mental illness Sister         Depression    Stroke Sister         2 mini strokes    Arrhythmia Sister     Diabetes Brother         Not sure when diagnosed    Heart disease Brother         Had open heart surgery about 4 yrs ago.    Hypertension Brother     Hyperlipidemia Brother     Arthritis Brother     Cancer Brother         Skin cancers and colon cancer    Heart attack Brother         Had a heart attack about 4 yrs ago    Breast cancer Maternal Grandmother     Breast cancer Maternal Aunt        Social History     Socioeconomic History    Marital status:    Tobacco Use     Smoking status: Former     Current packs/day: 0.00     Average packs/day: 0.5 packs/day for 15.6 years (7.8 ttl pk-yrs)     Types: Cigarettes     Start date: 1974     Quit date: 1990     Years since quittin.9     Passive exposure: Past    Smokeless tobacco: Never    Tobacco comments:     Stopped off and on.  Stopped once for more than 2 yrs   Vaping Use    Vaping status: Never Used   Substance and Sexual Activity    Alcohol use: No    Drug use: No    Sexual activity: Not Currently     Partners: Male     Birth control/protection: Hysterectomy, Surgical     Comment: Complete hyst            Objective   Physical Exam  Vitals and nursing note reviewed.   Constitutional:       General: She is in acute distress.      Appearance: She is ill-appearing. She is not toxic-appearing.   HENT:      Head: Normocephalic and atraumatic.      Nose: No congestion or rhinorrhea.      Mouth/Throat:      Mouth: Mucous membranes are dry.      Dentition: Abnormal dentition. Dental caries present.      Pharynx: Oropharynx is clear.   Eyes:      Extraocular Movements: Extraocular movements intact.      Pupils: Pupils are equal, round, and reactive to light.   Cardiovascular:      Rate and Rhythm: Normal rate and regular rhythm.      Pulses:           Dorsalis pedis pulses are 3+ on the right side and 3+ on the left side.   Pulmonary:      Effort: No respiratory distress.      Breath sounds: Normal breath sounds. No stridor. No wheezing, rhonchi or rales.   Abdominal:      General: Abdomen is flat. There is no distension.      Palpations: Abdomen is soft. There is no mass.      Tenderness: There is no abdominal tenderness.   Musculoskeletal:         General: Tenderness present. Normal range of motion.      Cervical back: Tenderness present. No rigidity.   Skin:     General: Skin is warm and dry.      Capillary Refill: Capillary refill takes 2 to 3 seconds.      Coloration: Skin is pale. Skin is not jaundiced.  "  Neurological:      Mental Status: She is alert. Mental status is at baseline.      Comments: Patient has repetitive motion of jaw at baseline due to Parkinson's.         Procedures           ED Course  ED Course as of 05/19/25 1558   Mon May 19, 2025   1224 XR and CT head final results reviewed; waiting for CT C-spine [RS]   1253 CT C-spine final result reviewed.   Waiting for UA.  Pt continues to complain of pain. Morphine 2 mg ordered (total 4 mg). [RS]   1404 Hospitalist paged for admission. [RS]      ED Course User Index  [RS] Americo Chua PA-C      /64 (BP Location: Left arm, Patient Position: Lying)   Pulse 58   Temp 97.2 °F (36.2 °C) (Oral)   Resp 16   Ht 165.1 cm (65\")   Wt 88.3 kg (194 lb 10.7 oz)   SpO2 99%   BMI 32.39 kg/m²   Labs Reviewed   URINALYSIS W/ CULTURE IF INDICATED - Abnormal; Notable for the following components:       Result Value    Appearance, UA Hazy (*)     Glucose, UA >=1000 mg/dL (3+) (*)     Ketones, UA 15 mg/dL (1+) (*)     Leuk Esterase, UA Small (1+) (*)     Nitrite, UA Positive (*)     All other components within normal limits    Narrative:     In absence of clinical symptoms, the presence of pyuria, bacteria, and/or nitrites on the urinalysis result does not correlate with infection.   COMPREHENSIVE METABOLIC PANEL - Abnormal; Notable for the following components:    BUN 32 (*)     BUN/Creatinine Ratio 39.5 (*)     All other components within normal limits    Narrative:     GFR Categories in Chronic Kidney Disease (CKD)              GFR Category          GFR (mL/min/1.73)    Interpretation  G1                    90 or greater        Normal or high (1)  G2                    60-89                Mild decrease (1)  G3a                   45-59                Mild to moderate decrease  G3b                   30-44                Moderate to severe decrease  G4                    15-29                Severe decrease  G5                    14 or less           Kidney " failure    (1)In the absence of evidence of kidney disease, neither GFR category G1 or G2 fulfill the criteria for CKD.    eGFR calculation 2021 CKD-EPI creatinine equation, which does not include race as a factor   CBC WITH AUTO DIFFERENTIAL - Abnormal; Notable for the following components:    Platelets 133 (*)     All other components within normal limits   URINALYSIS, MICROSCOPIC ONLY - Abnormal; Notable for the following components:    RBC, UA 3-5 (*)     WBC, UA 11-20 (*)     Bacteria, UA 3+ (*)     All other components within normal limits   CK - Normal   URINE CULTURE   SCAN SLIDE    Narrative:     Nominal fibrin seen on slide.   HEMOGLOBIN A1C   POCT GLUCOSE FINGERSTICK   POCT GLUCOSE FINGERSTICK   POCT GLUCOSE FINGERSTICK   POCT GLUCOSE FINGERSTICK   CBC AND DIFFERENTIAL    Narrative:     The following orders were created for panel order CBC & Differential.  Procedure                               Abnormality         Status                     ---------                               -----------         ------                     CBC Auto Differential[[id number]]        Abnormal            Final result               Scan Slide[[id number]]                                       Final result                 Please view results for these tests on the individual orders.     Medications   sodium chloride 0.9 % flush 10 mL (has no administration in time range)   enoxaparin sodium (LOVENOX) syringe 40 mg (40 mg Subcutaneous Given 5/19/25 7561)   nitroglycerin (NITROSTAT) SL tablet 0.4 mg (has no administration in time range)   Potassium Replacement - Follow Nurse / BPA Driven Protocol (has no administration in time range)   Magnesium Standard Dose Replacement - Follow Nurse / BPA Driven Protocol (has no administration in time range)   Phosphorus Replacement - Follow Nurse / BPA Driven Protocol (has no administration in time range)   Calcium Replacement - Follow Nurse / BPA Driven Protocol (has no administration in  time range)   acetaminophen (TYLENOL) tablet 650 mg (has no administration in time range)     Or   acetaminophen (TYLENOL) 160 MG/5ML oral solution 650 mg (has no administration in time range)     Or   acetaminophen (TYLENOL) suppository 505 mg (has no administration in time range)   sennosides-docusate (PERICOLACE) 8.6-50 MG per tablet 2 tablet (has no administration in time range)     And   polyethylene glycol (MIRALAX) packet 17 g (has no administration in time range)     And   bisacodyl (DULCOLAX) EC tablet 5 mg (has no administration in time range)     And   bisacodyl (DULCOLAX) suppository 10 mg (has no administration in time range)   cefTRIAXone (ROCEPHIN) 2,000 mg in sodium chloride 0.9 % 100 mL MBP (has no administration in time range)   dextrose (GLUTOSE) oral gel 15 g (has no administration in time range)   dextrose (D50W) (25 g/50 mL) IV injection 25 g (has no administration in time range)   glucagon (GLUCAGEN) injection 1 mg (has no administration in time range)   insulin lispro (HUMALOG/ADMELOG) injection 2-7 Units (has no administration in time range)   oxyCODONE (ROXICODONE) immediate release tablet 5 mg (5 mg Oral Given 5/19/25 1545)   HYDROmorphone (DILAUDID) injection 0.5 mg (has no administration in time range)   naloxone (NARCAN) injection 0.4 mg (has no administration in time range)   ondansetron (ZOFRAN) injection 4 mg (has no administration in time range)   morphine injection 2 mg (2 mg Intravenous Given 5/19/25 1227)   ondansetron (ZOFRAN) injection 4 mg (4 mg Intravenous Given 5/19/25 1227)   morphine injection 2 mg (2 mg Intravenous Given 5/19/25 1318)   cefTRIAXone (ROCEPHIN) 2,000 mg in sodium chloride 0.9 % 100 mL MBP (2,000 mg Intravenous New Bag 5/19/25 1441)     CT Cervical Spine Without Contrast  Result Date: 5/19/2025  Impression: 1.No acute cervical spine findings. 2.C3-4 and C6-7 anterior fusion hardware appears appropriately seated. 3.Degenerative changes of the cervical spine  "as described above. Electronically Signed: Maria L Foster MD  5/19/2025 12:32 PM EDT  Workstation ID: [id number]    CT Head Without Contrast  Result Date: 5/19/2025  Impression: No acute intracranial findings. Electronically Signed: Maria L Foster MD  5/19/2025 12:02 PM EDT  Workstation ID: [id number]    XR Spine Lumbar Complete 4+VW  Result Date: 5/19/2025  Impression: No acute lumbar spine findings. Degenerative changes of the lumbar spine as described above. Electronically Signed: Maria L Foster MD  5/19/2025 11:50 AM EDT  Workstation ID: [id number]                                                     Medical Decision Making  /64 (BP Location: Left arm, Patient Position: Lying)   Pulse 58   Temp 97.2 °F (36.2 °C) (Oral)   Resp 16   Ht 165.1 cm (65\")   Wt 88.3 kg (194 lb 10.7 oz)   SpO2 99%   BMI 32.39 kg/m²      Chart review: Patient February admission for cirrhosis exacerbation and December 2024 for mission for fall reviewed.    Patient is 69-year-old female who presents to the emergency department with multiple complaints after a fall 3 days prior.  See full HPI, primary assessment and exam above.    Patient is placed into ED bed and gown for assessment.  IV access is maintained for labs and medication administration if indicated.  Primary differentials for patient include but are not limited to acute cerebral hemorrhagic event, electrolyte derangement, hydration.  Comorbidities:  has a past medical history of Allergic (Not sure), Angina at rest, Anxiety disorder, Asthma (About 5 yrs ago), Back pain, Bipolar disorder, Cataract, Cervical dystonia, Cholelithiasis, Chronic pain, Chronic pain disorder, Cirrhosis, Colitis, Colon polyps, COPD (chronic obstructive pulmonary disease), Cramping of feet, Cramping of hands, DDD (degenerative disc disease), cervical, DDD (degenerative disc disease), lumbar, DDD (degenerative disc disease), lumbar, DDD (degenerative disc disease), thoracic, Depression, Diabetes " mellitus, Diverticulosis, Dysphagia (09/2020), Eosinophilic colitis, Gastritis, GERD (gastroesophageal reflux disease), Headache (Not sure), Hepatic steatosis, lithotripsy (02/10/2021), Hypertension, IBS (irritable bowel syndrome), Infectious viral hepatitis, Insomnia, Kidney stones, Low back pain, Lumbar stenosis, Neck pain, Neuromuscular disorder (Not sure), Neuropathy, Obesity, Osteoarthritis, Osteopenia, Parkinson's disease, Peripheral edema, Pneumonia (May 2018), PTSD (post-traumatic stress disorder), Recurrent UTI, Renal insufficiency, Seizure, Self-injurious behavior, Sleep apnea, Sleep apnea, obstructive, Suicide attempt, and Urinary incontinence.    Discussion with provider: Dr. James Rosa    Radiology interpretation: Imaging reviewed by ED attending physician and myself and interpreted by radiologist,   CT Cervical Spine Without Contrast  Result Date: 5/19/2025  Impression: 1.No acute cervical spine findings. 2.C3-4 and C6-7 anterior fusion hardware appears appropriately seated. 3.Degenerative changes of the cervical spine as described above. Electronically Signed: Maria L Foster MD  5/19/2025 12:32 PM EDT  Workstation ID: [id number]    CT Head Without Contrast  Result Date: 5/19/2025  Impression: No acute intracranial findings. Electronically Signed: Maria L Foster MD  5/19/2025 12:02 PM EDT  Workstation ID: [id number]    XR Spine Lumbar Complete 4+VW  Result Date: 5/19/2025  Impression: No acute lumbar spine findings. Degenerative changes of the lumbar spine as described above. Electronically Signed: Maria L Foster MD  5/19/2025 11:50 AM EDT  Workstation ID: [id number]    Lab interpretation:  Labs viewed by me significant for urine consistent with UTI and BUN of 32 on CMP.  Otherwise labs grossly unremarkable with no electrolyte derangement on CMP, no leukocytosis on CBC, and CK within normal limits.    EKG considered but not clinically indicated for this patient due to complaint and presentation.  Patient  has no complaints of chest pain, dizziness, or lightheadedness at this time and denies same at time of fall.    Appropriate PPE worn during exam.    All results of labs and scans discussed with patient and repeat physical exam completed.  Repeat exam unchanged from previous only with the patient's level of comfort increased due to adequate pain management.  Otherwise exam is unchanged from previous; patient remains alert and oriented, nontoxic in appearance with GCS 15.  Patient is informed that plan of care will be to admit her to the hospital for impaired mobility with UTI.  Patient is amenable to and verbalized understanding of these instructions.    Full report of ED course given to Dr. Shepherd prior to patient leaving the department.    i discussed findings with patient who voices understanding of discharge instructions, signs and symptoms requiring return to ED; discharged improved and in stable condition with follow up for re-evaluation.  This document is intended for medical expert use only. Reading of this document by patients and/or patient's family without participating medical staff guidance may result in misinterpretation and unintended morbidity.  Any interpretation of such data is the responsibility of the patient and/or family member responsible for the patient in concert with their primary or specialist providers, not to be left for sources of online searches such as [employer], [employer] or similar queries. Relying on these approaches to knowledge may result in misinterpretation, misguided goals of care and even death should patients or family members try recommendations outside of the realm of professional medical care in a supervised inpatient environment.         Problems Addressed:  Fall, initial encounter: complicated acute illness or injury  Mobility impaired: complicated acute illness or injury  Numbness and tingling of both feet: complicated acute illness or injury  Urinary tract infection without  hematuria, site unspecified: complicated acute illness or injury    Amount and/or Complexity of Data Reviewed  Labs: ordered.  Radiology: ordered.    Risk  Prescription drug management.  Decision regarding hospitalization.        Final diagnoses:   Urinary tract infection without hematuria, site unspecified   Mobility impaired   Numbness and tingling of both feet   Fall, initial encounter       ED Disposition  ED Disposition       ED Disposition   Decision to Admit    Condition   --    Comment   Level of Care: Telemetry [5]   Diagnosis: Fall [410435]   Admitting Physician: KAMAR BELLA [[id number]]   Attending Physician: KAMAR BELLA [[id number]]                 No follow-up provider specified.       Medication List        ASK your doctor about these medications      amantadine 100 MG capsule  Commonly known as: SYMMETREL  Ask about: Which instructions should I use?     clonazePAM 0.5 MG tablet  Commonly known as: KlonoPIN  Ask about: Which instructions should I use?     lidocaine 5 %  Commonly known as: LIDODERM  Ask about: Which instructions should I use?     potassium chloride 20 MEQ CR tablet  Commonly known as: KLOR-CON M20  Ask about: Which instructions should I use?     rOPINIRole 0.5 MG tablet  Commonly known as: REQUIP  Ask about: Which instructions should I use?                 Americo Chua PA-C  05/19/25 5104

## 2025-05-19 NOTE — Clinical Note
Level of Care: Med/Surg [1]   Admitting Physician: KAMAR BELLA [[id number]]   Attending Physician: KAMAR BELLA [[id number]]

## 2025-05-19 NOTE — H&P
[facility] Medicine Services  History & Physical    Patient Name: Dariana Rivera  : 1955  MRN: [id number]  Primary Care Physician:  Franny Strickland MD  Date of admission: 2025  Date and Time of Service: 2025 at 1430    Subjective      Chief Complaint: Pain after fall    History of Present Illness: Dariana Rivera is a 69 y.o. female with a CMH of parkinsons, RLS, COPD, htn, hld, T2DM, cirrhosis, kidney stones, GERD, depression/anxiety who presented to [facility] on 2025 with  pain after fall. Pt lives at home with her , reports this past Friday she went to get up from the toilet and had a mechanical fall, first lost her balance and fell forward into the sink, then lost her  on the sink and fell backwards/sideways into the shower, did hit her head/neck during the fall. Denies any lightheadedness/dizziness/loss of consciousness. Denies prolonged period down. She has had pain to her mid/lower back as well as her head/neck since the fall, initially did not present to hospital but pain has continued prompting her to come in today. She reports she has been dealing with numbness to her feet over the past several months and that has worsened her ability to get around, usually ambulates with a cane. She denies any new focal numbness/weakness since her fall, denies incontinence/saddle anesthesia. She does report some discomfort with urination, denies fevers/chills/flank pain.   In ED workup with CT Head, CT c-spine, lumbar XR showing no acute pathology. UA w/ infectious findings. Given ceftriaxone and IV pain medication, hospitalist service consulted for admission.       Review of Systems   Constitutional:  Negative for appetite change, chills and fever.   HENT:  Negative for congestion, rhinorrhea and sore throat.    Eyes: Negative.    Respiratory:  Negative for cough, shortness of breath and wheezing.    Cardiovascular:  Negative for chest pain,  palpitations and leg swelling.   Gastrointestinal:  Negative for abdominal pain, constipation, diarrhea and nausea.   Genitourinary:  Positive for difficulty urinating. Negative for dysuria.   Musculoskeletal:  Positive for arthralgias and back pain. Negative for myalgias.   Neurological:  Positive for tremors. Negative for dizziness, syncope, light-headedness and headaches.        + lower extremity numbness  + generalized weakness, no focal       Personal History     Past Medical History:   Diagnosis Date    Allergic Not sure    Penicillin, cipro, clindamycin, methadone, ambien    Angina at rest     DR. Amador    Anxiety disorder     [facility]     Asthma About 5 yrs ago    Shortness of breath with exertion    Back pain     Bipolar disorder     Cataract     Left & right removed in 3/2016    Cervical dystonia     Cholelithiasis     Gallbladder removed in 1995 or 97    Chronic pain     with stenosis    Chronic pain disorder     Cervical, lumbar, L knee, both shoulders    Cirrhosis     Colitis     Colon polyps     COPD (chronic obstructive pulmonary disease)     Not sure when diagnosed    Cramping of feet     Cramping of hands     DDD (degenerative disc disease), cervical     DDD (degenerative disc disease), lumbar     DDD (degenerative disc disease), lumbar     DDD (degenerative disc disease), thoracic     Depression     Diabetes mellitus     Not sure when diagnosed.    Diverticulosis     Not sure when diagnosed    Dysphagia 09/2020    Eosinophilic colitis     Gastritis     GERD (gastroesophageal reflux disease)     Headache Not sure    Migraines stopped after complete hyst in 2004    Hepatic steatosis     non alcoholic steo-hepatitis     Hx of lithotripsy 02/10/2021    Hypertension     IBS (irritable bowel syndrome)     Infectious viral hepatitis     ALEXANDER    Insomnia     Kidney stones     Low back pain     Not sure when diagnosed    Lumbar stenosis     Neck pain     Neuromuscular disorder Not sure    Parkinsons and  Cervical Dystonia    Neuropathy     Obesity     Obese since about 1966    Osteoarthritis     Osteopenia     Osteopenia diagnosed after bone density test in 2024.    Parkinson's disease     Peripheral edema     Pneumonia May 2018    About a month after neck fusion surgery    PTSD (post-traumatic stress disorder)     Recurrent UTI     Renal insufficiency     Not sure when diagnosed    Seizure     onset 7/2016.  Last seizure 10/2019    Self-injurious behavior     Tried to commit suicide in 2002    Sleep apnea     Using Bipap machine at night. advised to bring dos    Sleep apnea, obstructive     Suicide attempt     In 2001 or 2002    Urinary incontinence        Past Surgical History:   Procedure Laterality Date    BACK SURGERY      BRONCHOSCOPY  Date Unknown    CARDIAC CATHETERIZATION  02/2019    CARPAL TUNNEL RELEASE Bilateral     Wrist     CHOLECYSTECTOMY  1997    COLONOSCOPY      CYSTOSCOPY BLADDER STONE LITHOTRIPSY      ENDOMETRIAL ABLATION      ENDOSCOPY      ENDOSCOPY N/A 04/15/2020    Procedure: ESOPHAGOGASTRODUODENOSCOPY with dilitation (60FR.);  Surgeon: Julieta Allison MD;  Location: [facility] ENDOSCOPY;  Service: Gastroenterology;  Laterality: N/A;  varices,hiatal hernia,gastritis    ENDOSCOPY N/A 09/29/2020    Procedure: ESOPHAGOGASTRODUODENOSCOPY with esophageal dilitation (54 bougie);  Surgeon: Julieta Allison MD;  Location: [facility] ENDOSCOPY;  Service: Gastroenterology;  Laterality: N/A;   post op: hiatal hernia, esophageal stricture    ENDOSCOPY N/A 09/30/2021    Procedure: ESOPHAGOGASTRODUODENOSCOPY WITH DILATATION (BOUGIE #60);  Surgeon: Julieta Allison MD;  Location: [facility] ENDOSCOPY;  Service: Gastroenterology;  Laterality: N/A;  ESOPHAGEAL VARICES AND HIATAL HERNIA    ENDOSCOPY N/A 07/21/2022    Procedure: ESOPHAGOGASTRODUODENOSCOPY WITH BIOPSY X1 AREA  AND DILATATION (BOUGIE  #54);  Surgeon: Zachary Mcgarry MD;  Location: [facility] ENDOSCOPY;  Service: Gastroenterology;  Laterality: N/A;   Post: candida esophagitis,gastritis    ENDOSCOPY N/A 05/02/2023    Procedure: ESOPHAGOGASTRODUODENOSCOPY WITH BIOPSY X 1  AREA  AND DILATATON (48 NON GUIDED BOUGIE);  Surgeon: Zachary Mcgarry MD;  Location: [facility] ENDOSCOPY;  Service: Gastroenterology;  Laterality: N/A;  post op: GASTRITIS, SMALL ESOPHAGEAL VARICES    ENDOSCOPY N/A 02/10/2025    Procedure: ESOPHAGOGASTRODUODENOSCOPY, biopsy x2 areas;  Surgeon: Zachary Mcgarry MD;  Location: [facility] ENDOSCOPY;  Service: Gastroenterology;  Laterality: N/A;  post: gastritis, abnormal mucosa mid esophagus    EYE SURGERY  3-14 & 3-    Cataract surgery R 3-14, L 3-    HYSTERECTOMY  09/2004    complete    INTERSTIM PLACEMENT      bladder    JOINT REPLACEMENT Bilateral     knees    KNEE ARTHROSCOPY Bilateral     Arthroscopic surgery to bilateral knees     OTHER SURGICAL HISTORY  2015    Stress test     OTHER SURGICAL HISTORY  10/2016    Lithotripsy total 4-5    OTHER SURGICAL HISTORY      Right arm cellulits- spider bites     OTHER SURGICAL HISTORY  02/08/2018    Lithotripsy    OTHER SURGICAL HISTORY  04/20/2018    ACDF C3-C4 & C6-C7    REPLACEMENT TOTAL KNEE  2000    REPLACEMENT TOTAL KNEE Left 11/2016    SHOULDER ROTATOR CUFF REPAIR Right 01/08/2020    Procedure: RT ROTATOR CUFF REPAIR OPEN;  Surgeon: Curly Vaughn MD;  Location: [facility] MAIN OR;  Service: Orthopedics    SPINE SURGERY  04/2018    Fusion surgery C3-C4 and C6-C7       Family History: family history includes Arrhythmia in her sister; Arthritis in her brother, mother, and sister; Breast cancer in her maternal aunt and maternal grandmother; Cancer in her brother, mother, and sister; Dementia in her mother; Depression in her mother and sister; Diabetes in her brother, father, and sister; Early death in her father; Heart attack in her brother and father; Heart disease in her brother, father, and sister; Heart failure in her father; Hyperlipidemia in her brother, mother, and  sister; Hypertension in her brother, mother, and sister; Mental illness in her mother and sister; Stroke in her sister; Vision loss in her mother. Otherwise pertinent FHx was reviewed and not pertinent to current issue.    Social History:  reports that she quit smoking about 34 years ago. Her smoking use included cigarettes. She started smoking about 50 years ago. She has a 7.8 pack-year smoking history. She has been exposed to tobacco smoke. She has never used smokeless tobacco. She reports that she does not drink alcohol and does not use drugs.    Home Medications:  Prior to Admission Medications       Prescriptions Last Dose Informant Patient Reported? Taking?    Accu-Chek Guide Test test strip   No No    USE TO CHECK BLOOD SUGARS ONCE A DAY    albuterol sulfate  (90 Base) MCG/ACT inhaler   No No    Inhale 2 puffs Every 6 (Six) Hours As Needed for Wheezing or Shortness of Air.    amantadine (SYMMETREL) 100 MG capsule   Yes No    Take 1 capsule by mouth Every Night.    Patient taking differently:  Take 1 capsule by mouth 2 (Two) Times a Day.    aspirin 81 MG EC tablet   Yes No    aspirin 81 mg tablet,delayed release    calcium carbonate (Calcium 600) 600 MG tablet   Yes No    Take 1 tablet by mouth Daily.    carbidopa-levodopa ER (SINEMET CR)  MG per tablet   Yes No    Take 2 tablets by mouth 3 (Three) Times a Day.    Cholecalciferol (Vitamin D3) 50 MCG (2000 UT) capsule   Yes No    Take 1 capsule by mouth Daily.    clonazePAM (KlonoPIN) 0.5 MG tablet   No No    Take 1 tablet by mouth 2 (Two) Times a Day.    escitalopram (LEXAPRO) 10 MG tablet   No No    TAKE 1.5 TABLETS BY MOUTH DAILY.    Farxiga 10 MG tablet   No No    TAKE ONE TABLET BY MOUTH DAILY    Fluticasone-Umeclidin-Vilant (Trelegy Ellipta) 100-62.5-25 MCG/ACT inhaler   No No    Inhale 1 puff Daily.    gabapentin (NEURONTIN) 300 MG capsule  Pharmacy Yes No    Take 1 capsule by mouth 3 (Three) Times a Day.    ipratropium-albuterol (DUO-NEB)  0.5-2.5 mg/3 ml nebulizer  Pharmacy No No    Take 3 mL by nebulization Every 6 (Six) Hours As Needed for Shortness of Air or Wheezing for up to 30 days.    ipratropium-albuterol (DUO-NEB) 0.5-2.5 mg/3 ml nebulizer   No No    INHALE VIA NEBULIZER EVERY FOUR HOURS AS NEEDED FOR COPD( IN PATIENT ORDER )    KLOR-CON 20 MEQ CR tablet   No No    TAKE 1 TABLET BY MOUTH DAILY    Patient taking differently:  Take 1 tablet by mouth Daily.    lidocaine (LIDODERM) 5 %   No No    Place 1 patch on the skin as directed by provider Daily. Remove & Discard patch within 12 hours or as directed by MD    Patient taking differently:  Place 1 patch on the skin as directed by provider Daily As Needed. Remove & Discard patch within 12 hours or as directed by MD    lurasidone (LATUDA) 20 MG tablet tablet   No No    Take 1 tablet by mouth Every Evening.    Melatonin 10 MG capsule   Yes No    Take 1 capsule by mouth every night at bedtime.    Multiple Vitamins-Minerals (MULTIVITAMIN WITH MINERALS) tablet tablet   Yes No    Take 1 tablet by mouth Daily. Do not take dos    nystatin (MYCOSTATIN) 704063 UNIT/GM cream   No No    Apply 1 Application topically to the appropriate area as directed 2 (Two) Times a Day.    Patient taking differently:  Apply 1 Application topically to the appropriate area as directed As Needed.    O2 (OXYGEN)   Yes No    Inhale 1 L/min 1 (One) Time. @@ night    Patient taking differently:  Inhale 1 L/min every night at bedtime. [facility]    omeprazole (priLOSEC) 40 MG capsule  Pharmacy Yes No    Take 1 capsule by mouth Daily. Take preop    pentoxifylline (TRENtal) 400 MG CR tablet   Yes No    Take 1 tablet by mouth 3 (Three) Times a Day With Meals.    pramipexole (MIRAPEX) 0.5 MG tablet   No No    TAKE ONE TABLET BY MOUTH TWICE DAILY    rOPINIRole (REQUIP) 0.5 MG tablet   No No    TAKE ONE TABLET BY MOUTH EVERY MORNING AND EVERY NIGHT AT BEDTIME FOR RESTLESS LEG SYNDROME ( INCREASE )    spironolactone (ALDACTONE)  25 MG tablet   Yes No    Take 1 tablet by mouth Daily.    vitamin C (ASCORBIC ACID) 500 MG tablet   Yes No    Take 2 tablets by mouth Daily. dont take preop              Allergies:  Allergies   Allergen Reactions    Ambien  [Zolpidem Tartrate] Confusion    Ciprofloxacin Hcl Rash    Penicillins Rash    Zolpidem Unknown - High Severity     Other reaction(s): Confusion    Methadone Hallucinations    Clindamycin Rash       Objective      Vitals:   Temp:  [97.2 °F (36.2 °C)] 97.2 °F (36.2 °C)  Heart Rate:  [56-73] 58  Resp:  [16-20] 16  BP: (127-149)/(56-80) 129/64  Body mass index is 32.39 kg/m².  Physical Exam  Constitutional:       General: She is not in acute distress.  HENT:      Head: Normocephalic and atraumatic.      Mouth/Throat:      Mouth: Mucous membranes are moist.      Pharynx: Oropharynx is clear.   Eyes:      Extraocular Movements: Extraocular movements intact.      Conjunctiva/sclera: Conjunctivae normal.   Cardiovascular:      Rate and Rhythm: Normal rate and regular rhythm.      Heart sounds: Normal heart sounds.   Pulmonary:      Effort: Pulmonary effort is normal.      Breath sounds: Normal breath sounds. No wheezing, rhonchi or rales.   Abdominal:      General: Abdomen is flat. Bowel sounds are normal.      Palpations: Abdomen is soft.      Tenderness: There is no abdominal tenderness. There is no guarding or rebound.   Musculoskeletal:         General: No swelling or tenderness.      Cervical back: Normal range of motion and neck supple.      Right lower leg: No edema.      Left lower leg: No edema.   Skin:     General: Skin is warm and dry.   Neurological:      General: No focal deficit present.      Mental Status: She is alert and oriented to person, place, and time. Mental status is at baseline.      Cranial Nerves: No cranial nerve deficit.      Sensory: Sensory deficit present.      Comments: Gio LE str 3/5, limited 2/2 pain.   + decreased sensation gio feet, plantar worse than dorsal          Diagnostic Data:  Lab Results (last 24 hours)       Procedure Component Value Units Date/Time    Urinalysis, Microscopic Only - Straight Cath [[id number]]  (Abnormal) Collected: 05/19/25 1251    Specimen: Urine from Straight Cath Updated: 05/19/25 1342     RBC, UA 3-5 /HPF      WBC, UA 11-20 /HPF      Bacteria, UA 3+ /HPF      Squamous Epithelial Cells, UA None Seen /HPF      Hyaline Casts, UA None Seen /LPF      Methodology Manual Light Microscopy    Urine Culture - Urine, Straight Cath [[id number]] Collected: 05/19/25 1251    Specimen: Urine from Straight Cath Updated: 05/19/25 1342    Urinalysis With Culture If Indicated - Straight Cath [[id number]]  (Abnormal) Collected: 05/19/25 1251    Specimen: Urine from Straight Cath Updated: 05/19/25 1328     Color, UA Yellow     Appearance, UA Hazy     pH, UA 6.5     Specific Gravity, UA 1.026     Glucose, UA >=1000 mg/dL (3+)     Ketones, UA 15 mg/dL (1+)     Bilirubin, UA Negative     Blood, UA Negative     Protein, UA Negative     Leuk Esterase, UA Small (1+)     Nitrite, UA Positive     Urobilinogen, UA 0.2 E.U./dL    Narrative:      In absence of clinical symptoms, the presence of pyuria, bacteria, and/or nitrites on the urinalysis result does not correlate with infection.    Comprehensive Metabolic Panel [[id number]]  (Abnormal) Collected: 05/19/25 1216    Specimen: Blood from Arm, Left Updated: 05/19/25 1245     Glucose 87 mg/dL      BUN 32 mg/dL      Creatinine 0.81 mg/dL      Sodium 139 mmol/L      Potassium 4.3 mmol/L      Comment: Specimen hemolyzed.  Result may be falsely elevated.        Chloride 103 mmol/L      CO2 22.9 mmol/L      Calcium 10.1 mg/dL      Total Protein 7.5 g/dL      Albumin 4.1 g/dL      ALT (SGPT) 16 U/L      AST (SGOT) 22 U/L      Alkaline Phosphatase 61 U/L      Total Bilirubin 0.8 mg/dL      Globulin 3.4 gm/dL      A/G Ratio 1.2 g/dL      BUN/Creatinine Ratio 39.5     Anion Gap 13.1 mmol/L      eGFR 79.2 mL/min/1.73     Narrative:       GFR Categories in Chronic Kidney Disease (CKD)              GFR Category          GFR (mL/min/1.73)    Interpretation  G1                    90 or greater        Normal or high (1)  G2                    60-89                Mild decrease (1)  G3a                   45-59                Mild to moderate decrease  G3b                   30-44                Moderate to severe decrease  G4                    15-29                Severe decrease  G5                    14 or less           Kidney failure    (1)In the absence of evidence of kidney disease, neither GFR category G1 or G2 fulfill the criteria for CKD.    eGFR calculation 2021 CKD-EPI creatinine equation, which does not include race as a factor    CK [[id number]]  (Normal) Collected: 05/19/25 1216    Specimen: Blood from Arm, Left Updated: 05/19/25 1244     Creatine Kinase 101 U/L     CBC & Differential [[id number]]  (Abnormal) Collected: 05/19/25 1216    Specimen: Blood from Arm, Left Updated: 05/19/25 1243    Narrative:      The following orders were created for panel order CBC & Differential.  Procedure                               Abnormality         Status                     ---------                               -----------         ------                     CBC Auto Differential[[id number]]        Abnormal            Final result               Scan Slide[[id number]]                                       Final result                 Please view results for these tests on the individual orders.    CBC Auto Differential [[id number]]  (Abnormal) Collected: 05/19/25 1216    Specimen: Blood from Arm, Left Updated: 05/19/25 1243     WBC 8.51 10*3/mm3      RBC 4.85 10*6/mm3      Hemoglobin 15.0 g/dL      Hematocrit 44.7 %      MCV 92.2 fL      MCH 30.9 pg      MCHC 33.6 g/dL      RDW 12.8 %      RDW-SD 43.0 fl      MPV 10.0 fL      Platelets 133 10*3/mm3      Neutrophil % 68.2 %      Lymphocyte % 21.4 %      Monocyte % 8.8 %      Eosinophil % 0.8 %       Basophil % 0.4 %      Immature Grans % 0.4 %      Neutrophils, Absolute 5.81 10*3/mm3      Lymphocytes, Absolute 1.82 10*3/mm3      Monocytes, Absolute 0.75 10*3/mm3      Eosinophils, Absolute 0.07 10*3/mm3      Basophils, Absolute 0.03 10*3/mm3      Immature Grans, Absolute 0.03 10*3/mm3      nRBC 0.0 /100 WBC     Scan Slide [[id number]] Collected: 05/19/25 1216    Specimen: Blood from Arm, Left Updated: 05/19/25 1243     RBC Morphology Normal     WBC Morphology Normal     Platelet Estimate Decreased     Giant Platelets Slight/1+    Narrative:      Nominal fibrin seen on slide.             Imaging Results (Last 24 Hours)       Procedure Component Value Units Date/Time    CT Cervical Spine Without Contrast [[id number]] Collected: 05/19/25 1203     Updated: 05/19/25 1234    Narrative:        CT CERVICAL SPINE WO CONTRAST    Date of Exam: 5/19/2025 11:52 AM EDT    Indication: fall.    Comparison: Cervical spine radiographs 9/18/2020, 8/7/2019. CT cervical spine 7/3/2018.    Technique: Axial CT images were obtained of the cervical spine without contrast administration.  Sagittal and coronal reconstructions were performed.  Automated exposure control and iterative reconstruction methods were used.      Findings:  C3-4 and C6-7 anterior fusion hardware with the intervening disc prosthesis appears appropriately seated. There appears to be solid osseous fusion across C3-4 and C6-7.    Craniocervical alignment appears normal. No acute cervical spine fracture or subluxation is evident. There is mild reversal of cervical lordosis centered at C6-7. No high-grade cervical canal stenosis is identified. Imaged paraspinal soft tissues   demonstrate no acute finding.    At C2-3, moderate bilateral facet arthropathy. No significant disc bulge or canal stenosis. No significant neural foraminal narrowing.    At C3-4, posterior osteophyte formation is present. Borderline to mild canal stenosis. Moderate bilateral facet arthropathy and  mild bilateral uncovertebral spurring. Moderate right neural foraminal stenosis. Mild to moderate left neural foraminal   stenosis    At C4-5, posterior osteophyte formation is present with mild to moderate central canal stenosis. There is right greater than left uncovertebral spurring. Moderate bilateral facet arthropathy is seen. Left neural foramen is patent. There is mild to   moderate right neural foraminal stenosis.    At C5-6, there is mild bilateral facet arthropathy. There is mild left-sided uncovertebral spurring. No significant central canal stenosis. No significant neural foraminal stenosis    At C6-7, posterior osteophyte formation is present slightly eccentric to the left of midline with moderate canal stenosis. There is mild left-sided uncovertebral spurring and mild bilateral facet arthropathy. Mild left neural foraminal narrowing. Right   neural foramen is patent.    At C7-T1, mild posterior osteophyte formation is present. Mild bilateral facet arthropathy. No significant central canal stenosis. Mild left-sided neural foraminal narrowing. Right neural foramen appears patent.      Impression:      Impression:  1.No acute cervical spine findings.  2.C3-4 and C6-7 anterior fusion hardware appears appropriately seated.  3.Degenerative changes of the cervical spine as described above.        Electronically Signed: Maria L Foster MD    5/19/2025 12:32 PM EDT    Workstation ID: [id number]    CT Head Without Contrast [[id number]] Collected: 05/19/25 1200     Updated: 05/19/25 1204    Narrative:      CT HEAD WO CONTRAST    Date of Exam: 5/19/2025 11:52 AM EDT    Indication: fall.       Comparison: CT head 12/27/2024.    Technique: Axial CT images were obtained of the head without contrast administration.  Coronal reconstructions were performed.  Automated exposure control and iterative reconstruction methods were used.      Findings:  No acute intracranial hemorrhage, mass lesion, mass effect, midline shift or  evidence of acute evolving infarct. Paranasal sinuses are clear. Mastoid air cells are clear. Calvarium is within normal limits. Mild age-appropriate atrophy.      Impression:      Impression:  No acute intracranial findings.      Electronically Signed: Maria L Foster MD    5/19/2025 12:02 PM EDT    Workstation ID: [id number]    XR Spine Lumbar Complete 4+VW [[id number]] Collected: 05/19/25 1149     Updated: 05/19/25 1152    Narrative:      XR SPINE LUMBAR COMPLETE 4+VW    Date of Exam: 5/19/2025 11:35 AM EDT    Indication: fall    Comparison: CT lumbar spine 8/10/2021.    Findings:  No acute lumbar spine fracture or subluxation is seen. Disc space heights appear preserved. Anterior osteophytes are present, most notably at L3-3 and L3-4. Mild to moderate multilevel lumbar facet arthropathy is present, thought to be greatest at L4-5   and L5-S1 with. No spondylitic defects are identified. Sacral stimulator device projects right of midline. Cholecystectomy changes are noted.      Impression:      Impression:  No acute lumbar spine findings.  Degenerative changes of the lumbar spine as described above.      Electronically Signed: Maria L Foster MD    5/19/2025 11:50 AM EDT    Workstation ID: [id number]              Assessment & Plan        This is a 69 y.o. female with:    Active and Resolved Problems  Active Hospital Problems    Diagnosis  POA    **Fall [W19.XXXA]  Yes      Resolved Hospital Problems   No resolved problems to display.       Fall  Back pain  Parkinsons  Peripheral neuropathy  RLS  - Mechanical fall, likely 2/2 pt's neurological disorders, possible component of UTI as below  - CT Head, CT c-spine, lumbar XR without acute pathology appreciated. Pt w/ some chrissie LE weakness, limited 2/2 back pain, no incontinence/saddle anesthesia, pulses intact.   - Pain control as needed  - PT/OT consulted, appreciate assistance. C/f patient's current ability to ambulate/ADLs at home in present condition  - Cont home sinemet,  amantadine, gabapentin  - consider MRI if pain not improving    UTI  - SIRS 0/4. UA w/ nitrites, leuks, 11-20 wbc, 3+ bacteria. Pt w/ some reported LUTS.   - started on ceftriaxone in ED, cont  - Ucx pending    T2DM  - not on insulin at home  - A1C 5.8 2/2025, repeat pending  - holding home oral  - SSI  - CCD    COPD  - not in acute exacerbation  - cont home medications    Depression  Anxiety  - cont home medications, pt on clonazepam once daily      Home medications pending verification    VTE Prophylaxis:  Pharmacologic VTE prophylaxis orders are present.        The patient desires to be as follows:    CODE STATUS:    Code Status (Patient has no pulse and is not breathing): CPR (Attempt to Resuscitate)  Medical Interventions (Patient has pulse or is breathing): Full Support  Level Of Support Discussed With: Patient        Joe Rivera, who can be contacted at [phone] , is the designated person to make medical decisions on the patient's behalf if She is incapable of doing so. This was clarified with patient and/or next of kin on 5/19/2025 during the course of this H&P.    Admission Status:  I believe this patient meets observation status.    Expected Length of Stay: <2 midnights    PDMP and Medication Dispenses via Sidebar reviewed and consistent with patient reported medications.    I discussed the patient's findings and my recommendations with patient.      Signature:     This document has been electronically signed by Javier Shepherd MD on May 19, 2025 14:47 EDT   [facility] Hospitalist Team

## 2025-05-19 NOTE — CASE MANAGEMENT/SOCIAL WORK
Discharge Planning Assessment   [facility]     Patient Name: Dariana Rivera  MRN: [id number]  Today's Date: 5/19/2025    Admit Date: 5/19/2025    Plan: From Home with Spouse; PT/OT Luc Pending   Discharge Needs Assessment       Row Name 05/19/25 1844       Living Environment    People in Home spouse    Name(s) of People in Home Joe    Current Living Arrangements home    Potentially Unsafe Housing Conditions none    In the past 12 months has the electric, gas, oil, or water company threatened to shut off services in your home? No    Primary Care Provided by self;homecare agency    Provides Primary Care For no one, unable/limited ability to care for self    Family Caregiver if Needed sibling(s)    Family Caregiver Names Spouse-Joe; Caregivers through [facility];    Quality of Family Relationships unable to assess    Able to Return to Prior Arrangements yes    Living Arrangement Comments Home with Spouse       Resource/Environmental Concerns    Resource/Environmental Concerns none    Transportation Concerns none       Transportation Needs    In the past 12 months, has lack of transportation kept you from medical appointments or from getting medications? no    In the past 12 months, has lack of transportation kept you from meetings, work, or from getting things needed for daily living? No       Food Insecurity    Within the past 12 months, you worried that your food would run out before you got the money to buy more. Never true    Within the past 12 months, the food you bought just didn't last and you didn't have money to get more. Never true       Transition Planning    Patient/Family Anticipates Transition to home with help/services;inpatient rehabilitation facility    Patient/Family Anticipated Services at Transition none    Transportation Anticipated family or friend will provide       Discharge Needs Assessment    Equipment Currently Used at Home cane, straight;walker, rolling;wheelchair;shower  chair;glucometer    Concerns to be Addressed discharge planning    Do you want help finding or keeping work or a job? I do not need or want help    Do you want help with school or training? For example, starting or completing job training or getting a high school diploma, GED or equivalent No    Anticipated Changes Related to Illness inability to care for self    Equipment Needed After Discharge none    Outpatient/Agency/Support Group Needs homecare agency;skilled nursing facility    Provided Post Acute Provider List? Yes    Post Acute Provider List Inpatient Rehab;Long Term Acute Care    Delivered To Patient    Method of Delivery In person    Patient's Choice of Community Agency(s) [facility]                   Discharge Plan       Row Name 05/19/25 2189       Plan    Plan From Home with Spouse; PT/OT Evals Pending    Patient/Family in Agreement with Plan unable to assess    Plan Comments Met with patient at bedside, confirmed PCP and Pharm. States she has a caregiver from [facility] 4 days per week that assist with ADLs and IADLs, denies financial concerns and family can provide dc transportation. Pt has PT/OT orders and discussed dc plan with pt. She anticipates need for SNF at [place] and list provided. Has been admitted to [facility]. and is agreeable with this facility. DCP sent through Epic. Will need Precert and PASRR if SNF recommended.  Final dc plan is pending clinical course and therapy evals.                                                                DC Barriers: PT/OT Evals; IV ABX              Demographic Summary       Row Name 05/19/25 9067       General Information    Admission Type observation    Arrived From home    Required Notices Provided Observation Status Notice    Referral Source emergency department    Reason for Consult discharge planning    Preferred Language English       Contact Information    Permission Granted to Share Info With                    Functional  Status       Row Name 05/19/25 1844       Functional Status    Usual Activity Tolerance moderate    Current Activity Tolerance fair       Physical Activity    On average, how many days per week do you engage in moderate to strenuous exercise (like a brisk walk)? 0 days    On average, how many minutes do you engage in exercise at this level? 0 min    Number of minutes of exercise per week 0       Assessment of Health Literacy    How often do you have someone help you read hospital materials? Sometimes    How often do you have problems learning about your medical condition because of difficulty understanding written information? Sometimes    How often do you have a problem understanding what is told to you about your medical condition? Sometimes    How confident are you filling out medical forms by yourself? Somewhat    Health Literacy Moderate       Functional Status, IADL    Medications independent    Meal Preparation assistive person    Housekeeping assistive person    Laundry assistive person    Shopping assistive person    If for any reason you need help with day-to-day activities such as bathing, preparing meals, shopping, managing finances, etc., do you get the help you need? I get all the help I need    IADL Comments Has Caregiver through [facility] 4 days per week 4 hours per day       Mental Status    General Appearance WDL WDL       Mental Status Summary    Recent Changes in Mental Status/Cognitive Functioning no changes       Employment/    Employment Status disabled                Patient Forms       Row Name 05/19/25 1843       Patient Forms    Important Message from Medicare (IMM) --  MARS per Reg    Patient Observation Letter Delivered    Delivered to Patient    Method of delivery In person               Maintained distance greater than six feet and spent less than 15 minutes in the room    Lesly Schroeder RN    Phone [phone]  Fax [phone]

## 2025-05-19 NOTE — TELEPHONE ENCOUNTER
ALFA: Swapna Nurse  w/ [facility] called to report that the patient fell on Friday. The nurse saw the patient today. Her  was present. Lost balance getting off the toilet. Fell backwards and hit her butt, back, and head.   Usual care giver was there today and reported the fall and that pt is in pain and then went to ER.

## 2025-05-20 ENCOUNTER — APPOINTMENT (OUTPATIENT)
Dept: CT IMAGING | Facility: HOSPITAL | Age: 70
DRG: 552 | End: 2025-05-20
Payer: MEDICARE

## 2025-05-20 LAB
ANION GAP SERPL CALCULATED.3IONS-SCNC: 12.3 MMOL/L (ref 5–15)
BASOPHILS # BLD AUTO: 0.02 10*3/MM3 (ref 0–0.2)
BASOPHILS NFR BLD AUTO: 0.3 % (ref 0–1.5)
BUN SERPL-MCNC: 27 MG/DL (ref 8–23)
BUN/CREAT SERPL: 31.4 (ref 7–25)
C AURIS DNA SPEC QL NAA+NON-PROBE: NOT DETECTED
CALCIUM SPEC-SCNC: 9.4 MG/DL (ref 8.6–10.5)
CHLORIDE SERPL-SCNC: 103 MMOL/L (ref 98–107)
CO2 SERPL-SCNC: 23.7 MMOL/L (ref 22–29)
CREAT SERPL-MCNC: 0.86 MG/DL (ref 0.57–1)
DEPRECATED RDW RBC AUTO: 44 FL (ref 37–54)
EGFRCR SERPLBLD CKD-EPI 2021: 73.7 ML/MIN/1.73
EOSINOPHIL # BLD AUTO: 0.11 10*3/MM3 (ref 0–0.4)
EOSINOPHIL NFR BLD AUTO: 1.7 % (ref 0.3–6.2)
ERYTHROCYTE [DISTWIDTH] IN BLOOD BY AUTOMATED COUNT: 12.8 % (ref 12.3–15.4)
GLUCOSE BLDC GLUCOMTR-MCNC: 133 MG/DL (ref 70–105)
GLUCOSE BLDC GLUCOMTR-MCNC: 135 MG/DL (ref 70–105)
GLUCOSE BLDC GLUCOMTR-MCNC: 179 MG/DL (ref 70–105)
GLUCOSE BLDC GLUCOMTR-MCNC: 231 MG/DL (ref 70–105)
GLUCOSE SERPL-MCNC: 117 MG/DL (ref 65–99)
HCT VFR BLD AUTO: 43.2 % (ref 34–46.6)
HGB BLD-MCNC: 14.1 G/DL (ref 12–15.9)
IMM GRANULOCYTES # BLD AUTO: 0.02 10*3/MM3 (ref 0–0.05)
IMM GRANULOCYTES NFR BLD AUTO: 0.3 % (ref 0–0.5)
LYMPHOCYTES # BLD AUTO: 1.16 10*3/MM3 (ref 0.7–3.1)
LYMPHOCYTES NFR BLD AUTO: 17.9 % (ref 19.6–45.3)
MCH RBC QN AUTO: 30.7 PG (ref 26.6–33)
MCHC RBC AUTO-ENTMCNC: 32.6 G/DL (ref 31.5–35.7)
MCV RBC AUTO: 94.1 FL (ref 79–97)
MONOCYTES # BLD AUTO: 0.45 10*3/MM3 (ref 0.1–0.9)
MONOCYTES NFR BLD AUTO: 6.9 % (ref 5–12)
NEUTROPHILS NFR BLD AUTO: 4.72 10*3/MM3 (ref 1.7–7)
NEUTROPHILS NFR BLD AUTO: 72.9 % (ref 42.7–76)
NRBC BLD AUTO-RTO: 0 /100 WBC (ref 0–0.2)
PLATELET # BLD AUTO: 128 10*3/MM3 (ref 140–450)
PMV BLD AUTO: 9.9 FL (ref 6–12)
POTASSIUM SERPL-SCNC: 3.7 MMOL/L (ref 3.5–5.2)
RBC # BLD AUTO: 4.59 10*6/MM3 (ref 3.77–5.28)
SODIUM SERPL-SCNC: 139 MMOL/L (ref 136–145)
WBC NRBC COR # BLD AUTO: 6.48 10*3/MM3 (ref 3.4–10.8)

## 2025-05-20 PROCEDURE — 94799 UNLISTED PULMONARY SVC/PX: CPT

## 2025-05-20 PROCEDURE — 72131 CT LUMBAR SPINE W/O DYE: CPT

## 2025-05-20 PROCEDURE — 82948 REAGENT STRIP/BLOOD GLUCOSE: CPT

## 2025-05-20 PROCEDURE — 85025 COMPLETE CBC W/AUTO DIFF WBC: CPT | Performed by: STUDENT IN AN ORGANIZED HEALTH CARE EDUCATION/TRAINING PROGRAM

## 2025-05-20 PROCEDURE — G0378 HOSPITAL OBSERVATION PER HR: HCPCS

## 2025-05-20 PROCEDURE — 80048 BASIC METABOLIC PNL TOTAL CA: CPT | Performed by: STUDENT IN AN ORGANIZED HEALTH CARE EDUCATION/TRAINING PROGRAM

## 2025-05-20 PROCEDURE — 82948 REAGENT STRIP/BLOOD GLUCOSE: CPT | Performed by: STUDENT IN AN ORGANIZED HEALTH CARE EDUCATION/TRAINING PROGRAM

## 2025-05-20 PROCEDURE — 25010000002 CEFTRIAXONE PER 250 MG: Performed by: STUDENT IN AN ORGANIZED HEALTH CARE EDUCATION/TRAINING PROGRAM

## 2025-05-20 PROCEDURE — 94761 N-INVAS EAR/PLS OXIMETRY MLT: CPT

## 2025-05-20 PROCEDURE — 25010000002 ENOXAPARIN PER 10 MG: Performed by: STUDENT IN AN ORGANIZED HEALTH CARE EDUCATION/TRAINING PROGRAM

## 2025-05-20 PROCEDURE — 97166 OT EVAL MOD COMPLEX 45 MIN: CPT

## 2025-05-20 PROCEDURE — 63710000001 INSULIN LISPRO (HUMAN) PER 5 UNITS: Performed by: STUDENT IN AN ORGANIZED HEALTH CARE EDUCATION/TRAINING PROGRAM

## 2025-05-20 PROCEDURE — 97162 PT EVAL MOD COMPLEX 30 MIN: CPT

## 2025-05-20 RX ADMIN — Medication 1 TABLET: at 08:44

## 2025-05-20 RX ADMIN — CARBIDOPA AND LEVODOPA 2 TABLET: 25; 100 TABLET, EXTENDED RELEASE ORAL at 08:44

## 2025-05-20 RX ADMIN — ESCITALOPRAM 15 MG: 10 TABLET, FILM COATED ORAL at 08:44

## 2025-05-20 RX ADMIN — PENTOXIFYLLINE 400 MG: 400 TABLET, EXTENDED RELEASE ORAL at 08:44

## 2025-05-20 RX ADMIN — CARBIDOPA AND LEVODOPA 2 TABLET: 25; 100 TABLET, EXTENDED RELEASE ORAL at 17:27

## 2025-05-20 RX ADMIN — ASPIRIN 81 MG: 81 TABLET, COATED ORAL at 08:43

## 2025-05-20 RX ADMIN — CARBIDOPA AND LEVODOPA 2 TABLET: 25; 100 TABLET, EXTENDED RELEASE ORAL at 22:25

## 2025-05-20 RX ADMIN — ENOXAPARIN SODIUM 40 MG: 100 INJECTION SUBCUTANEOUS at 17:26

## 2025-05-20 RX ADMIN — PRAMIPEXOLE DIHYDROCHLORIDE 0.5 MG: 0.5 TABLET ORAL at 22:25

## 2025-05-20 RX ADMIN — PANTOPRAZOLE SODIUM 40 MG: 40 TABLET, DELAYED RELEASE ORAL at 05:27

## 2025-05-20 RX ADMIN — PENTOXIFYLLINE 400 MG: 400 TABLET, EXTENDED RELEASE ORAL at 11:35

## 2025-05-20 RX ADMIN — Medication 10 ML: at 22:26

## 2025-05-20 RX ADMIN — ALBUTEROL SULFATE 2 PUFF: 90 AEROSOL, METERED RESPIRATORY (INHALATION) at 20:27

## 2025-05-20 RX ADMIN — GABAPENTIN 300 MG: 300 CAPSULE ORAL at 17:27

## 2025-05-20 RX ADMIN — GABAPENTIN 300 MG: 300 CAPSULE ORAL at 22:26

## 2025-05-20 RX ADMIN — BUDESONIDE AND FORMOTEROL FUMARATE DIHYDRATE 2 PUFF: 160; 4.5 AEROSOL RESPIRATORY (INHALATION) at 20:30

## 2025-05-20 RX ADMIN — PENTOXIFYLLINE 400 MG: 400 TABLET, EXTENDED RELEASE ORAL at 17:27

## 2025-05-20 RX ADMIN — SPIRONOLACTONE 25 MG: 25 TABLET ORAL at 08:44

## 2025-05-20 RX ADMIN — INSULIN LISPRO 2 UNITS: 100 INJECTION, SOLUTION INTRAVENOUS; SUBCUTANEOUS at 22:25

## 2025-05-20 RX ADMIN — INSULIN LISPRO 3 UNITS: 100 INJECTION, SOLUTION INTRAVENOUS; SUBCUTANEOUS at 17:26

## 2025-05-20 RX ADMIN — GABAPENTIN 300 MG: 300 CAPSULE ORAL at 08:44

## 2025-05-20 RX ADMIN — BUDESONIDE AND FORMOTEROL FUMARATE DIHYDRATE 2 PUFF: 160; 4.5 AEROSOL RESPIRATORY (INHALATION) at 07:48

## 2025-05-20 RX ADMIN — OXYCODONE HYDROCHLORIDE AND ACETAMINOPHEN 1000 MG: 500 TABLET ORAL at 08:43

## 2025-05-20 RX ADMIN — AMANTADINE HYDROCHLORIDE 100 MG: 100 CAPSULE ORAL at 10:44

## 2025-05-20 RX ADMIN — LIDOCAINE 1 PATCH: 4 PATCH TOPICAL at 08:44

## 2025-05-20 RX ADMIN — ROPINIROLE 0.5 MG: 1 TABLET, FILM COATED ORAL at 22:25

## 2025-05-20 RX ADMIN — PRAMIPEXOLE DIHYDROCHLORIDE 0.5 MG: 0.5 TABLET ORAL at 08:44

## 2025-05-20 RX ADMIN — OXYCODONE 5 MG: 5 TABLET ORAL at 22:30

## 2025-05-20 RX ADMIN — OXYCODONE 5 MG: 5 TABLET ORAL at 00:24

## 2025-05-20 RX ADMIN — CEFTRIAXONE 2000 MG: 2 INJECTION, POWDER, FOR SOLUTION INTRAMUSCULAR; INTRAVENOUS at 13:54

## 2025-05-20 RX ADMIN — AMANTADINE HYDROCHLORIDE 100 MG: 100 CAPSULE ORAL at 22:26

## 2025-05-20 RX ADMIN — LURASIDONE HYDROCHLORIDE 20 MG: 40 TABLET, FILM COATED ORAL at 22:25

## 2025-05-20 RX ADMIN — Medication 2000 UNITS: at 08:44

## 2025-05-20 NOTE — THERAPY EVALUATION
Patient Name: Dariana Rivera  : 1955    MRN: [id number]                              Today's Date: 2025       Admit Date: 2025    Visit Dx:     ICD-10-CM ICD-9-CM   1. Urinary tract infection without hematuria, site unspecified  N39.0 599.0   2. Mobility impaired  Z74.09 799.89   3. Numbness and tingling of both feet  R20.0 782.0    R20.2    4. Fall, initial encounter  W19.XXXA E888.9     Patient Active Problem List   Diagnosis    Morbid obesity    Bipolar 1 disorder, depressed, moderate    Chronic back pain    Chronic kidney disease, unspecified    Chronic obstructive pulmonary disease    Cirrhosis    Gastroesophageal reflux disease    Hypertension    Sleep apnea    Parkinson's disease    Recurrent urinary tract infection    Seizure    Spinal stenosis of cervical region    Type 2 diabetes mellitus with other diabetic neurological complication    Generalized anxiety disorder    Hyperlipidemia    Esophageal varices without bleeding    Injury of cervical spine    Cor pulmonale    Dysphagia    Encounter for annual wellness exam in Medicare patient    Idiopathic osteoarthritis    Degeneration of lumbar intervertebral disc    Panniculitis affecting back    Panniculitis of neck    Spinal stenosis of lumbar region    Osteopenia after menopause    UTI (urinary tract infection)    Fall     Past Medical History:   Diagnosis Date    Allergic Not sure    Penicillin, cipro, clindamycin, methadone, ambien    Angina at rest     DR. [place]    Anxiety disorder     [facility]     Asthma About 5 yrs ago    Shortness of breath with exertion    Back pain     Bipolar disorder     Cataract     Left & right removed in 3/2016    Cervical dystonia     Cholelithiasis     Gallbladder removed in  or     Chronic pain     with stenosis    Chronic pain disorder     Cervical, lumbar, L knee, both shoulders    Cirrhosis     Colitis     Colon polyps     COPD (chronic obstructive pulmonary disease)     Not sure when  diagnosed    Cramping of feet     Cramping of hands     DDD (degenerative disc disease), cervical     DDD (degenerative disc disease), lumbar     DDD (degenerative disc disease), lumbar     DDD (degenerative disc disease), thoracic     Depression     Diabetes mellitus     Not sure when diagnosed.    Diverticulosis     Not sure when diagnosed    Dysphagia 09/2020    Eosinophilic colitis     Gastritis     GERD (gastroesophageal reflux disease)     Headache Not sure    Migraines stopped after complete hyst in 2004    Hepatic steatosis     non alcoholic steo-hepatitis     Hx of lithotripsy 02/10/2021    Hypertension     IBS (irritable bowel syndrome)     Infectious viral hepatitis     [facility]ER    Insomnia     Kidney stones     Low back pain     Not sure when diagnosed    Lumbar stenosis     Neck pain     Neuromuscular disorder Not sure    Parkinsons and Cervical Dystonia    Neuropathy     Obesity     Obese since about 1966    Osteoarthritis     Osteopenia     Osteopenia diagnosed after bone density test in 2024.    Parkinson's disease     Peripheral edema     Pneumonia May 2018    About a month after neck fusion surgery    PTSD (post-traumatic stress disorder)     Recurrent UTI     Renal insufficiency     Not sure when diagnosed    Seizure     onset 7/2016.  Last seizure 10/2019    Self-injurious behavior     Tried to commit suicide in 2002    Sleep apnea     Using Bipap machine at night. advised to bring dos    Sleep apnea, obstructive     Suicide attempt     In 2001 or 2002    Urinary incontinence      Past Surgical History:   Procedure Laterality Date    BACK SURGERY      BRONCHOSCOPY  Date Unknown    CARDIAC CATHETERIZATION  02/2019    CARPAL TUNNEL RELEASE Bilateral     Wrist     CHOLECYSTECTOMY  1997    COLONOSCOPY      CYSTOSCOPY BLADDER STONE LITHOTRIPSY      ENDOMETRIAL ABLATION      ENDOSCOPY      ENDOSCOPY N/A 04/15/2020    Procedure: ESOPHAGOGASTRODUODENOSCOPY with dilitation (60FR.);  Surgeon: Julieta Allison,  MD;  Location: [facility] ENDOSCOPY;  Service: Gastroenterology;  Laterality: N/A;  varices,hiatal hernia,gastritis    ENDOSCOPY N/A 09/29/2020    Procedure: ESOPHAGOGASTRODUODENOSCOPY with esophageal dilitation (54 bougie);  Surgeon: Julieta Allison MD;  Location: [facility] ENDOSCOPY;  Service: Gastroenterology;  Laterality: N/A;   post op: hiatal hernia, esophageal stricture    ENDOSCOPY N/A 09/30/2021    Procedure: ESOPHAGOGASTRODUODENOSCOPY WITH DILATATION (BOUGIE #60);  Surgeon: Julieta Allison MD;  Location: [facility] ENDOSCOPY;  Service: Gastroenterology;  Laterality: N/A;  ESOPHAGEAL VARICES AND HIATAL HERNIA    ENDOSCOPY N/A 07/21/2022    Procedure: ESOPHAGOGASTRODUODENOSCOPY WITH BIOPSY X1 AREA  AND DILATATION (BOUGIE  #54);  Surgeon: Zachary Mcgarry MD;  Location: [facility] ENDOSCOPY;  Service: Gastroenterology;  Laterality: N/A;  Post: candida esophagitis,gastritis    ENDOSCOPY N/A 05/02/2023    Procedure: ESOPHAGOGASTRODUODENOSCOPY WITH BIOPSY X 1  AREA  AND DILATATON (48 NON GUIDED BOUGIE);  Surgeon: Zachary Mcgarry MD;  Location: [facility] ENDOSCOPY;  Service: Gastroenterology;  Laterality: N/A;  post op: GASTRITIS, SMALL ESOPHAGEAL VARICES    ENDOSCOPY N/A 02/10/2025    Procedure: ESOPHAGOGASTRODUODENOSCOPY, biopsy x2 areas;  Surgeon: Zachary Mcgarry MD;  Location: [facility] ENDOSCOPY;  Service: Gastroenterology;  Laterality: N/A;  post: gastritis, abnormal mucosa mid esophagus    EYE SURGERY  3-14 & 3-    Cataract surgery R 3-14, L 3-    HYSTERECTOMY  09/2004    complete    INTERSTIM PLACEMENT      bladder    JOINT REPLACEMENT Bilateral     knees    KNEE ARTHROSCOPY Bilateral     Arthroscopic surgery to bilateral knees     OTHER SURGICAL HISTORY  2015    Stress test     OTHER SURGICAL HISTORY  10/2016    Lithotripsy total 4-5    OTHER SURGICAL HISTORY      Right arm cellulits- spider bites     OTHER SURGICAL HISTORY  02/08/2018    Lithotripsy    OTHER SURGICAL HISTORY   04/20/2018    ACDF C3-C4 & C6-C7    REPLACEMENT TOTAL KNEE  2000    REPLACEMENT TOTAL KNEE Left 11/2016    SHOULDER ROTATOR CUFF REPAIR Right 01/08/2020    Procedure: RT ROTATOR CUFF REPAIR OPEN;  Surgeon: Curly Vaughn MD;  Location: [facility] MAIN OR;  Service: Orthopedics    SPINE SURGERY  04/2018    Fusion surgery C3-C4 and C6-C7      General Information       Row Name 05/20/25 1137          Physical Therapy Time and Intention    Document Type evaluation  -AM     Mode of Treatment physical therapy  -AM       Row Name 05/20/25 1137          General Information    Patient Profile Reviewed yes  -AM     Prior Level of Function independent:;all household mobility;community mobility;gait;transfer;bed mobility;using stairs  ambulates with st cane  -AM     Existing Precautions/Restrictions fall;oxygen therapy device and L/min  1L O2, Contact Precautions  -AM     Barriers to Rehab medically complex  -AM       Row Name 05/20/25 1137          Living Environment    Current Living Arrangements home  -AM     People in Home spouse  -AM       Row Name 05/20/25 1137          Home Main Entrance    Number of Stairs, Main Entrance three  -AM     Stair Railings, Main Entrance railings safe and in good condition  -AM       Row Name 05/20/25 1137          Stairs Within Home, Primary    Number of Stairs, Within Home, Primary none  -AM       Row Name 05/20/25 1137          Cognition    Orientation Status (Cognition) oriented x 4  -AM       Row Name 05/20/25 1137          Safety Issues/Impairments Affecting Functional Mobility    Impairments Affecting Function (Mobility) balance;endurance/activity tolerance;pain;strength;sensation/sensory awareness  -AM     Comment, Safety Issues/Impairments (Mobility) gait belt utilized  -AM               User Key  (r) = Recorded By, (t) = Taken By, (c) = Cosigned By      Initials Name Provider Type    AM Philip Stanley, PT Physical Therapist                   Mobility       Row Name 05/20/25 1139           Bed Mobility    Bed Mobility supine-sit;sit-supine  -AM     Supine-Sit Love (Bed Mobility) minimum assist (75% patient effort);moderate assist (50% patient effort)  -AM     Sit-Supine Love (Bed Mobility) minimum assist (75% patient effort)  -AM     Assistive Device (Bed Mobility) bed rails  -AM     Comment, (Bed Mobility) via log roll technique  -AM       Row Name 05/20/25 1139          Sit-Stand Transfer    Sit-Stand Love (Transfers) minimum assist (75% patient effort);moderate assist (50% patient effort);1 person assist  -AM     Assistive Device (Sit-Stand Transfers) walker, front-wheeled  -AM     Comment, (Sit-Stand Transfer) increased tremoring noted with standing  -AM       Row Name 05/20/25 1139          Gait/Stairs (Locomotion)    Love Level (Gait) minimum assist (75% patient effort);moderate assist (50% patient effort);1 person assist  -AM     Assistive Device (Gait) walker, front-wheeled  -AM     Distance in Feet (Gait) 5  x2  -AM     Deviations/Abnormal Patterns (Gait) base of support, narrow;gait speed decreased;festinating/shuffling;dahlia decreased;stride length decreased  -AM     Bilateral Gait Deviations forward flexed posture  -AM     Comment, (Gait/Stairs) decreased endurance, increased tremoring note with standing activities.  Pt reports BLE numbness which makes stepping difficult  -AM               User Key  (r) = Recorded By, (t) = Taken By, (c) = Cosigned By      Initials Name Provider Type    AM Philip Stanley, PT Physical Therapist                   Obj/Interventions       Row Name 05/20/25 1141          Range of Motion Comprehensive    General Range of Motion no range of motion deficits identified  -AM       Row Name 05/20/25 1141          Strength Comprehensive (MMT)    Comment, General Manual Muscle Testing (MMT) Assessment BLEs: 4/5 throughout  -AM       Row Name 05/20/25 1141          Motor Skills    Motor Skills functional endurance  -AM     Functional  Endurance fair -  -AM       Row Name 05/20/25 1141          Balance    Balance Assessment sitting static balance;sitting dynamic balance;standing static balance;standing dynamic balance  -AM     Static Sitting Balance standby assist  -AM     Dynamic Sitting Balance standby assist  -AM     Position, Sitting Balance unsupported;sitting edge of bed  -AM     Static Standing Balance minimal assist  -AM     Dynamic Standing Balance minimal assist;moderate assist  -AM     Position/Device Used, Standing Balance supported;walker, front-wheeled  -AM       Row Name 05/20/25 1141          Sensory Assessment (Somatosensory)    Sensory Assessment (Somatosensory) other (see comments)  numbness BLE  -AM               User Key  (r) = Recorded By, (t) = Taken By, (c) = Cosigned By      Initials Name Provider Type    AM Philip Stanley, PT Physical Therapist                   Goals/Plan       Row Name 05/20/25 1204          Bed Mobility Goal 1 (PT)    Activity/Assistive Device (Bed Mobility Goal 1, PT) bed mobility activities, all  -AM     [place] Level/Cues Needed (Bed Mobility Goal 1, PT) modified independence  -AM     Time Frame (Bed Mobility Goal 1, PT) long term goal (LTG)  -AM       Row Name 05/20/25 1204          Transfer Goal 1 (PT)    Activity/Assistive Device (Transfer Goal 1, PT) transfers, all;walker, rolling  -AM     [place] Level/Cues Needed (Transfer Goal 1, PT) modified independence  -AM     Time Frame (Transfer Goal 1, PT) long term goal (LTG)  -AM       Row Name 05/20/25 1204          Gait Training Goal 1 (PT)    Activity/Assistive Device (Gait Training Goal 1, PT) gait (walking locomotion);walker, rolling  -AM     [place] Level (Gait Training Goal 1, PT) modified independence  -AM     Distance (Gait Training Goal 1, PT) 100'  -AM     Time Frame (Gait Training Goal 1, PT) long term goal (LTG)  -AM       Row Name 05/20/25 1204          Stairs Goal 1 (PT)    Activity/Assistive Device (Stairs Goal 1, PT)  stairs, all skills  -AM     Sebastian Level/Cues Needed (Stairs Goal 1, PT) contact guard required  -AM     Number of Stairs (Stairs Goal 1, PT) 3  -AM     Time Frame (Stairs Goal 1, PT) long term goal (LTG)  -AM       Row Name 05/20/25 1204          Therapy Assessment/Plan (PT)    Planned Therapy Interventions (PT) balance training;bed mobility training;gait training;strengthening;stair training;patient/family education;transfer training  -AM               User Key  (r) = Recorded By, (t) = Taken By, (c) = Cosigned By      Initials Name Provider Type    AM Philip Stanley, PT Physical Therapist                   Clinical Impression       Row Name 05/20/25 1143          Pain    Pretreatment Pain Rating 7/10  -AM     Posttreatment Pain Rating 7/10  -AM     Pain Location back  -AM     Pain Side/Orientation lower  -AM     Pain Management Interventions exercise or physical activity utilized  RN placed lidocaine patch prior to session  -AM     Response to Pain Interventions no change per patient report  -AM       Row Name 05/20/25 1143          Plan of Care Review    Plan of Care Reviewed With patient  -AM     Outcome Evaluation Pt is a 70 y/o female with mechanical fall in bathroom secondary to BLE numbness.  Pt fell forward into sink and then backwards/sideways into shower.  Pt's neck/head were struck during fall.    (-) LOC.  Pt now has c/o mid/lower back pain and neck pain.  (+) UTI.  Pt reports BLE numbness in feet have become progressively worse over past several months.  PMH:  PD, RLS, COPD, GERD, depression/anxiety.  CT C-spine: (-) acute.  CT head: (-) acute.  L-spine X-ray: (-) acute.  Pt reports she lives with her  in a 1 story home with 3 steps to enter into home.  Pt ambulates with st cane prior to hospitalization.  Pt wears home O2 at night.  Pt with 1L O2, telemetry and Purewick this date.  Pt required Min/Mod A for bed mobility.  Instructed pt in log-roll technique due to constant back pain.   Min/Mod  A for transfers with RW.  Ambulated 5' x 2 with RW wtih Min/Mod A.  Difficulty taking steps this date due to B feet numbness.   Increasing tremors noted with standing activities.  Pt is a very high fall risk at this time and is not safe to d/c home.  Recommend SNF.  -AM       Row Name 05/20/25 1143          Therapy Assessment/Plan (PT)    Patient/Family Therapy Goals Statement (PT) To get stronger  -AM     Rehab Potential (PT) good  -AM     Criteria for Skilled Interventions Met (PT) yes  -AM     Therapy Frequency (PT) 5 times/wk  -AM     Predicted Duration of Therapy Intervention (PT) until d/c  -AM       Row Name 05/20/25 1143          Vital Signs    O2 Delivery Pre Treatment nasal cannula  1L O2  -AM     O2 Delivery Intra Treatment nasal cannula  -AM     O2 Delivery Post Treatment nasal cannula  -AM     Pre Patient Position Supine  -AM     Intra Patient Position Standing  -AM     Post Patient Position Supine  -AM       Row Name 05/20/25 1143          Positioning and Restraints    Pre-Treatment Position in bed  -AM     Post Treatment Position bed  -AM     In Bed notified nsg;supine;call light within reach;encouraged to call for assist;exit alarm on  -AM               User Key  (r) = Recorded By, (t) = Taken By, (c) = Cosigned By      Initials Name Provider Type    AM Philip Stanley, PT Physical Therapist                   Outcome Measures       Row Name 05/20/25 1205 05/20/25 0845       How much help from another person do you currently need...    Turning from your back to your side while in flat bed without using bedrails? 3  -AM 4  -DD    Moving from lying on back to sitting on the side of a flat bed without bedrails? 2  -AM 4  -DD    Moving to and from a bed to a chair (including a wheelchair)? 2  -AM 3  -DD    Standing up from a chair using your arms (e.g., wheelchair, bedside chair)? 2  -AM 3  -DD    Climbing 3-5 steps with a railing? 1  -AM 3  -DD    To walk in hospital room? 2  -AM 3  -DD    AM-PAC  6 Clicks Score (PT) 12  -AM 20  -DD              User Key  (r) = Recorded By, (t) = Taken By, (c) = Cosigned By      Initials Name Provider Type    Rylee Del Toro LPN Licensed Nurse    Philip Hawk PT Physical Therapist                                 Physical Therapy Education       Title: PT OT SLP Therapies (Done)       Topic: Physical Therapy (Done)       Point: Mobility training (Done)       Learning Progress Summary            Patient Acceptance, E,TB, VU by AM at 5/20/2025 1205                      Point: Body mechanics (Done)       Learning Progress Summary            Patient Acceptance, E,TB, VU by AM at 5/20/2025 1205                      Point: Precautions (Done)       Learning Progress Summary            Patient Acceptance, E,TB, VU by AM at 5/20/2025 1205                                      User Key       Initials Effective Dates Name Provider Type Discipline    AM 05/10/21 -  Philip Stanley PT Physical Therapist PT                  PT Recommendation and Plan  Planned Therapy Interventions (PT): balance training, bed mobility training, gait training, strengthening, stair training, patient/family education, transfer training  Outcome Evaluation: Pt is a 70 y/o female with mechanical fall in bathroom secondary to BLE numbness.  Pt fell forward into sink and then backwards/sideways into shower.  Pt's neck/head were struck during fall.    (-) LOC.  Pt now has c/o mid/lower back pain and neck pain.  (+) UTI.  Pt reports BLE numbness in feet have become progressively worse over past several months.  PMH:  PD, RLS, COPD, GERD, depression/anxiety.  CT C-spine: (-) acute.  CT head: (-) acute.  L-spine X-ray: (-) acute.  Pt reports she lives with her  in a 1 story home with 3 steps to enter into home.  Pt ambulates with st cane prior to hospitalization.  Pt wears home O2 at night.  Pt with 1L O2, telemetry and Purewick this date.  Pt required Min/Mod A for bed mobility.  Instructed pt in  log-roll technique due to constant back pain.  Min/Mod  A for transfers with RW.  Ambulated 5' x 2 with RW wtih Min/Mod A.  Difficulty taking steps this date due to B feet numbness.   Increasing tremors noted with standing activities.  Pt is a very high fall risk at this time and is not safe to d/c home.  Recommend SNF.     Time Calculation:         PT Charges       Row Name 05/20/25 1206             Time Calculation    Start Time 0842  -AM      Stop Time 0907  -AM      Time Calculation (min) 25 min  -AM      PT Received On 05/20/25  -AM      PT - Next Appointment 05/21/25  -AM      PT Goal Re-Cert Due Date 06/03/25  -AM                User Key  (r) = Recorded By, (t) = Taken By, (c) = Cosigned By      Initials Name Provider Type    AM Philip Stanley, PT Physical Therapist                  Therapy Charges for Today       Code Description Service Date Service Provider Modifiers Qty    10691492487 HC PT EVAL MOD COMPLEXITY 4 5/20/2025 Philip Stanley, PT GP 1            PT G-Codes  AM-PAC 6 Clicks Score (PT): 12  PT Discharge Summary  Anticipated Discharge Disposition (PT): skilled nursing facility    Philip Stanley, [id number]  5/20/2025

## 2025-05-20 NOTE — PLAN OF CARE
Goal Outcome Evaluation:  Plan of Care Reviewed With: patient           Outcome Evaluation: Pt is a 68 y/o female with mechanical fall in bathroom secondary to BLE numbness.  Pt fell forward into sink and then backwards/sideways into shower.  Pt's neck/head were struck during fall.    (-) LOC.  Pt now has c/o mid/lower back pain and neck pain.  (+) UTI.  Pt reports BLE numbness in feet have become progressively worse over past several months.  PMH:  PD, RLS, COPD, GERD, depression/anxiety.  CT C-spine: (-) acute.  CT head: (-) acute.  L-spine X-ray: (-) acute.  Pt reports she lives with her  in a 1 story home with 3 steps to enter into home.  Pt ambulates with st cane prior to hospitalization.  Pt wears home O2 at night.  Pt with 1L O2, telemetry and Purewick this date.  Pt required Min/Mod A for bed mobility.  Instructed pt in log-roll technique due to constant back pain.  Min/Mod  A for transfers with RW.  Ambulated 5' x 2 with RW wt Min/Mod A.  Difficulty taking steps this date due to B feet numbness.   Increasing tremors noted with standing activities.  Pt is a very high fall risk at this time and is not safe to d/c home.  Recommend SNF.    Anticipated Discharge Disposition (PT): skilled nursing facility

## 2025-05-20 NOTE — PROGRESS NOTES
[facility] MEDICINE SERVICE  DAILY PROGRESS NOTE    NAME: Dariana Rivera  : 1955  MRN: [id number]      LOS: 0 days     PROVIDER OF SERVICE: Sagar Way MD    Chief Complaint: Fall    Subjective:     Interval History:  History taken from: patient chart family    Progressive lower extremity weakness      Review of Systems:   Review of Systems  All negative except as above  Objective:     Vital Signs  Temp:  [97.2 °F (36.2 °C)-98.4 °F (36.9 °C)] 98.3 °F (36.8 °C)  Heart Rate:  [56-75] 68  Resp:  [12-20] 18  BP: (111-149)/(54-80) 141/71  Flow (L/min) (Oxygen Therapy):  [1] 1   Body mass index is 32.39 kg/m².    Physical Exam  Physical Exam  AO x 3 NAD  RRR S1-S2 audible  Lungs with fair air entry  Abdomen soft nontender nondistended  Bilateral lower extremity motor 2 out of 5     Diagnostic Data    Results from last 7 days   Lab Units 25  0019 25  1216   WBC 10*3/mm3 6.48 8.51   HEMOGLOBIN g/dL 14.1 15.0   HEMATOCRIT % 43.2 44.7   PLATELETS 10*3/mm3 128* 133*   GLUCOSE mg/dL 117* 87   CREATININE mg/dL 0.86 0.81   BUN mg/dL 27* 32*   SODIUM mmol/L 139 139   POTASSIUM mmol/L 3.7 4.3   AST (SGOT) U/L  --  22   ALT (SGPT) U/L  --  16   ALK PHOS U/L  --  61   BILIRUBIN mg/dL  --  0.8   ANION GAP mmol/L 12.3 13.1       CT Cervical Spine Without Contrast  Result Date: 2025  Impression: 1.No acute cervical spine findings. 2.C3-4 and C6-7 anterior fusion hardware appears appropriately seated. 3.Degenerative changes of the cervical spine as described above. Electronically Signed: Maria L Foster MD  2025 12:32 PM EDT  Workstation ID: [id number]    CT Head Without Contrast  Result Date: 2025  Impression: No acute intracranial findings. Electronically Signed: Maria L Foster MD  2025 12:02 PM EDT  Workstation ID: [id number]    XR Spine Lumbar Complete 4+VW  Result Date: 2025  Impression: No acute lumbar spine findings. Degenerative changes of the lumbar spine as described  above. Electronically Signed: Maria L Foster MD  5/19/2025 11:50 AM EDT  Workstation ID: [id number]        I reviewed the patient's new clinical results.  I reviewed the patient's new imaging results and agree with the interpretation.    Assessment/Plan:     Active and Resolved Problems  Active Hospital Problems    Diagnosis  POA    **Fall [W19.XXXA]  Yes      Resolved Hospital Problems   No resolved problems to display.       69-year-old female with history of Parkinson's, RLS, COPD, hypertension, hyperlipidemia, DM2, GERD, anxiety/depression admitted to [facility] 5/19 with fall at home    #Fall at home appears to be in setting of bilateral lower extremity weakness  #Chronic back pain status post cervical spine surgery  #RLS  #Parkinson's  Patient has multiple risk factors for falls.  No reported loss of consciousness does endorse progressive bilateral lower extremity weakness last few months  Patient reports unable to have MRI given bladder stimulator placed 5 years ago    CT lumbar spine  CT head, CT C-spine, x-ray lumbar in emergency room no acute findings  PT/OT  At home takes Klonopin.  Has been on it for multiple years not interested in discontinuing continue home Requip, amantadine and Sinemet      #Abnormal UA  Patient with no urinary symptoms  History of bladder stimulator 5 years ago  Currently on ceftriaxone follow urine culture    #DM2  Hold OHA  ISS lispro  Diabetic diet    #COPD not in exacerbation  Continue current nebs    #Anxiety/depression  Continue home medications    VTE Prophylaxis:  Pharmacologic VTE prophylaxis orders are present.             Disposition Planning:     Barriers to Discharge: Medical workup  Anticipated Date of Discharge: 1 to 2 days  Place of Discharge: Home versus rehab      Time: 45 minutes     Code Status and Medical Interventions: CPR (Attempt to Resuscitate); Full Support   Ordered at: 05/19/25 0144     Code Status (Patient has no pulse and is not breathing):    CPR  (Attempt to Resuscitate)     Medical Interventions (Patient has pulse or is breathing):    Full Support     Level Of Support Discussed With:    Patient       Signature: Electronically signed by Sagar Way MD, 05/20/25, 08:06 EDT.  [facility] Hospitalist Team

## 2025-05-20 NOTE — THERAPY EVALUATION
Patient Name: Dariana Rivera  : 1955    MRN: [id number]                              Today's Date: 2025       Admit Date: 2025    Visit Dx:     ICD-10-CM ICD-9-CM   1. Urinary tract infection without hematuria, site unspecified  N39.0 599.0   2. Mobility impaired  Z74.09 799.89   3. Numbness and tingling of both feet  R20.0 782.0    R20.2    4. Fall, initial encounter  W19.XXXA E888.9     Patient Active Problem List   Diagnosis    Morbid obesity    Bipolar 1 disorder, depressed, moderate    Chronic back pain    Chronic kidney disease, unspecified    Chronic obstructive pulmonary disease    Cirrhosis    Gastroesophageal reflux disease    Hypertension    Sleep apnea    Parkinson's disease    Recurrent urinary tract infection    Seizure    Spinal stenosis of cervical region    Type 2 diabetes mellitus with other diabetic neurological complication    Generalized anxiety disorder    Hyperlipidemia    Esophageal varices without bleeding    Injury of cervical spine    Cor pulmonale    Dysphagia    Encounter for annual wellness exam in Medicare patient    Idiopathic osteoarthritis    Degeneration of lumbar intervertebral disc    Panniculitis affecting back    Panniculitis of neck    Spinal stenosis of lumbar region    Osteopenia after menopause    UTI (urinary tract infection)    Fall     Past Medical History:   Diagnosis Date    Allergic Not sure    Penicillin, cipro, clindamycin, methadone, ambien    Angina at rest     DR. Amador    Anxiety disorder     [facility]     Asthma About 5 yrs ago    Shortness of breath with exertion    Back pain     Bipolar disorder     Cataract     Left & right removed in 3/2016    Cervical dystonia     Cholelithiasis     Gallbladder removed in  or     Chronic pain     with stenosis    Chronic pain disorder     Cervical, lumbar, L knee, both shoulders    Cirrhosis     Colitis     Colon polyps     COPD (chronic obstructive pulmonary disease)     Not sure when  diagnosed    Cramping of feet     Cramping of hands     DDD (degenerative disc disease), cervical     DDD (degenerative disc disease), lumbar     DDD (degenerative disc disease), lumbar     DDD (degenerative disc disease), thoracic     Depression     Diabetes mellitus     Not sure when diagnosed.    Diverticulosis     Not sure when diagnosed    Dysphagia 09/2020    Eosinophilic colitis     Gastritis     GERD (gastroesophageal reflux disease)     Headache Not sure    Migraines stopped after complete hyst in 2004    Hepatic steatosis     non alcoholic steo-hepatitis     Hx of lithotripsy 02/10/2021    Hypertension     IBS (irritable bowel syndrome)     Infectious viral hepatitis     ALEXANDER    Insomnia     Kidney stones     Low back pain     Not sure when diagnosed    Lumbar stenosis     Neck pain     Neuromuscular disorder Not sure    Parkinsons and Cervical Dystonia    Neuropathy     Obesity     Obese since about 1966    Osteoarthritis     Osteopenia     Osteopenia diagnosed after bone density test in 2024.    Parkinson's disease     Peripheral edema     Pneumonia May 2018    About a month after neck fusion surgery    PTSD (post-traumatic stress disorder)     Recurrent UTI     Renal insufficiency     Not sure when diagnosed    Seizure     onset 7/2016.  Last seizure 10/2019    Self-injurious behavior     Tried to commit suicide in 2002    Sleep apnea     Using Bipap machine at night. advised to bring dos    Sleep apnea, obstructive     Suicide attempt     In 2001 or 2002    Urinary incontinence      Past Surgical History:   Procedure Laterality Date    BACK SURGERY      BRONCHOSCOPY  Date Unknown    CARDIAC CATHETERIZATION  02/2019    CARPAL TUNNEL RELEASE Bilateral     Wrist     CHOLECYSTECTOMY  1997    COLONOSCOPY      CYSTOSCOPY BLADDER STONE LITHOTRIPSY      ENDOMETRIAL ABLATION      ENDOSCOPY      ENDOSCOPY N/A 04/15/2020    Procedure: ESOPHAGOGASTRODUODENOSCOPY with dilitation (60FR.);  Surgeon: Julieta Allison,  MD;  Location: [facility] ENDOSCOPY;  Service: Gastroenterology;  Laterality: N/A;  varices,hiatal hernia,gastritis    ENDOSCOPY N/A 09/29/2020    Procedure: ESOPHAGOGASTRODUODENOSCOPY with esophageal dilitation (54 bougie);  Surgeon: Julieta Allison MD;  Location: [facility] ENDOSCOPY;  Service: Gastroenterology;  Laterality: N/A;   post op: hiatal hernia, esophageal stricture    ENDOSCOPY N/A 09/30/2021    Procedure: ESOPHAGOGASTRODUODENOSCOPY WITH DILATATION (BOUGIE #60);  Surgeon: Julieta Allison MD;  Location: [facility] ENDOSCOPY;  Service: Gastroenterology;  Laterality: N/A;  ESOPHAGEAL VARICES AND HIATAL HERNIA    ENDOSCOPY N/A 07/21/2022    Procedure: ESOPHAGOGASTRODUODENOSCOPY WITH BIOPSY X1 AREA  AND DILATATION (BOUGIE  #54);  Surgeon: Zachary Mcgarry MD;  Location: [facility] ENDOSCOPY;  Service: Gastroenterology;  Laterality: N/A;  Post: candida esophagitis,gastritis    ENDOSCOPY N/A 05/02/2023    Procedure: ESOPHAGOGASTRODUODENOSCOPY WITH BIOPSY X 1  AREA  AND DILATATON (48 NON GUIDED BOUGIE);  Surgeon: Zachary Mcgarry MD;  Location: [facility] ENDOSCOPY;  Service: Gastroenterology;  Laterality: N/A;  post op: GASTRITIS, SMALL ESOPHAGEAL VARICES    ENDOSCOPY N/A 02/10/2025    Procedure: ESOPHAGOGASTRODUODENOSCOPY, biopsy x2 areas;  Surgeon: Zachary Mcgarry MD;  Location: [facility] ENDOSCOPY;  Service: Gastroenterology;  Laterality: N/A;  post: gastritis, abnormal mucosa mid esophagus    EYE SURGERY  3-14 & 3-    Cataract surgery R 3-14, L 3-    HYSTERECTOMY  09/2004    complete    INTERSTIM PLACEMENT      bladder    JOINT REPLACEMENT Bilateral     knees    KNEE ARTHROSCOPY Bilateral     Arthroscopic surgery to bilateral knees     OTHER SURGICAL HISTORY  2015    Stress test     OTHER SURGICAL HISTORY  10/2016    Lithotripsy total 4-5    OTHER SURGICAL HISTORY      Right arm cellulits- spider bites     OTHER SURGICAL HISTORY  02/08/2018    Lithotripsy    OTHER SURGICAL HISTORY   04/20/2018    ACDF C3-C4 & C6-C7    REPLACEMENT TOTAL KNEE  2000    REPLACEMENT TOTAL KNEE Left 11/2016    SHOULDER ROTATOR CUFF REPAIR Right 01/08/2020    Procedure: RT ROTATOR CUFF REPAIR OPEN;  Surgeon: Curly Vaughn MD;  Location: [facility] MAIN OR;  Service: Orthopedics    SPINE SURGERY  04/2018    Fusion surgery C3-C4 and C6-C7      General Information       Row Name 05/20/25 1338          OT Time and Intention    Document Type evaluation  -JK     Mode of Treatment occupational therapy  -QioK       Row Name 05/20/25 1330          General Information    Patient Profile Reviewed yes  -JK     Prior Level of Function independent:;all household mobility;min assist:;ADL's  CG support 4 hrs a day 4 days a week  -JK     Existing Precautions/Restrictions fall;oxygen therapy device and L/min  -JK     Barriers to Rehab medically complex;previous functional deficit  -JK       Row Name 05/20/25 1338          Occupational Profile    Reason for Services/Referral (Occupational Profile) Pt is a 68 yo F brought to hospital after a fall at home due to BLE numbness . CT head, CT c spine, and lumbar xray all negative for acute findings. Pt diangosed with UTI. Pt has Pmhx including parkinsons, RLS, COPD, htn, hld, T2DM, cirrhosis, kidney stones, GERD, depression/anxiety.    At [facility], pt lives with her  in a H with 3 steps to enter. Pt uses a cane but also has a RW. Pt reports having caregiver support at home 4 days a week for 4 hours at a time who help with ADLs and IADLs. Pt is responsible for toileting IND and dressing/cooking when CG not present.  -Runa       Row Name 05/20/25 1338          Living Environment    Current Living Arrangements home  -JK     People in Home spouse  -Runa       Row Name 05/20/25 1338          Home Main Entrance    Number of Stairs, Main Entrance three  -JK     Stair Railings, Main Entrance railings safe and in good condition  -Runa       Row Name 05/20/25 1335          Stairs Within Home, Primary     Number of Stairs, Within Home, Primary none  -[place]       Row Name 05/20/25 1338          Cognition    Orientation Status (Cognition) oriented x 4  -       Row Name 05/20/25 1338          Safety Issues/Impairments Affecting Functional Mobility    Impairments Affecting Function (Mobility) balance;endurance/activity tolerance;pain;strength;sensation/sensory awareness  -               User Key  (r) = Recorded By, (t) = Taken By, (c) = Cosigned By      Initials Name Provider Type    Vicenta Pedraza OT Occupational Therapist                     Mobility/ADL's       Row Name 05/20/25 1340          Bed Mobility    Bed Mobility supine-sit;sit-supine  -     Supine-Sit [place] (Bed Mobility) maximum assist (25% patient effort)  -     Sit-Supine [place] (Bed Mobility) moderate assist (50% patient effort)  -     Assistive Device (Bed Mobility) head of bed elevated;bed rails  -     Comment, (Bed Mobility) log roll  -       Row Name 05/20/25 1340          Transfers    Transfers sit-stand transfer;stand-sit transfer  -[facility] Name 05/20/25 1340          Sit-Stand Transfer    Sit-Stand [place] (Transfers) moderate assist (50% patient effort)  -[place]       Row Name 05/20/25 1340          Stand-Sit Transfer    Stand-Sit [place] (Transfers) moderate assist (50% patient effort)  -[facility] Name 05/20/25 1340          Activities of Daily Living    BADL Assessment/Intervention lower body dressing;grooming;feeding  -       Row Name 05/20/25 1340          Lower Body Dressing Assessment/Training    [place] Level (Lower Body Dressing) lower body dressing skills;dependent (less than 25% patient effort)  -     Position (Lower Body Dressing) edge of bed sitting  -       Row Name 05/20/25 1340          Grooming Assessment/Training    [place] Level (Grooming) grooming skills;hair care, combing/brushing;minimum assist (75% patient effort)  -     Position (Grooming) edge of bed sitting  -        Row Name 05/20/25 1340          Self-Feeding Assessment/Training    [place] Level (Feeding) feeding skills;minimum assist (75% patient effort);liquids to mouth  -JK     Position (Feeding) sitting up in bed  -JK               User Key  (r) = Recorded By, (t) = Taken By, (c) = Cosigned By      Initials Name Provider Type    Vicenta Pedraza OT Occupational Therapist                   Obj/Interventions       Row Name 05/20/25 1341          Sensory Assessment (Somatosensory)    Sensory Assessment (Somatosensory) UE sensation intact  -JK     Sensory Assessment numbness BLE  -JK       Row Name 05/20/25 1341          Range of Motion Comprehensive    General Range of Motion bilateral upper extremity ROM WNL  -JK       [facility] Name 05/20/25 1341          Strength Comprehensive (MMT)    General Manual Muscle Testing (MMT) Assessment upper extremity strength deficits identified  -JK     Comment, General Manual Muscle Testing (MMT) Assessment BUE grossly 4/5  -JK       Row Name 05/20/25 1341          Motor Skills    Motor Skills coordination  -JK     Coordination other (see comments)  BUE tremors, increased with sitting EOB  -JK       Row Name 05/20/25 1341          Balance    Balance Assessment sitting static balance;sitting dynamic balance;standing static balance;standing dynamic balance  -JK     Static Sitting Balance contact guard  -JK     Dynamic Sitting Balance minimal assist  -JK     Position, Sitting Balance unsupported;sitting edge of bed  -JK     Static Standing Balance minimal assist  -JK     Dynamic Standing Balance moderate assist  -JK     Position/Device Used, Standing Balance supported  -JK               User Key  (r) = Recorded By, (t) = Taken By, (c) = Cosigned By      Initials Name Provider Type    Vicenta Pedraza OT Occupational Therapist                   Goals/Plan       Row Name 05/20/25 1345          Transfer Goal 1 (OT)    Activity/Assistive Device (Transfer Goal 1, OT) transfers,  all;toilet;bed-to-chair/chair-to-bed;sit-to-stand/stand-to-sit  -JK     [place] Level/Cues Needed (Transfer Goal 1, OT) standby assist  -JK     Time Frame (Transfer Goal 1, OT) 2 weeks  -       Row Name 05/20/25 1345          Dressing Goal 1 (OT)    Activity/Device (Dressing Goal 1, OT) lower body dressing  -JK     [place]/Cues Needed (Dressing Goal 1, OT) minimum assist (75% or more patient effort)  -JK     Time Frame (Dressing Goal 1, OT) 2 weeks  -       Row Name 05/20/25 1345          Toileting Goal 1 (OT)    Activity/Device (Toileting Goal 1, OT) toileting skills, all  -JK     [place] Level/Cues Needed (Toileting Goal 1, OT) contact guard required  -JK     Time Frame (Toileting Goal 1, OT) 2 weeks  -[facility] Name 05/20/25 1341          Self-Feeding Goal 1 (OT)    Activity/Device (Self-Feeding Goal 1, OT) self-feeding skills, all  -JK     [place] Level/Cues Needed (Self-Feeding Goal 1, OT) set-up required  -JK     Time Frame (Self-Feeding Goal 1, OT) 2 weeks  -[employer] Name 05/20/25 1340          Problem Specific Goal 1 (OT)    Problem Specific Goal 1 (OT) increase standing tolerance for ADLs to 3 minutes SBA  -JK     Time Frame (Problem Specific Goal 1, OT) 2 weeks  -[facility] Name 05/20/25 1347          Therapy Assessment/Plan (OT)    Planned Therapy Interventions (OT) activity tolerance training;adaptive equipment training;BADL retraining;functional balance retraining;occupation/activity based interventions;neuromuscular control/coordination retraining;patient/caregiver education/training;ROM/therapeutic exercise;strengthening exercise;transfer/mobility retraining  -JK               User Key  (r) = Recorded By, (t) = Taken By, (c) = Cosigned By      Initials Name Provider Type    Vicenta Pedraza, OT Occupational Therapist                   Clinical Impression       Row Name 05/20/25 1348          Pain Assessment    Pretreatment Pain Rating 7/10  -JK     Posttreatment  Pain Rating 7/10  -JK     Pain Location back  -JK     Pain Side/Orientation bilateral;medial;lower  -JK     Pain Management Interventions activity modification encouraged;exercise or physical activity utilized  -JK     Response to Pain Interventions activity participation with tolerable pain  -JK       Row Name 05/20/25 1343          Plan of Care Review    Plan of Care Reviewed With patient  -JK     Outcome Evaluation Pt is a 68 yo F brought to hospital after a fall at home due to BLE numbness . CT head, CT c spine, and lumbar xray all negative for acute findings. Pt diangosed with UTI. Pt has Pmhx including parkinsons, RLS, COPD, htn, hld, T2DM, cirrhosis, kidney stones, GERD, depression/anxiety.    At [facility], pt lives with her  in a SSH with 3 steps to enter. Pt uses a cane but also has a RW. Pt reports having caregiver support at home 4 days a week for 4 hours at a time who help with ADLs and IADLs. Pt is responsible for toileting IND and dressing/cooking when CG not present.     Upon OT evaluation, pt is on 1L O2 and oriented x 4. Pt required max A for supine to sit transfer with log roll technique to reduce pain in back. Pt required CGA- min A for sitting balance EOB with increased tremors upon sitting EOB. Pt required total A for LB dressing and min A with combing hair and bringing water to mouth for self feeding due to tremors. Pt stood from EOB with moderate A to take side steps. Pt very unsteady with standing with min A static standing balance and mod A dynamic standing balance. Recommending pt d/c SNF when medically appropriate as pt is not safe to return home at this time.  -NICK       Row Name 05/20/25 4652          Therapy Assessment/Plan (OT)    Rehab Potential (OT) fair  -JK     Criteria for Skilled Therapeutic Interventions Met (OT) yes;skilled treatment is necessary  -JK     Therapy Frequency (OT) 3 times/wk  -JK     Predicted Duration of Therapy Intervention (OT) until d/c  -JK       Row Name  05/20/25 1343          Therapy Plan Review/Discharge Plan (OT)    Anticipated Discharge Disposition (OT) skilled nursing facility  -       Row Name 05/20/25 1343          Vital Signs    Pre Systolic BP Rehab 139  -JK     Pre Treatment Diastolic BP 73  -JK     Intra Systolic BP Rehab 105  -JK     Intra Treatment Diastolic BP 77  -JK     Pretreatment Heart Rate (beats/min) 76  -JK     Intratreatment Heart Rate (beats/min) 76  -JK     Pre SpO2 (%) 96  -JK     O2 Delivery Pre Treatment nasal cannula  1L  -JK     Intra SpO2 (%) 96  -JK     O2 Delivery Intra Treatment nasal cannula  1L  -JK     Pre Patient Position Supine  -JK     Intra Patient Position Sitting  -JK       Row Name 05/20/25 1343          Positioning and Restraints    Pre-Treatment Position in bed  -JK     Post Treatment Position bed  -JK     In Bed notified nsg;supine;call light within reach;encouraged to call for assist;exit alarm on  -JK               User Key  (r) = Recorded By, (t) = Taken By, (c) = Cosigned By      Initials Name Provider Type    Vicenta Pedraza, OT Occupational Therapist                   Outcome Measures       Row Name 05/20/25 1345          How much help from another is currently needed...    Putting on and taking off regular lower body clothing? 1  -JK     Bathing (including washing, rinsing, and drying) 2  -JK     Toileting (which includes using toilet bed pan or urinal) 1  -JK     Putting on and taking off regular upper body clothing 2  -JK     Taking care of personal grooming (such as brushing teeth) 3  -JK     Eating meals 3  -JK     AM-PAC 6 Clicks Score (OT) 12  -JK       Row Name 05/20/25 1205 05/20/25 0845       How much help from another person do you currently need...    Turning from your back to your side while in flat bed without using bedrails? 3  -AM 4  -DD    Moving from lying on back to sitting on the side of a flat bed without bedrails? 2  -AM 4  -DD    Moving to and from a bed to a chair (including a  wheelchair)? 2  -AM 3  -DD    Standing up from a chair using your arms (e.g., wheelchair, bedside chair)? 2  -AM 3  -DD    Climbing 3-5 steps with a railing? 1  -AM 3  -DD    To walk in hospital room? 2  -AM 3  -DD    AM-PAC 6 Clicks Score (PT) 12  -AM 20  -DD      Row Name 05/20/25 1345          Functional Assessment    Outcome Measure Options AM-PAC 6 Clicks Daily Activity (OT)  -NICK               User Key  (r) = Recorded By, (t) = Taken By, (c) = Cosigned By      Initials Name Provider Type    DD Rylee Tabares LPN Licensed Nurse    AM Philip Stanley, PT Physical Therapist    Vicenta Pedraza, OT Occupational Therapist                      OT Recommendation and Plan  Planned Therapy Interventions (OT): activity tolerance training, adaptive equipment training, BADL retraining, functional balance retraining, occupation/activity based interventions, neuromuscular control/coordination retraining, patient/caregiver education/training, ROM/therapeutic exercise, strengthening exercise, transfer/mobility retraining  Therapy Frequency (OT): 3 times/wk  Plan of Care Review  Plan of Care Reviewed With: patient  Outcome Evaluation: Pt is a 70 yo F brought to hospital after a fall at home due to BLE numbness . CT head, CT c spine, and lumbar xray all negative for acute findings. Pt diangosed with UTI. Pt has Pmhx including parkinsons, RLS, COPD, htn, hld, T2DM, cirrhosis, kidney stones, GERD, depression/anxiety.    At [facility], pt lives with her  in a H with 3 steps to enter. Pt uses a cane but also has a RW. Pt reports having caregiver support at home 4 days a week for 4 hours at a time who help with ADLs and IADLs. Pt is responsible for toileting IND and dressing/cooking when CG not present.     Upon OT evaluation, pt is on 1L O2 and oriented x 4. Pt required max A for supine to sit transfer with log roll technique to reduce pain in back. Pt required CGA- min A for sitting balance EOB with increased tremors upon  sitting EOB. Pt required total A for LB dressing and min A with combing hair and bringing water to mouth for self feeding due to tremors. Pt stood from EOB with moderate A to take side steps. Pt very unsteady with standing with min A static standing balance and mod A dynamic standing balance. Recommending pt d/c SNF when medically appropriate as pt is not safe to return home at this time.     Time Calculation:         Time Calculation- OT       Row Name 05/20/25 1346             Time Calculation- OT    OT Start Time 0924  -NICK      OT Stop Time 0944  -NICK      OT Time Calculation (min) 20 min  -NICK      OT Non-Billable Time (min) 20 min  -NICK      OT Received On 05/20/25  -NICK      OT - Next Appointment 05/22/25  -NICK      OT Goal Re-Cert Due Date 06/03/25  -NICK                User Key  (r) = Recorded By, (t) = Taken By, (c) = Cosigned By      Initials Name Provider Type    Vicenta Pedraza OT Occupational Therapist                  Therapy Charges for Today       Code Description Service Date Service Provider Modifiers Qty    [id number] HC OT EVAL MOD COMPLEXITY 4 5/20/2025 Vicenta Olmedo OT GO 1                 Vicenta Olmedo OT  5/20/2025

## 2025-05-20 NOTE — PLAN OF CARE
Goal Outcome Evaluation:   Patient alert and oriented x 4. Able to make needs known. Pain treated per medication on the MAR. Q2 turn. Personal items and call light in reach. Plan of care is ongoing.

## 2025-05-20 NOTE — CASE MANAGEMENT/SOCIAL WORK
[id number]     Peter Galindo MRN: [id number]  AGE: 85 year old  ADMIT DATE: 5/6/2022    CODE STATUS: Full Resuscitation  ISOLATION STATUS: No active isolations   DIET: One Time Diet Cardiac  Sodium 2 Gm (low Sodium), Fluid Restrict 1500ml (900 From Dietary) Diet    ALLERGIES:  Zosyn     DX:Moderate asthma with exacerbation, unspecified whether persistent  (primary encounter diagnosis)  Acute on chronic congestive heart failure, unspecified heart failure type (CMS/[facility])     Att: Moise Rowe MD  PCP: Moise Rowe MD  IP Consult Orders (From admission, onward)             Start     Ordered    05/06/22 1642  Inpatient consult to Cardiology  ONE TIME        Provider:  Miles Turner MD    05/06/22 1642                    BP: 122/65  Temp: 97.5 °F (36.4 °C)  Temp src: Oral  Heart Rate: (!) 102  Resp: 16  SpO2: 93 %  Height: 5' 7\" (170.2 cm)  Weight: 131.5 kg (289 lb 14.5 oz)   Weight change:      Recent Labs   Lab 05/06/22  2105 05/07/22  0747 05/07/22  1123 05/07/22  1702   GLUCOSE BEDSIDE 378* 306* 459* 297*        Creatinine (mg/dL)   Date Value   05/07/2022 1.08   05/06/2022 1.10     PTT (sec)   Date Value   05/06/2022 32 (H)     INR (no units)   Date Value   05/06/2022 2.2     WBC   Date Value   05/06/2022 11.8 K/mcL (H)   11/09/2021 11.0 10'3/uL (H)        I/O last 3 completed shifts:  In: 920 [P.O.:920]  Out: 3595 [Urine:3595]                         IMPORTANT EVENTS THIS SHIFT:  No acute changes during day shift. BI=468 @ 1123- called Dr. Ugo ANDERSON ordered to discontinue solumedrol 80mg IV q 6hrs & changed to prednisone 40mg po daily, Jardiance 10mg daily & give Humalog 15 units subq. Will cont to monitor. Echo done today: EF=55% IMPORTANT EVENTS COMING UP/GOALS (PLEASE INCLUDE WHITE BOARD AND DISCHARGE BOARD UPDATES):       PATIENT SPECIAL NEEDS/ACCOMMODATIONS:  DM. On Fluid Restrictions 1500ml/24hrs (900ml dietary).                                 Continued Stay Note  ANNABELLE Pack     Patient Name: Dariana Rivera  MRN: [id number]  Today's Date: 5/20/2025    Admit Date: 5/19/2025    Plan: From Home with Spouse; PT/OT Evals Pending   Discharge Plan       Row Name 05/20/25 0934       Plan    Plan Comments DC Barriers: PT/OT Evals Pending; IV ABX               Lesly Schroeder RN    Phone [phone]  Fax [phone]

## 2025-05-20 NOTE — PLAN OF CARE
Goal Outcome Evaluation:  Plan of Care Reviewed With: patient           Outcome Evaluation: Pt is a 68 yo F brought to hospital after a fall at home due to BLE numbness . CT head, CT c spine, and lumbar xray all negative for acute findings. Pt diangosed with UTI. Pt has Pmhx including parkinsons, RLS, COPD, htn, hld, T2DM, cirrhosis, kidney stones, GERD, depression/anxiety.    At [facility], pt lives with her  in a SSH with 3 steps to enter. Pt uses a cane but also has a RW. Pt reports having caregiver support at home 4 days a week for 4 hours at a time who help with ADLs and IADLs. Pt is responsible for toileting IND and dressing/cooking when CG not present.     Upon OT evaluation, pt is on 1L O2 and oriented x 4. Pt required max A for supine to sit transfer with log roll technique to reduce pain in back. Pt required CGA- min A for sitting balance EOB with increased tremors upon sitting EOB. Pt required total A for LB dressing and min A with combing hair and bringing water to mouth for self feeding due to tremors. Pt stood from EOB with moderate A to take side steps. Pt very unsteady with standing with min A static standing balance and mod A dynamic standing balance. Recommending pt d/c SNF when medically appropriate as pt is not safe to return home at this time.    Anticipated Discharge Disposition (OT): skilled nursing facility

## 2025-05-20 NOTE — PLAN OF CARE
Goal Outcome Evaluation:   Patient Aox4, Ax2, external cath, ACHS. Remains on IV antibiotics for UTI. No complaints of pain when lying still. Patient experiences pain in back when being moved. Patient has visible tremors from parkinson's. Patients heel red and blanchable. Can make needs known. Call light within reach.

## 2025-05-21 ENCOUNTER — APPOINTMENT (OUTPATIENT)
Dept: ULTRASOUND IMAGING | Facility: HOSPITAL | Age: 70
DRG: 552 | End: 2025-05-21
Payer: MEDICARE

## 2025-05-21 ENCOUNTER — TELEPHONE (OUTPATIENT)
Dept: NEUROSURGERY | Facility: CLINIC | Age: 70
End: 2025-05-21

## 2025-05-21 LAB
BACTERIA SPEC AEROBE CULT: ABNORMAL
GLUCOSE BLDC GLUCOMTR-MCNC: 118 MG/DL (ref 70–105)
GLUCOSE BLDC GLUCOMTR-MCNC: 144 MG/DL (ref 70–105)
GLUCOSE BLDC GLUCOMTR-MCNC: 167 MG/DL (ref 70–105)

## 2025-05-21 PROCEDURE — 94799 UNLISTED PULMONARY SVC/PX: CPT

## 2025-05-21 PROCEDURE — 25010000002 HYDROMORPHONE PER 4 MG: Performed by: STUDENT IN AN ORGANIZED HEALTH CARE EDUCATION/TRAINING PROGRAM

## 2025-05-21 PROCEDURE — 94761 N-INVAS EAR/PLS OXIMETRY MLT: CPT

## 2025-05-21 PROCEDURE — 99221 1ST HOSP IP/OBS SF/LOW 40: CPT

## 2025-05-21 PROCEDURE — 82948 REAGENT STRIP/BLOOD GLUCOSE: CPT

## 2025-05-21 PROCEDURE — 25010000002 CEFTRIAXONE PER 250 MG: Performed by: STUDENT IN AN ORGANIZED HEALTH CARE EDUCATION/TRAINING PROGRAM

## 2025-05-21 PROCEDURE — 82948 REAGENT STRIP/BLOOD GLUCOSE: CPT | Performed by: STUDENT IN AN ORGANIZED HEALTH CARE EDUCATION/TRAINING PROGRAM

## 2025-05-21 PROCEDURE — 76775 US EXAM ABDO BACK WALL LIM: CPT

## 2025-05-21 PROCEDURE — 25010000002 ONDANSETRON PER 1 MG: Performed by: STUDENT IN AN ORGANIZED HEALTH CARE EDUCATION/TRAINING PROGRAM

## 2025-05-21 PROCEDURE — 94664 DEMO&/EVAL PT USE INHALER: CPT

## 2025-05-21 PROCEDURE — 25010000002 ENOXAPARIN PER 10 MG: Performed by: STUDENT IN AN ORGANIZED HEALTH CARE EDUCATION/TRAINING PROGRAM

## 2025-05-21 PROCEDURE — 76014 MR SFTY IMPLT&/FB ASMT STF 1: CPT

## 2025-05-21 PROCEDURE — 63710000001 INSULIN LISPRO (HUMAN) PER 5 UNITS: Performed by: STUDENT IN AN ORGANIZED HEALTH CARE EDUCATION/TRAINING PROGRAM

## 2025-05-21 RX ADMIN — BISACODYL 5 MG: 5 TABLET, COATED ORAL at 21:18

## 2025-05-21 RX ADMIN — CEFTRIAXONE 2000 MG: 2 INJECTION, POWDER, FOR SOLUTION INTRAMUSCULAR; INTRAVENOUS at 13:35

## 2025-05-21 RX ADMIN — PENTOXIFYLLINE 400 MG: 400 TABLET, EXTENDED RELEASE ORAL at 17:21

## 2025-05-21 RX ADMIN — HYDROMORPHONE HYDROCHLORIDE 0.5 MG: 1 INJECTION, SOLUTION INTRAMUSCULAR; INTRAVENOUS; SUBCUTANEOUS at 12:23

## 2025-05-21 RX ADMIN — GABAPENTIN 300 MG: 300 CAPSULE ORAL at 08:42

## 2025-05-21 RX ADMIN — BUDESONIDE AND FORMOTEROL FUMARATE DIHYDRATE 2 PUFF: 160; 4.5 AEROSOL RESPIRATORY (INHALATION) at 07:00

## 2025-05-21 RX ADMIN — SPIRONOLACTONE 25 MG: 25 TABLET ORAL at 08:42

## 2025-05-21 RX ADMIN — OXYCODONE 5 MG: 5 TABLET ORAL at 17:21

## 2025-05-21 RX ADMIN — Medication 2000 UNITS: at 08:43

## 2025-05-21 RX ADMIN — OXYCODONE HYDROCHLORIDE AND ACETAMINOPHEN 1000 MG: 500 TABLET ORAL at 08:43

## 2025-05-21 RX ADMIN — ESCITALOPRAM 15 MG: 10 TABLET, FILM COATED ORAL at 08:43

## 2025-05-21 RX ADMIN — PRAMIPEXOLE DIHYDROCHLORIDE 0.5 MG: 0.5 TABLET ORAL at 08:43

## 2025-05-21 RX ADMIN — LURASIDONE HYDROCHLORIDE 20 MG: 40 TABLET, FILM COATED ORAL at 21:19

## 2025-05-21 RX ADMIN — ASPIRIN 81 MG: 81 TABLET, COATED ORAL at 08:43

## 2025-05-21 RX ADMIN — Medication 1 TABLET: at 08:43

## 2025-05-21 RX ADMIN — CARBIDOPA AND LEVODOPA 2 TABLET: 25; 100 TABLET, EXTENDED RELEASE ORAL at 17:21

## 2025-05-21 RX ADMIN — PRAMIPEXOLE DIHYDROCHLORIDE 0.5 MG: 0.5 TABLET ORAL at 21:18

## 2025-05-21 RX ADMIN — GABAPENTIN 300 MG: 300 CAPSULE ORAL at 17:21

## 2025-05-21 RX ADMIN — ROPINIROLE 0.5 MG: 1 TABLET, FILM COATED ORAL at 21:19

## 2025-05-21 RX ADMIN — Medication 10 ML: at 08:44

## 2025-05-21 RX ADMIN — HYDROMORPHONE HYDROCHLORIDE 0.5 MG: 1 INJECTION, SOLUTION INTRAMUSCULAR; INTRAVENOUS; SUBCUTANEOUS at 08:41

## 2025-05-21 RX ADMIN — PENTOXIFYLLINE 400 MG: 400 TABLET, EXTENDED RELEASE ORAL at 08:42

## 2025-05-21 RX ADMIN — INSULIN LISPRO 2 UNITS: 100 INJECTION, SOLUTION INTRAVENOUS; SUBCUTANEOUS at 21:19

## 2025-05-21 RX ADMIN — ONDANSETRON 4 MG: 2 INJECTION, SOLUTION INTRAMUSCULAR; INTRAVENOUS at 13:35

## 2025-05-21 RX ADMIN — GABAPENTIN 300 MG: 300 CAPSULE ORAL at 21:19

## 2025-05-21 RX ADMIN — BUDESONIDE AND FORMOTEROL FUMARATE DIHYDRATE 2 PUFF: 160; 4.5 AEROSOL RESPIRATORY (INHALATION) at 20:56

## 2025-05-21 RX ADMIN — PENTOXIFYLLINE 400 MG: 400 TABLET, EXTENDED RELEASE ORAL at 12:20

## 2025-05-21 RX ADMIN — AMANTADINE HYDROCHLORIDE 100 MG: 100 CAPSULE ORAL at 08:42

## 2025-05-21 RX ADMIN — PANTOPRAZOLE SODIUM 40 MG: 40 TABLET, DELAYED RELEASE ORAL at 06:27

## 2025-05-21 RX ADMIN — LIDOCAINE 1 PATCH: 4 PATCH TOPICAL at 08:44

## 2025-05-21 RX ADMIN — AMANTADINE HYDROCHLORIDE 100 MG: 100 CAPSULE ORAL at 21:19

## 2025-05-21 RX ADMIN — INSULIN LISPRO 2 UNITS: 100 INJECTION, SOLUTION INTRAVENOUS; SUBCUTANEOUS at 12:20

## 2025-05-21 RX ADMIN — CARBIDOPA AND LEVODOPA 2 TABLET: 25; 100 TABLET, EXTENDED RELEASE ORAL at 21:19

## 2025-05-21 RX ADMIN — ALBUTEROL SULFATE 2 PUFF: 90 AEROSOL, METERED RESPIRATORY (INHALATION) at 06:55

## 2025-05-21 RX ADMIN — CARBIDOPA AND LEVODOPA 2 TABLET: 25; 100 TABLET, EXTENDED RELEASE ORAL at 08:43

## 2025-05-21 RX ADMIN — ENOXAPARIN SODIUM 40 MG: 100 INJECTION SUBCUTANEOUS at 17:21

## 2025-05-21 NOTE — CONSULTS
[facility] NEUROSURGERY CONSULT NOTE    Patient name: Dariana Rivera  Referring Provider:     Rodney Scott MD      Reason for Consultation:     Moderate compression fracture of L5 and other CT findings of spinal stenosis, c/o new onset bilateral feet numbness after a fall       Patient Care Team:  Franny Strickland MD as PCP - General (Family Medicine)  Nabor Amador MD as Consulting Physician (Cardiology)  Tucker Best MD as Consulting Physician (Nephrology)  Mariano Rodriguez MD as Consulting Physician (Urology)  Archana Singh MD as Consulting Physician (Psychiatry)  Sasha Khan MD as Consulting Physician (Sleep Medicine)  Zachary Mcgarry MD as Consulting Physician (Gastroenterology)    Chief complaint: Low back pain    Subjective .     History of present illness:    Patient is a 69 y.o. female with a history of COPD, diabetes mellitus type 2 and Parkinson's disease who presents to [facility] after falling on Friday and hurting her lower back. Patient went to stand up from the toilet and lost  on the bathroom sink causing her to fall backwards into the shower. She hit her head, neck and lower back. Patient has a history of low back pain with radicular symptoms in her lower legs. She used to see pain management, but stopped going last year. She states that her back pain since her fall has progressed, but does not report an increase in her radicular symptoms since the fall. She reports her pain an 8/10 today. She denies any bowel/bladder incontinence or saddle anesthesia.     Review of Systems  Review of Systems   Constitutional:  Positive for activity change.   Musculoskeletal:  Positive for back pain.   Neurological:  Positive for weakness and numbness.       History  PAST MEDICAL HISTORY  Past Medical History:   Diagnosis Date    Allergic Not sure    Penicillin, cipro, clindamycin, methadone, ambien    Angina at rest     DR. Amador    Anxiety disorder      [facility]     Asthma About 5 yrs ago    Shortness of breath with exertion    Back pain     Bipolar disorder     Cataract     Left & right removed in 3/2016    Cervical dystonia     Cholelithiasis     Gallbladder removed in 1995 or 97    Chronic pain     with stenosis    Chronic pain disorder     Cervical, lumbar, L knee, both shoulders    Cirrhosis     Colitis     Colon polyps     COPD (chronic obstructive pulmonary disease)     Not sure when diagnosed    Cramping of feet     Cramping of hands     DDD (degenerative disc disease), cervical     DDD (degenerative disc disease), lumbar     DDD (degenerative disc disease), lumbar     DDD (degenerative disc disease), thoracic     Depression     Diabetes mellitus     Not sure when diagnosed.    Diverticulosis     Not sure when diagnosed    Dysphagia 09/2020    Eosinophilic colitis     Gastritis     GERD (gastroesophageal reflux disease)     Headache Not sure    Migraines stopped after complete hyst in 2004    Hepatic steatosis     non alcoholic steo-hepatitis     Hx of lithotripsy 02/10/2021    Hypertension     IBS (irritable bowel syndrome)     Infectious viral hepatitis     ALEXANDER    Insomnia     Kidney stones     Low back pain     Not sure when diagnosed    Lumbar stenosis     Neck pain     Neuromuscular disorder Not sure    Parkinsons and Cervical Dystonia    Neuropathy     Obesity     Obese since about 1966    Osteoarthritis     Osteopenia     Osteopenia diagnosed after bone density test in 2024.    Parkinson's disease     Peripheral edema     Pneumonia May 2018    About a month after neck fusion surgery    PTSD (post-traumatic stress disorder)     Recurrent UTI     Renal insufficiency     Not sure when diagnosed    Seizure     onset 7/2016.  Last seizure 10/2019    Self-injurious behavior     Tried to commit suicide in 2002    Sleep apnea     Using Bipap machine at night. advised to bring dos    Sleep apnea, obstructive     Suicide attempt     In 2001 or 2002     Urinary incontinence        PAST SURGICAL HISTORY  Past Surgical History:   Procedure Laterality Date    BACK SURGERY      BRONCHOSCOPY  Date Unknown    CARDIAC CATHETERIZATION  02/2019    CARPAL TUNNEL RELEASE Bilateral     Wrist     CHOLECYSTECTOMY  1997    COLONOSCOPY      CYSTOSCOPY BLADDER STONE LITHOTRIPSY      ENDOMETRIAL ABLATION      ENDOSCOPY      ENDOSCOPY N/A 04/15/2020    Procedure: ESOPHAGOGASTRODUODENOSCOPY with dilitation (60FR.);  Surgeon: Julieta Allison MD;  Location: [facility] ENDOSCOPY;  Service: Gastroenterology;  Laterality: N/A;  varices,hiatal hernia,gastritis    ENDOSCOPY N/A 09/29/2020    Procedure: ESOPHAGOGASTRODUODENOSCOPY with esophageal dilitation (54 bougie);  Surgeon: Julieta Allison MD;  Location: [facility] ENDOSCOPY;  Service: Gastroenterology;  Laterality: N/A;   post op: hiatal hernia, esophageal stricture    ENDOSCOPY N/A 09/30/2021    Procedure: ESOPHAGOGASTRODUODENOSCOPY WITH DILATATION (BOUGIE #60);  Surgeon: Julieta Allison MD;  Location: [facility] ENDOSCOPY;  Service: Gastroenterology;  Laterality: N/A;  ESOPHAGEAL VARICES AND HIATAL HERNIA    ENDOSCOPY N/A 07/21/2022    Procedure: ESOPHAGOGASTRODUODENOSCOPY WITH BIOPSY X1 AREA  AND DILATATION (BOUGIE  #54);  Surgeon: Zachary Mcgarry MD;  Location: [facility] ENDOSCOPY;  Service: Gastroenterology;  Laterality: N/A;  Post: candida esophagitis,gastritis    ENDOSCOPY N/A 05/02/2023    Procedure: ESOPHAGOGASTRODUODENOSCOPY WITH BIOPSY X 1  AREA  AND DILATATON (48 NON GUIDED BOUGIE);  Surgeon: Zachary Mcgarry MD;  Location: [facility] ENDOSCOPY;  Service: Gastroenterology;  Laterality: N/A;  post op: GASTRITIS, SMALL ESOPHAGEAL VARICES    ENDOSCOPY N/A 02/10/2025    Procedure: ESOPHAGOGASTRODUODENOSCOPY, biopsy x2 areas;  Surgeon: Zachary Mcgarry MD;  Location: [facility] ENDOSCOPY;  Service: Gastroenterology;  Laterality: N/A;  post: gastritis, abnormal mucosa mid esophagus    EYE SURGERY  3-14 & 3-     Cataract surgery R 3-14, L 3--    HYSTERECTOMY  2004    complete    INTERSTIM PLACEMENT      bladder    JOINT REPLACEMENT Bilateral     knees    KNEE ARTHROSCOPY Bilateral     Arthroscopic surgery to bilateral knees     OTHER SURGICAL HISTORY      Stress test     OTHER SURGICAL HISTORY  10/2016    Lithotripsy total 4-5    OTHER SURGICAL HISTORY      Right arm cellulits- spider bites     OTHER SURGICAL HISTORY  2018    Lithotripsy    OTHER SURGICAL HISTORY  2018    ACDF C3-C4 & C6-C7    REPLACEMENT TOTAL KNEE  2000    REPLACEMENT TOTAL KNEE Left 2016    SHOULDER ROTATOR CUFF REPAIR Right 2020    Procedure: RT ROTATOR CUFF REPAIR OPEN;  Surgeon: Curly Vaughn MD;  Location: [facility] MAIN OR;  Service: Orthopedics    SPINE SURGERY  2018    Fusion surgery C3-C4 and C6-C7       FAMILY HISTORY  Family History   Problem Relation Age of Onset    Hypertension Mother     Hyperlipidemia Mother     Arthritis Mother     Cancer Mother         Skin cancers    Depression Mother     Mental illness Mother         Depression and Alzheimers    Vision loss Mother         Has macular degeneration.    Dementia Mother     Diabetes Father              Heart disease Father         Had angina. Was diabetic.  of heart attack at age 57.    Early death Father          at 57 from heart attack    Heart attack Father          of heart attack in     Heart failure Father              Hyperlipidemia Sister     Arthritis Sister     Cancer Sister         Skin cancers    Depression Sister     Diabetes Sister         Diagnosed     Heart disease Sister         2 mini strokes, atrial fribulation, pacemaker insertion    Hypertension Sister     Mental illness Sister         Depression    Stroke Sister         2 mini strokes    Arrhythmia Sister     Diabetes Brother         Not sure when diagnosed    Heart disease Brother         Had open heart surgery about 4 yrs ago.     Hypertension Brother     Hyperlipidemia Brother     Arthritis Brother     Cancer Brother         Skin cancers and colon cancer    Heart attack Brother         Had a heart attack about 4 yrs ago    Breast cancer Maternal Grandmother     Breast cancer Maternal Aunt        SOCIAL HISTORY  Social History     Tobacco Use    Smoking status: Former     Current packs/day: 0.00     Average packs/day: 0.5 packs/day for 15.6 years (7.8 ttl pk-yrs)     Types: Cigarettes     Start date: 1974     Quit date: 1990     Years since quittin.9     Passive exposure: Past    Smokeless tobacco: Never    Tobacco comments:     Stopped off and on.  Stopped once for more than 2 yrs   Vaping Use    Vaping status: Never Used   Substance Use Topics    Alcohol use: No    Drug use: No         Allergies:  Ambien  [zolpidem tartrate], Ciprofloxacin hcl, Penicillins, Zolpidem, Methadone, and Clindamycin    MEDICATIONS:  Medications Prior to Admission   Medication Sig Dispense Refill Last Dose/Taking    albuterol sulfate  (90 Base) MCG/ACT inhaler Inhale 2 puffs Every 6 (Six) Hours As Needed for Wheezing or Shortness of Air. 18 g 5 Taking As Needed    amantadine (SYMMETREL) 100 MG capsule Take 1 capsule by mouth 2 (Two) Times a Day.   Taking    aspirin 81 MG EC tablet Take 1 tablet by mouth Daily.   Taking    calcium carbonate (Calcium 600) 600 MG tablet Take 1 tablet by mouth Daily.   Taking    carbidopa-levodopa ER (SINEMET CR)  MG per tablet Take 2 tablets by mouth 3 (Three) Times a Day.   Taking    Cholecalciferol (Vitamin D3) 50 MCG (2000 UT) capsule Take 1 capsule by mouth Daily.   Taking    clonazePAM (KlonoPIN) 0.5 MG tablet Take 1 tablet by mouth 2 (Two) Times a Day As Needed.   Taking As Needed    escitalopram (LEXAPRO) 10 MG tablet TAKE 1.5 TABLETS BY MOUTH DAILY. 135 tablet 0 Taking    Farxiga 10 MG tablet TAKE ONE TABLET BY MOUTH DAILY 90 tablet 0 Taking    Fluticasone-Umeclidin-Vilant (Trelegy Ellipta)  100-62.5-25 MCG/ACT inhaler Inhale 1 puff Daily. 1 each 5 Taking    lidocaine (LIDODERM) 5 % Place 1 patch on the skin as directed by provider Daily. Remove & Discard patch within 12 hours or as directed by MD   Taking    lurasidone (LATUDA) 20 MG tablet tablet Take 1 tablet by mouth Every Evening. 30 tablet 3 Taking    Melatonin 10 MG capsule Take 1 capsule by mouth every night at bedtime.   Taking    omeprazole (priLOSEC) 40 MG capsule Take 1 capsule by mouth Daily. Take preop   Taking    pentoxifylline (TRENtal) 400 MG CR tablet Take 1 tablet by mouth 3 (Three) Times a Day With Meals.   Taking    potassium chloride (KLOR-CON M20) 20 MEQ CR tablet Take 1 tablet by mouth Daily.   Taking    pramipexole (MIRAPEX) 0.5 MG tablet TAKE ONE TABLET BY MOUTH TWICE DAILY 180 tablet 0 Taking    rOPINIRole (REQUIP) 0.5 MG tablet Take 1 tablet by mouth Every Night. Take 1 hour before bedtime.   Taking    spironolactone (ALDACTONE) 25 MG tablet Take 1 tablet by mouth Daily.   Taking    vitamin C (ASCORBIC ACID) 500 MG tablet Take 2 tablets by mouth Daily. dont take preop   Taking    gabapentin (NEURONTIN) 300 MG capsule Take 1 capsule by mouth 3 (Three) Times a Day.       lactulose (CHRONULAC) 10 GM/15ML solution Take 30 mL by mouth 2 (Two) Times a Day As Needed.       Multiple Vitamins-Minerals (MULTIVITAMIN WITH MINERALS) tablet tablet Take 1 tablet by mouth Daily. Do not take dos            Current Facility-Administered Medications:     acetaminophen (TYLENOL) tablet 650 mg, 650 mg, Oral, Q4H PRN **OR** acetaminophen (TYLENOL) 160 MG/5ML oral solution 650 mg, 650 mg, Oral, Q4H PRN **OR** acetaminophen (TYLENOL) suppository 505 mg, 505 mg, Rectal, Q4H PRN, Javier Shepherd MD    albuterol sulfate HFA (PROVENTIL HFA;VENTOLIN HFA;PROAIR HFA) inhaler 2 puff, 2 puff, Inhalation, Q6H PRN, Javier Shepherd MD, 2 puff at 05/21/25 0655    amantadine (SYMMETREL) capsule 100 mg, 100 mg, Oral, BID, Javier Shepherd MD, 100 mg at  05/21/25 0842    ascorbic acid (VITAMIN C) tablet 1,000 mg, 1,000 mg, Oral, Daily, Javier Shepherd MD, 1,000 mg at 05/21/25 0843    aspirin EC tablet 81 mg, 81 mg, Oral, Daily, Javier Shepherd MD, 81 mg at 05/21/25 0843    sennosides-docusate (PERICOLACE) 8.6-50 MG per tablet 2 tablet, 2 tablet, Oral, BID PRN **AND** polyethylene glycol (MIRALAX) packet 17 g, 17 g, Oral, Daily PRN **AND** bisacodyl (DULCOLAX) EC tablet 5 mg, 5 mg, Oral, Daily PRN **AND** bisacodyl (DULCOLAX) suppository 10 mg, 10 mg, Rectal, Daily PRN, Javier Shepherd MD    budesonide-formoterol (SYMBICORT) 160-4.5 MCG/ACT inhaler 2 puff, 2 puff, Inhalation, BID - RT, 2 puff at 05/21/25 0700 **AND** tiotropium (SPIRIVA RESPIMAT) 2.5 mcg/act aerosol solution inhaler, 2 puff, Inhalation, Daily - RT, Javier Shepherd MD    calcium 500 mg vitamin D 5 mcg (200 UT) per tablet 1 tablet, 1 tablet, Oral, Daily, Javier Shepherd MD, 1 tablet at 05/21/25 0843    Calcium Replacement - Follow Nurse / BPA Driven Protocol, , Not Applicable, PRN, Javier Shepherd MD    carbidopa-levodopa ER (SINEMET CR)  MG per tablet 2 tablet, 2 tablet, Oral, TID, Javier Shepherd MD, 2 tablet at 05/21/25 0843    cefTRIAXone (ROCEPHIN) 2,000 mg in sodium chloride 0.9 % 100 mL MBP, 2,000 mg, Intravenous, Q24H, Javier Shepherd MD, Last Rate: 200 mL/hr at 05/20/25 1354, 2,000 mg at 05/20/25 1354    cholecalciferol (VITAMIN D3) tablet 2,000 Units, 2,000 Units, Oral, Daily, Javier Shepherd MD, 2,000 Units at 05/21/25 0843    clonazePAM (KlonoPIN) tablet 0.5 mg, 0.5 mg, Oral, BID PRN, Javier Shepherd MD    dextrose (D50W) (25 g/50 mL) IV injection 25 g, 25 g, Intravenous, Q15 Min PRN, Javier Shepherd MD    dextrose (GLUTOSE) oral gel 15 g, 15 g, Oral, Q15 Min PRN, Javier Shepherd MD    enoxaparin sodium (LOVENOX) syringe 40 mg, 40 mg, Subcutaneous, Daily, Javier Shepherd MD, 40 mg at 05/20/25 1726    escitalopram (LEXAPRO) tablet 15 mg, 15 mg, Oral, Daily,  Javier Shepherd MD, 15 mg at 05/21/25 0843    gabapentin (NEURONTIN) capsule 300 mg, 300 mg, Oral, TID, Javier Shepherd MD, 300 mg at 05/21/25 0842    glucagon (GLUCAGEN) injection 1 mg, 1 mg, Intramuscular, Q15 Min PRN, Javier Shepherd MD    HYDROmorphone (DILAUDID) injection 0.5 mg, 0.5 mg, Intravenous, Q4H PRN, Javier Shepherd MD, 0.5 mg at 05/21/25 1223    insulin lispro (HUMALOG/ADMELOG) injection 2-7 Units, 2-7 Units, Subcutaneous, 4x Daily AC & at Bedtime, Javier Shepherd MD, 2 Units at 05/21/25 1220    lactulose (CHRONULAC) 10 GM/15ML solution 20 g, 20 g, Oral, BID PRN, Javier Shepherd MD    Lidocaine 4 % 1 patch, 1 patch, Transdermal, Q24H, Javier Shepherd MD, 1 patch at 05/21/25 0844    lurasidone (LATUDA) tablet 20 mg, 20 mg, Oral, Nightly, Javier Shepherd MD, 20 mg at 05/20/25 2225    Magnesium Standard Dose Replacement - Follow Nurse / BPA Driven Protocol, , Not Applicable, PRN, Javier Shepherd MD    melatonin tablet 10 mg, 10 mg, Oral, Nightly PRN, Javier Shepherd MD    multivitamin with minerals 1 tablet, 1 tablet, Oral, Daily, Javier Shehperd MD, 1 tablet at 05/21/25 0843    naloxone (NARCAN) injection 0.4 mg, 0.4 mg, Intravenous, Q5 Min PRN, Javier Shepherd MD    nitroglycerin (NITROSTAT) SL tablet 0.4 mg, 0.4 mg, Sublingual, Q5 Min PRN, Javier Shepherd MD    ondansetron (ZOFRAN) injection 4 mg, 4 mg, Intravenous, Q6H PRN, Javier Shepherd MD    oxyCODONE (ROXICODONE) immediate release tablet 5 mg, 5 mg, Oral, Q6H PRN, Javier Shepherd MD, 5 mg at 05/20/25 2230    pantoprazole (PROTONIX) EC tablet 40 mg, 40 mg, Oral, Q AM, Javier Shepherd MD, 40 mg at 05/21/25 0627    pentoxifylline (TRENtal) CR tablet 400 mg, 400 mg, Oral, TID With Meals, Javier Shepherd MD, 400 mg at 05/21/25 1220    Phosphorus Replacement - Follow Nurse / BPA Driven Protocol, , Not Applicable, PRN, Javier Shepherd MD    Potassium Replacement - Follow Nurse / BPA Driven Protocol, , Not Applicable,  PRN, Javier Shepherd MD    pramipexole (MIRAPEX) tablet 0.5 mg, 0.5 mg, Oral, BID, Javier Shepherd MD, 0.5 mg at 05/21/25 0843    rOPINIRole (REQUIP) tablet 0.5 mg, 0.5 mg, Oral, Nightly, Javier Shepherd MD, 0.5 mg at 05/20/25 2225    [COMPLETED] Insert Peripheral IV, , , Once **AND** sodium chloride 0.9 % flush 10 mL, 10 mL, Intravenous, PRN, Americo Chua PA-C, 10 mL at 05/21/25 0844    spironolactone (ALDACTONE) tablet 25 mg, 25 mg, Oral, Daily, Javier Shepherd MD, 25 mg at 05/21/25 0842      Objective     Results Review:  LABS:  Results from last 7 days   Lab Units 05/20/25  0019 05/19/25  1216   WBC 10*3/mm3 6.48 8.51   HEMOGLOBIN g/dL 14.1 15.0   HEMATOCRIT % 43.2 44.7   PLATELETS 10*3/mm3 128* 133*     Results from last 7 days   Lab Units 05/20/25  0019 05/19/25  1216   SODIUM mmol/L 139 139   POTASSIUM mmol/L 3.7 4.3   CHLORIDE mmol/L 103 103   CO2 mmol/L 23.7 22.9   BUN mg/dL 27* 32*   CREATININE mg/dL 0.86 0.81   CALCIUM mg/dL 9.4 10.1   BILIRUBIN mg/dL  --  0.8   ALK PHOS U/L  --  61   ALT (SGPT) U/L  --  16   AST (SGOT) U/L  --  22   GLUCOSE mg/dL 117* 87         DIAGNOSTICS:  CT LUMBAR SPINE WO CONTRAST     Date of Exam: 5/20/2025 11:14 AM EDT     Indication: b/l LE weakness.     Comparison: CT abdomen pelvis 12/27/2024, CT lumbar spine 8/10/2021     Technique: Axial CT images were obtained of the lumbar spine without contrast administration.  Sagittal and coronal reconstructions were performed.  Automated exposure control and iterative reconstruction methods were used.        Findings:  Osseous demineralization limiting detection for fractures. There is an L5 compression fracture with up to 50% central height loss. No significant bony retropulsion. This is new from both prior CT comparisons. No convincing discrete fracture line. No   traumatic spondylolisthesis. Multilevel degenerative disc disease, disc height loss and marginal spurring most severe at L3-L4 and L4-L5 similar to previous  comparison. Multilevel up to moderate severity lower lumbar facet arthropathy also appears   similar. Question component of mild underlying congenital stenosis. Marked partially imaged bladder distention with mild bilateral hydroureteronephrosis. Numerous nonobstructing bilateral renal calculi including a cluster of stones in the left mid to   inferior renal pole measuring up to 7 mm. Circumaortic left renal vein. Abdominal aortic atherosclerotic disease. Mild paraspinous muscle atrophy. Degenerative osteoarthritis of the SI joints.     T12-L1: Small disc osteophyte complex. Mild facet arthropathy. Mild central spinal canal stenosis. No significant neural foraminal stenosis.     L1-L2: Mild facet arthropathy. No significant disc bulge. No significant central spinal canal stenosis. Mild neural foraminal stenosis.     L2-L3: Tiny concentric disc bulge. Mild facet arthropathy. Mild central spinal canal stenosis. Mild neural foraminal stenosis.     L3-L4: Concentric disc bulge with moderate facet arthropathy. There is probably moderate to severe central spinal canal stenosis L3-L4 which appears without significant change since 2021. Mild to moderate bilateral neural foraminal stenosis.     L4-L5: Diffuse concentric disc bulge with moderate facet arthropathy. Suspect moderate to severe central spinal canal stenosis similar to previous comparison. Moderate bilateral neuroforaminal stenosis.     L5-S1: Concentric disc bulge with mild facet arthropathy. Minimal central spinal canal stenosis. Mild bilateral neural foraminal stenosis.     IMPRESSION:  Impression:  1. Osseous demineralization limiting detection for fractures.  2. Moderate compression fracture of L5 without significant bony retropulsion new from 2024, considered age-indeterminate. This could reflect sequelae of trauma or insufficiency fracture. Correlate with history and point tenderness.  3. Multilevel lumbar spondylosis with suspect some degree of congenital  narrowing of the lumbar spine without significant change since 2021. Possible moderate to severe central spinal canal stenosis L3-L4 and L4-L5 suboptimally assessed by CT.  4. Marked bladder distention with bilateral mild hydroureteronephrosis. Consider renal ultrasound after voiding or Way decompression to ensure resolution.  5. Multiple nonobstructing renal calculi.  6. Abdominal aortic atherosclerotic disease.    Results Review:   I reviewed the patient's new clinical results.  I personally viewed patient's chart and imaging    Vital Signs   Temp:  [97.5 °F (36.4 °C)-98.3 °F (36.8 °C)] 97.5 °F (36.4 °C)  Heart Rate:  [58-95] 69  Resp:  [12-20] 14  BP: (110-130)/(62-75) 111/66    Physical Exam:  Physical Exam  Vitals reviewed.   Musculoskeletal:         General: Normal range of motion.      Cervical back: Normal range of motion.   Skin:     General: Skin is warm and dry.   Neurological:      General: No focal deficit present.   Psychiatric:         Mood and Affect: Mood normal.         Speech: Speech normal.       Neurological Exam  Mental Status  Awake, alert and oriented to person, place and time. Oriented to person, place and time. Speech is normal. Language is fluent with no aphasia.    Motor                                               Right                     Left   Iliopsoas                               4-                          4-   Quadriceps                           4-                          4-   Hamstring                             4-                          4-   Gastrocnemius                     4-                           4-   Anterior tibialis                      4-                          4-   Posterior tibialis                    4-                          4-  Patient is at her baseline in strength .    Reflexes    Right pathological reflexes: Ankle clonus absent.  Left pathological reflexes: Ankle clonus absent.      Assessment & Plan       Fall      Problem List Items Addressed  "This Visit       UTI (urinary tract infection) - Primary    Relevant Medications    cefTRIAXone (ROCEPHIN) 2,000 mg in sodium chloride 0.9 % 100 mL MBP    * (Principal) Fall     Other Visit Diagnoses         Mobility impaired          Numbness and tingling of both feet                 COMORBID CONDITIONS:  DM2, Parkinson's, COPD, CKD,     Patient is a 69 year old female who presents to [facility] with low back pain after falling this past Friday.     The patient had a CT scan of her lumbar spine showing a moderate compression fracture at L5 without significant bony retropulsion.     On physical examination the patient is at her baseline for strength, did not report any sensory deficit and I did not appreciate clonus.     At this time there is no urgent or emergent neurosurgical intervention indicated. The patient is to wear an LSO brace for the next 6 weeks at all times unless she is sleeping or taking a shower. She is to be mindful of bending, lifting and twisting. She is not to lift more than 10 lbs.     Patient is to follow up in clinic in 6 weeks with an updated lumbar x-ray. My office will call to schedule the appointment.     Neurosurgery will sign off at this time. Please reach out with any questions or concerns.       PLAN:   L5 compression fracture  - LSO brace for next 6 weeks  - No bending, lifting or twisting  - Pain management per primary  - Medical management per primary   - Updated lumbar x-ray in 6 weeks     I discussed the patient's findings and my recommendations with patient, family, and      During patient visit, I utilized appropriate personal protective equipment including gloves and mask.  Mask used was standard procedure mask. Appropriate PPE was worn during the entire visit.  Hand hygiene was completed before and after.     Rachel Cristina PA-C  05/21/25  12:46 EDT    \"Dictated utilizing Dragon dictation\".    "

## 2025-05-21 NOTE — PLAN OF CARE
Goal Outcome Evaluation:      Patient Aox4, Ax1-2, ACHS, pills whole. Remains on 2L NC. Room air is baseline.  Visible tremor due to Parkinson's. US renal done today. Neurosurgery consult in for compression fracture. External cath in place. LSO brace fitted today. Patient to wear when OOB. DTI on coccyx. Excoriation in skin folds. Remains on IV antibiotics for UTI. Patient has rested in bed throughout the day. Call light within reach. Can make needs known.     Problem: Breathing Pattern Ineffective  Goal: Effective Breathing Pattern  Outcome: Progressing  Intervention: Promote Improved Breathing Pattern  Recent Flowsheet Documentation  Taken 5/21/2025 0835 by Glendy Guadalupe LPN  Head of Bed (HOB) Positioning: HOB elevated     Problem: Adult Inpatient Plan of Care  Goal: Plan of Care Review  Outcome: Progressing  Goal: Patient-Specific Goal (Individualized)  Outcome: Progressing  Goal: Absence of Hospital-Acquired Illness or Injury  Outcome: Progressing  Intervention: Identify and Manage Fall Risk  Recent Flowsheet Documentation  Taken 5/21/2025 1600 by Glendy Guadalupe LPN  Safety Promotion/Fall Prevention: safety round/check completed  Taken 5/21/2025 1400 by Glendy Guadalupe LPN  Safety Promotion/Fall Prevention: safety round/check completed  Taken 5/21/2025 1200 by Glendy Guadalupe LPN  Safety Promotion/Fall Prevention: safety round/check completed  Taken 5/21/2025 1000 by Glendy Guadalupe LPN  Safety Promotion/Fall Prevention: safety round/check completed  Taken 5/21/2025 0835 by Glendy Guadalupe LPN  Safety Promotion/Fall Prevention: safety round/check completed  Intervention: Prevent Skin Injury  Recent Flowsheet Documentation  Taken 5/21/2025 1600 by Glendy Guadalupe LPN  Skin Protection: transparent dressing maintained  Taken 5/21/2025 1200 by Glendy Guadalupe LPN  Skin Protection: transparent dressing maintained  Taken 5/21/2025 0835 by Glendy Guadalupe LPN  Body Position: supine  Skin Protection:  transparent dressing maintained  Intervention: Prevent and Manage VTE (Venous Thromboembolism) Risk  Recent Flowsheet Documentation  Taken 5/21/2025 0835 by Glendy Guadalupe LPN  VTE Prevention/Management: SCDs (sequential compression devices) off  Intervention: Prevent Infection  Recent Flowsheet Documentation  Taken 5/21/2025 1600 by Glendy Guadalupe LPN  Infection Prevention:   hand hygiene promoted   personal protective equipment utilized  Taken 5/21/2025 1400 by Glendy Guadalupe LPN  Infection Prevention:   hand hygiene promoted   personal protective equipment utilized  Taken 5/21/2025 1200 by Glendy Guadalupe LPN  Infection Prevention:   hand hygiene promoted   personal protective equipment utilized  Taken 5/21/2025 1000 by Glendy Guadalupe LPN  Infection Prevention:   hand hygiene promoted   personal protective equipment utilized  Taken 5/21/2025 0835 by Glendy Guadalupe LPN  Infection Prevention:   hand hygiene promoted   personal protective equipment utilized  Goal: Optimal Comfort and Wellbeing  Outcome: Progressing  Intervention: Monitor Pain and Promote Comfort  Recent Flowsheet Documentation  Taken 5/21/2025 1721 by Glendy Guadalupe LPN  Pain Management Interventions: pain medication given  Taken 5/21/2025 1600 by Glendy Guadalupe LPN  Pain Management Interventions: position adjusted  Taken 5/21/2025 1223 by Glendy Guadalupe LPN  Pain Management Interventions: pain medication given  Taken 5/21/2025 0841 by Glendy Guadalupe LPN  Pain Management Interventions: pain medication given  Intervention: Provide Person-Centered Care  Recent Flowsheet Documentation  Taken 5/21/2025 0835 by Glendy Guadalupe LPN  Trust Relationship/Rapport: care explained  Goal: Readiness for Transition of Care  Outcome: Progressing     Problem: Chest Pain  Goal: Resolution of Chest Pain Symptoms  Outcome: Progressing     Problem: Fall Injury Risk  Goal: Absence of Fall and Fall-Related Injury  Outcome: Progressing  Intervention:  Identify and Manage Contributors  Recent Flowsheet Documentation  Taken 5/21/2025 1600 by Glendy Guadalupe LPN  Medication Review/Management: medications reviewed  Taken 5/21/2025 1400 by Glendy Guadalupe LPN  Medication Review/Management: medications reviewed  Taken 5/21/2025 1200 by Glendy Guadalupe LPN  Medication Review/Management: medications reviewed  Taken 5/21/2025 1000 by Glendy Guadaulpe LPN  Medication Review/Management: medications reviewed  Taken 5/21/2025 0835 by Glendy Guadalupe LPN  Medication Review/Management: medications reviewed  Intervention: Promote Injury-Free Environment  Recent Flowsheet Documentation  Taken 5/21/2025 1600 by Glendy Guadalupe LPN  Safety Promotion/Fall Prevention: safety round/check completed  Taken 5/21/2025 1400 by Glendy Guadalupe LPN  Safety Promotion/Fall Prevention: safety round/check completed  Taken 5/21/2025 1200 by Glendy Guadalupe LPN  Safety Promotion/Fall Prevention: safety round/check completed  Taken 5/21/2025 1000 by Glendy Guadalupe LPN  Safety Promotion/Fall Prevention: safety round/check completed  Taken 5/21/2025 0835 by Glendy Guadalupe LPN  Safety Promotion/Fall Prevention: safety round/check completed     Problem: Skin Injury Risk Increased  Goal: Skin Health and Integrity  Outcome: Progressing  Intervention: Optimize Skin Protection  Recent Flowsheet Documentation  Taken 5/21/2025 1600 by Glendy Guadalupe LPN  Pressure Reduction Techniques: frequent weight shift encouraged  Pressure Reduction Devices: pressure-redistributing mattress utilized  Skin Protection: transparent dressing maintained  Taken 5/21/2025 1200 by Glendy Guadalupe LPN  Pressure Reduction Techniques: frequent weight shift encouraged  Pressure Reduction Devices: pressure-redistributing mattress utilized  Skin Protection: transparent dressing maintained  Taken 5/21/2025 0835 by Glendy Guadalupe LPN  Activity Management: activity encouraged  Pressure Reduction Techniques:   frequent  weight shift encouraged   heels elevated off bed  Head of Bed (HOB) Positioning: HOB elevated  Pressure Reduction Devices: pressure-redistributing mattress utilized  Skin Protection: transparent dressing maintained

## 2025-05-21 NOTE — PROGRESS NOTES
[facility] MEDICINE SERVICE  DAILY PROGRESS NOTE    NAME: Dariana Rivera  : 1955  MRN: [id number]      LOS: 0 days     PROVIDER OF SERVICE: Rodney Scott MD    Chief Complaint: Fall    Subjective:     Interval History:  History taken from: patient chart family    C/o back pain and lower extremity weakness       Review of Systems:   Review of Systems  All negative except as above  Objective:     Vital Signs  Temp:  [97.5 °F (36.4 °C)-98.3 °F (36.8 °C)] 97.5 °F (36.4 °C)  Heart Rate:  [58-95] 69  Resp:  [12-20] 14  BP: (110-130)/(62-75) 111/66  Flow (L/min) (Oxygen Therapy):  [2] 2   Body mass index is 32.39 kg/m².    Physical Exam  Physical Exam  General: Alert and oriented, no acute distress.  Lungs: Clear to auscultation, nonlabored respiration.  Heart: regular rate and rhythm   Abdomen: Soft, nontender, nondistended, + bowel sounds.  Musculoskeletal: bilateral LE motor 2/5  Neuro: alert and awake, moving all 4 extremities   Skin: Skin is warm, dry and pink, no rashes or lesions.  Psychiatric: Cooperative, appropriate mood and affect.        Diagnostic Data    Results from last 7 days   Lab Units 25  0019 25  1216   WBC 10*3/mm3 6.48 8.51   HEMOGLOBIN g/dL 14.1 15.0   HEMATOCRIT % 43.2 44.7   PLATELETS 10*3/mm3 128* 133*   GLUCOSE mg/dL 117* 87   CREATININE mg/dL 0.86 0.81   BUN mg/dL 27* 32*   SODIUM mmol/L 139 139   POTASSIUM mmol/L 3.7 4.3   AST (SGOT) U/L  --  22   ALT (SGPT) U/L  --  16   ALK PHOS U/L  --  61   BILIRUBIN mg/dL  --  0.8   ANION GAP mmol/L 12.3 13.1       US Renal Bilateral  Result Date: 2025  Impression: Unremarkable renal ultrasound. Electronically Signed: Daniele Min MD  2025 1:15 PM EDT  Workstation ID: [id number]    CT Lumbar Spine Without Contrast  Result Date: 2025  Impression: 1. Osseous demineralization limiting detection for fractures. 2. Moderate compression fracture of L5 without significant bony retropulsion new from ,  considered age-indeterminate. This could reflect sequelae of trauma or insufficiency fracture. Correlate with history and point tenderness. 3. Multilevel lumbar spondylosis with suspect some degree of congenital narrowing of the lumbar spine without significant change since 2021. Possible moderate to severe central spinal canal stenosis L3-L4 and L4-L5 suboptimally assessed by CT. 4. Marked bladder distention with bilateral mild hydroureteronephrosis. Consider renal ultrasound after voiding or Way decompression to ensure resolution. 5. Multiple nonobstructing renal calculi. 6. Abdominal aortic atherosclerotic disease. Electronically Signed: Selvin Marquez MD  5/20/2025 12:56 PM EDT  Workstation ID: [id number]        I reviewed the patient's new clinical results.  I reviewed the patient's new imaging results and agree with the interpretation.    Assessment/Plan:     Active and Resolved Problems  Active Hospital Problems    Diagnosis  POA    **Fall [W19.XXXA]  Yes      Resolved Hospital Problems   No resolved problems to display.       69-year-old female with history of Parkinson's, RLS, COPD, hypertension, hyperlipidemia, DM2, GERD, anxiety/depression admitted to [facility] 5/19 with fall at home    #Fall at home appears to be in setting of bilateral lower extremity weakness  #Chronic back pain status post cervical spine surgery  #RLS  #Parkinson's  #moderate compression fracture at L5 without significant bony retropulsion.   Patient has multiple risk factors for falls.  No reported loss of consciousness does endorse progressive bilateral lower extremity weakness last few months  Patient reports unable to have MRI given bladder stimulator placed 5 years ago  CT head, CT C-spine, x-ray lumbar in emergency room no acute findings  PT/OT  At home takes Klonopin.  Has been on it for multiple years not interested in discontinuing continue home Requip, amantadine and Sinemet  NIC recommendations appreciated, no  intervention. Recommended LSO brace for 6 weeks and outpatient follow up       #Abnormal UA  Patient with no urinary symptoms  History of bladder stimulator 5 years ago  Currently on ceftriaxone follow urine culture    # mild hydroureteronephrosis  Patient denies any urinary retention  Renal ultrasound  Monitor BMP    #DM2  Hold OHA  ISS lispro  Diabetic diet    #COPD not in exacerbation  Continue current nebs    #Anxiety/depression  Continue home medications    VTE Prophylaxis:  Pharmacologic VTE prophylaxis orders are present.             Disposition Planning:     Barriers to Discharge: Medical workup  Anticipated Date of Discharge: 1 to 2 days  Place of Discharge: SNF      Time: 45 minutes     Code Status and Medical Interventions: CPR (Attempt to Resuscitate); Full Support   Ordered at: 05/19/25 1447     Code Status (Patient has no pulse and is not breathing):    CPR (Attempt to Resuscitate)     Medical Interventions (Patient has pulse or is breathing):    Full Support     Level Of Support Discussed With:    Patient       Signature: Electronically signed by Rodney Scott MD, 05/21/25, 15:15 EDT.  [facility] Hospitalist Team

## 2025-05-21 NOTE — CASE MANAGEMENT/SOCIAL WORK
Continued Stay Note  ANNABELLE Pack     Patient Name: Dariana Rivera  MRN: [id number]  Today's Date: 5/21/2025    Admit Date: 5/19/2025    Plan: [facility] accepted. Precert approved 5/22-5/28. PASRR approved.   Discharge Plan       Row Name 05/21/25 1551       Plan    Plan [facility] accepted. Precert approved 5/22-5/28. PASRR approved.    Patient/Family in Agreement with Plan yes    Plan Comments Received confirmation from CME Gerard that precert was started and approved. NeuroSx saw pt and pt fit for LSO brace.      Megan Naegele, RN     Office Phone: [phone]  Office Cell: [phone]

## 2025-05-21 NOTE — SIGNIFICANT NOTE
Case Management/Social Work    Patient Name:  Dariana Rivera  YOB: 1955  MRN: [id number]  Admit Date:  5/19/2025 05/21/25 1351   Post Acute Pre-Cert Documentation   Request Submitted by Facility - Type: Hospital   Post-Acute Authorization Type Submitted: SNF   Date Post Acute Pre-Cert Inititated per Facility 05/21/25   Verification from Payer Yes   Date Post Acute Pre-Cert Completed 05/21/25   Accepting Facility [facility]   Hospital Discharge Date Requested 05/22/25   All Clinicals Submitted? Yes   Had Accepting Facility at Time of Submission Yes   Response Received from Insurance? Approval   Response Communicated to:    Authorization Number: [id number]   Post Acute Pre-Cert Initiated Comment CME submitted SNF precert for [facility] via [employer] portal. Auth ID [id number] . SNF precert auto approved. valid 5/22-5/28. Approval letter indexed to media. CM made aware.           Electronically signed by:  Gerard Herman, CELESTE  05/21/25 13:55 EDT    Gerard Herman  Case Management Extender  [address] [address]  P: [phone]  F: [phone]

## 2025-05-21 NOTE — DISCHARGE PLACEMENT REQUEST
"Miguel Joesph SAHU \"Pat\" (69 y.o. Female)       Date of Birth   1955    Social Security Number       Address   [address] [place] [address] [place] [address]    Home Phone   [phone]    MRN   [id number]       Scientologist   [employer]    Marital Status                               Admission Date   5/19/2025    Admission Type   Emergency    Admitting Provider   Javier Shepherd MD    Attending Provider   Rodney Scott MD    Department, Room/Bed   [facility] INPATIENT, 356/1       Discharge Date       Discharge Disposition       Discharge Destination                                 Attending Provider: Rodney Scott MD    Allergies: Ambien  [Zolpidem Tartrate], Ciprofloxacin Hcl, Penicillins, Zolpidem, Methadone, Clindamycin    Isolation: None   Infection: None   Code Status: CPR    Ht: 165.1 cm (65\")   Wt: 88.3 kg (194 lb 10.7 oz)    Admission Cmt: None   Principal Problem: Fall [W19.XXXA]                   Active Insurance as of 5/19/2025       Primary Coverage       Payor Plan Insurance Group Employer/Plan Group    ANTHEM MEDICARE REPLACEMENT ANTHEM MEDICARE ADVANTAGE [place] [id number]       Payor Plan Address Payor Plan Phone Number Payor Plan Fax Number Effective Dates    [address] [phone]  1/1/2024 - None Entered    [facility] [phone]         Subscriber Name Subscriber Birth Date Member ID       JOESPH FANG 1955 [id number]               Secondary Coverage       Payor Plan Insurance Group Employer/Plan Group    [employer]HE[place] [id number]       Payor Plan Address Payor Plan Phone Number Payor Plan Fax Number Effective Dates    [address]   7/1/2024 - None Entered    [facility] [address]         Subscriber Name Subscriber Birth Date Member ID       JOESPH FANG 1955 [id number]                     Emergency Contacts        (Rel.) Home Phone Work Phone Mobile Phone    JIMBO FANG " (Spouse) [phone] -- [phone]    CHADWICKAPRIL (Sister) -- -- [phone]                 History & Physical        Javier Shepherd MD at 25 1447              [facility] Medicine Services  History & Physical    Patient Name: Dariana Rivera  : 1955  MRN: [id number]  Primary Care Physician:  Franny Strickland MD  Date of admission: 2025  Date and Time of Service: 2025 at 1430    Subjective      Chief Complaint: Pain after fall    History of Present Illness: Dariana Rivera is a 69 y.o. female with a CMH of parkinsons, RLS, COPD, htn, hld, T2DM, cirrhosis, kidney stones, GERD, depression/anxiety who presented to [facility] on 2025 with  pain after fall. Pt lives at home with her , reports this past Friday she went to get up from the toilet and had a mechanical fall, first lost her balance and fell forward into the sink, then lost her  on the sink and fell backwards/sideways into the shower, did hit her head/neck during the fall. Denies any lightheadedness/dizziness/loss of consciousness. Denies prolonged period down. She has had pain to her mid/lower back as well as her head/neck since the fall, initially did not present to hospital but pain has continued prompting her to come in today. She reports she has been dealing with numbness to her feet over the past several months and that has worsened her ability to get around, usually ambulates with a cane. She denies any new focal numbness/weakness since her fall, denies incontinence/saddle anesthesia. She does report some discomfort with urination, denies fevers/chills/flank pain.   In ED workup with CT Head, CT c-spine, lumbar XR showing no acute pathology. UA w/ infectious findings. Given ceftriaxone and IV pain medication, hospitalist service consulted for admission.       Review of Systems   Constitutional:  Negative for appetite change, chills and fever.   HENT:  Negative for congestion,  rhinorrhea and sore throat.    Eyes: Negative.    Respiratory:  Negative for cough, shortness of breath and wheezing.    Cardiovascular:  Negative for chest pain, palpitations and leg swelling.   Gastrointestinal:  Negative for abdominal pain, constipation, diarrhea and nausea.   Genitourinary:  Positive for difficulty urinating. Negative for dysuria.   Musculoskeletal:  Positive for arthralgias and back pain. Negative for myalgias.   Neurological:  Positive for tremors. Negative for dizziness, syncope, light-headedness and headaches.        + lower extremity numbness  + generalized weakness, no focal       Personal History     Past Medical History:   Diagnosis Date    Allergic Not sure    Penicillin, cipro, clindamycin, methadone, ambien    Angina at rest     DR. [facility]    Anxiety disorder     [facility]     Asthma About 5 yrs ago    Shortness of breath with exertion    Back pain     Bipolar disorder     Cataract     Left & right removed in 3/2016    Cervical dystonia     Cholelithiasis     Gallbladder removed in 1995 or 97    Chronic pain     with stenosis    Chronic pain disorder     Cervical, lumbar, L knee, both shoulders    Cirrhosis     Colitis     Colon polyps     COPD (chronic obstructive pulmonary disease)     Not sure when diagnosed    Cramping of feet     Cramping of hands     DDD (degenerative disc disease), cervical     DDD (degenerative disc disease), lumbar     DDD (degenerative disc disease), lumbar     DDD (degenerative disc disease), thoracic     Depression     Diabetes mellitus     Not sure when diagnosed.    Diverticulosis     Not sure when diagnosed    Dysphagia 09/2020    Eosinophilic colitis     Gastritis     GERD (gastroesophageal reflux disease)     Headache Not sure    Migraines stopped after complete hyst in 2004    Hepatic steatosis     non alcoholic steo-hepatitis     Hx of lithotripsy 02/10/2021    Hypertension     IBS (irritable bowel syndrome)     Infectious viral hepatitis     ALEXANDER     Insomnia     Kidney stones     Low back pain     Not sure when diagnosed    Lumbar stenosis     Neck pain     Neuromuscular disorder Not sure    Parkinsons and Cervical Dystonia    Neuropathy     Obesity     Obese since about 1966    Osteoarthritis     Osteopenia     Osteopenia diagnosed after bone density test in 2024.    Parkinson's disease     Peripheral edema     Pneumonia May 2018    About a month after neck fusion surgery    PTSD (post-traumatic stress disorder)     Recurrent UTI     Renal insufficiency     Not sure when diagnosed    Seizure     onset 7/2016.  Last seizure 10/2019    Self-injurious behavior     Tried to commit suicide in 2002    Sleep apnea     Using Bipap machine at night. advised to bring dos    Sleep apnea, obstructive     Suicide attempt     In 2001 or 2002    Urinary incontinence        Past Surgical History:   Procedure Laterality Date    BACK SURGERY      BRONCHOSCOPY  Date Unknown    CARDIAC CATHETERIZATION  02/2019    CARPAL TUNNEL RELEASE Bilateral     Wrist     CHOLECYSTECTOMY  1997    COLONOSCOPY      CYSTOSCOPY BLADDER STONE LITHOTRIPSY      ENDOMETRIAL ABLATION      ENDOSCOPY      ENDOSCOPY N/A 04/15/2020    Procedure: ESOPHAGOGASTRODUODENOSCOPY with dilitation (60FR.);  Surgeon: Julieta Allison MD;  Location: [facility] ENDOSCOPY;  Service: Gastroenterology;  Laterality: N/A;  varices,hiatal hernia,gastritis    ENDOSCOPY N/A 09/29/2020    Procedure: ESOPHAGOGASTRODUODENOSCOPY with esophageal dilitation (54 bougie);  Surgeon: Julieta Allison MD;  Location: [facility] ENDOSCOPY;  Service: Gastroenterology;  Laterality: N/A;   post op: hiatal hernia, esophageal stricture    ENDOSCOPY N/A 09/30/2021    Procedure: ESOPHAGOGASTRODUODENOSCOPY WITH DILATATION (BOUGIE #60);  Surgeon: Julieta Allison MD;  Location: [facility] ENDOSCOPY;  Service: Gastroenterology;  Laterality: N/A;  ESOPHAGEAL VARICES AND HIATAL HERNIA    ENDOSCOPY N/A 07/21/2022    Procedure: ESOPHAGOGASTRODUODENOSCOPY WITH  BIOPSY X1 AREA  AND DILATATION (BOUGIE  #54);  Surgeon: Zachary Mcgarry MD;  Location: [facility] ENDOSCOPY;  Service: Gastroenterology;  Laterality: N/A;  Post: candida esophagitis,gastritis    ENDOSCOPY N/A 05/02/2023    Procedure: ESOPHAGOGASTRODUODENOSCOPY WITH BIOPSY X 1  AREA  AND DILATATON (48 NON GUIDED BOUGIE);  Surgeon: Zachary Mcgarry MD;  Location: [facility] ENDOSCOPY;  Service: Gastroenterology;  Laterality: N/A;  post op: GASTRITIS, SMALL ESOPHAGEAL VARICES    ENDOSCOPY N/A 02/10/2025    Procedure: ESOPHAGOGASTRODUODENOSCOPY, biopsy x2 areas;  Surgeon: Zachary Mcgarry MD;  Location: [facility] ENDOSCOPY;  Service: Gastroenterology;  Laterality: N/A;  post: gastritis, abnormal mucosa mid esophagus    EYE SURGERY  3-14 & 3-    Cataract surgery R 3-14, L 3-    HYSTERECTOMY  09/2004    complete    INTERSTIM PLACEMENT      bladder    JOINT REPLACEMENT Bilateral     knees    KNEE ARTHROSCOPY Bilateral     Arthroscopic surgery to bilateral knees     OTHER SURGICAL HISTORY  2015    Stress test     OTHER SURGICAL HISTORY  10/2016    Lithotripsy total 4-5    OTHER SURGICAL HISTORY      Right arm cellulits- spider bites     OTHER SURGICAL HISTORY  02/08/2018    Lithotripsy    OTHER SURGICAL HISTORY  04/20/2018    ACDF C3-C4 & C6-C7    REPLACEMENT TOTAL KNEE  2000    REPLACEMENT TOTAL KNEE Left 11/2016    SHOULDER ROTATOR CUFF REPAIR Right 01/08/2020    Procedure: RT ROTATOR CUFF REPAIR OPEN;  Surgeon: Curly Vaughn MD;  Location: [facility] MAIN OR;  Service: Orthopedics    SPINE SURGERY  04/2018    Fusion surgery C3-C4 and C6-C7       Family History: family history includes Arrhythmia in her sister; Arthritis in her brother, mother, and sister; Breast cancer in her maternal aunt and maternal grandmother; Cancer in her brother, mother, and sister; Dementia in her mother; Depression in her mother and sister; Diabetes in her brother, father, and sister; Early death in her father;  Heart attack in her brother and father; Heart disease in her brother, father, and sister; Heart failure in her father; Hyperlipidemia in her brother, mother, and sister; Hypertension in her brother, mother, and sister; Mental illness in her mother and sister; Stroke in her sister; Vision loss in her mother. Otherwise pertinent FHx was reviewed and not pertinent to current issue.    Social History:  reports that she quit smoking about 34 years ago. Her smoking use included cigarettes. She started smoking about 50 years ago. She has a 7.8 pack-year smoking history. She has been exposed to tobacco smoke. She has never used smokeless tobacco. She reports that she does not drink alcohol and does not use drugs.    Home Medications:  Prior to Admission Medications       Prescriptions Last Dose Informant Patient Reported? Taking?    Accu-Chek Guide Test test strip   No No    USE TO CHECK BLOOD SUGARS ONCE A DAY    albuterol sulfate  (90 Base) MCG/ACT inhaler   No No    Inhale 2 puffs Every 6 (Six) Hours As Needed for Wheezing or Shortness of Air.    amantadine (SYMMETREL) 100 MG capsule   Yes No    Take 1 capsule by mouth Every Night.    Patient taking differently:  Take 1 capsule by mouth 2 (Two) Times a Day.    aspirin 81 MG EC tablet   Yes No    aspirin 81 mg tablet,delayed release    calcium carbonate (Calcium 600) 600 MG tablet   Yes No    Take 1 tablet by mouth Daily.    carbidopa-levodopa ER (SINEMET CR)  MG per tablet   Yes No    Take 2 tablets by mouth 3 (Three) Times a Day.    Cholecalciferol (Vitamin D3) 50 MCG (2000 UT) capsule   Yes No    Take 1 capsule by mouth Daily.    clonazePAM (KlonoPIN) 0.5 MG tablet   No No    Take 1 tablet by mouth 2 (Two) Times a Day.    escitalopram (LEXAPRO) 10 MG tablet   No No    TAKE 1.5 TABLETS BY MOUTH DAILY.    Farxiga 10 MG tablet   No No    TAKE ONE TABLET BY MOUTH DAILY    Fluticasone-Umeclidin-Vilant (Trelegy Ellipta) 100-62.5-25 MCG/ACT inhaler   No No     Inhale 1 puff Daily.    gabapentin (NEURONTIN) 300 MG capsule  Pharmacy Yes No    Take 1 capsule by mouth 3 (Three) Times a Day.    ipratropium-albuterol (DUO-NEB) 0.5-2.5 mg/3 ml nebulizer  Pharmacy No No    Take 3 mL by nebulization Every 6 (Six) Hours As Needed for Shortness of Air or Wheezing for up to 30 days.    ipratropium-albuterol (DUO-NEB) 0.5-2.5 mg/3 ml nebulizer   No No    INHALE VIA NEBULIZER EVERY FOUR HOURS AS NEEDED FOR COPD( IN PATIENT ORDER )    KLOR-CON 20 MEQ CR tablet   No No    TAKE 1 TABLET BY MOUTH DAILY    Patient taking differently:  Take 1 tablet by mouth Daily.    lidocaine (LIDODERM) 5 %   No No    Place 1 patch on the skin as directed by provider Daily. Remove & Discard patch within 12 hours or as directed by MD    Patient taking differently:  Place 1 patch on the skin as directed by provider Daily As Needed. Remove & Discard patch within 12 hours or as directed by MD    lurasidone (LATUDA) 20 MG tablet tablet   No No    Take 1 tablet by mouth Every Evening.    Melatonin 10 MG capsule   Yes No    Take 1 capsule by mouth every night at bedtime.    Multiple Vitamins-Minerals (MULTIVITAMIN WITH MINERALS) tablet tablet   Yes No    Take 1 tablet by mouth Daily. Do not take dos    nystatin (MYCOSTATIN) 040897 UNIT/GM cream   No No    Apply 1 Application topically to the appropriate area as directed 2 (Two) Times a Day.    Patient taking differently:  Apply 1 Application topically to the appropriate area as directed As Needed.    O2 (OXYGEN)   Yes No    Inhale 1 L/min 1 (One) Time. @@ night    Patient taking differently:  Inhale 1 L/min every night at bedtime. [facility]    omeprazole (priLOSEC) 40 MG capsule  Pharmacy Yes No    Take 1 capsule by mouth Daily. Take preop    pentoxifylline (TRENtal) 400 MG CR tablet   Yes No    Take 1 tablet by mouth 3 (Three) Times a Day With Meals.    pramipexole (MIRAPEX) 0.5 MG tablet   No No    TAKE ONE TABLET BY MOUTH TWICE DAILY    rOPINIRole  (REQUIP) 0.5 MG tablet   No No    TAKE ONE TABLET BY MOUTH EVERY MORNING AND EVERY NIGHT AT BEDTIME FOR RESTLESS LEG SYNDROME ( INCREASE )    spironolactone (ALDACTONE) 25 MG tablet   Yes No    Take 1 tablet by mouth Daily.    vitamin C (ASCORBIC ACID) 500 MG tablet   Yes No    Take 2 tablets by mouth Daily. dont take preop              Allergies:  Allergies   Allergen Reactions    Ambien  [Zolpidem Tartrate] Confusion    Ciprofloxacin Hcl Rash    Penicillins Rash    Zolpidem Unknown - High Severity     Other reaction(s): Confusion    Methadone Hallucinations    Clindamycin Rash       Objective      Vitals:   Temp:  [97.2 °F (36.2 °C)] 97.2 °F (36.2 °C)  Heart Rate:  [56-73] 58  Resp:  [16-20] 16  BP: (127-149)/(56-80) 129/64  Body mass index is 32.39 kg/m².  Physical Exam  Constitutional:       General: She is not in acute distress.  HENT:      Head: Normocephalic and atraumatic.      Mouth/Throat:      Mouth: Mucous membranes are moist.      Pharynx: Oropharynx is clear.   Eyes:      Extraocular Movements: Extraocular movements intact.      Conjunctiva/sclera: Conjunctivae normal.   Cardiovascular:      Rate and Rhythm: Normal rate and regular rhythm.      Heart sounds: Normal heart sounds.   Pulmonary:      Effort: Pulmonary effort is normal.      Breath sounds: Normal breath sounds. No wheezing, rhonchi or rales.   Abdominal:      General: Abdomen is flat. Bowel sounds are normal.      Palpations: Abdomen is soft.      Tenderness: There is no abdominal tenderness. There is no guarding or rebound.   Musculoskeletal:         General: No swelling or tenderness.      Cervical back: Normal range of motion and neck supple.      Right lower leg: No edema.      Left lower leg: No edema.   Skin:     General: Skin is warm and dry.   Neurological:      General: No focal deficit present.      Mental Status: She is alert and oriented to person, place, and time. Mental status is at baseline.      Cranial Nerves: No cranial  nerve deficit.      Sensory: Sensory deficit present.      Comments: Gio LE str 3/5, limited 2/2 pain.   + decreased sensation gio feet, plantar worse than dorsal         Diagnostic Data:  Lab Results (last 24 hours)       Procedure Component Value Units Date/Time    Urinalysis, Microscopic Only - Straight Cath [[id number]]  (Abnormal) Collected: 05/19/25 1251    Specimen: Urine from Straight Cath Updated: 05/19/25 1342     RBC, UA 3-5 /HPF      WBC, UA 11-20 /HPF      Bacteria, UA 3+ /HPF      Squamous Epithelial Cells, UA None Seen /HPF      Hyaline Casts, UA None Seen /LPF      Methodology Manual Light Microscopy    Urine Culture - Urine, Straight Cath [[id number]] Collected: 05/19/25 1251    Specimen: Urine from Straight Cath Updated: 05/19/25 1342    Urinalysis With Culture If Indicated - Straight Cath [[id number]]  (Abnormal) Collected: 05/19/25 1251    Specimen: Urine from Straight Cath Updated: 05/19/25 1328     Color, UA Yellow     Appearance, UA Hazy     pH, UA 6.5     Specific Gravity, UA 1.026     Glucose, UA >=1000 mg/dL (3+)     Ketones, UA 15 mg/dL (1+)     Bilirubin, UA Negative     Blood, UA Negative     Protein, UA Negative     Leuk Esterase, UA Small (1+)     Nitrite, UA Positive     Urobilinogen, UA 0.2 E.U./dL    Narrative:      In absence of clinical symptoms, the presence of pyuria, bacteria, and/or nitrites on the urinalysis result does not correlate with infection.    Comprehensive Metabolic Panel [[id number]]  (Abnormal) Collected: 05/19/25 1216    Specimen: Blood from Arm, Left Updated: 05/19/25 1245     Glucose 87 mg/dL      BUN 32 mg/dL      Creatinine 0.81 mg/dL      Sodium 139 mmol/L      Potassium 4.3 mmol/L      Comment: Specimen hemolyzed.  Result may be falsely elevated.        Chloride 103 mmol/L      CO2 22.9 mmol/L      Calcium 10.1 mg/dL      Total Protein 7.5 g/dL      Albumin 4.1 g/dL      ALT (SGPT) 16 U/L      AST (SGOT) 22 U/L      Alkaline Phosphatase 61 U/L      Total  Bilirubin 0.8 mg/dL      Globulin 3.4 gm/dL      A/G Ratio 1.2 g/dL      BUN/Creatinine Ratio 39.5     Anion Gap 13.1 mmol/L      eGFR 79.2 mL/min/1.73     Narrative:      GFR Categories in Chronic Kidney Disease (CKD)              GFR Category          GFR (mL/min/1.73)    Interpretation  G1                    90 or greater        Normal or high (1)  G2                    60-89                Mild decrease (1)  G3a                   45-59                Mild to moderate decrease  G3b                   30-44                Moderate to severe decrease  G4                    15-29                Severe decrease  G5                    14 or less           Kidney failure    (1)In the absence of evidence of kidney disease, neither GFR category G1 or G2 fulfill the criteria for CKD.    eGFR calculation 2021 CKD-EPI creatinine equation, which does not include race as a factor    CK [[id number]]  (Normal) Collected: 05/19/25 1216    Specimen: Blood from Arm, Left Updated: 05/19/25 1244     Creatine Kinase 101 U/L     CBC & Differential [[id number]]  (Abnormal) Collected: 05/19/25 1216    Specimen: Blood from Arm, Left Updated: 05/19/25 1243    Narrative:      The following orders were created for panel order CBC & Differential.  Procedure                               Abnormality         Status                     ---------                               -----------         ------                     CBC Auto Differential[[id number]]        Abnormal            Final result               Scan Slide[[id number]]                                       Final result                 Please view results for these tests on the individual orders.    CBC Auto Differential [[id number]]  (Abnormal) Collected: 05/19/25 1216    Specimen: Blood from Arm, Left Updated: 05/19/25 1243     WBC 8.51 10*3/mm3      RBC 4.85 10*6/mm3      Hemoglobin 15.0 g/dL      Hematocrit 44.7 %      MCV 92.2 fL      MCH 30.9 pg      MCHC 33.6 g/dL      RDW 12.8 %       RDW-SD 43.0 fl      MPV 10.0 fL      Platelets 133 10*3/mm3      Neutrophil % 68.2 %      Lymphocyte % 21.4 %      Monocyte % 8.8 %      Eosinophil % 0.8 %      Basophil % 0.4 %      Immature Grans % 0.4 %      Neutrophils, Absolute 5.81 10*3/mm3      Lymphocytes, Absolute 1.82 10*3/mm3      Monocytes, Absolute 0.75 10*3/mm3      Eosinophils, Absolute 0.07 10*3/mm3      Basophils, Absolute 0.03 10*3/mm3      Immature Grans, Absolute 0.03 10*3/mm3      nRBC 0.0 /100 WBC     Scan Slide [[id number]] Collected: 05/19/25 1216    Specimen: Blood from Arm, Left Updated: 05/19/25 1243     RBC Morphology Normal     WBC Morphology Normal     Platelet Estimate Decreased     Giant Platelets Slight/1+    Narrative:      Nominal fibrin seen on slide.             Imaging Results (Last 24 Hours)       Procedure Component Value Units Date/Time    CT Cervical Spine Without Contrast [[id number]] Collected: 05/19/25 1203     Updated: 05/19/25 1234    Narrative:        CT CERVICAL SPINE WO CONTRAST    Date of Exam: 5/19/2025 11:52 AM EDT    Indication: fall.    Comparison: Cervical spine radiographs 9/18/2020, 8/7/2019. CT cervical spine 7/3/2018.    Technique: Axial CT images were obtained of the cervical spine without contrast administration.  Sagittal and coronal reconstructions were performed.  Automated exposure control and iterative reconstruction methods were used.      Findings:  C3-4 and C6-7 anterior fusion hardware with the intervening disc prosthesis appears appropriately seated. There appears to be solid osseous fusion across C3-4 and C6-7.    Craniocervical alignment appears normal. No acute cervical spine fracture or subluxation is evident. There is mild reversal of cervical lordosis centered at C6-7. No high-grade cervical canal stenosis is identified. Imaged paraspinal soft tissues   demonstrate no acute finding.    At C2-3, moderate bilateral facet arthropathy. No significant disc bulge or canal stenosis. No significant  neural foraminal narrowing.    At C3-4, posterior osteophyte formation is present. Borderline to mild canal stenosis. Moderate bilateral facet arthropathy and mild bilateral uncovertebral spurring. Moderate right neural foraminal stenosis. Mild to moderate left neural foraminal   stenosis    At C4-5, posterior osteophyte formation is present with mild to moderate central canal stenosis. There is right greater than left uncovertebral spurring. Moderate bilateral facet arthropathy is seen. Left neural foramen is patent. There is mild to   moderate right neural foraminal stenosis.    At C5-6, there is mild bilateral facet arthropathy. There is mild left-sided uncovertebral spurring. No significant central canal stenosis. No significant neural foraminal stenosis    At C6-7, posterior osteophyte formation is present slightly eccentric to the left of midline with moderate canal stenosis. There is mild left-sided uncovertebral spurring and mild bilateral facet arthropathy. Mild left neural foraminal narrowing. Right   neural foramen is patent.    At C7-T1, mild posterior osteophyte formation is present. Mild bilateral facet arthropathy. No significant central canal stenosis. Mild left-sided neural foraminal narrowing. Right neural foramen appears patent.      Impression:      Impression:  1.No acute cervical spine findings.  2.C3-4 and C6-7 anterior fusion hardware appears appropriately seated.  3.Degenerative changes of the cervical spine as described above.        Electronically Signed: Maria L Foster MD    5/19/2025 12:32 PM EDT    Workstation ID: [id number]    CT Head Without Contrast [[id number]] Collected: 05/19/25 1200     Updated: 05/19/25 1204    Narrative:      CT HEAD WO CONTRAST    Date of Exam: 5/19/2025 11:52 AM EDT    Indication: fall.       Comparison: CT head 12/27/2024.    Technique: Axial CT images were obtained of the head without contrast administration.  Coronal reconstructions were performed.  Automated  exposure control and iterative reconstruction methods were used.      Findings:  No acute intracranial hemorrhage, mass lesion, mass effect, midline shift or evidence of acute evolving infarct. Paranasal sinuses are clear. Mastoid air cells are clear. Calvarium is within normal limits. Mild age-appropriate atrophy.      Impression:      Impression:  No acute intracranial findings.      Electronically Signed: Maria L Foster MD    5/19/2025 12:02 PM EDT    Workstation ID: [id number]    XR Spine Lumbar Complete 4+VW [[id number]] Collected: 05/19/25 1149     Updated: 05/19/25 1152    Narrative:      XR SPINE LUMBAR COMPLETE 4+VW    Date of Exam: 5/19/2025 11:35 AM EDT    Indication: fall    Comparison: CT lumbar spine 8/10/2021.    Findings:  No acute lumbar spine fracture or subluxation is seen. Disc space heights appear preserved. Anterior osteophytes are present, most notably at L3-3 and L3-4. Mild to moderate multilevel lumbar facet arthropathy is present, thought to be greatest at L4-5   and L5-S1 with. No spondylitic defects are identified. Sacral stimulator device projects right of midline. Cholecystectomy changes are noted.      Impression:      Impression:  No acute lumbar spine findings.  Degenerative changes of the lumbar spine as described above.      Electronically Signed: Maria L Foster MD    5/19/2025 11:50 AM EDT    Workstation ID: [id number]              Assessment & Plan        This is a 69 y.o. female with:    Active and Resolved Problems  Active Hospital Problems    Diagnosis  POA    **Fall [W19.XXXA]  Yes      Resolved Hospital Problems   No resolved problems to display.       Fall  Back pain  Parkinsons  Peripheral neuropathy  RLS  - Mechanical fall, likely 2/2 pt's neurological disorders, possible component of UTI as below  - CT Head, CT c-spine, lumbar XR without acute pathology appreciated. Pt w/ some chrissie LE weakness, limited 2/2 back pain, no incontinence/saddle anesthesia, pulses intact.   - Pain  control as needed  - PT/OT consulted, appreciate assistance. C/f patient's current ability to ambulate/ADLs at home in present condition  - Cont home sinemet, amantadine, gabapentin  - consider MRI if pain not improving    UTI  - SIRS 0/4. UA w/ nitrites, leuks, 11-20 wbc, 3+ bacteria. Pt w/ some reported LUTS.   - started on ceftriaxone in ED, cont  - Ucx pending    T2DM  - not on insulin at home  - A1C 5.8 2/2025, repeat pending  - holding home oral  - SSI  - CCD    COPD  - not in acute exacerbation  - cont home medications    Depression  Anxiety  - cont home medications, pt on clonazepam once daily      Home medications pending verification    VTE Prophylaxis:  Pharmacologic VTE prophylaxis orders are present.        The patient desires to be as follows:    CODE STATUS:    Code Status (Patient has no pulse and is not breathing): CPR (Attempt to Resuscitate)  Medical Interventions (Patient has pulse or is breathing): Full Support  Level Of Support Discussed With: Patient        Joe Rivera, who can be contacted at [phone] , is the designated person to make medical decisions on the patient's behalf if She is incapable of doing so. This was clarified with patient and/or next of kin on 5/19/2025 during the course of this H&P.    Admission Status:  I believe this patient meets observation status.    Expected Length of Stay: <2 midnights    PDMP and Medication Dispenses via Sidebar reviewed and consistent with patient reported medications.    I discussed the patient's findings and my recommendations with patient.      Signature:     This document has been electronically signed by Javier Shepherd MD on May 19, 2025 14:47 EDT   [facility] Hospitalist Team     Electronically signed by Javier Shepherd MD at 05/19/25 3092       Operative/Procedure Notes (all)    No notes of this type exist for this encounter.          Physician Progress Notes (last 48 hours)        Sagar Way MD at 05/20/25 4752           Anticipate patient will need less than 30 days at rehab  Electronically signed by Sagar Way MD at 25 1603       Sagar Way MD at 25 0806              [facility] MEDICINE SERVICE  DAILY PROGRESS NOTE    NAME: Dariana Rivera  : 1955  MRN: [id number]      LOS: 0 days     PROVIDER OF SERVICE: Sagar Way MD    Chief Complaint: Fall    Subjective:     Interval History:  History taken from: patient chart family    Progressive lower extremity weakness      Review of Systems:   Review of Systems  All negative except as above  Objective:     Vital Signs  Temp:  [97.2 °F (36.2 °C)-98.4 °F (36.9 °C)] 98.3 °F (36.8 °C)  Heart Rate:  [56-75] 68  Resp:  [12-20] 18  BP: (111-149)/(54-80) 141/71  Flow (L/min) (Oxygen Therapy):  [1] 1   Body mass index is 32.39 kg/m².    Physical Exam  Physical Exam  AO x 3 NAD  RRR S1-S2 audible  Lungs with fair air entry  Abdomen soft nontender nondistended  Bilateral lower extremity motor 2 out of 5     Diagnostic Data    Results from last 7 days   Lab Units 25  0019 25  1216   WBC 10*3/mm3 6.48 8.51   HEMOGLOBIN g/dL 14.1 15.0   HEMATOCRIT % 43.2 44.7   PLATELETS 10*3/mm3 128* 133*   GLUCOSE mg/dL 117* 87   CREATININE mg/dL 0.86 0.81   BUN mg/dL 27* 32*   SODIUM mmol/L 139 139   POTASSIUM mmol/L 3.7 4.3   AST (SGOT) U/L  --  22   ALT (SGPT) U/L  --  16   ALK PHOS U/L  --  61   BILIRUBIN mg/dL  --  0.8   ANION GAP mmol/L 12.3 13.1       CT Cervical Spine Without Contrast  Result Date: 2025  Impression: 1.No acute cervical spine findings. 2.C3-4 and C6-7 anterior fusion hardware appears appropriately seated. 3.Degenerative changes of the cervical spine as described above. Electronically Signed: Maria L Foster MD  2025 12:32 PM EDT  Workstation ID: [id number]    CT Head Without Contrast  Result Date: 2025  Impression: No acute intracranial findings. Electronically Signed: Maria L Foster MD  2025 12:02 PM  EDT  Workstation ID: [id number]    XR Spine Lumbar Complete 4+VW  Result Date: 5/19/2025  Impression: No acute lumbar spine findings. Degenerative changes of the lumbar spine as described above. Electronically Signed: Maria L Foster MD  5/19/2025 11:50 AM EDT  Workstation ID: [id number]        I reviewed the patient's new clinical results.  I reviewed the patient's new imaging results and agree with the interpretation.    Assessment/Plan:     Active and Resolved Problems  Active Hospital Problems    Diagnosis  POA    **Fall [W19.XXXA]  Yes      Resolved Hospital Problems   No resolved problems to display.       69-year-old female with history of Parkinson's, RLS, COPD, hypertension, hyperlipidemia, DM2, GERD, anxiety/depression admitted to [facility] 5/19 with fall at home    #Fall at home appears to be in setting of bilateral lower extremity weakness  #Chronic back pain status post cervical spine surgery  #RLS  #Parkinson's  Patient has multiple risk factors for falls.  No reported loss of consciousness does endorse progressive bilateral lower extremity weakness last few months  Patient reports unable to have MRI given bladder stimulator placed 5 years ago    CT lumbar spine  CT head, CT C-spine, x-ray lumbar in emergency room no acute findings  PT/OT  At home takes Klonopin.  Has been on it for multiple years not interested in discontinuing continue home Requip, amantadine and Sinemet      #Abnormal UA  Patient with no urinary symptoms  History of bladder stimulator 5 years ago  Currently on ceftriaxone follow urine culture    #DM2  Hold OHA  ISS lispro  Diabetic diet    #COPD not in exacerbation  Continue current nebs    #Anxiety/depression  Continue home medications    VTE Prophylaxis:  Pharmacologic VTE prophylaxis orders are present.             Disposition Planning:     Barriers to Discharge: Medical workup  Anticipated Date of Discharge: 1 to 2 days  Place of Discharge: Home versus rehab      Time: 45 minutes      Code Status and Medical Interventions: CPR (Attempt to Resuscitate); Full Support   Ordered at: 05/19/25 1447     Code Status (Patient has no pulse and is not breathing):    CPR (Attempt to Resuscitate)     Medical Interventions (Patient has pulse or is breathing):    Full Support     Level Of Support Discussed With:    Patient       Signature: Electronically signed by Sagar Way MD, 05/20/25, 08:06 EDT.  [facility] Hospitalist Team      Electronically signed by Sagar Way MD at 05/20/25 1058          Consult Notes (last 48 hours)        Rachel Cristina PA-C at 05/21/25 1245        Consult Orders    1. Inpatient Neurosurgery Consult [[id number]] ordered by Rodney Scott MD at 05/21/25 1054                 [facility] NEUROSURGERY CONSULT NOTE    Patient name: Dariana Rivera  Referring Provider:     Rodney Scott MD      Reason for Consultation:     Moderate compression fracture of L5 and other CT findings of spinal stenosis, c/o new onset bilateral feet numbness after a fall       Patient Care Team:  Franny Strickland MD as PCP - General (Family Medicine)  Nabor Amador MD as Consulting Physician (Cardiology)  Tucker Best MD as Consulting Physician (Nephrology)  Mariano Rodriguez MD as Consulting Physician (Urology)  Archana Singh MD as Consulting Physician (Psychiatry)  Sasha Khan MD as Consulting Physician (Sleep Medicine)  Zachary Mcgarry MD as Consulting Physician (Gastroenterology)    Chief complaint: Low back pain    Subjective .     History of present illness:    Patient is a 69 y.o. female with a history of COPD, diabetes mellitus type 2 and Parkinson's disease who presents to [facility] after falling on Friday and hurting her lower back. Patient went to stand up from the toilet and lost  on the bathroom sink causing her to fall backwards into the shower. She hit her head, neck and lower back. Patient has a history  of low back pain with radicular symptoms in her lower legs. She used to see pain management, but stopped going last year. She states that her back pain since her fall has progressed, but does not report an increase in her radicular symptoms since the fall. She reports her pain an 8/10 today. She denies any bowel/bladder incontinence or saddle anesthesia.     Review of Systems  Review of Systems   Constitutional:  Positive for activity change.   Musculoskeletal:  Positive for back pain.   Neurological:  Positive for weakness and numbness.       History  PAST MEDICAL HISTORY  Past Medical History:   Diagnosis Date    Allergic Not sure    Penicillin, cipro, clindamycin, methadone, ambien    Angina at rest     DR. Amador    Anxiety disorder     [facility]     Asthma About 5 yrs ago    Shortness of breath with exertion    Back pain     Bipolar disorder     Cataract     Left & right removed in 3/2016    Cervical dystonia     Cholelithiasis     Gallbladder removed in 1995 or 97    Chronic pain     with stenosis    Chronic pain disorder     Cervical, lumbar, L knee, both shoulders    Cirrhosis     Colitis     Colon polyps     COPD (chronic obstructive pulmonary disease)     Not sure when diagnosed    Cramping of feet     Cramping of hands     DDD (degenerative disc disease), cervical     DDD (degenerative disc disease), lumbar     DDD (degenerative disc disease), lumbar     DDD (degenerative disc disease), thoracic     Depression     Diabetes mellitus     Not sure when diagnosed.    Diverticulosis     Not sure when diagnosed    Dysphagia 09/2020    Eosinophilic colitis     Gastritis     GERD (gastroesophageal reflux disease)     Headache Not sure    Migraines stopped after complete hyst in 2004    Hepatic steatosis     non alcoholic steo-hepatitis     Hx of lithotripsy 02/10/2021    Hypertension     IBS (irritable bowel syndrome)     Infectious viral hepatitis     ALEXANDER    Insomnia     Kidney stones     Low back pain     Not  sure when diagnosed    Lumbar stenosis     Neck pain     Neuromuscular disorder Not sure    Parkinsons and Cervical Dystonia    Neuropathy     Obesity     Obese since about 1966    Osteoarthritis     Osteopenia     Osteopenia diagnosed after bone density test in 2024.    Parkinson's disease     Peripheral edema     Pneumonia May 2018    About a month after neck fusion surgery    PTSD (post-traumatic stress disorder)     Recurrent UTI     Renal insufficiency     Not sure when diagnosed    Seizure     onset 7/2016.  Last seizure 10/2019    Self-injurious behavior     Tried to commit suicide in 2002    Sleep apnea     Using Bipap machine at night. advised to bring dos    Sleep apnea, obstructive     Suicide attempt     In 2001 or 2002    Urinary incontinence        PAST SURGICAL HISTORY  Past Surgical History:   Procedure Laterality Date    BACK SURGERY      BRONCHOSCOPY  Date Unknown    CARDIAC CATHETERIZATION  02/2019    CARPAL TUNNEL RELEASE Bilateral     Wrist     CHOLECYSTECTOMY  1997    COLONOSCOPY      CYSTOSCOPY BLADDER STONE LITHOTRIPSY      ENDOMETRIAL ABLATION      ENDOSCOPY      ENDOSCOPY N/A 04/15/2020    Procedure: ESOPHAGOGASTRODUODENOSCOPY with dilitation (60FR.);  Surgeon: Julieta Allison MD;  Location: [facility] ENDOSCOPY;  Service: Gastroenterology;  Laterality: N/A;  varices,hiatal hernia,gastritis    ENDOSCOPY N/A 09/29/2020    Procedure: ESOPHAGOGASTRODUODENOSCOPY with esophageal dilitation (54 bougie);  Surgeon: Julieta Allison MD;  Location: [facility] ENDOSCOPY;  Service: Gastroenterology;  Laterality: N/A;   post op: hiatal hernia, esophageal stricture    ENDOSCOPY N/A 09/30/2021    Procedure: ESOPHAGOGASTRODUODENOSCOPY WITH DILATATION (BOUGIE #60);  Surgeon: Julieta Allison MD;  Location: [facility] ENDOSCOPY;  Service: Gastroenterology;  Laterality: N/A;  ESOPHAGEAL VARICES AND HIATAL HERNIA    ENDOSCOPY N/A 07/21/2022    Procedure: ESOPHAGOGASTRODUODENOSCOPY WITH BIOPSY X1 AREA  AND DILATATION  (BOUGIE  #54);  Surgeon: Zachary Mcgarry MD;  Location: [facility] ENDOSCOPY;  Service: Gastroenterology;  Laterality: N/A;  Post: candida esophagitis,gastritis    ENDOSCOPY N/A 2023    Procedure: ESOPHAGOGASTRODUODENOSCOPY WITH BIOPSY X 1  AREA  AND DILATATON (48 NON GUIDED BOUGIE);  Surgeon: Zachary Mcgarry MD;  Location: [facility] ENDOSCOPY;  Service: Gastroenterology;  Laterality: N/A;  post op: GASTRITIS, SMALL ESOPHAGEAL VARICES    ENDOSCOPY N/A 02/10/2025    Procedure: ESOPHAGOGASTRODUODENOSCOPY, biopsy x2 areas;  Surgeon: Zachary Mcgarry MD;  Location: [facility] ENDOSCOPY;  Service: Gastroenterology;  Laterality: N/A;  post: gastritis, abnormal mucosa mid esophagus    EYE SURGERY  3-14 & 3-    Cataract surgery R 3-14, L 3-    HYSTERECTOMY  2004    complete    INTERSTIM PLACEMENT      bladder    JOINT REPLACEMENT Bilateral     knees    KNEE ARTHROSCOPY Bilateral     Arthroscopic surgery to bilateral knees     OTHER SURGICAL HISTORY      Stress test     OTHER SURGICAL HISTORY  10/2016    Lithotripsy total 4-5    OTHER SURGICAL HISTORY      Right arm cellulits- spider bites     OTHER SURGICAL HISTORY  2018    Lithotripsy    OTHER SURGICAL HISTORY  2018    ACDF C3-C4 & C6-C7    REPLACEMENT TOTAL KNEE  2000    REPLACEMENT TOTAL KNEE Left 2016    SHOULDER ROTATOR CUFF REPAIR Right 2020    Procedure: RT ROTATOR CUFF REPAIR OPEN;  Surgeon: Curly Vaughn MD;  Location: [facility] MAIN OR;  Service: Orthopedics    SPINE SURGERY  2018    Fusion surgery C3-C4 and C6-C7       FAMILY HISTORY  Family History   Problem Relation Age of Onset    Hypertension Mother     Hyperlipidemia Mother     Arthritis Mother     Cancer Mother         Skin cancers    Depression Mother     Mental illness Mother         Depression and Alzheimers    Vision loss Mother         Has macular degeneration.    Dementia Mother     Diabetes Father              Heart  disease Father         Had angina. Was diabetic.  of heart attack at age 57.    Early death Father          at 57 from heart attack    Heart attack Father          of heart attack in     Heart failure Father              Hyperlipidemia Sister     Arthritis Sister     Cancer Sister         Skin cancers    Depression Sister     Diabetes Sister         Diagnosed 2019    Heart disease Sister         2 mini strokes, atrial fribulation, pacemaker insertion    Hypertension Sister     Mental illness Sister         Depression    Stroke Sister         2 mini strokes    Arrhythmia Sister     Diabetes Brother         Not sure when diagnosed    Heart disease Brother         Had open heart surgery about 4 yrs ago.    Hypertension Brother     Hyperlipidemia Brother     Arthritis Brother     Cancer Brother         Skin cancers and colon cancer    Heart attack Brother         Had a heart attack about 4 yrs ago    Breast cancer Maternal Grandmother     Breast cancer Maternal Aunt        SOCIAL HISTORY  Social History     Tobacco Use    Smoking status: Former     Current packs/day: 0.00     Average packs/day: 0.5 packs/day for 15.6 years (7.8 ttl pk-yrs)     Types: Cigarettes     Start date: 1974     Quit date: 1990     Years since quittin.9     Passive exposure: Past    Smokeless tobacco: Never    Tobacco comments:     Stopped off and on.  Stopped once for more than 2 yrs   Vaping Use    Vaping status: Never Used   Substance Use Topics    Alcohol use: No    Drug use: No         Allergies:  Ambien  [zolpidem tartrate], Ciprofloxacin hcl, Penicillins, Zolpidem, Methadone, and Clindamycin    MEDICATIONS:  Medications Prior to Admission   Medication Sig Dispense Refill Last Dose/Taking    albuterol sulfate  (90 Base) MCG/ACT inhaler Inhale 2 puffs Every 6 (Six) Hours As Needed for Wheezing or Shortness of Air. 18 g 5 Taking As Needed    amantadine (SYMMETREL) 100 MG capsule Take 1 capsule  by mouth 2 (Two) Times a Day.   Taking    aspirin 81 MG EC tablet Take 1 tablet by mouth Daily.   Taking    calcium carbonate (Calcium 600) 600 MG tablet Take 1 tablet by mouth Daily.   Taking    carbidopa-levodopa ER (SINEMET CR)  MG per tablet Take 2 tablets by mouth 3 (Three) Times a Day.   Taking    Cholecalciferol (Vitamin D3) 50 MCG (2000 UT) capsule Take 1 capsule by mouth Daily.   Taking    clonazePAM (KlonoPIN) 0.5 MG tablet Take 1 tablet by mouth 2 (Two) Times a Day As Needed.   Taking As Needed    escitalopram (LEXAPRO) 10 MG tablet TAKE 1.5 TABLETS BY MOUTH DAILY. 135 tablet 0 Taking    Farxiga 10 MG tablet TAKE ONE TABLET BY MOUTH DAILY 90 tablet 0 Taking    Fluticasone-Umeclidin-Vilant (Trelegy Ellipta) 100-62.5-25 MCG/ACT inhaler Inhale 1 puff Daily. 1 each 5 Taking    lidocaine (LIDODERM) 5 % Place 1 patch on the skin as directed by provider Daily. Remove & Discard patch within 12 hours or as directed by MD   Taking    lurasidone (LATUDA) 20 MG tablet tablet Take 1 tablet by mouth Every Evening. 30 tablet 3 Taking    Melatonin 10 MG capsule Take 1 capsule by mouth every night at bedtime.   Taking    omeprazole (priLOSEC) 40 MG capsule Take 1 capsule by mouth Daily. Take preop   Taking    pentoxifylline (TRENtal) 400 MG CR tablet Take 1 tablet by mouth 3 (Three) Times a Day With Meals.   Taking    potassium chloride (KLOR-CON M20) 20 MEQ CR tablet Take 1 tablet by mouth Daily.   Taking    pramipexole (MIRAPEX) 0.5 MG tablet TAKE ONE TABLET BY MOUTH TWICE DAILY 180 tablet 0 Taking    rOPINIRole (REQUIP) 0.5 MG tablet Take 1 tablet by mouth Every Night. Take 1 hour before bedtime.   Taking    spironolactone (ALDACTONE) 25 MG tablet Take 1 tablet by mouth Daily.   Taking    vitamin C (ASCORBIC ACID) 500 MG tablet Take 2 tablets by mouth Daily. dont take preop   Taking    gabapentin (NEURONTIN) 300 MG capsule Take 1 capsule by mouth 3 (Three) Times a Day.       lactulose (CHRONULAC) 10 GM/15ML  solution Take 30 mL by mouth 2 (Two) Times a Day As Needed.       Multiple Vitamins-Minerals (MULTIVITAMIN WITH MINERALS) tablet tablet Take 1 tablet by mouth Daily. Do not take dos            Current Facility-Administered Medications:     acetaminophen (TYLENOL) tablet 650 mg, 650 mg, Oral, Q4H PRN **OR** acetaminophen (TYLENOL) 160 MG/5ML oral solution 650 mg, 650 mg, Oral, Q4H PRN **OR** acetaminophen (TYLENOL) suppository 505 mg, 505 mg, Rectal, Q4H PRN, Javier Shepherd MD    albuterol sulfate HFA (PROVENTIL HFA;VENTOLIN HFA;PROAIR HFA) inhaler 2 puff, 2 puff, Inhalation, Q6H PRN, Javier Shepherd MD, 2 puff at 05/21/25 0655    amantadine (SYMMETREL) capsule 100 mg, 100 mg, Oral, BID, Javier Shepherd MD, 100 mg at 05/21/25 0842    ascorbic acid (VITAMIN C) tablet 1,000 mg, 1,000 mg, Oral, Daily, Javier Shepherd MD, 1,000 mg at 05/21/25 0843    aspirin EC tablet 81 mg, 81 mg, Oral, Daily, Javier Shepherd MD, 81 mg at 05/21/25 0843    sennosides-docusate (PERICOLACE) 8.6-50 MG per tablet 2 tablet, 2 tablet, Oral, BID PRN **AND** polyethylene glycol (MIRALAX) packet 17 g, 17 g, Oral, Daily PRN **AND** bisacodyl (DULCOLAX) EC tablet 5 mg, 5 mg, Oral, Daily PRN **AND** bisacodyl (DULCOLAX) suppository 10 mg, 10 mg, Rectal, Daily PRN, Javier Shepherd MD    budesonide-formoterol (SYMBICORT) 160-4.5 MCG/ACT inhaler 2 puff, 2 puff, Inhalation, BID - RT, 2 puff at 05/21/25 0700 **AND** tiotropium (SPIRIVA RESPIMAT) 2.5 mcg/act aerosol solution inhaler, 2 puff, Inhalation, Daily - RT, Javier Shepherd MD    calcium 500 mg vitamin D 5 mcg (200 UT) per tablet 1 tablet, 1 tablet, Oral, Daily, Javier Shepherd MD, 1 tablet at 05/21/25 0843    Calcium Replacement - Follow Nurse / BPA Driven Protocol, , Not Applicable, PRN, Javier Shepherd MD    carbidopa-levodopa ER (SINEMET CR)  MG per tablet 2 tablet, 2 tablet, Oral, TID, Javier Shepherd MD, 2 tablet at 05/21/25 0843    cefTRIAXone (ROCEPHIN) 2,000 mg  in sodium chloride 0.9 % 100 mL MBP, 2,000 mg, Intravenous, Q24H, Javier Shepherd MD, Last Rate: 200 mL/hr at 05/20/25 1354, 2,000 mg at 05/20/25 1354    cholecalciferol (VITAMIN D3) tablet 2,000 Units, 2,000 Units, Oral, Daily, Javier Shepherd MD, 2,000 Units at 05/21/25 0843    clonazePAM (KlonoPIN) tablet 0.5 mg, 0.5 mg, Oral, BID PRN, Javier Shepherd MD    dextrose (D50W) (25 g/50 mL) IV injection 25 g, 25 g, Intravenous, Q15 Min PRN, Javier Shepherd MD    dextrose (GLUTOSE) oral gel 15 g, 15 g, Oral, Q15 Min PRN, Javier Shepherd MD    enoxaparin sodium (LOVENOX) syringe 40 mg, 40 mg, Subcutaneous, Daily, Javier Shepherd MD, 40 mg at 05/20/25 1726    escitalopram (LEXAPRO) tablet 15 mg, 15 mg, Oral, Daily, Javier Shepherd MD, 15 mg at 05/21/25 0843    gabapentin (NEURONTIN) capsule 300 mg, 300 mg, Oral, TID, Javier Shepherd MD, 300 mg at 05/21/25 0842    glucagon (GLUCAGEN) injection 1 mg, 1 mg, Intramuscular, Q15 Min PRN, Javier Shepherd MD    HYDROmorphone (DILAUDID) injection 0.5 mg, 0.5 mg, Intravenous, Q4H PRN, Javier Shepherd MD, 0.5 mg at 05/21/25 1223    insulin lispro (HUMALOG/ADMELOG) injection 2-7 Units, 2-7 Units, Subcutaneous, 4x Daily AC & at Bedtime, Javier Shepherd MD, 2 Units at 05/21/25 1220    lactulose (CHRONULAC) 10 GM/15ML solution 20 g, 20 g, Oral, BID PRN, Javier Shepherd MD    Lidocaine 4 % 1 patch, 1 patch, Transdermal, Q24H, Javier Shepherd MD, 1 patch at 05/21/25 0844    lurasidone (LATUDA) tablet 20 mg, 20 mg, Oral, Nightly, Javier Shepherd MD, 20 mg at 05/20/25 2225    Magnesium Standard Dose Replacement - Follow Nurse / BPA Driven Protocol, , Not Applicable, PRN, Javier Shepherd MD    melatonin tablet 10 mg, 10 mg, Oral, Nightly PRN, Javier Shepherd MD    multivitamin with minerals 1 tablet, 1 tablet, Oral, Daily, Javier Shepherd MD, 1 tablet at 05/21/25 0843    naloxone (NARCAN) injection 0.4 mg, 0.4 mg, Intravenous, Q5 Min PRN, Javier Shepherd  MD    nitroglycerin (NITROSTAT) SL tablet 0.4 mg, 0.4 mg, Sublingual, Q5 Min PRN, Javier Shepherd MD    ondansetron (ZOFRAN) injection 4 mg, 4 mg, Intravenous, Q6H PRN, Javier Shepherd MD    oxyCODONE (ROXICODONE) immediate release tablet 5 mg, 5 mg, Oral, Q6H PRN, Javier Shepherd MD, 5 mg at 05/20/25 2230    pantoprazole (PROTONIX) EC tablet 40 mg, 40 mg, Oral, Q AM, Javier Shepherd MD, 40 mg at 05/21/25 0627    pentoxifylline (TRENtal) CR tablet 400 mg, 400 mg, Oral, TID With Meals, Javier Shepherd MD, 400 mg at 05/21/25 1220    Phosphorus Replacement - Follow Nurse / BPA Driven Protocol, , Not Applicable, PRN, Javier Shepherd MD    Potassium Replacement - Follow Nurse / BPA Driven Protocol, , Not Applicable, PRN, Javier Shepherd MD    pramipexole (MIRAPEX) tablet 0.5 mg, 0.5 mg, Oral, BID, Javier Shepherd MD, 0.5 mg at 05/21/25 0843    rOPINIRole (REQUIP) tablet 0.5 mg, 0.5 mg, Oral, Nightly, Javier Shepherd MD, 0.5 mg at 05/20/25 2225    [COMPLETED] Insert Peripheral IV, , , Once **AND** sodium chloride 0.9 % flush 10 mL, 10 mL, Intravenous, PRN, Americo Chua PA-C, 10 mL at 05/21/25 0844    spironolactone (ALDACTONE) tablet 25 mg, 25 mg, Oral, Daily, Javier Shepherd MD, 25 mg at 05/21/25 0842      Objective     Results Review:  LABS:  Results from last 7 days   Lab Units 05/20/25  0019 05/19/25  1216   WBC 10*3/mm3 6.48 8.51   HEMOGLOBIN g/dL 14.1 15.0   HEMATOCRIT % 43.2 44.7   PLATELETS 10*3/mm3 128* 133*     Results from last 7 days   Lab Units 05/20/25  0019 05/19/25  1216   SODIUM mmol/L 139 139   POTASSIUM mmol/L 3.7 4.3   CHLORIDE mmol/L 103 103   CO2 mmol/L 23.7 22.9   BUN mg/dL 27* 32*   CREATININE mg/dL 0.86 0.81   CALCIUM mg/dL 9.4 10.1   BILIRUBIN mg/dL  --  0.8   ALK PHOS U/L  --  61   ALT (SGPT) U/L  --  16   AST (SGOT) U/L  --  22   GLUCOSE mg/dL 117* 87         DIAGNOSTICS:  CT LUMBAR SPINE WO CONTRAST     Date of Exam: 5/20/2025 11:14 AM EDT     Indication: b/l LE  weakness.     Comparison: CT abdomen pelvis 12/27/2024, CT lumbar spine 8/10/2021     Technique: Axial CT images were obtained of the lumbar spine without contrast administration.  Sagittal and coronal reconstructions were performed.  Automated exposure control and iterative reconstruction methods were used.        Findings:  Osseous demineralization limiting detection for fractures. There is an L5 compression fracture with up to 50% central height loss. No significant bony retropulsion. This is new from both prior CT comparisons. No convincing discrete fracture line. No   traumatic spondylolisthesis. Multilevel degenerative disc disease, disc height loss and marginal spurring most severe at L3-L4 and L4-L5 similar to previous comparison. Multilevel up to moderate severity lower lumbar facet arthropathy also appears   similar. Question component of mild underlying congenital stenosis. Marked partially imaged bladder distention with mild bilateral hydroureteronephrosis. Numerous nonobstructing bilateral renal calculi including a cluster of stones in the left mid to   inferior renal pole measuring up to 7 mm. Circumaortic left renal vein. Abdominal aortic atherosclerotic disease. Mild paraspinous muscle atrophy. Degenerative osteoarthritis of the SI joints.     T12-L1: Small disc osteophyte complex. Mild facet arthropathy. Mild central spinal canal stenosis. No significant neural foraminal stenosis.     L1-L2: Mild facet arthropathy. No significant disc bulge. No significant central spinal canal stenosis. Mild neural foraminal stenosis.     L2-L3: Tiny concentric disc bulge. Mild facet arthropathy. Mild central spinal canal stenosis. Mild neural foraminal stenosis.     L3-L4: Concentric disc bulge with moderate facet arthropathy. There is probably moderate to severe central spinal canal stenosis L3-L4 which appears without significant change since 2021. Mild to moderate bilateral neural foraminal stenosis.     L4-L5:  Diffuse concentric disc bulge with moderate facet arthropathy. Suspect moderate to severe central spinal canal stenosis similar to previous comparison. Moderate bilateral neuroforaminal stenosis.     L5-S1: Concentric disc bulge with mild facet arthropathy. Minimal central spinal canal stenosis. Mild bilateral neural foraminal stenosis.     IMPRESSION:  Impression:  1. Osseous demineralization limiting detection for fractures.  2. Moderate compression fracture of L5 without significant bony retropulsion new from 2024, considered age-indeterminate. This could reflect sequelae of trauma or insufficiency fracture. Correlate with history and point tenderness.  3. Multilevel lumbar spondylosis with suspect some degree of congenital narrowing of the lumbar spine without significant change since 2021. Possible moderate to severe central spinal canal stenosis L3-L4 and L4-L5 suboptimally assessed by CT.  4. Marked bladder distention with bilateral mild hydroureteronephrosis. Consider renal ultrasound after voiding or Way decompression to ensure resolution.  5. Multiple nonobstructing renal calculi.  6. Abdominal aortic atherosclerotic disease.    Results Review:   I reviewed the patient's new clinical results.  I personally viewed patient's chart and imaging    Vital Signs   Temp:  [97.5 °F (36.4 °C)-98.3 °F (36.8 °C)] 97.5 °F (36.4 °C)  Heart Rate:  [58-95] 69  Resp:  [12-20] 14  BP: (110-130)/(62-75) 111/66    Physical Exam:  Physical Exam  Vitals reviewed.   Musculoskeletal:         General: Normal range of motion.      Cervical back: Normal range of motion.   Skin:     General: Skin is warm and dry.   Neurological:      General: No focal deficit present.   Psychiatric:         Mood and Affect: Mood normal.         Speech: Speech normal.       Neurological Exam  Mental Status  Awake, alert and oriented to person, place and time. Oriented to person, place and time. Speech is normal. Language is fluent with no  aphasia.    Motor                                               Right                     Left   Iliopsoas                               4-                          4-   Quadriceps                           4-                          4-   Hamstring                             4-                          4-   Gastrocnemius                     4-                           4-   Anterior tibialis                      4-                          4-   Posterior tibialis                    4-                          4-  Patient is at her baseline in strength .    Reflexes    Right pathological reflexes: Ankle clonus absent.  Left pathological reflexes: Ankle clonus absent.      Assessment & Plan       Fall      Problem List Items Addressed This Visit       UTI (urinary tract infection) - Primary    Relevant Medications    cefTRIAXone (ROCEPHIN) 2,000 mg in sodium chloride 0.9 % 100 mL MBP    * (Principal) Fall     Other Visit Diagnoses         Mobility impaired          Numbness and tingling of both feet                 COMORBID CONDITIONS:  DM2, Parkinson's, COPD, CKD,     Patient is a 69 year old female who [facility] with low back pain after falling this past Friday.     The patient had a CT scan of her lumbar spine showing a moderate compression fracture at L5 without significant bony retropulsion.     On physical examination the patient is at her baseline for strength, did not report any sensory deficit and I did not appreciate clonus.     At this time there is no urgent or emergent neurosurgical intervention indicated. The patient is to wear an LSO brace for the next 6 weeks at all times unless she is sleeping or taking a shower. She is to be mindful of bending, lifting and twisting. She is not to lift more than 10 lbs.     Patient is to follow up in clinic in 6 weeks with an updated lumbar x-ray. My office will call to schedule the appointment.     Neurosurgery will sign off at this time. Please  "reach out with any questions or concerns.       PLAN:   L5 compression fracture  - LSO brace for next 6 weeks  - No bending, lifting or twisting  - Pain management per primary  - Medical management per primary   - Updated lumbar x-ray in 6 weeks     I discussed the patient's findings and my recommendations with patient, family, and      During patient visit, I utilized appropriate personal protective equipment including gloves and mask.  Mask used was standard procedure mask. Appropriate PPE was worn during the entire visit.  Hand hygiene was completed before and after.     Rachel Cristina PA-C  25  12:46 EDT    \"Dictated utilizing Dragon dictation\".      Electronically signed by Rachel Cristina PA-C at 25 1311       Nutrition Notes (most recent note)    No notes exist for this encounter.       Speech Language Pathology Notes (most recent note)    No notes exist for this encounter.       Philip Stanley, PT   Physical Therapist  Physical Therapy  Therapy Evaluation     Signed  Date of Service:  25  Creation Time:  25     Signed        Expand All Collapse All  Patient Name: Dariana Rivera               : 1955                        MRN: [id number]                              Today's Date: 2025                                   Admit Date: 2025                        Visit Dx:   Visit Diagnosis       ICD-10-CM ICD-9-CM   1. Urinary tract infection without hematuria, site unspecified  N39.0 599.0   2. Mobility impaired  Z74.09 799.89   3. Numbness and tingling of both feet  R20.0 782.0     R20.2     4. Fall, initial encounter  W19.XXXA E888.9         Problem List       Patient Active Problem List   Diagnosis    Morbid obesity    Bipolar 1 disorder, depressed, moderate    Chronic back pain    Chronic kidney disease, unspecified    Chronic obstructive pulmonary disease    Cirrhosis    Gastroesophageal reflux disease    Hypertension    Sleep apnea    " Parkinson's disease    Recurrent urinary tract infection    Seizure    Spinal stenosis of cervical region    Type 2 diabetes mellitus with other diabetic neurological complication    Generalized anxiety disorder    Hyperlipidemia    Esophageal varices without bleeding    Injury of cervical spine    Cor pulmonale    Dysphagia    Encounter for annual wellness exam in Medicare patient    Idiopathic osteoarthritis    Degeneration of lumbar intervertebral disc    Panniculitis affecting back    Panniculitis of neck    Spinal stenosis of lumbar region    Osteopenia after menopause    UTI (urinary tract infection)    Fall         Medical History        Past Medical History:   Diagnosis Date    Allergic Not sure     Penicillin, cipro, clindamycin, methadone, ambien    Angina at rest       DR. [facility]ador    Anxiety disorder       [facility]     Asthma About 5 yrs ago     Shortness of breath with exertion    Back pain      Bipolar disorder      Cataract       Left & right removed in 3/2016    Cervical dystonia      Cholelithiasis       Gallbladder removed in 1995 or 97    Chronic pain       with stenosis    Chronic pain disorder       Cervical, lumbar, L knee, both shoulders    Cirrhosis      Colitis      Colon polyps      COPD (chronic obstructive pulmonary disease)       Not sure when diagnosed    Cramping of feet      Cramping of hands      DDD (degenerative disc disease), cervical      DDD (degenerative disc disease), lumbar      DDD (degenerative disc disease), lumbar      DDD (degenerative disc disease), thoracic      Depression      Diabetes mellitus       Not sure when diagnosed.    Diverticulosis       Not sure when diagnosed    Dysphagia 09/2020    Eosinophilic colitis      Gastritis      GERD (gastroesophageal reflux disease)      Headache Not sure     Migraines stopped after complete hyst in 2004    Hepatic steatosis       non alcoholic steo-hepatitis     Hx of lithotripsy 02/10/2021    Hypertension      IBS (irritable  bowel syndrome)      Infectious viral hepatitis       ALEXANDER    Insomnia      Kidney stones      Low back pain       Not sure when diagnosed    Lumbar stenosis      Neck pain      Neuromuscular disorder Not sure     Parkinsons and Cervical Dystonia    Neuropathy      Obesity       Obese since about 1966    Osteoarthritis      Osteopenia       Osteopenia diagnosed after bone density test in 2024.    Parkinson's disease      Peripheral edema      Pneumonia May 2018     About a month after neck fusion surgery    PTSD (post-traumatic stress disorder)      Recurrent UTI      Renal insufficiency       Not sure when diagnosed    Seizure       onset 7/2016.  Last seizure 10/2019    Self-injurious behavior       Tried to commit suicide in 2002    Sleep apnea       Using Bipap machine at night. advised to bring dos    Sleep apnea, obstructive      Suicide attempt       In 2001 or 2002    Urinary incontinence           Surgical History         Past Surgical History:   Procedure Laterality Date    BACK SURGERY        BRONCHOSCOPY   Date Unknown    CARDIAC CATHETERIZATION   02/2019    CARPAL TUNNEL RELEASE Bilateral       Wrist     CHOLECYSTECTOMY   1997    COLONOSCOPY        CYSTOSCOPY BLADDER STONE LITHOTRIPSY        ENDOMETRIAL ABLATION        ENDOSCOPY        ENDOSCOPY N/A 04/15/2020     Procedure: ESOPHAGOGASTRODUODENOSCOPY with dilitation (60FR.);  Surgeon: Julieta Allison MD;  Location: [facility] ENDOSCOPY;  Service: Gastroenterology;  Laterality: N/A;  varices,hiatal hernia,gastritis    ENDOSCOPY N/A 09/29/2020     Procedure: ESOPHAGOGASTRODUODENOSCOPY with esophageal dilitation (54 bougie);  Surgeon: Julieta Allison MD;  Location: [facility] ENDOSCOPY;  Service: Gastroenterology;  Laterality: N/A;   post op: hiatal hernia, esophageal stricture    ENDOSCOPY N/A 09/30/2021     Procedure: ESOPHAGOGASTRODUODENOSCOPY WITH DILATATION (BOUGIE #60);  Surgeon: Julieta Allison MD;  Location: [facility] ENDOSCOPY;  Service:  Gastroenterology;  Laterality: N/A;  ESOPHAGEAL VARICES AND HIATAL HERNIA    ENDOSCOPY N/A 07/21/2022     Procedure: ESOPHAGOGASTRODUODENOSCOPY WITH BIOPSY X1 AREA  AND DILATATION (BOUGIE  #54);  Surgeon: Zachary Mcgarry MD;  Location: [facility] ENDOSCOPY;  Service: Gastroenterology;  Laterality: N/A;  Post: candida esophagitis,gastritis    ENDOSCOPY N/A 05/02/2023     Procedure: ESOPHAGOGASTRODUODENOSCOPY WITH BIOPSY X 1  AREA  AND DILATATON (48 NON GUIDED BOUGIE);  Surgeon: Zachary Mcgarry MD;  Location: [facility] ENDOSCOPY;  Service: Gastroenterology;  Laterality: N/A;  post op: GASTRITIS, SMALL ESOPHAGEAL VARICES    ENDOSCOPY N/A 02/10/2025     Procedure: ESOPHAGOGASTRODUODENOSCOPY, biopsy x2 areas;  Surgeon: Zachary Mcgarry MD;  Location: [facility] ENDOSCOPY;  Service: Gastroenterology;  Laterality: N/A;  post: gastritis, abnormal mucosa mid esophagus    EYE SURGERY   3-14 & 3-     Cataract surgery R 3-14, L 3-    HYSTERECTOMY   09/2004     complete    INTERSTIM PLACEMENT         bladder    JOINT REPLACEMENT Bilateral       knees    KNEE ARTHROSCOPY Bilateral       Arthroscopic surgery to bilateral knees     OTHER SURGICAL HISTORY   2015     Stress test     OTHER SURGICAL HISTORY   10/2016     Lithotripsy total 4-5    OTHER SURGICAL HISTORY         Right arm cellulits- spider bites     OTHER SURGICAL HISTORY   02/08/2018     Lithotripsy    OTHER SURGICAL HISTORY   04/20/2018     ACDF C3-C4 & C6-C7    REPLACEMENT TOTAL KNEE   2000    REPLACEMENT TOTAL KNEE Left 11/2016    SHOULDER ROTATOR CUFF REPAIR Right 01/08/2020     Procedure: RT ROTATOR CUFF REPAIR OPEN;  Surgeon: Curly Vaughn MD;  Location: [facility] MAIN OR;  Service: Orthopedics    SPINE SURGERY   04/2018     Fusion surgery C3-C4 and C6-C7           General Information         Row Name 05/20/25 5917                 Physical Therapy Time and Intention     Document Type evaluation  -AM       Mode of Treatment physical  therapy  -AM          Row Name 05/20/25 1137                 General Information     Patient Profile Reviewed yes  -AM       Prior Level of Function independent:;all household mobility;community mobility;gait;transfer;bed mobility;using stairs  ambulates with st cane  -AM       Existing Precautions/Restrictions fall;oxygen therapy device and L/min  1L O2, Contact Precautions  -AM       Barriers to Rehab medically complex  -AM          Row Name 05/20/25 1137                 Living Environment     Current Living Arrangements home  -AM       People in Home spouse  -AM          Row Name 05/20/25 1137                 Home Main Entrance     Number of Stairs, Main Entrance three  -AM       Stair Railings, Main Entrance railings safe and in good condition  -AM          Row Name 05/20/25 1137                 Stairs Within Home, Primary     Number of Stairs, Within Home, Primary none  -AM          Row Name 05/20/25 1137                 Cognition     Orientation Status (Cognition) oriented x 4  -AM          Row Name 05/20/25 1137                 Safety Issues/Impairments Affecting Functional Mobility     Impairments Affecting Function (Mobility) balance;endurance/activity tolerance;pain;strength;sensation/sensory awareness  -AM       Comment, Safety Issues/Impairments (Mobility) gait belt utilized  -AM                       User Key  (r) = Recorded By, (t) = Taken By, (c) = Cosigned By        Initials Name Provider Type     AM Philip Stanley, GRZEGORZ Physical Therapist                            Mobility         Row Name 05/20/25 1139                 Bed Mobility     Bed Mobility supine-sit;sit-supine  -AM       Supine-Sit [place] (Bed Mobility) minimum assist (75% patient effort);moderate assist (50% patient effort)  -AM       Sit-Supine [place] (Bed Mobility) minimum assist (75% patient effort)  -AM       Assistive Device (Bed Mobility) bed rails  -AM       Comment, (Bed Mobility) via log roll technique  -AM           Row Name 05/20/25 1139                 Sit-Stand Transfer     Sit-Stand [place] (Transfers) minimum assist (75% patient effort);moderate assist (50% patient effort);1 person assist  -AM       Assistive Device (Sit-Stand Transfers) walker, front-wheeled  -AM       Comment, (Sit-Stand Transfer) increased tremoring noted with standing  -AM          Row Name 05/20/25 1139                 Gait/Stairs (Locomotion)     [place] Level (Gait) minimum assist (75% patient effort);moderate assist (50% patient effort);1 person assist  -AM       Assistive Device (Gait) walker, front-wheeled  -AM       Distance in Feet (Gait) 5  x2  -AM       Deviations/Abnormal Patterns (Gait) base of support, narrow;gait speed decreased;festinating/shuffling;dahlia decreased;stride length decreased  -AM       Bilateral Gait Deviations forward flexed posture  -AM       Comment, (Gait/Stairs) decreased endurance, increased tremoring note with standing activities.  Pt reports BLE numbness which makes stepping difficult  -AM                       User Key  (r) = Recorded By, (t) = Taken By, (c) = Cosigned By        Initials Name Provider Type     AM Philip Stanley, PT Physical Therapist                            Obj/Interventions         Row Name 05/20/25 1141                 Range of Motion Comprehensive     General Range of Motion no range of motion deficits identified  -AM          Row Name 05/20/25 1141                 Strength Comprehensive (MMT)     Comment, General Manual Muscle Testing (MMT) Assessment BLEs: 4/5 throughout  -AM          Row Name 05/20/25 1141                 Motor Skills     Motor Skills functional endurance  -AM       Functional Endurance fair -  -AM          Row Name 05/20/25 1141                 Balance     Balance Assessment sitting static balance;sitting dynamic balance;standing static balance;standing dynamic balance  -AM       Static Sitting Balance standby assist  -AM       Dynamic Sitting Balance standby  assist  -AM       Position, Sitting Balance unsupported;sitting edge of bed  -AM       Static Standing Balance minimal assist  -AM       Dynamic Standing Balance minimal assist;moderate assist  -AM       Position/Device Used, Standing Balance supported;walker, front-wheeled  -AM          Row Name 05/20/25 1141                 Sensory Assessment (Somatosensory)     Sensory Assessment (Somatosensory) other (see comments)  numbness BLE  -AM                       User Key  (r) = Recorded By, (t) = Taken By, (c) = Cosigned By        Initials Name Provider Type     AM Philip Stanley, PT Physical Therapist                            Goals/Plan         Row Name 05/20/25 1204                 Bed Mobility Goal 1 (PT)     Activity/Assistive Device (Bed Mobility Goal 1, PT) bed mobility activities, all  -AM       [place] Level/Cues Needed (Bed Mobility Goal 1, PT) modified independence  -AM       Time Frame (Bed Mobility Goal 1, PT) long term goal (LTG)  -AM          Row Name 05/20/25 1204                 Transfer Goal 1 (PT)     Activity/Assistive Device (Transfer Goal 1, PT) transfers, all;walker, rolling  -AM       [place] Level/Cues Needed (Transfer Goal 1, PT) modified independence  -AM       Time Frame (Transfer Goal 1, PT) long term goal (LTG)  -AM          Row Name 05/20/25 1204                 Gait Training Goal 1 (PT)     Activity/Assistive Device (Gait Training Goal 1, PT) gait (walking locomotion);walker, rolling  -AM       [place] Level (Gait Training Goal 1, PT) modified independence  -AM       Distance (Gait Training Goal 1, PT) 100'  -AM       Time Frame (Gait Training Goal 1, PT) long term goal (LTG)  -AM          Row Name 05/20/25 1204                 Stairs Goal 1 (PT)     Activity/Assistive Device (Stairs Goal 1, PT) stairs, all skills  -AM       [place] Level/Cues Needed (Stairs Goal 1, PT) contact guard required  -AM       Number of Stairs (Stairs Goal 1, PT) 3  -AM       Time Frame  (Stairs Goal 1, PT) long term goal (LTG)  -AM          Row Name 05/20/25 1204                 Therapy Assessment/Plan (PT)     Planned Therapy Interventions (PT) balance training;bed mobility training;gait training;strengthening;stair training;patient/family education;transfer training  -AM                    User Key  (r) = Recorded By, (t) = Taken By, (c) = Cosigned By        Initials Name Provider Type     AM Philip Stanley, PT Physical Therapist                         Clinical Impression         Row Name 05/20/25 1143                 Pain     Pretreatment Pain Rating 7/10  -AM       Posttreatment Pain Rating 7/10  -AM       Pain Location back  -AM       Pain Side/Orientation lower  -AM       Pain Management Interventions exercise or physical activity utilized  RN placed lidocaine patch prior to session  -AM       Response to Pain Interventions no change per patient report  -AM          Row Name 05/20/25 1143                 Plan of Care Review     Plan of Care Reviewed With patient  -AM       Outcome Evaluation Pt is a 68 y/o female with mechanical fall in bathroom secondary to BLE numbness.  Pt fell forward into sink and then backwards/sideways into shower.  Pt's neck/head were struck during fall.    (-) LOC.  Pt now has c/o mid/lower back pain and neck pain.  (+) UTI.  Pt reports BLE numbness in feet have become progressively worse over past several months.  PMH:  PD, RLS, COPD, GERD, depression/anxiety.  CT C-spine: (-) acute.  CT head: (-) acute.  L-spine X-ray: (-) acute.  Pt reports she lives with her  in a 1 story home with 3 steps to enter into home.  Pt ambulates with st cane prior to hospitalization.  Pt wears home O2 at night.  Pt with 1L O2, telemetry and Pure[place] this date.  Pt required Min/Mod A for bed mobility.  Instructed pt in log-roll technique due to constant back pain.  Min/Mod  A for transfers with RW.  Ambulated 5' x 2 with RW wtih Min/Mod A.  Difficulty taking steps this date due  to B feet numbness.   Increasing tremors noted with standing activities.  Pt is a very high fall risk at this time and is not safe to d/c home.  Recommend SNF.  -AM          Row Name 05/20/25 1143                 Therapy Assessment/Plan (PT)     Patient/Family Therapy Goals Statement (PT) To get stronger  -AM       Rehab Potential (PT) good  -AM       Criteria for Skilled Interventions Met (PT) yes  -AM       Therapy Frequency (PT) 5 times/wk  -AM       Predicted Duration of Therapy Intervention (PT) until d/c  -AM          Row Name 05/20/25 1143                 Vital Signs     O2 Delivery Pre Treatment nasal cannula  1L O2  -AM       O2 Delivery Intra Treatment nasal cannula  -AM       O2 Delivery Post Treatment nasal cannula  -AM       Pre Patient Position Supine  -AM       Intra Patient Position Standing  -AM       Post Patient Position Supine  -AM          Row Name 05/20/25 1143                 Positioning and Restraints     Pre-Treatment Position in bed  -AM       Post Treatment Position bed  -AM       In Bed notified nsg;supine;call light within reach;encouraged to call for assist;exit alarm on  -AM                       User Key  (r) = Recorded By, (t) = Taken By, (c) = Cosigned By        Initials Name Provider Type     AM Philip Stanley, PT Physical Therapist                            Outcome Measures         Row Name 05/20/25 1205 05/20/25 0845            How much help from another person do you currently need...     Turning from your back to your side while in flat bed without using bedrails? 3  -AM 4  -DD     Moving from lying on back to sitting on the side of a flat bed without bedrails? 2  -AM 4  -DD     Moving to and from a bed to a chair (including a wheelchair)? 2  -AM 3  -DD     Standing up from a chair using your arms (e.g., wheelchair, bedside chair)? 2  -AM 3  -DD     Climbing 3-5 steps with a railing? 1  -AM 3  -DD     To walk in hospital room? 2  -AM 3  -DD     AM-PAC 6 Clicks Score (PT) 12   -AM 20  -DD                     User Key  (r) = Recorded By, (t) = Taken By, (c) = Cosigned By        Initials Name Provider Type     Rylee Del Toro LPN Licensed Nurse     Philip Hawk PT Physical Therapist                          Physical Therapy Education            Title: PT OT SLP Therapies (Done)         Topic: Physical Therapy (Done)         Point: Mobility training (Done)         Learning Progress Summary             Patient Acceptance, E,TB, VU by AM at 5/20/2025 1205                            Point: Body mechanics (Done)         Learning Progress Summary             Patient Acceptance, E,TB, VU by AM at 5/20/2025 1205                            Point: Precautions (Done)         Learning Progress Summary             Patient Acceptance, E,TB, VU by AM at 5/20/2025 1205                                                User Key         Initials Effective Dates Name Provider Type Discipline     AM 05/10/21 -  Philip Stanley PT Physical Therapist PT                          PT Recommendation and Plan  Planned Therapy Interventions (PT): balance training, bed mobility training, gait training, strengthening, stair training, patient/family education, transfer training  Outcome Evaluation: Pt is a 70 y/o female with mechanical fall in bathroom secondary to BLE numbness.  Pt fell forward into sink and then backwards/sideways into shower.  Pt's neck/head were struck during fall.    (-) LOC.  Pt now has c/o mid/lower back pain and neck pain.  (+) UTI.  Pt reports BLE numbness in feet have become progressively worse over past several months.  PMH:  PD, RLS, COPD, GERD, depression/anxiety.  CT C-spine: (-) acute.  CT head: (-) acute.  L-spine X-ray: (-) acute.  Pt reports she lives with her  in a 1 story home with 3 steps to enter into home.  Pt ambulates with st cane prior to hospitalization.  Pt wears home O2 at night.  Pt with 1L O2, telemetry and Purewick this date.  Pt required Min/Mod A for bed  mobility.  Instructed pt in log-roll technique due to constant back pain.  Min/Mod  A for transfers with RW.  Ambulated 5' x 2 with RW wtih Min/Mod A.  Difficulty taking steps this date due to B feet numbness.   Increasing tremors noted with standing activities.  Pt is a very high fall risk at this time and is not safe to d/c home.  Recommend SNF.      Time Calculation:        PT Charges         Row Name 25 1206                       Time Calculation     Start Time 0842  -AM         Stop Time 0907  -AM         Time Calculation (min) 25 min  -AM         PT Received On 25  -AM         PT - Next Appointment 25  -AM         PT Goal Re-Cert Due Date 25  -AM                      User Key  (r) = Recorded By, (t) = Taken By, (c) = Cosigned By        Initials Name Provider Type     Philip Hawk, PT Physical Therapist                       Therapy Charges for Today         Code Description Service Date Service Provider Modifiers Qty     [id number] HC PT EVAL MOD COMPLEXITY 4 2025 Philip Stanley, PT GP 1                PT G-Codes  AM-PAC 6 Clicks Score (PT): 12  PT Discharge Summary  Anticipated Discharge Disposition (PT): skilled nursing facility     Philip Stanley PT                   2025                                    Vicenta Olmedo OT   Occupational Therapist  Occupational Therapy  Therapy Evaluation     Signed  Date of Service:  25  Creation Time:  25     Signed        Expand All Collapse All  Patient Name: Dariana Rivera               : 1955                        MRN: [id number]                              Today's Date: 2025                                   Admit Date: 2025                        Visit Dx:   Visit Diagnosis       ICD-10-CM ICD-9-CM   1. Urinary tract infection without hematuria, site unspecified  N39.0 599.0   2. Mobility impaired  Z74.09 799.89   3. Numbness and tingling of both feet  R20.0 782.0     R20.2      4. Fall, initial encounter  W19.XXXA E888.9         Problem List       Patient Active Problem List   Diagnosis    Morbid obesity    Bipolar 1 disorder, depressed, moderate    Chronic back pain    Chronic kidney disease, unspecified    Chronic obstructive pulmonary disease    Cirrhosis    Gastroesophageal reflux disease    Hypertension    Sleep apnea    Parkinson's disease    Recurrent urinary tract infection    Seizure    Spinal stenosis of cervical region    Type 2 diabetes mellitus with other diabetic neurological complication    Generalized anxiety disorder    Hyperlipidemia    Esophageal varices without bleeding    Injury of cervical spine    Cor pulmonale    Dysphagia    Encounter for annual wellness exam in Medicare patient    Idiopathic osteoarthritis    Degeneration of lumbar intervertebral disc    Panniculitis affecting back    Panniculitis of neck    Spinal stenosis of lumbar region    Osteopenia after menopause    UTI (urinary tract infection)    Fall         Medical History        Past Medical History:   Diagnosis Date    Allergic Not sure     Penicillin, cipro, clindamycin, methadone, ambien    Angina at rest       DR. [facility]    Anxiety disorder       [facility]     Asthma About 5 yrs ago     Shortness of breath with exertion    Back pain      Bipolar disorder      Cataract       Left & right removed in 3/2016    Cervical dystonia      Cholelithiasis       Gallbladder removed in 1995 or 97    Chronic pain       with stenosis    Chronic pain disorder       Cervical, lumbar, L knee, both shoulders    Cirrhosis      Colitis      Colon polyps      COPD (chronic obstructive pulmonary disease)       Not sure when diagnosed    Cramping of feet      Cramping of hands      DDD (degenerative disc disease), cervical      DDD (degenerative disc disease), lumbar      DDD (degenerative disc disease), lumbar      DDD (degenerative disc disease), thoracic      Depression      Diabetes mellitus       Not sure when  diagnosed.    Diverticulosis       Not sure when diagnosed    Dysphagia 09/2020    Eosinophilic colitis      Gastritis      GERD (gastroesophageal reflux disease)      Headache Not sure     Migraines stopped after complete hyst in 2004    Hepatic steatosis       non alcoholic steo-hepatitis     Hx of lithotripsy 02/10/2021    Hypertension      IBS (irritable bowel syndrome)      Infectious viral hepatitis       [facility]    Insomnia      Kidney stones      Low back pain       Not sure when diagnosed    Lumbar stenosis      Neck pain      Neuromuscular disorder Not sure     Parkinsons and Cervical Dystonia    Neuropathy      Obesity       Obese since about 1966    Osteoarthritis      Osteopenia       Osteopenia diagnosed after bone density test in 2024.    Parkinson's disease      Peripheral edema      Pneumonia May 2018     About a month after neck fusion surgery    PTSD (post-traumatic stress disorder)      Recurrent UTI      Renal insufficiency       Not sure when diagnosed    Seizure       onset 7/2016.  Last seizure 10/2019    Self-injurious behavior       Tried to commit suicide in 2002    Sleep apnea       Using Bipap machine at night. advised to bring dos    Sleep apnea, obstructive      Suicide attempt       In 2001 or 2002    Urinary incontinence           Surgical History         Past Surgical History:   Procedure Laterality Date    BACK SURGERY        BRONCHOSCOPY   Date Unknown    CARDIAC CATHETERIZATION   02/2019    CARPAL TUNNEL RELEASE Bilateral       Wrist     CHOLECYSTECTOMY   1997    COLONOSCOPY        CYSTOSCOPY BLADDER STONE LITHOTRIPSY        ENDOMETRIAL ABLATION        ENDOSCOPY        ENDOSCOPY N/A 04/15/2020     Procedure: ESOPHAGOGASTRODUODENOSCOPY with dilitation (60FR.);  Surgeon: Julieta Allison MD;  Location: [facility] ENDOSCOPY;  Service: Gastroenterology;  Laterality: N/A;  varices,hiatal hernia,gastritis    ENDOSCOPY N/A 09/29/2020     Procedure: ESOPHAGOGASTRODUODENOSCOPY with esophageal  dilitation (54 bougie);  Surgeon: Julieta Allison MD;  [facility];  Service: Gastroenterology;  Laterality: N/A;   post op: hiatal hernia, esophageal stricture    ENDOSCOPY N/A 09/30/2021     Procedure: ESOPHAGOGASTRODUODENOSCOPY WITH DILATATION (BOUGIE #60);  Surgeon: Julieta Allison MD;  [facility];  Service: Gastroenterology;  Laterality: N/A;  ESOPHAGEAL VARICES AND HIATAL HERNIA    ENDOSCOPY N/A 07/21/2022     Procedure: ESOPHAGOGASTRODUODENOSCOPY WITH BIOPSY X1 AREA  AND DILATATION (BOUGIE  #54);  Surgeon: Zachary Mcgarry MD;  [facility];  Service: Gastroenterology;  Laterality: N/A;  Post: candida esophagitis,gastritis    ENDOSCOPY N/A 05/02/2023     Procedure: ESOPHAGOGASTRODUODENOSCOPY WITH BIOPSY X 1  AREA  AND DILATATON (48 NON GUIDED BOUGIE);  Surgeon: Zachary Mcgarry MD;  [facility];  Service: Gastroenterology;  Laterality: N/A;  post op: GASTRITIS, SMALL ESOPHAGEAL VARICES    ENDOSCOPY N/A 02/10/2025     Procedure: ESOPHAGOGASTRODUODENOSCOPY, biopsy x2 areas;  Surgeon: Zachary Mcgarry MD;  [facility];  Service: Gastroenterology;  Laterality: N/A;  post: gastritis, abnormal mucosa mid esophagus    EYE SURGERY   3-14 & 3-     Cataract surgery R 3-14, L 3-    HYSTERECTOMY   09/2004     complete    INTERSTIM PLACEMENT         bladder    JOINT REPLACEMENT Bilateral       knees    KNEE ARTHROSCOPY Bilateral       Arthroscopic surgery to bilateral knees     OTHER SURGICAL HISTORY   2015     Stress test     OTHER SURGICAL HISTORY   10/2016     Lithotripsy total 4-5    OTHER SURGICAL HISTORY         Right arm cellulits- spider bites     OTHER SURGICAL HISTORY   02/08/2018     Lithotripsy    OTHER SURGICAL HISTORY   04/20/2018     ACDF C3-C4 & C6-C7    REPLACEMENT TOTAL KNEE   2000    REPLACEMENT TOTAL KNEE Left 11/2016    SHOULDER ROTATOR CUFF REPAIR Right 01/08/2020     Procedure:  RT ROTATOR CUFF REPAIR OPEN;  Surgeon: Curly Vaughn MD;  Location: [facility] MAIN OR;  Service: Orthopedics    SPINE SURGERY   04/2018     Fusion surgery C3-C4 and C6-C7           General Information         Row Name 05/20/25 1339                 OT Time and Intention     Document Type evaluation  -JK       Mode of Treatment occupational therapy  -[employer]          Row Name 05/20/25 1333                 General Information     Patient Profile Reviewed yes  -JK       Prior Level of Function independent:;all household mobility;min assist:;ADL's  CG support 4 hrs a day 4 days a week  -JK       Existing Precautions/Restrictions fall;oxygen therapy device and L/min  -JK       Barriers to Rehab medically complex;previous functional deficit  -[employer]          Row Name 05/20/25 1337                 Occupational Profile     Reason for Services/Referral (Occupational Profile) Pt is a 70 yo F brought to hospital after a fall at home due to BLE numbness . CT head, CT c spine, and lumbar xray all negative for acute findings. Pt diangosed with UTI. Pt has Pmhx including parkinsons, RLS, COPD, htn, hld, T2DM, cirrhosis, kidney stones, GERD, depression/anxiety.    At [facility], pt lives with her  in a SSH with 3 steps to enter. Pt uses a cane but also has a RW. Pt reports having caregiver support at home 4 days a week for 4 hours at a time who help with ADLs and IADLs. Pt is responsible for toileting IND and dressing/cooking when CG not present.  -[employer]          Row Name 05/20/25 1333                 Living Environment     Current Living Arrangements home  -[employer]       People in Home spouse  -[employer]          Row Name 05/20/25 1335                 Home Main Entrance     Number of Stairs, Main Entrance three  -JK       Stair Railings, Main Entrance railings safe and in good condition  -[employer]          Row Name 05/20/25 6441                 Stairs Within Home, Primary     Number of Stairs, Within Home, Primary none  -[employer]          Row Name 05/20/25 2539                  Cognition     Orientation Status (Cognition) oriented x 4  -          Row Name 05/20/25 1338                 Safety Issues/Impairments Affecting Functional Mobility     Impairments Affecting Function (Mobility) balance;endurance/activity tolerance;pain;strength;sensation/sensory awareness  -                       User Key  (r) = Recorded By, (t) = Taken By, (c) = Cosigned By        Initials Name Provider Type     LUIS Vicenta Olmedo, OT Occupational Therapist                                     Mobility/ADL's         Row Name 05/20/25 1340                 Bed Mobility     Bed Mobility supine-sit;sit-supine  -       Supine-Sit Dolores (Bed Mobility) maximum assist (25% patient effort)  -       Sit-Supine Dolores (Bed Mobility) moderate assist (50% patient effort)  -       Assistive Device (Bed Mobility) head of bed elevated;bed rails  -       Comment, (Bed Mobility) log roll  -          Row Name 05/20/25 1340                 Transfers     Transfers sit-stand transfer;stand-sit transfer  -[facility] Name 05/20/25 1340                 Sit-Stand Transfer     Sit-Stand Dolores (Transfers) moderate assist (50% patient effort)  -[facility]          Row Name 05/20/25 1340                 Stand-Sit Transfer     Stand-Sit Dolores (Transfers) moderate assist (50% patient effort)  -[facility] Name 05/20/25 1340                 Activities of Daily Living     BADL Assessment/Intervention lower body dressing;grooming;feeding  -          Row Name 05/20/25 1340                 Lower Body Dressing Assessment/Training     [place] Level (Lower Body Dressing) lower body dressing skills;dependent (less than 25% patient effort)  -       Position (Lower Body Dressing) edge of bed sitting  -[facility] Name 05/20/25 1340                 Grooming Assessment/Training     [place] Level (Grooming) grooming skills;hair care, combing/brushing;minimum assist (75% patient effort)   -JK       Position (Grooming) edge of bed sitting  -K          Row Name 05/20/25 1340                 Self-Feeding Assessment/Training     [place] Level (Feeding) feeding skills;minimum assist (75% patient effort);liquids to mouth  -JK       Position (Feeding) sitting up in bed  -JK                       User Key  (r) = Recorded By, (t) = Taken By, (c) = Cosigned By        Initials Name Provider Type     Vicenta Pedraza OT Occupational Therapist                            Obj/Interventions         Row Name 05/20/25 1341                 Sensory Assessment (Somatosensory)     Sensory Assessment (Somatosensory) UE sensation intact  -       Sensory Assessment numbness BLE  -          Row Name 05/20/25 1341                 Range of Motion Comprehensive     General Range of Motion bilateral upper extremity ROM WNL  -          Row Name 05/20/25 1341                 Strength Comprehensive (MMT)     General Manual Muscle Testing (MMT) Assessment upper extremity strength deficits identified  -       Comment, General Manual Muscle Testing (MMT) Assessment BUE grossly 4/5  -          Row Name 05/20/25 1341                 Motor Skills     Motor Skills coordination  -       Coordination other (see comments)  BUE tremors, increased with sitting EOB  -JK          Row Name 05/20/25 1341                 Balance     Balance Assessment sitting static balance;sitting dynamic balance;standing static balance;standing dynamic balance  -JK       Static Sitting Balance contact guard  -JK       Dynamic Sitting Balance minimal assist  -JK       Position, Sitting Balance unsupported;sitting edge of bed  -JK       Static Standing Balance minimal assist  -JK       Dynamic Standing Balance moderate assist  -JK       Position/Device Used, Standing Balance supported  -                       User Key  (r) = Recorded By, (t) = Taken By, (c) = Cosigned By        Initials Name Provider Type     Vicenta Pedraza, OT  Occupational Therapist                            Goals/Plan         Row Name 05/20/25 1343                 Transfer Goal 1 (OT)     Activity/Assistive Device (Transfer Goal 1, OT) transfers, all;toilet;bed-to-chair/chair-to-bed;sit-to-stand/stand-to-sit  -JK       [place] Level/Cues Needed (Transfer Goal 1, OT) standby assist  -JK       Time Frame (Transfer Goal 1, OT) 2 weeks  -          Row Name 05/20/25 1342                 Dressing Goal 1 (OT)     Activity/Device (Dressing Goal 1, OT) lower body dressing  -JK       [place]/Cues Needed (Dressing Goal 1, OT) minimum assist (75% or more patient effort)  -JK       Time Frame (Dressing Goal 1, OT) 2 weeks  -[facility] Name 05/20/25 1342                 Toileting Goal 1 (OT)     Activity/Device (Toileting Goal 1, OT) toileting skills, all  -JK       [place] Level/Cues Needed (Toileting Goal 1, OT) contact guard required  -JK       Time Frame (Toileting Goal 1, OT) 2 weeks  -[facility] Name 05/20/25 1349                 Self-Feeding Goal 1 (OT)     Activity/Device (Self-Feeding Goal 1, OT) self-feeding skills, all  -JK       [place] Level/Cues Needed (Self-Feeding Goal 1, OT) set-up required  -JK       Time Frame (Self-Feeding Goal 1, OT) 2 weeks  -[facility] Name 05/20/25 1347                 Problem Specific Goal 1 (OT)     Problem Specific Goal 1 (OT) increase standing tolerance for ADLs to 3 minutes SBA  -JK       Time Frame (Problem Specific Goal 1, OT) 2 weeks  -[facility] Name 05/20/25 1341                 Therapy Assessment/Plan (OT)     Planned Therapy Interventions (OT) activity tolerance training;adaptive equipment training;BADL retraining;functional balance retraining;occupation/activity based interventions;neuromuscular control/coordination retraining;patient/caregiver education/training;ROM/therapeutic exercise;strengthening exercise;transfer/mobility retraining  -JK                    User Key  (r) = Recorded  By, (t) = Taken By, (c) = Cosigned By        Initials Name Provider Type     Vicenta Pedraza, OT Occupational Therapist                         Clinical Impression         Row Name 05/20/25 1342                 Pain Assessment     Pretreatment Pain Rating 7/10  -JK       Posttreatment Pain Rating 7/10  -JK       Pain Location back  -JK       Pain Side/Orientation bilateral;medial;lower  -JK       Pain Management Interventions activity modification encouraged;exercise or physical activity utilized  -JK       Response to Pain Interventions activity participation with tolerable pain  -JK          Row Name 05/20/25 1347                 Plan of Care Review     Plan of Care Reviewed With patient  -JK       Outcome Evaluation Pt is a 68 yo F brought to hospital after a fall at home due to BLE numbness . CT head, CT c spine, and lumbar xray all negative for acute findings. Pt diangosed with UTI. Pt has Pmhx including parkinsons, RLS, COPD, htn, hld, T2DM, cirrhosis, kidney stones, GERD, depression/anxiety.    At [facility], pt lives with her  in a SSH with 3 steps to enter. Pt uses a cane but also has a RW. Pt reports having caregiver support at home 4 days a week for 4 hours at a time who help with ADLs and IADLs. Pt is responsible for toileting IND and dressing/cooking when CG not present.     Upon OT evaluation, pt is on 1L O2 and oriented x 4. Pt required max A for supine to sit transfer with log roll technique to reduce pain in back. Pt required CGA- min A for sitting balance EOB with increased tremors upon sitting EOB. Pt required total A for LB dressing and min A with combing hair and bringing water to mouth for self feeding due to tremors. Pt stood from EOB with moderate A to take side steps. Pt very unsteady with standing with min A static standing balance and mod A dynamic standing balance. Recommending pt d/c SNF when medically appropriate as pt is not safe to return home at this time.  -NICK Lira  Name 05/20/25 1343                 Therapy Assessment/Plan (OT)     Rehab Potential (OT) fair  -JK       Criteria for Skilled Therapeutic Interventions Met (OT) yes;skilled treatment is necessary  -JK       Therapy Frequency (OT) 3 times/wk  -JK       Predicted Duration of Therapy Intervention (OT) until d/c  -JK          Row Name 05/20/25 1343                 Therapy Plan Review/Discharge Plan (OT)     Anticipated Discharge Disposition (OT) skilled nursing facility  -JK          Row Name 05/20/25 1343                 Vital Signs     Pre Systolic BP Rehab 139  -JK       Pre Treatment Diastolic BP 73  -JK       Intra Systolic BP Rehab 105  -JK       Intra Treatment Diastolic BP 77  -JK       Pretreatment Heart Rate (beats/min) 76  -JK       Intratreatment Heart Rate (beats/min) 76  -JK       Pre SpO2 (%) 96  -JK       O2 Delivery Pre Treatment nasal cannula  1L  -JK       Intra SpO2 (%) 96  -JK       O2 Delivery Intra Treatment nasal cannula  1L  -JK       Pre Patient Position Supine  -JK       Intra Patient Position Sitting  -JK          Row Name 05/20/25 1343                 Positioning and Restraints     Pre-Treatment Position in bed  -JK       Post Treatment Position bed  -JK       In Bed notified nsg;supine;call light within reach;encouraged to call for assist;exit alarm on  -JK                       User Key  (r) = Recorded By, (t) = Taken By, (c) = Cosigned By        Initials Name Provider Type     Vicenta Pedraza, OT Occupational Therapist                            Outcome Measures         Row Name 05/20/25 1345                 How much help from another is currently needed...     Putting on and taking off regular lower body clothing? 1  -JK       Bathing (including washing, rinsing, and drying) 2  -JK       Toileting (which includes using toilet bed pan or urinal) 1  -JK       Putting on and taking off regular upper body clothing 2  -JK       Taking care of personal grooming (such as brushing teeth) 3   -JK       Eating meals 3  -JK       AM-PAC 6 Clicks Score (OT) 12  -JK          Row Name 05/20/25 1205 05/20/25 0845            How much help from another person do you currently need...     Turning from your back to your side while in flat bed without using bedrails? 3  -AM 4  -DD     Moving from lying on back to sitting on the side of a flat bed without bedrails? 2  -AM 4  -DD     Moving to and from a bed to a chair (including a wheelchair)? 2  -AM 3  -DD     Standing up from a chair using your arms (e.g., wheelchair, bedside chair)? 2  -AM 3  -DD     Climbing 3-5 steps with a railing? 1  -AM 3  -DD     To walk in hospital room? 2  -AM 3  -DD     AM-PAC 6 Clicks Score (PT) 12  -AM 20  -DD        Row Name 05/20/25 1345                 Functional Assessment     Outcome Measure Options AM-PAC 6 Clicks Daily Activity (OT)  -JK                       User Key  (r) = Recorded By, (t) = Taken By, (c) = Cosigned By        Initials Name Provider Type     DD Rylee Tabares LPN Licensed Nurse     AM Philip Stanley, PT Physical Therapist     Vicenta Pedraza, OT Occupational Therapist                                      OT Recommendation and Plan  Planned Therapy Interventions (OT): activity tolerance training, adaptive equipment training, BADL retraining, functional balance retraining, occupation/activity based interventions, neuromuscular control/coordination retraining, patient/caregiver education/training, ROM/therapeutic exercise, strengthening exercise, transfer/mobility retraining  Therapy Frequency (OT): 3 times/wk  Plan of Care Review  Plan of Care Reviewed With: patient  Outcome Evaluation: Pt is a 68 yo F brought to hospital after a fall at home due to BLE numbness . CT head, CT c spine, and lumbar xray all negative for acute findings. Pt diangosed with UTI. Pt has Pmhx including parkinsons, RLS, COPD, htn, hld, T2DM, cirrhosis, kidney stones, GERD, depression/anxiety.    At [facility], pt lives with her  in  a SSH with 3 steps to enter. Pt uses a cane but also has a RW. Pt reports having caregiver support at home 4 days a week for 4 hours at a time who help with ADLs and IADLs. Pt is responsible for toileting IND and dressing/cooking when CG not present.     Upon OT evaluation, pt is on 1L O2 and oriented x 4. Pt required max A for supine to sit transfer with log roll technique to reduce pain in back. Pt required CGA- min A for sitting balance EOB with increased tremors upon sitting EOB. Pt required total A for LB dressing and min A with combing hair and bringing water to mouth for self feeding due to tremors. Pt stood from EOB with moderate A to take side steps. Pt very unsteady with standing with min A static standing balance and mod A dynamic standing balance. Recommending pt d/c SNF when medically appropriate as pt is not safe to return home at this time.      Time Calculation:        Time Calculation- OT         Row Name 05/20/25 1346                       Time Calculation- OT     OT Start Time 0924  -NICK         OT Stop Time 0944  -NICK         OT Time Calculation (min) 20 min  -NICK         OT Non-Billable Time (min) 20 min  -NICK         OT Received On 05/20/25  -NICK         OT - Next Appointment 05/22/25  -NICK         OT Goal Re-Cert Due Date 06/03/25  -NICK                      User Key  (r) = Recorded By, (t) = Taken By, (c) = Cosigned By        Initials Name Provider Type     Vicenta Pedraza OT Occupational Therapist                       Therapy Charges for Today         Code Description Service Date Service Provider Modifiers Qty     [id number]  OT EVAL MOD COMPLEXITY 4 5/20/2025 Vicenta Olmedo OT GO 1                   Vicenta Olmedo OT                  5/20/2025

## 2025-05-21 NOTE — PLAN OF CARE
Goal Outcome Evaluation:               Patient's lower back pain treated per MAR. Remains on 2L NC. Patient stable at this time.

## 2025-05-21 NOTE — CASE MANAGEMENT/SOCIAL WORK
Continued Stay Note  [facility]     Patient Name: Dariana Rivera  MRN: [id number]  Today's Date: 5/21/2025    Admit Date: 5/19/2025    Plan: From home with spouse. [facility] accepted, pending precert. PASRR approved.   Discharge Plan       Row Name 05/21/25 1313       Plan    Plan From home with spouse. [facility] accepted, pending precert. PASRR approved.    Patient/Family in Agreement with Plan yes    Provided Post Acute Provider List? N/A    Plan Comments CM met with patient at bedside to discuss therapy's recommendation of SNF placement. This CM had pt during previous hospitalization in December and pt had gone to [facility]. Discussed previous stay and pt is agreeable to going to [facility] again. Referral sent in [facility] and liaison Chelly notified. She confirmed acceptance. Pharmacy updated to [facility]. CELESTE Gee notified to start precert, pending response.             Megan Naegele, RN     Office Phone: [phone]  Office Cell: [phone]

## 2025-05-21 NOTE — DISCHARGE PLACEMENT REQUEST
"Miguel Joesph SAHU \"Pat\" (69 y.o. Female)       Date of Birth   1955    Social Security Number       Address   [address] [place] [address] [place] [address]    Home Phone   [phone]    MRN   [id number]       L[employer]   [employer]    Marital Status                               Admission Date   5/19/2025    Admission Type   Emergency    Admitting Provider   Javier Shepherd MD    Attending Provider   Rodney Scott MD    Department, Room/Bed   [facility] INPATIENT, 356/1       Discharge Date       Discharge Disposition       Discharge Destination                                 Attending Provider: Rodney Scott MD    Allergies: Ambien  [Zolpidem Tartrate], Ciprofloxacin Hcl, Penicillins, Zolpidem, Methadone, Clindamycin    Isolation: None   Infection: None   Code Status: CPR    Ht: 165.1 cm (65\")   Wt: 88.3 kg (194 lb 10.7 oz)    Admission Cmt: None   Principal Problem: Fall [W19.XXXA]                   Active Insurance as of 5/19/2025       Primary Coverage       Payor Plan Insurance Group Employer/Plan Group    ANTHEM MEDICARE REPLACEMENT ANTHEM MEDICARE ADVANTAGE [place] [id number]       Payor Plan Address Payor Plan Phone Number Payor Plan Fax Number Effective Dates    [address] [phone]  1/1/2024 - None Entered    [facility] [address]         Subscriber Name Subscriber Birth Date Member ID       JOESPH FANG 1955 [id number]               Secondary Coverage       Payor Plan Insurance Group Employer/Plan Group    ANTHEM MEDICAID AN[place] PATHWAYS [place] [id number]       Payor Plan Address Payor Plan Phone Number Payor Plan Fax Number Effective Dates    [address]   7/1/2024 - None Entered    [facility] [address]         Subscriber Name Subscriber Birth Date Member ID       JOESPH FANG 1955 [id number]                     Emergency Contacts        (Rel.) Home Phone Work Phone Mobile Phone    JIMBO AFNG " (Spouse) [phone] -- [phone]    CHADWICKAPRIL (Sister) -- -- [phone]

## 2025-05-22 ENCOUNTER — APPOINTMENT (OUTPATIENT)
Dept: GENERAL RADIOLOGY | Facility: HOSPITAL | Age: 70
DRG: 552 | End: 2025-05-22
Payer: MEDICARE

## 2025-05-22 LAB
GLUCOSE BLDC GLUCOMTR-MCNC: 117 MG/DL (ref 70–105)
GLUCOSE BLDC GLUCOMTR-MCNC: 157 MG/DL (ref 70–105)
GLUCOSE BLDC GLUCOMTR-MCNC: 166 MG/DL (ref 70–105)
GLUCOSE BLDC GLUCOMTR-MCNC: 178 MG/DL (ref 70–105)
GLUCOSE BLDC GLUCOMTR-MCNC: 203 MG/DL (ref 70–105)

## 2025-05-22 PROCEDURE — 82948 REAGENT STRIP/BLOOD GLUCOSE: CPT | Performed by: STUDENT IN AN ORGANIZED HEALTH CARE EDUCATION/TRAINING PROGRAM

## 2025-05-22 PROCEDURE — 25010000002 ENOXAPARIN PER 10 MG: Performed by: STUDENT IN AN ORGANIZED HEALTH CARE EDUCATION/TRAINING PROGRAM

## 2025-05-22 PROCEDURE — 94799 UNLISTED PULMONARY SVC/PX: CPT

## 2025-05-22 PROCEDURE — 82948 REAGENT STRIP/BLOOD GLUCOSE: CPT

## 2025-05-22 PROCEDURE — 94761 N-INVAS EAR/PLS OXIMETRY MLT: CPT

## 2025-05-22 PROCEDURE — 25010000002 KETOROLAC TROMETHAMINE PER 15 MG: Performed by: STUDENT IN AN ORGANIZED HEALTH CARE EDUCATION/TRAINING PROGRAM

## 2025-05-22 PROCEDURE — 94664 DEMO&/EVAL PT USE INHALER: CPT

## 2025-05-22 PROCEDURE — 74018 RADEX ABDOMEN 1 VIEW: CPT

## 2025-05-22 PROCEDURE — 63710000001 INSULIN LISPRO (HUMAN) PER 5 UNITS: Performed by: STUDENT IN AN ORGANIZED HEALTH CARE EDUCATION/TRAINING PROGRAM

## 2025-05-22 RX ORDER — OXYCODONE HYDROCHLORIDE 5 MG/1
5 TABLET ORAL EVERY 6 HOURS PRN
Qty: 10 TABLET | Refills: 0 | Status: SHIPPED | OUTPATIENT
Start: 2025-05-22 | End: 2025-05-25

## 2025-05-22 RX ORDER — SORBITOL SOLUTION 70 %
30 SOLUTION, ORAL MISCELLANEOUS DAILY
Status: DISCONTINUED | OUTPATIENT
Start: 2025-05-22 | End: 2025-05-23 | Stop reason: HOSPADM

## 2025-05-22 RX ORDER — KETOROLAC TROMETHAMINE 30 MG/ML
15 INJECTION, SOLUTION INTRAMUSCULAR; INTRAVENOUS EVERY 6 HOURS PRN
Status: DISCONTINUED | OUTPATIENT
Start: 2025-05-22 | End: 2025-05-23 | Stop reason: HOSPADM

## 2025-05-22 RX ORDER — AMOXICILLIN 250 MG
2 CAPSULE ORAL 2 TIMES DAILY PRN
Qty: 60 TABLET | Refills: 0 | Status: SHIPPED | OUTPATIENT
Start: 2025-05-22 | End: 2025-06-21

## 2025-05-22 RX ORDER — ACETAMINOPHEN 325 MG/1
650 TABLET ORAL EVERY 4 HOURS PRN
Qty: 90 TABLET | Refills: 0 | Status: SHIPPED | OUTPATIENT
Start: 2025-05-22 | End: 2025-06-21

## 2025-05-22 RX ADMIN — OXYCODONE HYDROCHLORIDE AND ACETAMINOPHEN 1000 MG: 500 TABLET ORAL at 08:41

## 2025-05-22 RX ADMIN — PANTOPRAZOLE SODIUM 40 MG: 40 TABLET, DELAYED RELEASE ORAL at 06:02

## 2025-05-22 RX ADMIN — BUDESONIDE AND FORMOTEROL FUMARATE DIHYDRATE 2 PUFF: 160; 4.5 AEROSOL RESPIRATORY (INHALATION) at 07:05

## 2025-05-22 RX ADMIN — SORBITOL SOLUTION (BULK) 30 ML: 70 SOLUTION at 15:44

## 2025-05-22 RX ADMIN — AMANTADINE HYDROCHLORIDE 100 MG: 100 CAPSULE ORAL at 20:27

## 2025-05-22 RX ADMIN — Medication 10 ML: at 08:47

## 2025-05-22 RX ADMIN — Medication 2000 UNITS: at 08:43

## 2025-05-22 RX ADMIN — ASPIRIN 81 MG: 81 TABLET, COATED ORAL at 08:41

## 2025-05-22 RX ADMIN — AMANTADINE HYDROCHLORIDE 100 MG: 100 CAPSULE ORAL at 08:42

## 2025-05-22 RX ADMIN — CARBIDOPA AND LEVODOPA 2 TABLET: 25; 100 TABLET, EXTENDED RELEASE ORAL at 20:27

## 2025-05-22 RX ADMIN — BUDESONIDE AND FORMOTEROL FUMARATE DIHYDRATE 2 PUFF: 160; 4.5 AEROSOL RESPIRATORY (INHALATION) at 20:06

## 2025-05-22 RX ADMIN — GABAPENTIN 300 MG: 300 CAPSULE ORAL at 08:43

## 2025-05-22 RX ADMIN — PRAMIPEXOLE DIHYDROCHLORIDE 0.5 MG: 0.5 TABLET ORAL at 20:28

## 2025-05-22 RX ADMIN — LURASIDONE HYDROCHLORIDE 20 MG: 40 TABLET, FILM COATED ORAL at 20:27

## 2025-05-22 RX ADMIN — INSULIN LISPRO 3 UNITS: 100 INJECTION, SOLUTION INTRAVENOUS; SUBCUTANEOUS at 20:27

## 2025-05-22 RX ADMIN — CARBIDOPA AND LEVODOPA 2 TABLET: 25; 100 TABLET, EXTENDED RELEASE ORAL at 15:44

## 2025-05-22 RX ADMIN — BISACODYL 5 MG: 5 TABLET, COATED ORAL at 08:46

## 2025-05-22 RX ADMIN — ENOXAPARIN SODIUM 40 MG: 100 INJECTION SUBCUTANEOUS at 15:44

## 2025-05-22 RX ADMIN — PRAMIPEXOLE DIHYDROCHLORIDE 0.5 MG: 0.5 TABLET ORAL at 08:41

## 2025-05-22 RX ADMIN — OXYCODONE 5 MG: 5 TABLET ORAL at 08:43

## 2025-05-22 RX ADMIN — LACTULOSE 20 G: 20 SOLUTION ORAL at 20:27

## 2025-05-22 RX ADMIN — PENTOXIFYLLINE 400 MG: 400 TABLET, EXTENDED RELEASE ORAL at 17:01

## 2025-05-22 RX ADMIN — GABAPENTIN 300 MG: 300 CAPSULE ORAL at 15:44

## 2025-05-22 RX ADMIN — SPIRONOLACTONE 25 MG: 25 TABLET ORAL at 08:42

## 2025-05-22 RX ADMIN — ESCITALOPRAM 15 MG: 10 TABLET, FILM COATED ORAL at 08:42

## 2025-05-22 RX ADMIN — Medication 1 TABLET: at 08:43

## 2025-05-22 RX ADMIN — PENTOXIFYLLINE 400 MG: 400 TABLET, EXTENDED RELEASE ORAL at 08:43

## 2025-05-22 RX ADMIN — LIDOCAINE 1 PATCH: 4 PATCH TOPICAL at 12:55

## 2025-05-22 RX ADMIN — CARBIDOPA AND LEVODOPA 2 TABLET: 25; 100 TABLET, EXTENDED RELEASE ORAL at 08:42

## 2025-05-22 RX ADMIN — PENTOXIFYLLINE 400 MG: 400 TABLET, EXTENDED RELEASE ORAL at 13:19

## 2025-05-22 RX ADMIN — ROPINIROLE 0.5 MG: 1 TABLET, FILM COATED ORAL at 20:27

## 2025-05-22 RX ADMIN — KETOROLAC TROMETHAMINE 15 MG: 30 INJECTION, SOLUTION INTRAMUSCULAR at 17:00

## 2025-05-22 RX ADMIN — Medication 1 TABLET: at 09:41

## 2025-05-22 RX ADMIN — GABAPENTIN 300 MG: 300 CAPSULE ORAL at 20:28

## 2025-05-22 NOTE — PLAN OF CARE
Goal Outcome Evaluation:   Pt alert and orient x4. Pt makes needs known verbally. Upon morning assessment pt complained of constipation. See MAR for orders. Pt unable to have bowel movement this shift. New order for KUB. Completed. Pt awaiting new discharge orders after bowel movement.

## 2025-05-22 NOTE — DISCHARGE SUMMARY
"[facility] Medicine Services  Discharge Summary    Date of Service: 2025  Patient Name: Dariana Rivera  : 1955  MRN: [id number]    Date of Admission: 2025  Discharge Diagnosis: Fall  L5 compression fracture    Date of Discharge: 2025  Primary Care Physician: Franny Strickland MD      Presenting Problem:   Fall [W19.XXXA]  Mobility impaired [Z74.09]  Fall, initial encounter [W19.XXXA]  Urinary tract infection without hematuria, site unspecified [N39.0]  Numbness and tingling of both feet [R20.0, R20.2]    Active and Resolved Hospital Problems:  Active Hospital Problems    Diagnosis POA    **Fall [W19.XXXA] Yes      Resolved Hospital Problems   No resolved problems to display.         Hospital Course     HPI:    \"Dariana Rivera is a 69 y.o. female with a CMH of parkinsons, RLS, COPD, htn, hld, T2DM, cirrhosis, kidney stones, GERD, depression/anxiety who presented to [facility] on 2025 with  pain after fall. Pt lives at home with her , reports this past Friday she went to get up from the toilet and had a mechanical fall, first lost her balance and fell forward into the sink, then lost her  on the sink and fell backwards/sideways into the shower, did hit her head/neck during the fall. Denies any lightheadedness/dizziness/loss of consciousness. Denies prolonged period down. She has had pain to her mid/lower back as well as her head/neck since the fall, initially did not present to hospital but pain has continued prompting her to come in today. She reports she has been dealing with numbness to her feet over the past several months and that has worsened her ability to get around, usually ambulates with a cane. She denies any new focal numbness/weakness since her fall, denies incontinence/saddle anesthesia. She does report some discomfort with urination, denies fevers/chills/flank pain.   In ED workup with CT Head, CT c-spine, lumbar XR " "showing no acute pathology. UA w/ infectious findings. Given ceftriaxone and IV pain medication, hospitalist service consulted for admission. \"    Hospital Course:  69-year-old female with history of Parkinson's, RLS, COPD, hypertension, hyperlipidemia, DM2, GERD, anxiety/depression admitted to [facility] 5/19 with fall at home     #Fall at home appears to be in setting of bilateral lower extremity weakness  #Chronic back pain status post cervical spine surgery  #RLS  #Parkinson's  #moderate compression fracture at L5 without significant bony retropulsion.   Patient has multiple risk factors for falls.  No reported loss of consciousness does endorse progressive bilateral lower extremity weakness last few months  Patient reports unable to have MRI given bladder stimulator placed 5 years ago  CT head, CT C-spine, x-ray lumbar in emergency room no acute findings  PT/OT  At home takes Klonopin.  Has been on it for multiple years not interested in discontinuing continue home Requip, amantadine and Sinemet  NIC recommendations appreciated, no intervention. Recommended LSO brace for 6 weeks and outpatient follow up in 6 weeks with repeat Xray.         #Abnormal UA  Patient with no urinary symptoms  History of bladder stimulator 5 years ago  S/p IV ceftriaxone x 3 days     # mild hydroureteronephrosis  Patient denies any urinary retention  Renal ultrasound- normal  Monitor BMP     #DM2  Resume home regimen     #COPD not in exacerbation  Continue current nebs     #Anxiety/depression  Continue home medications      ADDENDUM:  Patient did not have a bowel movement for few days and discharge delayed yesterday. She received bowel regimen and had a bowel movement. Patient is being discharged today on 05/23.     DISCHARGE Follow Up Recommendations for labs and diagnostics:   Follow up with primary care provider within 3 days of this discharge.   Follow up with Neurology and Neurosurgery as outpatient.  Updated lumbar x-ray in 6 " weeks   LSO brace for next 6 weeks       Day of Discharge     Vital Signs:  Temp:  [97.8 °F (36.6 °C)-98.1 °F (36.7 °C)] 97.8 °F (36.6 °C)  Heart Rate:  [63-71] 71  Resp:  [12-26] 26  BP: (114-143)/(63-76) 124/70  Flow (L/min) (Oxygen Therapy):  [2] 2          Pertinent  and/or Most Recent Results     LAB RESULTS:      Lab 05/20/25  0019 05/19/25  1216   WBC 6.48 8.51   HEMOGLOBIN 14.1 15.0   HEMATOCRIT 43.2 44.7   PLATELETS 128* 133*   NEUTROS ABS 4.72 5.81   IMMATURE GRANS (ABS) 0.02 0.03   LYMPHS ABS 1.16 1.82   MONOS ABS 0.45 0.75   EOS ABS 0.11 0.07   MCV 94.1 92.2         Lab 05/20/25  0019 05/19/25  1216   SODIUM 139 139   POTASSIUM 3.7 4.3   CHLORIDE 103 103   CO2 23.7 22.9   ANION GAP 12.3 13.1   BUN 27* 32*   CREATININE 0.86 0.81   EGFR 73.7 79.2   GLUCOSE 117* 87   CALCIUM 9.4 10.1   HEMOGLOBIN A1C  --  5.32         Lab 05/19/25  1216   TOTAL PROTEIN 7.5   ALBUMIN 4.1   GLOBULIN 3.4   ALT (SGPT) 16   AST (SGOT) 22   BILIRUBIN 0.8   ALK PHOS 61                     Brief Urine Lab Results  (Last result in the past 365 days)        Color   Clarity   Blood   Leuk Est   Nitrite   Protein   CREAT   Urine HCG        05/19/25 1251 Yellow   Hazy   Negative   Small (1+)   Positive   Negative                 Microbiology Results (last 10 days)       Procedure Component Value - Date/Time    CANDIDA AURIS PCR - Swab, Axilla Right, Axilla Left and Groin [[id number]]  (Normal) Collected: 05/19/25 1755    Lab Status: Final result Specimen: Swab from Axilla Right, Axilla Left and Groin Updated: 05/20/25 0727     CANDIDA AURIS PCR Not Detected    Urine Culture - Urine, Straight Cath [[id number]]  (Abnormal)  (Susceptibility) Collected: 05/19/25 1251    Lab Status: Final result Specimen: Urine from Straight Cath Updated: 05/21/25 0347     Urine Culture >100,000 CFU/mL Escherichia coli    Narrative:      Colonization of the urinary tract without infection is common. Treatment is discouraged unless the patient is symptomatic,  pregnant, or undergoing an invasive urologic procedure.    Susceptibility        Escherichia coli      MARTI      Amoxicillin + Clavulanate Susceptible      Ampicillin Resistant      Ampicillin + Sulbactam Resistant      Cefazolin (Urine) Susceptible      Cefepime Susceptible      Ceftazidime Susceptible      Ceftriaxone Susceptible      Ciprofloxacin Intermediate      Gentamicin Susceptible      Levofloxacin Intermediate      Nitrofurantoin Susceptible      Piperacillin + Tazobactam Susceptible      Trimethoprim + Sulfamethoxazole Resistant                                   XR Abdomen KUB  Result Date: 5/22/2025  Impression: Impression: Moderate stool burden. Electronically Signed: Michael Huff DO  5/22/2025 4:18 PM EDT  Workstation ID: [id number]    US Renal Bilateral  Result Date: 5/21/2025  Impression: Impression: Unremarkable renal ultrasound. Electronically Signed: Daniele Min MD  5/21/2025 1:15 PM EDT  Workstation ID: [id number]    CT Lumbar Spine Without Contrast  Result Date: 5/20/2025  Impression: Impression: 1. Osseous demineralization limiting detection for fractures. 2. Moderate compression fracture of L5 without significant bony retropulsion new from 2024, considered age-indeterminate. This could reflect sequelae of trauma or insufficiency fracture. Correlate with history and point tenderness. 3. Multilevel lumbar spondylosis with suspect some degree of congenital narrowing of the lumbar spine without significant change since 2021. Possible moderate to severe central spinal canal stenosis L3-L4 and L4-L5 suboptimally assessed by CT. 4. Marked bladder distention with bilateral mild hydroureteronephrosis. Consider renal ultrasound after voiding or Way decompression to ensure resolution. 5. Multiple nonobstructing renal calculi. 6. Abdominal aortic atherosclerotic disease. Electronically Signed: Selvin Marquez MD  5/20/2025 12:56 PM EDT  Workstation ID: [id number]    CT Cervical Spine Without  Contrast  Result Date: 5/19/2025  Impression: Impression: 1.No acute cervical spine findings. 2.C3-4 and C6-7 anterior fusion hardware appears appropriately seated. 3.Degenerative changes of the cervical spine as described above. Electronically Signed: Maria L Foster MD  5/19/2025 12:32 PM EDT  Workstation ID: [id number]    CT Head Without Contrast  Result Date: 5/19/2025  Impression: Impression: No acute intracranial findings. Electronically Signed: Maria L Foster MD  5/19/2025 12:02 PM EDT  Workstation ID: [id number]    XR Spine Lumbar Complete 4+VW  Result Date: 5/19/2025  Impression: Impression: No acute lumbar spine findings. Degenerative changes of the lumbar spine as described above. Electronically Signed: Maria L Foster MD  5/19/2025 11:50 AM EDT  Workstation ID: [id number]                  Labs Pending at Discharge:  Pending Results       None            Procedures Performed           Consults:   Consults       Date and Time Order Name Status Description    5/21/2025 10:54 AM Inpatient Neurosurgery Consult Completed               Discharge Details        Discharge Medications        New Medications        Instructions Start Date   acetaminophen 325 MG tablet  Commonly known as: TYLENOL   650 mg, Oral, Every 4 Hours PRN      naloxone 4 MG/0.1ML nasal spray  Commonly known as: NARCAN   Call 911. Don't prime. [place] in 1 nostril for overdose. Repeat in 2-3 minutes in other nostril if no or minimal breathing/responsiveness.      oxyCODONE 5 MG immediate release tablet  Commonly known as: ROXICODONE   5 mg, Oral, Every 6 Hours PRN      sennosides-docusate 8.6-50 MG per tablet  Commonly known as: PERICOLACE   2 tablets, Oral, 2 Times Daily PRN, As needed for constipation             Continue These Medications        Instructions Start Date   albuterol sulfate  (90 Base) MCG/ACT inhaler  Commonly known as: PROVENTIL HFA;VENTOLIN HFA;PROAIR HFA   2 puffs, Inhalation, Every 6 Hours PRN      amantadine 100 MG  capsule  Commonly known as: SYMMETREL   100 mg, 2 Times Daily      aspirin 81 MG EC tablet   Take 1 tablet by mouth Daily.      Calcium 600 600 MG tablet  Generic drug: calcium carbonate   Take 1 tablet by mouth Daily.      carbidopa-levodopa ER  MG per tablet  Commonly known as: SINEMET CR   2 tablets, 3 Times Daily      clonazePAM 0.5 MG tablet  Commonly known as: KlonoPIN   0.5 mg, 2 Times Daily PRN      escitalopram 10 MG tablet  Commonly known as: LEXAPRO   15 mg, Oral, Daily      Farxiga 10 MG tablet  Generic drug: dapagliflozin Propanediol   10 mg, Oral, Daily      gabapentin 300 MG capsule  Commonly known as: NEURONTIN   300 mg, 3 Times Daily      lactulose 10 GM/15ML solution  Commonly known as: CHRONULAC   20 g, Oral, 2 Times Daily PRN      lidocaine 5 %  Commonly known as: LIDODERM   1 patch, Every 24 Hours      Lurasidone HCl 20 MG tablet tablet  Commonly known as: LATUDA   20 mg, Oral, Every Evening      Melatonin 10 MG capsule   1 capsule, Every Night at Bedtime      multivitamin with minerals tablet tablet   1 tablet, Oral, Daily      omeprazole 40 MG capsule  Commonly known as: priLOSEC   40 mg, Oral, Daily      pentoxifylline 400 MG CR tablet  Commonly known as: TRENtal   400 mg, 3 Times Daily With Meals      potassium chloride 20 MEQ CR tablet  Commonly known as: KLOR-CON M20   20 mEq, Daily      pramipexole 0.5 MG tablet  Commonly known as: MIRAPEX   0.5 mg, Oral, 2 Times Daily      rOPINIRole 0.5 MG tablet  Commonly known as: REQUIP   0.5 mg, Nightly      spironolactone 25 MG tablet  Commonly known as: ALDACTONE   1 tablet, Daily      Trelegy Ellipta 100-62.5-25 MCG/ACT inhaler  Generic drug: Fluticasone-Umeclidin-Vilant   1 puff, Inhalation, Daily - RT      vitamin C 500 MG tablet  Commonly known as: ASCORBIC ACID   1,000 mg, Oral, Daily      Vitamin D3 50 MCG (2000 UT) capsule   Take 1 capsule by mouth Daily.               Allergies   Allergen Reactions    Ambien  [Zolpidem Tartrate]  Confusion    Ciprofloxacin Hcl Rash    Penicillins Rash    Zolpidem Unknown - High Severity     Other reaction(s): Confusion    Methadone Hallucinations    Clindamycin Rash         Discharge Disposition:   Skilled Nursing Facility (DC - External)    Diet:  Hospital:  Diet Order   Procedures    Diet: Cardiac, Diabetic; Healthy Heart (2-3 Na+); Consistent Carbohydrate; Fluid Consistency: Thin (IDDSI 0)         Discharge Activity:   Activity Instructions    As tolerated       as tolerated       CODE STATUS:  Code Status and Medical Interventions: CPR (Attempt to Resuscitate); Full Support   Ordered at: 05/19/25 1447     Code Status (Patient has no pulse and is not breathing):    CPR (Attempt to Resuscitate)     Medical Interventions (Patient has pulse or is breathing):    Full Support     Level Of Support Discussed With:    Patient         Future Appointments   Date Time Provider Department Center   6/19/2025  1:30 PM Archana Singh MD [facility]   10/21/2025 12:30 PM Franny Strickland MD [facility]   11/4/2025 11:10 AM Sasha Khan MD [facility] None       Additional Instructions for the Follow-ups that You Need to Schedule       XR Spine Lumbar 2 or 3 View   Jun 25, 2025      Exam reason: L5 compression fracture   Release to patient: Routine Release                Time spent on Discharge including face to face service:  >35 minutes    Signature: Electronically signed by Rodney Scott MD, 05/23/25, 09:07 EDT.  [facility], [place], operated by [facility] Hospitalist Team

## 2025-05-22 NOTE — CASE MANAGEMENT/SOCIAL WORK
Continued Stay Note  [facility]     Patient Name: Dariana Rivera  MRN: [id number]  Today's Date: 5/22/2025    Admit Date: 5/19/2025    Plan: [facility] accepted. Precert approved 5/22-5/28. PASRR approved.   Discharge Plan       Row Name 05/22/25 1543       Plan    Plan Comments Updated nursing and hospitalist of precert approval for [facility]. IMM letter delivered to pt at bedside. Discharge orders placed and  set up [facility] W/C van for transportation. Nursing noted that pt has not had a BM x5 days. Orders for enema/sorbitol placed.           Megan Naegele, RN     Office Phone: [phone]  Office Cell: [phone]      [id number]

## 2025-05-22 NOTE — PLAN OF CARE
Goal Outcome Evaluation:              Outcome Evaluation: pt requested pain medicine x1; O2 at 2L per n/c in use; finished IV abx; up with assist; continue to monitor

## 2025-05-22 NOTE — TELEPHONE ENCOUNTER
"LVM for patient to please call our office.    \" Patient will need to complete new X-Rays that have been ordered in 6 weeks and schedule a follow up here in the office\"   "

## 2025-05-23 VITALS
DIASTOLIC BLOOD PRESSURE: 67 MMHG | SYSTOLIC BLOOD PRESSURE: 110 MMHG | RESPIRATION RATE: 20 BRPM | OXYGEN SATURATION: 91 % | HEART RATE: 70 BPM | BODY MASS INDEX: 32.43 KG/M2 | HEIGHT: 65 IN | TEMPERATURE: 97.9 F | WEIGHT: 194.67 LBS

## 2025-05-23 LAB
GLUCOSE BLDC GLUCOMTR-MCNC: 121 MG/DL (ref 70–105)
GLUCOSE BLDC GLUCOMTR-MCNC: 146 MG/DL (ref 70–105)

## 2025-05-23 PROCEDURE — 94799 UNLISTED PULMONARY SVC/PX: CPT

## 2025-05-23 PROCEDURE — 82948 REAGENT STRIP/BLOOD GLUCOSE: CPT

## 2025-05-23 PROCEDURE — 94664 DEMO&/EVAL PT USE INHALER: CPT

## 2025-05-23 PROCEDURE — 82948 REAGENT STRIP/BLOOD GLUCOSE: CPT | Performed by: STUDENT IN AN ORGANIZED HEALTH CARE EDUCATION/TRAINING PROGRAM

## 2025-05-23 PROCEDURE — 94761 N-INVAS EAR/PLS OXIMETRY MLT: CPT

## 2025-05-23 PROCEDURE — 25010000002 KETOROLAC TROMETHAMINE PER 15 MG: Performed by: STUDENT IN AN ORGANIZED HEALTH CARE EDUCATION/TRAINING PROGRAM

## 2025-05-23 RX ADMIN — PRAMIPEXOLE DIHYDROCHLORIDE 0.5 MG: 0.5 TABLET ORAL at 08:34

## 2025-05-23 RX ADMIN — KETOROLAC TROMETHAMINE 15 MG: 30 INJECTION, SOLUTION INTRAMUSCULAR at 04:52

## 2025-05-23 RX ADMIN — Medication 1 TABLET: at 08:35

## 2025-05-23 RX ADMIN — LIDOCAINE 1 PATCH: 4 PATCH TOPICAL at 08:41

## 2025-05-23 RX ADMIN — PENTOXIFYLLINE 400 MG: 400 TABLET, EXTENDED RELEASE ORAL at 13:25

## 2025-05-23 RX ADMIN — AMANTADINE HYDROCHLORIDE 100 MG: 100 CAPSULE ORAL at 08:34

## 2025-05-23 RX ADMIN — Medication 1 TABLET: at 08:34

## 2025-05-23 RX ADMIN — Medication 10 ML: at 08:35

## 2025-05-23 RX ADMIN — ASPIRIN 81 MG: 81 TABLET, COATED ORAL at 08:34

## 2025-05-23 RX ADMIN — Medication 2000 UNITS: at 08:34

## 2025-05-23 RX ADMIN — PENTOXIFYLLINE 400 MG: 400 TABLET, EXTENDED RELEASE ORAL at 08:34

## 2025-05-23 RX ADMIN — CARBIDOPA AND LEVODOPA 2 TABLET: 25; 100 TABLET, EXTENDED RELEASE ORAL at 10:41

## 2025-05-23 RX ADMIN — OXYCODONE HYDROCHLORIDE AND ACETAMINOPHEN 1000 MG: 500 TABLET ORAL at 08:34

## 2025-05-23 RX ADMIN — ESCITALOPRAM 15 MG: 10 TABLET, FILM COATED ORAL at 08:34

## 2025-05-23 RX ADMIN — BUDESONIDE AND FORMOTEROL FUMARATE DIHYDRATE 2 PUFF: 160; 4.5 AEROSOL RESPIRATORY (INHALATION) at 10:10

## 2025-05-23 RX ADMIN — SORBITOL SOLUTION (BULK) 30 ML: 70 SOLUTION at 09:28

## 2025-05-23 RX ADMIN — SPIRONOLACTONE 25 MG: 25 TABLET ORAL at 08:34

## 2025-05-23 RX ADMIN — GABAPENTIN 300 MG: 300 CAPSULE ORAL at 08:34

## 2025-05-23 RX ADMIN — PANTOPRAZOLE SODIUM 40 MG: 40 TABLET, DELAYED RELEASE ORAL at 04:52

## 2025-05-23 NOTE — CASE MANAGEMENT/SOCIAL WORK
Case Management Discharge Note      Final Note: [facility].    Selected Continued Care - Discharged on 5/23/2025 Admission date: 5/19/2025 - Discharge disposition: Skilled Nursing Facility (DC - External)      Destination Coordination complete.      Service Provider Services Address Phone Fax Patient Preferred    [facility] [address] [place] [address] [phone] --             Transportation Services  W/C Van: [facility]    Final Discharge Disposition Code: 03 - skilled nursing facility (SNF)

## 2025-05-23 NOTE — PLAN OF CARE
Goal Outcome Evaluation:              Pt requested pain medicine x1; O2 at 2L per n/c in use; PRN bowel meds given with large results noted; up with assist; continue to monitor

## 2025-05-23 NOTE — SIGNIFICANT NOTE
05/23/25 1206   OTHER   Discipline physical therapist   Rehab Time/Intention   Session Not Performed other (see comments)  (hospital d/c pending)   Recommendation   PT - Next Appointment 05/26/25

## 2025-06-02 ENCOUNTER — TELEPHONE (OUTPATIENT)
Dept: FAMILY MEDICINE CLINIC | Facility: CLINIC | Age: 70
End: 2025-06-02
Payer: MEDICARE

## 2025-06-02 NOTE — TELEPHONE ENCOUNTER
Fyi: Pt is being discharged from help at home because pt is still at [facility] and it Is the end of her cert period. They will call when she gets discharged from facility, but at this time there is no estimated  discharge date at facility.   No action needed at this time.

## 2025-06-04 DIAGNOSIS — F41.1 GENERALIZED ANXIETY DISORDER: Chronic | ICD-10-CM

## 2025-06-04 RX ORDER — ROPINIROLE 0.5 MG/1
TABLET, FILM COATED ORAL
Qty: 60 TABLET | Refills: 0 | Status: SHIPPED | OUTPATIENT
Start: 2025-06-04

## 2025-06-04 RX ORDER — ESCITALOPRAM OXALATE 10 MG/1
15 TABLET ORAL DAILY
Qty: 135 TABLET | Refills: 0 | Status: SHIPPED | OUTPATIENT
Start: 2025-06-04

## 2025-06-04 RX ORDER — PRAMIPEXOLE DIHYDROCHLORIDE 0.5 MG/1
0.5 TABLET ORAL 2 TIMES DAILY
Qty: 180 TABLET | Refills: 0 | Status: SHIPPED | OUTPATIENT
Start: 2025-06-04

## 2025-06-11 ENCOUNTER — TELEPHONE (OUTPATIENT)
Dept: FAMILY MEDICINE CLINIC | Facility: CLINIC | Age: 70
End: 2025-06-11
Payer: MEDICARE

## 2025-06-17 ENCOUNTER — OFFICE (AMBULATORY)
Dept: URBAN - METROPOLITAN AREA CLINIC 64 | Facility: CLINIC | Age: 70
End: 2025-06-17
Payer: COMMERCIAL

## 2025-06-17 ENCOUNTER — OFFICE VISIT (OUTPATIENT)
Dept: FAMILY MEDICINE CLINIC | Facility: CLINIC | Age: 70
End: 2025-06-17
Payer: MEDICARE

## 2025-06-17 VITALS
WEIGHT: 187 LBS | HEIGHT: 67 IN | HEART RATE: 77 BPM | DIASTOLIC BLOOD PRESSURE: 65 MMHG | SYSTOLIC BLOOD PRESSURE: 109 MMHG

## 2025-06-17 VITALS
HEART RATE: 76 BPM | DIASTOLIC BLOOD PRESSURE: 85 MMHG | TEMPERATURE: 97.7 F | WEIGHT: 188.4 LBS | SYSTOLIC BLOOD PRESSURE: 131 MMHG | HEIGHT: 65 IN | BODY MASS INDEX: 31.39 KG/M2 | OXYGEN SATURATION: 95 %

## 2025-06-17 DIAGNOSIS — K59.00 CONSTIPATION, UNSPECIFIED: ICD-10-CM

## 2025-06-17 DIAGNOSIS — E11.49 TYPE 2 DIABETES MELLITUS WITH OTHER DIABETIC NEUROLOGICAL COMPLICATION: ICD-10-CM

## 2025-06-17 DIAGNOSIS — S32.050A CLOSED COMPRESSION FRACTURE OF L5 VERTEBRA, INITIAL ENCOUNTER: Primary | ICD-10-CM

## 2025-06-17 DIAGNOSIS — K21.9 GASTRO-ESOPHAGEAL REFLUX DISEASE WITHOUT ESOPHAGITIS: ICD-10-CM

## 2025-06-17 DIAGNOSIS — N39.0 RECURRENT UTI: ICD-10-CM

## 2025-06-17 DIAGNOSIS — K75.81 NONALCOHOLIC STEATOHEPATITIS (NASH): ICD-10-CM

## 2025-06-17 LAB
BILIRUB BLD-MCNC: NEGATIVE MG/DL
CLARITY, POC: ABNORMAL
COLOR UR: ABNORMAL
EXPIRATION DATE: ABNORMAL
GLUCOSE UR STRIP-MCNC: ABNORMAL MG/DL
KETONES UR QL: NEGATIVE
LEUKOCYTE EST, POC: ABNORMAL
Lab: ABNORMAL
NITRITE UR-MCNC: NEGATIVE MG/ML
PH UR: 6 [PH] (ref 5–8)
PROT UR STRIP-MCNC: NEGATIVE MG/DL
RBC # UR STRIP: NEGATIVE /UL
SP GR UR: 1.02 (ref 1–1.03)
UROBILINOGEN UR QL: ABNORMAL

## 2025-06-17 PROCEDURE — 87077 CULTURE AEROBIC IDENTIFY: CPT | Performed by: PHYSICIAN ASSISTANT

## 2025-06-17 PROCEDURE — 1160F RVW MEDS BY RX/DR IN RCRD: CPT | Performed by: PHYSICIAN ASSISTANT

## 2025-06-17 PROCEDURE — 1125F AMNT PAIN NOTED PAIN PRSNT: CPT | Performed by: PHYSICIAN ASSISTANT

## 2025-06-17 PROCEDURE — 99213 OFFICE O/P EST LOW 20 MIN: CPT | Performed by: NURSE PRACTITIONER

## 2025-06-17 PROCEDURE — 99214 OFFICE O/P EST MOD 30 MIN: CPT | Performed by: PHYSICIAN ASSISTANT

## 2025-06-17 PROCEDURE — 87186 SC STD MICRODIL/AGAR DIL: CPT | Performed by: PHYSICIAN ASSISTANT

## 2025-06-17 PROCEDURE — 1111F DSCHRG MED/CURRENT MED MERGE: CPT | Performed by: PHYSICIAN ASSISTANT

## 2025-06-17 PROCEDURE — 3079F DIAST BP 80-89 MM HG: CPT | Performed by: PHYSICIAN ASSISTANT

## 2025-06-17 PROCEDURE — 87086 URINE CULTURE/COLONY COUNT: CPT | Performed by: PHYSICIAN ASSISTANT

## 2025-06-17 PROCEDURE — 81003 URINALYSIS AUTO W/O SCOPE: CPT | Performed by: PHYSICIAN ASSISTANT

## 2025-06-17 PROCEDURE — 1159F MED LIST DOCD IN RCRD: CPT | Performed by: PHYSICIAN ASSISTANT

## 2025-06-17 PROCEDURE — 3075F SYST BP GE 130 - 139MM HG: CPT | Performed by: PHYSICIAN ASSISTANT

## 2025-06-17 PROCEDURE — 3044F HG A1C LEVEL LT 7.0%: CPT | Performed by: PHYSICIAN ASSISTANT

## 2025-06-17 RX ORDER — BLOOD SUGAR DIAGNOSTIC
STRIP MISCELLANEOUS
Qty: 100 EACH | Refills: 3 | Status: SHIPPED | OUTPATIENT
Start: 2025-06-17

## 2025-06-17 RX ORDER — LANCETS 23 GAUGE
EACH MISCELLANEOUS
Qty: 100 EACH | Refills: 3 | Status: SHIPPED | OUTPATIENT
Start: 2025-06-17

## 2025-06-17 RX ORDER — LIDOCAINE 50 MG/G
1 PATCH TOPICAL EVERY 24 HOURS
Qty: 30 EACH | Refills: 2 | Status: SHIPPED | OUTPATIENT
Start: 2025-06-17

## 2025-06-17 NOTE — PROGRESS NOTES
"Transitional Care Follow Up Visit  Subjective     Dariana Rivera is a 69 y.o. female who presents for a transitional care management visit.    Within 48 business hours after discharge our office contacted her via telephone to coordinate her care and needs.      I reviewed and discussed the details of that call along with the discharge summary, hospital problems, inpatient lab results, inpatient diagnostic studies, and consultation reports with Dariana.     Current outpatient and discharge medications have been reconciled for the patient.  Reviewed by: Anay Espinoza PA-C           No data to display              Risk for Readmission (LACE) No data recorded    History of Present Illness  Pt presents to follow up from recent hospitalization and subsequent rehab facility stay.  She was discharged home on 6/8/24.  She is doing well and has had no further falls.  Has follow up with neurosurgery on 6/27/25.    She is no longer on pain medication due to constipation.  Pain is typically around a 6/7 out of 10.  She is taking lactulose and last bowel movement was yesterday.  She was treated for uti in hospital and then again at rehab facility since she tested positive again.  Has hx of UTIs in past and has bladder stimulator. Denies any fevers.     Course During Hospital Stay:  \"Dariana Rivera is a 69 y.o. female with a CMH of parkinsons, RLS, COPD, htn, hld, T2DM, cirrhosis, kidney stones, GERD, depression/anxiety who presented to [facility] on 5/19/2025 with  pain after fall. Pt lives at home with her , reports this past Friday she went to get up from the toilet and had a mechanical fall, first lost her balance and fell forward into the sink, then lost her  on the sink and fell backwards/sideways into the shower, did hit her head/neck during the fall. Denies any lightheadedness/dizziness/loss of consciousness. Denies prolonged period down. She has had pain to her mid/lower back as well as her " head/neck since the fall, initially did not present to hospital but pain has continued prompting her to come in today. She reports she has been dealing with numbness to her feet over the past several months and that has worsened her ability to get around, usually ambulates with a cane. She denies any new focal numbness/weakness since her fall, denies incontinence/saddle anesthesia. She does report some discomfort with urination, denies fevers/chills/flank pain.   In ED workup with CT Head, CT c-spine, lumbar XR showing no acute pathology. UA w/ infectious findings. Given ceftriaxone and IV pain medication, hospitalist service consulted for admission.      Hospital Course:  69-year-old female with history of Parkinson's, RLS, COPD, hypertension, hyperlipidemia, DM2, GERD, anxiety/depression admitted to [facility] 5/19 with fall at home     #Fall at home appears to be in setting of bilateral lower extremity weakness  #Chronic back pain status post cervical spine surgery  #RLS  #Parkinson's  #moderate compression fracture at L5 without significant bony retropulsion.   Patient has multiple risk factors for falls.  No reported loss of consciousness does endorse progressive bilateral lower extremity weakness last few months  Patient reports unable to have MRI given bladder stimulator placed 5 years ago  CT head, CT C-spine, x-ray lumbar in emergency room no acute findings  PT/OT  At home takes Klonopin.  Has been on it for multiple years not interested in discontinuing continue home Requip, amantadine and Sinemet  NIC recommendations appreciated, no intervention. Recommended LSO brace for 6 weeks and outpatient follow up in 6 weeks with repeat Xray.         #Abnormal UA  Patient with no urinary symptoms  History of bladder stimulator 5 years ago  S/p IV ceftriaxone x 3 days     # mild hydroureteronephrosis  Patient denies any urinary retention  Renal ultrasound- normal  Monitor BMP     #DM2  Resume home regimen    "  #COPD not in exacerbation  Continue current nebs     #Anxiety/depression  Continue home medications        ADDENDUM:  Patient did not have a bowel movement for few days and discharge delayed yesterday. She received bowel regimen and had a bowel movement. Patient is being discharged today on 05/23.      DISCHARGE Follow Up Recommendations for labs and diagnostics:   Follow up with primary care provider within 3 days of this discharge.   Follow up with Neurology and Neurosurgery as outpatient.  Updated lumbar x-ray in 6 weeks   LSO brace for next 6 weeks \"     The following portions of the patient's history were reviewed and updated as appropriate: allergies, current medications, past family history, past medical history, past social history, past surgical history, and problem list.    Review of Systems   Constitutional:  Negative for activity change, appetite change, chills, diaphoresis, fatigue, fever and unexpected weight change.   Respiratory:  Negative for chest tightness and shortness of breath.    Cardiovascular:  Negative for chest pain, palpitations and leg swelling.   Gastrointestinal:  Negative for abdominal pain, constipation, diarrhea, nausea and vomiting.   Genitourinary:  Negative for difficulty urinating and dysuria.   Musculoskeletal:  Positive for back pain. Negative for gait problem.   Neurological:  Negative for dizziness, weakness, light-headedness and headaches.   Psychiatric/Behavioral:  Negative for sleep disturbance.        Objective   /85 (BP Location: Left arm, Patient Position: Sitting, Cuff Size: Adult)   Pulse 76   Temp 97.7 °F (36.5 °C) (Temporal)   Ht 165.1 cm (65\")   Wt 85.5 kg (188 lb 6.4 oz)   SpO2 95%   BMI 31.35 kg/m²   Physical Exam  Vitals and nursing note reviewed.   Constitutional:       Appearance: Normal appearance. She is normal weight.   HENT:      Head: Normocephalic and atraumatic.   Neck:      Thyroid: No thyroid mass, thyromegaly or thyroid tenderness.     "  Vascular: No carotid bruit.   Cardiovascular:      Rate and Rhythm: Normal rate and regular rhythm.      Heart sounds: Normal heart sounds.   Pulmonary:      Effort: Pulmonary effort is normal.      Breath sounds: Normal breath sounds.   Musculoskeletal:      Cervical back: Normal range of motion and neck supple.      Right lower leg: No edema.      Left lower leg: No edema.      Comments: Wearing back brace   Skin:     General: Skin is warm and dry.   Neurological:      General: No focal deficit present.      Mental Status: She is alert and oriented to person, place, and time.   Psychiatric:         Mood and Affect: Mood normal.         Behavior: Behavior normal.         Thought Content: Thought content normal.         Assessment & Plan   Diagnoses and all orders for this visit:    1. Closed compression fracture of L5 vertebra, initial encounter (Primary)  Comments:  Continue wearing brace and follow up with neurosurgery as scheduled.  Orders:  -     lidocaine (LIDODERM) 5 %; Place 1 patch on the skin as directed by provider Daily. Remove & Discard patch within 12 hours or as directed by MD  Dispense: 30 each; Refill: 2    2. Recurrent UTI  Comments:  Will recheck today and send for culture.  Orders:  -     POCT urinalysis dipstick, automated  -     Urine Culture - Urine, Urine, Clean Catch    3. Type 2 diabetes mellitus with other diabetic neurological complication  Comments:  Refills for lancets and test strips sent to pharmacy.  Orders:  -     glucose blood (Accu-Chek Guide Test) test strip; For use with glucometer to check glucose once daily.  Dispense: 100 each; Refill: 3  -     Lancets (accu-chek safe-t pro) lancets; For use with glucometer to check glucose level once daily.  Dispense: 100 each; Refill: 3

## 2025-06-18 ENCOUNTER — TRANSCRIBE ORDERS (OUTPATIENT)
Dept: ADMINISTRATIVE | Facility: HOSPITAL | Age: 70
End: 2025-06-18
Payer: OTHER GOVERNMENT

## 2025-06-18 DIAGNOSIS — K59.00 CONSTIPATION, UNSPECIFIED CONSTIPATION TYPE: ICD-10-CM

## 2025-06-18 DIAGNOSIS — K74.69 OTHER CIRRHOSIS OF LIVER: Primary | ICD-10-CM

## 2025-06-18 DIAGNOSIS — K21.9 CHRONIC GERD: ICD-10-CM

## 2025-06-18 DIAGNOSIS — K75.81 NONALCOHOLIC STEATOHEPATITIS (NASH): ICD-10-CM

## 2025-06-19 ENCOUNTER — OFFICE VISIT (OUTPATIENT)
Dept: PSYCHIATRY | Facility: CLINIC | Age: 70
End: 2025-06-19
Payer: OTHER GOVERNMENT

## 2025-06-19 DIAGNOSIS — F41.1 GENERALIZED ANXIETY DISORDER: Chronic | ICD-10-CM

## 2025-06-19 DIAGNOSIS — F31.32 BIPOLAR 1 DISORDER, DEPRESSED, MODERATE: Primary | Chronic | ICD-10-CM

## 2025-06-19 LAB — BACTERIA SPEC AEROBE CULT: ABNORMAL

## 2025-06-19 PROCEDURE — 99214 OFFICE O/P EST MOD 30 MIN: CPT | Performed by: PSYCHIATRY & NEUROLOGY

## 2025-06-19 PROCEDURE — 1159F MED LIST DOCD IN RCRD: CPT | Performed by: PSYCHIATRY & NEUROLOGY

## 2025-06-19 PROCEDURE — 1160F RVW MEDS BY RX/DR IN RCRD: CPT | Performed by: PSYCHIATRY & NEUROLOGY

## 2025-06-19 RX ORDER — PRAMIPEXOLE DIHYDROCHLORIDE 0.5 MG/1
0.5 TABLET ORAL 2 TIMES DAILY
Qty: 180 TABLET | Refills: 1 | Status: SHIPPED | OUTPATIENT
Start: 2025-06-19

## 2025-06-19 RX ORDER — CLONAZEPAM 0.5 MG/1
0.5 TABLET ORAL DAILY PRN
Qty: 30 TABLET | Refills: 2 | Status: SHIPPED | OUTPATIENT
Start: 2025-06-19

## 2025-06-19 RX ORDER — ESCITALOPRAM OXALATE 10 MG/1
15 TABLET ORAL DAILY
Qty: 135 TABLET | Refills: 1 | Status: SHIPPED | OUTPATIENT
Start: 2025-06-19

## 2025-06-19 RX ORDER — ROPINIROLE 0.5 MG/1
0.5 TABLET, FILM COATED ORAL 2 TIMES DAILY
Qty: 60 TABLET | Refills: 5 | Status: SHIPPED | OUTPATIENT
Start: 2025-06-19

## 2025-06-19 NOTE — PROGRESS NOTES
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 [id number]:  [id number]    Glucose 05/20/2025 179 (H)  70 - 105 mg/dL Final    Serial Number: [id number]:  [id number]    Glucose 05/22/2025 117 (H)  70 - 105 mg/dL Final    Serial Number: [id number]:  [id number]    Glucose 05/22/2025 166 (H)  70 - 105 mg/dL Final    Serial Number: [id number]:  [id number]    Glucose 05/21/2025 144 (H)  70 - 105 mg/dL Final    Serial Number: [id number]:  [id number]    Glucose 05/21/2025 167 (H)  70 - 105 mg/dL Final    Serial Number: [id number]:  [id number]    Glucose 05/21/2025 178 (H)  70 - 105 mg/dL Final    Serial Number: [id number]:  [id number]    Glucose 05/23/2025 121 (H)  70 - 105 mg/dL Final    Serial Number: [id number]:  [id number]    Glucose 05/22/2025 157 (H)  70 - 105 mg/dL Final    Serial Number: [id number]:  [id number]    Glucose 05/22/2025 203 (H)  70 - 105 mg/dL Final    Serial Number: [id number]:  [id number]    Glucose 05/23/2025 146 (H)  70 - 105 mg/dL Final    Serial Number: [id number]:  [id number]   Lab on 04/17/2025   Component Date Value Ref Range Status    Total Cholesterol 04/17/2025 188  0 - 200 mg/dL Final    Triglycerides 04/17/2025 129  0 - 150 mg/dL Final     HDL Cholesterol 04/17/2025 58  40 - 60 mg/dL Final    LDL Cholesterol  04/17/2025 107 (H)  0 - 100 mg/dL Final    VLDL Cholesterol 04/17/2025 23  5 - 40 mg/dL Final    LDL/HDL Ratio 04/17/2025 1.80   Final       Assessment & Plan   Problems Addressed this Visit       Bipolar 1 disorder, depressed, moderate - Primary (Chronic)    Relevant Medications    escitalopram (LEXAPRO) 10 MG tablet    Generalized anxiety disorder (Chronic)    Relevant Medications    escitalopram (LEXAPRO) 10 MG tablet    clonazePAM (KlonoPIN) 0.5 MG tablet     Diagnoses         Codes Comments      Bipolar 1 disorder, depressed, moderate    -  Primary ICD-10-CM: F31.32  ICD-9-CM: 296.52       Generalized anxiety disorder     ICD-10-CM: F41.1  ICD-9-CM: 300.02             Visit Diagnoses:    ICD-10-CM ICD-9-CM   1. Bipolar 1 disorder, depressed, moderate  F31.32 296.52   2. Generalized anxiety disorder  F41.1 300.02             TREATMENT PLAN/GOALS: Continue supportive psychotherapy efforts and medications as indicated. Treatment and medication options discussed during today's visit. Patient ackowledged and verbally consented to continue with current treatment plan and was educated on the importance of compliance with treatment and follow-up appointments.    MEDICATION ISSUES:  1. Bipolar d/o - stop latuda completely - mood is stable, the pt has parkinson   Cont gabapentin 300 mg TID prescribed by neurologist , gabapentin is mild mood stabilizer   PCP is monitoring HgA1c     2. Generalized anxiety disorder-continue clonazepam  0.5 mg, decrease to 1 tabper day     (it was already decreased from 1 mg BID)  continue Lexapro 15 mg p.o. daily, no changes necessary.  Cont mirapex and pramipexole   Recommended to implement behavioral changes, to get distracted, to play games and work on puzzles     3. Long term therapeutic drug monitoring - UDS consistent  3/10/23    LABORATORY - SCAN - MULTI LAB REPORT, [facility] LABORATORY, 03/10/2023 (03/10/2023)     3/7/24 - consistent   [facility] Abbreviated Urine Drug Screen - (03/07/2024)       Issues with meds discussed , long term benzo use in geriatric population     INSPECT reviewed as expected  5/24/25 - clonazepam 0.5 mg # 28 for 14 days         PHQ scored 11 and indicated  moderate  depression (mainly due to health issues)    FAVIOLA 7 scored 7 mild anxiety     Patient screened positive for depression based on a PHQ-9 score of 11 on 6/19/2025. Follow-up recommendations include: Prescribed antidepressant medication treatment.      Discussed medication options and treatment plan of prescribed medication as well as the risks, benefits, and side effects including potential falls, possible impaired driving and metabolic adversities among others. Patient is agreeable to call the office with any worsening of symptoms or onset of side effects. Patient is agreeable to call 911 or go to the nearest ER should he/she begin having SI/HI. No medication side effects or related complaints today.     MEDS ORDERED DURING VISIT:  New Medications Ordered This Visit   Medications    rOPINIRole (REQUIP) 0.5 MG tablet     Sig: Take 1 tablet by mouth 2 (Two) Times a Day. Take 1 hour before bedtime.     Dispense:  60 tablet     Refill:  5    pramipexole (MIRAPEX) 0.5 MG tablet     Sig: Take 1 tablet by mouth 2 (Two) Times a Day.     Dispense:  180 tablet     Refill:  1     * * N O T I C E * * Last quantity doesn't match original quantity    escitalopram (LEXAPRO) 10 MG tablet     Sig: Take 1.5 tablets by mouth Daily.     Dispense:  135 tablet     Refill:  1     * * N O T I C E * * Last quantity doesn't match original quantity    clonazePAM (KlonoPIN) 0.5 MG tablet     Sig: Take 1 tablet by mouth Daily As Needed for Anxiety.     Dispense:  30 tablet     Refill:  2     Please dispense when it is due       Return in about 6 months (around 12/19/2025).       This document has been electronically signed by Archana Singh MD  June 19, 2025 13:35  EDT

## 2025-06-25 ENCOUNTER — HOSPITAL ENCOUNTER (OUTPATIENT)
Dept: GENERAL RADIOLOGY | Facility: HOSPITAL | Age: 70
Discharge: HOME | End: 2025-06-25
Payer: MEDICARE

## 2025-06-25 DIAGNOSIS — S32.050A CLOSED COMPRESSION FRACTURE OF L5 LUMBAR VERTEBRA, INITIAL ENCOUNTER: ICD-10-CM

## 2025-06-25 PROCEDURE — 72100 X-RAY EXAM L-S SPINE 2/3 VWS: CPT

## 2025-06-27 ENCOUNTER — TELEPHONE (OUTPATIENT)
Dept: FAMILY MEDICINE CLINIC | Facility: CLINIC | Age: 70
End: 2025-06-27
Payer: OTHER GOVERNMENT

## 2025-06-27 ENCOUNTER — TELEPHONE (OUTPATIENT)
Dept: NEUROSURGERY | Facility: CLINIC | Age: 70
End: 2025-06-27

## 2025-06-27 NOTE — PROGRESS NOTES
Subjective     Chief Complaint   Patient presents with    Back Pain         Previous Treatment:   NSAID'S, Ice, Heat, Rest, Topical anesthetics, and Nerve pain medications    HPI: Dariana Rivera is a 69 y.o. female who presents to clinic for follow-up on L5 compression fracture.  Being seen in the hospital.  Unfortunately, patient continues to be in quite a bit of pain.  She recently fell after coming out of [facility] and broke bone in her right foot.  She states that since being seen in the hospital she continues to have quite a bit of pain stating that it is a 7 or 8 out of 10.  She has been wearing her LSO as prescribed.  She reports no new symptoms since I last seen her.        PMH:  Past Medical History:   Diagnosis Date    Allergic Not sure    Angina at rest     Anxiety disorder     Asthma About 5 yrs ago    Back pain     Bipolar disorder     Cataract     Cervical dystonia     Cholelithiasis     Chronic pain     Chronic pain disorder     Cirrhosis     Colitis     Colon polyps     COPD (chronic obstructive pulmonary disease)     Cramping of feet     Cramping of hands     CTS (carpal tunnel syndrome)     DDD (degenerative disc disease), cervical     DDD (degenerative disc disease), lumbar     DDD (degenerative disc disease), lumbar     DDD (degenerative disc disease), thoracic     Depression     Diabetes mellitus     Diverticulosis     Dysphagia 09/2020    Eosinophilic colitis     Gastritis     GERD (gastroesophageal reflux disease)     Headache Not sure    Hepatic steatosis     Hx of lithotripsy 02/10/2021    Hypertension     IBS (irritable bowel syndrome)     Infectious viral hepatitis     Insomnia     Kidney stones     Low back pain     Lumbar stenosis     Lumbosacral disc disease 5/22/2025    Neck pain     Neuromuscular disorder Not sure    Neuropathy     Obesity     Osteoarthritis     Osteopenia     Parkinson's disease     Peripheral edema     Pneumonia May 2018    PTSD (post-traumatic stress disorder)      Recurrent UTI     Renal insufficiency     Seizure     Self-injurious behavior     Sleep apnea     Sleep apnea, obstructive     Suicide attempt     Urinary incontinence          Current Outpatient Medications:     albuterol sulfate  (90 Base) MCG/ACT inhaler, Inhale 2 puffs Every 6 (Six) Hours As Needed for Wheezing or Shortness of Air., Disp: 18 g, Rfl: 5    amantadine (SYMMETREL) 100 MG capsule, Take 1 capsule by mouth 2 (Two) Times a Day., Disp: , Rfl:     aspirin 81 MG EC tablet, Take 1 tablet by mouth Daily., Disp: , Rfl:     calcium carbonate (Calcium 600) 600 MG tablet, Take 1 tablet by mouth Daily., Disp: , Rfl:     carbidopa-levodopa ER (SINEMET CR)  MG per tablet, Take 2 tablets by mouth 3 (Three) Times a Day., Disp: , Rfl:     Cholecalciferol (Vitamin D3) 50 MCG (2000 UT) capsule, Take 1 capsule by mouth Daily., Disp: , Rfl:     clonazePAM (KlonoPIN) 0.5 MG tablet, Take 1 tablet by mouth Daily As Needed for Anxiety., Disp: 30 tablet, Rfl: 2    escitalopram (LEXAPRO) 10 MG tablet, Take 1.5 tablets by mouth Daily., Disp: 135 tablet, Rfl: 1    Farxiga 10 MG tablet, TAKE ONE TABLET BY MOUTH DAILY, Disp: 90 tablet, Rfl: 0    Fluticasone-Umeclidin-Vilant (Trelegy Ellipta) 100-62.5-25 MCG/ACT inhaler, Inhale 1 puff Daily., Disp: 1 each, Rfl: 5    furosemide (LASIX) 40 MG tablet, Take 1 tablet by mouth., Disp: , Rfl:     gabapentin (NEURONTIN) 300 MG capsule, Take 1 capsule by mouth 3 (Three) Times a Day., Disp: , Rfl:     glucose blood (Accu-Chek Guide Test) test strip, For use with glucometer to check glucose once daily., Disp: 100 each, Rfl: 3    lactulose (CHRONULAC) 10 GM/15ML solution, Take 30 mL by mouth 2 (Two) Times a Day As Needed., Disp: , Rfl:     Lancets (accu-chek safe-t pro) lancets, For use with glucometer to check glucose level once daily., Disp: 100 each, Rfl: 3    lidocaine (LIDODERM) 5 %, Place 1 patch on the skin as directed by provider Daily. Remove & Discard patch within 12  hours or as directed by MD, Disp: 30 each, Rfl: 2    Melatonin 10 MG capsule, Take 1 capsule by mouth every night at bedtime., Disp: , Rfl:     Multiple Vitamins-Minerals (MULTIVITAMIN WITH MINERALS) tablet tablet, Take 1 tablet by mouth Daily., Disp: , Rfl:     naloxone (NARCAN) 4 MG/0.1ML nasal spray, Call 911. Don't prime. [place] in 1 nostril for overdose. Repeat in 2-3 minutes in other nostril if no or minimal breathing/responsiveness., Disp: 2 each, Rfl: 0    omeprazole (priLOSEC) 40 MG capsule, Take 1 capsule by mouth Daily., Disp: , Rfl:     pentoxifylline (TRENtal) 400 MG CR tablet, Take 1 tablet by mouth 3 (Three) Times a Day With Meals., Disp: , Rfl:     potassium chloride (KLOR-CON M20) 20 MEQ CR tablet, Take 1 tablet by mouth Daily., Disp: , Rfl:     pramipexole (MIRAPEX) 0.5 MG tablet, Take 1 tablet by mouth 2 (Two) Times a Day., Disp: 180 tablet, Rfl: 1    rOPINIRole (REQUIP) 0.5 MG tablet, Take 1 tablet by mouth 2 (Two) Times a Day. Take 1 hour before bedtime., Disp: 60 tablet, Rfl: 5    spironolactone (ALDACTONE) 25 MG tablet, Take 1 tablet by mouth Daily., Disp: , Rfl:     vitamin C (ASCORBIC ACID) 500 MG tablet, Take 2 tablets by mouth Daily., Disp: , Rfl:      Allergies   Allergen Reactions    Ambien  [Zolpidem Tartrate] Confusion    Ciprofloxacin Hcl Rash    Penicillins Rash    Zolpidem Unknown - High Severity     Other reaction(s): Confusion    Methadone Hallucinations    Clindamycin Rash        Past Surgical History:   Procedure Laterality Date    BACK SURGERY      BRONCHOSCOPY  Date Unknown    CARDIAC CATHETERIZATION  02/2019    CARPAL TUNNEL RELEASE Bilateral     Wrist     CHOLECYSTECTOMY  1997    COLONOSCOPY      CYSTOSCOPY BLADDER STONE LITHOTRIPSY      ENDOMETRIAL ABLATION      ENDOSCOPY      ENDOSCOPY N/A 04/15/2020    Procedure: ESOPHAGOGASTRODUODENOSCOPY with dilitation (60FR.);  Surgeon: Julieta Allison MD;  Location: [facility] ENDOSCOPY;  Service: Gastroenterology;  Laterality: N/A;   varices,hiatal hernia,gastritis    ENDOSCOPY N/A 09/29/2020    Procedure: ESOPHAGOGASTRODUODENOSCOPY with esophageal dilitation (54 bougie);  Surgeon: Julieta Allison MD;  Location: [facility] ENDOSCOPY;  Service: Gastroenterology;  Laterality: N/A;   post op: hiatal hernia, esophageal stricture    ENDOSCOPY N/A 09/30/2021    Procedure: ESOPHAGOGASTRODUODENOSCOPY WITH DILATATION (BOUGIE #60);  Surgeon: Julieta Allison MD;  Location: [facility] ENDOSCOPY;  Service: Gastroenterology;  Laterality: N/A;  ESOPHAGEAL VARICES AND HIATAL HERNIA    ENDOSCOPY N/A 07/21/2022    Procedure: ESOPHAGOGASTRODUODENOSCOPY WITH BIOPSY X1 AREA  AND DILATATION (BOUGIE  #54);  Surgeon: Zachary Mcgarry MD;  Location: [facility] ENDOSCOPY;  Service: Gastroenterology;  Laterality: N/A;  Post: candida esophagitis,gastritis    ENDOSCOPY N/A 05/02/2023    Procedure: ESOPHAGOGASTRODUODENOSCOPY WITH BIOPSY X 1  AREA  AND DILATATON (48 NON GUIDED BOUGIE);  Surgeon: Zachary Mcgarry MD;  Location: [facility] ENDOSCOPY;  Service: Gastroenterology;  Laterality: N/A;  post op: GASTRITIS, SMALL ESOPHAGEAL VARICES    ENDOSCOPY N/A 02/10/2025    Procedure: ESOPHAGOGASTRODUODENOSCOPY, biopsy x2 areas;  Surgeon: Zachary Mcgarry MD;  Location: [facility] ENDOSCOPY;  Service: Gastroenterology;  Laterality: N/A;  post: gastritis, abnormal mucosa mid esophagus    EPIDURAL BLOCK      Several times    EYE SURGERY  3-14 & 3-    Cataract surgery R 3-14, L 3-    HYSTERECTOMY  09/2004    complete    INTERSTIM PLACEMENT      bladder    JOINT REPLACEMENT Bilateral     knees    KNEE ARTHROSCOPY Bilateral     Arthroscopic surgery to bilateral knees     NECK SURGERY  04/2018    Fusion surgery C3-C4 C6-C7    OTHER SURGICAL HISTORY  2015    Stress test     OTHER SURGICAL HISTORY  10/2016    Lithotripsy total 4-5    OTHER SURGICAL HISTORY      Right arm cellulits- spider bites     OTHER SURGICAL HISTORY  02/08/2018    Lithotripsy    OTHER  SURGICAL HISTORY  2018    ACDF C3-C4 & C6-C7    REPLACEMENT TOTAL KNEE  2000    REPLACEMENT TOTAL KNEE Left 2016    SHOULDER ROTATOR CUFF REPAIR Right 2020    Procedure: RT ROTATOR CUFF REPAIR OPEN;  Surgeon: Curly Vaughn MD;  Location: [facility] MAIN OR;  Service: Orthopedics    SPINAL FUSION  2018    Fusion surgery C3-C4 C6-C7    SPINE SURGERY  2018    Fusion surgery C3-C4 and C6-C7    TRIGGER POINT INJECTION      Several times        Family History   Problem Relation Age of Onset    Hypertension Mother     Hyperlipidemia Mother     Arthritis Mother     Cancer Mother         Skin cancers    Depression Mother     Mental illness Mother         Depression and Alzheimers    Vision loss Mother         Has macular degeneration.    Dementia Mother         Has Alzheimers    Diabetes Father              Heart disease Father         Angina    Early death Father          at 57 from heart attack     Heart attack Father          of heart attack in     Heart failure Father              Hyperlipidemia Sister     Arthritis Sister     Cancer Sister         Skin cancers    Depression Sister     Diabetes Sister         Diagnosed     Heart disease Sister         Afib pacemaker inserted    Hypertension Sister     Mental illness Sister         Depression    Stroke Sister         2 mini strokes    Arrhythmia Sister     Diabetes Brother         Not sure when diagnosed    Heart disease Brother         Open heart surgery for blockages    Hypertension Brother     Hyperlipidemia Brother     Arthritis Brother     Cancer Brother         Skin cancers and colon cancer     Heart attack Brother         Had a heart attack about 4 yrs ago    Breast cancer Maternal Grandmother     Breast cancer Maternal Aunt          Social Hx:  Social History     Tobacco Use   Smoking Status Former    Current packs/day: 0.00    Average packs/day: 0.5 packs/day for 15.6 years (7.8 ttl pk-yrs)     "Types: Cigarettes    Start date: 1974    Quit date: 1990    Years since quittin.0    Passive exposure: Past   Smokeless Tobacco Never   Tobacco Comments    Stopped off and on.  Stopped once for more than 2 yrs      Alcohol Use: Not At Risk (2025)    AUDIT-C     Frequency of Alcohol Consumption: Never     Average Number of Drinks: Patient does not drink     Frequency of Binge Drinking: Never      Social History     Substance and Sexual Activity   Drug Use No          Review of Systems   Constitutional:  Positive for activity change.   HENT: Negative.     Eyes: Negative.    Respiratory: Negative.     Cardiovascular: Negative.    Gastrointestinal: Negative.    Endocrine: Negative.    Genitourinary: Negative.    Musculoskeletal:  Positive for arthralgias, back pain (ache,sharp,burn-lower-bilateral hips) and myalgias.   Skin: Negative.    Allergic/Immunologic: Negative.    Neurological:  Positive for numbness (+tingling right foot).   Hematological: Negative.    Psychiatric/Behavioral:  Positive for sleep disturbance.          Objective     /79   Pulse 63   Resp 18   Ht 165.1 cm (65\")   Wt 83.9 kg (185 lb)   SpO2 98%   BMI 30.79 kg/m²    Body mass index is 30.79 kg/m².      Physical Exam  Vitals reviewed.   Musculoskeletal:         General: Normal range of motion.      Cervical back: Normal range of motion.   Skin:     General: Skin is warm and dry.   Neurological:      General: No focal deficit present.   Psychiatric:         Mood and Affect: Mood normal.         Speech: Speech normal.          Neurological Exam  Mental Status  Awake, alert and oriented to person, place and time. Oriented to person, place and time. Speech is normal. Language is fluent with no aphasia.    Motor    4/5 strength in lower extremities.          Results Review  I personally reviewed and interpreted the images from the following studies:          XR Spine Lumbar 2 or 3 View  Result Date: 2025  XR SPINE " LUMBAR 2 OR 3 VW Date of Exam: 6/25/2025 11:01 AM EDT Indication: L5 compression fracture Comparison: Radiographs 5/19/2025, CT 5/20/2025 Findings: There are 5 lumbar type vertebral bodies. There is fracture of the L5 vertebrae, as before. L5 vertebral body height appears grossly similar to the prior exam. Lumbar vertebral body heights otherwise appear grossly normal. No definite acute osseous abnormality is seen. Osseous structures appear demineralized. Multilevel disc and facet joint degeneration, as before. Calcific atherosclerosis of the abdominal aorta. Right sacral nerve stimulator device is present.     Impression: Impression: 1.Fracture of the L5 vertebrae, as before. L5 vertebral body height appears grossly similar to the prior exam. 2.No definite acute osseous abnormality is seen. 3.Multilevel disc and facet joint degeneration, as before. Electronically Signed: Zachary Montemayor  6/30/2025 9:26 AM EDT  Workstation ID: [id number]        Assessment & Plan     MDM: Dariana Rivera is a 69 y.o. female who presents to clinic after being seen in the hospital for an L5 compression fracture.  Unfortunately, patient continues to be in quite a bit of pain.  She reports no new symptoms or radicular symptoms at this time.    Patient's updated x-ray of her lumbar spine shows that the L5 compression fracture appears grossly similar to her prior exam.    On physical semination patient has 4/5 strength in her lower extremities and does not report any sensory deficit.  She reports some hip pain but otherwise does not report any radicular symptoms into her lower extremities at this time.    At this time I am going to have the patient continue to wear her LSO brace for another 4 weeks and get an updated CT scan since she is having delayed healing in addition to having a recent fall.  Patient states that she has had no worse pain since the fall.    Should the patient have continued worsening symptoms when I see her again in 4  weeks I will send her to have an evaluation for possible kyphoplasty.    Patient is agreeable to this plan is to call any questions or concerns.       Diagnosis Plan   1. Closed compression fracture of L5 lumbar vertebra with delayed healing, subsequent encounter  CT Lumbar Spine Without Contrast          Return 4 weeks, for Recheck.      Dariana Rivera  reports that she quit smoking about 35 years ago. Her smoking use included cigarettes. She started smoking about 50 years ago. She has a 7.8 pack-year smoking history. She has been exposed to tobacco smoke. She has never used smokeless tobacco.               This patient was examined wearing appropriate personal protective equipment.            Rachel Cristina PA-C    06/30/25  11:50 EDT      Part of this note may be an electronic transcription/translation of spoken language to printed text using the Dragon Dictation System.

## 2025-06-27 NOTE — TELEPHONE ENCOUNTER
Called Stacie as she has an appointment today but unfortunately her results aren't in yet. She needs to reschedule to Tuesday.     HUB OKAY TO RELAY MESSAGE     Patient is no longer in a facility

## 2025-06-27 NOTE — TELEPHONE ENCOUNTER
Swapna with help at home called to report fall on Monday. Pt was walking with attendant care staff. Pt was wearing large sandals and feet were slipping around in them. That caused her to fall in the grocery store parking lot. She saw podiatrist the next day. The poditrist did a toenail removal and xray. Pt has a fractured foot. Foor on right side is in a boot. She wanted to make you aware.

## 2025-06-30 ENCOUNTER — OFFICE VISIT (OUTPATIENT)
Dept: NEUROSURGERY | Facility: CLINIC | Age: 70
End: 2025-06-30
Payer: OTHER GOVERNMENT

## 2025-06-30 VITALS
SYSTOLIC BLOOD PRESSURE: 132 MMHG | OXYGEN SATURATION: 98 % | RESPIRATION RATE: 18 BRPM | BODY MASS INDEX: 30.82 KG/M2 | DIASTOLIC BLOOD PRESSURE: 79 MMHG | HEART RATE: 63 BPM | HEIGHT: 65 IN | WEIGHT: 185 LBS

## 2025-06-30 DIAGNOSIS — S32.050G CLOSED COMPRESSION FRACTURE OF L5 LUMBAR VERTEBRA WITH DELAYED HEALING, SUBSEQUENT ENCOUNTER: Primary | ICD-10-CM

## 2025-06-30 RX ORDER — FUROSEMIDE 40 MG/1
1 TABLET ORAL
COMMUNITY

## 2025-07-01 ENCOUNTER — HOSPITAL ENCOUNTER (OUTPATIENT)
Dept: ULTRASOUND IMAGING | Facility: HOSPITAL | Age: 70
Discharge: HOME | End: 2025-07-01
Admitting: NURSE PRACTITIONER
Payer: MEDICARE

## 2025-07-01 DIAGNOSIS — K21.9 CHRONIC GERD: ICD-10-CM

## 2025-07-01 DIAGNOSIS — F31.32 BIPOLAR 1 DISORDER, DEPRESSED, MODERATE: Chronic | ICD-10-CM

## 2025-07-01 DIAGNOSIS — K75.81 NONALCOHOLIC STEATOHEPATITIS (NASH): ICD-10-CM

## 2025-07-01 DIAGNOSIS — K59.00 CONSTIPATION, UNSPECIFIED CONSTIPATION TYPE: ICD-10-CM

## 2025-07-01 DIAGNOSIS — K74.69 OTHER CIRRHOSIS OF LIVER: ICD-10-CM

## 2025-07-01 PROCEDURE — 76705 ECHO EXAM OF ABDOMEN: CPT

## 2025-07-02 RX ORDER — LURASIDONE HYDROCHLORIDE 20 MG/1
20 TABLET, FILM COATED ORAL EVERY EVENING
Qty: 30 TABLET | Refills: 2 | OUTPATIENT
Start: 2025-07-02

## 2025-07-24 ENCOUNTER — HOSPITAL ENCOUNTER (OUTPATIENT)
Dept: CT IMAGING | Facility: HOSPITAL | Age: 70
Discharge: HOME | End: 2025-07-24
Payer: MEDICARE

## 2025-07-24 DIAGNOSIS — S32.050G CLOSED COMPRESSION FRACTURE OF L5 LUMBAR VERTEBRA WITH DELAYED HEALING, SUBSEQUENT ENCOUNTER: ICD-10-CM

## 2025-07-24 PROCEDURE — 72131 CT LUMBAR SPINE W/O DYE: CPT

## 2025-07-28 NOTE — PROGRESS NOTES
Subjective     Chief Complaint   Patient presents with    Back Pain         Previous Treatment:   NSAID'S, Tylenol, Muscle Relaxants, Rest, Topical anesthetics, and Nerve pain medications    HPI: Dariana Rivera is a 69 y.o. female who presents to clinic for follow-up on L5 compression fracture.  Being seen in the hospital.  Unfortunately, patient continues to be in quite a bit of pain.  She recently fell after coming out of [email or web address] and broke bone in her right foot.  She states that since being seen in the hospital she continues to have quite a bit of pain stating that it is a 7 or 8 out of 10.  She has been wearing her LSO as prescribed.  She reports no new symptoms since I last seen her.     Interval history 8/1/2025:  Patient presents to clinic after wearing her brace for 6 weeks with an updated imaging.  Patient unfortunately still has a lot of back pain.  She also states that she is now having pain that radiates up her leg and her right leg.  She does not report any other symptoms at this time and denies any bowel or bladder incontinence or saddle anesthesia.        PMH:  Past Medical History:   Diagnosis Date    Allergic Not sure    Angina at rest     Anxiety disorder     Asthma About 5 yrs ago    Back pain     Bipolar disorder     Cataract     Cervical dystonia     Cholelithiasis     Chronic pain     Chronic pain disorder     Cirrhosis     Colitis     Colon polyps     COPD (chronic obstructive pulmonary disease)     Cramping of feet     Cramping of hands     CTS (carpal tunnel syndrome)     DDD (degenerative disc disease), cervical     DDD (degenerative disc disease), lumbar     DDD (degenerative disc disease), lumbar     DDD (degenerative disc disease), thoracic     Depression     Diabetes mellitus     Diverticulosis     Dysphagia 09/2020    Eosinophilic colitis     Gastritis     GERD (gastroesophageal reflux disease)     Headache Not sure    Hepatic steatosis     Hx of lithotripsy 02/10/2021     Hypertension     IBS (irritable bowel syndrome)     Infectious viral hepatitis     Insomnia     Kidney stones     Low back pain     Lumbar stenosis     Lumbosacral disc disease 5/22/2025    Neck pain     Neuromuscular disorder Not sure    Neuropathy     Obesity     Osteoarthritis     Osteopenia     Parkinson's disease     Peripheral edema     Pneumonia May 2018    PTSD (post-traumatic stress disorder)     Recurrent UTI     Renal insufficiency     Seizure     Self-injurious behavior     Sleep apnea     Sleep apnea, obstructive     Suicide attempt     Urinary incontinence          Current Outpatient Medications:     albuterol sulfate  (90 Base) MCG/ACT inhaler, Inhale 2 puffs Every 6 (Six) Hours As Needed for Wheezing or Shortness of Air., Disp: 18 g, Rfl: 5    amantadine (SYMMETREL) 100 MG capsule, Take 1 capsule by mouth 2 (Two) Times a Day., Disp: , Rfl:     aspirin 81 MG EC tablet, Take 1 tablet by mouth Daily., Disp: , Rfl:     calcium carbonate (Calcium 600) 600 MG tablet, Take 1 tablet by mouth Daily., Disp: , Rfl:     carbidopa-levodopa ER (SINEMET CR)  MG per tablet, Take 2 tablets by mouth 3 (Three) Times a Day., Disp: , Rfl:     Cholecalciferol (Vitamin D3) 50 MCG (2000 UT) capsule, Take 1 capsule by mouth Daily., Disp: , Rfl:     clonazePAM (KlonoPIN) 0.5 MG tablet, Take 1 tablet by mouth Daily As Needed for Anxiety., Disp: 30 tablet, Rfl: 2    escitalopram (LEXAPRO) 10 MG tablet, Take 1.5 tablets by mouth Daily., Disp: 135 tablet, Rfl: 1    Farxiga 10 MG tablet, TAKE ONE TABLET BY MOUTH DAILY, Disp: 90 tablet, Rfl: 0    Fluticasone-Umeclidin-Vilant (Trelegy Ellipta) 100-62.5-25 MCG/ACT inhaler, Inhale 1 puff Daily., Disp: 1 each, Rfl: 5    gabapentin (NEURONTIN) 300 MG capsule, Take 1 capsule by mouth 3 (Three) Times a Day., Disp: , Rfl:     glucose blood (Accu-Chek Guide Test) test strip, For use with glucometer to check glucose once daily., Disp: 100 each, Rfl: 3    lactulose (CHRONULAC) 10  GM/15ML solution, Take 30 mL by mouth 2 (Two) Times a Day As Needed., Disp: , Rfl:     Lancets (accu-chek safe-t pro) lancets, For use with glucometer to check glucose level once daily., Disp: 100 each, Rfl: 3    lidocaine (LIDODERM) 5 %, Place 1 patch on the skin as directed by provider Daily. Remove & Discard patch within 12 hours or as directed by MD, Disp: 30 each, Rfl: 2    Melatonin 10 MG capsule, Take 1 capsule by mouth every night at bedtime., Disp: , Rfl:     Multiple Vitamins-Minerals (MULTIVITAMIN WITH MINERALS) tablet tablet, Take 1 tablet by mouth Daily., Disp: , Rfl:     naloxone (NARCAN) 4 MG/0.1ML nasal spray, Call 911. Don't prime. [place] in 1 nostril for overdose. Repeat in 2-3 minutes in other nostril if no or minimal breathing/responsiveness., Disp: 2 each, Rfl: 0    omeprazole (priLOSEC) 40 MG capsule, Take 1 capsule by mouth Daily., Disp: , Rfl:     pentoxifylline (TRENtal) 400 MG CR tablet, Take 1 tablet by mouth 3 (Three) Times a Day With Meals., Disp: , Rfl:     potassium chloride (KLOR-CON M20) 20 MEQ CR tablet, Take 1 tablet by mouth Daily., Disp: , Rfl:     pramipexole (MIRAPEX) 0.5 MG tablet, Take 1 tablet by mouth 2 (Two) Times a Day., Disp: 180 tablet, Rfl: 1    rOPINIRole (REQUIP) 0.5 MG tablet, Take 1 tablet by mouth 2 (Two) Times a Day. Take 1 hour before bedtime., Disp: 60 tablet, Rfl: 5    spironolactone (ALDACTONE) 25 MG tablet, Take 1 tablet by mouth Daily., Disp: , Rfl:     vitamin C (ASCORBIC ACID) 500 MG tablet, Take 2 tablets by mouth Daily., Disp: , Rfl:     furosemide (LASIX) 40 MG tablet, Take 1 tablet by mouth., Disp: , Rfl:      Allergies   Allergen Reactions    Ambien  [Zolpidem Tartrate] Confusion    Ciprofloxacin Hcl Rash    Penicillins Rash    Zolpidem Unknown - High Severity     Other reaction(s): Confusion    Methadone Hallucinations    Clindamycin Rash        Past Surgical History:   Procedure Laterality Date    BACK SURGERY      BRONCHOSCOPY  Date Unknown     CARDIAC CATHETERIZATION  02/2019    CARPAL TUNNEL RELEASE Bilateral     Wrist     CHOLECYSTECTOMY  1997    COLONOSCOPY      CYSTOSCOPY BLADDER STONE LITHOTRIPSY      ENDOMETRIAL ABLATION      ENDOSCOPY      ENDOSCOPY N/A 04/15/2020    Procedure: ESOPHAGOGASTRODUODENOSCOPY with dilitation (60FR.);  Surgeon: Julieta Allison MD;  Location: [facility] ENDOSCOPY;  Service: Gastroenterology;  Laterality: N/A;  varices,hiatal hernia,gastritis    ENDOSCOPY N/A 09/29/2020    Procedure: ESOPHAGOGASTRODUODENOSCOPY with esophageal dilitation (54 bougie);  Surgeon: Julieta Allison MD;  Location: [facility] ENDOSCOPY;  Service: Gastroenterology;  Laterality: N/A;   post op: hiatal hernia, esophageal stricture    ENDOSCOPY N/A 09/30/2021    Procedure: ESOPHAGOGASTRODUODENOSCOPY WITH DILATATION (BOUGIE #60);  Surgeon: Julieta Allison MD;  Location: [facility] ENDOSCOPY;  Service: Gastroenterology;  Laterality: N/A;  ESOPHAGEAL VARICES AND HIATAL HERNIA    ENDOSCOPY N/A 07/21/2022    Procedure: ESOPHAGOGASTRODUODENOSCOPY WITH BIOPSY X1 AREA  AND DILATATION (BOUGIE  #54);  Surgeon: Zachary Mcgarry MD;  Location: [facility] ENDOSCOPY;  Service: Gastroenterology;  Laterality: N/A;  Post: candida esophagitis,gastritis    ENDOSCOPY N/A 05/02/2023    Procedure: ESOPHAGOGASTRODUODENOSCOPY WITH BIOPSY X 1  AREA  AND DILATATON (48 NON GUIDED BOUGIE);  Surgeon: Zachary Mcgarry MD;  Location: [facility] ENDOSCOPY;  Service: Gastroenterology;  Laterality: N/A;  post op: GASTRITIS, SMALL ESOPHAGEAL VARICES    ENDOSCOPY N/A 02/10/2025    Procedure: ESOPHAGOGASTRODUODENOSCOPY, biopsy x2 areas;  Surgeon: Zachary Mcgarry MD;  Location: [facility] ENDOSCOPY;  Service: Gastroenterology;  Laterality: N/A;  post: gastritis, abnormal mucosa mid esophagus    EPIDURAL BLOCK      Several times    EYE SURGERY  3-14 & 3-    Cataract surgery R 3-14, L 3-    HYSTERECTOMY  09/2004    complete    INTERSTIM PLACEMENT      bladder     JOINT REPLACEMENT Bilateral     knees    KNEE ARTHROSCOPY Bilateral     Arthroscopic surgery to bilateral knees     NECK SURGERY  2018    Fusion surgery C3-C4 C6-C7    OTHER SURGICAL HISTORY  2015    Stress test     OTHER SURGICAL HISTORY  10/2016    Lithotripsy total 4-5    OTHER SURGICAL HISTORY      Right arm cellulits- spider bites     OTHER SURGICAL HISTORY  2018    Lithotripsy    OTHER SURGICAL HISTORY  2018    ACDF C3-C4 & C6-C7    REPLACEMENT TOTAL KNEE  2000    REPLACEMENT TOTAL KNEE Left 2016    SHOULDER ROTATOR CUFF REPAIR Right 2020    Procedure: RT ROTATOR CUFF REPAIR OPEN;  Surgeon: Curly Vaughn MD;  Location: [facility] MAIN OR;  Service: Orthopedics    SPINAL FUSION  2018    Fusion surgery C3-C4 C6-C7    SPINE SURGERY  2018    Fusion surgery C3-C4 and C6-C7    TRIGGER POINT INJECTION      Several times        Family History   Problem Relation Age of Onset    Hypertension Mother     Hyperlipidemia Mother     Arthritis Mother     Cancer Mother         Skin cancers    Depression Mother     Mental illness Mother         Depression and Alzheimers    Vision loss Mother         Has macular degeneration.    Dementia Mother         Has Alzheimers    Diabetes Father              Heart disease Father         Angina    Early death Father          at 57 from heart attack     Heart attack Father          of heart attack in     Heart failure Father              Hyperlipidemia Sister     Arthritis Sister     Cancer Sister         Skin cancers    Depression Sister     Diabetes Sister         Diagnosed     Heart disease Sister         Afib pacemaker inserted    Hypertension Sister     Mental illness Sister         Depression    Stroke Sister         2 mini strokes    Arrhythmia Sister     Diabetes Brother         Not sure when diagnosed    Heart disease Brother         Open heart surgery for blockages    Hypertension Brother     Hyperlipidemia  "Brother     Arthritis Brother     Cancer Brother         Skin cancers and colon cancer     Heart attack Brother         Had a heart attack about 4 yrs ago    Breast cancer Maternal Grandmother     Breast cancer Maternal Aunt          Social Hx:  Social History     Tobacco Use   Smoking Status Former    Current packs/day: 0.00    Average packs/day: 0.5 packs/day for 15.6 years (7.8 ttl pk-yrs)    Types: Cigarettes    Start date: 1974    Quit date: 1990    Years since quittin.1    Passive exposure: Past   Smokeless Tobacco Never   Tobacco Comments    Stopped off and on.  Stopped once for more than 2 yrs      Alcohol Use: Not At Risk (2025)    AUDIT-C     Frequency of Alcohol Consumption: Never     Average Number of Drinks: Patient does not drink     Frequency of Binge Drinking: Never      Social History     Substance and Sexual Activity   Drug Use No          Review of Systems   Constitutional:  Positive for activity change.   HENT: Negative.     Eyes: Negative.    Respiratory: Negative.     Cardiovascular: Negative.    Gastrointestinal: Negative.    Endocrine: Negative.    Genitourinary: Negative.    Musculoskeletal:  Positive for arthralgias, back pain (sharp-lower,hip pain/backsided) and myalgias.   Skin: Negative.    Allergic/Immunologic: Negative.    Neurological:  Positive for numbness (+tingling her feet).   Hematological: Negative.    Psychiatric/Behavioral:  Positive for sleep disturbance.          Objective     /82   Pulse 80   Resp 18   Ht 165.1 cm (65\")   Wt 83.9 kg (185 lb)   SpO2 96%   BMI 30.79 kg/m²    Body mass index is 30.79 kg/m².      Physical Exam  Vitals reviewed.   Musculoskeletal:         General: Normal range of motion.      Cervical back: Normal range of motion.   Skin:     General: Skin is warm and dry.   Neurological:      General: No focal deficit present.   Psychiatric:         Mood and Affect: Mood normal.         Speech: Speech normal.      "     Neurological Exam  Mental Status  Awake, alert and oriented to person, place and time. Oriented to person, place and time. Speech is normal. Language is fluent with no aphasia.    Motor    4/5 strength lower extremities.    Sensory  Does not endorse any sensory deficit.          Results Review  I personally reviewed and interpreted the images from the following studies:       CT Lumbar Spine Without Contrast  Result Date: 7/30/2025  CT LUMBAR SPINE WO CONTRAST Date of Exam: 7/24/2025 1:25 PM EDT Indication: L5 compression fracture with delayed healing. Comparison: Lumbar spine radiographs from June 25, 2025 and CT lumbar spine from May 20, 2025 Technique: Axial CT images were obtained of the lumbar spine without contrast administration.  Sagittal and coronal reconstructions were performed.  Automated exposure control and iterative reconstruction methods were used. Findings: A severe compression fracture at L5 has progressed from prior exam with mild bony retropulsion. No other fracture is identified. The alignment is stable. There are mild discogenic changes with vacuum disc phenomenon at L4-5 and L5-S1. The posterior paravertebral soft tissues are unremarkable, with note of a bladder stimulator device in place. Within the visualized abdomen are bilateral nonobstructing renal stones. L1-2: Mild disc bulge. Moderate bilateral facet arthropathy. No spinal canal stenosis. No neural foraminal stenosis. L2-3: Mild disc bulge. Moderate bilateral facet arthropathy. No spinal canal stenosis. Mild bilateral neural foraminal stenosis. L3-4: Moderate disc bulge. Moderate bilateral facet arthropathy with ligamentum flavum infolding. Moderate spinal canal stenosis. Moderate bilateral neural foraminal stenosis. L4-5: Moderate disc bulge. Severe bilateral facet arthropathy with ligamentum flavum infolding. Severe spinal canal stenosis. Moderate to severe bilateral neural foraminal stenosis. L5-S1: Moderate central disc  protrusion. Moderate bilateral facet arthropathy. Mild spinal canal stenosis. Moderate bilateral neural foraminal stenosis.     Impression: Impression: 1.Severe compression fracture at L5 has progressed from prior exam with mild bony retropulsion. 2.Multilevel degenerative changes of lumbar spine as described above, most prominent at L4-5 where there is severe spinal canal stenosis. Electronically Signed: Talib Christianson MD  7/30/2025 11:29 AM EDT  Workstation ID: [id number]           Assessment & Plan     MDM: Dariana Rivera is a 69 y.o. female who presents to clinic after wearing her brace for 6 weeks and an updated image.  Unfortunately, patient continues to have worsening low back pain.  She also states that she started having pain that radiates up her right leg.  Patient's updated CT scan shows a severe compression fracture L5 that is progressed from prior exam.    On physical examination patient continues to have fairly good strength in her lower extremities and is not reporting sensory deficit this time.    Unfortunately, since the patient does have worsening symptoms and her fracture has progressed I am sending her to pain management to be evaluated for possible kyphoplasty.  For the time being she is to continue wearing her brace at all times and is to continue with her restrictions.    Patient is to follow-up in clinic as needed.  Patient is agreeable to this plan knows call any questions or concerns.       Diagnosis Plan   1. Closed compression fracture of L5 lumbar vertebra with delayed healing, subsequent encounter  Ambulatory Referral to Pain Management          Return if symptoms worsen or fail to improve.      Dariana Rivera  reports that she quit smoking about 35 years ago. Her smoking use included cigarettes. She started smoking about 50 years ago. She has a 7.8 pack-year smoking history. She has been exposed to tobacco smoke. She has never used smokeless tobacco.                This patient  was examined wearing appropriate personal protective equipment.            Rachel Cristina PA-C    08/01/25  12:35 EDT      Part of this note may be an electronic transcription/translation of spoken language to printed text using the Dragon Dictation System.

## 2025-07-31 DIAGNOSIS — E11.49 TYPE 2 DIABETES MELLITUS WITH OTHER DIABETIC NEUROLOGICAL COMPLICATION: Chronic | ICD-10-CM

## 2025-07-31 RX ORDER — DAPAGLIFLOZIN 10 MG/1
1 TABLET, FILM COATED ORAL DAILY
Qty: 90 TABLET | Refills: 0 | Status: SHIPPED | OUTPATIENT
Start: 2025-07-31

## 2025-08-01 ENCOUNTER — OFFICE VISIT (OUTPATIENT)
Dept: NEUROSURGERY | Facility: CLINIC | Age: 70
End: 2025-08-01
Payer: OTHER GOVERNMENT

## 2025-08-01 VITALS
HEART RATE: 80 BPM | WEIGHT: 185 LBS | OXYGEN SATURATION: 96 % | RESPIRATION RATE: 18 BRPM | SYSTOLIC BLOOD PRESSURE: 131 MMHG | HEIGHT: 65 IN | DIASTOLIC BLOOD PRESSURE: 82 MMHG | BODY MASS INDEX: 30.82 KG/M2

## 2025-08-01 DIAGNOSIS — S32.050G CLOSED COMPRESSION FRACTURE OF L5 LUMBAR VERTEBRA WITH DELAYED HEALING, SUBSEQUENT ENCOUNTER: Primary | ICD-10-CM

## 2025-08-07 ENCOUNTER — TELEPHONE (OUTPATIENT)
Dept: FAMILY MEDICINE CLINIC | Facility: CLINIC | Age: 70
End: 2025-08-07
Payer: MEDICARE

## 2025-08-11 ENCOUNTER — TELEPHONE (OUTPATIENT)
Dept: NEUROSURGERY | Facility: CLINIC | Age: 70
End: 2025-08-11
Payer: MEDICARE

## 2025-08-11 ENCOUNTER — TELEPHONE (OUTPATIENT)
Dept: FAMILY MEDICINE CLINIC | Facility: CLINIC | Age: 70
End: 2025-08-11
Payer: MEDICARE

## 2025-08-11 DIAGNOSIS — S32.050A CLOSED COMPRESSION FRACTURE OF L5 VERTEBRA, INITIAL ENCOUNTER: Primary | ICD-10-CM

## 2025-08-11 RX ORDER — TRAMADOL HYDROCHLORIDE 50 MG/1
50 TABLET ORAL EVERY 12 HOURS PRN
Qty: 14 TABLET | Refills: 0 | Status: SHIPPED | OUTPATIENT
Start: 2025-08-11

## 2025-08-18 ENCOUNTER — TELEPHONE (OUTPATIENT)
Dept: FAMILY MEDICINE CLINIC | Facility: CLINIC | Age: 70
End: 2025-08-18
Payer: MEDICARE

## 2025-08-20 ENCOUNTER — OFFICE VISIT (OUTPATIENT)
Dept: PAIN MEDICINE | Facility: CLINIC | Age: 70
End: 2025-08-20
Payer: MEDICARE

## 2025-08-20 VITALS
SYSTOLIC BLOOD PRESSURE: 113 MMHG | WEIGHT: 187 LBS | RESPIRATION RATE: 16 BRPM | DIASTOLIC BLOOD PRESSURE: 71 MMHG | OXYGEN SATURATION: 96 % | HEART RATE: 65 BPM | BODY MASS INDEX: 31.12 KG/M2

## 2025-08-20 DIAGNOSIS — M54.16 LUMBAR RADICULOPATHY: ICD-10-CM

## 2025-08-20 DIAGNOSIS — S32.050G COMPRESSION FRACTURE OF L5 VERTEBRA WITH DELAYED HEALING: Primary | ICD-10-CM

## 2025-08-20 DIAGNOSIS — M47.816 LUMBAR SPONDYLOSIS: ICD-10-CM

## 2025-08-26 ENCOUNTER — TELEPHONE (OUTPATIENT)
Dept: FAMILY MEDICINE CLINIC | Facility: CLINIC | Age: 70
End: 2025-08-26
Payer: MEDICARE

## 2025-08-26 ENCOUNTER — HOSPITAL ENCOUNTER (EMERGENCY)
Facility: HOSPITAL | Age: 70
Discharge: HOME | End: 2025-08-26
Admitting: EMERGENCY MEDICINE
Payer: MEDICARE

## 2025-08-26 ENCOUNTER — APPOINTMENT (OUTPATIENT)
Dept: GENERAL RADIOLOGY | Facility: HOSPITAL | Age: 70
End: 2025-08-26
Payer: MEDICARE

## 2025-08-26 VITALS
OXYGEN SATURATION: 99 % | SYSTOLIC BLOOD PRESSURE: 128 MMHG | RESPIRATION RATE: 18 BRPM | WEIGHT: 186.95 LBS | HEART RATE: 69 BPM | HEIGHT: 65 IN | DIASTOLIC BLOOD PRESSURE: 56 MMHG | TEMPERATURE: 97.6 F | BODY MASS INDEX: 31.15 KG/M2

## 2025-08-26 DIAGNOSIS — S92.911A FRACTURE OF PROXIMAL PHALANX OF TOE OF RIGHT FOOT: Primary | ICD-10-CM

## 2025-08-26 PROCEDURE — 99283 EMERGENCY DEPT VISIT LOW MDM: CPT

## 2025-08-26 PROCEDURE — 73630 X-RAY EXAM OF FOOT: CPT

## 2025-08-26 PROCEDURE — 73610 X-RAY EXAM OF ANKLE: CPT

## 2025-08-26 RX ORDER — TRAMADOL HYDROCHLORIDE 50 MG/1
50 TABLET ORAL ONCE
Status: COMPLETED | OUTPATIENT
Start: 2025-08-26 | End: 2025-08-26

## 2025-08-26 RX ORDER — TRAMADOL HYDROCHLORIDE 50 MG/1
50 TABLET ORAL EVERY 6 HOURS PRN
Qty: 8 TABLET | Refills: 0 | Status: SHIPPED | OUTPATIENT
Start: 2025-08-26

## 2025-08-26 RX ORDER — TRAMADOL HYDROCHLORIDE 50 MG/1
50 TABLET ORAL ONCE
Status: DISCONTINUED | OUTPATIENT
Start: 2025-08-26 | End: 2025-08-26

## 2025-08-26 RX ADMIN — TRAMADOL HYDROCHLORIDE 50 MG: 50 TABLET, COATED ORAL at 11:49

## (undated) DEVICE — SUT VIC 3/0 FS1 27IN J442H

## (undated) DEVICE — NDL HYPO PRECISIONGLIDE/REG 20G 1IN YEL

## (undated) DEVICE — PAPR PRNT PK SONY W RIBN UPC55

## (undated) DEVICE — ADHS LIQ MASTISOL 2/3ML

## (undated) DEVICE — BITEBLOCK ENDO W/STRAP 60F A/ LF DISP

## (undated) DEVICE — GAUZE,SPONGE,FLUFF,6"X6.75",STRL,5/TRAY: Brand: MEDLINE

## (undated) DEVICE — PK PROC TURNOVER

## (undated) DEVICE — SOL IRRIG H2O 1000ML STRL

## (undated) DEVICE — SINGLE-USE BIOPSY FORCEPS: Brand: RADIAL JAW 4

## (undated) DEVICE — SUT PROLN 3/0 FS2 18IN 8665G

## (undated) DEVICE — 3M™ STERI-DRAPE™ U-DRAPE 1015: Brand: STERI-DRAPE™

## (undated) DEVICE — PK EXTREM 50

## (undated) DEVICE — GOWN,REINFORCE,POLY,SIRUS,BREATH SLV,XLG: Brand: MEDLINE

## (undated) DEVICE — IMMOB SHLDR PAD2 UNIV MD

## (undated) DEVICE — PK ENDO GI 50

## (undated) DEVICE — SOL IRRIG NACL 9PCT 1000ML BTL

## (undated) DEVICE — GLV SURG TRIUMPH LT PF LTX 8.5 STRL

## (undated) DEVICE — APPL CHLORAPREP W/TINT 10.5ML ORNG

## (undated) DEVICE — 3M™ STERI-DRAPE™ INSTRUMENT POUCH 1018: Brand: STERI-DRAPE™

## (undated) DEVICE — TP NDL SCORPION MULTIFIRE

## (undated) DEVICE — 3M™ STERI-STRIP™ REINFORCED ADHESIVE SKIN CLOSURES, R1547, 1/2 IN X 4 IN (12 MM X 100 MM), 6 STRIPS/ENVELOPE: Brand: 3M™ STERI-STRIP™

## (undated) DEVICE — GLV SURG TRIUMPH GREEN W/ALOE PF LTX 8.5 STRL